# Patient Record
Sex: MALE | Race: WHITE | NOT HISPANIC OR LATINO | Employment: OTHER | URBAN - METROPOLITAN AREA
[De-identification: names, ages, dates, MRNs, and addresses within clinical notes are randomized per-mention and may not be internally consistent; named-entity substitution may affect disease eponyms.]

---

## 2017-02-02 ENCOUNTER — GENERIC CONVERSION - ENCOUNTER (OUTPATIENT)
Dept: OTHER | Facility: OTHER | Age: 72
End: 2017-02-02

## 2017-02-22 ENCOUNTER — GENERIC CONVERSION - ENCOUNTER (OUTPATIENT)
Dept: OTHER | Facility: OTHER | Age: 72
End: 2017-02-22

## 2017-03-16 ENCOUNTER — GENERIC CONVERSION - ENCOUNTER (OUTPATIENT)
Dept: OTHER | Facility: OTHER | Age: 72
End: 2017-03-16

## 2017-04-04 ENCOUNTER — ALLSCRIPTS OFFICE VISIT (OUTPATIENT)
Dept: OTHER | Facility: OTHER | Age: 72
End: 2017-04-04

## 2017-04-06 ENCOUNTER — TRANSCRIBE ORDERS (OUTPATIENT)
Dept: ADMINISTRATIVE | Facility: HOSPITAL | Age: 72
End: 2017-04-06

## 2017-04-06 DIAGNOSIS — I49.9 IRREGULAR HEART BEATS: Primary | ICD-10-CM

## 2017-04-09 ENCOUNTER — HOSPITAL ENCOUNTER (EMERGENCY)
Facility: HOSPITAL | Age: 72
Discharge: HOME | End: 2017-04-09
Attending: EMERGENCY MEDICINE
Payer: COMMERCIAL

## 2017-04-09 ENCOUNTER — APPOINTMENT (EMERGENCY)
Dept: RADIOLOGY | Facility: HOSPITAL | Age: 72
End: 2017-04-09
Payer: COMMERCIAL

## 2017-04-09 VITALS
TEMPERATURE: 98 F | DIASTOLIC BLOOD PRESSURE: 80 MMHG | SYSTOLIC BLOOD PRESSURE: 168 MMHG | RESPIRATION RATE: 16 BRPM | OXYGEN SATURATION: 97 % | HEART RATE: 87 BPM | WEIGHT: 147.2 LBS

## 2017-04-09 DIAGNOSIS — S42.309A: Primary | ICD-10-CM

## 2017-04-09 PROCEDURE — 73030 X-RAY EXAM OF SHOULDER: CPT

## 2017-04-09 PROCEDURE — 96372 THER/PROPH/DIAG INJ SC/IM: CPT

## 2017-04-09 PROCEDURE — 99283 EMERGENCY DEPT VISIT LOW MDM: CPT

## 2017-04-09 RX ORDER — MORPHINE SULFATE 10 MG/ML
6 INJECTION, SOLUTION INTRAMUSCULAR; INTRAVENOUS ONCE
Status: COMPLETED | OUTPATIENT
Start: 2017-04-09 | End: 2017-04-09

## 2017-04-09 RX ORDER — ONDANSETRON 4 MG/1
4 TABLET, ORALLY DISINTEGRATING ORAL ONCE
Status: COMPLETED | OUTPATIENT
Start: 2017-04-09 | End: 2017-04-09

## 2017-04-09 RX ORDER — OXYCODONE HYDROCHLORIDE 5 MG/1
5 TABLET ORAL EVERY 4 HOURS PRN
Qty: 12 TABLET | Refills: 0 | Status: SHIPPED | OUTPATIENT
Start: 2017-04-09 | End: 2017-04-21

## 2017-04-09 RX ADMIN — ONDANSETRON 4 MG: 4 TABLET, ORALLY DISINTEGRATING ORAL at 19:14

## 2017-04-09 RX ADMIN — MORPHINE SULFATE 6 MG: 10 INJECTION, SOLUTION INTRAMUSCULAR; INTRAVENOUS at 19:14

## 2017-04-10 ENCOUNTER — ALLSCRIPTS OFFICE VISIT (OUTPATIENT)
Dept: OTHER | Facility: OTHER | Age: 72
End: 2017-04-10

## 2017-04-10 DIAGNOSIS — S42.202A CLOSED FRACTURE OF UPPER END OF LEFT HUMERUS: ICD-10-CM

## 2017-04-22 ENCOUNTER — HOSPITAL ENCOUNTER (EMERGENCY)
Facility: HOSPITAL | Age: 72
Discharge: HOME | End: 2017-04-22
Attending: EMERGENCY MEDICINE | Admitting: EMERGENCY MEDICINE
Payer: COMMERCIAL

## 2017-04-22 VITALS
HEART RATE: 88 BPM | SYSTOLIC BLOOD PRESSURE: 141 MMHG | OXYGEN SATURATION: 96 % | DIASTOLIC BLOOD PRESSURE: 81 MMHG | RESPIRATION RATE: 18 BRPM | TEMPERATURE: 97.6 F

## 2017-04-22 DIAGNOSIS — S20.212A CONTUSION OF RIB ON LEFT SIDE, INITIAL ENCOUNTER: Primary | ICD-10-CM

## 2017-04-22 PROCEDURE — A9270 NON-COVERED ITEM OR SERVICE: HCPCS | Performed by: EMERGENCY MEDICINE

## 2017-04-22 PROCEDURE — 99284 EMERGENCY DEPT VISIT MOD MDM: CPT

## 2017-04-22 RX ORDER — IBUPROFEN 400 MG/1
400 TABLET ORAL ONCE
Status: COMPLETED | OUTPATIENT
Start: 2017-04-22 | End: 2017-04-22

## 2017-04-22 RX ADMIN — IBUPROFEN 400 MG: 400 TABLET ORAL at 00:53

## 2017-04-24 ENCOUNTER — APPOINTMENT (OUTPATIENT)
Dept: PHYSICAL THERAPY | Facility: CLINIC | Age: 72
End: 2017-04-24
Payer: COMMERCIAL

## 2017-04-24 DIAGNOSIS — S42.202A CLOSED FRACTURE OF UPPER END OF LEFT HUMERUS: ICD-10-CM

## 2017-04-24 PROCEDURE — 97110 THERAPEUTIC EXERCISES: CPT

## 2017-04-24 PROCEDURE — 97162 PT EVAL MOD COMPLEX 30 MIN: CPT

## 2017-04-26 ENCOUNTER — HOSPITAL ENCOUNTER (OUTPATIENT)
Dept: NON INVASIVE DIAGNOSTICS | Facility: CLINIC | Age: 72
Discharge: HOME | End: 2017-04-26
Payer: COMMERCIAL

## 2017-04-26 DIAGNOSIS — I49.9 IRREGULAR HEART BEATS: ICD-10-CM

## 2017-04-26 PROCEDURE — 93306 TTE W/DOPPLER COMPLETE: CPT

## 2017-04-27 ENCOUNTER — APPOINTMENT (OUTPATIENT)
Dept: PHYSICAL THERAPY | Facility: CLINIC | Age: 72
End: 2017-04-27
Payer: COMMERCIAL

## 2017-04-27 PROCEDURE — 97140 MANUAL THERAPY 1/> REGIONS: CPT

## 2017-04-27 PROCEDURE — 97110 THERAPEUTIC EXERCISES: CPT

## 2017-05-01 ENCOUNTER — APPOINTMENT (OUTPATIENT)
Dept: PHYSICAL THERAPY | Facility: CLINIC | Age: 72
End: 2017-05-01
Payer: COMMERCIAL

## 2017-05-01 PROCEDURE — 97140 MANUAL THERAPY 1/> REGIONS: CPT

## 2017-05-01 PROCEDURE — 97010 HOT OR COLD PACKS THERAPY: CPT

## 2017-05-01 PROCEDURE — 97112 NEUROMUSCULAR REEDUCATION: CPT

## 2017-05-03 ENCOUNTER — APPOINTMENT (OUTPATIENT)
Dept: PHYSICAL THERAPY | Facility: CLINIC | Age: 72
End: 2017-05-03
Payer: COMMERCIAL

## 2017-05-03 PROCEDURE — 97140 MANUAL THERAPY 1/> REGIONS: CPT

## 2017-05-03 PROCEDURE — 97112 NEUROMUSCULAR REEDUCATION: CPT

## 2017-05-03 PROCEDURE — 97010 HOT OR COLD PACKS THERAPY: CPT

## 2017-05-08 ENCOUNTER — APPOINTMENT (OUTPATIENT)
Dept: PHYSICAL THERAPY | Facility: CLINIC | Age: 72
End: 2017-05-08
Payer: COMMERCIAL

## 2017-05-08 ENCOUNTER — ALLSCRIPTS OFFICE VISIT (OUTPATIENT)
Dept: OTHER | Facility: OTHER | Age: 72
End: 2017-05-08

## 2017-05-08 ENCOUNTER — HOSPITAL ENCOUNTER (OUTPATIENT)
Dept: RADIOLOGY | Facility: CLINIC | Age: 72
Discharge: HOME | End: 2017-05-08
Payer: COMMERCIAL

## 2017-05-08 DIAGNOSIS — S42.202A CLOSED FRACTURE OF UPPER END OF LEFT HUMERUS: ICD-10-CM

## 2017-05-08 PROCEDURE — 97140 MANUAL THERAPY 1/> REGIONS: CPT

## 2017-05-08 PROCEDURE — 73030 X-RAY EXAM OF SHOULDER: CPT

## 2017-05-08 PROCEDURE — 97112 NEUROMUSCULAR REEDUCATION: CPT

## 2017-05-10 ENCOUNTER — APPOINTMENT (OUTPATIENT)
Dept: PHYSICAL THERAPY | Facility: CLINIC | Age: 72
End: 2017-05-10
Payer: COMMERCIAL

## 2017-05-10 PROCEDURE — 97140 MANUAL THERAPY 1/> REGIONS: CPT

## 2017-05-10 PROCEDURE — 97112 NEUROMUSCULAR REEDUCATION: CPT

## 2017-05-15 ENCOUNTER — APPOINTMENT (OUTPATIENT)
Dept: PHYSICAL THERAPY | Facility: CLINIC | Age: 72
End: 2017-05-15
Payer: COMMERCIAL

## 2017-05-15 PROCEDURE — 97140 MANUAL THERAPY 1/> REGIONS: CPT

## 2017-05-15 PROCEDURE — 97112 NEUROMUSCULAR REEDUCATION: CPT

## 2017-05-18 ENCOUNTER — APPOINTMENT (OUTPATIENT)
Dept: PHYSICAL THERAPY | Facility: CLINIC | Age: 72
End: 2017-05-18
Payer: COMMERCIAL

## 2017-05-18 ENCOUNTER — GENERIC CONVERSION - ENCOUNTER (OUTPATIENT)
Dept: OTHER | Facility: OTHER | Age: 72
End: 2017-05-18

## 2017-05-19 ENCOUNTER — GENERIC CONVERSION - ENCOUNTER (OUTPATIENT)
Dept: OTHER | Facility: OTHER | Age: 72
End: 2017-05-19

## 2017-05-19 ENCOUNTER — APPOINTMENT (OUTPATIENT)
Dept: PHYSICAL THERAPY | Facility: CLINIC | Age: 72
End: 2017-05-19
Payer: COMMERCIAL

## 2017-05-19 PROCEDURE — 97140 MANUAL THERAPY 1/> REGIONS: CPT

## 2017-05-19 PROCEDURE — 97110 THERAPEUTIC EXERCISES: CPT

## 2017-05-22 ENCOUNTER — APPOINTMENT (OUTPATIENT)
Dept: PHYSICAL THERAPY | Facility: CLINIC | Age: 72
End: 2017-05-22
Payer: COMMERCIAL

## 2017-05-22 PROCEDURE — 97112 NEUROMUSCULAR REEDUCATION: CPT

## 2017-05-22 PROCEDURE — 97140 MANUAL THERAPY 1/> REGIONS: CPT

## 2017-05-24 ENCOUNTER — APPOINTMENT (OUTPATIENT)
Dept: PHYSICAL THERAPY | Facility: CLINIC | Age: 72
End: 2017-05-24
Payer: COMMERCIAL

## 2017-05-24 PROCEDURE — 97112 NEUROMUSCULAR REEDUCATION: CPT

## 2017-05-24 PROCEDURE — 97140 MANUAL THERAPY 1/> REGIONS: CPT

## 2017-05-30 ENCOUNTER — APPOINTMENT (OUTPATIENT)
Dept: PHYSICAL THERAPY | Facility: CLINIC | Age: 72
End: 2017-05-30
Payer: COMMERCIAL

## 2017-05-30 PROCEDURE — 97110 THERAPEUTIC EXERCISES: CPT

## 2017-05-30 PROCEDURE — 97140 MANUAL THERAPY 1/> REGIONS: CPT

## 2017-05-30 PROCEDURE — 97112 NEUROMUSCULAR REEDUCATION: CPT

## 2017-06-01 ENCOUNTER — APPOINTMENT (OUTPATIENT)
Dept: PHYSICAL THERAPY | Facility: CLINIC | Age: 72
End: 2017-06-01
Payer: COMMERCIAL

## 2017-06-01 ENCOUNTER — GENERIC CONVERSION - ENCOUNTER (OUTPATIENT)
Dept: OTHER | Facility: OTHER | Age: 72
End: 2017-06-01

## 2017-06-01 PROCEDURE — 97140 MANUAL THERAPY 1/> REGIONS: CPT

## 2017-06-01 PROCEDURE — 97112 NEUROMUSCULAR REEDUCATION: CPT

## 2017-06-01 PROCEDURE — 97110 THERAPEUTIC EXERCISES: CPT

## 2017-06-05 ENCOUNTER — APPOINTMENT (OUTPATIENT)
Dept: PHYSICAL THERAPY | Facility: CLINIC | Age: 72
End: 2017-06-05
Payer: COMMERCIAL

## 2017-06-05 PROCEDURE — 97110 THERAPEUTIC EXERCISES: CPT

## 2017-06-05 PROCEDURE — 97140 MANUAL THERAPY 1/> REGIONS: CPT

## 2017-06-05 PROCEDURE — 97112 NEUROMUSCULAR REEDUCATION: CPT

## 2017-06-06 ENCOUNTER — GENERIC CONVERSION - ENCOUNTER (OUTPATIENT)
Dept: OTHER | Facility: OTHER | Age: 72
End: 2017-06-06

## 2017-06-07 ENCOUNTER — APPOINTMENT (OUTPATIENT)
Dept: PHYSICAL THERAPY | Facility: CLINIC | Age: 72
End: 2017-06-07
Payer: COMMERCIAL

## 2017-06-07 PROCEDURE — 97112 NEUROMUSCULAR REEDUCATION: CPT

## 2017-06-07 PROCEDURE — 97110 THERAPEUTIC EXERCISES: CPT

## 2017-06-07 PROCEDURE — 97140 MANUAL THERAPY 1/> REGIONS: CPT

## 2017-06-14 ENCOUNTER — APPOINTMENT (OUTPATIENT)
Dept: PHYSICAL THERAPY | Facility: CLINIC | Age: 72
End: 2017-06-14
Payer: COMMERCIAL

## 2017-06-15 ENCOUNTER — APPOINTMENT (OUTPATIENT)
Dept: PHYSICAL THERAPY | Facility: CLINIC | Age: 72
End: 2017-06-15
Payer: COMMERCIAL

## 2017-06-15 ENCOUNTER — GENERIC CONVERSION - ENCOUNTER (OUTPATIENT)
Dept: OTHER | Facility: OTHER | Age: 72
End: 2017-06-15

## 2017-06-15 PROCEDURE — 97140 MANUAL THERAPY 1/> REGIONS: CPT

## 2017-06-15 PROCEDURE — 97112 NEUROMUSCULAR REEDUCATION: CPT

## 2017-06-15 PROCEDURE — 97110 THERAPEUTIC EXERCISES: CPT

## 2017-06-16 ENCOUNTER — APPOINTMENT (OUTPATIENT)
Dept: PHYSICAL THERAPY | Facility: CLINIC | Age: 72
End: 2017-06-16
Payer: COMMERCIAL

## 2017-06-16 PROCEDURE — 97140 MANUAL THERAPY 1/> REGIONS: CPT

## 2017-06-16 PROCEDURE — 97112 NEUROMUSCULAR REEDUCATION: CPT

## 2017-06-16 PROCEDURE — 97110 THERAPEUTIC EXERCISES: CPT

## 2017-06-19 ENCOUNTER — ALLSCRIPTS OFFICE VISIT (OUTPATIENT)
Dept: OTHER | Facility: OTHER | Age: 72
End: 2017-06-19

## 2017-06-19 ENCOUNTER — APPOINTMENT (OUTPATIENT)
Dept: PHYSICAL THERAPY | Facility: CLINIC | Age: 72
End: 2017-06-19
Payer: COMMERCIAL

## 2017-06-19 DIAGNOSIS — S42.202A CLOSED FRACTURE OF UPPER END OF LEFT HUMERUS: ICD-10-CM

## 2017-06-19 PROCEDURE — 97110 THERAPEUTIC EXERCISES: CPT

## 2017-06-19 PROCEDURE — 97140 MANUAL THERAPY 1/> REGIONS: CPT

## 2017-06-19 PROCEDURE — 97112 NEUROMUSCULAR REEDUCATION: CPT

## 2017-06-21 ENCOUNTER — APPOINTMENT (OUTPATIENT)
Dept: PHYSICAL THERAPY | Facility: CLINIC | Age: 72
End: 2017-06-21
Payer: COMMERCIAL

## 2017-06-21 PROCEDURE — 97140 MANUAL THERAPY 1/> REGIONS: CPT

## 2017-06-21 PROCEDURE — 97112 NEUROMUSCULAR REEDUCATION: CPT

## 2017-06-21 PROCEDURE — 97110 THERAPEUTIC EXERCISES: CPT

## 2017-06-26 ENCOUNTER — APPOINTMENT (OUTPATIENT)
Dept: PHYSICAL THERAPY | Facility: CLINIC | Age: 72
End: 2017-06-26
Payer: COMMERCIAL

## 2017-06-26 PROCEDURE — 97112 NEUROMUSCULAR REEDUCATION: CPT

## 2017-06-26 PROCEDURE — 97140 MANUAL THERAPY 1/> REGIONS: CPT

## 2017-06-26 PROCEDURE — 97110 THERAPEUTIC EXERCISES: CPT

## 2017-06-28 ENCOUNTER — APPOINTMENT (OUTPATIENT)
Dept: PHYSICAL THERAPY | Facility: CLINIC | Age: 72
End: 2017-06-28
Payer: COMMERCIAL

## 2017-06-28 PROCEDURE — 97110 THERAPEUTIC EXERCISES: CPT

## 2017-06-28 PROCEDURE — 97140 MANUAL THERAPY 1/> REGIONS: CPT

## 2017-06-28 PROCEDURE — 97112 NEUROMUSCULAR REEDUCATION: CPT

## 2017-07-03 ENCOUNTER — APPOINTMENT (OUTPATIENT)
Dept: PHYSICAL THERAPY | Facility: CLINIC | Age: 72
End: 2017-07-03
Payer: COMMERCIAL

## 2017-07-03 PROCEDURE — 97110 THERAPEUTIC EXERCISES: CPT

## 2017-07-03 PROCEDURE — 97112 NEUROMUSCULAR REEDUCATION: CPT

## 2017-07-03 PROCEDURE — 97140 MANUAL THERAPY 1/> REGIONS: CPT

## 2017-07-05 ENCOUNTER — GENERIC CONVERSION - ENCOUNTER (OUTPATIENT)
Dept: OTHER | Facility: OTHER | Age: 72
End: 2017-07-05

## 2017-07-05 ENCOUNTER — APPOINTMENT (OUTPATIENT)
Dept: PHYSICAL THERAPY | Facility: CLINIC | Age: 72
End: 2017-07-05
Payer: COMMERCIAL

## 2017-07-05 PROCEDURE — 97112 NEUROMUSCULAR REEDUCATION: CPT

## 2017-07-05 PROCEDURE — 97110 THERAPEUTIC EXERCISES: CPT

## 2017-07-05 PROCEDURE — 97140 MANUAL THERAPY 1/> REGIONS: CPT

## 2017-07-06 ENCOUNTER — ALLSCRIPTS OFFICE VISIT (OUTPATIENT)
Dept: OTHER | Facility: OTHER | Age: 72
End: 2017-07-06

## 2017-07-06 ENCOUNTER — GENERIC CONVERSION - ENCOUNTER (OUTPATIENT)
Dept: OTHER | Facility: OTHER | Age: 72
End: 2017-07-06

## 2017-07-12 ENCOUNTER — APPOINTMENT (OUTPATIENT)
Dept: PHYSICAL THERAPY | Facility: CLINIC | Age: 72
End: 2017-07-12
Payer: COMMERCIAL

## 2017-07-12 PROCEDURE — 97112 NEUROMUSCULAR REEDUCATION: CPT

## 2017-07-12 PROCEDURE — 97140 MANUAL THERAPY 1/> REGIONS: CPT

## 2017-07-12 PROCEDURE — 97110 THERAPEUTIC EXERCISES: CPT

## 2017-07-14 ENCOUNTER — APPOINTMENT (OUTPATIENT)
Dept: PHYSICAL THERAPY | Facility: CLINIC | Age: 72
End: 2017-07-14
Payer: COMMERCIAL

## 2017-07-14 PROCEDURE — 97110 THERAPEUTIC EXERCISES: CPT

## 2017-07-14 PROCEDURE — 97112 NEUROMUSCULAR REEDUCATION: CPT

## 2017-07-14 PROCEDURE — 97140 MANUAL THERAPY 1/> REGIONS: CPT

## 2017-07-17 ENCOUNTER — APPOINTMENT (OUTPATIENT)
Dept: PHYSICAL THERAPY | Facility: CLINIC | Age: 72
End: 2017-07-17
Payer: COMMERCIAL

## 2017-07-17 PROCEDURE — 97112 NEUROMUSCULAR REEDUCATION: CPT

## 2017-07-17 PROCEDURE — 97110 THERAPEUTIC EXERCISES: CPT

## 2017-07-17 PROCEDURE — 97140 MANUAL THERAPY 1/> REGIONS: CPT

## 2017-07-19 ENCOUNTER — ALLSCRIPTS OFFICE VISIT (OUTPATIENT)
Dept: OTHER | Facility: OTHER | Age: 72
End: 2017-07-19

## 2017-07-19 ENCOUNTER — APPOINTMENT (OUTPATIENT)
Dept: PHYSICAL THERAPY | Facility: CLINIC | Age: 72
End: 2017-07-19
Payer: COMMERCIAL

## 2017-07-19 PROCEDURE — 97140 MANUAL THERAPY 1/> REGIONS: CPT

## 2017-07-19 PROCEDURE — 97112 NEUROMUSCULAR REEDUCATION: CPT

## 2017-07-19 PROCEDURE — 97110 THERAPEUTIC EXERCISES: CPT

## 2017-07-20 ENCOUNTER — GENERIC CONVERSION - ENCOUNTER (OUTPATIENT)
Dept: OTHER | Facility: OTHER | Age: 72
End: 2017-07-20

## 2017-07-24 ENCOUNTER — APPOINTMENT (OUTPATIENT)
Dept: PHYSICAL THERAPY | Facility: CLINIC | Age: 72
End: 2017-07-24
Payer: COMMERCIAL

## 2017-07-24 PROCEDURE — 97140 MANUAL THERAPY 1/> REGIONS: CPT

## 2017-07-24 PROCEDURE — 97110 THERAPEUTIC EXERCISES: CPT

## 2017-07-24 PROCEDURE — 97112 NEUROMUSCULAR REEDUCATION: CPT

## 2017-07-26 ENCOUNTER — APPOINTMENT (OUTPATIENT)
Dept: PHYSICAL THERAPY | Facility: CLINIC | Age: 72
End: 2017-07-26
Payer: COMMERCIAL

## 2017-07-26 PROCEDURE — 97140 MANUAL THERAPY 1/> REGIONS: CPT

## 2017-07-26 PROCEDURE — 97112 NEUROMUSCULAR REEDUCATION: CPT

## 2017-07-26 PROCEDURE — 97110 THERAPEUTIC EXERCISES: CPT

## 2017-07-31 ENCOUNTER — APPOINTMENT (OUTPATIENT)
Dept: PHYSICAL THERAPY | Facility: CLINIC | Age: 72
End: 2017-07-31
Payer: COMMERCIAL

## 2017-07-31 PROCEDURE — 97112 NEUROMUSCULAR REEDUCATION: CPT

## 2017-07-31 PROCEDURE — 97110 THERAPEUTIC EXERCISES: CPT

## 2017-07-31 PROCEDURE — 97140 MANUAL THERAPY 1/> REGIONS: CPT

## 2017-08-04 ENCOUNTER — APPOINTMENT (OUTPATIENT)
Dept: PHYSICAL THERAPY | Facility: CLINIC | Age: 72
End: 2017-08-04
Payer: COMMERCIAL

## 2017-08-04 PROCEDURE — 97110 THERAPEUTIC EXERCISES: CPT

## 2017-08-04 PROCEDURE — 97112 NEUROMUSCULAR REEDUCATION: CPT

## 2017-08-04 PROCEDURE — 97140 MANUAL THERAPY 1/> REGIONS: CPT

## 2017-08-07 ENCOUNTER — APPOINTMENT (OUTPATIENT)
Dept: PHYSICAL THERAPY | Facility: CLINIC | Age: 72
End: 2017-08-07
Payer: COMMERCIAL

## 2017-08-07 PROCEDURE — 97110 THERAPEUTIC EXERCISES: CPT

## 2017-08-07 PROCEDURE — 97140 MANUAL THERAPY 1/> REGIONS: CPT

## 2017-08-07 PROCEDURE — 97112 NEUROMUSCULAR REEDUCATION: CPT

## 2017-08-16 ENCOUNTER — APPOINTMENT (OUTPATIENT)
Dept: PHYSICAL THERAPY | Facility: CLINIC | Age: 72
End: 2017-08-16
Payer: COMMERCIAL

## 2017-08-17 ENCOUNTER — APPOINTMENT (OUTPATIENT)
Dept: PHYSICAL THERAPY | Facility: CLINIC | Age: 72
End: 2017-08-17
Payer: COMMERCIAL

## 2017-08-17 PROCEDURE — 97112 NEUROMUSCULAR REEDUCATION: CPT

## 2017-08-17 PROCEDURE — 97110 THERAPEUTIC EXERCISES: CPT

## 2017-08-17 PROCEDURE — 97140 MANUAL THERAPY 1/> REGIONS: CPT

## 2017-08-18 ENCOUNTER — APPOINTMENT (OUTPATIENT)
Dept: PHYSICAL THERAPY | Facility: CLINIC | Age: 72
End: 2017-08-18
Payer: COMMERCIAL

## 2017-08-18 ENCOUNTER — GENERIC CONVERSION - ENCOUNTER (OUTPATIENT)
Dept: OTHER | Facility: OTHER | Age: 72
End: 2017-08-18

## 2017-08-18 PROCEDURE — 97112 NEUROMUSCULAR REEDUCATION: CPT

## 2017-08-18 PROCEDURE — 97110 THERAPEUTIC EXERCISES: CPT

## 2017-08-18 PROCEDURE — 97140 MANUAL THERAPY 1/> REGIONS: CPT

## 2017-08-21 ENCOUNTER — GENERIC CONVERSION - ENCOUNTER (OUTPATIENT)
Dept: OBGYN CLINIC | Facility: CLINIC | Age: 72
End: 2017-08-21

## 2017-10-05 ENCOUNTER — HOSPITAL ENCOUNTER (EMERGENCY)
Facility: HOSPITAL | Age: 72
Discharge: HOME | End: 2017-10-05
Attending: EMERGENCY MEDICINE | Admitting: EMERGENCY MEDICINE
Payer: MEDICARE

## 2017-10-05 ENCOUNTER — GENERIC CONVERSION - ENCOUNTER (OUTPATIENT)
Dept: OTHER | Facility: OTHER | Age: 72
End: 2017-10-05

## 2017-10-05 ENCOUNTER — APPOINTMENT (EMERGENCY)
Dept: CT IMAGING | Facility: HOSPITAL | Age: 72
End: 2017-10-05
Payer: MEDICARE

## 2017-10-05 ENCOUNTER — TRANSCRIBE ORDERS (OUTPATIENT)
Dept: ADMINISTRATIVE | Facility: HOSPITAL | Age: 72
End: 2017-10-05

## 2017-10-05 ENCOUNTER — ALLSCRIPTS OFFICE VISIT (OUTPATIENT)
Dept: OTHER | Facility: OTHER | Age: 72
End: 2017-10-05

## 2017-10-05 VITALS
OXYGEN SATURATION: 98 % | DIASTOLIC BLOOD PRESSURE: 74 MMHG | RESPIRATION RATE: 18 BRPM | HEART RATE: 81 BPM | TEMPERATURE: 97.6 F | WEIGHT: 147.4 LBS | SYSTOLIC BLOOD PRESSURE: 128 MMHG

## 2017-10-05 DIAGNOSIS — I49.8 NODAL RHYTHM DISORDER: Primary | ICD-10-CM

## 2017-10-05 DIAGNOSIS — S22.39XA RIB FRACTURE: Primary | ICD-10-CM

## 2017-10-05 DIAGNOSIS — S32.000A LUMBAR COMPRESSION FRACTURE (HCC): ICD-10-CM

## 2017-10-05 PROCEDURE — 74176 CT ABD & PELVIS W/O CONTRAST: CPT

## 2017-10-05 PROCEDURE — 99283 EMERGENCY DEPT VISIT LOW MDM: CPT

## 2017-10-05 RX ORDER — TRAMADOL HYDROCHLORIDE 50 MG/1
50 TABLET ORAL EVERY 6 HOURS PRN
Qty: 20 TABLET | Refills: 0 | Status: SHIPPED | OUTPATIENT
Start: 2017-10-05 | End: 2017-10-15

## 2017-10-05 RX ORDER — IBUPROFEN 600 MG/1
600 TABLET ORAL ONCE
Status: COMPLETED | OUTPATIENT
Start: 2017-10-05 | End: 2017-10-05

## 2017-10-05 RX ORDER — ACETAMINOPHEN 160 MG
2000 TABLET,DISINTEGRATING ORAL DAILY
COMMUNITY
End: 2021-01-01 | Stop reason: HOSPADM

## 2017-10-05 RX ORDER — LEVOTHYROXINE SODIUM 0.15 MG/1
150 TABLET ORAL DAILY
COMMUNITY
End: 2019-04-05 | Stop reason: HOSPADM

## 2017-10-05 RX ORDER — ASPIRIN 81 MG/1
81 TABLET, CHEWABLE ORAL DAILY
COMMUNITY
End: 2021-01-01 | Stop reason: HOSPADM

## 2017-10-05 RX ORDER — AMLODIPINE BESYLATE 5 MG/1
5 TABLET ORAL DAILY
COMMUNITY
End: 2018-07-06

## 2017-10-05 RX ORDER — PANTOPRAZOLE SODIUM 40 MG/1
40 TABLET, DELAYED RELEASE ORAL
COMMUNITY
End: 2021-01-01 | Stop reason: HOSPADM

## 2017-10-05 RX ORDER — GABAPENTIN 100 MG/1
100 CAPSULE ORAL 3 TIMES DAILY
COMMUNITY
End: 2019-04-10

## 2017-10-05 RX ORDER — LISINOPRIL 20 MG/1
30 TABLET ORAL DAILY
COMMUNITY
End: 2019-04-10

## 2017-10-05 RX ORDER — TAMSULOSIN HYDROCHLORIDE 0.4 MG/1
0.4 CAPSULE ORAL
COMMUNITY
End: 2021-01-01 | Stop reason: HOSPADM

## 2017-10-05 RX ADMIN — IBUPROFEN 600 MG: 600 TABLET ORAL at 12:37

## 2017-10-05 NOTE — ED PROVIDER NOTES
History  Chief Complaint   Patient presents with    Back Pain     states he fell friday on his left side, denies hitting his head or loc  "thought the back pain would go away and it was just bruised but the pain is still there"       History provided by:  Patient  Back Pain   Location:  Lumbar spine  Quality:  Aching  Radiates to:  Does not radiate (left rib and lower back pain as well)  Pain severity:  Moderate  Onset quality:  Gradual  Timing:  Constant  Progression:  Worsening  Chronicity:  New  Context: recent injury    Context comment:  Patient fell several days ago and injured himself  Relieved by:  Being still  Worsened by:  Deep breathing and twisting  Ineffective treatments:  None tried  Associated symptoms: no abdominal pain, no abdominal swelling, no bladder incontinence, no bowel incontinence, no chest pain, no dysuria, no fever, no headaches, no leg pain, no numbness, no perianal numbness and no weakness        Prior to Admission Medications   Prescriptions Last Dose Informant Patient Reported? Taking?    Atorvastatin Calcium (LIPITOR PO)   Yes Yes   Sig: Take 10 mg by mouth   Cholecalciferol (VITAMIN D3) 2000 units capsule   Yes No   Sig: Take 2,000 Units by mouth daily   Insulin Degludec (TRESIBA FLEXTOUCH) 100 UNIT/ML SOPN   Yes Yes   Sig: Inject under the skin   amLODIPine (NORVASC) 5 mg tablet   Yes Yes   Sig: Take 5 mg by mouth daily   aspirin 81 mg chewable tablet   Yes Yes   Sig: Chew 81 mg daily   gabapentin (NEURONTIN) 100 mg capsule   Yes Yes   Sig: Take 100 mg by mouth   insulin aspart (NOVOLOG FLEXPEN) 100 Units/mL SOPN   Yes Yes   Sig: Inject under the skin   levothyroxine 150 mcg tablet   Yes Yes   Sig: Take by mouth   lisinopril (ZESTRIL) 20 mg tablet   Yes Yes   Sig: Take 20 mg by mouth   pantoprazole (PROTONIX) 40 mg tablet   Yes Yes   Sig: Take 40 mg by mouth   tamsulosin (FLOMAX) 0 4 mg   Yes Yes   Sig: Take by mouth      Facility-Administered Medications: None       Past Medical History:   Diagnosis Date    Cardiac disease     Diabetes mellitus ([facility] [address] )     Disease of thyroid gland     Pacemaker        Past Surgical History:   Procedure Laterality Date    CARDIAC PACEMAKER PLACEMENT Left 2015    CARDIAC PACEMAKER PLACEMENT      MUSCLE FLAP      right foot    TOE AMPUTATION      left 5th toe       History reviewed  No pertinent family history  I have reviewed and agree with the history as documented  Social History   Substance Use Topics    Smoking status: Former Smoker     Types: Pipe    Smokeless tobacco: Never Used    Alcohol use Yes      Comment: socially        Review of Systems   Constitutional: Negative for chills and fever  HENT: Negative for rhinorrhea, sore throat and trouble swallowing  Eyes: Negative for pain  Respiratory: Negative for cough, shortness of breath, wheezing and stridor  Cardiovascular: Negative for chest pain and leg swelling  Gastrointestinal: Negative for abdominal pain, bowel incontinence, diarrhea and nausea  Endocrine: Negative for polyuria  Genitourinary: Negative for bladder incontinence, dysuria, flank pain and urgency  Musculoskeletal: Positive for back pain  Negative for joint swelling, myalgias and neck stiffness  Skin: Negative for rash  Allergic/Immunologic: Negative for immunocompromised state  Neurological: Negative for dizziness, syncope, weakness, numbness and headaches  Psychiatric/Behavioral: Negative for confusion and suicidal ideas  All other systems reviewed and are negative  Physical Exam  ED Triage Vitals [10/05/17 1205]   Temperature Pulse Respirations Blood Pressure SpO2   97 6 °F (36 4 °C) 81 18 128/74 98 %      Temp Source Heart Rate Source Patient Position - Orthostatic VS BP Location FiO2 (%)   Oral Monitor Sitting Left arm --      Pain Score       --           Physical Exam   Constitutional: He is oriented to person, place, and time  He appears well-developed and well-nourished     HENT: Head: Normocephalic and atraumatic  Eyes: EOM are normal  Pupils are equal, round, and reactive to light  Neck: Normal range of motion  Neck supple  Cardiovascular: Normal rate and regular rhythm  Exam reveals no friction rub  No murmur heard  Pulmonary/Chest: Breath sounds normal  No respiratory distress  He has no wheezes  He has no rales  Abdominal: Soft  Bowel sounds are normal  He exhibits no distension  There is no tenderness  Musculoskeletal: Normal range of motion  He exhibits tenderness  He exhibits no edema  Back:    Neurological: He is alert and oriented to person, place, and time  Skin: Skin is warm  No rash noted  Psychiatric: He has a normal mood and affect  Nursing note and vitals reviewed  ED Medications  Medications   ibuprofen (MOTRIN) tablet 600 mg (600 mg Oral Given 10/5/17 1237)       Diagnostic Studies  Labs Reviewed - No data to display    CT abdomen pelvis wo contrast   Final Result      1  L1 superior endplate fracture with mild loss of height  No malalignment  2   Healing fracture of the posterior left 12th rib  3   No traumatic injury to the abdominal or pelvic viscera though evaluation is limited by lack of intravenous contrast       4   Moderate to severe fecal stasis  5   Prostatomegaly  6   Mild cholelithiasis  ##[id number]##[id number]                      Workstation performed: [id number]             Procedures  Procedures      Phone Contacts  ED Phone Contact    ED Course  ED Course                                MDM  Number of Diagnoses or Management Options  Lumbar compression fracture [facility]): new and requires workup  Rib fracture: new and requires workup  Diagnosis management comments: Noted superior endplate fracture on L1 which is causing the patient's pain has a healing left-sided 12 rib fracture  Likely the cause the patient's symptoms no evidence of hemorrhage   Recommend pain control outpatient management and follow-up given strict instructions when to return back to the emergency department  Pt re-examined and evaluated after testing and treatment  Spoke with the patient and feeling improved and sxs have resolved  Will discharge home with close f/u with pcp and instructed to return to the ED if sxs worsen or continue  Pt agrees with the plan for discharge and feels comfortable to go home with proper f/u  Advised to return for worsening or additional problems  Diagnostic tests were reviewed and questions answered  Diagnosis, care plan and treatment options were discussed  The patient understand instructions and will follow up as directed  Amount and/or Complexity of Data Reviewed  Tests in the radiology section of CPT®: ordered and reviewed      CritCare Time    Disposition  Final diagnoses:   Rib fracture   Lumbar compression fracture [facility])     ED Disposition     ED Disposition Condition Comment    Discharge  [place] discharge to home/self care      Condition at discharge: Stable        Follow-up Information    None       Discharge Medication List as of 10/5/2017  2:18 PM      START taking these medications    Details   traMADol (ULTRAM) 50 mg tablet Take 1 tablet by mouth every 6 (six) hours as needed for moderate pain for up to 10 days, Starting Thu 10/5/2017, Until Sun 10/15/2017, Print         CONTINUE these medications which have NOT CHANGED    Details   amLODIPine (NORVASC) 5 mg tablet Take 5 mg by mouth daily, Historical Med      aspirin 81 mg chewable tablet Chew 81 mg daily, Historical Med      Atorvastatin Calcium (LIPITOR PO) Take 10 mg by mouth, Historical Med      gabapentin (NEURONTIN) 100 mg capsule Take 100 mg by mouth, Historical Med      insulin aspart (NOVOLOG FLEXPEN) 100 Units/mL SOPN Inject under the skin, Starting Wed 7/19/2017, Historical Med      Insulin Degludec (TRESIBA FLEXTOUCH) 100 UNIT/ML SOPN Inject under the skin, Starting Wed 7/19/2017, Historical Med      levothyroxine 150 mcg tablet Take by mouth, Historical Med      lisinopril (ZESTRIL) 20 mg tablet Take 20 mg by mouth, Historical Med      pantoprazole (PROTONIX) 40 mg tablet Take 40 mg by mouth, Historical Med      tamsulosin (FLOMAX) 0 4 mg Take by mouth, Historical Med      Cholecalciferol (VITAMIN D3) 2000 units capsule Take 2,000 Units by mouth daily, Historical Med           No discharge procedures on file      ED Provider  Electronically Signed by       Sanjana Hadley DO  10/05/17 5358

## 2017-10-05 NOTE — DISCHARGE INSTRUCTIONS
Rib Fracture   AMBULATORY CARE:   A rib fracture  is a crack or break in a rib  A rib fracture can be caused by trauma such as a car accident or fall  Trauma can increase your risk for organ damage when your rib is fractured  Stress fractures in your ribs happen in your upper or middle ribs  Stress fractures can happen when you have a forceful long-term cough  Other things that increase your risk for rib fractures include being an older adult, osteoporosis, and tumors  Common signs and symptoms include:   · Chest wall pain that worsens when you breathe, move, or cough    · Bruising or swelling near your injury    · Shortness of breath or difficulty taking a deep breath  Call 911 for any of the following:   · You have trouble breathing  · You have new or increased pain  Seek care immediately if:   · Your pain does not get better, even after treatment  · You have a fever or a cough  Contact your healthcare provider if:   · You have questions or concerns about your condition or care  Treatment for a rib fracture  may include any of the following:  · Medicines:      ¨ NSAIDs  help decrease swelling and pain  NSAIDs are available without a doctor's order  Ask your healthcare provider which medicine is right for you  Ask how much to take and when to take it  Take as directed  NSAIDs can cause stomach bleeding and kidney problems if not taken correctly  ¨ Prescription pain medicine  may be given  Ask your healthcare provider how to take this medicine safely  Some prescription pain medicines contain acetaminophen  Do not take other medicines that contain acetaminophen without talking to your healthcare provider  Too much acetaminophen may cause liver damage  Prescription pain medicine may cause constipation  Ask your healthcare provider how to prevent or treat constipation  ¨ Intercostal nerve block  may be given to numb the injured area for about 6 hours   It is given as a shot between 2 of your ribs in the fractured area  You may need this if your pain continues or is getting worse even after you take oral pain medicines  · Deep breathing and coughing  will decrease your risk for a lung infection  Hug a pillow on the injured side while doing this exercise, to decrease pain  Take a deep breath and hold it for as long as possible  You should let the air out and then cough strongly  Deep breaths help open your airway  You may be given an incentive spirometer to help take deep breaths  Put the plastic piece in your mouth  Take a slow, deep breath  You should then let the air out and cough  Repeat these steps 10 times every hour  · Rest and limit activity as directed  to decrease swelling and pain, and allow your injury to heal  Avoid activities that may cause more pain or damage to your ribs such as, pulling, pushing, and lifting  As your pain decreases, begin movements slowly  Take short walks between rest periods  · Ice  helps decrease swelling and pain  Ice may also help prevent tissue damage  Use an ice pack or put crushed ice in a plastic bag  Cover it with a towel and place it on your injured area for 15 to 20 minutes every hour as directed  · Surgery  may be needed If many of your ribs are badly fractured  Surgery is often needed for a flail chest  Flail chest occurs if 3 or more of your ribs are broken in 2 or more places  This condition may make it hard for you to breathe  When you take a breath, your rib cage expands  The broken ribs with flail chest do not expand and may push inward on your organs  Broken ribs may need to be held together with plates and screws  An injury to an organ, nerve, or blood vessel may also be treated with surgery  Follow up with your healthcare provider as directed:  Write down your questions so you remember to ask them during your visits    © 2017 [employer] Information is for End User's use only and may not be sold, redistributed or otherwise used for commercial purposes  All illustrations and images included in CareNotes® are the copyrighted property of [employer]  or Kirill Ulrich  The above information is an  only  It is not intended as medical advice for individual conditions or treatments  Talk to your doctor, nurse or pharmacist before following any medical regimen to see if it is safe and effective for you  Pelvic Fracture   WHAT YOU NEED TO KNOW:   A pelvic fracture is a break in 1 or more of your 5 pelvic (hip) bones  This also includes a fracture of the acetabulum, the part of your pelvis that makes up your hip joint  Pelvic fractures can be caused by a car accident or a fall from a great height  Some pelvic fractures are caused by minor falls or injuries  DISCHARGE INSTRUCTIONS:   Medicines:   · Prescription pain medicine  may be given  Ask your healthcare provider how to take this medicine safely  · Take your medicine as directed  Contact your healthcare provider if you think your medicine is not helping or if you have side effects  Tell him or her if you are allergic to any medicine  Keep a list of the medicines, vitamins, and herbs you take  Include the amounts, and when and why you take them  Bring the list or the pill bottles to follow-up visits  Carry your medicine list with you in case of an emergency  Follow up with your healthcare provider as directed:  Write down your questions so you remember to ask them during your visits  Limit activity as directed:  Get plenty of rest while your fracture heals  When the pain decreases, begin normal, slow movements  Slowly start to do more each day  Rest when you feel it is needed  Do not lift heavy objects  Ice:  Apply ice on your hip for 15 to 20 minutes every hour or as directed  Use an ice pack, or put crushed ice in a plastic bag  Cover it with a towel  Ice helps prevent tissue damage and decreases swelling and pain  Walking devices:   You may need to use crutches or a walker until your pelvic injury heals  Ask for more information about how to use these walking devices if needed  Physical therapy:  A physical therapist may teach you exercises to strengthen your hip and legs once the pain is gone  Contact your healthcare provider if:   · Your skin is itchy, swollen, or has a rash  · Your pain or swelling increases  · You have new symptoms  · You have a fever  · You have questions or concerns about your condition or care  Seek care immediately or call 911 if:   · You feel lightheaded, short of breath, and have chest pain  · You cough up blood  · Your leg feels warm, tender, and painful  It may look swollen and red  © 2017 [employer]  Information is for End User's use only and may not be sold, redistributed or otherwise used for commercial purposes  All illustrations and images included in CareNotes® are the copyrighted property of [employer]  or Kirill Ulrich  The above information is an  only  It is not intended as medical advice for individual conditions or treatments  Talk to your doctor, nurse or pharmacist before following any medical regimen to see if it is safe and effective for you

## 2017-10-09 ENCOUNTER — ALLSCRIPTS OFFICE VISIT (OUTPATIENT)
Dept: OTHER | Facility: OTHER | Age: 72
End: 2017-10-09

## 2017-10-13 ENCOUNTER — HOSPITAL ENCOUNTER (OUTPATIENT)
Dept: SLEEP CENTER | Facility: CLINIC | Age: 72
Discharge: HOME | End: 2017-10-13
Payer: MEDICARE

## 2017-10-13 DIAGNOSIS — I49.8 NODAL RHYTHM DISORDER: ICD-10-CM

## 2017-10-13 DIAGNOSIS — G47.33 OSA (OBSTRUCTIVE SLEEP APNEA): ICD-10-CM

## 2017-10-13 PROCEDURE — 95810 POLYSOM 6/> YRS 4/> PARAM: CPT

## 2017-10-18 ENCOUNTER — HOSPITAL ENCOUNTER (OUTPATIENT)
Dept: SLEEP CENTER | Facility: CLINIC | Age: 72
Discharge: HOME | End: 2017-10-18
Payer: MEDICARE

## 2017-10-18 ENCOUNTER — TRANSCRIBE ORDERS (OUTPATIENT)
Dept: SLEEP CENTER | Facility: CLINIC | Age: 72
End: 2017-10-18

## 2017-10-18 DIAGNOSIS — G47.33 OSA (OBSTRUCTIVE SLEEP APNEA): ICD-10-CM

## 2017-10-18 DIAGNOSIS — G47.33 OSA (OBSTRUCTIVE SLEEP APNEA): Primary | ICD-10-CM

## 2017-10-18 PROCEDURE — 95811 POLYSOM 6/>YRS CPAP 4/> PARM: CPT

## 2017-10-19 ENCOUNTER — GENERIC CONVERSION - ENCOUNTER (OUTPATIENT)
Dept: OTHER | Facility: OTHER | Age: 72
End: 2017-10-19

## 2017-10-19 DIAGNOSIS — S32.009A CLOSED FRACTURE OF LUMBAR VERTEBRA (HCC): ICD-10-CM

## 2017-10-19 DIAGNOSIS — M62.830 MUSCLE SPASM OF BACK: ICD-10-CM

## 2017-10-27 ENCOUNTER — APPOINTMENT (OUTPATIENT)
Dept: PHYSICAL THERAPY | Facility: CLINIC | Age: 72
End: 2017-10-27
Payer: MEDICARE

## 2017-10-27 ENCOUNTER — GENERIC CONVERSION - ENCOUNTER (OUTPATIENT)
Dept: OTHER | Facility: OTHER | Age: 72
End: 2017-10-27

## 2017-10-27 DIAGNOSIS — M62.830 MUSCLE SPASM OF BACK: ICD-10-CM

## 2017-10-27 DIAGNOSIS — S32.009A CLOSED FRACTURE OF LUMBAR VERTEBRA (HCC): ICD-10-CM

## 2017-10-27 PROCEDURE — 97162 PT EVAL MOD COMPLEX 30 MIN: CPT

## 2017-10-27 PROCEDURE — G8990 OTHER PT/OT CURRENT STATUS: HCPCS

## 2017-10-27 PROCEDURE — 97112 NEUROMUSCULAR REEDUCATION: CPT

## 2017-10-27 PROCEDURE — G8991 OTHER PT/OT GOAL STATUS: HCPCS

## 2017-11-01 ENCOUNTER — APPOINTMENT (OUTPATIENT)
Dept: PHYSICAL THERAPY | Facility: CLINIC | Age: 72
End: 2017-11-01
Payer: MEDICARE

## 2017-11-02 ENCOUNTER — APPOINTMENT (OUTPATIENT)
Dept: PHYSICAL THERAPY | Facility: CLINIC | Age: 72
End: 2017-11-02
Payer: MEDICARE

## 2017-11-02 PROCEDURE — 97140 MANUAL THERAPY 1/> REGIONS: CPT

## 2017-11-02 PROCEDURE — 97112 NEUROMUSCULAR REEDUCATION: CPT

## 2017-11-02 PROCEDURE — 97110 THERAPEUTIC EXERCISES: CPT

## 2017-11-03 ENCOUNTER — APPOINTMENT (OUTPATIENT)
Dept: PHYSICAL THERAPY | Facility: CLINIC | Age: 72
End: 2017-11-03
Payer: MEDICARE

## 2017-11-03 PROCEDURE — 97140 MANUAL THERAPY 1/> REGIONS: CPT

## 2017-11-03 PROCEDURE — 97112 NEUROMUSCULAR REEDUCATION: CPT

## 2017-11-06 ENCOUNTER — HOSPITAL ENCOUNTER (OUTPATIENT)
Dept: SLEEP CENTER | Facility: CLINIC | Age: 72
Discharge: HOME | End: 2017-11-06
Payer: MEDICARE

## 2017-11-06 ENCOUNTER — TRANSCRIBE ORDERS (OUTPATIENT)
Dept: SLEEP CENTER | Facility: CLINIC | Age: 72
End: 2017-11-06

## 2017-11-06 DIAGNOSIS — G47.33 OSA (OBSTRUCTIVE SLEEP APNEA): ICD-10-CM

## 2017-11-06 DIAGNOSIS — G47.33 OSA (OBSTRUCTIVE SLEEP APNEA): Primary | ICD-10-CM

## 2017-11-08 ENCOUNTER — APPOINTMENT (OUTPATIENT)
Dept: PHYSICAL THERAPY | Facility: CLINIC | Age: 72
End: 2017-11-08
Payer: MEDICARE

## 2017-11-08 PROCEDURE — 97110 THERAPEUTIC EXERCISES: CPT

## 2017-11-08 PROCEDURE — 97140 MANUAL THERAPY 1/> REGIONS: CPT

## 2017-11-08 PROCEDURE — 97112 NEUROMUSCULAR REEDUCATION: CPT

## 2017-11-10 ENCOUNTER — APPOINTMENT (OUTPATIENT)
Dept: PHYSICAL THERAPY | Facility: CLINIC | Age: 72
End: 2017-11-10
Payer: MEDICARE

## 2017-11-10 PROCEDURE — 97110 THERAPEUTIC EXERCISES: CPT

## 2017-11-10 PROCEDURE — 97112 NEUROMUSCULAR REEDUCATION: CPT

## 2017-11-10 PROCEDURE — 97140 MANUAL THERAPY 1/> REGIONS: CPT

## 2017-11-15 ENCOUNTER — APPOINTMENT (OUTPATIENT)
Dept: PHYSICAL THERAPY | Facility: CLINIC | Age: 72
End: 2017-11-15
Payer: MEDICARE

## 2017-11-15 PROCEDURE — 97112 NEUROMUSCULAR REEDUCATION: CPT

## 2017-11-15 PROCEDURE — 97110 THERAPEUTIC EXERCISES: CPT

## 2017-11-17 ENCOUNTER — APPOINTMENT (OUTPATIENT)
Dept: PHYSICAL THERAPY | Facility: CLINIC | Age: 72
End: 2017-11-17
Payer: MEDICARE

## 2017-11-17 PROCEDURE — 97110 THERAPEUTIC EXERCISES: CPT

## 2017-11-17 PROCEDURE — 97112 NEUROMUSCULAR REEDUCATION: CPT

## 2017-11-22 ENCOUNTER — APPOINTMENT (OUTPATIENT)
Dept: PHYSICAL THERAPY | Facility: CLINIC | Age: 72
End: 2017-11-22
Payer: MEDICARE

## 2017-11-22 PROCEDURE — 97110 THERAPEUTIC EXERCISES: CPT

## 2017-11-22 PROCEDURE — 97112 NEUROMUSCULAR REEDUCATION: CPT

## 2017-11-24 ENCOUNTER — GENERIC CONVERSION - ENCOUNTER (OUTPATIENT)
Dept: OTHER | Facility: OTHER | Age: 72
End: 2017-11-24

## 2017-11-24 ENCOUNTER — APPOINTMENT (OUTPATIENT)
Dept: PHYSICAL THERAPY | Facility: CLINIC | Age: 72
End: 2017-11-24
Payer: MEDICARE

## 2017-11-24 PROCEDURE — G8990 OTHER PT/OT CURRENT STATUS: HCPCS

## 2017-11-24 PROCEDURE — 97110 THERAPEUTIC EXERCISES: CPT

## 2017-11-24 PROCEDURE — G8991 OTHER PT/OT GOAL STATUS: HCPCS

## 2017-11-24 PROCEDURE — 97112 NEUROMUSCULAR REEDUCATION: CPT

## 2017-11-29 ENCOUNTER — APPOINTMENT (OUTPATIENT)
Dept: PHYSICAL THERAPY | Facility: CLINIC | Age: 72
End: 2017-11-29
Payer: MEDICARE

## 2018-01-10 ENCOUNTER — ALLSCRIPTS OFFICE VISIT (OUTPATIENT)
Dept: OTHER | Facility: OTHER | Age: 73
End: 2018-01-10

## 2018-01-10 ENCOUNTER — GENERIC CONVERSION - ENCOUNTER (OUTPATIENT)
Dept: OTHER | Facility: OTHER | Age: 73
End: 2018-01-10

## 2018-01-10 NOTE — PROGRESS NOTES
Assessment  Assessed    1  Heart block (426 9) (I45 9)    Plan  Heart block    · ECHO COMPLETE WITH CONTRAST IF INDICATED, TTE / TRANSTHORACIC;  Status:Need Information - Financial Authorization,Retrospective By Protocol Authorization; Requested for:[id number];    Perform:[facility]; Order Comments:LV function; Due:33Aun8469; Last Updated By:Natalia Zhu; 4/14/2016 12:07:21 PM;Ordered;  For:Heart block; Ordered By:Jacinto English; Discussion/Summary  Cardiology Discussion Summary Free Text Note Form [facility]:   1  Heart block post permanent pacemaker    The pacemaker site looks well  Patient is 100% RV paced despite prolonging out the AV delay    At the current time the patient does not have any symptoms  Rate histograms were reviewed and is appropriate    Patient will get an echocardiogram in 5 months time  If his heart rate function continues to decline, then he may be a candidate for BiV upgrade   Otherwise to continue the same  Chief Complaint   Followup of pacemaker for heart block   Chief Complaint Free Text Note Form: Patient here for 6-9 month follow-up visit  He denies any cardiac complaints  He does c/o occasional heartburn which subsides on its own without taking antacids        History of Present Illness   The patient has slowly recovered from his lower limb wound that required muscle grafting  He has started to walk And has been feeling better       The patient is a pleasant 70-year-old gentleman with significant history of diabetes and heart block  I had initially seen him in May of 2015, when he was in sinus rhythm, high junctional escape  He was completely asymptomatic at the time  However he had an event in August of 2015, when he developed complete heart block and a pacemaker was placed      He has been doing well as far as cardiac complaints or concerns  He does not complain of any angina like chest pain or chest pressure  He does not complain of any orthopnea, paroxysmal nocturnal dyspnea,  He does not complain of palpitations, presyncope or syncope      He had been a lifelong   His activity is limited because of this foot problem and not his cardiac condition        Review of Systems   As described in detail in my history of present illness  The patient does or significant leg from his diabetes        Active Problems  Problems    1  Acid reflux (530 81) (K21 9)   2  Anemia (285 9) (D64 9)   3  Ankle ulcer (707 13) (L97 309)   4  Arthritis (716 90) (M19 90)   5  Chronic foot ulcer (707 15) (L97 509)   6  Diabetic peripheral neuropathy (250 60,357 2) (E11 42)   7  DM type 2, not at goal (250 00) (E11 9)   8  Encounter for prostate cancer screening (V76 44) (Z12 5)   9  Flu vaccine need (V04 81) (Z23)   10  Glaucoma screening (V80 1) (Z13 5)   11  Heart block (426 9) (I45 9)   12  Hyperlipidemia (272 4) (E78 5)   13  Hypertension (401 9) (I10)   14  Hypoglycemia (251 2) (E16 2)   15  Hypomagnesemia (275 2) (E83 42)   16  Hyponatremia (276 1) (E87 1)   17  Hypothyroidism (244 9) (E03 9)   18  Microalbuminuria (791 0) (R80 9)   19  Nausea (787 02) (R11 0)   20  Open Wound Of The Toe(S) (893 0)   21  Osteomyelitis, ankle and foot (730 27) (M86 9)   22  Pacemaker Placement   23  Screening for genitourinary condition (V81 6) (Z13 89)   24  Screening for neurological condition (V80 09) (Z13 89)   25  Second degree AV block, Mobitz type I (426 13) (I44 1)   26  Thyroid disorder (246 9) (E07 9)   27  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 (056 70) (W98 13)   28  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, 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)    (E10 39,E10 65)   29  Visit for pre-operative examination (V72 84) (Z01 818)   30  Vitamin B12 deficiency (266 2) (E53 8)    Past Medical History  Problems    1  History of Closed fracture of fibula, lateral malleolus, right (824 2) (S82 61XA)   2  History of External incisional dehiscence (998 32) (T81 31XA)   3   History of Muscular deconditioning (781 99) (R23 240)  Active Problems And Past Medical History Reviewed: The active problems and past medical history were reviewed and updated today  Surgical History  Problems    1  History of Cataract Surgery   2  History of Nerve Block Transforaminal Epidural Lumbar With Fluoroscopic Guidance  Surgical History Reviewed: The surgical history was reviewed and updated today  Family History  Mother    1  Family history of Diabetes Mellitus (V18 0)   2  Family history of Heart Disease (V17 49)   3  Family history of Thyroid Disorder (V18 19)  Brother    4  Family history of Cancer   5  Family history of Gout (V18 19)  Maternal Grandmother    6  Family history of Diabetes Mellitus (V18 0)  Family History Reviewed: The family history was reviewed and updated today  Social History  Problems    · Denied: History of Alcohol Use (History)   · Caffeine Use   · Denied: History of Drug Use   · Former smoker (R64 10) (E24 992)   · Occupation:   · Stopped Drinking Alcohol  Social History Reviewed: The social history was reviewed and updated today  The social history was reviewed and is unchanged  Current Meds   1  AmLODIPine Besylate 5 MG Oral Tablet; take 1 tablet by mouth daily; Therapy: 89HIN3570 to (Evaluate:09Apr2017)  Requested for: 14Apr2016 Recorded   2  Aspirin 81 MG Oral Tablet; Take 1 tablet daily Recorded   3  Atorvastatin Calcium 10 MG Oral Tablet; take 1 tablet by mouth daily; Therapy: 66EDB1977 to (Evaluate:11Mar2015)  Requested for: 28HPN1556; Last   Rx:11Nov2014 Ordered   4  [facility] Vitamin D3 400 UNIT CAPS; TAKE 1 CAPSULE Daily Recorded   5  Gabapentin 100 MG Oral Capsule; take 1 capsule by mouth three times daily; Therapy: 09TLR9944 to 51-30-20-57)  Requested for: 78HRY8563; Last   Rx:01Nov2015 Ordered   6  Glucerna LIQD; USE AS DIRECTED Recorded   7   HumaLOG KwikPen 100 UNIT/ML Subcutaneous Solution Pen-injector; FOLLOW   SLIDING SCALE AS DIRECTED; Therapy: 30Mcs5032 to (Evaluate:68Zzy2133)  Requested for: 53Oua0020; Last   Rx:10Sep2015 Ordered   8  Ketostix In Vitro Strip; USE AS DIRECTED; Therapy: 87NJH3091 to (Evaluate:31Mar2014); Last Rx:30Jan2014 Ordered   9  Lantus SoloStar 100 UNIT/ML Subcutaneous Solution Pen-injector; INJECT 7 UNITS   EVERY MORNING AND 6 UNITS EVERY EVENING AS DIRECTED; Therapy: 23RSL3012 to (Evaluate:01Feb2016)  Requested for: 90UZZ0499; Last   Rx:03Nov2015 Ordered   10  Levothyroxine Sodium 150 MCG Oral Tablet; TAKE 1 TABLET BY MOUTH MONDAY THRU    SATURDAY AND 2 TABLETS BY MOUTH ONSUNDAY; Therapy: 86GUG3456 to (Evaluate:25Jun2016)  Requested for: 18GBM2431; Last    Rx:81Mzw5358 Ordered   11  Lisinopril 20 MG Oral Tablet; TAKE 1 TABLET BY MOUTH DAILY FOR BLOOD    PRESSURE; Therapy: 62LZT6235 to (Evaluate:12Mar2017)  Requested for: 23BYR2470; Last    Rx:17Mar2016 Ordered   12  Loratadine 10 MG Oral Tablet; TAKE 1 TABLET DAILY AS NEEDED; Therapy: 93JMU6424 to [phone])  Requested for: 41Czp9878; Last    Rx:51Gjv0326 Ordered   13  Pantoprazole Sodium 40 MG Oral Tablet Delayed Release; take 1 tablet by mouth every    day; Therapy: 43POI8193 to (Evaluate:25Jun2016)  Requested for: 55Lni9146; Last    Rx:83Avt4083 Ordered   14  Vitamin B-Complex Oral Tablet; TAKE 1 TABLET DAILY; Therapy: 14Apr2016 to Recorded  Medication List Reviewed: The medication list was reviewed and updated today  Allergies  Medication    1  Acetaminophen CAPS   2  Iodine SOLN    Physical Exam    Constitutional   General appearance: No acute distress, well appearing and well nourished  Marisabel See does have Compression stockings  Eyes   Conjunctiva and Sclera examination: Conjunctiva pink, sclera anicteric  Pallor present, no cyanosis icterus or julio-orbital edema  Ears, Nose, Mouth, and Throat - External inspection of ears and nose: Normal without deformities or discharge   Nasal mucosa, septum, and turbinates: Normal, "no edema or discharge  Oropharynx: Clear, nares are clear, mucous membranes are moist    Neck   Neck and thyroid: Normal, supple, trachea midline, no thyromegaly  Pulmonary   Auscultation of lungs: Clear to auscultation, no rales, no rhonchi, no wheezing, good air movement  Cardiovascular S1-S2,Regular, S2 is split  Compression stockings around the lower extremity and could not check for pedal pulses  Chest -   Sternum: Normal     Abdomen   Abdomen: Non-tender and no distention  Musculoskeletal Patient is recovering from foot surgery and he uses and modified walker  Skin - Erythema over both hands, which has been present for a long time, denies history of psoriasis  Neurologic - Extraocular movement is full and symmetric, facial symmetry is retained, head neck tongue and palate movement is bilateral and symmetric  Speech: Normal     Psychiatric - Orientation to person, place, and time: Normal  Mood and affect: Normal       Results/Data  ECG Report: EKG reviewed by myself, a sensed and V  paced  The patient is 100% of the time Results   Cardiac Device Remote [id number] 07:02PM Wajonathan Agudelo     Test Name Result Flag Reference   MISCELLANEOUS COMMENT      CARELINK TRANSMISSION: [id number] VOLTAGE ADEQUATE  (9 YRS)  1 VHR EPISODE DETECTED 7 BEATS @ 300ms  PATIENT EF IS 60% (06/15)  ALL AVAILABLE LEAD PARAMETERS WITHIN NORMAL LIMITS  AP 1%  99% (CHB)  NORMAL DEVICE FUNCTION  ----[facility]   Cardiac Electrophysiology Report      [id number]}  pdf   DEVICE TYPE Pacemaker       (1) CBC/PLT/DIFF [id number] 09:22AM Zurdo Smith     Test Name Result Flag Reference   WHITE BLOOD CELL COUNT 5 4 Thousand/uL  3 8-10 8   RED BLOOD CELL COUNT 4 43 Million/uL  4 20-5 80   HEMOGLOBIN 12 8 g/dL L 13 2-17 1   HEMATOCRIT 39 4 %  38 5-50 0   MCV 88 9 fL  80 0-100 0   MCH 29 0 pg  27 0-33 0   MCHC " 32 6 g/dL  32 0-36 0   RDW 13 9 %  11 0-15 0   PLATELET COUNT 685 Thousand/uL  140-400   MPV 7 2 fL L 7 5-11 5   ABSOLUTE NEUTROPHILS 3850 cells/uL  4545-6806   ABSOLUTE LYMPHOCYTES 740 cells/uL L 850-3900   ABSOLUTE MONOCYTES 475 cells/uL  200-950   ABSOLUTE EOSINOPHILS 302 cells/uL     ABSOLUTE BASOPHILS 32 cells/uL  0-200   NEUTROPHILS 71 3 %     LYMPHOCYTES 13 7 %     MONOCYTES 8 8 %     EOSINOPHILS 5 6 %     BASOPHILS 0 6 %       Future Appointments    Date/Time Provider Specialty Site   10/10/2016 08:30 AM Cardiology, [facility]   05/11/2016 09:15 AM Ulysses Greaves, M D   [address] [employer]     Signatures   Electronically signed by : SADIE Damon ; Apr 14 2016  2:57PM EST                       (Author)

## 2018-01-12 VITALS
WEIGHT: 147 LBS | HEIGHT: 68 IN | SYSTOLIC BLOOD PRESSURE: 108 MMHG | HEART RATE: 101 BPM | DIASTOLIC BLOOD PRESSURE: 68 MMHG | BODY MASS INDEX: 22.28 KG/M2

## 2018-01-13 VITALS
HEART RATE: 92 BPM | HEIGHT: 68 IN | DIASTOLIC BLOOD PRESSURE: 79 MMHG | WEIGHT: 147 LBS | BODY MASS INDEX: 22.28 KG/M2 | SYSTOLIC BLOOD PRESSURE: 145 MMHG

## 2018-01-13 VITALS
WEIGHT: 149 LBS | SYSTOLIC BLOOD PRESSURE: 156 MMHG | HEART RATE: 87 BPM | BODY MASS INDEX: 22.58 KG/M2 | HEIGHT: 68 IN | DIASTOLIC BLOOD PRESSURE: 80 MMHG

## 2018-01-13 VITALS
DIASTOLIC BLOOD PRESSURE: 68 MMHG | OXYGEN SATURATION: 99 % | HEART RATE: 87 BPM | HEIGHT: 68 IN | RESPIRATION RATE: 14 BRPM | SYSTOLIC BLOOD PRESSURE: 122 MMHG | BODY MASS INDEX: 22.88 KG/M2 | WEIGHT: 151 LBS

## 2018-01-13 VITALS
SYSTOLIC BLOOD PRESSURE: 140 MMHG | HEART RATE: 87 BPM | HEIGHT: 68 IN | BODY MASS INDEX: 22.3 KG/M2 | DIASTOLIC BLOOD PRESSURE: 76 MMHG | WEIGHT: 147.13 LBS

## 2018-01-15 VITALS
BODY MASS INDEX: 22.58 KG/M2 | HEIGHT: 68 IN | SYSTOLIC BLOOD PRESSURE: 130 MMHG | WEIGHT: 149 LBS | DIASTOLIC BLOOD PRESSURE: 82 MMHG | HEART RATE: 86 BPM | RESPIRATION RATE: 14 BRPM

## 2018-01-22 VITALS
SYSTOLIC BLOOD PRESSURE: 150 MMHG | HEART RATE: 74 BPM | BODY MASS INDEX: 22.32 KG/M2 | HEIGHT: 68 IN | DIASTOLIC BLOOD PRESSURE: 78 MMHG | WEIGHT: 147.25 LBS

## 2018-01-22 VITALS
HEIGHT: 68 IN | SYSTOLIC BLOOD PRESSURE: 110 MMHG | HEART RATE: 86 BPM | BODY MASS INDEX: 22.13 KG/M2 | RESPIRATION RATE: 16 BRPM | WEIGHT: 146 LBS | DIASTOLIC BLOOD PRESSURE: 62 MMHG | OXYGEN SATURATION: 99 %

## 2018-01-25 ENCOUNTER — OFFICE VISIT (OUTPATIENT)
Dept: FAMILY MEDICINE CLINIC | Facility: CLINIC | Age: 73
End: 2018-01-25
Payer: COMMERCIAL

## 2018-01-25 VITALS
HEART RATE: 86 BPM | SYSTOLIC BLOOD PRESSURE: 100 MMHG | DIASTOLIC BLOOD PRESSURE: 52 MMHG | WEIGHT: 149 LBS | BODY MASS INDEX: 23.39 KG/M2 | OXYGEN SATURATION: 98 % | HEIGHT: 67 IN

## 2018-01-25 DIAGNOSIS — I49.9 CARDIAC ARRHYTHMIA, UNSPECIFIED CARDIAC ARRHYTHMIA TYPE: ICD-10-CM

## 2018-01-25 DIAGNOSIS — Z12.11 SCREENING FOR COLON CANCER: Primary | ICD-10-CM

## 2018-01-25 DIAGNOSIS — Z96.41 INSULIN PUMP STATUS: ICD-10-CM

## 2018-01-25 DIAGNOSIS — Z23 NEED FOR PNEUMOCOCCAL VACCINE: ICD-10-CM

## 2018-01-25 DIAGNOSIS — E78.5 HYPERLIPIDEMIA, UNSPECIFIED HYPERLIPIDEMIA TYPE: ICD-10-CM

## 2018-01-25 DIAGNOSIS — I10 ESSENTIAL HYPERTENSION: ICD-10-CM

## 2018-01-25 DIAGNOSIS — D64.9 ANEMIA, UNSPECIFIED TYPE: ICD-10-CM

## 2018-01-25 DIAGNOSIS — E11.3299 DIABETES MELLITUS WITH BACKGROUND RETINOPATHY (HCC): ICD-10-CM

## 2018-01-25 DIAGNOSIS — K21.9 GASTROESOPHAGEAL REFLUX DISEASE WITHOUT ESOPHAGITIS: ICD-10-CM

## 2018-01-25 DIAGNOSIS — E11.42 DIABETIC PERIPHERAL NEUROPATHY (HCC): ICD-10-CM

## 2018-01-25 DIAGNOSIS — Z95.0 HISTORY OF PERMANENT CARDIAC PACEMAKER PLACEMENT: ICD-10-CM

## 2018-01-25 DIAGNOSIS — E03.9 ACQUIRED HYPOTHYROIDISM: ICD-10-CM

## 2018-01-25 PROBLEM — M19.90 ARTHRITIS: Status: ACTIVE | Noted: 2018-01-25

## 2018-01-25 PROBLEM — E11.9 DM TYPE 2, NOT AT GOAL (HCC): Status: ACTIVE | Noted: 2018-01-25

## 2018-01-25 PROCEDURE — 90471 IMMUNIZATION ADMIN: CPT

## 2018-01-25 PROCEDURE — 90732 PPSV23 VACC 2 YRS+ SUBQ/IM: CPT

## 2018-01-25 PROCEDURE — 99215 OFFICE O/P EST HI 40 MIN: CPT | Performed by: FAMILY MEDICINE

## 2018-01-25 NOTE — PATIENT INSTRUCTIONS
10% - bad control"> 10% - bad control,Hemoglobin A1c (HbA1c) greater than 10% indicating poor diabetic control,Haemoglobin A1c greater than 10% indicating poor diabetic control">   Diabetes Mellitus Type 2 in Adults, Ambulatory Care   GENERAL INFORMATION:   Diabetes mellitus type 2  is a disease that affects how your body uses glucose (sugar)  Insulin helps move sugar out of the blood so it can be used for energy  Normally, when the blood sugar level increases, the pancreas makes more insulin  Type 2 diabetes develops because either the body cannot make enough insulin, or it cannot use the insulin correctly  After many years, your pancreas may stop making insulin  Common symptoms include the following:   · More hunger or thirst than usual     · Frequent urination     · Weight loss without trying     · Blurred vision  Seek immediate care for the following symptoms:   · Severe abdominal pain, or pain that spreads to your back  You may also be vomiting  · Trouble staying awake or focusing    · Shaking or sweating    · Blurred or double vision    · Breath has a fruity, sweet smell    · Breathing is deep and labored, or rapid and shallow    · Heartbeat is fast and weak  Treatment for diabetes mellitus type 2  includes keeping your blood sugar at a normal level  You must eat the right foods, and exercise regularly  You may also need medicine if you cannot control your blood sugar level with nutrition and exercise  Manage diabetes mellitus type 2:   · Check your blood sugar level  You will be taught how to check a small drop of blood in a glucose monitor  Ask your healthcare provider when and how often to check during the day  Ask your healthcare provider what your blood sugar levels should be when you check them  · Keep track of carbohydrates (sugar and starchy foods)  Your blood sugar level can get too high if you eat too many carbohydrates   Your dietitian will help you plan meals and snacks that have the right amount of carbohydrates  · Eat low-fat foods  Some examples are skinless chicken and low-fat milk  · Eat less sodium (salt)  Some examples of high-sodium foods to limit are soy sauce, potato chips, and soup  Do not add salt to food you cook  Limit your use of table salt  · Eat high-fiber foods  Foods that are a good source of fiber include vegetables, whole grain bread, and beans  · Limit alcohol  Alcohol affects your blood sugar level and can make it harder to manage your diabetes  Women should limit alcohol to 1 drink a day  Men should limit alcohol to 2 drinks a day  A drink of alcohol is 12 ounces of beer, 5 ounces of wine, or 1½ ounces of liquor  · Get regular exercise  Exercise can help keep your blood sugar level steady, decrease your risk of heart disease, and help you lose weight  Exercise for at least 30 minutes, 5 days a week  Include muscle strengthening activities 2 days each week  Work with your healthcare provider to create an exercise plan  · Check your feet each day  for injuries or open sores  Ask your healthcare provider for activities you can do if you have an open sore  · Quit smoking  If you smoke, it is never too late to quit  Smoking can worsen the problems that may occur with diabetes  Ask your healthcare provider for information about how to stop smoking if you are having trouble quitting  · Ask about your weight:  Ask healthcare providers if you need to lose weight, and how much to lose  Ask them to help you with a weight loss program  Even a 10 to 15 pound weight loss can help you manage your blood sugar level  · Carry medical alert identification  Wear medical alert jewelry or carry a card that says you have diabetes  Ask your healthcare provider where to get these items  · Ask about vaccines  Diabetes puts you at risk of serious illness if you get the flu, pneumonia, or hepatitis   Ask your healthcare provider if you should get a flu, pneumonia, or hepatitis B vaccine, and when to get the vaccine  Follow up with your healthcare provider as directed:  Write down your questions so you remember to ask them during your visits  CARE AGREEMENT:   You have the right to help plan your care  Learn about your health condition and how it may be treated  Discuss treatment options with your caregivers to decide what care you want to receive  You always have the right to refuse treatment  The above information is an  only  It is not intended as medical advice for individual conditions or treatments  Talk to your doctor, nurse or pharmacist before following any medical regimen to see if it is safe and effective for you  © 2014 [address] is for End User's use only and may not be sold, redistributed or otherwise used for commercial purposes  All illustrations and images included in CareNotes® are the copyrighted property of A D A M , Inc  or Kirill Ulrich

## 2018-01-25 NOTE — PROGRESS NOTES
Assessment/Plan:    No problem-specific Assessment & Plan notes found for this encounter  Diagnoses and all orders for this visit:    Screening for colon cancer  -     Ambulatory referral to Gastroenterology; Future  -     CBC and differential; Future  -     Comprehensive metabolic panel; Future  -     Hemoglobin A1c; Future  -     Lipid panel; Future  -     TSH, 3rd generation with T4 reflex; Future  -     CBC and differential  -     Comprehensive metabolic panel  -     Hemoglobin A1c  -     Lipid panel  -     TSH, 3rd generation with T4 reflex  -     Ambulatory referral to Gastroenterology; Future    Gastroesophageal reflux disease without esophagitis  -     CBC and differential; Future  -     Comprehensive metabolic panel; Future  -     Hemoglobin A1c; Future  -     Lipid panel; Future  -     TSH, 3rd generation with T4 reflex; Future  -     CBC and differential  -     Comprehensive metabolic panel  -     Hemoglobin A1c  -     Lipid panel  -     TSH, 3rd generation with T4 reflex    Acquired hypothyroidism  -     CBC and differential; Future  -     Comprehensive metabolic panel; Future  -     Hemoglobin A1c; Future  -     Lipid panel; Future  -     TSH, 3rd generation with T4 reflex; Future  -     CBC and differential  -     Comprehensive metabolic panel  -     Hemoglobin A1c  -     Lipid panel  -     TSH, 3rd generation with T4 reflex    Diabetes mellitus with background retinopathy (HCC)  -     CBC and differential; Future  -     Comprehensive metabolic panel; Future  -     Hemoglobin A1c; Future  -     Lipid panel; Future  -     TSH, 3rd generation with T4 reflex; Future  -     CBC and differential  -     Comprehensive metabolic panel  -     Hemoglobin A1c  -     Lipid panel  -     TSH, 3rd generation with T4 reflex    Essential hypertension  -     CBC and differential; Future  -     Comprehensive metabolic panel; Future  -     Hemoglobin A1c; Future  -     Lipid panel;  Future  -     TSH, 3rd generation with T4 reflex; Future  -     CBC and differential  -     Comprehensive metabolic panel  -     Hemoglobin A1c  -     Lipid panel  -     TSH, 3rd generation with T4 reflex    Anemia, unspecified type  -     CBC and differential; Future  -     Comprehensive metabolic panel; Future  -     Hemoglobin A1c; Future  -     Lipid panel; Future  -     TSH, 3rd generation with T4 reflex; Future  -     CBC and differential  -     Comprehensive metabolic panel  -     Hemoglobin A1c  -     Lipid panel  -     TSH, 3rd generation with T4 reflex    Hyperlipidemia, unspecified hyperlipidemia type  -     CBC and differential; Future  -     Comprehensive metabolic panel; Future  -     Hemoglobin A1c; Future  -     Lipid panel; Future  -     TSH, 3rd generation with T4 reflex; Future  -     CBC and differential  -     Comprehensive metabolic panel  -     Hemoglobin A1c  -     Lipid panel  -     TSH, 3rd generation with T4 reflex    History of permanent cardiac pacemaker placement  -     CBC and differential; Future  -     Comprehensive metabolic panel; Future  -     Hemoglobin A1c; Future  -     Lipid panel; Future  -     TSH, 3rd generation with T4 reflex; Future  -     CBC and differential  -     Comprehensive metabolic panel  -     Hemoglobin A1c  -     Lipid panel  -     TSH, 3rd generation with T4 reflex    Need for pneumococcal vaccine  -     Pneumococcal polysaccharide vaccine 23-valent greater than or equal to 1yo subcutaneous/IM    Other orders  -     Discontinue: aspirin 81 MG tablet; Take by mouth  -     Discontinue: Cholecalciferol (CVS VITAMIN D3) 400 units CAPS; Take 1 capsule by mouth daily  -     Calcium Citrate 200 MG TABS; Take by mouth daily  -     glucose blood test strip; As directed dx:250 03  -     glucose blood (ACCU-CHEK DEMETRIO PLUS) test strip; Check BGs 5 times a day  -     Discontinue: aspirin 81 MG tablet; Take 1 tablet by mouth daily          Subjective:      Patient ID: Vanda Rothman is a 67 y o  male   PATIENT IS HERE FOR 6 MONTH FOLLOW-Up  PAT[facility] AND [facility] ALL HIS CARE BASED THROUGH THEM  HE IS A [employer]OWN DIABETIC CONTINUOUSLY DOING CONTINUOUS GLUCOSE MONITORING  Patient states that he has labile blood sugar values,  Which fluctuate very frequently  His most recent A1c is 8 3 on 20 units of Traciba and NovoLog which he takes 3 times daily  Patient has diabetic retinopathy and macular degeneration  Patient follows up with his ophthalmologist every few weeks for an intra-ocular injection  Also follows up with podiatrist   Patient also has a history of borderline low hemoglobin of 11 9  He does complain of mild fatigue  Patient states that he wants to try an over-the-counter iron supplement and see if that helps with the symptoms  Patient has been for colonoscopy twice but unfortunately he did not have a good prep  He was advised to go back for colonoscopy but he wants to go through [address] [place] [address]  Patient will be provided with a referral for the same today  He also has a history of hypothyroidism for which he is currently on 100 micrograms of levothyroxine  Patient states that his thyroid function is monitored by his other PCP, he does not remember what his last value was but he states that his doses monitored by them  He also went for a prostate biopsy recently which was reported negative  Diabetes   He presents for his follow-up diabetic visit  He has type 2 diabetes mellitus  No MedicAlert identification noted  His disease course has been fluctuating (rECENT a1C 8 3)  Hypoglycemia symptoms include dizziness, headaches, sweats and tremors  Pertinent negatives for hypoglycemia include no nervousness/anxiousness  Associated symptoms include fatigue  There are no hypoglycemic complications  Diabetic complications include peripheral neuropathy and retinopathy  Current diabetic treatment includes intensive insulin program and insulin injections   He is compliant with treatment all of the time  His weight is stable  He is following a diabetic diet  Meal planning includes avoidance of concentrated sweets and ADA exchanges  He has not had a previous visit with a dietitian  He participates in exercise daily  His home blood glucose trend is fluctuating dramatically  An ACE inhibitor/angiotensin II receptor blocker is being taken  He sees a podiatrist Eye exam is current  Hypertension   This is a chronic problem  The current episode started more than 1 year ago  The problem is unchanged  Associated symptoms include headaches and sweats  Past treatments include ACE inhibitors and calcium channel blockers  The current treatment provides significant improvement  There are no compliance problems  Hypertensive end-organ damage includes retinopathy and a thyroid problem  Hyperlipidemia   This is a chronic problem  The current episode started more than 1 year ago  The problem is controlled  Recent lipid tests were reviewed and are normal  Exacerbating diseases include diabetes and hypothyroidism  Current antihyperlipidemic treatment includes statins  Thyroid Problem   Presents for follow-up (TSH UNAVAILABLE) visit  Symptoms include fatigue and tremors  Patient reports no anxiety, depressed mood or hair loss  The symptoms have been stable  His past medical history is significant for diabetes and hyperlipidemia  The following portions of the patient's history were reviewed and updated as appropriate: allergies, current medications, past family history, past medical history, past social history, past surgical history and problem list     Review of Systems   Constitutional: Positive for fatigue  Eyes:        MACULAR DEGENERATION  Respiratory: Negative  Gastrointestinal: Negative  Endocrine:        DIABETES, HYPOTHYROIDISM  Musculoskeletal: Negative  Neurological: Positive for dizziness, tremors and headaches  Psychiatric/Behavioral: Negative    The patient is not nervous/anxious  Objective:     Physical Exam   Constitutional: He is oriented to person, place, and time  He appears well-developed and well-nourished  Cardiovascular: Normal rate and regular rhythm  Pulmonary/Chest: Effort normal and breath sounds normal    Abdominal: Soft  Bowel sounds are normal    Musculoskeletal: Normal range of motion  Neurological: He is alert and oriented to person, place, and time  He has normal reflexes

## 2018-03-07 NOTE — PROGRESS NOTES
"  Discussion/Summary  Normal device function      Results/Data  Cardiac Device Remote 09Oct2017 04:28AM Didier Baires     Test Name Result Flag Reference   MISCELLANEOUS COMMENT      CARELINK TRANSMISSION: BATTERY VOLTAGE ADEQUATE (6 YR)  AP 3 6%  100% (>40%/AVB)  ALL AVAILABLE LEAD PARAMETERS WITHIN NORMAL LIMITS  NO SIGNIFICANT HIGH RATE EPISODES  NORMAL DEVICE FUNCTION  RG   Cardiac Electrophysiology Report      [id number] Hospital Pittsburgh_[facility] [id number]  pdf   DEVICE TYPE Pacemaker       Cardiac Electrophysiology Report [id number] 04:28AM Didier Baires     Test Name Result Flag Reference   Cardiac Electrophysiology Report      Dwayne Cheadle  pdf     Signatures   Electronically signed by : Caitlin Padilla, ; Oct  9 2017  2:05PM EST                       (Author)    Electronically signed by : Lai Estrada DO; Oct 10 2017  7:23PM EST                       (Author)    "

## 2018-03-07 NOTE — PROGRESS NOTES
"  Discussion/Summary  Normal device function       Device interrogated and following changes done :  - Rate response turned on  - AV delay reduced to more physiologic at 200-250 ms       Results/Data  Cardiac Device In Clinic 25Oct2016 02:59PM Miranda Lim     Test Name Result Flag Reference   MISCELLANEOUS COMMENT      CHANGES MADE PER DR PAUL DURING OFFICE VISIT:[id number] PACED AV DELAY TO 250MS FROM [id number] SENSED AV DELAY TO 200MS FROM [id number]   Cardiac Electrophysiology Report      [id number] [id number]  pdf   DEVICE TYPE Pacemaker       Cardiac Device In Clinic 25Oct2016 01:42PM Miranda Lim     Test Name Result Flag Reference   MISCELLANEOUS COMMENT (Report)     DEVICE INTERROGATED IN THE [facility] OFFICE  BATTERY VOLTAGE ADEQUATE (7 5 YRS)  AP<0 1%, >99% (>40% CHB)  NO NEW SIGNIFICANT HIGH RATE EPISODES  ALL LEAD PARAMETERS WITHIN NORMAL LIMITS  ALL OTHER TESTING WITHIN NORMAL LIMITS  PT C/O FATIGUE W/ ACTIVITY  PROGRAMMED RATE RESPONSE ON  NORMAL DEVICE FUNCTION  PT TO SEE DR PAUL TODAY  GV   Cardiac Electrophysiology Report      [id number] [id number] [id number]  pdf   DEVICE TYPE Pacemaker       Cardiac Electrophysiology Report [id number] 01:42PM Miranda Lim     Test Name Result Flag Reference   Cardiac Electrophysiology Report      [id number]  pdf     Signatures   Electronically signed by : Evelio Sotelo RN; Oct 25 2016 11:23AM EST                       (Author)    Electronically signed by : SADIE Terrazas ; Oct 27 2016  9:57PM EST                       (Author)    "

## 2018-03-07 NOTE — PROGRESS NOTES
"  Discussion/Summary  Normal device function      Results/Data  Results   Cardiac Device Remote [id number] 10:39PM [facility]     Test Name Result Flag Reference   MISCELLANEOUS COMMENT      CARELINK TRANSMISSION: BATTERY VOLTAGE ADEQUATE (8 YRS)  AP<0 1%, -100% (>40% CHB)  NO SIGNIFICANT HIGH RATE EPISODES  ALL AVAILABLE LEAD PARAMETERS WITHIN NORMAL LIMITS  NORMAL DEVICE FUNCTION  GV   Cardiac Electrophysiology Report      [id number]}  pdf   DEVICE TYPE Pacemaker       Cardiac Electrophysiology Report [id number] 10:39PM [facility]     Test Name Result Flag Reference   Cardiac Electrophysiology Report      [id number]  pdf     Signatures   Electronically signed by : Paulino Childress RN; Apr 15 2016  4:37PM EST                       (Author)    Electronically signed by : SADIE Lo ; Apr 17 2016  8:26AM EST                       (Author)    "

## 2018-03-07 NOTE — PROGRESS NOTES
"  Discussion/Summary  Normal device function      Results/Data  Cardiac Device In Clinic [id number] 06:20PM Amarilis Munda     Test Name Result Flag Reference   MISCELLANEOUS COMMENT (Report)     DEVICE INTERROGATED IN THE [facility] OFFICE  BATTERY VOLTAGE ADEQUATE  (6 5 YRS)  AP 3%  99%  ALL LEAD PARAMETERS WITHIN NORMAL LIMITS  2 VHR EPISODES DETECTED  7 BEATS @ 340ms  PATIENT EF IS 60% (2015)  NO PROGRAMMING CHANGES MADE TO DEVICE PARAMETERS  NORMAL DEVICE FUNCTION  ---GUNDERSON   Cardiac Electrophysiology Report      [id number] [id number] [id number]  pdf   DEVICE TYPE Pacemaker       Cardiac Electrophysiology Report [id number] 06:20PM Amarilis Munda     Test Name Result Flag Reference   Cardiac Electrophysiology Report      [id number]  pdf     Signatures   Electronically signed by : Clara Godfrey, ; Apr 12 2017  1:21PM EST                       (Author)    Electronically signed by : SADIE Ayers ; Apr 12 2017  3:32PM EST                       (Author)    "

## 2018-03-07 NOTE — PROGRESS NOTES
"  Discussion/Summary  Normal device function      Results/Data  Cardiac Device Remote [id number] 09:45AM [facility] [employer] [facility]     Test Name Result Flag Reference   MISCELLANEOUS COMMENT      CARELINK TRANSMISSION: BATTERY STATUS "OK"  AS 0%  100%  NO SIGNIFICANT HIGH RATE EPISODES  ALL AVAILABLE LEAD PARAMETERS WITHIN NORMAL LIMITS  NORMAL DEVICE FUNCTION  NC   Cardiac Electrophysiology Report      [id number]}  pdf   DEVICE TYPE Pacemaker       Cardiac Electrophysiology Report [id number] 09:45AM [facility] [employer] [facility]     Test Name Result Flag Reference   Cardiac Electrophysiology Report      [id number]  pdf     Signatures   Electronically signed by : Demetri Kidd, ; Jul 25 2016  1:47PM EST                       (Author)    Electronically signed by : SADIE Yip ; Jul 25 2016  4:47PM EST                       (Author)    "

## 2018-03-07 NOTE — PROGRESS NOTES
"  Discussion/Summary  Normal device function      Results/Data  Cardiac Device Remote [id number] 11:39AM Kalin Ferrer     Test Name Result Flag Reference   MISCELLANEOUS COMMENT      CARELINK TRANSMISSION: BATTERY VOLTAGE ADEQUATE (6 5 YRS)  AP-2%, >99% (>40% CHB)  SMALL R WAVES-LAST MEASURED-2 6MV  ALL OTHER AVAILABLE LEAD PARAMETERS WITHIN NORMAL LIMITS  NO SIGNIFICANT HIGH RATE EPISODES  NORMAL DEVICE FUNCTION  GV   Cardiac Electrophysiology Report      [id number]  pdf   DEVICE TYPE Pacemaker       Cardiac Electrophysiology Report [id number] 11:39AM Kalin Ferrer     Test Name Result Flag Reference   Cardiac Electrophysiology Report      [id number]  pdf     Signatures   Electronically signed by : Elaine Nguyen RN; Jul 7 2017  4:02PM EST                       (Author)    Electronically signed by : SADIE Vance ; Jul 7 2017  4:26PM EST                       (Author)    "

## 2018-04-10 ENCOUNTER — CLINICAL SUPPORT (OUTPATIENT)
Dept: CARDIOLOGY CLINIC | Facility: CLINIC | Age: 73
End: 2018-04-10
Payer: COMMERCIAL

## 2018-04-10 DIAGNOSIS — I44.2 COMPLETE ATRIOVENTRICULAR BLOCK (HCC): Primary | ICD-10-CM

## 2018-04-10 DIAGNOSIS — Z95.0 PRESENCE OF PERMANENT CARDIAC PACEMAKER: ICD-10-CM

## 2018-04-10 PROCEDURE — 93280 PM DEVICE PROGR EVAL DUAL: CPT | Performed by: INTERNAL MEDICINE

## 2018-04-10 NOTE — PROGRESS NOTES
[facility]  ([facility])BATTERY VOLTAGE ADEQUATE  (6 YRS)  AP 2%  99%  ALL LEAD PARAMETERS WITHIN NORMAL LIMITS  1 VHR EPISODES DETECTED  8 BEATS @ 207ms  PATIENT EF IS 55% (2017)   NO PROGRAMMING CHANGES MADE TO DEVICE PARAMETERS  NORMAL DEVICE FUNCTION  ---GUNDERSON

## 2018-04-16 ENCOUNTER — OFFICE VISIT (OUTPATIENT)
Dept: GASTROENTEROLOGY | Facility: AMBULARY SURGERY CENTER | Age: 73
End: 2018-04-16
Payer: COMMERCIAL

## 2018-04-16 VITALS
HEIGHT: 67 IN | SYSTOLIC BLOOD PRESSURE: 122 MMHG | DIASTOLIC BLOOD PRESSURE: 82 MMHG | TEMPERATURE: 96.2 F | WEIGHT: 147 LBS | HEART RATE: 82 BPM | BODY MASS INDEX: 23.07 KG/M2

## 2018-04-16 DIAGNOSIS — Z12.11 SCREENING FOR COLON CANCER: ICD-10-CM

## 2018-04-16 DIAGNOSIS — K21.9 CHRONIC GERD: Primary | ICD-10-CM

## 2018-04-16 PROCEDURE — 99204 OFFICE O/P NEW MOD 45 MIN: CPT | Performed by: INTERNAL MEDICINE

## 2018-04-16 NOTE — LETTER
April 16, 2018     Zaria Cleary MD  [address]  [facility] [address]    Patient: Sabas Mendez   YOB: 1945   Date of Visit: 4/16/2018       Dear Dr Soraya Garcia:    Thank you for referring Brennon Gleason to me for evaluation  Below are my notes for this consultation  If you have questions, please do not hesitate to call me  I look forward to following your patient along with you           Sincerely,        Alexander Zamora MD        CC: No Recipients

## 2018-04-16 NOTE — PROGRESS NOTES
Consultation -  [facility]  Kin Bonner 67 y o  male MRN: [id number]  Unit/Bed#:  Encounter: [id number]        Consults    ASSESSMENT/PLAN:   1  Colon cancer screening:  Average risk-last colonoscopy was 12 years ago, unsure of any polyps-no family history of colon cancer   -will schedule for colonoscopy for age-appropriate screening   - Patient was explained about  the risks and benefits of the procedure  Risks including but not limited to bleeding, infection, perforation were explained in detail  Also explained about less than 100% sensitivity with the exam and other alternatives  2   Longstanding symptoms of GERD:  Will controlled with Protonix 40 mg, denies dysphagia, hematemesis and melena  Given chronicity of the symptoms, will schedule EGD to assess for Puente's  He has never had an EGD before   -no recent lab CR, scheduled for labs in June  Hemoglobin was last checked in 2015 as per our records, hemoglobin was 12 4, liver function tests were normal     3  Patient was explained about the lifestyle and dietary modifications  Advised to avoid fatty foods, chocolates, caffeine, alcohol and any other triggering foods  Avoid eating for at least 3 hours before going to bed                 ______________________________________________________________________    Reason for Consult / Principal Problem: [unfilled]    HPI: Kin Bonner is a 67y o  year old male with history of diabetes, hypertension, hyperlipidemia, pacemaker, peripheral neuropathy, presents for evaluation of colon cancer screening  Patient states that he underwent colonoscopy 12 years ago, unsure if there were any polyps  He is due for his colonoscopy  Denies any change in bowel habits, hematochezia, unintentional weight loss or abdominal pain  He does have longstanding symptoms of acid reflux which are well controlled with Protonix 40 mg on daily basis  He has never had an EGD    He denies dysphagia, hematemesis or melena  He is an ex-smoker, quit 20 years ago  He drinks socially  No family history of colon cancer  Review of Systems: The remainder of the review of systems was negative except for the pertinent positives noted in HPI  Historical Information   Past Medical History:   Diagnosis Date    Arthritis     Cardiac disease     Closed fracture of fibula, lateral malleolus, right     Diabetes mellitus ([address] )     Disease of thyroid gland     External incisional dehiscence     GERD (gastroesophageal reflux disease)     Heart disease     Muscular deconditioning     Pacemaker     Thyroid disease      Past Surgical History:   Procedure Laterality Date    CARDIAC PACEMAKER PLACEMENT Left 2015    CARDIAC PACEMAKER PLACEMENT      CARDIAC SURGERY      CATARACT EXTRACTION      MUSCLE FLAP      right foot    NERVE BLOCK      Transforaminal Epidural Lumbar with Fluoroscopic Guidance     TOE AMPUTATION      left 5th toe     Social History   History   Alcohol Use    Yes     Comment: socially     History   Drug Use No     History   Smoking Status    Former Smoker    Types: Pipe   Smokeless Tobacco    Never Used     Family History   Problem Relation Age of Onset    Diabetes Mother     Heart disease Mother     Thyroid disease Mother      Disorder     Cancer Brother     Gout Brother     Diabetes Maternal Grandmother        Meds/Allergies       (Not in a hospital admission)  No current facility-administered medications for this visit  Allergies   Allergen Reactions    Acetaminophen     Iodine Rash       Objective     Blood pressure 122/82, pulse 82, temperature (!) 96 2 °F (35 7 °C), temperature source Tympanic, height 5' 7" (1 702 m), weight 66 7 kg (147 lb)  [unfilled]    PHYSICAL EXAM     GEN: well nourished, well developed, no acute distress  HEENT: anicteric, MMM, no thrush      CV: RRR, no m/r/g  CHEST: CTA b/l, no WRR  ABD: +BS, soft, NT/ND, no hepatosplenomegaly  EXT:  No edema bilaterally, has compression stocking on the left lower extremity and bandage on the right  SKIN: no rashes,  NEURO: aaox3    Lab Results:   No visits with results within 1 Day(s) from this visit     Latest known visit with results is:   Lab Conversion - Encounter on 01/07/2016   Component Date Value    PROSTATE SPECIFIC ANTIGE* 01/06/2016 4 0      Imaging Studies: I have personally reviewed pertinent films in PACS

## 2018-04-23 ENCOUNTER — ANESTHESIA EVENT (OUTPATIENT)
Dept: PERIOP | Facility: AMBULARY SURGERY CENTER | Age: 73
End: 2018-04-23
Payer: COMMERCIAL

## 2018-05-04 ENCOUNTER — HOSPITAL ENCOUNTER (OUTPATIENT)
Facility: AMBULARY SURGERY CENTER | Age: 73
Setting detail: OUTPATIENT SURGERY
Discharge: HOME | End: 2018-05-04
Attending: INTERNAL MEDICINE | Admitting: INTERNAL MEDICINE
Payer: COMMERCIAL

## 2018-05-04 ENCOUNTER — ANESTHESIA (OUTPATIENT)
Dept: PERIOP | Facility: AMBULARY SURGERY CENTER | Age: 73
End: 2018-05-04
Payer: COMMERCIAL

## 2018-05-04 VITALS
HEIGHT: 67 IN | SYSTOLIC BLOOD PRESSURE: 110 MMHG | DIASTOLIC BLOOD PRESSURE: 62 MMHG | OXYGEN SATURATION: 98 % | TEMPERATURE: 97.2 F | BODY MASS INDEX: 23.23 KG/M2 | RESPIRATION RATE: 16 BRPM | WEIGHT: 148 LBS | HEART RATE: 67 BPM

## 2018-05-04 DIAGNOSIS — Z12.11 SCREENING FOR COLON CANCER: ICD-10-CM

## 2018-05-04 LAB
GLUCOSE SERPL-MCNC: 338 MG/DL (ref 65–140)
GLUCOSE SERPL-MCNC: 367 MG/DL (ref 65–140)

## 2018-05-04 PROCEDURE — 43239 EGD BIOPSY SINGLE/MULTIPLE: CPT | Performed by: INTERNAL MEDICINE

## 2018-05-04 PROCEDURE — 88342 IMHCHEM/IMCYTCHM 1ST ANTB: CPT | Performed by: PATHOLOGY

## 2018-05-04 PROCEDURE — 88305 TISSUE EXAM BY PATHOLOGIST: CPT | Performed by: PATHOLOGY

## 2018-05-04 PROCEDURE — 82948 REAGENT STRIP/BLOOD GLUCOSE: CPT

## 2018-05-04 RX ORDER — PROPOFOL 10 MG/ML
INJECTION, EMULSION INTRAVENOUS AS NEEDED
Status: DISCONTINUED | OUTPATIENT
Start: 2018-05-04 | End: 2018-05-04 | Stop reason: SURG

## 2018-05-04 RX ORDER — SODIUM CHLORIDE 9 MG/ML
INJECTION, SOLUTION INTRAVENOUS CONTINUOUS PRN
Status: DISCONTINUED | OUTPATIENT
Start: 2018-05-04 | End: 2018-05-04 | Stop reason: SURG

## 2018-05-04 RX ADMIN — PROPOFOL 50 MG: 10 INJECTION, EMULSION INTRAVENOUS at 08:57

## 2018-05-04 RX ADMIN — PROPOFOL 50 MG: 10 INJECTION, EMULSION INTRAVENOUS at 08:59

## 2018-05-04 RX ADMIN — PROPOFOL 50 MG: 10 INJECTION, EMULSION INTRAVENOUS at 09:01

## 2018-05-04 RX ADMIN — SODIUM CHLORIDE: 0.9 INJECTION, SOLUTION INTRAVENOUS at 07:58

## 2018-05-04 RX ADMIN — PROPOFOL 100 MG: 10 INJECTION, EMULSION INTRAVENOUS at 08:55

## 2018-05-04 NOTE — ANESTHESIA PREPROCEDURE EVALUATION
Review of Systems/Medical History      No history of anesthetic complications     Cardiovascular  Hyperlipidemia, Hypertension ,    Pulmonary       GI/Hepatic    GERD ,             Endo/Other  Diabetes poorly controlled Insulin, History of thyroid disease , hypothyroidism,      GYN  Negative gynecology ROS          Hematology  Anemia ,     Musculoskeletal    Arthritis     Neurology  Negative neurology ROS      Psychology   Negative psychology ROS              Physical Exam    Airway    Mallampati score: II  TM Distance: >3 FB  Neck ROM: full     Dental       Cardiovascular      Pulmonary      Other Findings        Anesthesia Plan  ASA Score- 3     Anesthesia Type- IV sedation with anesthesia with ASA Monitors  Additional Monitors:   Airway Plan:     Comment: IV sedation,  standard ASA monitors  Risks and benefits discussed with patient; patient consented and agrees to proceed  I saw and evaluated the patient  If seen with CRNA, we have discussed the anesthetic plan and I am in agreement that the plan is appropriate for the patient  Pt has diabetes - indwelling monitor; he attempts tight control and is actively managing it  He describes his blood pressure swings as similar to the stock market  We will check sugars pre and post, and as long as he is within his normal range and feels well, we will not treat it and let him resume his home management  He is agreeable to this plan        Plan Factors-    Induction- intravenous  Postoperative Plan-     Informed Consent- Anesthetic plan and risks discussed with patient  I personally reviewed this patient with the CRNA  Discussed and agreed on the Anesthesia Plan with the CRNA  Alexey Sanchez

## 2018-05-04 NOTE — H&P
History and Physical -  [facility]  Gabby Moss 67 y o  male MRN: [id number]    HPI: Gabby Moss is a 67y o  year old male who presents for colon cancer screening, and evaluation of longstanding symptoms of acid reflux  His last colonoscopy was 12 years ago        Review of Systems    Historical Information   Past Medical History:   Diagnosis Date    Arthritis     Cardiac disease     Closed fracture of fibula, lateral malleolus, right     Diabetes mellitus ([address] )     Disease of thyroid gland     External incisional dehiscence     GERD (gastroesophageal reflux disease)     Heart disease     Muscular deconditioning     Pacemaker     Thyroid disease      Past Surgical History:   Procedure Laterality Date    CARDIAC PACEMAKER PLACEMENT Left 2015    CARDIAC PACEMAKER PLACEMENT      CARDIAC SURGERY      CATARACT EXTRACTION      MUSCLE FLAP      right foot    NERVE BLOCK      Transforaminal Epidural Lumbar with Fluoroscopic Guidance     TOE AMPUTATION      left 5th toe     Social History   History   Alcohol Use    Yes     Comment: socially     History   Drug Use No     History   Smoking Status    Former Smoker    Types: Pipe   Smokeless Tobacco    Never Used     Family History   Problem Relation Age of Onset    Diabetes Mother     Heart disease Mother     Thyroid disease Mother      Disorder     Cancer Brother     Gout Brother     Diabetes Maternal Grandmother        Meds/Allergies     Prescriptions Prior to Admission   Medication    amLODIPine (NORVASC) 5 mg tablet    aspirin 81 mg chewable tablet    Atorvastatin Calcium (LIPITOR PO)    Calcium Citrate 200 MG TABS    Cholecalciferol (VITAMIN D3) 2000 units capsule    gabapentin (NEURONTIN) 100 mg capsule    levothyroxine 150 mcg tablet    lisinopril (ZESTRIL) 20 mg tablet    pantoprazole (PROTONIX) 40 mg tablet    tamsulosin (FLOMAX) 0 4 mg    insulin aspart (NOVOLOG FLEXPEN) 100 Units/mL SOPN    Insulin Degludec (TRESIBA FLEXTOUCH) 100 UNIT/ML SOPN       Allergies   Allergen Reactions    Acetaminophen     Iodine Rash       Objective     Blood pressure (!) 198/91, pulse 84, temperature 98 °F (36 7 °C), temperature source Temporal, resp  rate 16, height 5' 7" (1 702 m), weight 67 1 kg (148 lb), SpO2 98 %  PHYSICAL EXAM    Gen: NAD  CV: RRR  CHEST: Clear  ABD: soft, NT/ND  EXT: no edema  Neuro: AAO      ASSESSMENT/PLAN:  This is a 67y o  year old male here for colon cancer screening and evaluation of GERD symptoms  His last colonoscopy was 12 years ago      PLAN:   Procedure:  EGD and colonoscopy

## 2018-05-04 NOTE — OP NOTE
ESOPHAGOGASTRODUODENOSCOPY    PROCEDURE: EGD    SEDATION: Monitored anesthesia care, check anesthesia records    ASA Class: 2    INDICATIONS:  Longstanding symptoms of GERD, evaluate for Puente's  CONSENT:  Informed consent was obtained for the procedure, including sedation after explaining the risks and benefits of the procedure  Risks including but not limited to bleeding, perforation, infection, and missed lesion  PREPARATION:   Telemetry, pulse oximetry, blood pressure were monitored throughout the procedure  Patient was identified by myself both verbally and by visual inspection of ID band  DESCRIPTION:   Patient was placed in the left lateral decubitus position and was sedated with the above medication  The gastroscope was introduced in to the oropharynx and the esophagus was intubated under direct visualization  Scope was passed down the esophagus up to 2nd part of the duodenum  A careful inspection was made as the gastroscope was withdrawn, including a retroflexed view of the stomach; findings and interventions are described below  FINDINGS:    #1  Esophagus-1 5 cm of diffuse columnar mucosa noted in the distal esophagus suggestive of short-segment Puente's, 4 quadrant biopsies were obtained  There was a diminutive hiatal hernia  Squamocolumnar junction was noted at 40 cm  #2  Stomach-pale appearing gastric mucosa with thin gastric folds-suggestive of atrophic gastritis, biopsies were obtained to assess for H pylori  Retroflexed view revealed small sliding hiatal hernia  #3  Duodenum-normal appearing duodenal mucosa within the bulb, duodenal sweep and D2  IMPRESSIONS:      1  Possible short-segment Puente's esophagus  2   Moderate gastritis  RECOMMENDATIONS:     1  Follow-up biopsy results in 2-3 weeks  2   Continue pantoprazole 40 mg   3   Avoid NSAIDs  4   Anti-reflux diet  COMPLICATIONS:  None; patient tolerated the procedure well            DISPOSITION: PACU CONDITION: Stable

## 2018-05-04 NOTE — ANESTHESIA POSTPROCEDURE EVALUATION
Post-Op Assessment Note      CV Status:  Stable    Mental Status:  Alert and awake    Hydration Status:  Euvolemic    PONV Controlled:  Controlled    Airway Patency:  Patent    Post Op Vitals Reviewed: Yes          Staff: CRNA           /63 (05/04/18 0905)    Temp      Pulse 78 (05/04/18 0905)   Resp 14 (05/04/18 0905)    SpO2 96 % (05/04/18 0905)

## 2018-05-14 ENCOUNTER — TELEPHONE (OUTPATIENT)
Dept: GASTROENTEROLOGY | Facility: AMBULARY SURGERY CENTER | Age: 73
End: 2018-05-14

## 2018-05-14 ENCOUNTER — TELEPHONE (OUTPATIENT)
Dept: GASTROENTEROLOGY | Facility: CLINIC | Age: 73
End: 2018-05-14

## 2018-05-14 NOTE — TELEPHONE ENCOUNTER
Dr Carmen Valles pt    Pt called in stating that since his egd/colon on 5/4 he has been using the bathroom very frequently and his stomach is gassy  Please call back to discuss   [phone]

## 2018-05-14 NOTE — TELEPHONE ENCOUNTER
Please schedule patient for colonoscopy with 2 day prep as Dr Kamran Gaffney documented in EGD pathology result note

## 2018-05-14 NOTE — TELEPHONE ENCOUNTER
Patient is s/p EGD 5/4 showing short segment barretts, mod gastritis, he is on protonix 40 qd  It sounds like his colonoscopy could not be completed as his bowel was not clear  The nurse documented that she had to apply  Diaper as he was incontinent of a large amount of stool  I sent a message to procedure scheduling to call pt to schedule colonoscopy with 2 day prep as directed in your result note  He is still reporting soft/liquid stool 1x/day,  denies bleeding but is incontient of stool on occasion when he passes gas  I suggested he take a probiotic, increase fiber and water intake  I'm not sure if he's actually constipated and is not emptying which is causing these symptoms  or if imodium would help  Let me know your suggestion

## 2018-05-15 NOTE — TELEPHONE ENCOUNTER
Hi, please try to schedule for colonoscopy soon as possible, he can take Imodium p r n  however would not advice this prior to the colonoscopy  He needs a 2 day prep

## 2018-05-21 ENCOUNTER — PREP FOR PROCEDURE (OUTPATIENT)
Dept: GASTROENTEROLOGY | Facility: AMBULARY SURGERY CENTER | Age: 73
End: 2018-05-21

## 2018-05-21 DIAGNOSIS — Z12.11 SCREENING FOR COLON CANCER: Primary | ICD-10-CM

## 2018-05-21 NOTE — TELEPHONE ENCOUNTER
I spoke to patient, his symptoms have improved with imodium prn  I transferred his call to Bernice to schedule procedure  He said his next availability is July

## 2018-05-23 ENCOUNTER — OFFICE VISIT (OUTPATIENT)
Dept: FAMILY MEDICINE CLINIC | Facility: CLINIC | Age: 73
End: 2018-05-23
Payer: COMMERCIAL

## 2018-05-23 VITALS
HEART RATE: 94 BPM | HEIGHT: 67 IN | SYSTOLIC BLOOD PRESSURE: 118 MMHG | DIASTOLIC BLOOD PRESSURE: 76 MMHG | OXYGEN SATURATION: 98 % | TEMPERATURE: 97.7 F | RESPIRATION RATE: 16 BRPM | BODY MASS INDEX: 22.79 KG/M2 | WEIGHT: 145.2 LBS

## 2018-05-23 DIAGNOSIS — J04.2 ACUTE LARYNGOTRACHEITIS: Primary | ICD-10-CM

## 2018-05-23 PROCEDURE — 99213 OFFICE O/P EST LOW 20 MIN: CPT | Performed by: PHYSICIAN ASSISTANT

## 2018-05-23 RX ORDER — AMOXICILLIN 500 MG/1
500 TABLET, FILM COATED ORAL 2 TIMES DAILY
Qty: 14 TABLET | Refills: 0 | Status: SHIPPED | OUTPATIENT
Start: 2018-05-23 | End: 2018-05-30

## 2018-05-23 NOTE — PROGRESS NOTES
Assessment/Plan:    No problem-specific Assessment & Plan notes found for this encounter  Diagnoses and all orders for this visit:    Acute laryngotracheitis  Symptoms, VS and PE all minimal but increased concern due to age and brittle diabetic status  - Started on Amoxicillin 500 mg BID for 7 day  - advised to continue OTC supportive care with saline gargle   - encouraged rest and fluid intake   - educated Pt that cough can take 10-14 days total to resolve  - directed to return if fever over 100 4, symptoms persist for 7 days, thick productive cough        Subjective:    Patient ID: William Monzon is a 67 y o  male  Pt is presenting today for Raspy throat and hacking cough starting today  The patient is concerned due to his age and his brittle diabetic status  He has noticed his blood sugar was elevated today  He denies any fever, chills, SOB or runny nose  URI    There has been no fever  Associated symptoms include coughing and a sore throat  Pertinent negatives include no abdominal pain, chest pain, congestion, diarrhea, headaches, nausea, neck pain, rhinorrhea, sinus pain, sneezing, swollen glands, vomiting or wheezing  He has tried nothing for the symptoms  The following portions of the patient's history were reviewed and updated as appropriate: allergies, current medications and problem list     Review of Systems   Constitutional: Negative for activity change, fatigue, fever and unexpected weight change  HENT: Positive for sore throat  Negative for congestion, rhinorrhea, sinus pain and sneezing  Respiratory: Positive for cough  Negative for shortness of breath and wheezing  Cardiovascular: Negative for chest pain, palpitations and leg swelling  Gastrointestinal: Negative for abdominal pain, constipation, diarrhea, nausea and vomiting  Musculoskeletal: Negative for back pain, neck pain and neck stiffness  Neurological: Negative for headaches           Objective:  /76 Pulse 94   Temp 97 7 °F (36 5 °C)   Resp 16   Ht 5' 7" (1 702 m)   Wt 65 9 kg (145 lb 3 2 oz)   SpO2 98%   BMI 22 74 kg/m²      Physical Exam   Constitutional: He appears well-developed and well-nourished  HENT:   Head: Normocephalic and atraumatic  Right Ear: Tympanic membrane, external ear and ear canal normal    Left Ear: Tympanic membrane, external ear and ear canal normal    Nose: Nose normal  No rhinorrhea  Mouth/Throat: Oropharynx is clear and moist  No oropharyngeal exudate or posterior oropharyngeal erythema  Cardiovascular: Normal rate, regular rhythm and normal heart sounds  Exam reveals no gallop and no friction rub  No murmur heard  Pulmonary/Chest: Effort normal and breath sounds normal  He has no wheezes  He has no rales  Lymphadenopathy:        Head (right side): No submental, no submandibular, no tonsillar, no preauricular and no posterior auricular adenopathy present  Head (left side): No submental, no submandibular, no tonsillar, no preauricular and no posterior auricular adenopathy present  He has no cervical adenopathy

## 2018-07-06 ENCOUNTER — OFFICE VISIT (OUTPATIENT)
Dept: FAMILY MEDICINE CLINIC | Facility: CLINIC | Age: 73
End: 2018-07-06
Payer: COMMERCIAL

## 2018-07-06 VITALS
OXYGEN SATURATION: 97 % | WEIGHT: 141 LBS | HEART RATE: 73 BPM | HEIGHT: 67 IN | DIASTOLIC BLOOD PRESSURE: 52 MMHG | BODY MASS INDEX: 22.13 KG/M2 | SYSTOLIC BLOOD PRESSURE: 98 MMHG

## 2018-07-06 DIAGNOSIS — Z13.6 SCREENING FOR AAA (AORTIC ABDOMINAL ANEURYSM): ICD-10-CM

## 2018-07-06 DIAGNOSIS — Z00.00 MEDICARE ANNUAL WELLNESS VISIT, SUBSEQUENT: Primary | ICD-10-CM

## 2018-07-06 DIAGNOSIS — Z13.820 OSTEOPOROSIS SCREENING: ICD-10-CM

## 2018-07-06 DIAGNOSIS — Z91.81 RISK FOR FALLS: ICD-10-CM

## 2018-07-06 PROCEDURE — 3725F SCREEN DEPRESSION PERFORMED: CPT | Performed by: FAMILY MEDICINE

## 2018-07-06 PROCEDURE — G0439 PPPS, SUBSEQ VISIT: HCPCS | Performed by: FAMILY MEDICINE

## 2018-07-06 NOTE — PROGRESS NOTES
Assessment and Plan:  Problem List Items Addressed This Visit        Other    Medicare annual wellness visit, subsequent - Primary    Relevant Orders    US abdominal aorta screening aaa      Other Visit Diagnoses     Risk for falls        Screening for AAA (aortic abdominal aneurysm)        Relevant Orders    US abdominal aorta screening aaa        Health Maintenance Due   Topic Date Due    Hepatitis C Screening  1945    ABDOMINAL AORTIC ANEURYSM (AAA) SCREEN  10/13/2010    URINE MICROALBUMIN  11/10/2015    HEMOGLOBIN A1C  12/23/2015    GLAUCOMA SCREENING 65 + YR  02/02/2018    DM Eye Exam  07/06/2018      since patient's blood pressure is low today he was advised to stop amlodipine  he will continue taking lisinopril 40 milligram daily  Patient advised to monitor his blood pressure at home  He will call us back if the blood pressure is persistently below 100/60 or persistently above 130/80  I will see him back in the office within 7 to 10 days with labs and for blood pressure check  I have ordered an abdominal aortic aneurysm screening and DEXA screening since patient has never had these  He is a high risk for both  Patient advised to call insurance  To check coverage before going for any of these screenings  HPI:  Alex Aponte is a 67 y o  male here for his Subsequent Wellness Visit  Patient has all his care through [place]  He follows up with an endocrine specialist for management of his diabetes  He is currently  Using a continues blood sugar monitor since his blood sugar fluctuates throughout the day  he also sees a urologist    patient was due for labs few weeks ago  These were to follow-up on his chronic medical issues  Patient was unable to go for labs  Patient's blood pressure is low today  Patient denies any symptoms of chest pain /shortness of breath /dizziness especially with changes in posture     Patient Active Problem List   Diagnosis    Acid reflux    Acquired hypothyroidism    Anemia    Arthritis    Cardiac arrhythmia    Diabetes mellitus with background retinopathy ([facility] )    Diabetic peripheral neuropathy ([facility] )    DM type 2, not at goal [facility])    Essential hypertension    Hyperlipidemia    Insulin pump status    History of permanent cardiac pacemaker placement    Screening for colon cancer    Medicare annual wellness visit, subsequent     Past Medical History:   Diagnosis Date    Arthritis     Cardiac disease     Closed fracture of fibula, lateral malleolus, right     Diabetes mellitus ([facility] )     Disease of thyroid gland     External incisional dehiscence     GERD (gastroesophageal reflux disease)     Heart disease     Muscular deconditioning     Pacemaker     Thyroid disease      Past Surgical History:   Procedure Laterality Date    CARDIAC PACEMAKER PLACEMENT Left 2015    CARDIAC PACEMAKER PLACEMENT      CARDIAC SURGERY      CATARACT EXTRACTION      MUSCLE FLAP      right foot    NERVE BLOCK      Transforaminal Epidural Lumbar with Fluoroscopic Guidance     AZ COLONOSCOPY FLX DX W/COLLJ SPEC WHEN PFRMD N/A 5/4/2018    Procedure: EGD AND COLONOSCOPY;  Surgeon: Evelyn De Jesus MD;  [facility];   Service: Gastroenterology    TOE AMPUTATION      left 5th toe     Family History   Problem Relation Age of Onset    Diabetes Mother     Heart disease Mother     Thyroid disease Mother         Disorder     Cancer Brother     Gout Brother     Diabetes Maternal Grandmother      History   Smoking Status    Former Smoker    Types: Pipe   Smokeless Tobacco    Never Used     History   Alcohol Use    Yes     Comment: socially      History   Drug Use No         Current Outpatient Prescriptions   Medication Sig Dispense Refill    amLODIPine (NORVASC) 5 mg tablet Take 5 mg by mouth daily      aspirin 81 mg chewable tablet Chew 81 mg daily      Atorvastatin Calcium (LIPITOR PO) Take 10 mg by mouth      Calcium Citrate 200 MG TABS Take by mouth daily      Cholecalciferol (VITAMIN D3) 2000 units capsule Take 2,000 Units by mouth daily      gabapentin (NEURONTIN) 100 mg capsule Take 100 mg by mouth      insulin aspart (NOVOLOG FLEXPEN) 100 Units/mL SOPN Inject 8 Units under the skin 3 (three) times a day       Insulin Degludec (TRESIBA FLEXTOUCH) 100 UNIT/ML SOPN Inject 18 Units under the skin daily at bedtime        levothyroxine 150 mcg tablet Take by mouth      lisinopril (ZESTRIL) 20 mg tablet Take 30 mg by mouth        pantoprazole (PROTONIX) 40 mg tablet Take 40 mg by mouth      tamsulosin (FLOMAX) 0 4 mg Take by mouth       No current facility-administered medications for this visit        Allergies   Allergen Reactions    Acetaminophen      Other reaction(s): Unknown Reaction  sensitive    Ibuprofen      sensitive    Iodine Rash     Immunization History   Administered Date(s) Administered    Influenza Quadrivalent, 6-35 Months IM 11/19/2016    Influenza Split High Dose Preservative Free IM 10/15/2014, 11/11/2015, 10/19/2017    Influenza TIV (IM) 10/20/2011, 09/15/2013    Pneumococcal Conjugate 13-Valent 11/15/2016    Pneumococcal Polysaccharide PPV23 01/25/2018    Tdap 10/24/2008       Patient Care Team:  Marsha Johnson MD as PCP - MD Marzena Hart MD Renella Dolores, MD Noelia Patricia, MD Shellia Fritter, DO [facility] D[facility]    Medicare Screening Tests and Risk Assessments:  AWV Clinical     ISAR:       Once in a Lifetime Medicare Screening:       Medicare Screening Tests and Risk Assessment:   AAA Risk Assessment    Age over 72 (males only):  Yes    Osteoporosis Risk Assessment    HIV Risk Assessment        Drug and Alcohol Use:   Tobacco use    Tobacco use duration    Tobacco Cessation Readiness    Alcohol use    Alcohol Treatment Readiness   Illicit Drug Use        Diet & Exercise:   Diet   How many servings a day of the following:   Exercise        Cognitive Impairment Screening:   Cognitive Impairment Screening        Functional Ability/Level of Safety:   Hearing    Hearing Impairment Assessment    Current Activities    Help needed with the folllowing:    ADL    Fall Risk   Injury History       Home Safety:   Home Safety Risk Factors       Advanced Directives:   Advanced Directives    Patient's End of Life Decisions        Urinary Incontinence:       Glaucoma:            Provider Screening     Preventative Screening/Counseling:   Cardiovascular Screening/Counseling:   (Labs Q5 years, EKG optional one-time)   General:  Risks and Benefits Discussed, Screening Current Counseling:  Healthy Diet, Healthy Weight          Diabetes Screening/Counseling:   (2 tests/year if Pre-Diabetes or 1 test/year if no Diabetes)   General:  Risks and Benefits Discussed, Screening Current Counseling:  Healthy Diet, Healthy Weight          Colorectal Cancer Screening/Counseling:   (FOBT Q1 yr; Flex Sig Q4 yrs or Q10 yrs after Screening Colonoscopy; Screening Colonoscpy Q2 yrs High Risk or Q10 yrs Low Risk; Barium Enema Q2 yrs High Risk or Q4 yrs Low Risk)   General:  Risks and Benefits Discussed  Due for: Studies:  Colonoscopy - Low Risk         Prostate Cancer Screening/Counseling:   (Annual)    General:  Risks and Benefits Discussed          Breast Cancer Screening/Counseling:   (Baseline Age 28 - 43; Annual Age 36+)         Cervical Cancer Screening/Counseling:   (Annual for High Risk or Childbearing Age with Abnormal Pap in Last 3 yrs;  Every 2 all others)         Osteoporosis Screening/Counseling:   (Every 2 Yrs if at risk or more if medically necessary)   General:  Risks and Benefits Discussed  Due for: Studies:  DXA Axial         AAA Screening/Counseling:   (Once per Lifetime with risk factors)     Age over 72 (males only):  Yes    General:  Risks and Benefits Discussed  [place] US:  Screening US         Glaucoma Screening/Counseling:   (Annual)   General:  Risks and Benefits Discussed, Screening Current          HIV Screening/Counseling:   (Voluntary; Once annually for high risk OR 3 times for Pregnancy at diagnosis of IUP; 3rd trimester; and at Labor   General:  Screening Not Indicated           Hepatitis C Screening:   Hepatitis C Counseling Provided: Yes               Immunizations:   Influenza (annual): Influenza UTD This Year   Pneumococcal (Once in a Lifetime):  Lifetime Vaccine Completed   Zostavax (Medicare D Coverage, Pt >72 yo):  Risks & Benefits Discussed   Tdap (Non-Medicare Wellness Visit required): Tdap Vaccine UTD       Other Preventative Couseling (Non-Medicare Wellness Visit Required):       Referrals (Non-Medicare Wellness Visit Required):       Medical Equipment/Suppliers:           No exam data present    Physical Exam :  General ROS: negative  ENT ROS: negative  Hematological and Lymphatic ROS: negative  Respiratory ROS: no cough, shortness of breath, or wheezing  Cardiovascular ROS: no chest pain or dyspnea on exertion  Gastrointestinal ROS: no abdominal pain, change in bowel habits, or black or bloody stools  Musculoskeletal ROS: negative  Neurological ROS: negative  Physical Exam   Constitutional: He is oriented to person, place, and time  He appears well-developed and well-nourished  HENT:   Right Ear: External ear normal    Left Ear: External ear normal    Eyes: EOM are normal  Pupils are equal, round, and reactive to light  Neck: Normal range of motion  Neck supple  Cardiovascular: Normal rate, regular rhythm and normal heart sounds  Pulmonary/Chest: Effort normal and breath sounds normal    Abdominal: Soft  He exhibits no mass  There is no tenderness  There is no guarding  Musculoskeletal: Normal range of motion  Neurological: He is alert and oriented to person, place, and time  Psychiatric: He has a normal mood and affect  His behavior is normal  Judgment and thought content normal    Nursing note and vitals reviewed

## 2018-07-06 NOTE — PATIENT INSTRUCTIONS
Obesity   AMBULATORY CARE:   Obesity  is when your body mass index (BMI) is greater than 30  Your healthcare provider will use your height and weight to measure your BMI  The risks of obesity include  many health problems, such as injuries or physical disability  You may need tests to check for the following:  · Diabetes     · High blood pressure or high cholesterol     · Heart disease     · Gallbladder or liver disease     · Cancer of the colon, breast, prostate, liver, or kidney     · Sleep apnea     · Arthritis or gout  Seek care immediately if:   · You have a severe headache, confusion, or difficulty speaking  · You have weakness on one side of your body  · You have chest pain, sweating, or shortness of breath  Contact your healthcare provider if:   · You have symptoms of gallbladder or liver disease, such as pain in your upper abdomen  · You have knee or hip pain and discomfort while walking  · You have symptoms of diabetes, such as intense hunger and thirst, and frequent urination  · You have symptoms of sleep apnea, such as snoring or daytime sleepiness  · You have questions or concerns about your condition or care  Treatment for obesity  focuses on helping you lose weight to improve your health  Even a small decrease in BMI can reduce the risk for many health problems  Your healthcare provider will help you set a weight-loss goal   · Lifestyle changes  are the first step in treating obesity  These include making healthy food choices and getting regular physical activity  Your healthcare provider may suggest a weight-loss program that involves coaching, education, and therapy  · Medicine  may help you lose weight when it is used with a healthy diet and physical activity  · Surgery  can help you lose weight if you are very obese and have other health problems  There are several types of weight-loss surgery  Ask your healthcare provider for more information    Be successful losing weight:   · Set small, realistic goals  An example of a small goal is to walk for 20 minutes 5 days a week  Anther goal is to lose 5% of your body weight  · Tell friends, family members, and coworkers about your goals  and ask for their support  Ask a friend to lose weight with you, or join a weight-loss support group  · Identify foods or triggers that may cause you to overeat , and find ways to avoid them  Remove tempting high-calorie foods from your home and workplace  Place a bowl of fresh fruit on your kitchen counter  If stress causes you to eat, then find other ways to cope with stress  · Keep a diary to track what you eat and drink  Also write down how many minutes of physical activity you do each day  Weigh yourself once a week and record it in your diary  Eating changes: You will need to eat 500 to 1,000 fewer calories each day than you currently eat to lose 1 to 2 pounds a week  The following changes will help you cut calories:  · Eat smaller portions  Use small plates, no larger than 9 inches in diameter  Fill your plate half full of fruits and vegetables  Measure your food using measuring cups until you know what a serving size looks like  · Eat 3 meals and 1 or 2 snacks each day  Plan your meals in advance  Susanne Stewart and eat at home most of the time  Eat slowly  · Eat fruits and vegetables at every meal   They are low in calories and high in fiber, which makes you feel full  Do not add butter, margarine, or cream sauce to vegetables  Use herbs to season steamed vegetables  · Eat less fat and fewer fried foods  Eat more baked or grilled chicken and fish  These protein sources are lower in calories and fat than red meat  Limit fast food  Dress your salads with olive oil and vinegar instead of bottled dressing  · Limit the amount of sugar you eat  Do not drink sugary beverages  Limit alcohol  Activity changes:  Physical activity is good for your body in many ways   It helps you burn calories and build strong muscles  It decreases stress and depression, and improves your mood  It can also help you sleep better  Talk to your healthcare provider before you begin an exercise program   · Exercise for at least 30 minutes 5 days a week  Start slowly  Set aside time each day for physical activity that you enjoy and that is convenient for you  It is best to do both weight training and an activity that increases your heart rate, such as walking, bicycling, or swimming  · Find ways to be more active  Do yard work and housecleaning  Walk up the stairs instead of using elevators  Spend your leisure time going to events that require walking, such as outdoor festivals or fairs  This extra physical activity can help you lose weight and keep it off  Follow up with your healthcare provider as directed: You may need to meet with a dietitian  Write down your questions so you remember to ask them during your visits  © 2017 [address] [employer] Information is for End User's use only and may not be sold, redistributed or otherwise used for commercial purposes  All illustrations and images included in CareNotes® are the copyrighted property of [employer]  or Kirill Ulrich  The above information is an  only  It is not intended as medical advice for individual conditions or treatments  Talk to your doctor, nurse or pharmacist before following any medical regimen to see if it is safe and effective for you  Urinary Incontinence   WHAT YOU NEED TO KNOW:   What is urinary incontinence? Urinary incontinence (UI) is when you lose control of your bladder  What causes UI? UI occurs because your bladder cannot store or empty urine properly  The following are the most common types of UI:  · Stress incontinence  is when you leak urine due to increased bladder pressure  This may happen when you cough, sneeze, or exercise       · Urge incontinence  is when you feel the need to urinate right away and leak urine accidentally  · Mixed incontinence  is when you have both stress and urge UI  What are the signs and symptoms of UI?   · You feel like your bladder does not empty completely when you urinate  · You urinate often and need to urinate immediately  · You leak urine when you sleep, or you wake up with the urge to urinate  · You leak urine when you cough, sneeze, exercise, or laugh  How is UI diagnosed? Your healthcare provider will ask how often you leak urine and whether you have stress or urge symptoms  Tell him which medicines you take, how often you urinate, and how much liquid you drink each day  You may need any of the following tests:  · Urine tests  may show infection or kidney function  · A pelvic exam  may be done to check for blockages  A pelvic exam will also show if your bladder, uterus, or other organs have moved out of place  · An x-ray, ultrasound, or CT  may show problems with parts of your urinary system  You may be given contrast liquid to help your organs show up better in the pictures  Tell the healthcare provider if you have ever had an allergic reaction to contrast liquid  Do not enter the MRI room with anything metal  Metal can cause serious injury  Tell the healthcare provider if you have any metal in or on your body  · A bladder scan  will show how much urine is left in your bladder after you urinate  You will be asked to urinate and then healthcare providers will use a small ultrasound machine to check the urine left in your bladder  · Cystometry  is used to check the function of your urinary system  Your healthcare provider checks the pressure in your bladder while filling it with fluid  Your bladder pressure may also be tested when your bladder is full and while you urinate  How is UI treated? · Medicines  can help strengthen your bladder control      · Electrical stimulation  is used to send a small amount of electrical energy to your pelvic floor muscles  This helps control your bladder function  Electrodes may be placed outside your body or in your rectum  For women, the electrodes may be placed in the vagina  · A bulking agent  may be injected into the wall of your urethra to make it thicker  This helps keep your urethra closed and decreases urine leakage  · Devices  such as a clamp, pessary, or tampon may help stop urine leaks  Ask your healthcare provider for more information about these and other devices  · Surgery  may be needed if other treatments do not work  Several types of surgery can help improve your bladder control  Ask your healthcare provider for more information about the surgery you may need  How can I manage my symptoms? · Do pelvic muscle exercises often  Your pelvic muscles help you stop urinating  Squeeze these muscles tight for 5 seconds, then relax for 5 seconds  Gradually work up to squeezing for 10 seconds  Do 3 sets of 15 repetitions a day, or as directed  This will help strengthen your pelvic muscles and improve bladder control  · A catheter  may be used to help empty your bladder  A catheter is a tiny, plastic tube that is put into your bladder to drain your urine  Your healthcare provider may tell you to use a catheter to prevent your bladder from getting too full and leaking urine  · Keep a UI record  Write down how often you leak urine and how much you leak  Make a note of what you were doing when you leaked urine  · Train your bladder  Go to the bathroom at set times, such as every 2 hours, even if you do not feel the urge to go  You can also try to hold your urine when you feel the urge to go  For example, hold your urine for 5 minutes when you feel the urge to go  As that becomes easier, hold your urine for 10 minutes  · Drink liquids as directed  Ask your healthcare provider how much liquid to drink each day and which liquids are best for you   You may need to limit the amount of liquid you drink to help control your urine leakage  Limit or do not have drinks that contain caffeine or alcohol  Do not drink any liquid right before you go to bed  · Prevent constipation  Eat a variety of high-fiber foods  Good examples are high-fiber cereals, beans, vegetables, and whole-grain breads  Prune juice may help make your bowel movement softer  Walking is the best way to trigger your intestines to have a bowel movement  · Exercise regularly and maintain a healthy weight  Ask your healthcare provider how much you should weigh and about the best exercise plan for you  Weight loss and exercise will decrease pressure on your bladder and help you control your leakage  Ask him to help you create a weight loss plan if you are overweight  When should I seek immediate care? · You have severe pain  · You are confused or cannot think clearly  When should I contact my healthcare provider? · You have a fever  · You see blood in your urine  · You have pain when you urinate  · You have new or worse pain, even after treatment  · Your mouth feels dry or you have vision changes  · Your urine is cloudy or smells bad  · You have questions or concerns about your condition or care  CARE AGREEMENT:   You have the right to help plan your care  Learn about your health condition and how it may be treated  Discuss treatment options with your caregivers to decide what care you want to receive  You always have the right to refuse treatment  The above information is an  only  It is not intended as medical advice for individual conditions or treatments  Talk to your doctor, nurse or pharmacist before following any medical regimen to see if it is safe and effective for you  © 2017 [address] [employer] Information is for End User's use only and may not be sold, redistributed or otherwise used for commercial purposes   All illustrations and images included in CareNotes® are the copyrighted property of [employer]  or Kirill Ulrich  Cigarette Smoking and Your Health   AMBULATORY CARE:   Risks to your health if you smoke:  Nicotine and other chemicals found in tobacco damage every cell in your body  Even if you are a light smoker, you have an increased risk for cancer, heart disease, and lung disease  If you are pregnant or have diabetes, smoking increases your risk for complications  Benefits to your health if you stop smoking:   · You decrease respiratory symptoms such as coughing, wheezing, and shortness of breath  · You reduce your risk for cancers of the lung, mouth, throat, kidney, bladder, pancreas, stomach, and cervix  If you already have cancer, you increase the benefits of chemotherapy  You also reduce your risk for cancer returning or a second cancer from developing  · You reduce your risk for heart disease, blood clots, heart attack, and stroke  · You reduce your risk for lung infections, and diseases such as pneumonia, asthma, chronic bronchitis, and emphysema  · Your circulation improves  More oxygen can be delivered to your body  If you have diabetes, you lower your risk for complications, such as kidney, artery, and eye diseases  You also lower your risk for nerve damage  Nerve damage can lead to amputations, poor vision, and blindness  · You improve your body's ability to heal and to fight infections  Benefits to the health of others if you stop smoking:  Tobacco is harmful to nonsmokers who breathe in your secondhand smoke  The following are ways the health of others around you may improve when you stop smoking:  · You lower the risks for lung cancer and heart disease in nonsmoking adults  · If you are pregnant, you lower the risk for miscarriage, early delivery, low birth weight, and stillbirth  You also lower your baby's risk for SIDS, obesity, developmental delay, and neurobehavioral problems, such as ADHD  · If you have children, you lower their risk for ear infections, colds, pneumonia, bronchitis, and asthma  For more information and support to stop smoking:   · Smokefree  gov  Phone: 5- 286 - 521-9179  Web Address: www smokefr[employer]  Follow up with your healthcare provider as directed:  Write down your questions so you remember to ask them during your visits  © 2017 [address] [employer] Information is for End User's use only and may not be sold, redistributed or otherwise used for commercial purposes  All illustrations and images included in CareNotes® are the copyrighted property of [employer]  or Kirill Ulrich  The above information is an  only  It is not intended as medical advice for individual conditions or treatments  Talk to your doctor, nurse or pharmacist before following any medical regimen to see if it is safe and effective for you  Fall Prevention   WHAT YOU NEED TO KNOW:   What is fall prevention? Fall prevention includes ways to make your home and other areas safer  It also includes ways you can move more carefully to prevent a fall  What increases my risk for falls? · Lack of vitamin D    · Not getting enough sleep each night    · Trouble walking or keeping your balance, or foot problems    · Health conditions that cause changes in your blood pressure, vision, or muscle strength and coordination    · Medicines that make you dizzy, weak, or sleepy    · Problems seeing clearly    · Shoes that have high heels or are not supportive    · Tripping hazards, such as items left on the floor, no handrails on the stairs, or broken steps  How can I help protect myself from falls? · Stand or sit up slowly  This may help you keep your balance and prevent falls  If you need to get up during the night, sit up first  Be sure you are fully awake before you stand  Turn on the light before you start walking  Go slowly in case you are still sleepy   Make sure you will not trip over any pets sleeping in the bedroom  · Use assistive devices as directed  Your healthcare provider may suggest that you use a cane or walker to help you keep your balance  You may need to have grab bars put in your bathroom near the toilet or in the shower  · Wear shoes that fit well and have soles that   Wear shoes both inside and outside  Use slippers with good   Do not wear shoes with high heels  · Wear a personal alarm  This is a device that allows you to call 911 if you fall and need help  Ask your healthcare provider for more information  · Stay active  Exercise can help strengthen your muscles and improve your balance  Your healthcare provider may recommend water aerobics or walking  He or she may also recommend physical therapy to improve your coordination  Never start an exercise program without talking to your healthcare provider first      · Manage medical conditions  Keep all appointments with your healthcare providers  Visit your eye doctor as directed  How can I make my home safer? · Add items to prevent falls in the bathroom  Put nonslip strips on your bath or shower floor to prevent you from slipping  Use a bath mat if you do not have carpet in the bathroom  This will prevent you from falling when you step out of the bath or shower  Use a shower seat so you do not need to stand while you shower  Sit on the toilet or a chair in your bathroom to dry yourself and put on clothing  This will prevent you from losing your balance from drying or dressing yourself while you are standing  · Keep paths clear  Remove books, shoes, and other objects from walkways and stairs  Place cords for telephones and lamps out of the way so that you do not need to walk over them  Tape them down if you cannot move them  Remove small rugs  If you cannot remove a rug, secure it with double-sided tape  This will prevent you from tripping  · Install bright lights in your home  Use night lights to help light paths to the bathroom or kitchen  Always turn on the light before you start walking  · Keep items you use often on shelves within reach  Do not use a step stool to help you reach an item  · Paint or place reflective tape on the edges of your stairs  This will help you see the stairs better  Call 911 or have someone else call if:   · You have fallen and are unconscious  · You have fallen and cannot move part of your body  Contact your healthcare provider if:   · You have fallen and have pain or a headache  · You have questions or concerns about your condition or care  CARE AGREEMENT:   You have the right to help plan your care  Learn about your health condition and how it may be treated  Discuss treatment options with your caregivers to decide what care you want to receive  You always have the right to refuse treatment  The above information is an  only  It is not intended as medical advice for individual conditions or treatments  Talk to your doctor, nurse or pharmacist before following any medical regimen to see if it is safe and effective for you  © 2017 [address] [facility] Information is for End User's use only and may not be sold, redistributed or otherwise used for commercial purposes  All illustrations and images included in CareNotes® are the copyrighted property of [employer]  or Kirill Ulrich  Advance Directives   WHAT YOU NEED TO KNOW:   What are advance directives? Advance directives are legal documents that state your wishes and plans for medical care  These plans are made ahead of time in case you lose your ability to make decisions for yourself  Advance directives can apply to any medical decision, such as the treatments you want, and if you want to donate organs  What are the types of advance directives? There are many types of advance directives, and each state has rules about how to use them   You may choose a combination of any of the following:  · Living will: This is a written record of the treatment you want  You can also choose which treatments you do not want, which to limit, and which to stop at a certain time  This includes surgery, medicine, IV fluid, and tube feedings  · Durable power of  for healthcare [facility]): This is a written record that states who you want to make healthcare choices for you when you are unable to make them for yourself  This person, called a proxy, is usually a family member or a friend  You may choose more than 1 proxy  · Do not resuscitate (DNR) order:  A DNR order is used in case your heart stops beating or you stop breathing  It is a request not to have certain forms of treatment, such as CPR  A DNR order may be included in other types of advance directives  · Medical directive: This covers the care that you want if you are in a coma, near death, or unable to make decisions for yourself  You can list the treatments you want for each condition  Treatment may include pain medicine, surgery, blood transfusions, dialysis, IV or tube feedings, and a ventilator (breathing machine)  · Values history: This document has questions about your views, beliefs, and how you feel and think about life  This information can help others choose the care that you would choose  Why are advance directives important? An advance directive helps you control your care  Although spoken wishes may be used, it is better to have your wishes written down  Spoken wishes can be misunderstood, or not followed  Treatments may be given even if you do not want them  An advance directive may make it easier for your family to make difficult choices about your care  How do I decide what to put in my advance directives? · Make informed decisions:  Make sure you fully understand treatments or care you may receive   Think about the benefits and problems your decisions could cause for you or your family  Talk to healthcare providers if you have concerns or questions before you write down your wishes  You may also want to talk with your Sabian[employer] or , or a   Check your state laws to make sure that what you put in your advance directive is legal      · Sign all forms:  Sign and date your advance directive when you have finished  You may also need 2 witnesses to sign the forms  Witnesses cannot be your doctor or his staff, your spouse, heirs or beneficiaries, people you owe money to, or your chosen proxy  Talk to your family, proxy, and healthcare providers about your advance directive  Give each person a copy, and keep one for yourself in a place you can get to easily  Do not keep it hidden or locked away  · Review and revise your plans: You can revise your advance directive at any time, as long as you are able to make decisions  Review your plan every year, and when there are changes in your life, or your health  When you make changes, let your family, proxy, and healthcare providers know  Give each a new copy  Where can I find more information? · [employer]  [place] , [address]  Phone: 2- [phone] - [phone]  Phone: 3- 603 - [phone]  Web Address: http://www  aafp org  · [address] [facility])  [address] [facility], [address]  Phone: [phone]  Phone: [phone]  Web Address: Amber farrar  [facility] AGREEMENT:   You have the right to help plan your care  To help with this plan, you must learn about your health condition and treatment options  You must also learn about advance directives and how they are used  Work with your healthcare providers to decide what care will be used to treat you  You always have the right to refuse treatment  The above information is an  only   It is not intended as medical advice for individual conditions or treatments  Talk to your doctor, nurse or pharmacist before following any medical regimen to see if it is safe and effective for you  © 2017 [address] [employer] Information is for End User's use only and may not be sold, redistributed or otherwise used for commercial purposes  All illustrations and images included in CareNotes® are the copyrighted property of [employer] M [employer]  or Kirill Ulrich

## 2018-07-09 ENCOUNTER — APPOINTMENT (OUTPATIENT)
Dept: LAB | Facility: CLINIC | Age: 73
End: 2018-07-09
Payer: COMMERCIAL

## 2018-07-09 DIAGNOSIS — E03.9 PRIMARY HYPOTHYROIDISM: ICD-10-CM

## 2018-07-09 DIAGNOSIS — I10 BENIGN HYPERTENSION: ICD-10-CM

## 2018-07-09 DIAGNOSIS — E11.3299 TYPE 2 DIABETES MELLITUS WITH BACKGROUND RETINOPATHY (HCC): ICD-10-CM

## 2018-07-09 DIAGNOSIS — D64.9 ANEMIA, UNSPECIFIED TYPE: ICD-10-CM

## 2018-07-09 DIAGNOSIS — E11.3299 TYPE 2 DIABETES MELLITUS WITH BACKGROUND RETINOPATHY (HCC): Primary | ICD-10-CM

## 2018-07-09 LAB
ALBUMIN SERPL BCP-MCNC: 3.3 G/DL (ref 3.5–5)
ALP SERPL-CCNC: 95 U/L (ref 46–116)
ALT SERPL W P-5'-P-CCNC: 26 U/L (ref 12–78)
ANION GAP SERPL CALCULATED.3IONS-SCNC: 6 MMOL/L (ref 4–13)
AST SERPL W P-5'-P-CCNC: 17 U/L (ref 5–45)
BASOPHILS # BLD AUTO: 0.04 THOUSANDS/ΜL (ref 0–0.1)
BASOPHILS NFR BLD AUTO: 1 % (ref 0–1)
BILIRUB SERPL-MCNC: 0.44 MG/DL (ref 0.2–1)
BUN SERPL-MCNC: 30 MG/DL (ref 5–25)
CALCIUM SERPL-MCNC: 9 MG/DL (ref 8.3–10.1)
CHLORIDE SERPL-SCNC: 100 MMOL/L (ref 100–108)
CHOLEST SERPL-MCNC: 134 MG/DL (ref 50–200)
CO2 SERPL-SCNC: 28 MMOL/L (ref 21–32)
CREAT SERPL-MCNC: 1.46 MG/DL (ref 0.6–1.3)
EOSINOPHIL # BLD AUTO: 0.44 THOUSAND/ΜL (ref 0–0.61)
EOSINOPHIL NFR BLD AUTO: 7 % (ref 0–6)
ERYTHROCYTE [DISTWIDTH] IN BLOOD BY AUTOMATED COUNT: 12.9 % (ref 11.6–15.1)
EST. AVERAGE GLUCOSE BLD GHB EST-MCNC: 212 MG/DL
GFR SERPL CREATININE-BSD FRML MDRD: 47 ML/MIN/1.73SQ M
GLUCOSE P FAST SERPL-MCNC: 329 MG/DL (ref 65–99)
HBA1C MFR BLD: 9 % (ref 4.2–6.3)
HCT VFR BLD AUTO: 36.2 % (ref 36.5–49.3)
HDLC SERPL-MCNC: 68 MG/DL (ref 40–60)
HGB BLD-MCNC: 12.2 G/DL (ref 12–17)
IMM GRANULOCYTES # BLD AUTO: 0.03 THOUSAND/UL (ref 0–0.2)
IMM GRANULOCYTES NFR BLD AUTO: 1 % (ref 0–2)
LDLC SERPL CALC-MCNC: 47 MG/DL (ref 0–100)
LYMPHOCYTES # BLD AUTO: 0.71 THOUSANDS/ΜL (ref 0.6–4.47)
LYMPHOCYTES NFR BLD AUTO: 12 % (ref 14–44)
MCH RBC QN AUTO: 30.7 PG (ref 26.8–34.3)
MCHC RBC AUTO-ENTMCNC: 33.7 G/DL (ref 31.4–37.4)
MCV RBC AUTO: 91 FL (ref 82–98)
MONOCYTES # BLD AUTO: 0.61 THOUSAND/ΜL (ref 0.17–1.22)
MONOCYTES NFR BLD AUTO: 10 % (ref 4–12)
NEUTROPHILS # BLD AUTO: 4.12 THOUSANDS/ΜL (ref 1.85–7.62)
NEUTS SEG NFR BLD AUTO: 69 % (ref 43–75)
NONHDLC SERPL-MCNC: 66 MG/DL
NRBC BLD AUTO-RTO: 0 /100 WBCS
PLATELET # BLD AUTO: 123 THOUSANDS/UL (ref 149–390)
PMV BLD AUTO: 10.2 FL (ref 8.9–12.7)
POTASSIUM SERPL-SCNC: 5.2 MMOL/L (ref 3.5–5.3)
PROT SERPL-MCNC: 7 G/DL (ref 6.4–8.2)
RBC # BLD AUTO: 3.97 MILLION/UL (ref 3.88–5.62)
SODIUM SERPL-SCNC: 134 MMOL/L (ref 136–145)
TRIGL SERPL-MCNC: 97 MG/DL
TSH SERPL DL<=0.05 MIU/L-ACNC: 2.9 UIU/ML (ref 0.36–3.74)
WBC # BLD AUTO: 5.95 THOUSAND/UL (ref 4.31–10.16)

## 2018-07-09 PROCEDURE — 84443 ASSAY THYROID STIM HORMONE: CPT

## 2018-07-09 PROCEDURE — 36415 COLL VENOUS BLD VENIPUNCTURE: CPT

## 2018-07-09 PROCEDURE — 80061 LIPID PANEL: CPT

## 2018-07-09 PROCEDURE — 85025 COMPLETE CBC W/AUTO DIFF WBC: CPT

## 2018-07-09 PROCEDURE — 80053 COMPREHEN METABOLIC PANEL: CPT

## 2018-07-09 PROCEDURE — 83036 HEMOGLOBIN GLYCOSYLATED A1C: CPT

## 2018-07-10 ENCOUNTER — REMOTE DEVICE CLINIC VISIT (OUTPATIENT)
Dept: CARDIOLOGY CLINIC | Facility: CLINIC | Age: 73
End: 2018-07-10
Payer: COMMERCIAL

## 2018-07-10 DIAGNOSIS — Z95.0 PRESENCE OF CARDIAC PACEMAKER: ICD-10-CM

## 2018-07-10 DIAGNOSIS — I44.30 AVB (ATRIOVENTRICULAR BLOCK): Primary | ICD-10-CM

## 2018-07-10 PROCEDURE — 93296 REM INTERROG EVL PM/IDS: CPT | Performed by: INTERNAL MEDICINE

## 2018-07-10 PROCEDURE — 93294 REM INTERROG EVL PM/LDLS PM: CPT | Performed by: INTERNAL MEDICINE

## 2018-07-10 NOTE — PROGRESS NOTES
CARELINK TRANSMISSION: BATTERY VOLTAGE ADEQUATE (5 YRS)  AP: 3 6%  : 100% (>40% CHB)  ALL AVAILABLE LEAD PARAMETERS WITHIN NORMAL LIMITS   1 VT EPISODE WITH EGRAM SHOWING NSVT 6 BEATS @ 154 BPM   NO OTHER SIGNIFICANT HIGH RATE EPISODES   PT TAKES ASA 81MG, NO BB   EF: 55% (ECHO 04/26/17)   TASK TO DR Tisa Galeazzi FUNCTIONING APPROPRIATELY   CH/EB

## 2018-07-23 ENCOUNTER — TELEPHONE (OUTPATIENT)
Dept: GASTROENTEROLOGY | Facility: MEDICAL CENTER | Age: 73
End: 2018-07-23

## 2018-07-23 ENCOUNTER — APPOINTMENT (OUTPATIENT)
Dept: LAB | Facility: CLINIC | Age: 73
End: 2018-07-23
Payer: COMMERCIAL

## 2018-07-23 ENCOUNTER — OFFICE VISIT (OUTPATIENT)
Dept: FAMILY MEDICINE CLINIC | Facility: CLINIC | Age: 73
End: 2018-07-23
Payer: COMMERCIAL

## 2018-07-23 ENCOUNTER — TELEPHONE (OUTPATIENT)
Dept: FAMILY MEDICINE CLINIC | Facility: CLINIC | Age: 73
End: 2018-07-23

## 2018-07-23 ENCOUNTER — TRANSCRIBE ORDERS (OUTPATIENT)
Dept: LAB | Facility: CLINIC | Age: 73
End: 2018-07-23

## 2018-07-23 ENCOUNTER — TELEPHONE (OUTPATIENT)
Dept: GASTROENTEROLOGY | Facility: CLINIC | Age: 73
End: 2018-07-23

## 2018-07-23 VITALS
DIASTOLIC BLOOD PRESSURE: 70 MMHG | HEART RATE: 83 BPM | BODY MASS INDEX: 22.47 KG/M2 | TEMPERATURE: 98.5 F | SYSTOLIC BLOOD PRESSURE: 92 MMHG | RESPIRATION RATE: 16 BRPM | HEIGHT: 67 IN | OXYGEN SATURATION: 96 % | WEIGHT: 143.2 LBS

## 2018-07-23 DIAGNOSIS — E78.5 HYPERLIPIDEMIA, UNSPECIFIED HYPERLIPIDEMIA TYPE: ICD-10-CM

## 2018-07-23 DIAGNOSIS — N18.30 STAGE 3 CHRONIC KIDNEY DISEASE (HCC): ICD-10-CM

## 2018-07-23 DIAGNOSIS — Z01.810 PREOP CARDIOVASCULAR EXAM: Primary | ICD-10-CM

## 2018-07-23 DIAGNOSIS — H43.12 VITREOUS HEMORRHAGE OF LEFT EYE (HCC): ICD-10-CM

## 2018-07-23 DIAGNOSIS — Z12.11 COLON CANCER SCREENING: Primary | ICD-10-CM

## 2018-07-23 DIAGNOSIS — I10 ESSENTIAL HYPERTENSION: ICD-10-CM

## 2018-07-23 DIAGNOSIS — E11.3299 DIABETES MELLITUS WITH BACKGROUND RETINOPATHY (HCC): ICD-10-CM

## 2018-07-23 DIAGNOSIS — E11.42 DIABETIC PERIPHERAL NEUROPATHY (HCC): ICD-10-CM

## 2018-07-23 DIAGNOSIS — D69.6 THROMBOCYTOPENIA (HCC): ICD-10-CM

## 2018-07-23 DIAGNOSIS — Z96.41 INSULIN PUMP STATUS: ICD-10-CM

## 2018-07-23 DIAGNOSIS — H43.12 VITREOUS HEMORRHAGE OF LEFT EYE (HCC): Primary | ICD-10-CM

## 2018-07-23 LAB
ANION GAP SERPL CALCULATED.3IONS-SCNC: 5 MMOL/L (ref 4–13)
BUN SERPL-MCNC: 23 MG/DL (ref 5–25)
CALCIUM SERPL-MCNC: 9.3 MG/DL (ref 8.3–10.1)
CHLORIDE SERPL-SCNC: 103 MMOL/L (ref 100–108)
CO2 SERPL-SCNC: 29 MMOL/L (ref 21–32)
CREAT SERPL-MCNC: 1.27 MG/DL (ref 0.6–1.3)
GFR SERPL CREATININE-BSD FRML MDRD: 56 ML/MIN/1.73SQ M
GLUCOSE P FAST SERPL-MCNC: 172 MG/DL (ref 65–99)
POTASSIUM SERPL-SCNC: 5.4 MMOL/L (ref 3.5–5.3)
SODIUM SERPL-SCNC: 137 MMOL/L (ref 136–145)

## 2018-07-23 PROCEDURE — 80048 BASIC METABOLIC PNL TOTAL CA: CPT

## 2018-07-23 PROCEDURE — 1160F RVW MEDS BY RX/DR IN RCRD: CPT | Performed by: FAMILY MEDICINE

## 2018-07-23 PROCEDURE — 4040F PNEUMOC VAC/ADMIN/RCVD: CPT | Performed by: FAMILY MEDICINE

## 2018-07-23 PROCEDURE — 3066F NEPHROPATHY DOC TX: CPT | Performed by: FAMILY MEDICINE

## 2018-07-23 PROCEDURE — 1036F TOBACCO NON-USER: CPT | Performed by: FAMILY MEDICINE

## 2018-07-23 PROCEDURE — 36415 COLL VENOUS BLD VENIPUNCTURE: CPT

## 2018-07-23 PROCEDURE — 99215 OFFICE O/P EST HI 40 MIN: CPT | Performed by: FAMILY MEDICINE

## 2018-07-23 RX ORDER — POLYETHYLENE GLYCOL 3350 17 G/17G
119 POWDER, FOR SOLUTION ORAL ONCE
Qty: 119 G | Refills: 0 | Status: SHIPPED | OUTPATIENT
Start: 2018-07-23 | End: 2019-04-08

## 2018-07-23 NOTE — PROGRESS NOTES
Subjective:      Patient ID: Maribell Funes is a 67 y o  male  Chief Complaint   Patient presents with    Pre-op Exam    Follow-up     LABS       HPI    Patient is a known type 2 diabetic,  Insulin dependent,  Poorly controlled on insulin with the most recent A1c of 9  Patient follows up with an endocrine specialist through [place]  Patient currently uses continuous blood sugar monitoring device  He also follows up with a podiatrist    he recently developed total loss of vision in his left eye, he was diagnosed to have vitreous hemorrhage, and is now scheduled for   Vitrectomy  Patient states that he does not have the exact date for the surgery but he was informed that  He will have the surgery within 2 to 3 weeks  His recent labs also revealed a low platelet count of 203, creatinine of 1 46 with a GFR of 47     his LDL is at goal, 47  Patient takes a statin  Patient's TSH is at goal on 150 micrograms of levothyroxine, he is currently taking an ACE  Inhibitor  Patient has cardiac pacemaker,   Any follows up with [facility]  Patient will need a cardiac clearance prior to surgery  The following portions of the patient's history were reviewed and updated as appropriate: allergies, current medications, past family history, past medical history, past social history, past surgical history and problem list     Procedure date: unknown date    Surgeon:  [facility], Dr Felipe Goldstein    Planned procedure:    Vitrectomy    Diagnosis for procedure:  Vitreous hemorrhage    Prior anesthesia: yes   Type: general     Problem with anesthesia: no    CAD History: yes    ALINA history:no    Exercise Capacity:  · Able to walk 4 blocks without symptoms?: Yes  · Able to walk 2 flights without symptoms?: Yes        Review of Systems   Constitutional: Negative  Respiratory: Negative  Cardiovascular: Negative  Gastrointestinal: Negative  Musculoskeletal: Negative  Negative for myalgias  Neurological: Negative  Psychiatric/Behavioral: Negative  Current Outpatient Prescriptions   Medication Sig Dispense Refill    aspirin 81 mg chewable tablet Chew 81 mg daily      Atorvastatin Calcium (LIPITOR PO) Take 10 mg by mouth daily        Calcium Citrate 200 MG TABS Take by mouth daily      Cholecalciferol (VITAMIN D3) 2000 units capsule Take 2,000 Units by mouth daily      gabapentin (NEURONTIN) 100 mg capsule Take 100 mg by mouth 3 (three) times a day        insulin aspart (NOVOLOG FLEXPEN) 100 Units/mL SOPN Inject 8 Units under the skin 3 (three) times a day       Insulin Degludec (TRESIBA FLEXTOUCH) 100 UNIT/ML SOPN Inject 18 Units under the skin daily at bedtime        levothyroxine 150 mcg tablet Take 150 mcg by mouth daily        lisinopril (ZESTRIL) 20 mg tablet Take 30 mg by mouth daily        pantoprazole (PROTONIX) 40 mg tablet Take 40 mg by mouth      tamsulosin (FLOMAX) 0 4 mg Take 0 4 mg by mouth daily with dinner         No current facility-administered medications for this visit  Acetaminophen; Ibuprofen; and Iodine    Past Medical History:   Diagnosis Date    Arthritis     Cardiac disease     Closed fracture of fibula, lateral malleolus, right     Diabetes mellitus [facility] [address] )     Disease of thyroid gland     External incisional dehiscence     GERD (gastroesophageal reflux disease)     Heart disease     Muscular deconditioning     Pacemaker     Thyroid disease        Past Surgical History:   Procedure Laterality Date    CARDIAC PACEMAKER PLACEMENT Left 2015    CARDIAC PACEMAKER PLACEMENT      CARDIAC SURGERY      CATARACT EXTRACTION      MUSCLE FLAP      right foot    NERVE BLOCK      Transforaminal Epidural Lumbar with Fluoroscopic Guidance     MT COLONOSCOPY FLX DX W/COLLJ SPEC WHEN PFRMD N/A 5/4/2018    Procedure: EGD AND COLONOSCOPY;  Surgeon: Wilfredo Bronson MD;  [facility];   Service: Gastroenterology    TOE AMPUTATION      left 5th toe       Family History   Problem Relation Age of Onset    Diabetes Mother     Heart disease Mother     Thyroid disease Mother         Disorder     Cancer Brother     Gout Brother     Diabetes Maternal Grandmother        Social History   Substance Use Topics    Smoking status: Former Smoker     Types: Pipe    Smokeless tobacco: Never Used    Alcohol use Yes      Comment: socially       Objective:    Vitals:    07/23/18 1047   BP: 92/70   Pulse: 83   Resp: 16   Temp: 98 5 °F (36 9 °C)   SpO2: 96%   Weight: 65 kg (143 lb 3 2 oz)   Height: 5' 7" (1 702 m)        Physical Exam   Constitutional: He is oriented to person, place, and time  He appears well-developed and well-nourished  Cardiovascular: Normal rate, regular rhythm and normal heart sounds  Pulses are no weak pulses  Pulmonary/Chest: Effort normal and breath sounds normal    Abdominal: Soft  Bowel sounds are normal    Feet:   Right Foot:   Skin Integrity: Negative for ulcer, skin breakdown, erythema, warmth, callus or dry skin  Left Foot:   Skin Integrity: Negative for ulcer, skin breakdown, erythema, warmth, callus or dry skin  Neurological: He is alert and oriented to person, place, and time  Psychiatric: His behavior is normal  Judgment normal        Patient's shoes and socks removed  Right Foot/Ankle   Right Foot Inspection  Skin Exam: skin normal and skin intact no dry skin, no warmth, no callus, no erythema, no maceration, no abnormal color, no pre-ulcer, no ulcer and no callus                          Toe Exam: ROM and strength within normal limits  Sensory   Vibration: intact  Proprioception: intact   Monofilament testing: intact  Vascular  Capillary refills: < 3 seconds      Left Foot/Ankle  Left Foot Inspection  Skin Exam: skin normal and skin intactno dry skin, no warmth, no erythema, no maceration, normal color, no pre-ulcer, no ulcer and no callus                         Toe Exam: ROM and strength within normal limits Sensory   Vibration: intact  Proprioception: intact  Monofilament: intact  Vascular  Capillary refills: < 3 seconds    Assign Risk Category:  No deformity present; No loss of protective sensation; No weak pulses       Risk: 0    Preop labs/testing available and reviewed: yes    Laboratory Results: I have personally reviewed the pertinent laboratory results/reports     EKG: Pt will need cardiac clearance  Chest x-ray: ordered today      Assessment/Plan:    Patient is not cleared for planned procedure, needs further tests, cbc  To follow up on low platelet number, Chest xray  Diabetes, not optimally controlled, A1c 9  Patient will have to discuss Diabetes control with his endocrine  Further testing/evaluation is required, cardiac clearance  Postop concerns: no       Diagnoses and all orders for this visit:    Preop cardiovascular exam  -     XR chest pa & lateral; Future    Hyperlipidemia, unspecified hyperlipidemia type    Essential hypertension    Diabetic peripheral neuropathy ([facility] [address] )  -     Comprehensive metabolic panel; Future  -     Hemoglobin A1C; Future  -     Microalbumin / creatinine urine ratio; Future    Diabetes mellitus with background retinopathy ([facility] [address] )  -     Comprehensive metabolic panel; Future  -     Hemoglobin A1C; Future  -     Microalbumin / creatinine urine ratio; Future    Insulin pump status    Stage 3 chronic kidney disease  -     Comprehensive metabolic panel; Future  -     Microalbumin / creatinine urine ratio; Future    Thrombocytopenia (HCC)  -     CBC and differential; Future  -     CBC and differential; Future         type 2 diabetes, A1c not at goal, with diabetic retinopathy, nephropathy and neuropathy  Left vitreous hemorrhage due to retinopathy,  Patient is scheduled for vitrectomy within 2 to 3 weeks  Patient will call us back with any has the date for the surgery    I ordered a CBC to follow-up on his low platelet number,  And a chest x-ray as a part of preop clearance  Patient will continue management of his diabetes per his endocrine specialist     Patient advised to recheck his kidney function after a few weeks and follow up thereafter  Patient's TSH is at goal on 150 micrograms of levothyroxine he will continue same  His blood pressure and cholesterol panels are also at goal on statin and ACE inhibitor  Patient will continue same for cardiovascular protection

## 2018-07-23 NOTE — TELEPHONE ENCOUNTER
Patient called back to let Dr Tabby Kirkland know that his eye surgery is scheduled for 08/09/2018  He doesn't know the doctor but he said it's the [facility] on [address]  I asked him if he needed to set up a pre op clearance and he said that's what she is working on now from today's (07/23/18) visit

## 2018-07-23 NOTE — TELEPHONE ENCOUNTER
Dr Tonia Randle pt    Pt still needs his colonoscopy prep  He is scheduled on 7/26 and he states he is suppose to have a 2-day prep  Please advise   [phone]

## 2018-07-26 ENCOUNTER — TELEPHONE (OUTPATIENT)
Dept: FAMILY MEDICINE CLINIC | Facility: CLINIC | Age: 73
End: 2018-07-26

## 2018-07-26 ENCOUNTER — OFFICE VISIT (OUTPATIENT)
Dept: CARDIOLOGY CLINIC | Facility: CLINIC | Age: 73
End: 2018-07-26
Payer: COMMERCIAL

## 2018-07-26 VITALS
WEIGHT: 143.8 LBS | DIASTOLIC BLOOD PRESSURE: 80 MMHG | HEIGHT: 67 IN | SYSTOLIC BLOOD PRESSURE: 134 MMHG | BODY MASS INDEX: 22.57 KG/M2 | OXYGEN SATURATION: 97 % | HEART RATE: 80 BPM

## 2018-07-26 DIAGNOSIS — I10 ESSENTIAL HYPERTENSION: ICD-10-CM

## 2018-07-26 DIAGNOSIS — Z01.818 PRE-OPERATIVE CLEARANCE: Primary | ICD-10-CM

## 2018-07-26 DIAGNOSIS — I49.9 CARDIAC ARRHYTHMIA, UNSPECIFIED CARDIAC ARRHYTHMIA TYPE: ICD-10-CM

## 2018-07-26 PROCEDURE — 93000 ELECTROCARDIOGRAM COMPLETE: CPT | Performed by: INTERNAL MEDICINE

## 2018-07-26 NOTE — TELEPHONE ENCOUNTER
Pt called to let you know he had his EKG done this morning, they told him everything looked good  He is going for his chest X Ray tomorrow

## 2018-07-27 ENCOUNTER — APPOINTMENT (OUTPATIENT)
Dept: RADIOLOGY | Age: 73
End: 2018-07-27
Payer: COMMERCIAL

## 2018-07-27 ENCOUNTER — HOSPITAL ENCOUNTER (OUTPATIENT)
Dept: RADIOLOGY | Age: 73
Discharge: HOME | End: 2018-07-27
Payer: COMMERCIAL

## 2018-07-27 DIAGNOSIS — Z00.00 MEDICARE ANNUAL WELLNESS VISIT, SUBSEQUENT: ICD-10-CM

## 2018-07-27 DIAGNOSIS — Z13.6 SCREENING FOR AAA (AORTIC ABDOMINAL ANEURYSM): ICD-10-CM

## 2018-07-27 DIAGNOSIS — Z01.810 PREOP CARDIOVASCULAR EXAM: ICD-10-CM

## 2018-07-27 DIAGNOSIS — Z13.820 OSTEOPOROSIS SCREENING: ICD-10-CM

## 2018-07-27 PROCEDURE — 77080 DXA BONE DENSITY AXIAL: CPT

## 2018-07-27 PROCEDURE — 76706 US ABDL AORTA SCREEN AAA: CPT

## 2018-07-27 PROCEDURE — 71046 X-RAY EXAM CHEST 2 VIEWS: CPT

## 2018-08-07 ENCOUNTER — TELEPHONE (OUTPATIENT)
Dept: CARDIOLOGY CLINIC | Facility: CLINIC | Age: 73
End: 2018-08-07

## 2018-08-07 NOTE — TELEPHONE ENCOUNTER
Jackeline Paniagua had an EKG done at [facility] on 7/26 with Dr Dorothey Gowers  The doctor's office is looking for the cardiac clearance  His surgery is 8/9/18, this Thursday

## 2018-10-03 ENCOUNTER — TELEPHONE (OUTPATIENT)
Dept: GASTROENTEROLOGY | Facility: AMBULARY SURGERY CENTER | Age: 73
End: 2018-10-03

## 2018-10-18 LAB
LEFT EYE DIABETIC RETINOPATHY: NORMAL
RIGHT EYE DIABETIC RETINOPATHY: NORMAL

## 2018-10-23 ENCOUNTER — IMMUNIZATION (OUTPATIENT)
Dept: FAMILY MEDICINE CLINIC | Facility: CLINIC | Age: 73
End: 2018-10-23
Payer: COMMERCIAL

## 2018-10-23 DIAGNOSIS — Z23 NEED FOR INFLUENZA VACCINATION: Primary | ICD-10-CM

## 2018-10-23 PROCEDURE — G0008 ADMIN INFLUENZA VIRUS VAC: HCPCS

## 2018-10-23 PROCEDURE — 90662 IIV NO PRSV INCREASED AG IM: CPT

## 2019-03-30 ENCOUNTER — HOSPITAL ENCOUNTER (INPATIENT)
Facility: HOSPITAL | Age: 74
LOS: 5 days | Discharge: HOME | DRG: 638 | End: 2019-04-05
Attending: EMERGENCY MEDICINE | Admitting: INTERNAL MEDICINE
Payer: COMMERCIAL

## 2019-03-30 ENCOUNTER — APPOINTMENT (EMERGENCY)
Dept: CT IMAGING | Facility: HOSPITAL | Age: 74
DRG: 638 | End: 2019-03-30
Payer: COMMERCIAL

## 2019-03-30 DIAGNOSIS — E87.5 HYPERKALEMIA: ICD-10-CM

## 2019-03-30 DIAGNOSIS — R73.9 HYPERGLYCEMIA: Primary | ICD-10-CM

## 2019-03-30 DIAGNOSIS — N50.89 SCROTAL EDEMA: ICD-10-CM

## 2019-03-30 DIAGNOSIS — E11.10 DKA (DIABETIC KETOACIDOSES): ICD-10-CM

## 2019-03-30 DIAGNOSIS — E03.9 ACQUIRED HYPOTHYROIDISM: Chronic | ICD-10-CM

## 2019-03-30 LAB
BASE EX.OXY STD BLDV CALC-SCNC: 51.3 % (ref 60–80)
BASE EXCESS BLDV CALC-SCNC: -19.7 MMOL/L
GLUCOSE SERPL-MCNC: >500 MG/DL (ref 65–140)
GLUCOSE SERPL-MCNC: >500 MG/DL (ref 65–140)
HCO3 BLDV-SCNC: 9.9 MMOL/L (ref 24–30)
O2 CT BLDV-SCNC: 8.9 ML/DL
PCO2 BLDV: 36.5 MM HG (ref 42–50)
PH BLDV: 7.05 [PH] (ref 7.3–7.4)
PO2 BLDV: 35.2 MM HG (ref 35–45)

## 2019-03-30 PROCEDURE — 85027 COMPLETE CBC AUTOMATED: CPT | Performed by: EMERGENCY MEDICINE

## 2019-03-30 PROCEDURE — 82948 REAGENT STRIP/BLOOD GLUCOSE: CPT

## 2019-03-30 PROCEDURE — 70450 CT HEAD/BRAIN W/O DYE: CPT

## 2019-03-30 PROCEDURE — 85007 BL SMEAR W/DIFF WBC COUNT: CPT | Performed by: EMERGENCY MEDICINE

## 2019-03-30 PROCEDURE — 96361 HYDRATE IV INFUSION ADD-ON: CPT

## 2019-03-30 PROCEDURE — 36415 COLL VENOUS BLD VENIPUNCTURE: CPT | Performed by: EMERGENCY MEDICINE

## 2019-03-30 PROCEDURE — 99285 EMERGENCY DEPT VISIT HI MDM: CPT

## 2019-03-30 PROCEDURE — 96372 THER/PROPH/DIAG INJ SC/IM: CPT

## 2019-03-30 PROCEDURE — 82805 BLOOD GASES W/O2 SATURATION: CPT | Performed by: EMERGENCY MEDICINE

## 2019-03-30 PROCEDURE — 82009 KETONE BODYS QUAL: CPT | Performed by: EMERGENCY MEDICINE

## 2019-03-30 PROCEDURE — 80053 COMPREHEN METABOLIC PANEL: CPT | Performed by: EMERGENCY MEDICINE

## 2019-03-30 RX ORDER — SODIUM CHLORIDE 9 MG/ML
250 INJECTION, SOLUTION INTRAVENOUS CONTINUOUS
Status: DISCONTINUED | OUTPATIENT
Start: 2019-03-31 | End: 2019-03-31

## 2019-03-30 RX ORDER — SODIUM CHLORIDE 9 MG/ML
1000 INJECTION, SOLUTION INTRAVENOUS ONCE
Status: COMPLETED | OUTPATIENT
Start: 2019-03-31 | End: 2019-03-31

## 2019-03-30 RX ORDER — 0.9 % SODIUM CHLORIDE 0.9 %
3 VIAL (ML) INJECTION AS NEEDED
Status: DISCONTINUED | OUTPATIENT
Start: 2019-03-30 | End: 2019-04-05 | Stop reason: HOSPADM

## 2019-03-30 RX ORDER — SODIUM CHLORIDE 9 MG/ML
500 INJECTION, SOLUTION INTRAVENOUS CONTINUOUS
Status: DISPENSED | OUTPATIENT
Start: 2019-03-31 | End: 2019-03-31

## 2019-03-30 RX ADMIN — INSULIN LISPRO 10 UNITS: 100 INJECTION, SOLUTION INTRAVENOUS; SUBCUTANEOUS at 23:49

## 2019-03-30 RX ADMIN — SODIUM CHLORIDE 1000 ML: 0.9 INJECTION, SOLUTION INTRAVENOUS at 23:00

## 2019-03-31 ENCOUNTER — APPOINTMENT (INPATIENT)
Dept: RADIOLOGY | Facility: HOSPITAL | Age: 74
DRG: 638 | End: 2019-03-31
Payer: COMMERCIAL

## 2019-03-31 PROBLEM — D72.829 LEUKOCYTOSIS: Status: RESOLVED | Noted: 2019-03-31 | Resolved: 2019-03-31

## 2019-03-31 PROBLEM — D72.829 LEUKOCYTOSIS: Status: ACTIVE | Noted: 2019-03-31

## 2019-03-31 PROBLEM — E87.2 HIGH ANION GAP METABOLIC ACIDOSIS: Status: ACTIVE | Noted: 2019-03-31

## 2019-03-31 PROBLEM — Z95.0 PACEMAKER: Chronic | Status: ACTIVE | Noted: 2019-03-31

## 2019-03-31 PROBLEM — Z95.0 PACEMAKER: Status: ACTIVE | Noted: 2019-03-31

## 2019-03-31 PROBLEM — E87.5 HYPERKALEMIA: Status: RESOLVED | Noted: 2019-03-31 | Resolved: 2019-03-31

## 2019-03-31 PROBLEM — E11.10 DIABETIC KETOACIDOSIS WITHOUT COMA ASSOCIATED WITH TYPE 2 DIABETES MELLITUS (HCC): Status: ACTIVE | Noted: 2019-03-31

## 2019-03-31 PROBLEM — E87.5 HYPERKALEMIA: Status: ACTIVE | Noted: 2019-03-31

## 2019-03-31 PROBLEM — N17.9 AKI (ACUTE KIDNEY INJURY) (HCC): Status: ACTIVE | Noted: 2019-03-31

## 2019-03-31 PROBLEM — E87.8 LOW BICARBONATE: Status: ACTIVE | Noted: 2019-03-31

## 2019-03-31 PROBLEM — E87.1 HYPONATREMIA: Status: ACTIVE | Noted: 2019-03-31

## 2019-03-31 PROBLEM — D69.6 THROMBOCYTOPENIA (HCC): Chronic | Status: ACTIVE | Noted: 2018-07-23

## 2019-03-31 PROBLEM — G93.40 ENCEPHALOPATHY: Status: ACTIVE | Noted: 2019-03-31

## 2019-03-31 PROBLEM — R74.01 TRANSAMINITIS: Status: ACTIVE | Noted: 2019-03-31

## 2019-03-31 LAB
ACETONE SERPL-MCNC: ABNORMAL MG/DL
ALBUMIN SERPL BCP-MCNC: 2.6 G/DL (ref 3.5–5)
ALBUMIN SERPL BCP-MCNC: 3.1 G/DL (ref 3.5–5)
ALP SERPL-CCNC: 129 U/L (ref 46–116)
ALP SERPL-CCNC: 96 U/L (ref 46–116)
ALT SERPL W P-5'-P-CCNC: 38 U/L (ref 12–78)
ALT SERPL W P-5'-P-CCNC: 46 U/L (ref 12–78)
AMMONIA PLAS-SCNC: 11 UMOL/L (ref 11–35)
ANION GAP SERPL CALCULATED.3IONS-SCNC: 11 MMOL/L (ref 4–13)
ANION GAP SERPL CALCULATED.3IONS-SCNC: 11 MMOL/L (ref 4–13)
ANION GAP SERPL CALCULATED.3IONS-SCNC: 15 MMOL/L (ref 4–13)
ANION GAP SERPL CALCULATED.3IONS-SCNC: 23 MMOL/L (ref 4–13)
ANION GAP SERPL CALCULATED.3IONS-SCNC: 24 MMOL/L (ref 4–13)
ANION GAP SERPL CALCULATED.3IONS-SCNC: 24 MMOL/L (ref 4–13)
AST SERPL W P-5'-P-CCNC: 42 U/L (ref 5–45)
AST SERPL W P-5'-P-CCNC: 68 U/L (ref 5–45)
ATRIAL RATE: 93 BPM
BACTERIA UR QL AUTO: ABNORMAL /HPF
BASE EX.OXY STD BLDV CALC-SCNC: 88.3 % (ref 60–80)
BASE EXCESS BLDV CALC-SCNC: -6.3 MMOL/L
BASOPHILS # BLD AUTO: 0.01 THOUSANDS/ΜL (ref 0–0.1)
BASOPHILS # BLD MANUAL: 0 THOUSAND/UL (ref 0–0.1)
BASOPHILS NFR BLD AUTO: 0 % (ref 0–1)
BASOPHILS NFR MAR MANUAL: 0 % (ref 0–1)
BILIRUB SERPL-MCNC: 0.4 MG/DL (ref 0.2–1)
BILIRUB SERPL-MCNC: 1.2 MG/DL (ref 0.2–1)
BILIRUB UR QL STRIP: ABNORMAL
BUN SERPL-MCNC: 57 MG/DL (ref 5–25)
BUN SERPL-MCNC: 60 MG/DL (ref 5–25)
BUN SERPL-MCNC: 61 MG/DL (ref 5–25)
BUN SERPL-MCNC: 63 MG/DL (ref 5–25)
BUN SERPL-MCNC: 63 MG/DL (ref 5–25)
BUN SERPL-MCNC: 64 MG/DL (ref 5–25)
CALCIUM SERPL-MCNC: 7.8 MG/DL (ref 8.3–10.1)
CALCIUM SERPL-MCNC: 7.8 MG/DL (ref 8.3–10.1)
CALCIUM SERPL-MCNC: 8 MG/DL (ref 8.3–10.1)
CALCIUM SERPL-MCNC: 8.1 MG/DL (ref 8.3–10.1)
CALCIUM SERPL-MCNC: 8.3 MG/DL (ref 8.3–10.1)
CALCIUM SERPL-MCNC: 8.9 MG/DL (ref 8.3–10.1)
CHLORIDE SERPL-SCNC: 101 MMOL/L (ref 100–108)
CHLORIDE SERPL-SCNC: 102 MMOL/L (ref 100–108)
CHLORIDE SERPL-SCNC: 83 MMOL/L (ref 100–108)
CHLORIDE SERPL-SCNC: 89 MMOL/L (ref 100–108)
CHLORIDE SERPL-SCNC: 93 MMOL/L (ref 100–108)
CHLORIDE SERPL-SCNC: 98 MMOL/L (ref 100–108)
CK MB SERPL-MCNC: 13.6 NG/ML (ref 0–5)
CK MB SERPL-MCNC: 3.6 % (ref 0–2.5)
CK SERPL-CCNC: 375 U/L (ref 39–308)
CLARITY UR: CLEAR
CO2 SERPL-SCNC: 11 MMOL/L (ref 21–32)
CO2 SERPL-SCNC: 12 MMOL/L (ref 21–32)
CO2 SERPL-SCNC: 13 MMOL/L (ref 21–32)
CO2 SERPL-SCNC: 17 MMOL/L (ref 21–32)
CO2 SERPL-SCNC: 21 MMOL/L (ref 21–32)
CO2 SERPL-SCNC: 22 MMOL/L (ref 21–32)
COLOR UR: YELLOW
CREAT SERPL-MCNC: 2.16 MG/DL (ref 0.6–1.3)
CREAT SERPL-MCNC: 2.39 MG/DL (ref 0.6–1.3)
CREAT SERPL-MCNC: 2.95 MG/DL (ref 0.6–1.3)
CREAT SERPL-MCNC: 2.95 MG/DL (ref 0.6–1.3)
CREAT SERPL-MCNC: 2.98 MG/DL (ref 0.6–1.3)
CREAT SERPL-MCNC: 3.1 MG/DL (ref 0.6–1.3)
EOSINOPHIL # BLD AUTO: 0 THOUSAND/ΜL (ref 0–0.61)
EOSINOPHIL # BLD MANUAL: 0 THOUSAND/UL (ref 0–0.4)
EOSINOPHIL NFR BLD AUTO: 0 % (ref 0–6)
EOSINOPHIL NFR BLD MANUAL: 0 % (ref 0–6)
ERYTHROCYTE [DISTWIDTH] IN BLOOD BY AUTOMATED COUNT: 12.1 % (ref 11.6–15.1)
ERYTHROCYTE [DISTWIDTH] IN BLOOD BY AUTOMATED COUNT: 12.3 % (ref 11.6–15.1)
EST. AVERAGE GLUCOSE BLD GHB EST-MCNC: 217 MG/DL
GFR SERPL CREATININE-BSD FRML MDRD: 19 ML/MIN/1.73SQ M
GFR SERPL CREATININE-BSD FRML MDRD: 20 ML/MIN/1.73SQ M
GFR SERPL CREATININE-BSD FRML MDRD: 26 ML/MIN/1.73SQ M
GFR SERPL CREATININE-BSD FRML MDRD: 29 ML/MIN/1.73SQ M
GLUCOSE SERPL-MCNC: 106 MG/DL (ref 65–140)
GLUCOSE SERPL-MCNC: 1078 MG/DL (ref 65–140)
GLUCOSE SERPL-MCNC: 108 MG/DL (ref 65–140)
GLUCOSE SERPL-MCNC: 133 MG/DL (ref 65–140)
GLUCOSE SERPL-MCNC: 158 MG/DL (ref 65–140)
GLUCOSE SERPL-MCNC: 166 MG/DL (ref 65–140)
GLUCOSE SERPL-MCNC: 233 MG/DL (ref 65–140)
GLUCOSE SERPL-MCNC: 294 MG/DL (ref 65–140)
GLUCOSE SERPL-MCNC: 331 MG/DL (ref 65–140)
GLUCOSE SERPL-MCNC: 396 MG/DL (ref 65–140)
GLUCOSE SERPL-MCNC: 420 MG/DL (ref 65–140)
GLUCOSE SERPL-MCNC: 488 MG/DL (ref 65–140)
GLUCOSE SERPL-MCNC: 540 MG/DL (ref 65–140)
GLUCOSE SERPL-MCNC: 604 MG/DL (ref 65–140)
GLUCOSE SERPL-MCNC: 725 MG/DL (ref 65–140)
GLUCOSE SERPL-MCNC: 83 MG/DL (ref 65–140)
GLUCOSE SERPL-MCNC: 851 MG/DL (ref 65–140)
GLUCOSE SERPL-MCNC: 894 MG/DL (ref 65–140)
GLUCOSE SERPL-MCNC: 949 MG/DL (ref 65–140)
GLUCOSE SERPL-MCNC: 95 MG/DL (ref 65–140)
GLUCOSE SERPL-MCNC: >500 MG/DL (ref 65–140)
GLUCOSE UR STRIP-MCNC: ABNORMAL MG/DL
HBA1C MFR BLD: 9.2 % (ref 4.2–6.3)
HCO3 BLDV-SCNC: 20.1 MMOL/L (ref 24–30)
HCT VFR BLD AUTO: 32.3 % (ref 36.5–49.3)
HCT VFR BLD AUTO: 38.1 % (ref 36.5–49.3)
HGB BLD-MCNC: 10.5 G/DL (ref 12–17)
HGB BLD-MCNC: 11.8 G/DL (ref 12–17)
HGB UR QL STRIP.AUTO: ABNORMAL
IMM GRANULOCYTES # BLD AUTO: 0.06 THOUSAND/UL (ref 0–0.2)
IMM GRANULOCYTES NFR BLD AUTO: 1 % (ref 0–2)
KETONES UR STRIP-MCNC: ABNORMAL MG/DL
L PNEUMO1 AG UR QL IA.RAPID: NEGATIVE
LACTATE SERPL-SCNC: 2 MMOL/L (ref 0.5–2)
LACTATE SERPL-SCNC: 3.2 MMOL/L (ref 0.5–2)
LACTATE SERPL-SCNC: 3.3 MMOL/L (ref 0.5–2)
LACTATE SERPL-SCNC: 3.7 MMOL/L (ref 0.5–2)
LACTATE SERPL-SCNC: 3.9 MMOL/L (ref 0.5–2)
LACTATE SERPL-SCNC: 5.4 MMOL/L (ref 0.5–2)
LEUKOCYTE ESTERASE UR QL STRIP: ABNORMAL
LIPASE SERPL-CCNC: 26 U/L (ref 73–393)
LYMPHOCYTES # BLD AUTO: 0 % (ref 14–44)
LYMPHOCYTES # BLD AUTO: 0 THOUSAND/UL (ref 0.6–4.47)
LYMPHOCYTES # BLD AUTO: 0.29 THOUSANDS/ΜL (ref 0.6–4.47)
LYMPHOCYTES NFR BLD AUTO: 3 % (ref 14–44)
MAGNESIUM SERPL-MCNC: 2 MG/DL (ref 1.6–2.6)
MAGNESIUM SERPL-MCNC: 2.2 MG/DL (ref 1.6–2.6)
MAGNESIUM SERPL-MCNC: 2.3 MG/DL (ref 1.6–2.6)
MAGNESIUM SERPL-MCNC: 2.5 MG/DL (ref 1.6–2.6)
MCH RBC QN AUTO: 30.3 PG (ref 26.8–34.3)
MCH RBC QN AUTO: 30.4 PG (ref 26.8–34.3)
MCHC RBC AUTO-ENTMCNC: 31 G/DL (ref 31.4–37.4)
MCHC RBC AUTO-ENTMCNC: 32.5 G/DL (ref 31.4–37.4)
MCV RBC AUTO: 93 FL (ref 82–98)
MCV RBC AUTO: 98 FL (ref 82–98)
MONOCYTES # BLD AUTO: 0.48 THOUSAND/ΜL (ref 0.17–1.22)
MONOCYTES # BLD AUTO: 0.95 THOUSAND/UL (ref 0–1.22)
MONOCYTES NFR BLD AUTO: 5 % (ref 4–12)
MONOCYTES NFR BLD: 9 % (ref 4–12)
NEUTROPHILS # BLD AUTO: 8.33 THOUSANDS/ΜL (ref 1.85–7.62)
NEUTROPHILS # BLD MANUAL: 9.56 THOUSAND/UL (ref 1.85–7.62)
NEUTS BAND NFR BLD MANUAL: 1 % (ref 0–8)
NEUTS SEG NFR BLD AUTO: 90 % (ref 43–75)
NEUTS SEG NFR BLD AUTO: 91 % (ref 43–75)
NITRITE UR QL STRIP: NEGATIVE
NON-SQ EPI CELLS URNS QL MICRO: ABNORMAL /HPF
NRBC BLD AUTO-RTO: 0 /100 WBCS
NRBC BLD AUTO-RTO: 0 /100 WBCS
O2 CT BLDV-SCNC: 13.2 ML/DL
P AXIS: 0 DEGREES
PCO2 BLDV: 43.5 MM HG (ref 42–50)
PH BLDV: 7.28 [PH] (ref 7.3–7.4)
PH UR STRIP.AUTO: 5 [PH]
PHOSPHATE SERPL-MCNC: 2.6 MG/DL (ref 2.3–4.1)
PHOSPHATE SERPL-MCNC: 3.5 MG/DL (ref 2.3–4.1)
PHOSPHATE SERPL-MCNC: 5.3 MG/DL (ref 2.3–4.1)
PHOSPHATE SERPL-MCNC: 6.1 MG/DL (ref 2.3–4.1)
PLATELET # BLD AUTO: 102 THOUSANDS/UL (ref 149–390)
PLATELET # BLD AUTO: 120 THOUSANDS/UL (ref 149–390)
PLATELET # BLD AUTO: 125 THOUSANDS/UL (ref 149–390)
PLATELET BLD QL SMEAR: ADEQUATE
PMV BLD AUTO: 8.7 FL (ref 8.9–12.7)
PMV BLD AUTO: 9.2 FL (ref 8.9–12.7)
PMV BLD AUTO: 9.4 FL (ref 8.9–12.7)
PO2 BLDV: 60.6 MM HG (ref 35–45)
POTASSIUM SERPL-SCNC: 3.7 MMOL/L (ref 3.5–5.3)
POTASSIUM SERPL-SCNC: 4 MMOL/L (ref 3.5–5.3)
POTASSIUM SERPL-SCNC: 4.4 MMOL/L (ref 3.5–5.3)
POTASSIUM SERPL-SCNC: 4.8 MMOL/L (ref 3.5–5.3)
POTASSIUM SERPL-SCNC: 5.7 MMOL/L (ref 3.5–5.3)
POTASSIUM SERPL-SCNC: 8.1 MMOL/L (ref 3.5–5.3)
PROCALCITONIN SERPL-MCNC: 0.56 NG/ML
PROT SERPL-MCNC: 5.2 G/DL (ref 6.4–8.2)
PROT SERPL-MCNC: 6.6 G/DL (ref 6.4–8.2)
PROT UR STRIP-MCNC: NEGATIVE MG/DL
QRS AXIS: 90 DEGREES
QRSD INTERVAL: 96 MS
QT INTERVAL: 206 MS
QTC INTERVAL: 361 MS
RBC # BLD AUTO: 3.47 MILLION/UL (ref 3.88–5.62)
RBC # BLD AUTO: 3.88 MILLION/UL (ref 3.88–5.62)
RBC #/AREA URNS AUTO: ABNORMAL /HPF
S PNEUM AG UR QL: NEGATIVE
SODIUM SERPL-SCNC: 118 MMOL/L (ref 136–145)
SODIUM SERPL-SCNC: 125 MMOL/L (ref 136–145)
SODIUM SERPL-SCNC: 129 MMOL/L (ref 136–145)
SODIUM SERPL-SCNC: 130 MMOL/L (ref 136–145)
SODIUM SERPL-SCNC: 133 MMOL/L (ref 136–145)
SODIUM SERPL-SCNC: 135 MMOL/L (ref 136–145)
SP GR UR STRIP.AUTO: >=1.03 (ref 1–1.03)
T WAVE AXIS: -86 DEGREES
T4 FREE SERPL-MCNC: 1.29 NG/DL (ref 0.76–1.46)
TOTAL CELLS COUNTED SPEC: 100
TSH SERPL DL<=0.05 MIU/L-ACNC: 0.04 UIU/ML (ref 0.36–3.74)
UROBILINOGEN UR QL STRIP.AUTO: 0.2 E.U./DL
VENTRICULAR RATE: 185 BPM
WBC # BLD AUTO: 10.5 THOUSAND/UL (ref 4.31–10.16)
WBC # BLD AUTO: 9.17 THOUSAND/UL (ref 4.31–10.16)
WBC #/AREA URNS AUTO: ABNORMAL /HPF

## 2019-03-31 PROCEDURE — 83036 HEMOGLOBIN GLYCOSYLATED A1C: CPT | Performed by: PHYSICIAN ASSISTANT

## 2019-03-31 PROCEDURE — 81001 URINALYSIS AUTO W/SCOPE: CPT | Performed by: PHYSICIAN ASSISTANT

## 2019-03-31 PROCEDURE — 96374 THER/PROPH/DIAG INJ IV PUSH: CPT

## 2019-03-31 PROCEDURE — ERR1 ERRONEOUS ENCOUNTER-DISREGARD: Performed by: PHYSICIAN ASSISTANT

## 2019-03-31 PROCEDURE — 82140 ASSAY OF AMMONIA: CPT | Performed by: NURSE PRACTITIONER

## 2019-03-31 PROCEDURE — 85025 COMPLETE CBC W/AUTO DIFF WBC: CPT | Performed by: PHYSICIAN ASSISTANT

## 2019-03-31 PROCEDURE — 84100 ASSAY OF PHOSPHORUS: CPT | Performed by: PHYSICIAN ASSISTANT

## 2019-03-31 PROCEDURE — 80053 COMPREHEN METABOLIC PANEL: CPT | Performed by: NURSE PRACTITIONER

## 2019-03-31 PROCEDURE — 83735 ASSAY OF MAGNESIUM: CPT | Performed by: INTERNAL MEDICINE

## 2019-03-31 PROCEDURE — 93010 ELECTROCARDIOGRAM REPORT: CPT | Performed by: INTERNAL MEDICINE

## 2019-03-31 PROCEDURE — C9113 INJ PANTOPRAZOLE SODIUM, VIA: HCPCS | Performed by: NURSE PRACTITIONER

## 2019-03-31 PROCEDURE — 99223 1ST HOSP IP/OBS HIGH 75: CPT | Performed by: INTERNAL MEDICINE

## 2019-03-31 PROCEDURE — 80048 BASIC METABOLIC PNL TOTAL CA: CPT | Performed by: INTERNAL MEDICINE

## 2019-03-31 PROCEDURE — 93005 ELECTROCARDIOGRAM TRACING: CPT

## 2019-03-31 PROCEDURE — 82947 ASSAY GLUCOSE BLOOD QUANT: CPT | Performed by: INTERNAL MEDICINE

## 2019-03-31 PROCEDURE — 71045 X-RAY EXAM CHEST 1 VIEW: CPT

## 2019-03-31 PROCEDURE — 82948 REAGENT STRIP/BLOOD GLUCOSE: CPT

## 2019-03-31 PROCEDURE — 83605 ASSAY OF LACTIC ACID: CPT | Performed by: NURSE PRACTITIONER

## 2019-03-31 PROCEDURE — 82947 ASSAY GLUCOSE BLOOD QUANT: CPT | Performed by: PHYSICIAN ASSISTANT

## 2019-03-31 PROCEDURE — 83690 ASSAY OF LIPASE: CPT | Performed by: PHYSICIAN ASSISTANT

## 2019-03-31 PROCEDURE — 83735 ASSAY OF MAGNESIUM: CPT | Performed by: PHYSICIAN ASSISTANT

## 2019-03-31 PROCEDURE — 84439 ASSAY OF FREE THYROXINE: CPT | Performed by: NURSE PRACTITIONER

## 2019-03-31 PROCEDURE — 84443 ASSAY THYROID STIM HORMONE: CPT | Performed by: NURSE PRACTITIONER

## 2019-03-31 PROCEDURE — 87086 URINE CULTURE/COLONY COUNT: CPT | Performed by: PHYSICIAN ASSISTANT

## 2019-03-31 PROCEDURE — 82550 ASSAY OF CK (CPK): CPT | Performed by: PHYSICIAN ASSISTANT

## 2019-03-31 PROCEDURE — 80048 BASIC METABOLIC PNL TOTAL CA: CPT | Performed by: PHYSICIAN ASSISTANT

## 2019-03-31 PROCEDURE — 87040 BLOOD CULTURE FOR BACTERIA: CPT | Performed by: PHYSICIAN ASSISTANT

## 2019-03-31 PROCEDURE — 87449 NOS EACH ORGANISM AG IA: CPT | Performed by: PHYSICIAN ASSISTANT

## 2019-03-31 PROCEDURE — 85049 AUTOMATED PLATELET COUNT: CPT | Performed by: PHYSICIAN ASSISTANT

## 2019-03-31 PROCEDURE — 82805 BLOOD GASES W/O2 SATURATION: CPT | Performed by: INTERNAL MEDICINE

## 2019-03-31 PROCEDURE — 83605 ASSAY OF LACTIC ACID: CPT | Performed by: INTERNAL MEDICINE

## 2019-03-31 PROCEDURE — 96361 HYDRATE IV INFUSION ADD-ON: CPT

## 2019-03-31 PROCEDURE — 84145 PROCALCITONIN (PCT): CPT | Performed by: PHYSICIAN ASSISTANT

## 2019-03-31 PROCEDURE — 82553 CREATINE MB FRACTION: CPT | Performed by: PHYSICIAN ASSISTANT

## 2019-03-31 PROCEDURE — 84100 ASSAY OF PHOSPHORUS: CPT | Performed by: INTERNAL MEDICINE

## 2019-03-31 RX ORDER — POTASSIUM CHLORIDE 14.9 MG/ML
20 INJECTION INTRAVENOUS ONCE
Status: DISCONTINUED | OUTPATIENT
Start: 2019-03-31 | End: 2019-03-31 | Stop reason: SDUPTHER

## 2019-03-31 RX ORDER — SODIUM CHLORIDE 450 MG/100ML
250 INJECTION, SOLUTION INTRAVENOUS CONTINUOUS
Status: DISCONTINUED | OUTPATIENT
Start: 2019-03-31 | End: 2019-03-31

## 2019-03-31 RX ORDER — SODIUM CHLORIDE, SODIUM GLUCONATE, SODIUM ACETATE, POTASSIUM CHLORIDE, MAGNESIUM CHLORIDE, SODIUM PHOSPHATE, DIBASIC, AND POTASSIUM PHOSPHATE .53; .5; .37; .037; .03; .012; .00082 G/100ML; G/100ML; G/100ML; G/100ML; G/100ML; G/100ML; G/100ML
1000 INJECTION, SOLUTION INTRAVENOUS ONCE
Status: COMPLETED | OUTPATIENT
Start: 2019-03-31 | End: 2019-03-31

## 2019-03-31 RX ORDER — PANTOPRAZOLE SODIUM 40 MG/1
40 INJECTION, POWDER, FOR SOLUTION INTRAVENOUS
Status: DISCONTINUED | OUTPATIENT
Start: 2019-03-31 | End: 2019-04-01

## 2019-03-31 RX ORDER — FUROSEMIDE 10 MG/ML
20 INJECTION INTRAMUSCULAR; INTRAVENOUS ONCE
Status: DISCONTINUED | OUTPATIENT
Start: 2019-03-31 | End: 2019-03-31

## 2019-03-31 RX ORDER — POTASSIUM CHLORIDE 14.9 MG/ML
20 INJECTION INTRAVENOUS ONCE
Status: COMPLETED | OUTPATIENT
Start: 2019-03-31 | End: 2019-03-31

## 2019-03-31 RX ORDER — ATORVASTATIN CALCIUM 10 MG/1
10 TABLET, FILM COATED ORAL DAILY
Status: DISCONTINUED | OUTPATIENT
Start: 2019-03-31 | End: 2019-04-05 | Stop reason: HOSPADM

## 2019-03-31 RX ORDER — SODIUM CHLORIDE 9 MG/ML
250 INJECTION, SOLUTION INTRAVENOUS CONTINUOUS
Status: DISCONTINUED | OUTPATIENT
Start: 2019-03-31 | End: 2019-03-31

## 2019-03-31 RX ORDER — LEVOTHYROXINE SODIUM 0.15 MG/1
150 TABLET ORAL
Status: DISCONTINUED | OUTPATIENT
Start: 2019-03-31 | End: 2019-03-31

## 2019-03-31 RX ORDER — LEVOTHYROXINE SODIUM 0.12 MG/1
125 TABLET ORAL
Status: DISCONTINUED | OUTPATIENT
Start: 2019-04-01 | End: 2019-04-05 | Stop reason: HOSPADM

## 2019-03-31 RX ORDER — SODIUM POLYSTYRENE SULFONATE 4.1 MEQ/G
15 POWDER, FOR SUSPENSION ORAL; RECTAL ONCE
Status: COMPLETED | OUTPATIENT
Start: 2019-03-31 | End: 2019-03-31

## 2019-03-31 RX ORDER — POTASSIUM CHLORIDE 20 MEQ/1
20 TABLET, EXTENDED RELEASE ORAL ONCE
Status: DISCONTINUED | OUTPATIENT
Start: 2019-03-31 | End: 2019-03-31

## 2019-03-31 RX ORDER — ONDANSETRON 2 MG/ML
4 INJECTION INTRAMUSCULAR; INTRAVENOUS EVERY 4 HOURS PRN
Status: DISCONTINUED | OUTPATIENT
Start: 2019-03-31 | End: 2019-04-05 | Stop reason: HOSPADM

## 2019-03-31 RX ORDER — SODIUM CHLORIDE 9 MG/ML
500 INJECTION, SOLUTION INTRAVENOUS CONTINUOUS
Status: DISCONTINUED | OUTPATIENT
Start: 2019-03-31 | End: 2019-03-31

## 2019-03-31 RX ORDER — POTASSIUM CHLORIDE 14.9 MG/ML
20 INJECTION INTRAVENOUS ONCE
Status: DISCONTINUED | OUTPATIENT
Start: 2019-03-31 | End: 2019-03-31

## 2019-03-31 RX ORDER — ASPIRIN 81 MG/1
81 TABLET, CHEWABLE ORAL DAILY
Status: DISCONTINUED | OUTPATIENT
Start: 2019-04-01 | End: 2019-04-05 | Stop reason: HOSPADM

## 2019-03-31 RX ORDER — DEXTROSE AND SODIUM CHLORIDE 5; .9 G/100ML; G/100ML
250 INJECTION, SOLUTION INTRAVENOUS CONTINUOUS
Status: DISCONTINUED | OUTPATIENT
Start: 2019-03-31 | End: 2019-04-01

## 2019-03-31 RX ORDER — SODIUM CHLORIDE 9 MG/ML
2000 INJECTION, SOLUTION INTRAVENOUS CONTINUOUS
Status: DISCONTINUED | OUTPATIENT
Start: 2019-03-31 | End: 2019-03-31

## 2019-03-31 RX ORDER — HEPARIN SODIUM 5000 [USP'U]/ML
5000 INJECTION, SOLUTION INTRAVENOUS; SUBCUTANEOUS EVERY 8 HOURS SCHEDULED
Status: DISCONTINUED | OUTPATIENT
Start: 2019-03-31 | End: 2019-04-05 | Stop reason: HOSPADM

## 2019-03-31 RX ORDER — ALBUTEROL SULFATE 2.5 MG/3ML
10 SOLUTION RESPIRATORY (INHALATION) ONCE
Status: COMPLETED | OUTPATIENT
Start: 2019-03-31 | End: 2019-03-31

## 2019-03-31 RX ORDER — TAMSULOSIN HYDROCHLORIDE 0.4 MG/1
0.4 CAPSULE ORAL
Status: DISCONTINUED | OUTPATIENT
Start: 2019-03-31 | End: 2019-04-05 | Stop reason: HOSPADM

## 2019-03-31 RX ORDER — MAGNESIUM SULFATE HEPTAHYDRATE 40 MG/ML
2 INJECTION, SOLUTION INTRAVENOUS ONCE
Status: COMPLETED | OUTPATIENT
Start: 2019-03-31 | End: 2019-03-31

## 2019-03-31 RX ORDER — POTASSIUM CHLORIDE 20 MEQ/1
20 TABLET, EXTENDED RELEASE ORAL ONCE
Status: COMPLETED | OUTPATIENT
Start: 2019-03-31 | End: 2019-03-31

## 2019-03-31 RX ADMIN — HEPARIN SODIUM 5000 UNITS: 5000 INJECTION INTRAVENOUS; SUBCUTANEOUS at 22:13

## 2019-03-31 RX ADMIN — Medication 6 UNITS/HR: at 02:16

## 2019-03-31 RX ADMIN — HEPARIN SODIUM 5000 UNITS: 5000 INJECTION INTRAVENOUS; SUBCUTANEOUS at 05:00

## 2019-03-31 RX ADMIN — CALCIUM GLUCONATE 1 G: 98 INJECTION, SOLUTION INTRAVENOUS at 00:56

## 2019-03-31 RX ADMIN — POTASSIUM CHLORIDE 20 MEQ: 14.9 INJECTION, SOLUTION INTRAVENOUS at 05:36

## 2019-03-31 RX ADMIN — SODIUM POLYSTYRENE SULFONATE 15 G: 1 POWDER ORAL; RECTAL at 01:33

## 2019-03-31 RX ADMIN — SODIUM CHLORIDE, SODIUM GLUCONATE, SODIUM ACETATE, POTASSIUM CHLORIDE, MAGNESIUM CHLORIDE, SODIUM PHOSPHATE, DIBASIC, AND POTASSIUM PHOSPHATE 1000 ML: .53; .5; .37; .037; .03; .012; .00082 INJECTION, SOLUTION INTRAVENOUS at 17:21

## 2019-03-31 RX ADMIN — SODIUM CHLORIDE, SODIUM GLUCONATE, SODIUM ACETATE, POTASSIUM CHLORIDE, MAGNESIUM CHLORIDE, SODIUM PHOSPHATE, DIBASIC, AND POTASSIUM PHOSPHATE 1000 ML: .53; .5; .37; .037; .03; .012; .00082 INJECTION, SOLUTION INTRAVENOUS at 15:20

## 2019-03-31 RX ADMIN — SODIUM CHLORIDE 1000 ML/HR: 0.9 INJECTION, SOLUTION INTRAVENOUS at 00:24

## 2019-03-31 RX ADMIN — SODIUM CHLORIDE 500 ML/HR: 0.9 INJECTION, SOLUTION INTRAVENOUS at 00:25

## 2019-03-31 RX ADMIN — DEXTROSE AND SODIUM CHLORIDE 250 ML/HR: 5; .9 INJECTION, SOLUTION INTRAVENOUS at 14:14

## 2019-03-31 RX ADMIN — PANTOPRAZOLE SODIUM 40 MG: 40 INJECTION, POWDER, FOR SOLUTION INTRAVENOUS at 08:18

## 2019-03-31 RX ADMIN — ATORVASTATIN CALCIUM 10 MG: 10 TABLET, FILM COATED ORAL at 08:18

## 2019-03-31 RX ADMIN — SODIUM CHLORIDE 0.3 UNITS/HR: 9 INJECTION, SOLUTION INTRAVENOUS at 17:22

## 2019-03-31 RX ADMIN — TAMSULOSIN HYDROCHLORIDE 0.4 MG: 0.4 CAPSULE ORAL at 19:03

## 2019-03-31 RX ADMIN — SODIUM CHLORIDE 250 ML/HR: 0.9 INJECTION, SOLUTION INTRAVENOUS at 02:17

## 2019-03-31 RX ADMIN — SODIUM CHLORIDE, SODIUM GLUCONATE, SODIUM ACETATE, POTASSIUM CHLORIDE, MAGNESIUM CHLORIDE, SODIUM PHOSPHATE, DIBASIC, AND POTASSIUM PHOSPHATE 1000 ML: .53; .5; .37; .037; .03; .012; .00082 INJECTION, SOLUTION INTRAVENOUS at 07:38

## 2019-03-31 RX ADMIN — MAGNESIUM SULFATE HEPTAHYDRATE 2 G: 40 INJECTION, SOLUTION INTRAVENOUS at 05:36

## 2019-03-31 RX ADMIN — ALBUTEROL SULFATE 10 MG: 2.5 SOLUTION RESPIRATORY (INHALATION) at 00:47

## 2019-03-31 RX ADMIN — DEXTROSE AND SODIUM CHLORIDE 250 ML/HR: 5; .9 INJECTION, SOLUTION INTRAVENOUS at 20:10

## 2019-03-31 RX ADMIN — Medication 12 UNITS/HR: at 07:10

## 2019-03-31 RX ADMIN — SODIUM BICARBONATE 50 MEQ: 84 INJECTION, SOLUTION INTRAVENOUS at 00:47

## 2019-03-31 RX ADMIN — LEVOTHYROXINE SODIUM 150 MCG: 150 TABLET ORAL at 09:06

## 2019-03-31 RX ADMIN — SODIUM CHLORIDE 250 ML/HR: 0.45 INJECTION, SOLUTION INTRAVENOUS at 07:42

## 2019-03-31 RX ADMIN — POTASSIUM CHLORIDE 20 MEQ: 14.9 INJECTION, SOLUTION INTRAVENOUS at 18:32

## 2019-03-31 RX ADMIN — HEPARIN SODIUM 5000 UNITS: 5000 INJECTION INTRAVENOUS; SUBCUTANEOUS at 13:52

## 2019-03-31 RX ADMIN — SODIUM CHLORIDE 6 UNITS/HR: 9 INJECTION, SOLUTION INTRAVENOUS at 00:41

## 2019-04-01 LAB
ANION GAP SERPL CALCULATED.3IONS-SCNC: 6 MMOL/L (ref 4–13)
ANION GAP SERPL CALCULATED.3IONS-SCNC: 9 MMOL/L (ref 4–13)
BACTERIA UR CULT: NORMAL
BASOPHILS # BLD AUTO: 0.01 THOUSANDS/ΜL (ref 0–0.1)
BASOPHILS NFR BLD AUTO: 0 % (ref 0–1)
BUN SERPL-MCNC: 33 MG/DL (ref 5–25)
BUN SERPL-MCNC: 44 MG/DL (ref 5–25)
CALCIUM SERPL-MCNC: 7.3 MG/DL (ref 8.3–10.1)
CALCIUM SERPL-MCNC: 7.7 MG/DL (ref 8.3–10.1)
CHLORIDE SERPL-SCNC: 105 MMOL/L (ref 100–108)
CHLORIDE SERPL-SCNC: 105 MMOL/L (ref 100–108)
CO2 SERPL-SCNC: 23 MMOL/L (ref 21–32)
CO2 SERPL-SCNC: 24 MMOL/L (ref 21–32)
CREAT SERPL-MCNC: 1.01 MG/DL (ref 0.6–1.3)
CREAT SERPL-MCNC: 1.39 MG/DL (ref 0.6–1.3)
EOSINOPHIL # BLD AUTO: 0.02 THOUSAND/ΜL (ref 0–0.61)
EOSINOPHIL NFR BLD AUTO: 0 % (ref 0–6)
ERYTHROCYTE [DISTWIDTH] IN BLOOD BY AUTOMATED COUNT: 13.1 % (ref 11.6–15.1)
GFR SERPL CREATININE-BSD FRML MDRD: 50 ML/MIN/1.73SQ M
GFR SERPL CREATININE-BSD FRML MDRD: 73 ML/MIN/1.73SQ M
GLUCOSE SERPL-MCNC: 102 MG/DL (ref 65–140)
GLUCOSE SERPL-MCNC: 102 MG/DL (ref 65–140)
GLUCOSE SERPL-MCNC: 121 MG/DL (ref 65–140)
GLUCOSE SERPL-MCNC: 125 MG/DL (ref 65–140)
GLUCOSE SERPL-MCNC: 130 MG/DL (ref 65–140)
GLUCOSE SERPL-MCNC: 134 MG/DL (ref 65–140)
GLUCOSE SERPL-MCNC: 137 MG/DL (ref 65–140)
GLUCOSE SERPL-MCNC: 140 MG/DL (ref 65–140)
GLUCOSE SERPL-MCNC: 141 MG/DL (ref 65–140)
GLUCOSE SERPL-MCNC: 161 MG/DL (ref 65–140)
GLUCOSE SERPL-MCNC: 194 MG/DL (ref 65–140)
GLUCOSE SERPL-MCNC: 237 MG/DL (ref 65–140)
GLUCOSE SERPL-MCNC: 253 MG/DL (ref 65–140)
GLUCOSE SERPL-MCNC: 262 MG/DL (ref 65–140)
GLUCOSE SERPL-MCNC: 280 MG/DL (ref 65–140)
GLUCOSE SERPL-MCNC: 288 MG/DL (ref 65–140)
GLUCOSE SERPL-MCNC: 297 MG/DL (ref 65–140)
GLUCOSE SERPL-MCNC: 32 MG/DL (ref 65–140)
GLUCOSE SERPL-MCNC: 58 MG/DL (ref 65–140)
GLUCOSE SERPL-MCNC: 59 MG/DL (ref 65–140)
GLUCOSE SERPL-MCNC: 62 MG/DL (ref 65–140)
GLUCOSE SERPL-MCNC: 77 MG/DL (ref 65–140)
GLUCOSE SERPL-MCNC: 90 MG/DL (ref 65–140)
HCT VFR BLD AUTO: 32.8 % (ref 36.5–49.3)
HGB BLD-MCNC: 11.5 G/DL (ref 12–17)
IMM GRANULOCYTES # BLD AUTO: 0.09 THOUSAND/UL (ref 0–0.2)
IMM GRANULOCYTES NFR BLD AUTO: 1 % (ref 0–2)
LYMPHOCYTES # BLD AUTO: 0.5 THOUSANDS/ΜL (ref 0.6–4.47)
LYMPHOCYTES NFR BLD AUTO: 4 % (ref 14–44)
MAGNESIUM SERPL-MCNC: 2.3 MG/DL (ref 1.6–2.6)
MCH RBC QN AUTO: 30.7 PG (ref 26.8–34.3)
MCHC RBC AUTO-ENTMCNC: 35.1 G/DL (ref 31.4–37.4)
MCV RBC AUTO: 88 FL (ref 82–98)
MONOCYTES # BLD AUTO: 1.26 THOUSAND/ΜL (ref 0.17–1.22)
MONOCYTES NFR BLD AUTO: 9 % (ref 4–12)
NEUTROPHILS # BLD AUTO: 12.53 THOUSANDS/ΜL (ref 1.85–7.62)
NEUTS SEG NFR BLD AUTO: 86 % (ref 43–75)
NRBC BLD AUTO-RTO: 0 /100 WBCS
PLATELET # BLD AUTO: 139 THOUSANDS/UL (ref 149–390)
PMV BLD AUTO: 9.7 FL (ref 8.9–12.7)
POTASSIUM SERPL-SCNC: 4 MMOL/L (ref 3.5–5.3)
POTASSIUM SERPL-SCNC: 4.1 MMOL/L (ref 3.5–5.3)
RBC # BLD AUTO: 3.75 MILLION/UL (ref 3.88–5.62)
SODIUM SERPL-SCNC: 135 MMOL/L (ref 136–145)
SODIUM SERPL-SCNC: 137 MMOL/L (ref 136–145)
WBC # BLD AUTO: 14.41 THOUSAND/UL (ref 4.31–10.16)

## 2019-04-01 PROCEDURE — 85025 COMPLETE CBC W/AUTO DIFF WBC: CPT | Performed by: NURSE PRACTITIONER

## 2019-04-01 PROCEDURE — 99231 SBSQ HOSP IP/OBS SF/LOW 25: CPT | Performed by: INTERNAL MEDICINE

## 2019-04-01 PROCEDURE — 83735 ASSAY OF MAGNESIUM: CPT | Performed by: NURSE PRACTITIONER

## 2019-04-01 PROCEDURE — 82948 REAGENT STRIP/BLOOD GLUCOSE: CPT

## 2019-04-01 PROCEDURE — C9113 INJ PANTOPRAZOLE SODIUM, VIA: HCPCS | Performed by: NURSE PRACTITIONER

## 2019-04-01 PROCEDURE — ERR1 ERRONEOUS ENCOUNTER-DISREGARD: Performed by: INTERNAL MEDICINE

## 2019-04-01 PROCEDURE — 80048 BASIC METABOLIC PNL TOTAL CA: CPT | Performed by: NURSE PRACTITIONER

## 2019-04-01 PROCEDURE — 99232 SBSQ HOSP IP/OBS MODERATE 35: CPT | Performed by: INTERNAL MEDICINE

## 2019-04-01 PROCEDURE — ERR1 ERRONEOUS ENCOUNTER-DISREGARD: Performed by: NURSE PRACTITIONER

## 2019-04-01 RX ORDER — MELATONIN
2000 DAILY
Status: DISCONTINUED | OUTPATIENT
Start: 2019-04-01 | End: 2019-04-05 | Stop reason: HOSPADM

## 2019-04-01 RX ORDER — DEXTROSE MONOHYDRATE 25 G/50ML
50 INJECTION, SOLUTION INTRAVENOUS ONCE
Status: COMPLETED | OUTPATIENT
Start: 2019-04-01 | End: 2019-04-01

## 2019-04-01 RX ORDER — GABAPENTIN 100 MG/1
100 CAPSULE ORAL 3 TIMES DAILY
Status: DISCONTINUED | OUTPATIENT
Start: 2019-04-01 | End: 2019-04-05 | Stop reason: HOSPADM

## 2019-04-01 RX ORDER — DEXTROSE MONOHYDRATE 25 G/50ML
INJECTION, SOLUTION INTRAVENOUS
Status: COMPLETED
Start: 2019-04-01 | End: 2019-04-01

## 2019-04-01 RX ORDER — PANTOPRAZOLE SODIUM 40 MG/1
40 TABLET, DELAYED RELEASE ORAL
Status: DISCONTINUED | OUTPATIENT
Start: 2019-04-01 | End: 2019-04-01

## 2019-04-01 RX ORDER — INSULIN GLARGINE 100 [IU]/ML
18 INJECTION, SOLUTION SUBCUTANEOUS
Status: DISCONTINUED | OUTPATIENT
Start: 2019-04-02 | End: 2019-04-01

## 2019-04-01 RX ORDER — INSULIN GLARGINE 100 [IU]/ML
25 INJECTION, SOLUTION SUBCUTANEOUS ONCE
Status: DISCONTINUED | OUTPATIENT
Start: 2019-04-01 | End: 2019-04-01

## 2019-04-01 RX ORDER — DEXTROSE MONOHYDRATE 50 MG/ML
100 INJECTION, SOLUTION INTRAVENOUS CONTINUOUS
Status: DISCONTINUED | OUTPATIENT
Start: 2019-04-01 | End: 2019-04-03

## 2019-04-01 RX ORDER — CALCIUM CARBONATE 500(1250)
1 TABLET ORAL
Status: DISCONTINUED | OUTPATIENT
Start: 2019-04-02 | End: 2019-04-05 | Stop reason: HOSPADM

## 2019-04-01 RX ORDER — PANTOPRAZOLE SODIUM 40 MG/1
40 TABLET, DELAYED RELEASE ORAL
Status: DISCONTINUED | OUTPATIENT
Start: 2019-04-02 | End: 2019-04-05 | Stop reason: HOSPADM

## 2019-04-01 RX ADMIN — LEVOTHYROXINE SODIUM 125 MCG: 125 TABLET ORAL at 05:21

## 2019-04-01 RX ADMIN — HEPARIN SODIUM 5000 UNITS: 5000 INJECTION INTRAVENOUS; SUBCUTANEOUS at 20:34

## 2019-04-01 RX ADMIN — DEXTROSE 60 ML/HR: 5 SOLUTION INTRAVENOUS at 21:04

## 2019-04-01 RX ADMIN — HEPARIN SODIUM 5000 UNITS: 5000 INJECTION INTRAVENOUS; SUBCUTANEOUS at 13:58

## 2019-04-01 RX ADMIN — ATORVASTATIN CALCIUM 10 MG: 10 TABLET, FILM COATED ORAL at 09:13

## 2019-04-01 RX ADMIN — DEXTROSE AND SODIUM CHLORIDE 250 ML/HR: 5; .9 INJECTION, SOLUTION INTRAVENOUS at 05:30

## 2019-04-01 RX ADMIN — TAMSULOSIN HYDROCHLORIDE 0.4 MG: 0.4 CAPSULE ORAL at 17:11

## 2019-04-01 RX ADMIN — GABAPENTIN 100 MG: 100 CAPSULE ORAL at 20:34

## 2019-04-01 RX ADMIN — DEXTROSE MONOHYDRATE 50 ML: 25 INJECTION, SOLUTION INTRAVENOUS at 17:07

## 2019-04-01 RX ADMIN — DEXTROSE AND SODIUM CHLORIDE 250 ML/HR: 5; .9 INJECTION, SOLUTION INTRAVENOUS at 07:33

## 2019-04-01 RX ADMIN — DEXTROSE MONOHYDRATE 50 ML: 500 INJECTION PARENTERAL at 14:04

## 2019-04-01 RX ADMIN — DEXTROSE MONOHYDRATE 50 ML: 25 INJECTION, SOLUTION INTRAVENOUS at 12:36

## 2019-04-01 RX ADMIN — DEXTROSE AND SODIUM CHLORIDE 250 ML/HR: 5; .9 INJECTION, SOLUTION INTRAVENOUS at 00:44

## 2019-04-01 RX ADMIN — GABAPENTIN 100 MG: 100 CAPSULE ORAL at 17:11

## 2019-04-01 RX ADMIN — ASPIRIN 81 MG 81 MG: 81 TABLET ORAL at 09:13

## 2019-04-01 RX ADMIN — Medication 1 UNITS/HR: at 18:21

## 2019-04-01 RX ADMIN — PANTOPRAZOLE SODIUM 40 MG: 40 INJECTION, POWDER, FOR SOLUTION INTRAVENOUS at 05:21

## 2019-04-01 RX ADMIN — HEPARIN SODIUM 5000 UNITS: 5000 INJECTION INTRAVENOUS; SUBCUTANEOUS at 05:21

## 2019-04-02 LAB
ANION GAP SERPL CALCULATED.3IONS-SCNC: 8 MMOL/L (ref 4–13)
ATRIAL RATE: 86 BPM
BUN SERPL-MCNC: 26 MG/DL (ref 5–25)
CALCIUM SERPL-MCNC: 7.8 MG/DL (ref 8.3–10.1)
CHLORIDE SERPL-SCNC: 105 MMOL/L (ref 100–108)
CO2 SERPL-SCNC: 22 MMOL/L (ref 21–32)
CREAT SERPL-MCNC: 0.87 MG/DL (ref 0.6–1.3)
ERYTHROCYTE [DISTWIDTH] IN BLOOD BY AUTOMATED COUNT: 13.1 % (ref 11.6–15.1)
GFR SERPL CREATININE-BSD FRML MDRD: 86 ML/MIN/1.73SQ M
GLUCOSE SERPL-MCNC: 101 MG/DL (ref 65–140)
GLUCOSE SERPL-MCNC: 104 MG/DL (ref 65–140)
GLUCOSE SERPL-MCNC: 107 MG/DL (ref 65–140)
GLUCOSE SERPL-MCNC: 124 MG/DL (ref 65–140)
GLUCOSE SERPL-MCNC: 125 MG/DL (ref 65–140)
GLUCOSE SERPL-MCNC: 125 MG/DL (ref 65–140)
GLUCOSE SERPL-MCNC: 126 MG/DL (ref 65–140)
GLUCOSE SERPL-MCNC: 129 MG/DL (ref 65–140)
GLUCOSE SERPL-MCNC: 132 MG/DL (ref 65–140)
GLUCOSE SERPL-MCNC: 135 MG/DL (ref 65–140)
GLUCOSE SERPL-MCNC: 140 MG/DL (ref 65–140)
GLUCOSE SERPL-MCNC: 146 MG/DL (ref 65–140)
GLUCOSE SERPL-MCNC: 152 MG/DL (ref 65–140)
GLUCOSE SERPL-MCNC: 164 MG/DL (ref 65–140)
GLUCOSE SERPL-MCNC: 165 MG/DL (ref 65–140)
GLUCOSE SERPL-MCNC: 196 MG/DL (ref 65–140)
GLUCOSE SERPL-MCNC: 206 MG/DL (ref 65–140)
GLUCOSE SERPL-MCNC: 209 MG/DL (ref 65–140)
GLUCOSE SERPL-MCNC: 49 MG/DL (ref 65–140)
GLUCOSE SERPL-MCNC: 85 MG/DL (ref 65–140)
GLUCOSE SERPL-MCNC: 99 MG/DL (ref 65–140)
HCT VFR BLD AUTO: 32.5 % (ref 36.5–49.3)
HGB BLD-MCNC: 11.3 G/DL (ref 12–17)
MAGNESIUM SERPL-MCNC: 2.3 MG/DL (ref 1.6–2.6)
MCH RBC QN AUTO: 30.3 PG (ref 26.8–34.3)
MCHC RBC AUTO-ENTMCNC: 34.8 G/DL (ref 31.4–37.4)
MCV RBC AUTO: 87 FL (ref 82–98)
P AXIS: 70 DEGREES
PLATELET # BLD AUTO: 102 THOUSANDS/UL (ref 149–390)
PMV BLD AUTO: 8.5 FL (ref 8.9–12.7)
POTASSIUM SERPL-SCNC: 4 MMOL/L (ref 3.5–5.3)
PR INTERVAL: 254 MS
QRS AXIS: -87 DEGREES
QRSD INTERVAL: 148 MS
QT INTERVAL: 414 MS
QTC INTERVAL: 495 MS
RBC # BLD AUTO: 3.73 MILLION/UL (ref 3.88–5.62)
SODIUM SERPL-SCNC: 135 MMOL/L (ref 136–145)
T WAVE AXIS: 91 DEGREES
VENTRICULAR RATE: 86 BPM
WBC # BLD AUTO: 7.42 THOUSAND/UL (ref 4.31–10.16)

## 2019-04-02 PROCEDURE — 82948 REAGENT STRIP/BLOOD GLUCOSE: CPT

## 2019-04-02 PROCEDURE — 83735 ASSAY OF MAGNESIUM: CPT | Performed by: NURSE PRACTITIONER

## 2019-04-02 PROCEDURE — G8978 MOBILITY CURRENT STATUS: HCPCS

## 2019-04-02 PROCEDURE — 99232 SBSQ HOSP IP/OBS MODERATE 35: CPT | Performed by: INTERNAL MEDICINE

## 2019-04-02 PROCEDURE — 97116 GAIT TRAINING THERAPY: CPT

## 2019-04-02 PROCEDURE — 97163 PT EVAL HIGH COMPLEX 45 MIN: CPT

## 2019-04-02 PROCEDURE — 80048 BASIC METABOLIC PNL TOTAL CA: CPT | Performed by: NURSE PRACTITIONER

## 2019-04-02 PROCEDURE — 85027 COMPLETE CBC AUTOMATED: CPT | Performed by: NURSE PRACTITIONER

## 2019-04-02 PROCEDURE — G8979 MOBILITY GOAL STATUS: HCPCS

## 2019-04-02 PROCEDURE — 93010 ELECTROCARDIOGRAM REPORT: CPT | Performed by: INTERNAL MEDICINE

## 2019-04-02 RX ORDER — LISINOPRIL 10 MG/1
10 TABLET ORAL DAILY
Status: DISCONTINUED | OUTPATIENT
Start: 2019-04-03 | End: 2019-04-05 | Stop reason: HOSPADM

## 2019-04-02 RX ADMIN — GABAPENTIN 100 MG: 100 CAPSULE ORAL at 08:13

## 2019-04-02 RX ADMIN — GABAPENTIN 100 MG: 100 CAPSULE ORAL at 22:39

## 2019-04-02 RX ADMIN — ATORVASTATIN CALCIUM 10 MG: 10 TABLET, FILM COATED ORAL at 08:13

## 2019-04-02 RX ADMIN — INSULIN ASPART 100 UNITS: 100 INJECTION, SOLUTION INTRAVENOUS; SUBCUTANEOUS at 13:18

## 2019-04-02 RX ADMIN — PANTOPRAZOLE SODIUM 40 MG: 40 TABLET, DELAYED RELEASE ORAL at 05:37

## 2019-04-02 RX ADMIN — LEVOTHYROXINE SODIUM 125 MCG: 125 TABLET ORAL at 05:37

## 2019-04-02 RX ADMIN — HEPARIN SODIUM 5000 UNITS: 5000 INJECTION INTRAVENOUS; SUBCUTANEOUS at 22:40

## 2019-04-02 RX ADMIN — Medication 1 TABLET: at 08:13

## 2019-04-02 RX ADMIN — GABAPENTIN 100 MG: 100 CAPSULE ORAL at 16:01

## 2019-04-02 RX ADMIN — ASPIRIN 81 MG 81 MG: 81 TABLET ORAL at 08:13

## 2019-04-02 RX ADMIN — TAMSULOSIN HYDROCHLORIDE 0.4 MG: 0.4 CAPSULE ORAL at 16:01

## 2019-04-02 RX ADMIN — HEPARIN SODIUM 5000 UNITS: 5000 INJECTION INTRAVENOUS; SUBCUTANEOUS at 05:36

## 2019-04-02 RX ADMIN — VITAMIN D, TAB 1000IU (100/BT) 2000 UNITS: 25 TAB at 08:13

## 2019-04-02 RX ADMIN — HEPARIN SODIUM 5000 UNITS: 5000 INJECTION INTRAVENOUS; SUBCUTANEOUS at 13:19

## 2019-04-03 ENCOUNTER — PATIENT OUTREACH (OUTPATIENT)
Dept: FAMILY MEDICINE CLINIC | Facility: CLINIC | Age: 74
End: 2019-04-03

## 2019-04-03 DIAGNOSIS — E10.10 DIABETIC KETOACIDOSIS WITHOUT COMA ASSOCIATED WITH TYPE 1 DIABETES MELLITUS (HCC): Primary | ICD-10-CM

## 2019-04-03 LAB
ANION GAP SERPL CALCULATED.3IONS-SCNC: 7 MMOL/L (ref 4–13)
BUN SERPL-MCNC: 18 MG/DL (ref 5–25)
CALCIUM SERPL-MCNC: 8.7 MG/DL (ref 8.3–10.1)
CHLORIDE SERPL-SCNC: 102 MMOL/L (ref 100–108)
CO2 SERPL-SCNC: 24 MMOL/L (ref 21–32)
CREAT SERPL-MCNC: 0.84 MG/DL (ref 0.6–1.3)
ERYTHROCYTE [DISTWIDTH] IN BLOOD BY AUTOMATED COUNT: 12.8 % (ref 11.6–15.1)
GFR SERPL CREATININE-BSD FRML MDRD: 87 ML/MIN/1.73SQ M
GLUCOSE SERPL-MCNC: 192 MG/DL (ref 65–140)
GLUCOSE SERPL-MCNC: 232 MG/DL (ref 65–140)
GLUCOSE SERPL-MCNC: 280 MG/DL (ref 65–140)
GLUCOSE SERPL-MCNC: 280 MG/DL (ref 65–140)
GLUCOSE SERPL-MCNC: 285 MG/DL (ref 65–140)
GLUCOSE SERPL-MCNC: 289 MG/DL (ref 65–140)
GLUCOSE SERPL-MCNC: 294 MG/DL (ref 65–140)
GLUCOSE SERPL-MCNC: 304 MG/DL (ref 65–140)
GLUCOSE SERPL-MCNC: 322 MG/DL (ref 65–140)
GLUCOSE SERPL-MCNC: 345 MG/DL (ref 65–140)
GLUCOSE SERPL-MCNC: 346 MG/DL (ref 65–140)
GLUCOSE SERPL-MCNC: 370 MG/DL (ref 65–140)
HCT VFR BLD AUTO: 35.2 % (ref 36.5–49.3)
HGB BLD-MCNC: 12.1 G/DL (ref 12–17)
MCH RBC QN AUTO: 30.2 PG (ref 26.8–34.3)
MCHC RBC AUTO-ENTMCNC: 34.4 G/DL (ref 31.4–37.4)
MCV RBC AUTO: 88 FL (ref 82–98)
PLATELET # BLD AUTO: 116 THOUSANDS/UL (ref 149–390)
PMV BLD AUTO: 9.4 FL (ref 8.9–12.7)
POTASSIUM SERPL-SCNC: 4.8 MMOL/L (ref 3.5–5.3)
RBC # BLD AUTO: 4.01 MILLION/UL (ref 3.88–5.62)
SODIUM SERPL-SCNC: 133 MMOL/L (ref 136–145)
WBC # BLD AUTO: 6.06 THOUSAND/UL (ref 4.31–10.16)

## 2019-04-03 PROCEDURE — 99232 SBSQ HOSP IP/OBS MODERATE 35: CPT | Performed by: INTERNAL MEDICINE

## 2019-04-03 PROCEDURE — 97116 GAIT TRAINING THERAPY: CPT

## 2019-04-03 PROCEDURE — 85027 COMPLETE CBC AUTOMATED: CPT | Performed by: INTERNAL MEDICINE

## 2019-04-03 PROCEDURE — 97530 THERAPEUTIC ACTIVITIES: CPT

## 2019-04-03 PROCEDURE — 82948 REAGENT STRIP/BLOOD GLUCOSE: CPT

## 2019-04-03 PROCEDURE — ERR1 ERRONEOUS ENCOUNTER-DISREGARD: Performed by: INTERNAL MEDICINE

## 2019-04-03 PROCEDURE — 80048 BASIC METABOLIC PNL TOTAL CA: CPT | Performed by: INTERNAL MEDICINE

## 2019-04-03 RX ORDER — HYDRALAZINE HYDROCHLORIDE 20 MG/ML
5 INJECTION INTRAMUSCULAR; INTRAVENOUS EVERY 6 HOURS PRN
Status: DISCONTINUED | OUTPATIENT
Start: 2019-04-03 | End: 2019-04-05 | Stop reason: HOSPADM

## 2019-04-03 RX ADMIN — GABAPENTIN 100 MG: 100 CAPSULE ORAL at 15:36

## 2019-04-03 RX ADMIN — VITAMIN D, TAB 1000IU (100/BT) 2000 UNITS: 25 TAB at 08:02

## 2019-04-03 RX ADMIN — HEPARIN SODIUM 5000 UNITS: 5000 INJECTION INTRAVENOUS; SUBCUTANEOUS at 14:31

## 2019-04-03 RX ADMIN — Medication 1 TABLET: at 07:56

## 2019-04-03 RX ADMIN — HEPARIN SODIUM 5000 UNITS: 5000 INJECTION INTRAVENOUS; SUBCUTANEOUS at 05:36

## 2019-04-03 RX ADMIN — TAMSULOSIN HYDROCHLORIDE 0.4 MG: 0.4 CAPSULE ORAL at 15:36

## 2019-04-03 RX ADMIN — HYDRALAZINE HYDROCHLORIDE 5 MG: 20 INJECTION INTRAMUSCULAR; INTRAVENOUS at 02:13

## 2019-04-03 RX ADMIN — HEPARIN SODIUM 5000 UNITS: 5000 INJECTION INTRAVENOUS; SUBCUTANEOUS at 22:43

## 2019-04-03 RX ADMIN — DEXTROSE 100 ML/HR: 5 SOLUTION INTRAVENOUS at 01:08

## 2019-04-03 RX ADMIN — LEVOTHYROXINE SODIUM 125 MCG: 125 TABLET ORAL at 05:36

## 2019-04-03 RX ADMIN — ATORVASTATIN CALCIUM 10 MG: 10 TABLET, FILM COATED ORAL at 08:02

## 2019-04-03 RX ADMIN — ASPIRIN 81 MG 81 MG: 81 TABLET ORAL at 08:02

## 2019-04-03 RX ADMIN — GABAPENTIN 100 MG: 100 CAPSULE ORAL at 08:02

## 2019-04-03 RX ADMIN — PANTOPRAZOLE SODIUM 40 MG: 40 TABLET, DELAYED RELEASE ORAL at 05:36

## 2019-04-03 RX ADMIN — LISINOPRIL 10 MG: 10 TABLET ORAL at 08:02

## 2019-04-03 RX ADMIN — GABAPENTIN 100 MG: 100 CAPSULE ORAL at 22:43

## 2019-04-04 LAB
ANION GAP SERPL CALCULATED.3IONS-SCNC: 4 MMOL/L (ref 4–13)
BUN SERPL-MCNC: 18 MG/DL (ref 5–25)
CALCIUM SERPL-MCNC: 8.6 MG/DL (ref 8.3–10.1)
CHLORIDE SERPL-SCNC: 102 MMOL/L (ref 100–108)
CO2 SERPL-SCNC: 28 MMOL/L (ref 21–32)
CREAT SERPL-MCNC: 0.94 MG/DL (ref 0.6–1.3)
GFR SERPL CREATININE-BSD FRML MDRD: 80 ML/MIN/1.73SQ M
GLUCOSE SERPL-MCNC: 217 MG/DL (ref 65–140)
GLUCOSE SERPL-MCNC: 218 MG/DL (ref 65–140)
GLUCOSE SERPL-MCNC: 224 MG/DL (ref 65–140)
GLUCOSE SERPL-MCNC: 234 MG/DL (ref 65–140)
GLUCOSE SERPL-MCNC: 235 MG/DL (ref 65–140)
GLUCOSE SERPL-MCNC: 247 MG/DL (ref 65–140)
GLUCOSE SERPL-MCNC: 262 MG/DL (ref 65–140)
GLUCOSE SERPL-MCNC: 264 MG/DL (ref 65–140)
GLUCOSE SERPL-MCNC: 264 MG/DL (ref 65–140)
GLUCOSE SERPL-MCNC: 272 MG/DL (ref 65–140)
GLUCOSE SERPL-MCNC: 304 MG/DL (ref 65–140)
POTASSIUM SERPL-SCNC: 4.4 MMOL/L (ref 3.5–5.3)
SODIUM SERPL-SCNC: 134 MMOL/L (ref 136–145)

## 2019-04-04 PROCEDURE — 80048 BASIC METABOLIC PNL TOTAL CA: CPT | Performed by: NURSE PRACTITIONER

## 2019-04-04 PROCEDURE — 99232 SBSQ HOSP IP/OBS MODERATE 35: CPT | Performed by: INTERNAL MEDICINE

## 2019-04-04 PROCEDURE — 82948 REAGENT STRIP/BLOOD GLUCOSE: CPT

## 2019-04-04 RX ADMIN — PANTOPRAZOLE SODIUM 40 MG: 40 TABLET, DELAYED RELEASE ORAL at 05:04

## 2019-04-04 RX ADMIN — VITAMIN D, TAB 1000IU (100/BT) 2000 UNITS: 25 TAB at 08:41

## 2019-04-04 RX ADMIN — HEPARIN SODIUM 5000 UNITS: 5000 INJECTION INTRAVENOUS; SUBCUTANEOUS at 13:41

## 2019-04-04 RX ADMIN — Medication 1 TABLET: at 08:41

## 2019-04-04 RX ADMIN — LISINOPRIL 10 MG: 10 TABLET ORAL at 08:41

## 2019-04-04 RX ADMIN — HEPARIN SODIUM 5000 UNITS: 5000 INJECTION INTRAVENOUS; SUBCUTANEOUS at 05:05

## 2019-04-04 RX ADMIN — ASPIRIN 81 MG 81 MG: 81 TABLET ORAL at 08:41

## 2019-04-04 RX ADMIN — LEVOTHYROXINE SODIUM 125 MCG: 125 TABLET ORAL at 05:04

## 2019-04-04 RX ADMIN — TAMSULOSIN HYDROCHLORIDE 0.4 MG: 0.4 CAPSULE ORAL at 16:17

## 2019-04-04 RX ADMIN — GABAPENTIN 100 MG: 100 CAPSULE ORAL at 22:09

## 2019-04-04 RX ADMIN — INSULIN ASPART 100 UNITS: 100 INJECTION, SOLUTION INTRAVENOUS; SUBCUTANEOUS at 12:37

## 2019-04-04 RX ADMIN — GABAPENTIN 100 MG: 100 CAPSULE ORAL at 16:17

## 2019-04-04 RX ADMIN — GABAPENTIN 100 MG: 100 CAPSULE ORAL at 08:42

## 2019-04-04 RX ADMIN — ATORVASTATIN CALCIUM 10 MG: 10 TABLET, FILM COATED ORAL at 08:41

## 2019-04-05 ENCOUNTER — TRANSITIONAL CARE MANAGEMENT (OUTPATIENT)
Dept: FAMILY MEDICINE CLINIC | Facility: CLINIC | Age: 74
End: 2019-04-05

## 2019-04-05 VITALS
DIASTOLIC BLOOD PRESSURE: 79 MMHG | BODY MASS INDEX: 23.07 KG/M2 | WEIGHT: 147 LBS | HEIGHT: 67 IN | HEART RATE: 78 BPM | SYSTOLIC BLOOD PRESSURE: 162 MMHG | OXYGEN SATURATION: 96 % | RESPIRATION RATE: 18 BRPM | TEMPERATURE: 98 F

## 2019-04-05 LAB
ANION GAP SERPL CALCULATED.3IONS-SCNC: 6 MMOL/L (ref 4–13)
BACTERIA BLD CULT: NORMAL
BACTERIA BLD CULT: NORMAL
BUN SERPL-MCNC: 18 MG/DL (ref 5–25)
CALCIUM SERPL-MCNC: 8.8 MG/DL (ref 8.3–10.1)
CHLORIDE SERPL-SCNC: 101 MMOL/L (ref 100–108)
CO2 SERPL-SCNC: 29 MMOL/L (ref 21–32)
CREAT SERPL-MCNC: 0.98 MG/DL (ref 0.6–1.3)
ERYTHROCYTE [DISTWIDTH] IN BLOOD BY AUTOMATED COUNT: 12.7 % (ref 11.6–15.1)
GFR SERPL CREATININE-BSD FRML MDRD: 76 ML/MIN/1.73SQ M
GLUCOSE SERPL-MCNC: 159 MG/DL (ref 65–140)
GLUCOSE SERPL-MCNC: 160 MG/DL (ref 65–140)
GLUCOSE SERPL-MCNC: 253 MG/DL (ref 65–140)
HCT VFR BLD AUTO: 31.3 % (ref 36.5–49.3)
HGB BLD-MCNC: 11 G/DL (ref 12–17)
MCH RBC QN AUTO: 30.6 PG (ref 26.8–34.3)
MCHC RBC AUTO-ENTMCNC: 35.1 G/DL (ref 31.4–37.4)
MCV RBC AUTO: 87 FL (ref 82–98)
PLATELET # BLD AUTO: 133 THOUSANDS/UL (ref 149–390)
PMV BLD AUTO: 9.1 FL (ref 8.9–12.7)
POTASSIUM SERPL-SCNC: 4.2 MMOL/L (ref 3.5–5.3)
RBC # BLD AUTO: 3.59 MILLION/UL (ref 3.88–5.62)
SODIUM SERPL-SCNC: 136 MMOL/L (ref 136–145)
WBC # BLD AUTO: 6.83 THOUSAND/UL (ref 4.31–10.16)

## 2019-04-05 PROCEDURE — 99239 HOSP IP/OBS DSCHRG MGMT >30: CPT | Performed by: INTERNAL MEDICINE

## 2019-04-05 PROCEDURE — 85027 COMPLETE CBC AUTOMATED: CPT | Performed by: INTERNAL MEDICINE

## 2019-04-05 PROCEDURE — 82948 REAGENT STRIP/BLOOD GLUCOSE: CPT

## 2019-04-05 PROCEDURE — 80048 BASIC METABOLIC PNL TOTAL CA: CPT | Performed by: INTERNAL MEDICINE

## 2019-04-05 RX ORDER — LEVOTHYROXINE SODIUM 0.12 MG/1
125 TABLET ORAL
Qty: 30 TABLET | Refills: 0 | Status: SHIPPED | OUTPATIENT
Start: 2019-04-06 | End: 2019-11-26

## 2019-04-05 RX ORDER — POTASSIUM CHLORIDE 750 MG/1
10 TABLET, EXTENDED RELEASE ORAL DAILY
Qty: 5 TABLET | Refills: 0 | Status: SHIPPED | OUTPATIENT
Start: 2019-04-05 | End: 2019-06-03 | Stop reason: ALTCHOICE

## 2019-04-05 RX ORDER — FUROSEMIDE 20 MG/1
20 TABLET ORAL DAILY
Qty: 5 TABLET | Refills: 0 | Status: SHIPPED | OUTPATIENT
Start: 2019-04-05 | End: 2019-06-03 | Stop reason: ALTCHOICE

## 2019-04-05 RX ADMIN — ASPIRIN 81 MG 81 MG: 81 TABLET ORAL at 08:24

## 2019-04-05 RX ADMIN — GABAPENTIN 100 MG: 100 CAPSULE ORAL at 08:23

## 2019-04-05 RX ADMIN — Medication 1 TABLET: at 08:23

## 2019-04-05 RX ADMIN — HEPARIN SODIUM 5000 UNITS: 5000 INJECTION INTRAVENOUS; SUBCUTANEOUS at 05:18

## 2019-04-05 RX ADMIN — LEVOTHYROXINE SODIUM 125 MCG: 125 TABLET ORAL at 05:17

## 2019-04-05 RX ADMIN — VITAMIN D, TAB 1000IU (100/BT) 2000 UNITS: 25 TAB at 08:24

## 2019-04-05 RX ADMIN — LISINOPRIL 10 MG: 10 TABLET ORAL at 08:24

## 2019-04-05 RX ADMIN — ATORVASTATIN CALCIUM 10 MG: 10 TABLET, FILM COATED ORAL at 08:24

## 2019-04-05 RX ADMIN — PANTOPRAZOLE SODIUM 40 MG: 40 TABLET, DELAYED RELEASE ORAL at 05:18

## 2019-04-05 RX ADMIN — HEPARIN SODIUM 5000 UNITS: 5000 INJECTION INTRAVENOUS; SUBCUTANEOUS at 13:36

## 2019-04-09 ENCOUNTER — IN-CLINIC DEVICE VISIT (OUTPATIENT)
Dept: CARDIOLOGY CLINIC | Facility: CLINIC | Age: 74
End: 2019-04-09
Payer: COMMERCIAL

## 2019-04-09 DIAGNOSIS — I44.2 COMPLETE ATRIOVENTRICULAR BLOCK (HCC): Primary | ICD-10-CM

## 2019-04-09 DIAGNOSIS — Z95.0 PRESENCE OF PERMANENT CARDIAC PACEMAKER: ICD-10-CM

## 2019-04-09 PROCEDURE — 93280 PM DEVICE PROGR EVAL DUAL: CPT | Performed by: INTERNAL MEDICINE

## 2019-04-10 ENCOUNTER — OFFICE VISIT (OUTPATIENT)
Dept: FAMILY MEDICINE CLINIC | Facility: CLINIC | Age: 74
End: 2019-04-10
Payer: COMMERCIAL

## 2019-04-10 VITALS
OXYGEN SATURATION: 98 % | HEART RATE: 86 BPM | SYSTOLIC BLOOD PRESSURE: 122 MMHG | DIASTOLIC BLOOD PRESSURE: 62 MMHG | HEIGHT: 67 IN | WEIGHT: 150 LBS | BODY MASS INDEX: 23.54 KG/M2

## 2019-04-10 DIAGNOSIS — E03.9 HYPOTHYROIDISM, UNSPECIFIED TYPE: ICD-10-CM

## 2019-04-10 DIAGNOSIS — E10.10 DIABETIC KETOACIDOSIS WITHOUT COMA ASSOCIATED WITH TYPE 1 DIABETES MELLITUS (HCC): ICD-10-CM

## 2019-04-10 DIAGNOSIS — E87.2 HIGH ANION GAP METABOLIC ACIDOSIS: ICD-10-CM

## 2019-04-10 DIAGNOSIS — IMO0001 TRANSITION OF CARE PERFORMED WITH SHARING OF CLINICAL SUMMARY: Primary | ICD-10-CM

## 2019-04-10 DIAGNOSIS — E78.5 HYPERLIPIDEMIA, UNSPECIFIED HYPERLIPIDEMIA TYPE: ICD-10-CM

## 2019-04-10 DIAGNOSIS — H43.12 VITREOUS HEMORRHAGE OF LEFT EYE (HCC): ICD-10-CM

## 2019-04-10 PROCEDURE — 1160F RVW MEDS BY RX/DR IN RCRD: CPT | Performed by: FAMILY MEDICINE

## 2019-04-10 PROCEDURE — 1111F DSCHRG MED/CURRENT MED MERGE: CPT | Performed by: FAMILY MEDICINE

## 2019-04-10 PROCEDURE — 99496 TRANSJ CARE MGMT HIGH F2F 7D: CPT | Performed by: FAMILY MEDICINE

## 2019-04-10 RX ORDER — EZETIMIBE 10 MG/1
5 TABLET ORAL DAILY
COMMUNITY
End: 2019-06-03 | Stop reason: ALTCHOICE

## 2019-04-10 RX ORDER — LISINOPRIL 20 MG/1
10 TABLET ORAL DAILY
COMMUNITY
End: 2020-01-15

## 2019-04-10 RX ORDER — DULOXETIN HYDROCHLORIDE 30 MG/1
30 CAPSULE, DELAYED RELEASE ORAL DAILY
COMMUNITY
End: 2021-01-01 | Stop reason: HOSPADM

## 2019-04-11 ENCOUNTER — TELEPHONE (OUTPATIENT)
Dept: CASE MANAGEMENT | Facility: HOSPITAL | Age: 74
End: 2019-04-11

## 2019-04-11 ENCOUNTER — PATIENT OUTREACH (OUTPATIENT)
Dept: FAMILY MEDICINE CLINIC | Facility: CLINIC | Age: 74
End: 2019-04-11

## 2019-04-17 ENCOUNTER — OFFICE VISIT (OUTPATIENT)
Dept: ENDOCRINOLOGY | Facility: CLINIC | Age: 74
End: 2019-04-17
Payer: COMMERCIAL

## 2019-04-17 VITALS
BODY MASS INDEX: 21.97 KG/M2 | SYSTOLIC BLOOD PRESSURE: 102 MMHG | WEIGHT: 140 LBS | HEIGHT: 67 IN | DIASTOLIC BLOOD PRESSURE: 60 MMHG

## 2019-04-17 DIAGNOSIS — E03.9 ACQUIRED HYPOTHYROIDISM: Chronic | ICD-10-CM

## 2019-04-17 DIAGNOSIS — E10.65 TYPE 1 DIABETES MELLITUS WITH HYPERGLYCEMIA (HCC): Primary | ICD-10-CM

## 2019-04-17 DIAGNOSIS — E78.2 MIXED HYPERLIPIDEMIA: Chronic | ICD-10-CM

## 2019-04-17 DIAGNOSIS — I10 ESSENTIAL HYPERTENSION: Chronic | ICD-10-CM

## 2019-04-17 PROCEDURE — 99214 OFFICE O/P EST MOD 30 MIN: CPT | Performed by: PHYSICIAN ASSISTANT

## 2019-04-22 ENCOUNTER — PATIENT OUTREACH (OUTPATIENT)
Dept: FAMILY MEDICINE CLINIC | Facility: CLINIC | Age: 74
End: 2019-04-22

## 2019-04-24 ENCOUNTER — APPOINTMENT (OUTPATIENT)
Dept: LAB | Facility: HOSPITAL | Age: 74
End: 2019-04-24
Payer: COMMERCIAL

## 2019-04-24 DIAGNOSIS — E11.42 DIABETIC PERIPHERAL NEUROPATHY (HCC): ICD-10-CM

## 2019-04-24 DIAGNOSIS — E78.5 HYPERLIPIDEMIA, UNSPECIFIED HYPERLIPIDEMIA TYPE: ICD-10-CM

## 2019-04-24 DIAGNOSIS — N18.30 STAGE 3 CHRONIC KIDNEY DISEASE (HCC): ICD-10-CM

## 2019-04-24 DIAGNOSIS — E11.3299 DIABETES MELLITUS WITH BACKGROUND RETINOPATHY (HCC): ICD-10-CM

## 2019-04-24 LAB
CHOLEST SERPL-MCNC: 162 MG/DL (ref 50–200)
CREAT UR-MCNC: 98.8 MG/DL
HDLC SERPL-MCNC: 93 MG/DL (ref 40–60)
LDLC SERPL CALC-MCNC: 62 MG/DL (ref 0–100)
MICROALBUMIN UR-MCNC: 379 MG/L (ref 0–20)
MICROALBUMIN/CREAT 24H UR: 384 MG/G CREATININE (ref 0–30)
NONHDLC SERPL-MCNC: 69 MG/DL
TRIGL SERPL-MCNC: 33 MG/DL
TSH SERPL DL<=0.05 MIU/L-ACNC: 1.49 UIU/ML (ref 0.36–3.74)

## 2019-04-24 PROCEDURE — 84443 ASSAY THYROID STIM HORMONE: CPT | Performed by: FAMILY MEDICINE

## 2019-04-24 PROCEDURE — 3062F POS MACROALBUMINURIA REV: CPT | Performed by: FAMILY MEDICINE

## 2019-04-24 PROCEDURE — 82570 ASSAY OF URINE CREATININE: CPT

## 2019-04-24 PROCEDURE — 36415 COLL VENOUS BLD VENIPUNCTURE: CPT

## 2019-04-24 PROCEDURE — 80061 LIPID PANEL: CPT

## 2019-04-24 PROCEDURE — 82043 UR ALBUMIN QUANTITATIVE: CPT

## 2019-04-25 ENCOUNTER — OFFICE VISIT (OUTPATIENT)
Dept: FAMILY MEDICINE CLINIC | Facility: CLINIC | Age: 74
End: 2019-04-25
Payer: COMMERCIAL

## 2019-04-25 VITALS
HEIGHT: 67 IN | HEART RATE: 84 BPM | DIASTOLIC BLOOD PRESSURE: 60 MMHG | OXYGEN SATURATION: 99 % | SYSTOLIC BLOOD PRESSURE: 108 MMHG | WEIGHT: 141 LBS | BODY MASS INDEX: 22.13 KG/M2

## 2019-04-25 DIAGNOSIS — R80.9 MICROALBUMINURIA: ICD-10-CM

## 2019-04-25 DIAGNOSIS — E03.9 HYPOTHYROIDISM, UNSPECIFIED TYPE: Primary | ICD-10-CM

## 2019-04-25 DIAGNOSIS — S81.801A WOUND OF RIGHT LOWER EXTREMITY, INITIAL ENCOUNTER: ICD-10-CM

## 2019-04-25 PROBLEM — E11.10 DKA (DIABETIC KETOACIDOSES): Status: RESOLVED | Noted: 2019-03-31 | Resolved: 2019-04-25

## 2019-04-25 PROBLEM — N17.9 AKI (ACUTE KIDNEY INJURY) (HCC): Status: RESOLVED | Noted: 2019-03-31 | Resolved: 2019-04-25

## 2019-04-25 PROBLEM — G93.40 ENCEPHALOPATHY: Status: RESOLVED | Noted: 2019-03-31 | Resolved: 2019-04-25

## 2019-04-25 PROCEDURE — 99213 OFFICE O/P EST LOW 20 MIN: CPT | Performed by: FAMILY MEDICINE

## 2019-04-30 PROBLEM — I83.019 VARICOSE VEINS OF RIGHT LOWER EXTREMITY WITH ULCER (HCC): Status: ACTIVE | Noted: 2019-04-25

## 2019-04-30 PROBLEM — L97.919 VARICOSE VEINS OF RIGHT LOWER EXTREMITY WITH ULCER (HCC): Status: ACTIVE | Noted: 2019-04-25

## 2019-05-01 ENCOUNTER — PROCEDURE VISIT (OUTPATIENT)
Dept: PODIATRY | Facility: CLINIC | Age: 74
End: 2019-05-01
Payer: COMMERCIAL

## 2019-05-01 VITALS
HEIGHT: 68 IN | BODY MASS INDEX: 20.92 KG/M2 | WEIGHT: 138 LBS | DIASTOLIC BLOOD PRESSURE: 86 MMHG | SYSTOLIC BLOOD PRESSURE: 168 MMHG

## 2019-05-01 DIAGNOSIS — I83.012 VARICOSE VEINS OF RIGHT LOWER EXTREMITY WITH ULCER OF CALF WITH FAT LAYER EXPOSED (HCC): Primary | ICD-10-CM

## 2019-05-01 DIAGNOSIS — L97.212 VARICOSE VEINS OF RIGHT LOWER EXTREMITY WITH ULCER OF CALF WITH FAT LAYER EXPOSED (HCC): Primary | ICD-10-CM

## 2019-05-01 PROCEDURE — 11045 DBRDMT SUBQ TISS EACH ADDL: CPT | Performed by: PODIATRIST

## 2019-05-01 PROCEDURE — 11042 DBRDMT SUBQ TIS 1ST 20SQCM/<: CPT | Performed by: PODIATRIST

## 2019-05-09 ENCOUNTER — OFFICE VISIT (OUTPATIENT)
Dept: PODIATRY | Facility: CLINIC | Age: 74
End: 2019-05-09
Payer: COMMERCIAL

## 2019-05-09 VITALS — HEIGHT: 68 IN | BODY MASS INDEX: 20.92 KG/M2 | WEIGHT: 138 LBS

## 2019-05-09 DIAGNOSIS — I87.2 PERIPHERAL VENOUS INSUFFICIENCY: ICD-10-CM

## 2019-05-09 DIAGNOSIS — I87.311 CHRONIC VENOUS HYPERTENSION (IDIOPATHIC) WITH ULCER OF RIGHT LOWER EXTREMITY (HCC): ICD-10-CM

## 2019-05-09 DIAGNOSIS — L97.919 CHRONIC VENOUS HYPERTENSION (IDIOPATHIC) WITH ULCER OF RIGHT LOWER EXTREMITY (HCC): ICD-10-CM

## 2019-05-09 DIAGNOSIS — I89.0 LYMPHEDEMA: ICD-10-CM

## 2019-05-09 DIAGNOSIS — E11.42 DIABETIC PERIPHERAL NEUROPATHY (HCC): Primary | Chronic | ICD-10-CM

## 2019-05-09 PROCEDURE — 11042 DBRDMT SUBQ TIS 1ST 20SQCM/<: CPT | Performed by: PODIATRIST

## 2019-05-09 PROCEDURE — 11045 DBRDMT SUBQ TISS EACH ADDL: CPT | Performed by: PODIATRIST

## 2019-05-09 PROCEDURE — 29580 STRAPPING UNNA BOOT: CPT | Performed by: PODIATRIST

## 2019-05-09 RX ORDER — BLOOD-GLUCOSE SENSOR
EACH MISCELLANEOUS
COMMUNITY
Start: 2014-11-26

## 2019-05-16 ENCOUNTER — OFFICE VISIT (OUTPATIENT)
Dept: PODIATRY | Facility: CLINIC | Age: 74
End: 2019-05-16
Payer: COMMERCIAL

## 2019-05-16 VITALS
BODY MASS INDEX: 20.92 KG/M2 | DIASTOLIC BLOOD PRESSURE: 80 MMHG | SYSTOLIC BLOOD PRESSURE: 150 MMHG | WEIGHT: 138 LBS | HEIGHT: 68 IN

## 2019-05-16 DIAGNOSIS — E11.622 DIABETES MELLITUS WITH SKIN ULCER (HCC): ICD-10-CM

## 2019-05-16 DIAGNOSIS — L98.499 DIABETES MELLITUS WITH SKIN ULCER (HCC): ICD-10-CM

## 2019-05-16 DIAGNOSIS — L97.212: ICD-10-CM

## 2019-05-16 DIAGNOSIS — E11.42 DIABETIC PERIPHERAL NEUROPATHY (HCC): Primary | Chronic | ICD-10-CM

## 2019-05-16 DIAGNOSIS — L97.212 VARICOSE VEINS OF RIGHT LOWER EXTREMITY WITH ULCER OF CALF WITH FAT LAYER EXPOSED (HCC): ICD-10-CM

## 2019-05-16 DIAGNOSIS — I83.012 VARICOSE VEINS OF RIGHT LOWER EXTREMITY WITH ULCER OF CALF WITH FAT LAYER EXPOSED (HCC): ICD-10-CM

## 2019-05-16 PROCEDURE — 11042 DBRDMT SUBQ TIS 1ST 20SQCM/<: CPT | Performed by: PODIATRIST

## 2019-05-16 PROCEDURE — 11045 DBRDMT SUBQ TISS EACH ADDL: CPT | Performed by: PODIATRIST

## 2019-05-17 ENCOUNTER — PATIENT OUTREACH (OUTPATIENT)
Dept: FAMILY MEDICINE CLINIC | Facility: CLINIC | Age: 74
End: 2019-05-17

## 2019-05-22 ENCOUNTER — PROCEDURE VISIT (OUTPATIENT)
Dept: PODIATRY | Facility: CLINIC | Age: 74
End: 2019-05-22
Payer: COMMERCIAL

## 2019-05-22 VITALS
WEIGHT: 138.6 LBS | DIASTOLIC BLOOD PRESSURE: 54 MMHG | HEIGHT: 68 IN | BODY MASS INDEX: 21.01 KG/M2 | SYSTOLIC BLOOD PRESSURE: 114 MMHG

## 2019-05-22 DIAGNOSIS — E11.42 DIABETIC PERIPHERAL NEUROPATHY (HCC): Chronic | ICD-10-CM

## 2019-05-22 DIAGNOSIS — E11.622 DIABETES MELLITUS WITH SKIN ULCER (HCC): ICD-10-CM

## 2019-05-22 DIAGNOSIS — L98.499 DIABETES MELLITUS WITH SKIN ULCER (HCC): ICD-10-CM

## 2019-05-22 DIAGNOSIS — L97.212: Primary | ICD-10-CM

## 2019-05-22 PROCEDURE — 15002 WOUND PREP TRK/ARM/LEG: CPT | Performed by: PODIATRIST

## 2019-05-24 ENCOUNTER — TELEPHONE (OUTPATIENT)
Dept: CARDIOLOGY CLINIC | Facility: CLINIC | Age: 74
End: 2019-05-24

## 2019-05-24 ENCOUNTER — PATIENT OUTREACH (OUTPATIENT)
Dept: FAMILY MEDICINE CLINIC | Facility: CLINIC | Age: 74
End: 2019-05-24

## 2019-05-29 ENCOUNTER — TELEPHONE (OUTPATIENT)
Dept: CARDIOLOGY CLINIC | Facility: CLINIC | Age: 74
End: 2019-05-29

## 2019-05-30 ENCOUNTER — PROCEDURE VISIT (OUTPATIENT)
Dept: PODIATRY | Facility: CLINIC | Age: 74
End: 2019-05-30
Payer: COMMERCIAL

## 2019-05-30 VITALS
WEIGHT: 138 LBS | DIASTOLIC BLOOD PRESSURE: 78 MMHG | HEIGHT: 68 IN | SYSTOLIC BLOOD PRESSURE: 141 MMHG | BODY MASS INDEX: 20.92 KG/M2 | HEART RATE: 81 BPM

## 2019-05-30 DIAGNOSIS — E11.622 DIABETES MELLITUS WITH SKIN ULCER (HCC): ICD-10-CM

## 2019-05-30 DIAGNOSIS — L98.499 DIABETES MELLITUS WITH SKIN ULCER (HCC): ICD-10-CM

## 2019-05-30 DIAGNOSIS — I83.012 VARICOSE VEINS OF RIGHT LOWER EXTREMITY WITH ULCER OF CALF WITH FAT LAYER EXPOSED (HCC): Primary | ICD-10-CM

## 2019-05-30 DIAGNOSIS — L97.212 VARICOSE VEINS OF RIGHT LOWER EXTREMITY WITH ULCER OF CALF WITH FAT LAYER EXPOSED (HCC): Primary | ICD-10-CM

## 2019-05-30 DIAGNOSIS — L97.212: ICD-10-CM

## 2019-05-30 PROCEDURE — 99213 OFFICE O/P EST LOW 20 MIN: CPT | Performed by: PODIATRIST

## 2019-05-31 ENCOUNTER — PATIENT OUTREACH (OUTPATIENT)
Dept: FAMILY MEDICINE CLINIC | Facility: CLINIC | Age: 74
End: 2019-05-31

## 2019-06-03 ENCOUNTER — OFFICE VISIT (OUTPATIENT)
Dept: CARDIOLOGY CLINIC | Facility: CLINIC | Age: 74
End: 2019-06-03
Payer: COMMERCIAL

## 2019-06-03 VITALS
HEART RATE: 83 BPM | BODY MASS INDEX: 20.69 KG/M2 | DIASTOLIC BLOOD PRESSURE: 88 MMHG | SYSTOLIC BLOOD PRESSURE: 140 MMHG | HEIGHT: 68 IN | WEIGHT: 136.5 LBS

## 2019-06-03 DIAGNOSIS — I10 ESSENTIAL HYPERTENSION: Primary | Chronic | ICD-10-CM

## 2019-06-03 DIAGNOSIS — E78.2 MIXED HYPERLIPIDEMIA: Chronic | ICD-10-CM

## 2019-06-03 DIAGNOSIS — Z95.0 HISTORY OF PERMANENT CARDIAC PACEMAKER PLACEMENT: ICD-10-CM

## 2019-06-03 DIAGNOSIS — N18.30 STAGE 3 CHRONIC KIDNEY DISEASE (HCC): ICD-10-CM

## 2019-06-03 DIAGNOSIS — I44.2 COMPLETE HEART BLOCK (HCC): ICD-10-CM

## 2019-06-03 DIAGNOSIS — E11.3299 DIABETES MELLITUS WITH BACKGROUND RETINOPATHY (HCC): ICD-10-CM

## 2019-06-03 DIAGNOSIS — Z01.810 PREOP CARDIOVASCULAR EXAM: ICD-10-CM

## 2019-06-03 PROBLEM — I49.9 CARDIAC ARRHYTHMIA: Status: RESOLVED | Noted: 2017-04-04 | Resolved: 2019-06-03

## 2019-06-03 PROCEDURE — 93000 ELECTROCARDIOGRAM COMPLETE: CPT | Performed by: INTERNAL MEDICINE

## 2019-06-03 PROCEDURE — 99214 OFFICE O/P EST MOD 30 MIN: CPT | Performed by: INTERNAL MEDICINE

## 2019-06-05 ENCOUNTER — APPOINTMENT (OUTPATIENT)
Dept: LAB | Facility: CLINIC | Age: 74
End: 2019-06-05
Payer: COMMERCIAL

## 2019-06-05 ENCOUNTER — OFFICE VISIT (OUTPATIENT)
Dept: FAMILY MEDICINE CLINIC | Facility: CLINIC | Age: 74
End: 2019-06-05
Payer: COMMERCIAL

## 2019-06-05 VITALS
DIASTOLIC BLOOD PRESSURE: 60 MMHG | WEIGHT: 135 LBS | SYSTOLIC BLOOD PRESSURE: 102 MMHG | HEIGHT: 68 IN | OXYGEN SATURATION: 98 % | HEART RATE: 88 BPM | TEMPERATURE: 97.2 F | BODY MASS INDEX: 20.46 KG/M2

## 2019-06-05 DIAGNOSIS — D64.9 ANEMIA, UNSPECIFIED TYPE: ICD-10-CM

## 2019-06-05 DIAGNOSIS — E03.9 HYPOTHYROIDISM, UNSPECIFIED TYPE: Chronic | ICD-10-CM

## 2019-06-05 DIAGNOSIS — S81.801A WOUND OF RIGHT LOWER EXTREMITY, INITIAL ENCOUNTER: ICD-10-CM

## 2019-06-05 DIAGNOSIS — E11.42 DIABETIC PERIPHERAL NEUROPATHY (HCC): Chronic | ICD-10-CM

## 2019-06-05 DIAGNOSIS — R73.9 HYPERGLYCEMIA: ICD-10-CM

## 2019-06-05 DIAGNOSIS — Z95.0 PACEMAKER: Chronic | ICD-10-CM

## 2019-06-05 DIAGNOSIS — R80.9 MICROALBUMINURIA: ICD-10-CM

## 2019-06-05 DIAGNOSIS — Z01.818 PREOPERATIVE CLEARANCE: Primary | ICD-10-CM

## 2019-06-05 DIAGNOSIS — D69.6 THROMBOCYTOPENIA (HCC): Chronic | ICD-10-CM

## 2019-06-05 LAB
ALBUMIN SERPL BCP-MCNC: 3.1 G/DL (ref 3.5–5)
ALP SERPL-CCNC: 113 U/L (ref 46–116)
ALT SERPL W P-5'-P-CCNC: 27 U/L (ref 12–78)
ANION GAP SERPL CALCULATED.3IONS-SCNC: 1 MMOL/L (ref 4–13)
AST SERPL W P-5'-P-CCNC: 17 U/L (ref 5–45)
BASOPHILS # BLD AUTO: 0.03 THOUSANDS/ΜL (ref 0–0.1)
BASOPHILS NFR BLD AUTO: 1 % (ref 0–1)
BILIRUB SERPL-MCNC: 0.31 MG/DL (ref 0.2–1)
BUN SERPL-MCNC: 30 MG/DL (ref 5–25)
CALCIUM SERPL-MCNC: 8.7 MG/DL (ref 8.3–10.1)
CHLORIDE SERPL-SCNC: 101 MMOL/L (ref 100–108)
CO2 SERPL-SCNC: 30 MMOL/L (ref 21–32)
CREAT SERPL-MCNC: 1.32 MG/DL (ref 0.6–1.3)
EOSINOPHIL # BLD AUTO: 0.25 THOUSAND/ΜL (ref 0–0.61)
EOSINOPHIL NFR BLD AUTO: 5 % (ref 0–6)
ERYTHROCYTE [DISTWIDTH] IN BLOOD BY AUTOMATED COUNT: 13.2 % (ref 11.6–15.1)
GFR SERPL CREATININE-BSD FRML MDRD: 53 ML/MIN/1.73SQ M
GLUCOSE SERPL-MCNC: 248 MG/DL (ref 65–140)
HCT VFR BLD AUTO: 35.7 % (ref 36.5–49.3)
HGB BLD-MCNC: 11.7 G/DL (ref 12–17)
IMM GRANULOCYTES # BLD AUTO: 0.01 THOUSAND/UL (ref 0–0.2)
IMM GRANULOCYTES NFR BLD AUTO: 0 % (ref 0–2)
LYMPHOCYTES # BLD AUTO: 0.9 THOUSANDS/ΜL (ref 0.6–4.47)
LYMPHOCYTES NFR BLD AUTO: 16 % (ref 14–44)
MCH RBC QN AUTO: 29.6 PG (ref 26.8–34.3)
MCHC RBC AUTO-ENTMCNC: 32.8 G/DL (ref 31.4–37.4)
MCV RBC AUTO: 90 FL (ref 82–98)
MONOCYTES # BLD AUTO: 0.56 THOUSAND/ΜL (ref 0.17–1.22)
MONOCYTES NFR BLD AUTO: 10 % (ref 4–12)
NEUTROPHILS # BLD AUTO: 3.8 THOUSANDS/ΜL (ref 1.85–7.62)
NEUTS SEG NFR BLD AUTO: 68 % (ref 43–75)
NRBC BLD AUTO-RTO: 0 /100 WBCS
PLATELET # BLD AUTO: 135 THOUSANDS/UL (ref 149–390)
PMV BLD AUTO: 9.3 FL (ref 8.9–12.7)
POTASSIUM SERPL-SCNC: 5.1 MMOL/L (ref 3.5–5.3)
PROT SERPL-MCNC: 6.8 G/DL (ref 6.4–8.2)
RBC # BLD AUTO: 3.95 MILLION/UL (ref 3.88–5.62)
SODIUM SERPL-SCNC: 132 MMOL/L (ref 136–145)
WBC # BLD AUTO: 5.55 THOUSAND/UL (ref 4.31–10.16)

## 2019-06-05 PROCEDURE — 85025 COMPLETE CBC W/AUTO DIFF WBC: CPT | Performed by: FAMILY MEDICINE

## 2019-06-05 PROCEDURE — 80053 COMPREHEN METABOLIC PANEL: CPT | Performed by: FAMILY MEDICINE

## 2019-06-05 PROCEDURE — 99214 OFFICE O/P EST MOD 30 MIN: CPT | Performed by: FAMILY MEDICINE

## 2019-06-05 PROCEDURE — 36415 COLL VENOUS BLD VENIPUNCTURE: CPT | Performed by: FAMILY MEDICINE

## 2019-06-06 ENCOUNTER — OFFICE VISIT (OUTPATIENT)
Dept: PODIATRY | Facility: CLINIC | Age: 74
End: 2019-06-06
Payer: COMMERCIAL

## 2019-06-06 VITALS
WEIGHT: 135 LBS | SYSTOLIC BLOOD PRESSURE: 162 MMHG | HEIGHT: 68 IN | BODY MASS INDEX: 20.46 KG/M2 | DIASTOLIC BLOOD PRESSURE: 80 MMHG

## 2019-06-06 DIAGNOSIS — E11.42 DIABETIC PERIPHERAL NEUROPATHY (HCC): Primary | Chronic | ICD-10-CM

## 2019-06-06 DIAGNOSIS — L97.212: ICD-10-CM

## 2019-06-06 DIAGNOSIS — E11.622 DIABETES MELLITUS WITH SKIN ULCER (HCC): ICD-10-CM

## 2019-06-06 DIAGNOSIS — L98.499 DIABETES MELLITUS WITH SKIN ULCER (HCC): ICD-10-CM

## 2019-06-06 PROCEDURE — 11042 DBRDMT SUBQ TIS 1ST 20SQCM/<: CPT | Performed by: PODIATRIST

## 2019-06-07 ENCOUNTER — ANESTHESIA EVENT (OUTPATIENT)
Dept: PERIOP | Facility: HOSPITAL | Age: 74
End: 2019-06-07
Payer: COMMERCIAL

## 2019-06-10 ENCOUNTER — ANESTHESIA (OUTPATIENT)
Dept: PERIOP | Facility: HOSPITAL | Age: 74
End: 2019-06-10
Payer: COMMERCIAL

## 2019-06-10 ENCOUNTER — HOSPITAL ENCOUNTER (OUTPATIENT)
Facility: HOSPITAL | Age: 74
Setting detail: OUTPATIENT SURGERY
Discharge: HOME | End: 2019-06-10
Attending: PODIATRIST | Admitting: PODIATRIST
Payer: COMMERCIAL

## 2019-06-10 VITALS
TEMPERATURE: 97.5 F | RESPIRATION RATE: 16 BRPM | HEART RATE: 78 BPM | DIASTOLIC BLOOD PRESSURE: 81 MMHG | WEIGHT: 133.5 LBS | SYSTOLIC BLOOD PRESSURE: 143 MMHG | OXYGEN SATURATION: 98 % | BODY MASS INDEX: 20.23 KG/M2 | HEIGHT: 68 IN

## 2019-06-10 DIAGNOSIS — Z98.890 POST-OPERATIVE STATE: Primary | ICD-10-CM

## 2019-06-10 LAB
GLUCOSE SERPL-MCNC: 108 MG/DL (ref 65–140)
GLUCOSE SERPL-MCNC: 129 MG/DL (ref 65–140)

## 2019-06-10 PROCEDURE — 99024 POSTOP FOLLOW-UP VISIT: CPT | Performed by: PODIATRIST

## 2019-06-10 PROCEDURE — 15100 SPLT AGRFT T/A/L 1ST 100SQCM: CPT | Performed by: PODIATRIST

## 2019-06-10 PROCEDURE — 82948 REAGENT STRIP/BLOOD GLUCOSE: CPT

## 2019-06-10 RX ORDER — PROMETHAZINE HYDROCHLORIDE 25 MG/ML
6.25 INJECTION, SOLUTION INTRAMUSCULAR; INTRAVENOUS ONCE AS NEEDED
Status: DISCONTINUED | OUTPATIENT
Start: 2019-06-10 | End: 2019-06-10 | Stop reason: HOSPADM

## 2019-06-10 RX ORDER — FENTANYL CITRATE/PF 50 MCG/ML
25 SYRINGE (ML) INJECTION
Status: DISCONTINUED | OUTPATIENT
Start: 2019-06-10 | End: 2019-06-10 | Stop reason: HOSPADM

## 2019-06-10 RX ORDER — OXYCODONE HYDROCHLORIDE AND ACETAMINOPHEN 5; 325 MG/1; MG/1
1 TABLET ORAL EVERY 4 HOURS PRN
Status: DISCONTINUED | OUTPATIENT
Start: 2019-06-10 | End: 2019-06-10 | Stop reason: HOSPADM

## 2019-06-10 RX ORDER — HYDRALAZINE HYDROCHLORIDE 20 MG/ML
5 INJECTION INTRAMUSCULAR; INTRAVENOUS AS NEEDED
Status: DISCONTINUED | OUTPATIENT
Start: 2019-06-10 | End: 2019-06-10 | Stop reason: HOSPADM

## 2019-06-10 RX ORDER — MEPERIDINE HYDROCHLORIDE 25 MG/ML
12.5 INJECTION INTRAMUSCULAR; INTRAVENOUS; SUBCUTANEOUS
Status: DISCONTINUED | OUTPATIENT
Start: 2019-06-10 | End: 2019-06-10 | Stop reason: HOSPADM

## 2019-06-10 RX ORDER — NICOTINE POLACRILEX 4 MG
LOZENGE BUCCAL ONCE
COMMUNITY
End: 2021-01-01 | Stop reason: HOSPADM

## 2019-06-10 RX ORDER — LIDOCAINE HYDROCHLORIDE 10 MG/ML
INJECTION, SOLUTION INFILTRATION; PERINEURAL AS NEEDED
Status: DISCONTINUED | OUTPATIENT
Start: 2019-06-10 | End: 2019-06-10 | Stop reason: HOSPADM

## 2019-06-10 RX ORDER — BUPIVACAINE HYDROCHLORIDE 2.5 MG/ML
INJECTION, SOLUTION EPIDURAL; INFILTRATION; INTRACAUDAL AS NEEDED
Status: DISCONTINUED | OUTPATIENT
Start: 2019-06-10 | End: 2019-06-10 | Stop reason: HOSPADM

## 2019-06-10 RX ORDER — CEFAZOLIN SODIUM 1 G/50ML
1000 SOLUTION INTRAVENOUS ONCE
Status: COMPLETED | OUTPATIENT
Start: 2019-06-10 | End: 2019-06-10

## 2019-06-10 RX ORDER — METOCLOPRAMIDE HYDROCHLORIDE 5 MG/ML
10 INJECTION INTRAMUSCULAR; INTRAVENOUS ONCE AS NEEDED
Status: DISCONTINUED | OUTPATIENT
Start: 2019-06-10 | End: 2019-06-10 | Stop reason: HOSPADM

## 2019-06-10 RX ORDER — MIDAZOLAM HYDROCHLORIDE 1 MG/ML
INJECTION INTRAMUSCULAR; INTRAVENOUS AS NEEDED
Status: DISCONTINUED | OUTPATIENT
Start: 2019-06-10 | End: 2019-06-10 | Stop reason: SURG

## 2019-06-10 RX ORDER — LABETALOL 20 MG/4 ML (5 MG/ML) INTRAVENOUS SYRINGE
5 AS NEEDED
Status: DISCONTINUED | OUTPATIENT
Start: 2019-06-10 | End: 2019-06-10 | Stop reason: HOSPADM

## 2019-06-10 RX ORDER — OXYCODONE HYDROCHLORIDE AND ACETAMINOPHEN 5; 325 MG/1; MG/1
1 TABLET ORAL EVERY 6 HOURS PRN
Qty: 10 TABLET | Refills: 0 | Status: SHIPPED | OUTPATIENT
Start: 2019-06-10 | End: 2019-06-15

## 2019-06-10 RX ORDER — SODIUM CHLORIDE 9 MG/ML
100 INJECTION, SOLUTION INTRAVENOUS CONTINUOUS
Status: DISCONTINUED | OUTPATIENT
Start: 2019-06-10 | End: 2019-06-10 | Stop reason: HOSPADM

## 2019-06-10 RX ORDER — PROPOFOL 10 MG/ML
INJECTION, EMULSION INTRAVENOUS CONTINUOUS PRN
Status: DISCONTINUED | OUTPATIENT
Start: 2019-06-10 | End: 2019-06-10 | Stop reason: SURG

## 2019-06-10 RX ORDER — MINERAL OIL
OIL (ML) MISCELLANEOUS AS NEEDED
Status: DISCONTINUED | OUTPATIENT
Start: 2019-06-10 | End: 2019-06-10 | Stop reason: HOSPADM

## 2019-06-10 RX ORDER — PROPOFOL 10 MG/ML
INJECTION, EMULSION INTRAVENOUS AS NEEDED
Status: DISCONTINUED | OUTPATIENT
Start: 2019-06-10 | End: 2019-06-10 | Stop reason: SURG

## 2019-06-10 RX ORDER — ONDANSETRON 2 MG/ML
4 INJECTION INTRAMUSCULAR; INTRAVENOUS ONCE AS NEEDED
Status: DISCONTINUED | OUTPATIENT
Start: 2019-06-10 | End: 2019-06-10 | Stop reason: HOSPADM

## 2019-06-10 RX ORDER — FENTANYL CITRATE 50 UG/ML
INJECTION, SOLUTION INTRAMUSCULAR; INTRAVENOUS AS NEEDED
Status: DISCONTINUED | OUTPATIENT
Start: 2019-06-10 | End: 2019-06-10 | Stop reason: SURG

## 2019-06-10 RX ADMIN — PROPOFOL 20 MG: 10 INJECTION, EMULSION INTRAVENOUS at 13:11

## 2019-06-10 RX ADMIN — CEFAZOLIN SODIUM 1000 MG: 1 SOLUTION INTRAVENOUS at 13:12

## 2019-06-10 RX ADMIN — FENTANYL CITRATE 100 MCG: 50 INJECTION, SOLUTION INTRAMUSCULAR; INTRAVENOUS at 13:10

## 2019-06-10 RX ADMIN — PROPOFOL 50 MCG/KG/MIN: 10 INJECTION, EMULSION INTRAVENOUS at 13:11

## 2019-06-10 RX ADMIN — MIDAZOLAM 2 MG: 1 INJECTION INTRAMUSCULAR; INTRAVENOUS at 13:10

## 2019-06-10 RX ADMIN — SODIUM CHLORIDE 100 ML/HR: 0.9 INJECTION, SOLUTION INTRAVENOUS at 11:52

## 2019-06-11 ENCOUNTER — PATIENT OUTREACH (OUTPATIENT)
Dept: FAMILY MEDICINE CLINIC | Facility: CLINIC | Age: 74
End: 2019-06-11

## 2019-06-13 ENCOUNTER — OFFICE VISIT (OUTPATIENT)
Dept: PODIATRY | Facility: CLINIC | Age: 74
End: 2019-06-13

## 2019-06-13 VITALS
DIASTOLIC BLOOD PRESSURE: 50 MMHG | BODY MASS INDEX: 20.16 KG/M2 | HEIGHT: 68 IN | WEIGHT: 133 LBS | SYSTOLIC BLOOD PRESSURE: 102 MMHG

## 2019-06-13 DIAGNOSIS — Z98.890 POST-OPERATIVE STATE: Primary | ICD-10-CM

## 2019-06-13 PROCEDURE — 99024 POSTOP FOLLOW-UP VISIT: CPT | Performed by: PODIATRIST

## 2019-06-17 ENCOUNTER — PATIENT OUTREACH (OUTPATIENT)
Dept: FAMILY MEDICINE CLINIC | Facility: CLINIC | Age: 74
End: 2019-06-17

## 2019-06-18 ENCOUNTER — OFFICE VISIT (OUTPATIENT)
Dept: PODIATRY | Facility: CLINIC | Age: 74
End: 2019-06-18

## 2019-06-18 VITALS
DIASTOLIC BLOOD PRESSURE: 90 MMHG | BODY MASS INDEX: 20.16 KG/M2 | HEIGHT: 68 IN | WEIGHT: 133 LBS | SYSTOLIC BLOOD PRESSURE: 162 MMHG

## 2019-06-18 DIAGNOSIS — Z94.5 STATUS POST SPLIT THICKNESS SKIN GRAFT: Primary | ICD-10-CM

## 2019-06-18 DIAGNOSIS — Z98.890 POST-OPERATIVE STATE: ICD-10-CM

## 2019-06-18 PROCEDURE — 99024 POSTOP FOLLOW-UP VISIT: CPT | Performed by: PODIATRIST

## 2019-06-25 ENCOUNTER — OFFICE VISIT (OUTPATIENT)
Dept: PODIATRY | Facility: CLINIC | Age: 74
End: 2019-06-25

## 2019-06-25 VITALS
BODY MASS INDEX: 20.25 KG/M2 | HEIGHT: 68 IN | SYSTOLIC BLOOD PRESSURE: 74 MMHG | DIASTOLIC BLOOD PRESSURE: 53 MMHG | WEIGHT: 133.6 LBS | HEART RATE: 82 BPM

## 2019-06-25 DIAGNOSIS — Z98.890 POST-OPERATIVE STATE: ICD-10-CM

## 2019-06-25 DIAGNOSIS — L97.212: Primary | ICD-10-CM

## 2019-06-25 DIAGNOSIS — E11.42 DIABETIC PERIPHERAL NEUROPATHY (HCC): Chronic | ICD-10-CM

## 2019-06-25 DIAGNOSIS — Z94.5 STATUS POST SPLIT THICKNESS SKIN GRAFT: ICD-10-CM

## 2019-06-25 PROCEDURE — 99024 POSTOP FOLLOW-UP VISIT: CPT | Performed by: PODIATRIST

## 2019-07-02 ENCOUNTER — PATIENT OUTREACH (OUTPATIENT)
Dept: FAMILY MEDICINE CLINIC | Facility: CLINIC | Age: 74
End: 2019-07-02

## 2019-07-02 ENCOUNTER — PROCEDURE VISIT (OUTPATIENT)
Dept: PODIATRY | Facility: CLINIC | Age: 74
End: 2019-07-02

## 2019-07-02 VITALS
WEIGHT: 133 LBS | BODY MASS INDEX: 20.16 KG/M2 | HEIGHT: 68 IN | SYSTOLIC BLOOD PRESSURE: 114 MMHG | HEART RATE: 80 BPM | DIASTOLIC BLOOD PRESSURE: 64 MMHG

## 2019-07-02 DIAGNOSIS — Z98.890 POST-OPERATIVE STATE: Primary | ICD-10-CM

## 2019-07-02 PROCEDURE — 99024 POSTOP FOLLOW-UP VISIT: CPT | Performed by: PODIATRIST

## 2019-07-02 NOTE — PROGRESS NOTES
Assessment:  1  Post-SPTSG site    Plan:  Silver alginate applied to wound bed, unna boot applied using paste wrap and coban  Applied from base of toes to infrapatellar notch  To be left dry and intact  Problem List Items Addressed This Visit     None             There are no diagnoses linked to this encounter  Subjective:      Patient ID: Brad Lion is a 68 y o  male  Pt arrives for wound care follow up  Unna boot intact but wet  Pt states he got the unna boot wet today in shower  Pt denies problems and states there was no odor  Wound Check         The following portions of the patient's history were reviewed and updated as appropriate: allergies, current medications, past family history, past medical history, past social history, past surgical history and problem list     Review of Systems   Constitutional: Negative  Objective:      /64   Pulse 80   Ht 5' 8" (1 727 m) Comment: per pt  Wt 60 3 kg (133 lb) Comment: per pt, no scale avail  BMI 20 22 kg/m²          Physical Exam   Constitutional: He appears well-developed and well-nourished  No distress  Skin: He is not diaphoretic  Nursing note and vitals reviewed          Wound # 1   Location: right posterior lower leg  Length 5cm: Width 2 5cm: Depth 0 2cm:   Deepest Tissue Noted in Base: sq  Probe to Bone?: no  Peripheral Skin Description: intact  Granulation: 90% Fibrotic Tissue: 10% Necrotic Tissue: 0%  Drainage Amount: large  Signs of Infection: no  Total debrided 0 square centimeters

## 2019-07-09 ENCOUNTER — PROCEDURE VISIT (OUTPATIENT)
Dept: PODIATRY | Facility: CLINIC | Age: 74
End: 2019-07-09
Payer: COMMERCIAL

## 2019-07-09 VITALS
HEIGHT: 68 IN | BODY MASS INDEX: 20.16 KG/M2 | WEIGHT: 133 LBS | HEART RATE: 87 BPM | SYSTOLIC BLOOD PRESSURE: 132 MMHG | DIASTOLIC BLOOD PRESSURE: 79 MMHG

## 2019-07-09 DIAGNOSIS — Z94.5 STATUS POST SPLIT THICKNESS SKIN GRAFT: Primary | ICD-10-CM

## 2019-07-09 DIAGNOSIS — T86.821 SKIN GRAFT FAILURE: ICD-10-CM

## 2019-07-09 PROBLEM — L97.919 VARICOSE VEINS OF RIGHT LOWER EXTREMITY WITH ULCER (HCC): Status: RESOLVED | Noted: 2019-04-25 | Resolved: 2019-07-09

## 2019-07-09 PROBLEM — L97.212: Status: RESOLVED | Noted: 2019-05-16 | Resolved: 2019-07-09

## 2019-07-09 PROBLEM — I83.019 VARICOSE VEINS OF RIGHT LOWER EXTREMITY WITH ULCER (HCC): Status: RESOLVED | Noted: 2019-04-25 | Resolved: 2019-07-09

## 2019-07-09 PROBLEM — E11.622 DIABETES MELLITUS WITH SKIN ULCER (HCC): Status: RESOLVED | Noted: 2019-05-16 | Resolved: 2019-07-09

## 2019-07-09 PROBLEM — L98.499 DIABETES MELLITUS WITH SKIN ULCER (HCC): Status: RESOLVED | Noted: 2019-05-16 | Resolved: 2019-07-09

## 2019-07-09 PROCEDURE — 11042 DBRDMT SUBQ TIS 1ST 20SQCM/<: CPT | Performed by: PODIATRIST

## 2019-07-09 NOTE — PATIENT INSTRUCTIONS
Debridement   WHAT YOU NEED TO KNOW:   Debridement is the removal of infected, damaged, or dead tissue so a wound can heal properly  You may need more than one debridement  DISCHARGE INSTRUCTIONS:   Medicines:   · Medicines  can help decrease pain or prevent or treat an infection  · Take your medicine as directed  Contact your healthcare provider if you think your medicine is not helping or if you have side effects  Tell him of her if you are allergic to any medicine  Keep a list of the medicines, vitamins, and herbs you take  Include the amounts, and when and why you take them  Bring the list or the pill bottles to follow-up visits  Carry your medicine list with you in case of an emergency  Follow up with your healthcare provider as directed: You may need to return to have your wound checked  Write down your questions so you remember to ask them during your visits  Care for your wound as directed:   · Keep your wound clean and dry  You may need to cover your wound when you bathe  · Limit movements,  such as stretching, to prevent bleeding, tearing, and swelling in your wound  · Protect your wound  Avoid sunlight for at least 6 months  Apply mild, unscented lotion or cream to the skin around your wound to keep it moist     · Do not smoke  If you smoke, it is never too late to quit  Smoking decreases blood flow to the wound and delays healing  Ask for information if you need help quitting  · Drink liquids as directed  Ask how much liquid to drink each day and which liquids are best for you  Liquids help keep your skin moist so your wound can heal      · Eat a variety of healthy foods  Foods rich in protein, such as meat, eggs, and dairy products, help repair tissue  Carbohydrate-rich foods, such as bread and cereals, help increase cell growth and decrease the risk for wound infection  Do not have caffeine  Ask if you should take vitamins   Vitamins A and C may help tissue formation and increase scar tissue strength  Contact your healthcare provider if:   · You have a fever  · Your pain gets worse or does not go away, even after treatment  · Your skin is red, swollen, or draining pus  · You have questions or concerns about your condition or care  Return to the emergency department if:   · Blood soaks through your bandage  · You have severe pain  © 2017 [employer] Information is for End User's use only and may not be sold, redistributed or otherwise used for commercial purposes  All illustrations and images included in CareNotes® are the copyrighted property of [employer]  or Kirill Ulrich  The above information is an  only  It is not intended as medical advice for individual conditions or treatments  Talk to your doctor, nurse or pharmacist before following any medical regimen to see if it is safe and effective for you  Unna Boot   WHAT YOU NEED TO KNOW:   An Unna boot is a compression dressing made by wrapping layers of gauze around your leg and foot  It is often used to protect an ulcer or open wound  The compression of the dressing helps improve blood flow in your lower leg  Compression also helps decrease swelling and pain  You may need to wear the boot for a few weeks or until your wound heals  DISCHARGE INSTRUCTIONS:   Follow up with your healthcare provider as directed: Your Unna boot will be changed by a healthcare provider at least once every 7 days  Your wound will be cleaned and measured to make sure it is healing with each boot change  Write down your questions so you remember to ask them during your visits  Keep your boot dry:  Ask how to cover it when you take a shower or bath  Self-care:   · Manage your health conditions  Your wound may not heal or new wounds may form if your health conditions are not controlled  Take your medicines as directed   Follow your healthcare provider's instructions if you have high blood pressure  Check your blood sugar levels as directed if you have diabetes  · Walk for exercise  This will help the boot compress and improve blood flow  Ask your healthcare provider how long you should walk each day  · Eat a variety of healthy foods to promote wound healing  Healthy foods include fruits, vegetables, whole-grain breads, low-fat dairy products, beans, lean meats, and fish  Ask if you need to be on a special diet  Contact your healthcare provider if:   · Your boot feels very tight or loose after you walk  · Drainage from your wound soaks through the boot  · You have questions or concerns about your condition or care  Remove the boot and return to the emergency department if:   · Your leg itches and feels warm  · Your toes tingle, feel numb, or change color  · Your boot causes pain in your foot or leg when you walk  · You have swelling above or below your boot  © 2017 [employer] Information is for End User's use only and may not be sold, redistributed or otherwise used for commercial purposes  All illustrations and images included in CareNotes® are the copyrighted property of [employer]  or Kirill Ulrich  The above information is an  only  It is not intended as medical advice for individual conditions or treatments  Talk to your doctor, nurse or pharmacist before following any medical regimen to see if it is safe and effective for you

## 2019-07-09 NOTE — PROGRESS NOTES
Assessment:  1  Partial skin graft failure    Plan:  A formal timeout including patient identification, laterality and existing allergies using [facility] protocol was conducted  Procedure Performed: Debridement of subcutaneous tissue- <20 sq cm (50773)  Anesthetic used as needed to reduce discomfort  Under aseptic technique, the wound was thoroughly explored for undermining, deep sinus tracts and bone involvement  Sterile instrumentation including scalpel used to excise the clearly delineated devitalized subcutaneous tissue exposing viable tissue  Bleeding controlled with gentle pressure  Patient tolerated debridement without complications  The wound was dressed  Wound dressing technique and frequency described to patient  I am to be called if any signs of infection develop such as erythema, increased wound size or significant change in appearance of wound, odor, pain, increased or change in color of drainage, swelling, fever, chills, nightsweats  I reviewed these signs with the patient  I recommended limiting leg dependency and to use any prescribed compression devices in an effort to allow proper fluid control and healing of the wounded area  I was very clear that sleeping with legs dependent is to be avoided at all costs  I explained that good wound care and compliance are necessary to allow healing and to prevent limb loss  Zinc oxide applied to periwound  Silver alginate applied to the wound bed  Unna boot applied using paste wrap and coban  Applied from base of toes to infrapatellar notch  To remain dry and intact  Problem List Items Addressed This Visit     None             There are no diagnoses linked to this encounter  Subjective:      Patient ID: Maribell Funes is a 68 y o  male  Pt arrives for wound care follow up  Presents with dressing intact  Pt denies problems with the unna boot         The following portions of the patient's history were reviewed and updated as appropriate: allergies, current medications, past family history, past medical history, past social history, past surgical history and problem list     Review of Systems   Constitutional: Negative  Objective:      /79   Pulse 87   Ht 5' 8" (1 727 m) Comment: per pt  Wt 60 3 kg (133 lb) Comment: per pt, no scale avail  BMI 20 22 kg/m²          Physical Exam   Constitutional: He appears well-developed and well-nourished  No distress  Skin: He is not diaphoretic  Nursing note and vitals reviewed          Wound # 1   Location: right posterior lower leg  Length 4cm: Width 2cm: Depth 0 2cm:   Deepest Tissue Noted in Base: SQ  Probe to Bone?: no  Peripheral Skin Description: intact  Granulation: 90% Fibrotic Tissue: 10% Necrotic Tissue: 0%  Drainage Amount: large  Signs of Infection: no  Total debrided 8square centimeters

## 2019-07-12 ENCOUNTER — PATIENT OUTREACH (OUTPATIENT)
Dept: FAMILY MEDICINE CLINIC | Facility: CLINIC | Age: 74
End: 2019-07-12

## 2019-07-13 ENCOUNTER — HOSPITAL ENCOUNTER (INPATIENT)
Facility: HOSPITAL | Age: 74
LOS: 3 days | Discharge: HOME | DRG: 638 | End: 2019-07-16
Attending: EMERGENCY MEDICINE | Admitting: HOSPITALIST
Payer: COMMERCIAL

## 2019-07-13 ENCOUNTER — APPOINTMENT (EMERGENCY)
Dept: RADIOLOGY | Facility: HOSPITAL | Age: 74
DRG: 638 | End: 2019-07-13
Payer: COMMERCIAL

## 2019-07-13 DIAGNOSIS — E11.10 DKA (DIABETIC KETOACIDOSES): Primary | ICD-10-CM

## 2019-07-13 LAB
ALBUMIN SERPL BCP-MCNC: 3.4 G/DL (ref 3.5–5)
ALP SERPL-CCNC: 125 U/L (ref 46–116)
ALT SERPL W P-5'-P-CCNC: 55 U/L (ref 12–78)
ANION GAP SERPL CALCULATED.3IONS-SCNC: 14 MMOL/L (ref 4–13)
ANION GAP SERPL CALCULATED.3IONS-SCNC: 14 MMOL/L (ref 4–13)
AST SERPL W P-5'-P-CCNC: 59 U/L (ref 5–45)
BASE EXCESS BLDA CALC-SCNC: -6 MMOL/L (ref -2–3)
BASOPHILS # BLD AUTO: 0.03 THOUSANDS/ΜL (ref 0–0.1)
BASOPHILS NFR BLD AUTO: 0 % (ref 0–1)
BETA-HYDROXYBUTYRATE: 4.4 MMOL/L
BILIRUB SERPL-MCNC: 0.7 MG/DL (ref 0.2–1)
BILIRUB UR QL STRIP: NEGATIVE
BUN SERPL-MCNC: 40 MG/DL (ref 5–25)
BUN SERPL-MCNC: 42 MG/DL (ref 5–25)
CA-I BLD-SCNC: 1.22 MMOL/L (ref 1.12–1.32)
CALCIUM SERPL-MCNC: 8.5 MG/DL (ref 8.3–10.1)
CALCIUM SERPL-MCNC: 9.5 MG/DL (ref 8.3–10.1)
CHLORIDE SERPL-SCNC: 90 MMOL/L (ref 100–108)
CHLORIDE SERPL-SCNC: 99 MMOL/L (ref 100–108)
CLARITY UR: CLEAR
CO2 SERPL-SCNC: 20 MMOL/L (ref 21–32)
CO2 SERPL-SCNC: 20 MMOL/L (ref 21–32)
COLOR UR: YELLOW
CREAT SERPL-MCNC: 1.97 MG/DL (ref 0.6–1.3)
CREAT SERPL-MCNC: 2.32 MG/DL (ref 0.6–1.3)
EOSINOPHIL # BLD AUTO: 0.07 THOUSAND/ΜL (ref 0–0.61)
EOSINOPHIL NFR BLD AUTO: 1 % (ref 0–6)
ERYTHROCYTE [DISTWIDTH] IN BLOOD BY AUTOMATED COUNT: 13.2 % (ref 11.6–15.1)
GFR SERPL CREATININE-BSD FRML MDRD: 27 ML/MIN/1.73SQ M
GFR SERPL CREATININE-BSD FRML MDRD: 33 ML/MIN/1.73SQ M
GLUCOSE SERPL-MCNC: 316 MG/DL (ref 65–140)
GLUCOSE SERPL-MCNC: 378 MG/DL (ref 65–140)
GLUCOSE SERPL-MCNC: 456 MG/DL (ref 65–140)
GLUCOSE SERPL-MCNC: 580 MG/DL (ref 65–140)
GLUCOSE SERPL-MCNC: 732 MG/DL (ref 65–140)
GLUCOSE SERPL-MCNC: >500 MG/DL (ref 65–140)
GLUCOSE SERPL-MCNC: >600 MG/DL (ref 65–140)
GLUCOSE UR STRIP-MCNC: ABNORMAL MG/DL
HCO3 BLDA-SCNC: 20.2 MMOL/L (ref 24–30)
HCT VFR BLD AUTO: 39.4 % (ref 36.5–49.3)
HCT VFR BLD CALC: 38 % (ref 36.5–49.3)
HGB BLD-MCNC: 12.6 G/DL (ref 12–17)
HGB BLDA-MCNC: 12.9 G/DL (ref 12–17)
HGB UR QL STRIP.AUTO: NEGATIVE
IMM GRANULOCYTES # BLD AUTO: 0.03 THOUSAND/UL (ref 0–0.2)
IMM GRANULOCYTES NFR BLD AUTO: 0 % (ref 0–2)
KETONES UR STRIP-MCNC: ABNORMAL MG/DL
LACTATE SERPL-SCNC: 2.9 MMOL/L (ref 0.5–2)
LEUKOCYTE ESTERASE UR QL STRIP: NEGATIVE
LIPASE SERPL-CCNC: 36 U/L (ref 73–393)
LYMPHOCYTES # BLD AUTO: 0.29 THOUSANDS/ΜL (ref 0.6–4.47)
LYMPHOCYTES NFR BLD AUTO: 4 % (ref 14–44)
MAGNESIUM SERPL-MCNC: 1.9 MG/DL (ref 1.6–2.6)
MAGNESIUM SERPL-MCNC: 2 MG/DL (ref 1.6–2.6)
MCH RBC QN AUTO: 30.1 PG (ref 26.8–34.3)
MCHC RBC AUTO-ENTMCNC: 32 G/DL (ref 31.4–37.4)
MCV RBC AUTO: 94 FL (ref 82–98)
MONOCYTES # BLD AUTO: 0.57 THOUSAND/ΜL (ref 0.17–1.22)
MONOCYTES NFR BLD AUTO: 7 % (ref 4–12)
NEUTROPHILS # BLD AUTO: 6.85 THOUSANDS/ΜL (ref 1.85–7.62)
NEUTS SEG NFR BLD AUTO: 88 % (ref 43–75)
NITRITE UR QL STRIP: NEGATIVE
NRBC BLD AUTO-RTO: 0 /100 WBCS
PCO2 BLD: 22 MMOL/L (ref 21–32)
PCO2 BLD: 43.5 MM HG (ref 42–50)
PH BLD: 7.28 [PH] (ref 7.3–7.4)
PH UR STRIP.AUTO: 5 [PH] (ref 4.5–8)
PHOSPHATE SERPL-MCNC: 3.2 MG/DL (ref 2.3–4.1)
PHOSPHATE SERPL-MCNC: 4.4 MG/DL (ref 2.3–4.1)
PLATELET # BLD AUTO: 110 THOUSANDS/UL (ref 149–390)
PMV BLD AUTO: 9 FL (ref 8.9–12.7)
PO2 BLD: 26 MM HG (ref 35–45)
POTASSIUM BLD-SCNC: 6.2 MMOL/L (ref 3.5–5.3)
POTASSIUM SERPL-SCNC: 4.5 MMOL/L (ref 3.5–5.3)
POTASSIUM SERPL-SCNC: 6.3 MMOL/L (ref 3.5–5.3)
PROT SERPL-MCNC: 7.2 G/DL (ref 6.4–8.2)
PROT UR STRIP-MCNC: NEGATIVE MG/DL
RBC # BLD AUTO: 4.19 MILLION/UL (ref 3.88–5.62)
SAO2 % BLD FROM PO2: 39 % (ref 95–98)
SODIUM BLD-SCNC: 126 MMOL/L (ref 136–145)
SODIUM SERPL-SCNC: 124 MMOL/L (ref 136–145)
SODIUM SERPL-SCNC: 133 MMOL/L (ref 136–145)
SP GR UR STRIP.AUTO: 1.01 (ref 1–1.03)
SPECIMEN SOURCE: ABNORMAL
TROPONIN I SERPL-MCNC: 0.03 NG/ML
UROBILINOGEN UR QL STRIP.AUTO: 0.2 E.U./DL
WBC # BLD AUTO: 7.84 THOUSAND/UL (ref 4.31–10.16)

## 2019-07-13 PROCEDURE — 83690 ASSAY OF LIPASE: CPT | Performed by: EMERGENCY MEDICINE

## 2019-07-13 PROCEDURE — 84484 ASSAY OF TROPONIN QUANT: CPT | Performed by: EMERGENCY MEDICINE

## 2019-07-13 PROCEDURE — 99222 1ST HOSP IP/OBS MODERATE 55: CPT | Performed by: HOSPITALIST

## 2019-07-13 PROCEDURE — 82948 REAGENT STRIP/BLOOD GLUCOSE: CPT

## 2019-07-13 PROCEDURE — 84100 ASSAY OF PHOSPHORUS: CPT | Performed by: EMERGENCY MEDICINE

## 2019-07-13 PROCEDURE — 82010 KETONE BODYS QUAN: CPT | Performed by: EMERGENCY MEDICINE

## 2019-07-13 PROCEDURE — 99291 CRITICAL CARE FIRST HOUR: CPT | Performed by: EMERGENCY MEDICINE

## 2019-07-13 PROCEDURE — 85014 HEMATOCRIT: CPT

## 2019-07-13 PROCEDURE — 82947 ASSAY GLUCOSE BLOOD QUANT: CPT | Performed by: EMERGENCY MEDICINE

## 2019-07-13 PROCEDURE — 82803 BLOOD GASES ANY COMBINATION: CPT

## 2019-07-13 PROCEDURE — 83735 ASSAY OF MAGNESIUM: CPT | Performed by: EMERGENCY MEDICINE

## 2019-07-13 PROCEDURE — 82947 ASSAY GLUCOSE BLOOD QUANT: CPT

## 2019-07-13 PROCEDURE — 93005 ELECTROCARDIOGRAM TRACING: CPT

## 2019-07-13 PROCEDURE — 85025 COMPLETE CBC W/AUTO DIFF WBC: CPT | Performed by: EMERGENCY MEDICINE

## 2019-07-13 PROCEDURE — 80053 COMPREHEN METABOLIC PANEL: CPT | Performed by: EMERGENCY MEDICINE

## 2019-07-13 PROCEDURE — 99285 EMERGENCY DEPT VISIT HI MDM: CPT

## 2019-07-13 PROCEDURE — 80048 BASIC METABOLIC PNL TOTAL CA: CPT | Performed by: EMERGENCY MEDICINE

## 2019-07-13 PROCEDURE — 82330 ASSAY OF CALCIUM: CPT

## 2019-07-13 PROCEDURE — 84295 ASSAY OF SERUM SODIUM: CPT

## 2019-07-13 PROCEDURE — 96365 THER/PROPH/DIAG IV INF INIT: CPT

## 2019-07-13 PROCEDURE — 36415 COLL VENOUS BLD VENIPUNCTURE: CPT | Performed by: EMERGENCY MEDICINE

## 2019-07-13 PROCEDURE — 84132 ASSAY OF SERUM POTASSIUM: CPT

## 2019-07-13 PROCEDURE — 81003 URINALYSIS AUTO W/O SCOPE: CPT

## 2019-07-13 PROCEDURE — 96361 HYDRATE IV INFUSION ADD-ON: CPT

## 2019-07-13 PROCEDURE — 71046 X-RAY EXAM CHEST 2 VIEWS: CPT

## 2019-07-13 PROCEDURE — 83605 ASSAY OF LACTIC ACID: CPT | Performed by: EMERGENCY MEDICINE

## 2019-07-13 RX ORDER — SODIUM CHLORIDE 9 MG/ML
2000 INJECTION, SOLUTION INTRAVENOUS CONTINUOUS
Status: DISPENSED | OUTPATIENT
Start: 2019-07-13 | End: 2019-07-13

## 2019-07-13 RX ORDER — HEPARIN SODIUM 5000 [USP'U]/ML
5000 INJECTION, SOLUTION INTRAVENOUS; SUBCUTANEOUS EVERY 8 HOURS SCHEDULED
Status: DISCONTINUED | OUTPATIENT
Start: 2019-07-13 | End: 2019-07-16 | Stop reason: HOSPADM

## 2019-07-13 RX ORDER — 0.9 % SODIUM CHLORIDE 0.9 %
3 VIAL (ML) INJECTION AS NEEDED
Status: DISCONTINUED | OUTPATIENT
Start: 2019-07-13 | End: 2019-07-16 | Stop reason: HOSPADM

## 2019-07-13 RX ORDER — POTASSIUM CHLORIDE 20 MEQ/1
20 TABLET, EXTENDED RELEASE ORAL ONCE
Status: COMPLETED | OUTPATIENT
Start: 2019-07-13 | End: 2019-07-13

## 2019-07-13 RX ORDER — ATORVASTATIN CALCIUM 10 MG/1
10 TABLET, FILM COATED ORAL
Status: DISCONTINUED | OUTPATIENT
Start: 2019-07-14 | End: 2019-07-16 | Stop reason: HOSPADM

## 2019-07-13 RX ORDER — SODIUM CHLORIDE 9 MG/ML
250 INJECTION, SOLUTION INTRAVENOUS CONTINUOUS
Status: DISPENSED | OUTPATIENT
Start: 2019-07-13 | End: 2019-07-14

## 2019-07-13 RX ORDER — DULOXETIN HYDROCHLORIDE 30 MG/1
30 CAPSULE, DELAYED RELEASE ORAL DAILY
Status: DISCONTINUED | OUTPATIENT
Start: 2019-07-14 | End: 2019-07-16 | Stop reason: HOSPADM

## 2019-07-13 RX ORDER — LISINOPRIL 20 MG/1
20 TABLET ORAL 2 TIMES DAILY
Status: DISCONTINUED | OUTPATIENT
Start: 2019-07-13 | End: 2019-07-16 | Stop reason: HOSPADM

## 2019-07-13 RX ORDER — PANTOPRAZOLE SODIUM 40 MG/1
40 TABLET, DELAYED RELEASE ORAL
Status: DISCONTINUED | OUTPATIENT
Start: 2019-07-14 | End: 2019-07-16 | Stop reason: HOSPADM

## 2019-07-13 RX ORDER — SODIUM CHLORIDE 9 MG/ML
500 INJECTION, SOLUTION INTRAVENOUS CONTINUOUS
Status: DISPENSED | OUTPATIENT
Start: 2019-07-13 | End: 2019-07-13

## 2019-07-13 RX ORDER — POTASSIUM CHLORIDE 14.9 MG/ML
20 INJECTION INTRAVENOUS ONCE
Status: COMPLETED | OUTPATIENT
Start: 2019-07-13 | End: 2019-07-13

## 2019-07-13 RX ORDER — DEXTROSE AND SODIUM CHLORIDE 5; .9 G/100ML; G/100ML
250 INJECTION, SOLUTION INTRAVENOUS CONTINUOUS
Status: DISCONTINUED | OUTPATIENT
Start: 2019-07-13 | End: 2019-07-13

## 2019-07-13 RX ORDER — SODIUM CHLORIDE 9 MG/ML
1000 INJECTION, SOLUTION INTRAVENOUS CONTINUOUS
Status: DISPENSED | OUTPATIENT
Start: 2019-07-13 | End: 2019-07-13

## 2019-07-13 RX ORDER — DEXTROSE AND SODIUM CHLORIDE 5; .45 G/100ML; G/100ML
250 INJECTION, SOLUTION INTRAVENOUS CONTINUOUS
Status: DISCONTINUED | OUTPATIENT
Start: 2019-07-13 | End: 2019-07-14

## 2019-07-13 RX ORDER — CHOLECALCIFEROL (VITAMIN D3) 125 MCG
2000 CAPSULE ORAL DAILY
Status: DISCONTINUED | OUTPATIENT
Start: 2019-07-14 | End: 2019-07-16 | Stop reason: HOSPADM

## 2019-07-13 RX ORDER — LANOLIN ALCOHOL/MO/W.PET/CERES
2000 CREAM (GRAM) TOPICAL DAILY
COMMUNITY
End: 2021-01-01 | Stop reason: HOSPADM

## 2019-07-13 RX ORDER — LEVOTHYROXINE SODIUM 0.15 MG/1
150 TABLET ORAL
Status: DISCONTINUED | OUTPATIENT
Start: 2019-07-14 | End: 2019-07-15

## 2019-07-13 RX ORDER — ASPIRIN 81 MG/1
81 TABLET, CHEWABLE ORAL DAILY
Status: DISCONTINUED | OUTPATIENT
Start: 2019-07-14 | End: 2019-07-16 | Stop reason: HOSPADM

## 2019-07-13 RX ORDER — TAMSULOSIN HYDROCHLORIDE 0.4 MG/1
0.4 CAPSULE ORAL
Status: DISCONTINUED | OUTPATIENT
Start: 2019-07-14 | End: 2019-07-16 | Stop reason: HOSPADM

## 2019-07-13 RX ADMIN — SODIUM CHLORIDE 500 ML/HR: 0.9 INJECTION, SOLUTION INTRAVENOUS at 19:30

## 2019-07-13 RX ADMIN — POTASSIUM CHLORIDE 20 MEQ: 1500 TABLET, EXTENDED RELEASE ORAL at 22:18

## 2019-07-13 RX ADMIN — LISINOPRIL 20 MG: 20 TABLET ORAL at 22:18

## 2019-07-13 RX ADMIN — HEPARIN SODIUM 5000 UNITS: 5000 INJECTION INTRAVENOUS; SUBCUTANEOUS at 22:19

## 2019-07-13 RX ADMIN — SODIUM CHLORIDE 500 ML/HR: 0.9 INJECTION, SOLUTION INTRAVENOUS at 18:50

## 2019-07-13 RX ADMIN — SODIUM CHLORIDE 250 ML/HR: 0.9 INJECTION, SOLUTION INTRAVENOUS at 22:19

## 2019-07-13 RX ADMIN — MAGNESIUM OXIDE TAB 400 MG (241.3 MG ELEMENTAL MG) 800 MG: 400 (241.3 MG) TAB at 22:18

## 2019-07-13 RX ADMIN — SODIUM CHLORIDE 6.1 UNITS/HR: 9 INJECTION, SOLUTION INTRAVENOUS at 17:30

## 2019-07-13 RX ADMIN — SODIUM CHLORIDE 500 ML: 0.9 INJECTION, SOLUTION INTRAVENOUS at 16:36

## 2019-07-13 RX ADMIN — SODIUM CHLORIDE 2000 ML/HR: 0.9 INJECTION, SOLUTION INTRAVENOUS at 17:10

## 2019-07-13 RX ADMIN — Medication 6.1 UNITS/HR: at 19:00

## 2019-07-13 NOTE — ED PROVIDER NOTES
History  Chief Complaint   Patient presents with    Hyperglycemia - Symptomatic     Patient reports having blood glucose of 538mg/dL and is unable to control it with his insulin pump  C/O feeling weak, having vision changes, and having dry mouth  History provided by:  Patient   used: No    Hyperglycemia - Symptomatic   Associated symptoms: nausea    Associated symptoms: no abdominal pain, no chest pain, no diaphoresis, no dizziness, no dysuria, no fever, no shortness of breath, no vomiting and no weakness      Patient is a 60-year-old male presenting to emergency department due to elevated blood sugar at home over 500  Unable to control with his insulin pump  Feels weak  Feels dry  No fevers  No chills  No vomiting  Has nausea  Intermittent abdominal discomfort  No pain  No diarrhea  No pain with urination  Patient has been diabetic for 30 years  Is not sure if type 1 or 2 but is on insulin pump  MDM DKA evaluation      Prior to Admission Medications   Prescriptions Last Dose Informant Patient Reported? Taking?    Atorvastatin Calcium (LIPITOR PO)  Self Yes Yes   Sig: Take 10 mg by mouth daily 1/2 tab daily   CALCIUM CITRATE PO  Self Yes Yes   Sig: Take 2,000 mg by mouth daily    Cholecalciferol (VITAMIN D3) 2000 units capsule  Self Yes Yes   Sig: Take 2,000 Units by mouth daily   Continuous Blood Gluc Sensor (DEXCOM G6 SENSOR) MISC  Self Yes Yes   Sig: As directed dx:250 03   DULoxetine (CYMBALTA) 30 mg delayed release capsule  Self Yes Yes   Sig: Take 30 mg by mouth daily    aspirin 81 mg chewable tablet  Self Yes Yes   Sig: Chew 81 mg daily   cyanocobalamin (VITAMIN B-12) 1,000 mcg tablet   Yes Yes   Sig: Take 2,000 mcg by mouth daily   glucose 40 %   Yes Yes   Sig: Take by mouth once   insulin aspart (NovoLOG) 100 units/mL injection  Self Yes Yes   Sig: Inject under the skin Via insulin pump   levothyroxine 125 mcg tablet  Self No Yes   Sig: Take 1 tablet (125 mcg total) by mouth daily in the early morning   Patient taking differently: Take 150 mcg by mouth daily in the early morning    lisinopril (ZESTRIL) 20 mg tablet  Self Yes Yes   Sig: Take 20 mg by mouth 2 (two) times a day    pantoprazole (PROTONIX) 40 mg tablet  Self Yes Yes   Sig: Take 40 mg by mouth   tamsulosin (FLOMAX) 0 4 mg  Self Yes Yes   Sig: Take 0 4 mg by mouth daily with dinner        Facility-Administered Medications: None       Past Medical History:   Diagnosis Date    Arthritis     Cardiac disease     Closed fracture of fibula, lateral malleolus, right     Complete heart block (HCC)     Diabetes mellitus ([facility] [address] )     Disease of thyroid gland     External incisional dehiscence     GERD (gastroesophageal reflux disease)     Heart disease     Muscular deconditioning     Pacemaker     Sleep apnea     cannot tolerate cpap    Thyroid disease        Past Surgical History:   Procedure Laterality Date    CARDIAC PACEMAKER PLACEMENT Left 2015    CARDIAC PACEMAKER PLACEMENT      CARDIAC SURGERY      CATARACT EXTRACTION      MUSCLE FLAP      right foot    NERVE BLOCK      Transforaminal Epidural Lumbar with Fluoroscopic Guidance     CT COLONOSCOPY FLX DX W/COLLJ SPEC WHEN PFRMD N/A 5/4/2018    Procedure: EGD AND COLONOSCOPY;  Surgeon: Wilfredo Bronson MD;  Location: [facility]; Service: Gastroenterology    CT SPLIT [address] <100 SQCM Right 6/10/2019    Procedure: SKIN GRAFT SPLIT THICKNESS ([facility])  EXTREMITY right leg;  Surgeon: Jil Gallardo DPM;  Location: [facility] OR;  Service: Podiatry    TOE AMPUTATION      left 5th toe       Family History   Problem Relation Age of Onset    Diabetes Mother     Heart disease Mother     Thyroid disease Mother         Disorder     Cancer Brother     Gout Brother     Diabetes Maternal Grandmother      I have reviewed and agree with the history as documented      Social History     Tobacco Use    Smoking status: Former Smoker     Types: Pipe  Smokeless tobacco: Never Used   Substance Use Topics    Alcohol use: Yes     Alcohol/week: 0 0 standard drinks     Frequency: Never     Drinks per session: Patient refused     Binge frequency: Never     Comment: socially    Drug use: No        Review of Systems   Constitutional: Negative for chills, diaphoresis and fever  HENT: Negative for congestion and sore throat  Respiratory: Negative for cough, shortness of breath, wheezing and stridor  Cardiovascular: Negative for chest pain, palpitations and leg swelling  Gastrointestinal: Positive for nausea  Negative for abdominal pain, blood in stool, diarrhea and vomiting  Genitourinary: Negative for dysuria, frequency and urgency  Musculoskeletal: Negative for neck pain and neck stiffness  Skin: Negative for pallor and rash  Neurological: Negative for dizziness, syncope, weakness, light-headedness and headaches  All other systems reviewed and are negative  Physical Exam  Physical Exam   Constitutional: He is oriented to person, place, and time  He appears well-developed and well-nourished  No distress  HENT:   Head: Normocephalic and atraumatic  Dry mucous membranes   Eyes: EOM are normal    Neck: Neck supple  Cardiovascular: Regular rhythm  Tachycardic   Pulmonary/Chest: Effort normal and breath sounds normal  No respiratory distress  Abdominal: Soft  He exhibits no distension  There is no tenderness  There is no guarding  Musculoskeletal: Normal range of motion  He exhibits no edema or deformity  Neurological: He is alert and oriented to person, place, and time  Skin: Skin is warm  Capillary refill takes less than 2 seconds  He is not diaphoretic         Vital Signs  ED Triage Vitals   Temperature Pulse Respirations Blood Pressure SpO2   07/13/19 1621 07/13/19 1617 07/13/19 1617 07/13/19 1617 07/13/19 1617   98 °F (36 7 °C) (!) 106 20 92/53 96 %      Temp Source Heart Rate Source Patient Position - Orthostatic VS BP Location FiO2 (%)   07/13/19 1621 07/13/19 1617 07/13/19 1617 07/13/19 1617 --   Oral Monitor Lying Right arm       Pain Score       07/13/19 1749       No Pain           Vitals:    07/13/19 1900 07/13/19 1916 07/13/19 2205 07/13/19 2309   BP: 139/77 136/62 116/57 117/59   Pulse: 92 91 85 82   Patient Position - Orthostatic VS:  Lying Lying Lying         Visual Acuity  Visual Acuity      Most Recent Value   L Pupil Size (mm)  3   R Pupil Size (mm)  3          ED Medications  Medications   sodium chloride (PF) 0 9 % injection 3 mL (has no administration in time range)   sodium chloride 0 9 % infusion ( Intravenous Canceled Entry 7/13/19 1853)     Followed by   sodium chloride 0 9 % infusion (500 mL/hr Intravenous New Bag 7/13/19 1850)     Followed by   sodium chloride 0 9 % infusion (250 mL/hr Intravenous New Bag 7/13/19 2219)   aspirin chewable tablet 81 mg (has no administration in time range)   atorvastatin (LIPITOR) tablet 10 mg (has no administration in time range)   cyanocobalamin (VITAMIN B-12) tablet 2,000 mcg (has no administration in time range)   DULoxetine (CYMBALTA) delayed release capsule 30 mg (has no administration in time range)   levothyroxine tablet 150 mcg (has no administration in time range)   lisinopril (ZESTRIL) tablet 20 mg (20 mg Oral Given 7/13/19 2218)   pantoprazole (PROTONIX) EC tablet 40 mg (has no administration in time range)   tamsulosin (FLOMAX) capsule 0 4 mg (has no administration in time range)   dextrose 5 % and sodium chloride 0 45 % infusion (250 mL/hr Intravenous New Bag 7/14/19 0023)   insulin regular (HumuLIN R,NovoLIN R) 1 Units/mL in sodium chloride 0 9 % 100 mL infusion (3 1 Units/hr Intravenous Rate/Dose Change 7/14/19 0030)   sodium chloride 0 9 % infusion (1,000 mL/hr Intravenous Not Given 7/13/19 1854)     Followed by   sodium chloride 0 9 % infusion (500 mL/hr Intravenous New Bag 7/13/19 1930)     Followed by   sodium chloride 0 9 % infusion (0 mL/hr Intravenous Hold 7/14/19 0028)   heparin (porcine) subcutaneous injection 5,000 Units (5,000 Units Subcutaneous Given 7/13/19 2219)   sodium chloride 0 9 % bolus 500 mL (0 mL Intravenous Stopped 7/13/19 1658)   potassium chloride (K-DUR,KLOR-CON) CR tablet 20 mEq (20 mEq Oral Given 7/13/19 2218)     Or   potassium chloride 20 mEq IVPB (premix) ( Intravenous See Alternative 7/13/19 2218)   magnesium oxide (MAG-OX) tablet 800 mg (800 mg Oral Given 7/13/19 2218)       Diagnostic Studies  Results Reviewed     Procedure Component Value Units Date/Time    Basic metabolic panel [[id number]] Collected:  07/14/19 0025    Lab Status:  No result Specimen:  Blood from Arm, Left     Magnesium [[id number]] Collected:  07/14/19 0025    Lab Status:  No result Specimen:  Blood from Arm, Left     Phosphorus [[id number]] Collected:  07/14/19 0025    Lab Status:  No result Specimen:  Blood from Arm, Left     Basic metabolic panel [[id number]]     Lab Status:  No result Specimen:  Blood     Magnesium [[id number]]     Lab Status:  No result Specimen:  Blood     Phosphorus [[id number]]     Lab Status:  No result Specimen:  Blood     Basic metabolic panel [[id number]]  (Abnormal) Collected:  07/13/19 1958    Lab Status:  Final result Specimen:  Blood from Arm, Left Updated:  07/13/19 2030     Sodium 133 mmol/L      Potassium 4 5 mmol/L      Chloride 99 mmol/L      CO2 20 mmol/L      ANION GAP 14 mmol/L      BUN 40 mg/dL      Creatinine 1 97 mg/dL      Glucose 456 mg/dL      Calcium 8 5 mg/dL      eGFR 33 ml/min/1 73sq m     Narrative:       [place] guidelines for Chronic Kidney Disease (CKD):     Stage 1 with normal or high GFR (GFR > 90 mL/min/1 73 square meters)    Stage 2 Mild CKD (GFR = 60-89 mL/min/1 73 square meters)    Stage 3A Moderate CKD (GFR = 45-59 mL/min/1 73 square meters)    Stage 3B Moderate CKD (GFR = 30-44 mL/min/1 73 square meters)    Stage 4 Severe CKD (GFR = 15-29 mL/min/1 73 square meters)    Stage 5 End Stage CKD (GFR <15 mL/min/1 73 square meters)  Note: GFR calculation is accurate only with a steady state creatinine    Phosphorus [[id number]]  (Normal) Collected:  07/13/19 1958    Lab Status:  Final result Specimen:  Blood from Arm, Left Updated:  07/13/19 2030     Phosphorus 3 2 mg/dL     Magnesium [[id number]]  (Normal) Collected:  07/13/19 1958    Lab Status:  Final result Specimen:  Blood from Arm, Left Updated:  07/13/19 2018     Magnesium 1 9 mg/dL     Glucose, random [[id number]]  (Abnormal) Collected:  07/13/19 1845    Lab Status:  Final result Specimen:  Blood from Arm, Left Updated:  07/13/19 1916     Glucose 580 mg/dL     Fingerstick Glucose (POCT) [[id number]]  (Abnormal) Collected:  07/13/19 1839    Lab Status:  Final result Updated:  07/13/19 1855     POC Glucose >500 mg/dl     ED Urine Macroscopic [[id number]]  (Abnormal) Collected:  07/13/19 1807    Lab Status:  Final result Specimen:  Urine Updated:  07/13/19 1759     Color, UA Yellow     Clarity, UA Clear     pH, UA 5 0     Leukocytes, UA Negative     Nitrite, UA Negative     Protein, UA Negative mg/dl      Glucose,  (1/2%) mg/dl      Ketones, UA 15 (1+) mg/dl      Urobilinogen, UA 0 2 E U /dl      Bilirubin, UA Negative     Blood, UA Negative     Specific Gravity, UA 1 010    Narrative:       CLINITEK RESULT    Lipase [[id number]]  (Abnormal) Collected:  07/13/19 1629    Lab Status:  Final result Specimen:  Blood from Arm, Left Updated:  07/13/19 1727     Lipase 36 u/L     Lactic acid, plasma [[id number]]  (Abnormal) Collected:  07/13/19 1629    Lab Status:  Final result Specimen:  Blood from Arm, Left Updated:  07/13/19 1724     LACTIC ACID 2 9 mmol/L     Narrative:       Result may be elevated if tourniquet was used during collection      Comprehensive metabolic panel [[id number]]  (Abnormal) Collected:  07/13/19 1629    Lab Status:  Final result Specimen:  Blood from Arm, Left Updated:  07/13/19 1724     Sodium 124 mmol/L      Potassium 6 3 mmol/L      Chloride 90 mmol/L      CO2 20 mmol/L      ANION GAP 14 mmol/L      BUN 42 mg/dL      Creatinine 2 32 mg/dL      Glucose 732 mg/dL      Calcium 9 5 mg/dL      AST 59 U/L      ALT 55 U/L      Alkaline Phosphatase 125 U/L      Total Protein 7 2 g/dL      Albumin 3 4 g/dL      Total Bilirubin 0 70 mg/dL      eGFR 27 ml/min/1 73sq m     Narrative:       [place] guidelines for Chronic Kidney Disease (CKD):     Stage 1 with normal or high GFR (GFR > 90 mL/min/1 73 square meters)    Stage 2 Mild CKD (GFR = 60-89 mL/min/1 73 square meters)    Stage 3A Moderate CKD (GFR = 45-59 mL/min/1 73 square meters)    Stage 3B Moderate CKD (GFR = 30-44 mL/min/1 73 square meters)    Stage 4 Severe CKD (GFR = 15-29 mL/min/1 73 square meters)    Stage 5 End Stage CKD (GFR <15 mL/min/1 73 square meters)  Note: GFR calculation is accurate only with a steady state creatinine    Phosphorus [[id number]]  (Abnormal) Collected:  07/13/19 1629    Lab Status:  Final result Specimen:  Blood from Arm, Left Updated:  07/13/19 1712     Phosphorus 4 4 mg/dL     Magnesium [[id number]]  (Normal) Collected:  07/13/19 1629    Lab Status:  Final result Specimen:  Blood from Arm, Left Updated:  07/13/19 1712     Magnesium 2 0 mg/dL     Troponin I [[id number]]  (Normal) Collected:  07/13/19 1629    Lab Status:  Final result Specimen:  Blood from Arm, Left Updated:  07/13/19 1705     Troponin I 0 03 ng/mL     Beta Hydroxybutyrate [[id number]]  (Abnormal) Collected:  07/13/19 1629    Lab Status:  Final result Specimen:  Blood from Arm, Left Updated:  07/13/19 1656     BETA-HYDROXYBUTYRATE 4 4 mmol/L     CBC and differential [[id number]]  (Abnormal) Collected:  07/13/19 1629    Lab Status:  Final result Specimen:  Blood from Arm, Left Updated:  07/13/19 1639     WBC 7 84 Thousand/uL      RBC 4 19 Million/uL      Hemoglobin 12 6 g/dL      Hematocrit 39 4 %      MCV 94 fL      MCH 30 1 pg      MCHC 32 0 g/dL      RDW 13 2 % MPV 9 0 fL      Platelets 499 Thousands/uL      nRBC 0 /100 WBCs      Neutrophils Relative 88 %      Immat GRANS % 0 %      Lymphocytes Relative 4 %      Monocytes Relative 7 %      Eosinophils Relative 1 %      Basophils Relative 0 %      Neutrophils Absolute 6 85 Thousands/µL      Immature Grans Absolute 0 03 Thousand/uL      Lymphocytes Absolute 0 29 Thousands/µL      Monocytes Absolute 0 57 Thousand/µL      Eosinophils Absolute 0 07 Thousand/µL      Basophils Absolute 0 03 Thousands/µL     POCT Blood Gas (CG8+) [[id number]]  (Abnormal) Collected:  07/13/19 1632    Lab Status:  Final result Specimen:  Venous Updated:  07/13/19 1636     ph, Yusuf ISTAT 7 275     pCO2, Yusuf i-STAT 43 5 mm HG      pO2, Yusuf i-STAT 26 0 mm HG      BE, i-STAT -6 mmol/L      HCO3, Yusuf i-STAT 20 2 mmol/L      CO2, i-STAT 22 mmol/L      O2 Sat, i-STAT 39 %      SODIUM, I-STAT 126 mmol/l      Potassium, i-STAT 6 2 mmol/L      Calcium, Ionized i-STAT 1 22 mmol/L      Hct, i-STAT 38 %      Hgb, i-STAT 12 9 g/dl      Glucose, i-STAT >600 mg/dl      Specimen Type VENOUS                 XR chest 2 views    (Results Pending)              Procedures  CriticalCare Time  Performed by: Anahi Oliveira MD  Authorized by: Anahi Oliveira MD     Critical care provider statement:     Critical care time (minutes):  40    Critical care was necessary to treat or prevent imminent or life-threatening deterioration of the following conditions:  Dehydration, endocrine crisis and metabolic crisis    Critical care was time spent personally by me on the following activities:  Ordering and performing treatments and interventions, obtaining history from patient or surrogate, development of treatment plan with patient or surrogate, ordering and review of laboratory studies, re-evaluation of patient's condition and evaluation of patient's response to treatment           ED Course  ED Course as of Jul 14 0103   Sat Jul 13, 2019   1643 ECG shows rate of 104, paced, left axis deviation, nonspecific ST and T-waves, ,  independently interpreted by me                                  MDM    Disposition  Final diagnoses:   DKA (diabetic ketoacidoses) ([address] )     Time reflects when diagnosis was documented in both MDM as applicable and the Disposition within this note     Time User Action Codes Description Comment    7/13/2019  6:15 PM Celena Watkins [E13 [id number]] DKA (diabetic ketoacidoses) [facility])       ED Disposition     ED Disposition Condition Date/Time Comment    Admit Stable Sat Jul 13, 2019  6:15 PM Case was discussed with medicine and the patient's admission status was agreed to be Admission Status: inpatient status to the service of Dr Suzette Moncada          Follow-up Information    None         Current Discharge Medication List      CONTINUE these medications which have NOT CHANGED    Details   aspirin 81 mg chewable tablet Chew 81 mg daily      Atorvastatin Calcium (LIPITOR PO) Take 10 mg by mouth daily 1/2 tab daily      CALCIUM CITRATE PO Take 2,000 mg by mouth daily       Cholecalciferol (VITAMIN D3) 2000 units capsule Take 2,000 Units by mouth daily      Continuous Blood Gluc Sensor (DEXCOM G6 SENSOR) MISC As directed dx:250 03      cyanocobalamin (VITAMIN B-12) 1,000 mcg tablet Take 2,000 mcg by mouth daily      DULoxetine (CYMBALTA) 30 mg delayed release capsule Take 30 mg by mouth daily       glucose 40 % Take by mouth once      insulin aspart (NovoLOG) 100 units/mL injection Inject under the skin Via insulin pump      levothyroxine 125 mcg tablet Take 1 tablet (125 mcg total) by mouth daily in the early morning  Qty: 30 tablet, Refills: 0    Associated Diagnoses: Acquired hypothyroidism      lisinopril (ZESTRIL) 20 mg tablet Take 20 mg by mouth 2 (two) times a day       pantoprazole (PROTONIX) 40 mg tablet Take 40 mg by mouth      tamsulosin (FLOMAX) 0 4 mg Take 0 4 mg by mouth daily with dinner             No discharge procedures on file      ED Provider  Electronically Signed by           Trinity Corbett MD  07/14/19 2373       Trinity Corbett MD  07/14/19 1274

## 2019-07-13 NOTE — H&P
H&P- Sabas Mendez 1945, 68 y o  male MRN: [id number]    Unit/Bed#: ED 09 Encounter: [id number]    Primary Care Provider: Zaria Cleary MD   Date and time admitted to hospital: 7/13/2019  4:14 PM      * DKA (diabetic ketoacidoses) (HCC)  Assessment & Plan  Elevated blood sugars today; nonfunctioning insulin pump as per patient  Glucose of 732, bicarb of 20; AG of 14  Beta Hydroxybutyrate elevated at 4 4  IVF with normal saline and initiated on an insulin gtt  Hourly accuchecks  Chem panel q4h  K monitoring and supplementation  Change IVF to D5 1/2NS once blood sugars <250  Endocrinology     Hyperlipidemia  Assessment & Plan  Continue with statin    Essential hypertension  Assessment & Plan  Continue with home meds    Hypothyroidism  Assessment & Plan  Synthroid 150    KAZ  Assessment & Plan  Attributed to DKA  IVF and monitor      VTE Prophylaxis: Heparin  / sequential compression device   Code Status: full  POLST: POLST form is not discussed and not completed at this time  Anticipated Length of Stay:  Patient will be admitted on an Inpatient basis with an anticipated length of stay of  > 2 midnights  Justification for Hospital Stay: DKA    Total Time for Visit, including Counseling / Coordination of Care: 30 minutes  Greater than 50% of this total time spent on direct patient counseling and coordination of care  Chief Complaint:   DKA    History of Present Illness:    Sabas Mendez is a 68 y o  male with a PMHx of DM1, hypothyroidism, HTN presents to the ED for elevated blood sugars at home today  States the blood sugar readings were over 500 today thinks his insulin pump may not be functioning correctly  Patient states he was dry heaving this afternoon but denies fever/chills, abdominal pain, urinary complaints at this time       Review of Systems:  Review of Systems - 12 point ROS negative, except as in HPI    Past Medical and Surgical History:     Past Medical History:   Diagnosis Date  Arthritis     Cardiac disease     Closed fracture of fibula, lateral malleolus, right     Complete heart block (HCC)     Diabetes mellitus ([facility] [address] )     Disease of thyroid gland     External incisional dehiscence     GERD (gastroesophageal reflux disease)     Heart disease     Muscular deconditioning     Pacemaker     Sleep apnea     cannot tolerate cpap    Thyroid disease        Past Surgical History:   Procedure Laterality Date    CARDIAC PACEMAKER PLACEMENT Left 2015    CARDIAC PACEMAKER PLACEMENT      CARDIAC SURGERY      CATARACT EXTRACTION      MUSCLE FLAP      right foot    NERVE BLOCK      Transforaminal Epidural Lumbar with Fluoroscopic Guidance     NM COLONOSCOPY FLX DX W/COLLJ SPEC WHEN PFRMD N/A 5/4/2018    Procedure: EGD AND COLONOSCOPY;  Surgeon: Matthew Landaverde MD;  Location: [facility]; Service: Gastroenterology    NM SPLIT [address] <100 SQCM Right 6/10/2019    Procedure: SKIN GRAFT SPLIT THICKNESS (STSG)  EXTREMITY right leg;  Surgeon: Drew Obando DPM;  Location: [facility];  Service: Podiatry    TOE AMPUTATION      left 5th toe     Meds/Allergies:    Prior to Admission medications    Medication Sig Start Date End Date Taking?  Authorizing Provider   aspirin 81 mg chewable tablet Chew 81 mg daily   Yes Historical Provider, MD   Atorvastatin Calcium (LIPITOR PO) Take 10 mg by mouth daily 1/2 tab daily   Yes Historical Provider, MD   CALCIUM CITRATE PO Take 2,000 mg by mouth daily  11/3/17  Yes Historical Provider, MD   Cholecalciferol (VITAMIN D3) 2000 units capsule Take 2,000 Units by mouth daily   Yes Historical Provider, MD   Continuous Blood Gluc Sensor (DEXCOM G6 SENSOR) MISC As directed dx:250 03 11/26/14  Yes Historical Provider, MD   cyanocobalamin (VITAMIN B-12) 1,000 mcg tablet Take 2,000 mcg by mouth daily   Yes Historical Provider, MD   DULoxetine (CYMBALTA) 30 mg delayed release capsule Take 30 mg by mouth daily    Yes Historical Provider, MD glucose 40 % Take by mouth once   Yes Historical Provider, MD   insulin aspart (NovoLOG) 100 units/mL injection Inject under the skin Via insulin pump   Yes Historical Provider, MD   levothyroxine 125 mcg tablet Take 1 tablet (125 mcg total) by mouth daily in the early morning  Patient taking differently: Take 150 mcg by mouth daily in the early morning  4/6/19  Yes Amy Carter MD   lisinopril (ZESTRIL) 20 mg tablet Take 20 mg by mouth 2 (two) times a day    Yes Historical Provider, MD   pantoprazole (PROTONIX) 40 mg tablet Take 40 mg by mouth   Yes Historical Provider, MD   tamsulosin (FLOMAX) 0 4 mg Take 0 4 mg by mouth daily with dinner     Yes Historical Provider, MD   Cyanocobalamin (VITAMIN B 12 PO) Daily 7/8/14 7/13/19  Historical Provider, MD     I have reviewed home medications using allscripts  Allergies: Allergies   Allergen Reactions    Ibuprofen      sensitive    Iodine Rash       Social History:     Marital Status: /Civil Union   Substance Use History:   Social History     Substance and Sexual Activity   Alcohol Use Yes    Comment: socially     Social History     Tobacco Use   Smoking Status Former Smoker    Types: Pipe   Smokeless Tobacco Never Used     Social History     Substance and Sexual Activity   Drug Use No       Family History:  non-contributory    Physical Exam:     Vitals:   Blood Pressure: 112/58 (07/13/19 1800)  Pulse: 94 (07/13/19 1800)  Temperature: 98 °F (36 7 °C) (07/13/19 1621)  Temp Source: Oral (07/13/19 1621)  Respirations: 16 (07/13/19 1800)  Weight - Scale: 61 kg (134 lb 7 7 oz) (07/13/19 1618)  SpO2: 98 % (07/13/19 1800)    Physical Exam  Gen -Patient comfortable   HEENT: Dry MM  Neck- Supple  No thyromegaly or lymphadenopathy  Lungs-Clear bilaterally without any wheeze or rales   Heart S1-S2, regular rate and rhythm, no murmurs  Abdomen-soft nontender, no organomegaly   Bowel sounds present  Extremities-no cyanosi,  clubbing or edema  Skin- no rash  Neuro-nonfocal       Additional Data:     Lab Results: I have personally reviewed pertinent reports  Results from last 7 days   Lab Units 07/13/19  1632 07/13/19  1629   WBC Thousand/uL  --  7 84   HEMOGLOBIN g/dL  --  12 6   I STAT HEMOGLOBIN g/dl 12 9  --    HEMATOCRIT %  --  39 4   HEMATOCRIT, ISTAT % 38  --    PLATELETS Thousands/uL  --  110*   NEUTROS PCT %  --  88*   LYMPHS PCT %  --  4*   MONOS PCT %  --  7   EOS PCT %  --  1     Results from last 7 days   Lab Units 07/13/19  1632 07/13/19  1629   SODIUM mmol/L  --  124*   POTASSIUM mmol/L  --  6 3*   CHLORIDE mmol/L  --  90*   CO2 mmol/L  --  20*   CO2, I-STAT mmol/L 22  --    BUN mg/dL  --  42*   CREATININE mg/dL  --  2 32*   ANION GAP mmol/L  --  14*   CALCIUM mg/dL  --  9 5   ALBUMIN g/dL  --  3 4*   TOTAL BILIRUBIN mg/dL  --  0 70   ALK PHOS U/L  --  125*   ALT U/L  --  55   AST U/L  --  59*   GLUCOSE RANDOM mg/dL  --  732*                 Results from last 7 days   Lab Units 07/13/19  1629   LACTIC ACID mmol/L 2 9*       Imaging: I have personally reviewed pertinent reports  XR chest 2 views    (Results Pending)     [employer] / [employer] Records Reviewed: Yes     ** Please Note: This note has been constructed using a voice recognition system   **

## 2019-07-13 NOTE — ASSESSMENT & PLAN NOTE
Lab Results   Component Value Date    HGBA1C 9 2 (H) 03/31/2019       No results for input(s): POCGLU in the last 72 hours      Blood Sugar Average: Last 72 hrs:  insulin gtt

## 2019-07-14 LAB
ANION GAP SERPL CALCULATED.3IONS-SCNC: 3 MMOL/L (ref 4–13)
ANION GAP SERPL CALCULATED.3IONS-SCNC: 8 MMOL/L (ref 4–13)
BUN SERPL-MCNC: 33 MG/DL (ref 5–25)
BUN SERPL-MCNC: 36 MG/DL (ref 5–25)
CALCIUM SERPL-MCNC: 8 MG/DL (ref 8.3–10.1)
CALCIUM SERPL-MCNC: 8.4 MG/DL (ref 8.3–10.1)
CHLORIDE SERPL-SCNC: 104 MMOL/L (ref 100–108)
CHLORIDE SERPL-SCNC: 104 MMOL/L (ref 100–108)
CHOLEST SERPL-MCNC: 111 MG/DL (ref 50–200)
CO2 SERPL-SCNC: 24 MMOL/L (ref 21–32)
CO2 SERPL-SCNC: 25 MMOL/L (ref 21–32)
CREAT SERPL-MCNC: 1.29 MG/DL (ref 0.6–1.3)
CREAT SERPL-MCNC: 1.59 MG/DL (ref 0.6–1.3)
ERYTHROCYTE [DISTWIDTH] IN BLOOD BY AUTOMATED COUNT: 13.4 % (ref 11.6–15.1)
EST. AVERAGE GLUCOSE BLD GHB EST-MCNC: 209 MG/DL
GFR SERPL CREATININE-BSD FRML MDRD: 42 ML/MIN/1.73SQ M
GFR SERPL CREATININE-BSD FRML MDRD: 55 ML/MIN/1.73SQ M
GLUCOSE SERPL-MCNC: 106 MG/DL (ref 65–140)
GLUCOSE SERPL-MCNC: 131 MG/DL (ref 65–140)
GLUCOSE SERPL-MCNC: 135 MG/DL (ref 65–140)
GLUCOSE SERPL-MCNC: 139 MG/DL (ref 65–140)
GLUCOSE SERPL-MCNC: 155 MG/DL (ref 65–140)
GLUCOSE SERPL-MCNC: 186 MG/DL (ref 65–140)
GLUCOSE SERPL-MCNC: 290 MG/DL (ref 65–140)
GLUCOSE SERPL-MCNC: 295 MG/DL (ref 65–140)
GLUCOSE SERPL-MCNC: 297 MG/DL (ref 65–140)
GLUCOSE SERPL-MCNC: 40 MG/DL (ref 65–140)
GLUCOSE SERPL-MCNC: 48 MG/DL (ref 65–140)
GLUCOSE SERPL-MCNC: 96 MG/DL (ref 65–140)
HBA1C MFR BLD: 8.9 % (ref 4.2–6.3)
HCT VFR BLD AUTO: 30.7 % (ref 36.5–49.3)
HDLC SERPL-MCNC: 72 MG/DL (ref 40–60)
HGB BLD-MCNC: 10.3 G/DL (ref 12–17)
LDLC SERPL CALC-MCNC: 35 MG/DL (ref 0–100)
MAGNESIUM SERPL-MCNC: 1.9 MG/DL (ref 1.6–2.6)
MAGNESIUM SERPL-MCNC: 2 MG/DL (ref 1.6–2.6)
MCH RBC QN AUTO: 30.6 PG (ref 26.8–34.3)
MCHC RBC AUTO-ENTMCNC: 33.6 G/DL (ref 31.4–37.4)
MCV RBC AUTO: 91 FL (ref 82–98)
NONHDLC SERPL-MCNC: 39 MG/DL
PHOSPHATE SERPL-MCNC: 2.5 MG/DL (ref 2.3–4.1)
PLATELET # BLD AUTO: 111 THOUSANDS/UL (ref 149–390)
PMV BLD AUTO: 8.9 FL (ref 8.9–12.7)
POTASSIUM SERPL-SCNC: 4.3 MMOL/L (ref 3.5–5.3)
POTASSIUM SERPL-SCNC: 4.9 MMOL/L (ref 3.5–5.3)
RBC # BLD AUTO: 3.37 MILLION/UL (ref 3.88–5.62)
SODIUM SERPL-SCNC: 132 MMOL/L (ref 136–145)
SODIUM SERPL-SCNC: 136 MMOL/L (ref 136–145)
T4 FREE SERPL-MCNC: 1.4 NG/DL (ref 0.76–1.46)
TRIGL SERPL-MCNC: 19 MG/DL
TSH SERPL DL<=0.05 MIU/L-ACNC: 0.28 UIU/ML (ref 0.36–3.74)
WBC # BLD AUTO: 7.14 THOUSAND/UL (ref 4.31–10.16)

## 2019-07-14 PROCEDURE — 82948 REAGENT STRIP/BLOOD GLUCOSE: CPT

## 2019-07-14 PROCEDURE — 83036 HEMOGLOBIN GLYCOSYLATED A1C: CPT | Performed by: PHYSICIAN ASSISTANT

## 2019-07-14 PROCEDURE — 99232 SBSQ HOSP IP/OBS MODERATE 35: CPT | Performed by: HOSPITALIST

## 2019-07-14 PROCEDURE — 83735 ASSAY OF MAGNESIUM: CPT | Performed by: PHYSICIAN ASSISTANT

## 2019-07-14 PROCEDURE — 80048 BASIC METABOLIC PNL TOTAL CA: CPT | Performed by: PHYSICIAN ASSISTANT

## 2019-07-14 PROCEDURE — 80061 LIPID PANEL: CPT | Performed by: PHYSICIAN ASSISTANT

## 2019-07-14 PROCEDURE — 84439 ASSAY OF FREE THYROXINE: CPT | Performed by: PHYSICIAN ASSISTANT

## 2019-07-14 PROCEDURE — 84443 ASSAY THYROID STIM HORMONE: CPT | Performed by: PHYSICIAN ASSISTANT

## 2019-07-14 PROCEDURE — 84100 ASSAY OF PHOSPHORUS: CPT | Performed by: PHYSICIAN ASSISTANT

## 2019-07-14 PROCEDURE — 85027 COMPLETE CBC AUTOMATED: CPT | Performed by: PHYSICIAN ASSISTANT

## 2019-07-14 RX ORDER — INSULIN GLARGINE 100 [IU]/ML
15 INJECTION, SOLUTION SUBCUTANEOUS ONCE
Status: COMPLETED | OUTPATIENT
Start: 2019-07-14 | End: 2019-07-14

## 2019-07-14 RX ORDER — DEXTROSE MONOHYDRATE 25 G/50ML
INJECTION, SOLUTION INTRAVENOUS
Status: COMPLETED
Start: 2019-07-14 | End: 2019-07-14

## 2019-07-14 RX ORDER — INSULIN GLARGINE 100 [IU]/ML
15 INJECTION, SOLUTION SUBCUTANEOUS
Status: DISCONTINUED | OUTPATIENT
Start: 2019-07-14 | End: 2019-07-15

## 2019-07-14 RX ADMIN — TAMSULOSIN HYDROCHLORIDE 0.4 MG: 0.4 CAPSULE ORAL at 17:37

## 2019-07-14 RX ADMIN — INSULIN LISPRO 5 UNITS: 100 INJECTION, SOLUTION INTRAVENOUS; SUBCUTANEOUS at 11:42

## 2019-07-14 RX ADMIN — LISINOPRIL 20 MG: 20 TABLET ORAL at 10:14

## 2019-07-14 RX ADMIN — INSULIN LISPRO 3 UNITS: 100 INJECTION, SOLUTION INTRAVENOUS; SUBCUTANEOUS at 11:41

## 2019-07-14 RX ADMIN — ATORVASTATIN CALCIUM 10 MG: 10 TABLET, FILM COATED ORAL at 17:36

## 2019-07-14 RX ADMIN — DULOXETINE HYDROCHLORIDE 30 MG: 30 CAPSULE, DELAYED RELEASE ORAL at 10:14

## 2019-07-14 RX ADMIN — INSULIN LISPRO 5 UNITS: 100 INJECTION, SOLUTION INTRAVENOUS; SUBCUTANEOUS at 17:37

## 2019-07-14 RX ADMIN — DEXTROSE MONOHYDRATE 50 ML: 25 INJECTION, SOLUTION INTRAVENOUS at 03:49

## 2019-07-14 RX ADMIN — INSULIN LISPRO 3 UNITS: 100 INJECTION, SOLUTION INTRAVENOUS; SUBCUTANEOUS at 17:37

## 2019-07-14 RX ADMIN — DEXTROSE 50 % IN WATER (D50W) INTRAVENOUS SYRINGE 50 ML: at 03:49

## 2019-07-14 RX ADMIN — CYANOCOBALAMIN TAB 500 MCG 2000 MCG: 500 TAB at 10:14

## 2019-07-14 RX ADMIN — ASPIRIN 81 MG 81 MG: 81 TABLET ORAL at 10:14

## 2019-07-14 RX ADMIN — DEXTROSE AND SODIUM CHLORIDE 250 ML/HR: 5; .45 INJECTION, SOLUTION INTRAVENOUS at 00:23

## 2019-07-14 RX ADMIN — INSULIN LISPRO 3 UNITS: 100 INJECTION, SOLUTION INTRAVENOUS; SUBCUTANEOUS at 22:15

## 2019-07-14 RX ADMIN — INSULIN GLARGINE 15 UNITS: 100 INJECTION, SOLUTION SUBCUTANEOUS at 22:14

## 2019-07-14 RX ADMIN — HEPARIN SODIUM 5000 UNITS: 5000 INJECTION INTRAVENOUS; SUBCUTANEOUS at 22:20

## 2019-07-14 RX ADMIN — HEPARIN SODIUM 5000 UNITS: 5000 INJECTION INTRAVENOUS; SUBCUTANEOUS at 15:12

## 2019-07-14 RX ADMIN — LEVOTHYROXINE SODIUM 150 MCG: 150 TABLET ORAL at 06:19

## 2019-07-14 RX ADMIN — HEPARIN SODIUM 5000 UNITS: 5000 INJECTION INTRAVENOUS; SUBCUTANEOUS at 06:19

## 2019-07-14 RX ADMIN — LISINOPRIL 20 MG: 20 TABLET ORAL at 17:37

## 2019-07-14 RX ADMIN — PANTOPRAZOLE SODIUM 40 MG: 40 TABLET, DELAYED RELEASE ORAL at 06:19

## 2019-07-14 RX ADMIN — INSULIN GLARGINE 15 UNITS: 100 INJECTION, SOLUTION SUBCUTANEOUS at 03:01

## 2019-07-14 NOTE — UTILIZATION REVIEW
Initial Clinical Review    Admission: Date/Time/Statement: INPATIENT  7/13/19 @ 1816     Orders Placed This Encounter   Procedures    Inpatient Admission     Standing Status:   Standing     Number of Occurrences:   1     Order Specific Question:   Admitting Physician     Answer:   Mike Handler [[id number]]     Order Specific Question:   Level of Care     Answer:   Level 2 Stepdown / HOT [14]     Order Specific Question:   Estimated length of stay     Answer:   More than 2 Midnights     Order Specific Question:   Certification     Answer:   I certify that inpatient services are medically necessary for this patient for a duration of greater than two midnights  See H&P and MD Progress Notes for additional information about the patient's course of treatment  ED Arrival Information     Expected Arrival Acuity Means of Arrival Escorted By Service Admission Type    - 7/13/2019 16:06 [facility] Emergency    Arrival Complaint    -        Chief Complaint   Patient presents with    Hyperglycemia - Symptomatic     Patient reports having blood glucose of 538mg/dL and is unable to control it with his insulin pump  C/O feeling weak, having vision changes, and having dry mouth  Assessment/Plan: 69 yo male presents to ED from home due to elevated blood glucose > 500 (538)  Glucose 732  on initial ED labs  Pt is a diabetic x 30 years and uses an insulin pump  Pt c/o feeling weak and dry and having vision changes  + tachycardia  Venous pH 7 275, Beta-hydroxybutyrate 4 4 Pt admitted as INPATIENT  For DKA  IVF  NS and insulin gtt started  Endocrinology consulted  BMP's q 4h and hourly glucose monitoring  Also noted to have KAZ attributed to DKA, will continue to monitor and continue to treat with IVF     ED Triage Vitals   Temperature Pulse Respirations Blood Pressure SpO2   07/13/19 1621 07/13/19 1617 07/13/19 1617 07/13/19 1617 07/13/19 1617   98 °F (36 7 °C) (!) 106 20 92/53 96 %      Temp Source Heart Rate Source Patient Position - Orthostatic VS BP Location FiO2 (%)   07/13/19 1621 07/13/19 1617 07/13/19 1617 07/13/19 1617 --   Oral Monitor Lying Right arm       Pain Score       07/13/19 1749       No Pain        Wt Readings from Last 1 Encounters:   07/13/19 62 7 kg (138 lb 3 2 oz)     Additional Vital Signs:  Date/Time  Temp  Pulse  Resp  BP  MAP (mmHg)  SpO2  O2 Device  Patient Position - Orthostatic VS   07/14/19 0700  97 6 °F (36 4 °C)  78  18  137/60    98 %  None (Room air)  Lying   07/14/19 0300  97 6 °F (36 4 °C)  77  18  97/56  73  98 %  None (Room air)         Pertinent Labs/Diagnostic Test Results:    CXR  No acute cardiopulmonary disease      Beta-hydroxybutyrate 4 4    Results from last 7 days   Lab Units 07/14/19  0520 07/13/19  1632 07/13/19  1629   WBC Thousand/uL 7 14  --  7 84   HEMOGLOBIN g/dL 10 3*  --  12 6   I STAT HEMOGLOBIN g/dl  --  12 9  --    HEMATOCRIT % 30 7*  --  39 4   HEMATOCRIT, ISTAT %  --  38  --    PLATELETS Thousands/uL 111*  --  110*   NEUTROS ABS Thousands/µL  --   --  6 85         Results from last 7 days   Lab Units 07/14/19  0516 07/14/19  0025 07/13/19  1958 07/13/19  1632 07/13/19  1629   SODIUM mmol/L 132* 136 133*  --  124*   POTASSIUM mmol/L 4 9 4 3 4 5  --  6 3*   CHLORIDE mmol/L 104 104 99*  --  90*   CO2 mmol/L 25 24 20*  --  20*   CO2, I-STAT mmol/L  --   --   --  22  --    ANION GAP mmol/L 3* 8 14*  --  14*   BUN mg/dL 33* 36* 40*  --  42*   CREATININE mg/dL 1 29 1 59* 1 97*  --  2 32*   EGFR ml/min/1 73sq m 55 42 33  --  27   CALCIUM mg/dL 8 0* 8 4 8 5  --  9 5   CALCIUM, IONIZED, ISTAT mmol/L  --   --   --  1 22  --    MAGNESIUM mg/dL 2 0 1 9 1 9  --  2 0   PHOSPHORUS mg/dL  --  2 5 3 2  --  4 4*     Results from last 7 days   Lab Units 07/13/19  1629   AST U/L 59*   ALT U/L 55   ALK PHOS U/L 125*   TOTAL PROTEIN g/dL 7 2   ALBUMIN g/dL 3 4*   TOTAL BILIRUBIN mg/dL 0 70     Results from last 7 days   Lab Units 07/14/19  0735 07/14/19  0518 07/14/19  0358 07/14/19  0349 07/14/19  0339 07/14/19  0209 07/14/19  0019 07/13/19  2204 07/13/19  2104 07/13/19  1839   POC GLUCOSE mg/dl 135 155* 186* 40* 48* 106 139 316* 378* >500*     Results from last 7 days   Lab Units 07/14/19  0516 07/14/19  0025 07/13/19  1958 07/13/19  1845 07/13/19  1629   GLUCOSE RANDOM mg/dL 131 96 456* 580* 732*       Results from last 7 days   Lab Units 07/13/19  1632   PH, ANDRA I-STAT  7 275*   PCO2, ANDRA ISTAT mm HG 43 5   PO2, ANDRA ISTAT mm HG 26 0*   HCO3, ANDRA ISTAT mmol/L 20 2*   I STAT BASE EXC mmol/L -6*   I STAT O2 SAT % 39*         Results from last 7 days   Lab Units 07/13/19  1629   TROPONIN I ng/mL 0 03     Results from last 7 days   Lab Units 07/13/19  1629   LACTIC ACID mmol/L 2 9*           Results from last 7 days   Lab Units 07/13/19  1629   LIPASE u/L 36*         Results from last 7 days   Lab Units 07/13/19  1807   CLARITY UA  Clear   COLOR UA  Yellow   SPEC GRAV UA  1 010   PH UA  5 0   GLUCOSE UA mg/dl 500 (1/2%)*   KETONES UA mg/dl 15 (1+)*   BLOOD UA  Negative   PROTEIN UA mg/dl Negative   NITRITE UA  Negative   BILIRUBIN UA  Negative   UROBILINOGEN UA E U /dl 0 2   LEUKOCYTES UA  Negative       ED Treatment:   Medication Administration from 07/13/2019 1606 to 07/13/2019 1910       Date/Time Order Dose Route Action     07/13/2019 1658 sodium chloride 0 9 % bolus 500 mL 0 mL Intravenous Stopped     07/13/2019 1636 sodium chloride 0 9 % bolus 500 mL 500 mL Intravenous New Bag     07/13/2019 1710 sodium chloride 0 9 % infusion 2,000 mL/hr Intravenous New Bag     07/13/2019 1850 sodium chloride 0 9 % infusion 500 mL/hr Intravenous New Bag     07/13/2019 1730 insulin regular (HumuLIN R,NovoLIN R) 1 Units/mL in sodium chloride 0 9 % 100 mL infusion 6 1 Units/hr Intravenous New Bag     07/13/2019 1900 insulin regular (HumuLIN R,NovoLIN R) 1 Units/mL in sodium chloride 0 9 % 100 mL infusion 6 1 Units/hr Intravenous New Bag        Past Medical History:   Diagnosis Date  Arthritis     Cardiac disease     Closed fracture of fibula, lateral malleolus, right     Complete heart block (HCC)     Diabetes mellitus (HCC)     Disease of thyroid gland     External incisional dehiscence     GERD (gastroesophageal reflux disease)     Heart disease     Muscular deconditioning     Pacemaker     Sleep apnea     cannot tolerate cpap    Thyroid disease      Present on Admission:   Hypothyroidism   Essential hypertension   Hyperlipidemia      Admitting Diagnosis: DKA (diabetic ketoacidoses) (HCC) [E13 10]  High blood sugar [R73 9]  Age/Sex: 68 y o  male  Admission Orders:    Current Facility-Administered Medications:  aspirin 81 mg Oral Daily    atorvastatin 10 mg Oral Daily With Dinner    cyanocobalamin 2,000 mcg Oral Daily    DULoxetine 30 mg Oral Daily    heparin (porcine) 5,000 Units Subcutaneous Q8H [address]    insulin glargine 15 Units Subcutaneous HS    insulin lispro 1-5 Units Subcutaneous HS    insulin lispro 1-5 Units Subcutaneous TID AC    insulin lispro 5 Units Subcutaneous TID With Meals    insulin regular (HumuLIN R,NovoLIN R) infusion 0 1-30 Units/hr Intravenous Continuous Last Rate: 3 1 Units/hr (07/14/19 0427)   levothyroxine 150 mcg Oral Early Morning    lisinopril 20 mg Oral BID    pantoprazole 40 mg Oral Early Morning    sodium chloride (PF) 3 mL Intravenous PRN    tamsulosin 0 4 mg Oral Daily With Dinner      BMP 7/15 0600   IP CONSULT TO ENDOCRINOLOGY  [facility]       Network Utilization Review Department  Phone: [phone]; Fax [phone]  [email or web address]  ATTENTION: Please call with any questions or concerns to [phone]  and carefully listen to the prompts so that you are directed to the right person  Send all requests for admission clinical reviews, approved or denied determinations and any other requests to fax [phone]   All voicemails are confidential

## 2019-07-14 NOTE — PROGRESS NOTES
Progress Note - Wilbert Olivares 1945, 68 y o  male MRN: [id number]    Unit/Bed#: -Gigi Encounter: [id number]    Primary Care Provider: Ankur Hernández MD   Date and time admitted to hospital: 2019  4:14 PM          DKA (diabetic ketoacidoses) ([facility] [address] )  Assessment & Plan  Blood sugars improved  Noted to have an episode of hypoglycemia overnight  Insulin gtt turned off, transitioned to basal insulin  Continue to monitor accuchecks  K supplemented  Diabetic diet  Endocrinology; evaluation of insulin pump  HbA1c pending     Hyperlipidemia  Assessment & Plan  Continue with statin     Essential hypertension  Assessment & Plan  Continue with home meds     Hypothyroidism  Assessment & Plan  TSH low; will defer to Endo to adjust Synthroid     KAZ  Assessment & Plan  Attributed to DKA  Improved after ivf      VTE Pharmacologic Prophylaxis:   Pharmacologic: Heparin  Mechanical VTE Prophylaxis in Place: Yes  Patient Centered Rounds: I have performed bedside rounds with nursing staff today  Time Spent for Care: 30 minutes  More than 50% of total time spent on counseling and coordination of care as described above  Current Length of Stay: 1 day(s)  Current Patient Status: Inpatient   Certification Statement: The patient will continue to require additional inpatient hospital stay due to blood sugar correction  Discharge Plan: home  Code Status: Prior      Subjective:   No complaints offered  Mild lethargy this am    Objective:     Vitals:   Temp (24hrs), Av 7 °F (36 5 °C), Min:97 6 °F (36 4 °C), Max:98 °F (36 7 °C)    Temp:  [97 6 °F (36 4 °C)-98 °F (36 7 °C)] 97 6 °F (36 4 °C)  HR:  [] 78  Resp:  [16-20] 18  BP: ()/(53-77) 137/60  SpO2:  [96 %-99 %] 98 %  Body mass index is 21 01 kg/m²  Input and Output Summary (last 24 hours):        Intake/Output Summary (Last 24 hours) at 2019 1029  Last data filed at 2019 0700  Gross per 24 hour   Intake 2539 65 ml   Output 1450 ml   Net 1089 65 ml     Physical Exam  Gen -Patient in no acute distress  Neck- Supple  No thyromegaly or lymphadenopathy  Lungs-Clear bilaterally without any wheeze or rales   Heart S1-S2, regular rate and rhythm, no murmurs  Abdomen-soft nontender, no organomegaly  Bowel sounds present  Extremities-no cyanosis, clubbing or edema  Skin- no rash  Neuro-nonfocal     Additional Data:   Labs:    Results from last 7 days   Lab Units 07/14/19  0520  07/13/19  1629   WBC Thousand/uL 7 14  --  7 84   HEMOGLOBIN g/dL 10 3*  --  12 6   I STAT HEMOGLOBIN   --    < >  --    HEMATOCRIT % 30 7*  --  39 4   HEMATOCRIT, ISTAT   --    < >  --    PLATELETS Thousands/uL 111*  --  110*   NEUTROS PCT %  --   --  88*   LYMPHS PCT %  --   --  4*   MONOS PCT %  --   --  7   EOS PCT %  --   --  1    < > = values in this interval not displayed  Results from last 7 days   Lab Units 07/14/19  0516  07/13/19  1629   SODIUM mmol/L 132*   < > 124*   POTASSIUM mmol/L 4 9   < > 6 3*   CHLORIDE mmol/L 104   < > 90*   CO2 mmol/L 25   < > 20*   CO2, I-STAT   --    < >  --    BUN mg/dL 33*   < > 42*   CREATININE mg/dL 1 29   < > 2 32*   ANION GAP mmol/L 3*   < > 14*   CALCIUM mg/dL 8 0*   < > 9 5   ALBUMIN g/dL  --   --  3 4*   TOTAL BILIRUBIN mg/dL  --   --  0 70   ALK PHOS U/L  --   --  125*   ALT U/L  --   --  55   AST U/L  --   --  59*   GLUCOSE RANDOM mg/dL 131   < > 732*    < > = values in this interval not displayed  Results from last 7 days   Lab Units 07/14/19  0735 07/14/19  0518 07/14/19  0358 07/14/19  0349 07/14/19  0339 07/14/19  0209 07/14/19  0019 07/13/19  2204 07/13/19  2104 07/13/19  1839   POC GLUCOSE mg/dl 135 155* 186* 40* 48* 106 139 316* 378* >500*         Results from last 7 days   Lab Units 07/13/19  1629   LACTIC ACID mmol/L 2 9*     * I Have Reviewed All Lab Data Listed Above  * Additional Pertinent Lab Tests Reviewed:  All Labs Within Last 24 Hours Reviewed      Last 24 Hours Medication List:     Current Facility-Administered Medications:  aspirin 81 mg Oral Daily Abram Burkitt, MD    atorvastatin 10 mg Oral Daily With Laurent Pratt MD    cyanocobalamin 2,000 mcg Oral Daily Abram Burkitt, MD    DULoxetine 30 mg Oral Daily Abram Burkitt, MD    heparin (porcine) 5,000 Units Subcutaneous [facility] Carrillo Andrade PA-C    insulin glargine 15 Units Subcutaneous HS SHALOM Flaherty-EWA    insulin lispro 1-5 Units Subcutaneous HS SHALOM Flaherty-EWA    insulin lispro 1-5 Units Subcutaneous TID AC Carrillo Andrade PA-C    insulin lispro 5 Units Subcutaneous TID With Meals Carrillo Andrade PA-C    insulin regular (HumuLIN R,NovoLIN R) infusion 0 1-30 Units/hr Intravenous Continuous Carrillo Andrade PA-C Last Rate: 3 1 Units/hr (07/14/19 0427)   levothyroxine 150 mcg Oral Early Morning Abram Burkitt, MD    lisinopril 20 mg Oral BID Abram Burkitt, MD    pantoprazole 40 mg Oral Early Morning Abram Burkitt, MD    sodium chloride (PF) 3 mL Intravenous PRN Carrillo Andrade PA-C    tamsulosin 0 4 mg Oral Daily With Laurent Pratt MD         Today, Patient Was Seen By: Abram Burkitt, MD    ** Please Note: Dictation voice to text software may have been used in the creation of this document   **

## 2019-07-14 NOTE — PROGRESS NOTES
Responded to call bell, patient reports "I think my sugar's dropping" based on portable glucometer reading  Verified BS with in-house glucometer, reading at 48  Gave patient 240 ml cranberry juice to drink, and re-checked @ 15 minutes  BS down to 40 on in-house glucometer  Paged SLIM and gave 1/2 amp D5 per protocol based on patient's BS and diet order  Per SLIM was instructed to give entire amp and reassess BS  Will continue to monitor

## 2019-07-14 NOTE — PLAN OF CARE
Problem: Potential for Falls  Goal: Patient will remain free of falls  Description  INTERVENTIONS:  - Assess patient frequently for physical needs  -  Identify cognitive and physical deficits and behaviors that affect risk of falls    -  [place] fall precautions as indicated by assessment   - Educate patient/family on patient safety including physical limitations  - Instruct patient to call for assistance with activity based on assessment  - Modify environment to reduce risk of injury  - Consider OT/PT consult to assist with strengthening/mobility  Outcome: Progressing     Problem: Prexisting or High Potential for Compromised Skin Integrity  Goal: Skin integrity is maintained or improved  Description  INTERVENTIONS:  - Identify patients at risk for skin breakdown  - Assess and monitor skin integrity  - Assess and monitor nutrition and hydration status  - Monitor labs (i e  albumin)  - Assess for incontinence   - Turn and reposition patient  - Assist with mobility/ambulation  - Relieve pressure over bony prominences  - Avoid friction and shearing  - Provide appropriate hygiene as needed including keeping skin clean and dry  - Evaluate need for skin moisturizer/barrier cream  - Collaborate with interdisciplinary team (i e  Nutrition, Rehabilitation, etc )   - Patient/family teaching  Outcome: Progressing

## 2019-07-15 LAB
ANION GAP SERPL CALCULATED.3IONS-SCNC: 4 MMOL/L (ref 4–13)
BUN SERPL-MCNC: 20 MG/DL (ref 5–25)
CALCIUM SERPL-MCNC: 8.5 MG/DL (ref 8.3–10.1)
CHLORIDE SERPL-SCNC: 106 MMOL/L (ref 100–108)
CO2 SERPL-SCNC: 28 MMOL/L (ref 21–32)
CREAT SERPL-MCNC: 1.05 MG/DL (ref 0.6–1.3)
GFR SERPL CREATININE-BSD FRML MDRD: 70 ML/MIN/1.73SQ M
GLUCOSE SERPL-MCNC: 106 MG/DL (ref 65–140)
GLUCOSE SERPL-MCNC: 120 MG/DL (ref 65–140)
GLUCOSE SERPL-MCNC: 128 MG/DL (ref 65–140)
GLUCOSE SERPL-MCNC: 144 MG/DL (ref 65–140)
GLUCOSE SERPL-MCNC: 175 MG/DL (ref 65–140)
GLUCOSE SERPL-MCNC: 276 MG/DL (ref 65–140)
GLUCOSE SERPL-MCNC: 55 MG/DL (ref 65–140)
GLUCOSE SERPL-MCNC: 80 MG/DL (ref 65–140)
POTASSIUM SERPL-SCNC: 4.3 MMOL/L (ref 3.5–5.3)
SODIUM SERPL-SCNC: 138 MMOL/L (ref 136–145)

## 2019-07-15 PROCEDURE — 99232 SBSQ HOSP IP/OBS MODERATE 35: CPT | Performed by: HOSPITALIST

## 2019-07-15 PROCEDURE — 80048 BASIC METABOLIC PNL TOTAL CA: CPT | Performed by: HOSPITALIST

## 2019-07-15 PROCEDURE — 99223 1ST HOSP IP/OBS HIGH 75: CPT | Performed by: INTERNAL MEDICINE

## 2019-07-15 PROCEDURE — 82948 REAGENT STRIP/BLOOD GLUCOSE: CPT

## 2019-07-15 RX ORDER — LEVOTHYROXINE SODIUM 112 UG/1
112 TABLET ORAL
Status: DISCONTINUED | OUTPATIENT
Start: 2019-07-16 | End: 2019-07-16 | Stop reason: HOSPADM

## 2019-07-15 RX ORDER — DEXTROSE MONOHYDRATE 25 G/50ML
25 INJECTION, SOLUTION INTRAVENOUS ONCE
Status: COMPLETED | OUTPATIENT
Start: 2019-07-15 | End: 2019-07-14

## 2019-07-15 RX ADMIN — LISINOPRIL 20 MG: 20 TABLET ORAL at 08:17

## 2019-07-15 RX ADMIN — HEPARIN SODIUM 5000 UNITS: 5000 INJECTION INTRAVENOUS; SUBCUTANEOUS at 21:29

## 2019-07-15 RX ADMIN — PANTOPRAZOLE SODIUM 40 MG: 40 TABLET, DELAYED RELEASE ORAL at 05:44

## 2019-07-15 RX ADMIN — ATORVASTATIN CALCIUM 10 MG: 10 TABLET, FILM COATED ORAL at 17:09

## 2019-07-15 RX ADMIN — INSULIN LISPRO 5 UNITS: 100 INJECTION, SOLUTION INTRAVENOUS; SUBCUTANEOUS at 11:34

## 2019-07-15 RX ADMIN — ASPIRIN 81 MG 81 MG: 81 TABLET ORAL at 08:17

## 2019-07-15 RX ADMIN — LISINOPRIL 20 MG: 20 TABLET ORAL at 17:09

## 2019-07-15 RX ADMIN — HEPARIN SODIUM 5000 UNITS: 5000 INJECTION INTRAVENOUS; SUBCUTANEOUS at 05:44

## 2019-07-15 RX ADMIN — CYANOCOBALAMIN TAB 500 MCG 2000 MCG: 500 TAB at 08:17

## 2019-07-15 RX ADMIN — TAMSULOSIN HYDROCHLORIDE 0.4 MG: 0.4 CAPSULE ORAL at 17:08

## 2019-07-15 RX ADMIN — LEVOTHYROXINE SODIUM 150 MCG: 150 TABLET ORAL at 05:44

## 2019-07-15 RX ADMIN — HEPARIN SODIUM 5000 UNITS: 5000 INJECTION INTRAVENOUS; SUBCUTANEOUS at 13:59

## 2019-07-15 RX ADMIN — DULOXETINE HYDROCHLORIDE 30 MG: 30 CAPSULE, DELAYED RELEASE ORAL at 08:17

## 2019-07-15 NOTE — CONSULTS
Consultation - Tushar Crowley 68 y o  male MRN: [id number]    Unit/Bed#: -01 Encounter: [id number]      Assessment/Plan     Assessment: This is a 68y o -year-old male with type 1 diabetes, with hyperglycemia and hypoglycemia, admitted with diabetes ketoacidosis, hypothyroidism, acute kidney failure    Plan:  1  Type 1 diabetes with hyperglycemia- etiology for DKA, patient things there was kinking of cannula, blood sugar was not coming down even after taking boluses     DKA has been resolved, recent anion gap is normal   He was given Lantus 15 units at bedtime yesterday  Patient prefers to go back on insulin pump, uses T slim insulin pump and NovoLog via pump, is comfortable using all the pump settings  Will restart the pump at 8:00 p m  With following settings  Basal rates- 12 AM - 0 4  1 AM- 0 4   7 AM- 0  65   I:C - 1 units for 25   ISF- 80   Target 140   Patient does not have insulin pump with him, his wife is going to bring insulin pump,  If he cannot restart the insulin pump he will need Lantus 10 units at bedtime, in case if he cannot restart the pump  Continue to monitor blood sugars with meals and at bedtime  Will discontinue Lantus tonight  orders for insulin pump in epic  Follow up with endocrinology on outpatient basis  Follow hypoglycemia protocol in case of blood sugar less than 70  Discussed with patient to call me, T slim support, to see if he needs any trouble shooting    2  Hypothyroidism  His dose was adjusted to 125 mcg daily in March  TSH is still low  Lab Results   Component Value Date    [id number] 0 275 (L) 07/14/2019    Reduce levothyroxine to 112 mcg daily  He will need repeat TSH and free T4 in 6 weeks after adjustment    3  Acute kidney failure-after hydration creatinine is improved      Thank you for consulting Endocrinology       CC: Diabetes Consult    History of Present Illness     HPI: Tushar Crowley is a 68y o  year old male with type 1 DM managed on insulin pump at home, uncontrolled, admitted for diabetic ketoacidosis  He had recent admission for diabetic ketoacidosis in March 2019  On admission patient had elevated blood sugars with positive anion gap, he was started on insulin infusion as per DKA protocol  He was given Lantus 15 units at bedtime, and insulin infusion was stopped  His blood sugars are in 200 range  On presentation he had a blood sugar of 456 creatinine 1 97 anion gap of 14  Most recent anion gap is 4 with glucose 128 and creatinine normalized to 1 05  On admission beta hydroxybutyrate was positive suggestive of diabetic ketoacidosis    His insulin pump settings are  Basal rates- 12 AM - 0 4  1 AM- 0 4   7 AM- 0  65   I:C - 1 units for 25   ISF- 80   Target 140       He denies any complications of diabetes    He also has history of hypothyroidism, his levothyroxine dose was recently changed to 125 mcg daily  He admits compliance to medication  He takes it on empty stomach 1 hour before breakfast              Inpatient consult to Endocrinology  Consult performed by: Hanny Randle MD  Consult ordered by: Tomás Vargas PA-C          Review of Systems   Constitutional: Positive for activity change  Negative for diaphoresis, fatigue, fever and unexpected weight change  HENT: Negative  Eyes: Negative for visual disturbance  Respiratory: Negative for cough, chest tightness and shortness of breath  Cardiovascular: Negative for chest pain, palpitations and leg swelling  Gastrointestinal: Negative for abdominal pain, blood in stool, constipation, diarrhea, nausea and vomiting  Endocrine: Negative for cold intolerance, heat intolerance, polydipsia, polyphagia and polyuria  Genitourinary: Negative for dysuria, enuresis, frequency and urgency  Musculoskeletal: Negative for arthralgias and myalgias  Skin: Negative for pallor, rash and wound  Allergic/Immunologic: Negative      Neurological: Negative for dizziness, tremors, weakness and numbness  Hematological: Negative  Psychiatric/Behavioral: Negative  Historical Information   Past Medical History:   Diagnosis Date    Arthritis     Cardiac disease     Closed fracture of fibula, lateral malleolus, right     Complete heart block (HCC)     Diabetes mellitus ([address] )     Disease of thyroid gland     External incisional dehiscence     GERD (gastroesophageal reflux disease)     Heart disease     Muscular deconditioning     Pacemaker     Sleep apnea     cannot tolerate cpap    Thyroid disease      Past Surgical History:   Procedure Laterality Date    CARDIAC PACEMAKER PLACEMENT Left 2015    CARDIAC PACEMAKER PLACEMENT      CARDIAC SURGERY      CATARACT EXTRACTION      MUSCLE FLAP      right foot    NERVE BLOCK      Transforaminal Epidural Lumbar with Fluoroscopic Guidance     VA COLONOSCOPY FLX DX W/COLLJ SPEC WHEN PFRMD N/A 5/4/2018    Procedure: EGD AND COLONOSCOPY;  Surgeon: Fernando Melo MD;  Location: [facility];   Service: Gastroenterology    VA SPLIT [address] <100 SQCM Right 6/10/2019    Procedure: SKIN GRAFT SPLIT THICKNESS (STSG)  EXTREMITY right leg;  Surgeon: Shola Agarwal DPM;  Location:  MAIN OR;  Service: Podiatry    TOE AMPUTATION      left 5th toe     Social History   Social History     Substance and Sexual Activity   Alcohol Use Yes    Alcohol/week: 0 0 standard drinks    Frequency: Never    Drinks per session: Patient refused    Binge frequency: Never    Comment: socially     Social History     Substance and Sexual Activity   Drug Use No     Social History     Tobacco Use   Smoking Status Former Smoker    Types: Pipe   Smokeless Tobacco Never Used     Family History:   Family History   Problem Relation Age of Onset    Diabetes Mother     Heart disease Mother     Thyroid disease Mother         Disorder     Cancer Brother     Gout Brother     Diabetes Maternal Grandmother        Meds/Allergies   Current Facility-Administered Medications   Medication Dose Route Frequency Provider Last Rate Last Dose    aspirin chewable tablet 81 mg  81 mg Oral Daily Agustin Chavez MD   81 mg at 07/15/19 0817    atorvastatin (LIPITOR) tablet 10 mg  10 mg Oral Daily With Preston Pena MD   10 mg at 07/14/19 1736    cyanocobalamin (VITAMIN B-12) tablet 2,000 mcg  2,000 mcg Oral Daily Agustin Chavez MD   2,000 mcg at 07/15/19 0817    DULoxetine (CYMBALTA) delayed release capsule 30 mg  30 mg Oral Daily Agustin Chavez MD   30 mg at 07/15/19 0817    heparin (porcine) subcutaneous injection 5,000 Units  5,000 Units Subcutaneous [facility] Lorraine Langston PA-C   5,000 Units at 07/15/19 1359    insulin glargine (LANTUS) subcutaneous injection 15 Units 0 15 mL  15 Units Subcutaneous HS Lorraine Langston PA-C   15 Units at 07/14/19 2214    insulin lispro (HumaLOG) 100 units/mL subcutaneous injection 1-5 Units  1-5 Units Subcutaneous HS Lorraine Langston PA-C   3 Units at 07/14/19 2215    insulin lispro (HumaLOG) 100 units/mL subcutaneous injection 1-5 Units  1-5 Units Subcutaneous TID AC Lorraine Langston PA-C   3 Units at 07/14/19 1737    insulin lispro (HumaLOG) 100 units/mL subcutaneous injection 5 Units  5 Units Subcutaneous BID before breakfast/lunch Agustin Chavez MD   5 Units at 07/15/19 1134    levothyroxine tablet 150 mcg  150 mcg Oral Early Morning Agustin Chavez MD   150 mcg at 07/15/19 0544    lisinopril (ZESTRIL) tablet 20 mg  20 mg Oral BID Agustin Chavez MD   20 mg at 07/15/19 0817    pantoprazole (PROTONIX) EC tablet 40 mg  40 mg Oral Early Morning Agustin Chavez MD   40 mg at 07/15/19 0544    sodium chloride (PF) 0 9 % injection 3 mL  3 mL Intravenous PRN Lorraine Langston PA-C        tamsulosin [facility]) capsule 0 4 mg  0 4 mg Oral Daily With Preston Pena MD   0 4 mg at 07/14/19 1737     Allergies   Allergen Reactions    Ibuprofen      sensitive    Iodine Rash Objective   Vitals: Blood pressure 122/65, pulse 75, temperature 97 5 °F (36 4 °C), temperature source Oral, resp  rate 18, height 5' 8" (1 727 m), weight 62 7 kg (138 lb 3 2 oz), SpO2 99 %  Intake/Output Summary (Last 24 hours) at 7/15/2019 1521  Last data filed at 7/15/2019 1001  Gross per 24 hour   Intake    Output 2100 ml   Net -2100 ml     Invasive Devices     Peripheral Intravenous Line            Peripheral IV 07/13/19 Left Forearm 1 day    Peripheral IV 07/13/19 Right Forearm 1 day                Physical Exam   Constitutional: He is oriented to person, place, and time  He appears well-developed and well-nourished  No distress  HENT:   Head: Normocephalic and atraumatic  Eyes: Pupils are equal, round, and reactive to light  EOM are normal    Neck: Normal range of motion  Neck supple  No thyromegaly present  Cardiovascular: Normal rate, regular rhythm and normal heart sounds  Pulmonary/Chest: Effort normal and breath sounds normal  No respiratory distress  Musculoskeletal: Normal range of motion  He exhibits no edema or deformity  Neurological: He is alert and oriented to person, place, and time  Skin: Skin is warm and dry  No erythema  Psychiatric: He has a normal mood and affect  Vitals reviewed  The history was obtained from the review of the chart, patient      Lab Results:   Results from last 7 days   Lab Units 07/14/19  0520   HEMOGLOBIN A1C % 8 9*     Lab Results   Component Value Date    WBC 7 14 07/14/2019    HGB 10 3 (L) 07/14/2019    HCT 30 7 (L) 07/14/2019    MCV 91 07/14/2019     (L) 07/14/2019     Lab Results   Component Value Date/Time    BUN 20 07/15/2019 04:42 AM    BUN 24 06/24/2015 06:00 AM     (L) 06/24/2015 06:00 AM    K 4 3 07/15/2019 04:42 AM    K 4 9 06/24/2015 06:00 AM     07/15/2019 04:42 AM    CL 96 (L) 06/24/2015 06:00 AM    CO2 28 07/15/2019 04:42 AM    CO2 22 07/13/2019 04:32 PM    CREATININE 1 05 07/15/2019 04:42 AM CREATININE 0 96 06/24/2015 06:00 AM    AST 59 (H) 07/13/2019 04:29 PM    AST 14 06/21/2015 04:21 AM    ALT 55 07/13/2019 04:29 PM    ALT 24 06/21/2015 04:21 AM    ALB 3 4 (L) 07/13/2019 04:29 PM    ALB 2 8 (L) 06/21/2015 04:21 AM     Recent Labs     07/14/19  0516   HDL 72*   TRIG 19     No results found for: MICROALBUR, [id number]  POC Glucose (mg/dl)   Date Value   07/15/2019 106   07/15/2019 80   07/15/2019 144 (H)   07/15/2019 55 (L)   07/14/2019 290 (H)   07/14/2019 297 (H)   07/14/2019 295 (H)   07/14/2019 135   07/14/2019 155 (H)   07/14/2019 186 (H)       Imaging Studies: I have personally reviewed pertinent reports  Portions of the record may have been created with voice recognition software

## 2019-07-15 NOTE — PROGRESS NOTES
Progress Note - Kassy Dior 1945, 68 y o  male MRN: [id number]    Unit/Bed#: -01 Encounter: [id number]    Primary Care Provider: Flory Lake MD   Date and time admitted to hospital: 2019  4:14 PM       DKA (diabetic ketoacidoses) ([facility] [address] )  Assessment & Plan  Blood sugars improved  Noted to have an episode of hypoglycemia overnight  Will d/c evening mealtime insulin; continue with basal insulin  Pending Endocrinology consultation   Continue to monitor accuchecks  Diabetic diet  HbA1c 8 9     Hyperlipidemia  Assessment & Plan  Continue with statin     Essential hypertension  Assessment & Plan  Continue with home meds     Hypothyroidism  Assessment & Plan  TSH low; will defer to Endo to adjust Synthroid       VTE Pharmacologic Prophylaxis:   Pharmacologic: Heparin  Mechanical VTE Prophylaxis in Place: Yes  Patient Centered Rounds: I have performed bedside rounds with nursing staff today  Time Spent for Care: 30 minutes  More than 50% of total time spent on counseling and coordination of care as described above  Current Length of Stay: 2 day(s)  Current Patient Status: Inpatient   Certification Statement: The patient will continue to require additional inpatient hospital stay due to blood sugar control, malfunctioning insulin pump  Discharge Plan: home  Code Status: Prior      Subjective:   No active complaints  Noted to have an episode of hypoglycemia last night    Objective:     Vitals:   Temp (24hrs), Av 7 °F (36 5 °C), Min:97 5 °F (36 4 °C), Max:97 9 °F (36 6 °C)    Temp:  [97 5 °F (36 4 °C)-97 9 °F (36 6 °C)] 97 5 °F (36 4 °C)  HR:  [75-81] 75  Resp:  [18] 18  BP: (122-168)/(65-78) 122/65  SpO2:  [97 %-99 %] 99 %  Body mass index is 21 01 kg/m²  Input and Output Summary (last 24 hours):        Intake/Output Summary (Last 24 hours) at 7/15/2019 1130  Last data filed at 7/15/2019 1001  Gross per 24 hour   Intake    Output 2100 ml   Net -2100 ml     Physical Exam  Gen -Patient comfortable   Neck- Supple  No thyromegaly or lymphadenopathy  Lungs-Clear bilaterally without any wheeze or rales   Heart S1-S2, regular rate and rhythm, no murmurs  Abdomen-soft nontender, no organomegaly  Bowel sounds present  Extremities-no cyanosis, clubbing or edema  Skin- no rash  Neuro-nonfocal     Additional Data:   Labs:    Results from last 7 days   Lab Units 07/14/19  0520  07/13/19  1629   WBC Thousand/uL 7 14  --  7 84   HEMOGLOBIN g/dL 10 3*  --  12 6   I STAT HEMOGLOBIN   --    < >  --    HEMATOCRIT % 30 7*  --  39 4   HEMATOCRIT, ISTAT   --    < >  --    PLATELETS Thousands/uL 111*  --  110*   NEUTROS PCT %  --   --  88*   LYMPHS PCT %  --   --  4*   MONOS PCT %  --   --  7   EOS PCT %  --   --  1    < > = values in this interval not displayed  Results from last 7 days   Lab Units 07/15/19  0442  07/13/19  1629   SODIUM mmol/L 138   < > 124*   POTASSIUM mmol/L 4 3   < > 6 3*   CHLORIDE mmol/L 106   < > 90*   CO2 mmol/L 28   < > 20*   CO2, I-STAT   --    < >  --    BUN mg/dL 20   < > 42*   CREATININE mg/dL 1 05   < > 2 32*   ANION GAP mmol/L 4   < > 14*   CALCIUM mg/dL 8 5   < > 9 5   ALBUMIN g/dL  --   --  3 4*   TOTAL BILIRUBIN mg/dL  --   --  0 70   ALK PHOS U/L  --   --  125*   ALT U/L  --   --  55   AST U/L  --   --  59*   GLUCOSE RANDOM mg/dL 128   < > 732*    < > = values in this interval not displayed  Results from last 7 days   Lab Units 07/15/19  0717 07/15/19  0437 07/15/19  0357 07/14/19  2124 07/14/19  1602 07/14/19  1112 07/14/19  0735 07/14/19  0518 07/14/19  0358 07/14/19  0349 07/14/19  0339 07/14/19  0209   POC GLUCOSE mg/dl 80 144* 55* 290* 297* 295* 135 155* 186* 40* 48* 106     Results from last 7 days   Lab Units 07/14/19  0520   HEMOGLOBIN A1C % 8 9*     Results from last 7 days   Lab Units 07/13/19  1629   LACTIC ACID mmol/L 2 9*     * I Have Reviewed All Lab Data Listed Above  * Additional Pertinent Lab Tests Reviewed:  All Labs Within Last 24 Hours Reviewed Last 24 Hours Medication List:     Current Facility-Administered Medications:  aspirin 81 mg Oral Daily Chris Madden MD   atorvastatin 10 mg Oral Daily With Justin Mccullough MD   cyanocobalamin 2,000 mcg Oral Daily Chris Madden MD   DULoxetine 30 mg Oral Daily Chris Madden MD   heparin (porcine) 5,000 Units Subcutaneous [facility] Evelyn Corrales PA-C   insulin glargine 15 Units Subcutaneous HS Evelyn Corrales PA-C   insulin lispro 1-5 Units Subcutaneous HS Evelyn Corrales PA-C   insulin lispro 1-5 Units Subcutaneous TID JESSIE Smith PA-C   insulin lispro 5 Units Subcutaneous BID before breakfast/lunch Chris Madden MD   levothyroxine 150 mcg Oral Early Morning Chris Madden MD   lisinopril 20 mg Oral BID Chris Madden MD   pantoprazole 40 mg Oral Early Morning Chris Madden MD   sodium chloride (PF) 3 mL Intravenous PRN Evelyn Corrales PA-C   tamsulosin 0 4 mg Oral Daily With Justin Mccullough MD        Today, Patient Was Seen By: Chris Madden MD    ** Please Note: Dictation voice to text software may have been used in the creation of this document   **

## 2019-07-15 NOTE — PLAN OF CARE
Problem: Potential for Falls  Goal: Patient will remain free of falls  Description  INTERVENTIONS:  - Assess patient frequently for physical needs  -  Identify cognitive and physical deficits and behaviors that affect risk of falls    -  [place] fall precautions as indicated by assessment   - Educate patient/family on patient safety including physical limitations  - Instruct patient to call for assistance with activity based on assessment  - Modify environment to reduce risk of injury  - Consider OT/PT consult to assist with strengthening/mobility  Outcome: Progressing     Problem: Prexisting or High Potential for Compromised Skin Integrity  Goal: Skin integrity is maintained or improved  Description  INTERVENTIONS:  - Identify patients at risk for skin breakdown  - Assess and monitor skin integrity  - Assess and monitor nutrition and hydration status  - Monitor labs (i e  albumin)  - Assess for incontinence   - Turn and reposition patient  - Assist with mobility/ambulation  - Relieve pressure over bony prominences  - Avoid friction and shearing  - Provide appropriate hygiene as needed including keeping skin clean and dry  - Evaluate need for skin moisturizer/barrier cream  - Collaborate with interdisciplinary team (i e  Nutrition, Rehabilitation, etc )   - Patient/family teaching  Outcome: Progressing

## 2019-07-16 VITALS
HEART RATE: 81 BPM | SYSTOLIC BLOOD PRESSURE: 165 MMHG | OXYGEN SATURATION: 98 % | RESPIRATION RATE: 18 BRPM | BODY MASS INDEX: 20.95 KG/M2 | DIASTOLIC BLOOD PRESSURE: 82 MMHG | HEIGHT: 68 IN | TEMPERATURE: 97.7 F | WEIGHT: 138.2 LBS

## 2019-07-16 PROBLEM — E11.10 DKA (DIABETIC KETOACIDOSES): Status: RESOLVED | Noted: 2019-03-31 | Resolved: 2019-07-16

## 2019-07-16 LAB
ATRIAL RATE: 104 BPM
GLUCOSE SERPL-MCNC: 185 MG/DL (ref 65–140)
GLUCOSE SERPL-MCNC: 244 MG/DL (ref 65–140)
GLUCOSE SERPL-MCNC: 85 MG/DL (ref 65–140)
P AXIS: 87 DEGREES
QRS AXIS: -87 DEGREES
QRSD INTERVAL: 166 MS
QT INTERVAL: 406 MS
QTC INTERVAL: 533 MS
T WAVE AXIS: 79 DEGREES
VENTRICULAR RATE: 104 BPM

## 2019-07-16 PROCEDURE — 82948 REAGENT STRIP/BLOOD GLUCOSE: CPT

## 2019-07-16 PROCEDURE — 99239 HOSP IP/OBS DSCHRG MGMT >30: CPT | Performed by: INTERNAL MEDICINE

## 2019-07-16 PROCEDURE — 93010 ELECTROCARDIOGRAM REPORT: CPT | Performed by: INTERNAL MEDICINE

## 2019-07-16 RX ADMIN — ASPIRIN 81 MG 81 MG: 81 TABLET ORAL at 08:49

## 2019-07-16 RX ADMIN — DULOXETINE HYDROCHLORIDE 30 MG: 30 CAPSULE, DELAYED RELEASE ORAL at 08:49

## 2019-07-16 RX ADMIN — HEPARIN SODIUM 5000 UNITS: 5000 INJECTION INTRAVENOUS; SUBCUTANEOUS at 06:01

## 2019-07-16 RX ADMIN — LISINOPRIL 20 MG: 20 TABLET ORAL at 08:49

## 2019-07-16 RX ADMIN — PANTOPRAZOLE SODIUM 40 MG: 40 TABLET, DELAYED RELEASE ORAL at 06:01

## 2019-07-16 RX ADMIN — LEVOTHYROXINE SODIUM 112 MCG: 112 TABLET ORAL at 06:01

## 2019-07-16 RX ADMIN — HEPARIN SODIUM 5000 UNITS: 5000 INJECTION INTRAVENOUS; SUBCUTANEOUS at 13:10

## 2019-07-16 RX ADMIN — CYANOCOBALAMIN TAB 500 MCG 2000 MCG: 500 TAB at 08:49

## 2019-07-16 NOTE — DISCHARGE SUMMARY
Discharge Summary - [facility] Internal Medicine    Patient Information: Yahir Richardson 68 y o  male MRN: [id number]  Unit/Bed#: -01 Encounter: [id number]    Discharging Physician / Practitioner: Bassam Rodas MD  PCP: Ignacio Crowley MD  Admission Date: 7/13/2019  Discharge Date: 07/16/19    Reason for Admission: DKA    Discharge Diagnoses:     Principal Problem (Resolved):    DKA (diabetic ketoacidoses) ([address] )  Active Problems:    Hypothyroidism    Essential hypertension    Hyperlipidemia      Consultations During Hospital Stay:  · Dr Bonnie Hamilton Endocrinology    Procedures Performed:     · none      Incidental Findings:   · none    Test Results Pending at Discharge (will require follow up):   · none     Outpatient Tests Requested:  · none    Complications: none    Hospital Course:     Yahir Richardson is a 68 y o  male patient who originally presented to the hospital on 7/13/2019 due to DKA with known Type 21 diabetes  This likely happened due to canula kink in the insulin pump  He was initially admitted and given IV insulin then seen by Endocrinology and restarted pump with improvement and was discharged in a stable condition  Condition at Discharge: fair     Discharge Day Visit / Exam:     Vitals: Blood Pressure: 165/82 (07/16/19 0900)  Pulse: 81 (07/16/19 0730)  Temperature: 97 7 °F (36 5 °C) (07/16/19 0730)  Temp Source: Oral (07/16/19 0730)  Respirations: 18 (07/16/19 0730)  Height: 5' 8" (172 7 cm) (07/13/19 1916)  Weight - Scale: 62 7 kg (138 lb 3 2 oz) (07/13/19 1916)  SpO2: 98 % (07/16/19 0730)  Exam:   Physical Exam   Constitutional: He is oriented to person, place, and time  HENT:   Head: Normocephalic  Mouth/Throat: Oropharynx is clear and moist    Eyes: Pupils are equal, round, and reactive to light  Cardiovascular: Normal rate and normal heart sounds  Pulmonary/Chest: Effort normal and breath sounds normal    Neurological: He is alert and oriented to person, place, and time  Discharge instructions/Information to patient and family:   See after visit summary for information provided to patient and family  Provisions for Follow-Up Care:  See after visit summary for information related to follow-up care and any pertinent home health orders  Disposition: Home    Discharge Statement:  I spent 35 minutes discharging the patient  This time was spent on the day of discharge  I had direct contact with the patient on the day of discharge  Greater than 50% of the total time was spent examining patient, answering all patient questions, arranging and discussing plan of care with patient as well as directly providing post-discharge instructions  Additional time then spent on discharge activities  Discharge Medications:  See after visit summary for reconciled discharge medications provided to patient and family  ** Please Note: Dragon 360 Dictation voice to text software may have been used in the creation of this document   **

## 2019-07-16 NOTE — PLAN OF CARE
Problem: Potential for Falls  Goal: Patient will remain free of falls  Description  INTERVENTIONS:  - Assess patient frequently for physical needs  -  Identify cognitive and physical deficits and behaviors that affect risk of falls    -  [place] fall precautions as indicated by assessment   - Educate patient/family on patient safety including physical limitations  - Instruct patient to call for assistance with activity based on assessment  - Modify environment to reduce risk of injury  - Consider OT/PT consult to assist with strengthening/mobility  Outcome: Progressing     Problem: Prexisting or High Potential for Compromised Skin Integrity  Goal: Skin integrity is maintained or improved  Description  INTERVENTIONS:  - Identify patients at risk for skin breakdown  - Assess and monitor skin integrity  - Assess and monitor nutrition and hydration status  - Monitor labs (i e  albumin)  - Assess for incontinence   - Turn and reposition patient  - Assist with mobility/ambulation  - Relieve pressure over bony prominences  - Avoid friction and shearing  - Provide appropriate hygiene as needed including keeping skin clean and dry  - Evaluate need for skin moisturizer/barrier cream  - Collaborate with interdisciplinary team (i e  Nutrition, Rehabilitation, etc )   - Patient/family teaching  Outcome: Progressing

## 2019-07-17 ENCOUNTER — OFFICE VISIT (OUTPATIENT)
Dept: PODIATRY | Facility: CLINIC | Age: 74
End: 2019-07-17
Payer: COMMERCIAL

## 2019-07-17 ENCOUNTER — TRANSITIONAL CARE MANAGEMENT (OUTPATIENT)
Dept: FAMILY MEDICINE CLINIC | Facility: CLINIC | Age: 74
End: 2019-07-17

## 2019-07-17 VITALS
HEIGHT: 68 IN | BODY MASS INDEX: 20.4 KG/M2 | HEART RATE: 87 BPM | WEIGHT: 134.6 LBS | SYSTOLIC BLOOD PRESSURE: 134 MMHG | DIASTOLIC BLOOD PRESSURE: 64 MMHG

## 2019-07-17 DIAGNOSIS — T86.821 SKIN GRAFT FAILURE: Primary | ICD-10-CM

## 2019-07-17 PROCEDURE — 11042 DBRDMT SUBQ TIS 1ST 20SQCM/<: CPT | Performed by: PODIATRIST

## 2019-07-17 NOTE — PROGRESS NOTES
Assessment:  1  Right calf wound at site of prior skin graft  2  Partial failure of skin graft Right calf    Plan:  1  PROCEDURE NOTE     A formal timeout including patient identification, laterality and existing allergies using [facility] protocol was conducted  Procedure Performed: Debridement of subcutaneous tissue- >20 sq cm (96414)  Under aseptic technique, the wound was thoroughly explored and bluntly probed for undermining, deep sinus tracts and bone involvement  Sterile instrumentation including scalpel, forceps and curette used to excise the clearly delineated devitalized subcutaneous tissue (fibrotic tissue and necrotic non-viable portions of fat) exposing viable tissue  After debridement, bleeding in the wound bed base was noted indicated viable subcutaneous tissue had been exposed  Bleeding controlled with gentle pressure  Patient tolerated debridement without complications  The wound was dressed  Wound dressing (Unna boot over maxorb Ag applied) to be left intact x 1 week  I am to be called if any signs of infection develop such as erythema, increased wound size or significant change in appearance of wound, odor, pain, increased or change in color of drainage, swelling, fever, chills, nightsweats  I reviewed these signs with the patient  I recommended limiting WB to bathroom priveleges and meal table and to use any prescribed offloading devices in an effort to allow proper rest and healing of the wounded area  I explained that good wound care and compliance are necessary to allow healing and to prevent toe loss or limb loss  Problem List Items Addressed This Visit        Musculoskeletal and Integument    Skin graft failure - Primary             Diagnoses and all orders for this visit:    Skin graft failure          Subjective:      Patient ID: Tony Freeman is a 68 y o  male  [facility] is here for a dressing change and wound evaluation of his Right leg  He presents with a DSD on the leg  He was in the hospital early this week for ketoacidosis  His Unna boot was removed when he was in the hospital       The following portions of the patient's history were reviewed and updated as appropriate: allergies, current medications, past family history, past medical history, past social history, past surgical history and problem list     Review of Systems   Constitutional: Negative  Objective:      /64   Pulse 87   Ht 5' 8" (1 727 m) Comment: verbal  Wt 61 1 kg (134 lb 9 6 oz)   BMI 20 47 kg/m²          Physical Exam   Constitutional: He appears well-developed and well-nourished  No distress  Skin: He is not diaphoretic  Nursing note and vitals reviewed        Wound # 1   Location: Right posterior calf  Length 3 0cm: Width 0 7cm: Depth 0 2cm:   Deepest Tissue Noted in Base: SubQ  Probe to Bone?: No  Peripheral Skin Description: Intact healed skin graft  Granulation: 80% Fibrotic Tissue: 20% Necrotic Tissue: 0%  Drainage Amount: Scant  Signs of Infection: None  Total debrided 2 1 square centimeters

## 2019-07-22 ENCOUNTER — REMOTE DEVICE CLINIC VISIT (OUTPATIENT)
Dept: CARDIOLOGY CLINIC | Facility: CLINIC | Age: 74
End: 2019-07-22
Payer: COMMERCIAL

## 2019-07-22 DIAGNOSIS — Z95.0 PRESENCE OF PERMANENT CARDIAC PACEMAKER: Primary | ICD-10-CM

## 2019-07-22 PROCEDURE — 93294 REM INTERROG EVL PM/LDLS PM: CPT | Performed by: INTERNAL MEDICINE

## 2019-07-22 PROCEDURE — 93296 REM INTERROG EVL PM/IDS: CPT | Performed by: INTERNAL MEDICINE

## 2019-07-22 NOTE — PROGRESS NOTES
Results for orders placed or performed in visit on 07/22/19   Cardiac EP device report    Narrative    MDT DUAL PM  CARELINK TRANSMISSION: BATTERY VOLTAGE ADEQUATE ( 4 5 YRS)  AP - 1 4%  - 99 9% (>40%/ AVB - MVP OFF)  ALL AVAILABLE LEAD PARAMETERS WITHIN NORMAL LIMITS  NO SIGNIFICANT HIGH RATE EPISODES  NORMAL DEVICE FUNCTION     EB

## 2019-07-23 ENCOUNTER — OFFICE VISIT (OUTPATIENT)
Dept: FAMILY MEDICINE CLINIC | Facility: CLINIC | Age: 74
End: 2019-07-23
Payer: COMMERCIAL

## 2019-07-23 VITALS
HEART RATE: 80 BPM | OXYGEN SATURATION: 96 % | RESPIRATION RATE: 16 BRPM | BODY MASS INDEX: 19.7 KG/M2 | WEIGHT: 130 LBS | DIASTOLIC BLOOD PRESSURE: 70 MMHG | HEIGHT: 68 IN | SYSTOLIC BLOOD PRESSURE: 128 MMHG

## 2019-07-23 DIAGNOSIS — IMO0001 TRANSITION OF CARE PERFORMED WITH SHARING OF CLINICAL SUMMARY: Primary | ICD-10-CM

## 2019-07-23 DIAGNOSIS — I10 ESSENTIAL HYPERTENSION: Chronic | ICD-10-CM

## 2019-07-23 DIAGNOSIS — D64.9 ANEMIA, UNSPECIFIED TYPE: ICD-10-CM

## 2019-07-23 DIAGNOSIS — E03.9 HYPOTHYROIDISM, UNSPECIFIED TYPE: Chronic | ICD-10-CM

## 2019-07-23 DIAGNOSIS — R80.9 MICROALBUMINURIA: ICD-10-CM

## 2019-07-23 DIAGNOSIS — E78.2 MIXED HYPERLIPIDEMIA: Chronic | ICD-10-CM

## 2019-07-23 DIAGNOSIS — R73.9 HYPERGLYCEMIA: ICD-10-CM

## 2019-07-23 DIAGNOSIS — E11.9 DM TYPE 2, NOT AT GOAL (HCC): ICD-10-CM

## 2019-07-23 PROCEDURE — 99496 TRANSJ CARE MGMT HIGH F2F 7D: CPT | Performed by: FAMILY MEDICINE

## 2019-07-23 PROCEDURE — 1111F DSCHRG MED/CURRENT MED MERGE: CPT | Performed by: FAMILY MEDICINE

## 2019-07-23 NOTE — PROGRESS NOTES
Assessment:  1  Right venous ulcer s/p SPTSG  2  Partial failure of SPTSG Right lower leg  3  Ingrown nail medial Right hallux nail    Plan:    1  Wound Debridement Right calf: A formal timeout including patient identification, laterality and existing allergies [facility] was conducted  Procedure Performed: Debridement of subcutaneous tissue- <20 sq cm (26027)  Anesthetic used as needed to reduce discomfort  Under aseptic technique, the wound was thoroughly explored for undermining, deep sinus tracts and bone involvement  Sterile instrumentation including scalpel used to excise the clearly delineated devitalized subcutaneous tissue exposing viable tissue  Bleeding controlled with gentle pressure  Patient tolerated debridement without complications  The wound was dressed  Wound dressing technique and frequency described to patient  I am to be called if any signs of infection develop such as erythema, increased wound size or significant change in appearance of wound, odor, pain, increased or change in color of drainage, swelling, fever, chills, nightsweats  I reviewed these signs with the patient  I recommended limiting leg dependency and to use any prescribed compression devices in an effort to allow proper fluid control and healing of the wounded area  I was very clear that sleeping with legs dependent is to be avoided at all costs  I explained that good wound care and compliance are necessary to allow healing and to prevent limb loss  Silver alginate applied to wound bed, covered with abd  Unna boot applied using paste wrap and coban  Applied from base of toes to infrapatellar notch  To remain dry and intact       2  Ingrown nail removal Right hallux: Nail removal  Date/Time: 7/25/2019 1:31 PM  Performed by: Drew Obando DPM  Authorized by: Drew Obando DPM     Patient location:  Clinic  Other Assisting Provider: No    Consent:     Consent obtained:  Verbal    Consent given by: Patient    Risks discussed:  Bleeding, incomplete removal, infection, pain and permanent nail deformity    Alternatives discussed:  Delayed treatment  Universal protocol:     Procedure explained and questions answered to patient or proxy's satisfaction: yes      Site/side marked: yes      Immediately prior to procedure a time out was called: yes      Patient identity confirmed:  Verbally with patient  Location:     Foot:  R big toe  Pre-procedure details:     Skin preparation:  Alcohol  Anesthesia (see MAR for exact dosages): Anesthesia method:  Local infiltration    Local anesthetic:  Lidocaine 1% w/o epi  Nail Removal:     Nail removed:  Vertical    Nail bed sutured: no      Removed nail replaced and anchored: no    Trephination:     Subungual hematoma drained: no    Ingrown nail:     Wedge excision of skin: no      Nail matrix removed or ablated:  None  Post-procedure details:     Dressing:  Antibiotic ointment and gauze roll    Patient tolerance of procedure: Tolerated well, no immediate complications            Problem List Items Addressed This Visit     None             There are no diagnoses linked to this encounter  Subjective:      Patient ID: Tony Freeman is a 68 y o  male  Pt arrives for wound care follow up  Presents with unna boot dry and intact  He has a secondary issue with pain, drainage Right medial hallux nail  The following portions of the patient's history were reviewed and updated as appropriate: allergies, current medications, past family history, past medical history, past social history, past surgical history and problem list     Review of Systems   Constitutional: Negative  Objective:      /66   Pulse 86   Ht 5' 8" (1 727 m) Comment: per pt  Wt 59 kg (130 lb) Comment: perpt  BMI 19 77 kg/m²          Physical Exam   Constitutional: He appears well-developed and well-nourished  No distress  Skin: He is not diaphoretic     Nursing note and vitals reviewed  Wound # 1   Location: right lower leg posterior  Length 5cm: Width 1 5cm: Depth 0 2cm:   Deepest Tissue Noted in Base: sq  Probe to Bone?: no  Peripheral Skin Description: intact  Granulation: 90% Fibrotic Tissue: 10% Necrotic Tissue: 0%  Drainage Amount: moderate  Signs of Infection: no  Total debrided 7 5 square centimeters        I note a paronychia of the medial Right hallux nail margin with erythema, tenderness, edema and an obvious ingrown nail with some mild serous drainage       Ingrown nail removed

## 2019-07-23 NOTE — ASSESSMENT & PLAN NOTE
Patient's TSH is 0 27 on 01/20 5 micrograms levothyroxine  Has an appointment with his endocrine Dr Oliverio Floyd next week  Advised to check TSH 2 days prior to his appointment for medication readjustment

## 2019-07-23 NOTE — ASSESSMENT & PLAN NOTE
Lab Results   Component Value Date    HGBA1C 8 9 (H) 07/14/2019       No results for input(s): POCGLU in the last 72 hours  Blood Sugar Average: Last 72 hrs:   patient on insulin pump  Will follow up with his endocrine next week

## 2019-07-23 NOTE — PROGRESS NOTES
FAMILY MEDICINE TRANSITION OF CARE OFFICE VISIT  [facility]    NAME: Beatriz Parker  AGE: 68 y o  SEX: male  : 1945       DATE: 2019    Assessment and Plan     Hypothyroidism  Patient's TSH is 0 27 on  5 micrograms levothyroxine  Has an appointment with his endocrine Dr Dayron Stroud next week  Advised to check TSH 2 days prior to his appointment for medication readjustment  DM type 2, not at goal [facility])  Lab Results   Component Value Date    HGBA1C 8 9 (H) 2019       No results for input(s): POCGLU in the last 72 hours  Blood Sugar Average: Last 72 hrs:   patient on insulin pump  Will follow up with his endocrine next week  Essential hypertension  Blood pressure is on goal on lisinopril 20 milligram   Continue same  Hyperlipidemia  Stable on atorvastatin 10 milligrams  Continue same  Anemia  Check hemoglobin now and at 3 months interval    Transition of care performed with sharing of clinical summary  Patient is back on insulin pump  Does not have any questions or concerns today  Has an upcoming appointment with his Endocrine to follow-up on diabetes and hypothyroidism  Will continue taking all the medications  Suggested to repeat TSH 2 days prior to his appointment for medication dose  readjustment  Microalbuminuria  Suggested to repeat at 3 months interval   Continue lisinopril          - Counseling Documentation: patient was counseled regarding: diagnostic results, instructions for management, risk factor reductions, prognosis, patient and family education, impressions, risks and benefits of treatment options and importance of compliance with treatment    Transitional Care Management Review     Tank Guzman is a 68 y o  male here for TCM follow-up    During the TCM phone call patient stated:    TCM Call (since 2019)     Date and time call was made  2019  9:46 AM    Hospital care reviewed  Records reviewed    Patient was hospitialized at  [address] [place] [address]    Date of Admission  07/13/19    Date of discharge  07/16/19    Diagnosis  DKA (DIABETIC KETOACIDOSES    Disposition  Home    Were the patients medications reviewed and updated  Yes    Current Symptoms  None      TCM Call (since 6/22/2019)     Post hospital issues  None    Should patient be enrolled in anticoag monitoring? No    Scheduled for follow up? Yes    Patients specialists  Endocrinologist    Did you obtain your prescribed medications  Yes    Do you need help managing your prescriptions or medications  No    Is transportation to your appointment needed  No    I have advised the patient to call PCP with any new or worsening symptoms  AKIKO GIVENS MA    Living Arrangements  Spouse or Significiant other    Support System  Family    Do you have social support  Yes, as much as I need    Are you recieving any outpatient services  No    Are you recieving home care services  No    Are you using any community resources  No    Current waiver services  No    Have you fallen in the last 12 months  No    Interperter language line needed  No          History of Present Illness     HPI  Patient is here to follow-up from most recent hospital admission with type 1 diabetes with hyperglycemia admitted diabetic ketoacidosis and acute kidney failure  Patient was evaluated by Endocrine  The etiology for DKA was due to a kink of cannula  Patient is back on insulin pump  Does not have any specific concerns today  He has an appointment with his Endocrine at [place] next week  Acute kidney injury resolved  Patient's TSH was 0 27, currently on 01/20 5 micrograms of levothyroxine  Patient's thyroid is monitored by his endocrine, Dr Modesto Marie      The following portions of the patient's history were reviewed and updated as appropriate: allergies, current medications, past family history, past medical history, past social history, past surgical history and problem list     Review of Systems       Review of Systems   Constitutional: Negative  Respiratory: Negative  Cardiovascular: Negative  Gastrointestinal: Negative  Musculoskeletal: Negative  Negative for myalgias  Neurological: Negative  Psychiatric/Behavioral: Negative  Active Problem List     Patient Active Problem List   Diagnosis    Acid reflux    Hypothyroidism    Anemia    Arthritis    Diabetes mellitus with background retinopathy ([facility] [address] )    Diabetic peripheral neuropathy ([facility] )    DM type 2, not at goal [facility])    Essential hypertension    Hyperlipidemia    Insulin pump status    Medtronic dual chamber PM    Screening for colon cancer    Preop cardiovascular exam    Risk for falls    Stage 3 chronic kidney disease (HCC)    Thrombocytopenia (HCC)    High anion gap metabolic acidosis    Transaminitis    Hyponatremia    Low bicarbonate    Pacemaker    Hyperglycemia    Vitreous hemorrhage of left eye (HCC)    Transition of care performed with sharing of clinical summary    Microalbuminuria    Complete heart block (HCC)    Preoperative clearance    Wound of right leg    Post-operative state    Status post split thickness skin graft    Skin graft failure       Objective     /70   Pulse 80   Resp 16   Ht 5' 8" (1 727 m)   Wt 59 kg (130 lb)   SpO2 96%   BMI 19 77 kg/m²     Physical Exam   Constitutional: He is oriented to person, place, and time  He appears well-developed and well-nourished  Cardiovascular: Normal rate, regular rhythm and normal heart sounds  Pulses are no weak pulses  Pulmonary/Chest: Effort normal and breath sounds normal    Abdominal: Soft  Bowel sounds are normal    Feet:   Right Foot:   Skin Integrity: Negative for ulcer, skin breakdown, erythema, warmth, callus or dry skin  Left Foot:   Skin Integrity: Negative for ulcer, skin breakdown, erythema, warmth, callus or dry skin     Neurological: He is alert and oriented to person, place, and time  Psychiatric: His behavior is normal  Judgment normal      Patient's shoes and socks removed  Right Foot/Ankle   Right Foot Inspection  Skin Exam: skin normal and skin intact no dry skin, no warmth, no callus, no erythema, no maceration, no abnormal color, no pre-ulcer, no ulcer and no callus                          Toe Exam: ROM and strength within normal limits  Sensory   Vibration: intact  Proprioception: intact   Monofilament testing: intact  Vascular  Capillary refills: < 3 seconds      Left Foot/Ankle  Left Foot Inspection  Skin Exam: skin normal and skin intactno dry skin, no warmth, no erythema, no maceration, normal color, no pre-ulcer, no ulcer and no callus                         Toe Exam: ROM and strength within normal limits                   Sensory   Vibration: intact  Proprioception: intact  Monofilament: intact  Vascular  Capillary refills: < 3 seconds    Assign Risk Category:  No deformity present; No loss of protective sensation; No weak pulses       Risk: 0    Sensations on RT foot are slightly diminished as compared to right foot  Rt leg Skin graft is dressed  Pt has appointment with podiatrist tomorrow  Laboratory Results: I have personally reviewed the pertinent laboratory results/reports     Radiology/Other Diagnostic Testing Results: I have personally reviewed pertinent reports  Xr Chest 2 Views    Result Date: 7/14/2019  CHEST INDICATION:   sob  COMPARISON:  3/31/2019 EXAM PERFORMED/VIEWS:  XR CHEST PA & LATERAL FINDINGS: Cardiomediastinal silhouette appears unremarkable  The lungs are clear  No pneumothorax or pleural effusion  Osseous structures appear within normal limits for patient age  No acute cardiopulmonary disease   Workstation performed: [id number]        Current Medications     Current Outpatient Medications:     aspirin 81 mg chewable tablet, Chew 81 mg daily, Disp: , Rfl:     Atorvastatin Calcium (LIPITOR PO), Take 10 mg by mouth daily 1/2 tab daily, Disp: , Rfl:     CALCIUM CITRATE PO, Take 2,000 mg by mouth daily , Disp: , Rfl:     Cholecalciferol (VITAMIN D3) 2000 units capsule, Take 2,000 Units by mouth daily, Disp: , Rfl:     Continuous Blood Gluc Sensor (DEXCOM G6 SENSOR) MISC, As directed dx:250 03, Disp: , Rfl:     cyanocobalamin (VITAMIN B-12) 1,000 mcg tablet, Take 2,000 mcg by mouth daily, Disp: , Rfl:     DULoxetine (CYMBALTA) 30 mg delayed release capsule, Take 30 mg by mouth daily , Disp: , Rfl:     glucose 40 %, Take by mouth once, Disp: , Rfl:     insulin aspart (NovoLOG) 100 units/mL injection, Inject under the skin Via insulin pump, Disp: , Rfl:     levothyroxine 125 mcg tablet, Take 1 tablet (125 mcg total) by mouth daily in the early morning (Patient taking differently: Take 150 mcg by mouth daily in the early morning ), Disp: 30 tablet, Rfl: 0    lisinopril (ZESTRIL) 20 mg tablet, Take 20 mg by mouth 2 (two) times a day , Disp: , Rfl:     pantoprazole (PROTONIX) 40 mg tablet, Take 40 mg by mouth, Disp: , Rfl:     tamsulosin (FLOMAX) 0 4 mg, Take 0 4 mg by mouth daily with dinner  , Disp: , Rfl:     Tera Quesada MD  [place] [id number]

## 2019-07-23 NOTE — ASSESSMENT & PLAN NOTE
Patient is back on insulin pump  Does not have any questions or concerns today  Has an upcoming appointment with his Endocrine to follow-up on diabetes and hypothyroidism  Will continue taking all the medications  Suggested to repeat TSH 2 days prior to his appointment for medication dose  readjustment

## 2019-07-24 ENCOUNTER — PROCEDURE VISIT (OUTPATIENT)
Dept: PODIATRY | Facility: CLINIC | Age: 74
End: 2019-07-24
Payer: COMMERCIAL

## 2019-07-24 VITALS
DIASTOLIC BLOOD PRESSURE: 66 MMHG | HEIGHT: 68 IN | HEART RATE: 86 BPM | BODY MASS INDEX: 19.7 KG/M2 | SYSTOLIC BLOOD PRESSURE: 136 MMHG | WEIGHT: 130 LBS

## 2019-07-24 DIAGNOSIS — T86.821 SKIN GRAFT FAILURE: ICD-10-CM

## 2019-07-24 DIAGNOSIS — S81.801D WOUND OF RIGHT LOWER EXTREMITY, SUBSEQUENT ENCOUNTER: ICD-10-CM

## 2019-07-24 DIAGNOSIS — L60.0 INGROWING NAIL: Primary | ICD-10-CM

## 2019-07-24 PROCEDURE — 11042 DBRDMT SUBQ TIS 1ST 20SQCM/<: CPT | Performed by: PODIATRIST

## 2019-07-24 PROCEDURE — 11730 AVULSION NAIL PLATE SIMPLE 1: CPT | Performed by: PODIATRIST

## 2019-07-30 PROBLEM — Z89.422 ACQUIRED ABSENCE OF OTHER LEFT TOE(S) (HCC): Status: ACTIVE | Noted: 2019-07-30

## 2019-07-30 PROBLEM — E11.22 TYPE 2 DIABETES MELLITUS WITH STAGE 3 CHRONIC KIDNEY DISEASE (HCC): Status: ACTIVE | Noted: 2019-07-30

## 2019-07-30 PROBLEM — N18.30 TYPE 2 DIABETES MELLITUS WITH STAGE 3 CHRONIC KIDNEY DISEASE (HCC): Status: ACTIVE | Noted: 2019-07-30

## 2019-07-30 NOTE — PROGRESS NOTES
Assessment:  1  Residual Right posterior lower leg wound  2  Partial skin graft failure  3  Diabetic peripheral neuropathy    Plan:  A formal timeout including patient identification, laterality and existing allergies [facility]  Procedure Performed: Debridement of subcutaneous tissue- <20 sq cm (05983)  Anesthetic used as needed to reduce discomfort  Under aseptic technique, the wound was thoroughly explored for undermining, deep sinus tracts and bone involvement  Sterile instrumentation including scalpel used to excise the clearly delineated devitalized subcutaneous tissue exposing viable tissue  Bleeding controlled with gentle pressure  Patient tolerated debridement without complications  The wound was dressed  Wound dressing technique and frequency described to patient  I am to be called if any signs of infection develop such as erythema, increased wound size or significant change in appearance of wound, odor, pain, increased or change in color of drainage, swelling, fever, chills, nightsweats  I reviewed these signs with the patient  I recommended limiting leg dependency and to use any prescribed compression devices in an effort to allow proper fluid control and healing of the wounded area  I was very clear that sleeping with legs dependent is to be avoided at all costs  I explained that good wound care and compliance are necessary to allow healing and to prevent limb loss  Silver alginate applied to wound bed, abd and kerlex, unna boot applied using paste wrap and coban  Applied from base of toes to infrapatellar notch  Unna boot care and precautions reviewed with pt  Pt verbalizes understanding  Problem List Items Addressed This Visit     None             There are no diagnoses linked to this encounter  Subjective:      Patient ID: Davonte Woods is a 68 y o  male  Pt arrives for wound care follow up  Presents with unna boot dry and intact  Pt denies problems  The following portions of the patient's history were reviewed and updated as appropriate: allergies, current medications, past family history, past medical history, past social history, past surgical history and problem list     Review of Systems   All other systems reviewed and are negative  Objective: There were no vitals taken for this visit  Physical Exam   Constitutional: He appears well-developed and well-nourished  No distress  Skin: He is not diaphoretic  Nursing note and vitals reviewed        Wound # 1   Location: right posterior lower leg  Length 2cm: Width 0 8cm: Depth 0 2cm:   Deepest Tissue Noted in Base: sq  Probe to Bone?: no  Peripheral Skin Description: intact  Granulation: 80% Fibrotic Tissue: 20% Necrotic Tissue: 0%  Drainage Amount: moderate  Signs of Infection: no  Total debrided 3 6 square centimeters

## 2019-07-31 ENCOUNTER — PATIENT OUTREACH (OUTPATIENT)
Dept: FAMILY MEDICINE CLINIC | Facility: CLINIC | Age: 74
End: 2019-07-31

## 2019-07-31 NOTE — PROGRESS NOTES
Outpatient Care Management Note:    Re: spoke with patient and wife  He continues to manage diabetes  She reports they received a call from [place] and recommended patient carry Humalog Insulin and syringe with him as they have concerns that he may be turning Insulin pump off instead of dosing self  States his blood sugars have not gone above 400  He continues to follow with Dr Laureen Esparza for lower leg wound  Unna boot intact  Encouraged to drink increased water during high temperature days  Denies need for additional resources/in home services at this time  Agrees to next outreach call in one month

## 2019-08-01 ENCOUNTER — PROCEDURE VISIT (OUTPATIENT)
Dept: PODIATRY | Facility: CLINIC | Age: 74
End: 2019-08-01
Payer: COMMERCIAL

## 2019-08-01 VITALS
HEART RATE: 90 BPM | SYSTOLIC BLOOD PRESSURE: 134 MMHG | DIASTOLIC BLOOD PRESSURE: 74 MMHG | HEIGHT: 68 IN | WEIGHT: 135.6 LBS | BODY MASS INDEX: 20.55 KG/M2

## 2019-08-01 DIAGNOSIS — S81.801D WOUND OF RIGHT LOWER EXTREMITY, SUBSEQUENT ENCOUNTER: ICD-10-CM

## 2019-08-01 DIAGNOSIS — E11.42 DIABETIC PERIPHERAL NEUROPATHY (HCC): Chronic | ICD-10-CM

## 2019-08-01 DIAGNOSIS — T86.821 SKIN GRAFT FAILURE: Primary | ICD-10-CM

## 2019-08-01 PROCEDURE — 11042 DBRDMT SUBQ TIS 1ST 20SQCM/<: CPT | Performed by: PODIATRIST

## 2019-08-02 NOTE — PATIENT INSTRUCTIONS
Debridement   WHAT YOU NEED TO KNOW:   Debridement is the removal of infected, damaged, or dead tissue so a wound can heal properly  You may need more than one debridement  DISCHARGE INSTRUCTIONS:   Medicines:   · Medicines  can help decrease pain or prevent or treat an infection  · Take your medicine as directed  Contact your healthcare provider if you think your medicine is not helping or if you have side effects  Tell him of her if you are allergic to any medicine  Keep a list of the medicines, vitamins, and herbs you take  Include the amounts, and when and why you take them  Bring the list or the pill bottles to follow-up visits  Carry your medicine list with you in case of an emergency  Follow up with your healthcare provider as directed: You may need to return to have your wound checked  Write down your questions so you remember to ask them during your visits  Care for your wound as directed:   · Keep your wound clean and dry  You may need to cover your wound when you bathe  · Limit movements,  such as stretching, to prevent bleeding, tearing, and swelling in your wound  · Protect your wound  Avoid sunlight for at least 6 months  Apply mild, unscented lotion or cream to the skin around your wound to keep it moist     · Do not smoke  If you smoke, it is never too late to quit  Smoking decreases blood flow to the wound and delays healing  Ask for information if you need help quitting  · Drink liquids as directed  Ask how much liquid to drink each day and which liquids are best for you  Liquids help keep your skin moist so your wound can heal      · Eat a variety of healthy foods  Foods rich in protein, such as meat, eggs, and dairy products, help repair tissue  Carbohydrate-rich foods, such as bread and cereals, help increase cell growth and decrease the risk for wound infection  Do not have caffeine  Ask if you should take vitamins   Vitamins A and C may help tissue formation and increase scar tissue strength  Contact your healthcare provider if:   · You have a fever  · Your pain gets worse or does not go away, even after treatment  · Your skin is red, swollen, or draining pus  · You have questions or concerns about your condition or care  Return to the emergency department if:   · Blood soaks through your bandage  · You have severe pain  © 2017 [employer]  Information is for End User's use only and may not be sold, redistributed or otherwise used for commercial purposes  All illustrations and images included in CareNotes® are the copyrighted property of [employer]  or Kirill Ulrich  The above information is an  only  It is not intended as medical advice for individual conditions or treatments  Talk to your doctor, nurse or pharmacist before following any medical regimen to see if it is safe and effective for you

## 2019-08-06 NOTE — PROGRESS NOTES
Assessment:  1  Partial skin graft failure Right leg  2  Diabetic calf ulcer  3  Diabetic peripheral neuropathy    Plan:  Procedure performed: Debridement of dermis/epidermis- < 20 sq cm (61435)     A formal timeout including patient identification, laterality and existing allergies [facility] conducted  Debridement is indicated due to devitalized tissue present in open wound bed  Anesthetic used as needed prior to procedure to prevent discomfort  Under aseptic technique,the wound was thoroughly explored for undermining, deep sinus tracts and bone involvement  Sterile instrumentation including scalpel used to excise the clearly delineated devitalized epidermis/dermis tissue to promote wound healing  I thoroughly explored the wound for deep infection or exposure of deep tissues  Minimal, if any, blood loss noted  Patient tolerated debridement without complications  The wound was dressed  Wound dressing technique and frequency described to patient  I am to be called if any signs of infection develop such as erythema, increased wound size or significant change in appearance of wound, odor, pain, increased or change in color of drainage, swelling, fever, chills, nightsweats  I reviewed these signs with the patient  I recommended limiting weight bearing to bathroom priveleges and meal table and to use any prescribed offloading devices in an effort to allow proper rest and healing of the wounded area  I explained that good wound care and compliance are necessary to allow healing and to prevent toe loss or limb loss  Wound is much smaller  Adaptic applied, abd and kerlex  Ace wrap x2 applied from base of toes to infrapatellar notch  To be changed in one week, dressing technique discussed and demonstrated  Pt verbalizes understanding       Problem List Items Addressed This Visit        Endocrine    Diabetic peripheral neuropathy (HCC) (Chronic)       Musculoskeletal and Integument    Skin graft failure - Primary       Other    Wound of right leg    Status post split thickness skin graft             Diagnoses and all orders for this visit:    Skin graft failure    Wound of right lower extremity, subsequent encounter    Status post split thickness skin graft    Diabetic peripheral neuropathy (HCC)          Subjective:      Patient ID: Jerry Faria is a 68 y o  male  Pt arrives for wound care follow up  Presents with unna boot intact, denies problems  Pt is going to [place] for vacation for two weeks  The following portions of the patient's history were reviewed and updated as appropriate: allergies, current medications, past family history, past medical history, past social history, past surgical history and problem list     Review of Systems   All other systems reviewed and are negative  Objective:      /80   Pulse 86   Ht 5' 8" (1 727 m)   Wt 60 9 kg (134 lb 3 2 oz)   BMI 20 41 kg/m²          Physical Exam   Constitutional: He appears well-developed and well-nourished  No distress  Skin: He is not diaphoretic  Nursing note and vitals reviewed          Wound # 1   Location: Right posterior lower leg  Length 0 5cm: Width 0 2cm: Depth 0 1cm:   Deepest Tissue Noted in Base: dermis  Probe to Bone?: no  Peripheral Skin Description: intact  Granulation: 90% Fibrotic Tissue: 10% Necrotic Tissue: 0%  Drainage Amount: minimal  Signs of Infection: no  Total debrided 0 10 square centimeters

## 2019-08-07 ENCOUNTER — PROCEDURE VISIT (OUTPATIENT)
Dept: PODIATRY | Facility: CLINIC | Age: 74
End: 2019-08-07
Payer: COMMERCIAL

## 2019-08-07 VITALS
DIASTOLIC BLOOD PRESSURE: 80 MMHG | HEART RATE: 86 BPM | HEIGHT: 68 IN | WEIGHT: 134.2 LBS | SYSTOLIC BLOOD PRESSURE: 140 MMHG | BODY MASS INDEX: 20.34 KG/M2

## 2019-08-07 DIAGNOSIS — E11.42 DIABETIC PERIPHERAL NEUROPATHY (HCC): Chronic | ICD-10-CM

## 2019-08-07 DIAGNOSIS — Z94.5 STATUS POST SPLIT THICKNESS SKIN GRAFT: ICD-10-CM

## 2019-08-07 DIAGNOSIS — T86.821 SKIN GRAFT FAILURE: Primary | ICD-10-CM

## 2019-08-07 DIAGNOSIS — S81.801D WOUND OF RIGHT LOWER EXTREMITY, SUBSEQUENT ENCOUNTER: ICD-10-CM

## 2019-08-07 PROCEDURE — 97597 DBRDMT OPN WND 1ST 20 CM/<: CPT | Performed by: PODIATRIST

## 2019-08-07 NOTE — PATIENT INSTRUCTIONS
Debridement   WHAT YOU NEED TO KNOW:   Debridement is the removal of infected, damaged, or dead tissue so a wound can heal properly  You may need more than one debridement  DISCHARGE INSTRUCTIONS:   Medicines:   · Medicines  can help decrease pain or prevent or treat an infection  · Take your medicine as directed  Contact your healthcare provider if you think your medicine is not helping or if you have side effects  Tell him of her if you are allergic to any medicine  Keep a list of the medicines, vitamins, and herbs you take  Include the amounts, and when and why you take them  Bring the list or the pill bottles to follow-up visits  Carry your medicine list with you in case of an emergency  Follow up with your healthcare provider as directed: You may need to return to have your wound checked  Write down your questions so you remember to ask them during your visits  Care for your wound as directed:   · Keep your wound clean and dry  You may need to cover your wound when you bathe  · Limit movements,  such as stretching, to prevent bleeding, tearing, and swelling in your wound  · Protect your wound  Avoid sunlight for at least 6 months  Apply mild, unscented lotion or cream to the skin around your wound to keep it moist     · Do not smoke  If you smoke, it is never too late to quit  Smoking decreases blood flow to the wound and delays healing  Ask for information if you need help quitting  · Drink liquids as directed  Ask how much liquid to drink each day and which liquids are best for you  Liquids help keep your skin moist so your wound can heal      · Eat a variety of healthy foods  Foods rich in protein, such as meat, eggs, and dairy products, help repair tissue  Carbohydrate-rich foods, such as bread and cereals, help increase cell growth and decrease the risk for wound infection  Do not have caffeine  Ask if you should take vitamins   Vitamins A and C may help tissue formation and increase scar tissue strength  Contact your healthcare provider if:   · You have a fever  · Your pain gets worse or does not go away, even after treatment  · Your skin is red, swollen, or draining pus  · You have questions or concerns about your condition or care  Return to the emergency department if:   · Blood soaks through your bandage  · You have severe pain  © 2017 [employer]  Information is for End User's use only and may not be sold, redistributed or otherwise used for commercial purposes  All illustrations and images included in CareNotes® are the copyrighted property of [employer]  or Kirill Ulrich  The above information is an  only  It is not intended as medical advice for individual conditions or treatments  Talk to your doctor, nurse or pharmacist before following any medical regimen to see if it is safe and effective for you

## 2019-08-29 ENCOUNTER — PROCEDURE VISIT (OUTPATIENT)
Dept: PODIATRY | Facility: CLINIC | Age: 74
End: 2019-08-29
Payer: COMMERCIAL

## 2019-08-29 VITALS — WEIGHT: 135.6 LBS | HEIGHT: 68 IN | BODY MASS INDEX: 20.55 KG/M2

## 2019-08-29 DIAGNOSIS — S81.801D WOUND OF RIGHT LOWER EXTREMITY, SUBSEQUENT ENCOUNTER: Primary | ICD-10-CM

## 2019-08-29 PROCEDURE — 11042 DBRDMT SUBQ TIS 1ST 20SQCM/<: CPT | Performed by: PODIATRIST

## 2019-08-29 NOTE — PATIENT INSTRUCTIONS
Debridement   WHAT YOU NEED TO KNOW:   Debridement is the removal of infected, damaged, or dead tissue so a wound can heal properly  You may need more than one debridement  DISCHARGE INSTRUCTIONS:   Medicines:   · Medicines  can help decrease pain or prevent or treat an infection  · Take your medicine as directed  Contact your healthcare provider if you think your medicine is not helping or if you have side effects  Tell him of her if you are allergic to any medicine  Keep a list of the medicines, vitamins, and herbs you take  Include the amounts, and when and why you take them  Bring the list or the pill bottles to follow-up visits  Carry your medicine list with you in case of an emergency  Follow up with your healthcare provider as directed: You may need to return to have your wound checked  Write down your questions so you remember to ask them during your visits  Care for your wound as directed:   · Keep your wound clean and dry  You may need to cover your wound when you bathe  · Limit movements,  such as stretching, to prevent bleeding, tearing, and swelling in your wound  · Protect your wound  Avoid sunlight for at least 6 months  Apply mild, unscented lotion or cream to the skin around your wound to keep it moist     · Do not smoke  If you smoke, it is never too late to quit  Smoking decreases blood flow to the wound and delays healing  Ask for information if you need help quitting  · Drink liquids as directed  Ask how much liquid to drink each day and which liquids are best for you  Liquids help keep your skin moist so your wound can heal      · Eat a variety of healthy foods  Foods rich in protein, such as meat, eggs, and dairy products, help repair tissue  Carbohydrate-rich foods, such as bread and cereals, help increase cell growth and decrease the risk for wound infection  Do not have caffeine  Ask if you should take vitamins   Vitamins A and C may help tissue formation and increase scar tissue strength  Contact your healthcare provider if:   · You have a fever  · Your pain gets worse or does not go away, even after treatment  · Your skin is red, swollen, or draining pus  · You have questions or concerns about your condition or care  Return to the emergency department if:   · Blood soaks through your bandage  · You have severe pain  © 2017 [employer] Information is for End User's use only and may not be sold, redistributed or otherwise used for commercial purposes  All illustrations and images included in CareNotes® are the copyrighted property of [employer]  or Kirill Ulrich  The above information is an  only  It is not intended as medical advice for individual conditions or treatments  Talk to your doctor, nurse or pharmacist before following any medical regimen to see if it is safe and effective for you  Venous Insufficiency   AMBULATORY CARE:   Venous insufficiency  is a condition that prevents blood from flowing out of your legs and back to your heart  Veins contain valves that help blood flow in one direction  Venous insufficiency means the valves do not close correctly or fully  Blood flows back and pools in your leg  This can cause problems such as varicose veins  Venous insufficiency may also be called chronic venous insufficiency or venous stasis    Common signs and symptoms:   · Visible veins on your legs that may be small and red or large, thick, and blue    · Swelling in your ankles or calves    · Changes in skin color, such as dark or purple skin    · An ulcer (open sore) on your leg    · Leg pain that is worse when you are menstruating (women) or when you stand, and better when you elevate your legs    · Burning or itching    · Cramps that happen at night    · Thick, hard skin on your legs and ankles    · Feeling of heaviness in your legs  Seek care immediately if:   · You have a wound that does not heal or is infected  · You have an injury that has broken your skin and caused your varicose veins to bleed  · Your leg is swollen and hard  · You have pain in your leg that does not go away or gets worse  · Your legs or feet are turning blue or black  · Your leg feels warm, tender, and painful  It may look swollen and red  Contact your healthcare provider if:   · You have a fever  · You have varicose veins and they are painful  · You have new or worsening leg pain, swelling, or redness  · You have new or worsening ulcers or other sores on your leg  · You have questions or concerns about your condition or care  Treatment  may include any of the following:  · Medicine  may be given to improve blood flow  The medicines may thin your blood or reduce swelling to help blood flow  You may also need medicine to treat a bacterial infection  · Ablation  is a procedure used to close varicose veins  A catheter is guided until it is near the vein  A device will then be guided to the area  The device may produce energy through radiofrequency or a laser  The energy creates heat that will close the blood vessel  · Sclerotherapy  is a procedure used to fade visible veins  Your healthcare provider will inject a liquid into a spider vein or varicose vein  The liquid causes irritation in the vein  The vein swells and sticks together  Your body will then absorb the vein  · Surgery  may be needed if other treatments do not work  Surgery may be used to repair a leg vein valve or to clip or tie off a vein so blood cannot flow through it  You may need to have a veins removed during surgery called stripping  Surgery may be used to bypass (go around) the damaged vein  Blood will flow through a vein transplanted from another part of your body  Manage your symptoms:   · Wear pressure stockings as directed  Pressure stockings help keep blood from pooling in your leg veins   Your healthcare provider can prescribe stockings that are right for you  Do not buy over-the-counter pressure stockings unless your healthcare provider says it is okay  They may not fit correctly or may have elastic that cuts off your circulation  Ask your healthcare provider when to start wearing pressure stockings and how long to wear them each day  · Do not sit or stand for long periods of time  If you have to sit for a long time, flex and extend your legs, feet, and ankles  Do this about 10 times every 30 minutes to help keep blood flowing  If you have to stand for a long time, take breaks and sit with your legs elevated  · Elevate your legs  Elevate your legs above the level of your heart to reduce swelling  Your healthcare provider may recommend that you keep your legs elevated for 30 minutes at a time  You may need to do this 3 to 4 times per day, or more if your healthcare provider recommends  · Do not smoke  Nicotine and other chemicals in cigarettes and cigars can cause blood vessel damage  Ask your healthcare provider for information if you currently smoke and need help to quit  E-cigarettes or smokeless tobacco still contain nicotine  Talk to your healthcare provider before you use these products  · Reach or maintain a healthy weight  Extra weight can make venous insufficiency worse  Ask your healthcare provider how much you should weigh  He can help you create a weight loss plan if you need to lose weight  · Exercise as directed  Walking can help increase blood flow in your calves  Ask your healthcare provider how much exercise you need each day and which exercises are best for you  · Care for your skin  Keep your skin clean  Do not use any soaps or lotions that may dry your skin  For example, do not use products that contain fragrance or alcohol  If you have a skin ulcer, your healthcare provider may recommend a wet-to-dry bandage  To do this, apply a wet bandage to your wound and allow it to dry   This will help remove drainage from your wound each time you change the bandage  Your healthcare provider will tell you how often to change your bandage and which kind of bandage to use  Check your wound for signs of infection, such as swelling or pus  · Go to physical therapy (PT) as directed  A physical therapist can help you increase movement and range of motion in your legs  Follow up with your healthcare provider as directed:  Write down your questions so you remember to ask them during your visits  © 2017 [address] [facility] Information is for End User's use only and may not be sold, redistributed or otherwise used for commercial purposes  All illustrations and images included in CareNotes® are the copyrighted property of [employer]  or Kirill Ulrich  The above information is an  only  It is not intended as medical advice for individual conditions or treatments  Talk to your doctor, nurse or pharmacist before following any medical regimen to see if it is safe and effective for you

## 2019-08-29 NOTE — PROGRESS NOTES
Assessment:  1  Right posterior calf wound  2  Diabetic peripheral neuropathy    Plan:  1  I had a long discussion with he and his wife about the need for better compliance; particularly with my instructions to elevate the leg and prevent prolonged periods of dependency  2  Debridement: A formal timeout including patient identification, laterality and existing allergies using [facility] protocol was conducted  Procedure Performed: Debridement of subcutaneous tissue- <20 sq cm (83643)  Anesthetic used as needed to reduce discomfort  Under aseptic technique, the wound was thoroughly explored for undermining, deep sinus tracts and bone involvement  Sterile instrumentation including scalpel used to excise the clearly delineated devitalized subcutaneous tissue exposing viable tissue  Bleeding controlled with gentle pressure  Patient tolerated debridement without complications  The wound was dressed  Wound dressing technique and frequency described to patient  I am to be called if any signs of infection develop such as erythema, increased wound size or significant change in appearance of wound, odor, pain, increased or change in color of drainage, swelling, fever, chills, nightsweats  I reviewed these signs with the patient  I recommended limiting leg dependency and to use any prescribed compression devices in an effort to allow proper fluid control and healing of the wounded area  I was very clear that sleeping with legs dependent is to be avoided at all costs  I explained that good wound care and compliance are necessary to allow healing and to prevent limb loss  Silver alginate applied to wound bed  Unna boot applied using paste wrap and coban  Applied from base of toes to infrapatellar notch  Unna boot care and precautions discussed and explained to pt/family  1  Leave wrap dry and intact  2   If wrap is too tight (feels like a tourniquet, toes turning purple), elevate the leg above heart level, if no relief, remove wrap (no scissors) and call office  3  If wrap becomes too loose, remove wrap (no scissors) and call the office  Pt/family verbalize understanding    Return in one week  Problem List Items Addressed This Visit     None             There are no diagnoses linked to this encounter  Subjective:      Patient ID: Jerry Faria is a 68 y o  male  Pt arrives for wound care follow up  Presents with dressing intact  Pt was away on vacation and changing the dressing as ordered  Pt reports wound is larger and admits the leg was dependent for long periods of time (car rides, etc )  The following portions of the patient's history were reviewed and updated as appropriate: allergies, current medications, past family history, past medical history, past social history, past surgical history and problem list     Review of Systems   Constitutional: Negative  Objective:      Ht 5' 8" (1 727 m) Comment: verbal  Wt 61 5 kg (135 lb 9 6 oz)   BMI 20 62 kg/m²          Physical Exam   Constitutional: He appears well-developed and well-nourished  No distress  Skin: He is not diaphoretic  Nursing note and vitals reviewed          Wound # 1   Location: Right posterior lower leg  Length 4 cm: Width 2cm: Depth 0 2 cm:   Deepest Tissue Noted in Base: SQ  Probe to Bone?: no  Peripheral Skin Description: intact  Granulation: 90% Fibrotic Tissue: 10% Necrotic Tissue: 0%  Drainage Amount: minimal  Signs of Infection: no  Total debrided  8square centimeters

## 2019-09-04 ENCOUNTER — PROCEDURE VISIT (OUTPATIENT)
Dept: PODIATRY | Facility: CLINIC | Age: 74
End: 2019-09-04
Payer: COMMERCIAL

## 2019-09-04 VITALS
SYSTOLIC BLOOD PRESSURE: 110 MMHG | HEART RATE: 80 BPM | HEIGHT: 68 IN | DIASTOLIC BLOOD PRESSURE: 58 MMHG | BODY MASS INDEX: 20.46 KG/M2 | WEIGHT: 135 LBS

## 2019-09-04 DIAGNOSIS — E11.42 DIABETIC PERIPHERAL NEUROPATHY (HCC): Chronic | ICD-10-CM

## 2019-09-04 DIAGNOSIS — S81.801D WOUND OF RIGHT LOWER EXTREMITY, SUBSEQUENT ENCOUNTER: Primary | ICD-10-CM

## 2019-09-04 PROCEDURE — 11042 DBRDMT SUBQ TIS 1ST 20SQCM/<: CPT | Performed by: PODIATRIST

## 2019-09-04 NOTE — PROGRESS NOTES
Assessment:  1  Calf ulcer    Plan:  A formal timeout including patient identification, laterality and existing allergies using [facility] was conducted  Procedure Performed: Debridement of subcutaneous tissue- <20 sq cm (61266)  Anesthetic used as needed to reduce discomfort  Under aseptic technique, the wound was thoroughly explored for undermining, deep sinus tracts and bone involvement  Sterile instrumentation including scalpel used to excise the clearly delineated devitalized subcutaneous tissue exposing viable tissue  Bleeding controlled with gentle pressure  Patient tolerated debridement without complications  The wound was dressed  Wound dressing technique and frequency described to patient  I am to be called if any signs of infection develop such as erythema, increased wound size or significant change in appearance of wound, odor, pain, increased or change in color of drainage, swelling, fever, chills, nightsweats  I reviewed these signs with the patient  I recommended limiting leg dependency and to use any prescribed compression devices in an effort to allow proper fluid control and healing of the wounded area  I was very clear that sleeping with legs dependent is to be avoided at all costs  I explained that good wound care and compliance are necessary to allow healing and to prevent limb loss  Silver alginate applied to wound bed, abd  Unna boot applied using paste wrap and coban  Applied from base of toes to infrapatellar notch  Unna boot care and precautions discussed and explained to pt/family  1  Leave wrap dry and intact  2  If wrap is too tight (feels like a tourniquet, toes turning purple), elevate the leg above heart level, if no relief, remove wrap (no scissors) and call office  3  If wrap becomes too loose, remove wrap (no scissors) and call the office    Pt/family verbalize understanding    Problem List Items Addressed This Visit     None             There are no diagnoses linked to this encounter  Subjective:      Patient ID: Charles Austin is a 68 y o  male  Pt arrives for wound care follow up  Presents with unna boot intact, denies problems  The following portions of the patient's history were reviewed and updated as appropriate: allergies, current medications, past family history, past medical history, past social history, past surgical history and problem list     Review of Systems   Constitutional: Negative  Objective:      Ht 5' 8" (1 727 m) Comment: verbal  Wt 61 2 kg (135 lb)   BMI 20 53 kg/m²          Physical Exam   Constitutional: He appears well-developed and well-nourished  No distress  Skin: He is not diaphoretic  Nursing note and vitals reviewed          Wound # 1   Location: right posterior lower leg  Length 4cm: Width 1 5cm: Depth 0 2cm:   Deepest Tissue Noted in Base: sq  Probe to Bone?: no  Peripheral Skin Description: intact  Granulation: 80% Fibrotic Tissue: 20% Necrotic Tissue: 0%  Drainage Amount: moderate  Signs of Infection: no  Total debrided 6 square centimeters

## 2019-09-06 ENCOUNTER — PATIENT OUTREACH (OUTPATIENT)
Dept: FAMILY MEDICINE CLINIC | Facility: CLINIC | Age: 74
End: 2019-09-06

## 2019-09-11 NOTE — PROGRESS NOTES
This patient was seen on 9/12/19  Assessment:    Problem List Items Addressed This Visit        Endocrine    Diabetic peripheral neuropathy (HCC) (Chronic)       Other    Wound of right leg - Primary          Plan:  DEBRIDEMENT NOTE    A formal timeout including patient identification, laterality and existing allergies [facility] was conducted  Procedure Performed: Debridement of subcutaneous tissue- >20 sq cm (42226)  Under aseptic technique, the wound was thoroughly explored and bluntly probed for undermining, deep sinus tracts and bone involvement  Sterile instrumentation including scalpel, forceps and curette used to excise the clearly delineated devitalized subcutaneous tissue (fibrotic tissue and necrotic non-viable portions of fat) exposing viable tissue  After debridement, bleeding in the wound bed base was noted indicated viable subcutaneous tissue had been exposed  Bleeding controlled with gentle pressure  Patient tolerated debridement without complications  The wound was dressed  Wound dressing technique and frequency described to patient  I am to be called if any signs of infection develop such as erythema, increased wound size or significant change in appearance of wound, odor, pain, increased or change in color of drainage, swelling, fever, chills, nightsweats  I reviewed these signs with the patient  I recommended limiting WB to bathroom priveleges and meal table and to use any prescribed offloading devices in an effort to allow proper rest and healing of the wounded area  I explained that good wound care and compliance are necessary to allow healing and to prevent toe loss or limb loss  Silver alginate applied to wound bed  Unna boot applied using paste wrap and coban  Applied from base of toes to infrapatellar notch  Unna boot care and precautions discussed and explained to pt/family  1  Leave wrap dry and intact  2   If wrap is too tight (feels like a tourniquet, toes turning purple), elevate the leg above heart level, if no relief, remove wrap (no scissors) and call office  3  If wrap becomes too loose, remove wrap (no scissors) and call the office  Pt/family verbalize understanding     Diagnoses and all orders for this visit:    Wound of right lower extremity, subsequent encounter    Diabetic peripheral neuropathy (HCC)          Subjective:      Patient ID: William Monzon is a 68 y o  male  Pt arrives for wound care follow up  Arrives with unna boot intact  Pt denies problems  The following portions of the patient's history were reviewed and updated as appropriate: allergies, current medications, past family history, past medical history, past social history, past surgical history and problem list     Review of Systems   Constitutional: Negative  Objective:      BP (!) 98/48   Pulse 66   Ht 5' 8" (1 727 m) Comment: verbal  Wt 61 1 kg (134 lb 12 8 oz)   BMI 20 50 kg/m²          Physical Exam   Constitutional: He appears well-developed and well-nourished  No distress  Skin: He is not diaphoretic  Nursing note and vitals reviewed          Wound # 1   Location: right posterior leg  Length 3cm: Width 1cm: Depth 0 2cm:   Deepest Tissue Noted in Base: SQ  Probe to Bone?: no  Peripheral Skin Description: intact  Granulation: 90% Fibrotic Tissue: 10% Necrotic Tissue: 0%  Drainage Amount: moderate  Signs of Infection: no  Total debrided 3 square centimeters

## 2019-09-12 ENCOUNTER — PROCEDURE VISIT (OUTPATIENT)
Dept: PODIATRY | Facility: CLINIC | Age: 74
End: 2019-09-12
Payer: COMMERCIAL

## 2019-09-12 VITALS
WEIGHT: 134.8 LBS | DIASTOLIC BLOOD PRESSURE: 48 MMHG | HEIGHT: 68 IN | SYSTOLIC BLOOD PRESSURE: 98 MMHG | HEART RATE: 66 BPM | BODY MASS INDEX: 20.43 KG/M2

## 2019-09-12 DIAGNOSIS — E11.42 DIABETIC PERIPHERAL NEUROPATHY (HCC): Chronic | ICD-10-CM

## 2019-09-12 DIAGNOSIS — S81.801D WOUND OF RIGHT LOWER EXTREMITY, SUBSEQUENT ENCOUNTER: Primary | ICD-10-CM

## 2019-09-12 PROCEDURE — 11042 DBRDMT SUBQ TIS 1ST 20SQCM/<: CPT | Performed by: PODIATRIST

## 2019-09-12 NOTE — PATIENT INSTRUCTIONS
Debridement   WHAT YOU NEED TO KNOW:   Debridement is the removal of infected, damaged, or dead tissue so a wound can heal properly  You may need more than one debridement  DISCHARGE INSTRUCTIONS:   Medicines:   · Medicines  can help decrease pain or prevent or treat an infection  · Take your medicine as directed  Contact your healthcare provider if you think your medicine is not helping or if you have side effects  Tell him of her if you are allergic to any medicine  Keep a list of the medicines, vitamins, and herbs you take  Include the amounts, and when and why you take them  Bring the list or the pill bottles to follow-up visits  Carry your medicine list with you in case of an emergency  Follow up with your healthcare provider as directed: You may need to return to have your wound checked  Write down your questions so you remember to ask them during your visits  Care for your wound as directed:   · Keep your wound clean and dry  You may need to cover your wound when you bathe  · Limit movements,  such as stretching, to prevent bleeding, tearing, and swelling in your wound  · Protect your wound  Avoid sunlight for at least 6 months  Apply mild, unscented lotion or cream to the skin around your wound to keep it moist     · Do not smoke  If you smoke, it is never too late to quit  Smoking decreases blood flow to the wound and delays healing  Ask for information if you need help quitting  · Drink liquids as directed  Ask how much liquid to drink each day and which liquids are best for you  Liquids help keep your skin moist so your wound can heal      · Eat a variety of healthy foods  Foods rich in protein, such as meat, eggs, and dairy products, help repair tissue  Carbohydrate-rich foods, such as bread and cereals, help increase cell growth and decrease the risk for wound infection  Do not have caffeine  Ask if you should take vitamins   Vitamins A and C may help tissue formation and increase scar tissue strength  Contact your healthcare provider if:   · You have a fever  · Your pain gets worse or does not go away, even after treatment  · Your skin is red, swollen, or draining pus  · You have questions or concerns about your condition or care  Return to the emergency department if:   · Blood soaks through your bandage  · You have severe pain  © 2017 [employer]  Information is for End User's use only and may not be sold, redistributed or otherwise used for commercial purposes  All illustrations and images included in CareNotes® are the copyrighted property of [employer]  or Kirill Ulrich  The above information is an  only  It is not intended as medical advice for individual conditions or treatments  Talk to your doctor, nurse or pharmacist before following any medical regimen to see if it is safe and effective for you

## 2019-09-17 NOTE — PROGRESS NOTES
This patient was seen on 9/19/19  Assessment:  The calf ulcer is healing well  The new foot ulcer appears to have some associated cellulitis streaking     Problem List Items Addressed This Visit        Endocrine    Diabetic peripheral neuropathy ([address] ) (Chronic)       Musculoskeletal and Integument    Right foot ulcer, with fat layer exposed ([address] [address] [address] )       Other    Wound of right leg - Primary    Relevant Medications    cephalexin (KEFLEX) 500 mg capsule    Other Relevant Orders    Offloading Wedge Shoe          Plan:    1  Start Keflex 500mg QID  2  Debridement of both calf and foot ulcers:     A formal timeout including patient identification, laterality and existing allergies using [facility] protocol was conducted  Procedure Performed: Debridement of subcutaneous tissue- <20 sq cm (49283)  Anesthetic used as needed to reduce discomfort  Under aseptic technique, the wound was thoroughly explored for undermining, deep sinus tracts and bone involvement  Sterile instrumentation including scalpel used to excise the clearly delineated devitalized subcutaneous tissue exposing viable tissue  Bleeding controlled with gentle pressure  Patient tolerated debridement without complications  The wound was dressed  Wound dressing technique and frequency described to patient  I am to be called if any signs of infection develop such as erythema, increased wound size or significant change in appearance of wound, odor, pain, increased or change in color of drainage, swelling, fever, chills, nightsweats  I reviewed these signs with the patient  I recommended limiting leg dependency and to use any prescribed compression devices in an effort to allow proper fluid control and healing of the wounded area  I was very clear that sleeping with legs dependent is to be avoided at all costs  I explained that good wound care and compliance are necessary to allow healing and to prevent limb loss  Silver alginate applied to wound bed  Unna boot applied using paste wrap and coban  Applied from base of toes to infrapatellar notch  Unna boot care and precautions discussed and explained to pt/family  1  Leave wrap dry and intact  2  If wrap is too tight (feels like a tourniquet, toes turning purple), elevate the leg above heart level, if no relief, remove wrap (no scissors) and call office  3  If wrap becomes too loose, remove wrap (no scissors) and call the office  Pt/family verbalize understanding    3  All ambulation in darco wedge shoe to remove weight from forefoot  Diagnoses and all orders for this visit:    Wound of right lower extremity, subsequent encounter  -     cephalexin (KEFLEX) 500 mg capsule; Take 1 capsule (500 mg total) by mouth every 6 (six) hours for 7 days  -     Offloading Wedge Shoe    Diabetic peripheral neuropathy (HCC)    Right foot ulcer, with fat layer exposed ([address] )          Subjective:      Patient ID: Cait Umanzor is a 68 y o  male  Pt arrives for wound care follow up  Presents with unna boot dry and intact  Pt denies problems         The following portions of the patient's history were reviewed and updated as appropriate: allergies, current medications, past family history, past medical history, past social history, past surgical history and problem list     Review of Systems      Objective:      BP 96/62   Pulse 86   Ht 5' 8" (1 727 m) Comment: verbal  Wt 60 9 kg (134 lb 3 2 oz)   BMI 20 41 kg/m²          Physical Exam      Wound # 1   Location: Right plantar 5th metatarsal head  Length 2 cm: Width 2 cm: Depth 0 2cm:   Deepest Tissue Noted in Base: SubQ  Probe to Bone?: no  Peripheral Skin Description: Callous  Granulation: 50% Fibrotic Tissue: 50% Necrotic Tissue: 0%  Drainage Amount: Mild  Signs of Infection: Cellulitis  Total debrided 4  square centimeters    Wound # 2  Location: Right posterior calf  Length 3 cm: Width 3 cm: Depth 0 2cm:   Deepest Tissue Noted in Base: SubQ  Probe to Bone?: no  Peripheral Skin Description: Clear  Granulation: 90% Fibrotic Tissue: 10% Necrotic Tissue: 0%  Drainage Amount: Mild  Signs of Infection None  Total debrided 9  square centimeters

## 2019-09-19 ENCOUNTER — PROCEDURE VISIT (OUTPATIENT)
Dept: PODIATRY | Facility: CLINIC | Age: 74
End: 2019-09-19
Payer: COMMERCIAL

## 2019-09-19 VITALS
DIASTOLIC BLOOD PRESSURE: 62 MMHG | BODY MASS INDEX: 20.34 KG/M2 | WEIGHT: 134.2 LBS | SYSTOLIC BLOOD PRESSURE: 96 MMHG | HEIGHT: 68 IN | HEART RATE: 86 BPM

## 2019-09-19 DIAGNOSIS — E11.42 DIABETIC PERIPHERAL NEUROPATHY (HCC): Chronic | ICD-10-CM

## 2019-09-19 DIAGNOSIS — S81.801D WOUND OF RIGHT LOWER EXTREMITY, SUBSEQUENT ENCOUNTER: ICD-10-CM

## 2019-09-19 DIAGNOSIS — L97.512 RIGHT FOOT ULCER, WITH FAT LAYER EXPOSED (HCC): Primary | ICD-10-CM

## 2019-09-19 PROBLEM — Z98.890 POST-OPERATIVE STATE: Status: RESOLVED | Noted: 2019-06-13 | Resolved: 2019-09-19

## 2019-09-19 PROCEDURE — 99213 OFFICE O/P EST LOW 20 MIN: CPT | Performed by: PODIATRIST

## 2019-09-19 PROCEDURE — 11042 DBRDMT SUBQ TIS 1ST 20SQCM/<: CPT | Performed by: PODIATRIST

## 2019-09-19 RX ORDER — CEPHALEXIN 500 MG/1
500 CAPSULE ORAL EVERY 6 HOURS SCHEDULED
Qty: 28 CAPSULE | Refills: 0 | Status: SHIPPED | OUTPATIENT
Start: 2019-09-19 | End: 2019-09-26

## 2019-09-24 NOTE — PROGRESS NOTES
This patient was seen on 9/26/19 for RN visit, unna boot dressing change  Assessment:  Posterior wound- alginate adhered to wound base  Periwound is dry  Foot wound- improved, erythema resolved  Problem List Items Addressed This Visit        Endocrine    Diabetic peripheral neuropathy (HCC) (Chronic)       Other    Wound of right leg - Primary          Plan:    I am to be called if any signs of infection develop such as erythema, increased wound size or significant change in appearance of wound, odor, pain, increased or change in color of drainage, swelling, fever, chills, nightsweats  I reviewed these signs with the patient  I recommended limiting leg dependency and to use any prescribed compression devices in an effort to allow proper fluid control and healing of the wounded area  I was very clear that sleeping with legs dependent is to be avoided at all costs  I explained that good wound care and compliance are necessary to allow healing and to prevent limb loss  Adaptic applied to posterior wound  Unna boot applied using paste wrap and coban  Applied from base of toes to infrapatellar notch  Unna boot care and precautions discussed and explained to pt/family  1  Leave wrap dry and intact  2  If wrap is too tight (feels like a tourniquet, toes turning purple), elevate the leg above heart level, if no relief, remove wrap (no scissors) and call office  3  If wrap becomes too loose, remove wrap (no scissors) and call the office  Pt/family verbalize understanding    Foot wound- acticoat flex 7 applied, mepilix border applied  Dressing to be left dry and intact  Diagnoses and all orders for this visit:    Wound of right lower extremity, subsequent encounter    Diabetic peripheral neuropathy (HCC)          Subjective:      Patient ID: Bob Rawls is a 68 y o  male  Pt arrives for RN visit, unna boot dressing change     Presents with dressing intact to foot and unna boot intact   Pt denies problems and states he is wearing the wedge shoe as ordered  Pt is taking the oral abx as ordered  The following portions of the patient's history were reviewed and updated as appropriate: allergies, current medications, past family history, past medical history, past social history, past surgical history and problem list     Review of Systems   Constitutional: Negative  Objective:      /86   Pulse 76   Ht 5' 8" (1 727 m) Comment: verbal  Wt 60 8 kg (134 lb)   BMI 20 37 kg/m²          Physical Exam   Nursing note and vitals reviewed          Wound # 1   Location: right posterior lower leg  Length 1 2cm: Width 0 8cm: Depth 0 2cm:   Deepest Tissue Noted in Base: SQ  Probe to Bone?: no  Peripheral Skin Description: dry  Granulation: 90% Fibrotic Tissue: 10% Necrotic Tissue: 0%  Drainage Amount: minimal  Signs of Infection: no  Total debrided 0 square centimeters    Wound # 2  Location: right lateral foot  Length 1 5cm: Width 1cm: Depth 0 2cm:   Deepest Tissue Noted in Base: SQ  Probe to Bone?: no  Peripheral Skin Description: intact  Granulation: 80% Fibrotic Tissue: 20% Necrotic Tissue: 0%  Drainage Amount: minimal  Signs of Infection: no, resolved  Total debrided 0 square centimeters

## 2019-09-26 ENCOUNTER — PROCEDURE VISIT (OUTPATIENT)
Dept: PODIATRY | Facility: CLINIC | Age: 74
End: 2019-09-26
Payer: COMMERCIAL

## 2019-09-26 VITALS
HEART RATE: 76 BPM | HEIGHT: 68 IN | BODY MASS INDEX: 20.31 KG/M2 | SYSTOLIC BLOOD PRESSURE: 162 MMHG | DIASTOLIC BLOOD PRESSURE: 86 MMHG | WEIGHT: 134 LBS

## 2019-09-26 DIAGNOSIS — E11.42 DIABETIC PERIPHERAL NEUROPATHY (HCC): Chronic | ICD-10-CM

## 2019-09-26 DIAGNOSIS — S81.801D WOUND OF RIGHT LOWER EXTREMITY, SUBSEQUENT ENCOUNTER: Primary | ICD-10-CM

## 2019-09-26 PROCEDURE — 99211 OFF/OP EST MAY X REQ PHY/QHP: CPT | Performed by: PODIATRIST

## 2019-09-27 ENCOUNTER — PATIENT OUTREACH (OUTPATIENT)
Dept: FAMILY MEDICINE CLINIC | Facility: CLINIC | Age: 74
End: 2019-09-27

## 2019-09-27 NOTE — PROGRESS NOTES
Outpatient Care Management Note:    Re: Brief conversation with pt and his wife  He continues to follow up diligently with podiatry for calf and new foot wound  Pt wearing a darco wedge shoe but expressed he is nervous as his "balance is not good"  Pt reports he has a walker and cane in his home, we discussed use of walker if it provides him additional stability  Encouraged to limit his walking and weight bearing to promote wound healing  Self manages his diabetes and provides glucometer readings to endocrinologist    Denies need for additional in home or community resources  Encouraged to maintain diet according to diabetic meal planning  Agrees to next outreach in one month

## 2019-10-01 NOTE — PROGRESS NOTES
This patient was seen on 10/3/19  Assessment:    Problem List Items Addressed This Visit     None          Plan:  A formal timeout including patient identification, laterality and existing allergies [facility] was [facility]  Procedure Performed: Debridement of subcutaneous tissue- <20 sq cm (71741)  Anesthetic used as needed to reduce discomfort  Under aseptic technique, the wound was thoroughly explored for undermining, deep sinus tracts and bone involvement  Sterile instrumentation including scalpel used to excise the clearly delineated devitalized subcutaneous tissue exposing viable tissue  Bleeding controlled with gentle pressure  Patient tolerated debridement without complications  The wound was dressed  Wound dressing technique and frequency described to patient  I am to be called if any signs of infection develop such as erythema, increased wound size or significant change in appearance of wound, odor, pain, increased or change in color of drainage, swelling, fever, chills, nightsweats  I reviewed these signs with the patient  I recommended limiting leg dependency and to use any prescribed compression devices in an effort to allow proper fluid control and healing of the wounded area  I was very clear that sleeping with legs dependent is to be avoided at all costs  I explained that good wound care and compliance are necessary to allow healing and to prevent limb loss  Adaptic applied to posterior leg wound  Unna boot applied using paste wrap and coban  Applied from base of toes to infrapatellar notch  Unna boot care and precautions discussed and explained to pt/family  1  Leave wrap dry and intact  2  If wrap is too tight (feels like a tourniquet, toes turning purple), elevate the leg above heart level, if no relief, remove wrap (no scissors) and call office  3  If wrap becomes too loose, remove wrap (no scissors) and call the office    Pt/family verbalize understanding    Lateral foot wound- adaptic applied with mepilex border  Dressing to be left dry and intact  There are no diagnoses linked to this encounter  Subjective:      Patient ID: Janna Sotelo is a 68 y o  male  Pt arrives for wound care follow up  Presents with unna boot intact and denies problems         The following portions of the patient's history were reviewed and updated as appropriate: allergies, current medications, past family history, past medical history, past social history, past surgical history and problem list     Review of Systems      Objective:      /82   Ht 5' 8" (1 727 m) Comment: verbal  Wt 62 1 kg (136 lb 12 8 oz)   BMI 20 80 kg/m²          Physical Exam      Wound # 1   Location: right posterior lower leg  Length 0 8cm: Width 0 5cm: Depth 0 2cm:   Deepest Tissue Noted in Base: SQ  Probe to Bone?: no  Peripheral Skin Description: intact  Granulation: 90% Fibrotic Tissue: 10% Necrotic Tissue: 0%  Drainage Amount: minimal  Signs of Infection: no  Total debrided 0 40 square centimeters            Wound # 2   Location: right lateral foot  Length 0 5cm: Width 1cm: Depth 0 2cm:   Deepest Tissue Noted in Base: SQ  Probe to Bone?: no  Peripheral Skin Description: intact  Granulation: 80% Fibrotic Tissue: 20% Necrotic Tissue: 0%  Drainage Amount: minimal  Signs of Infection: no  Total debrided 0 5 square centimeters

## 2019-10-03 ENCOUNTER — PROCEDURE VISIT (OUTPATIENT)
Dept: PODIATRY | Facility: CLINIC | Age: 74
End: 2019-10-03
Payer: COMMERCIAL

## 2019-10-03 VITALS
WEIGHT: 136.8 LBS | DIASTOLIC BLOOD PRESSURE: 82 MMHG | BODY MASS INDEX: 20.73 KG/M2 | HEIGHT: 68 IN | SYSTOLIC BLOOD PRESSURE: 122 MMHG

## 2019-10-03 DIAGNOSIS — L97.512 RIGHT FOOT ULCER, WITH FAT LAYER EXPOSED (HCC): ICD-10-CM

## 2019-10-03 DIAGNOSIS — E11.42 DIABETIC PERIPHERAL NEUROPATHY (HCC): Primary | Chronic | ICD-10-CM

## 2019-10-03 PROCEDURE — 11042 DBRDMT SUBQ TIS 1ST 20SQCM/<: CPT | Performed by: PODIATRIST

## 2019-10-08 NOTE — PROGRESS NOTES
This patient was seen on 10/10/19  Assessment:    Problem List Items Addressed This Visit     None          Plan:  A formal timeout including patient identification, laterality and existing allergies [facility]  Procedure Performed: Debridement of subcutaneous tissue- <20 sq cm (72472)  Anesthetic used as needed to reduce discomfort  Under aseptic technique, the wound was thoroughly explored for undermining, deep sinus tracts and bone involvement  Sterile instrumentation including scalpel used to excise the clearly delineated devitalized subcutaneous tissue exposing viable tissue  Bleeding controlled with gentle pressure  Patient tolerated debridement without complications  The wound was dressed  Wound dressing technique and frequency described to patient  I am to be called if any signs of infection develop such as erythema, increased wound size or significant change in appearance of wound, odor, pain, increased or change in color of drainage, swelling, fever, chills, nightsweats  I reviewed these signs with the patient  I recommended limiting leg dependency and to use any prescribed compression devices in an effort to allow proper fluid control and healing of the wounded area  I was very clear that sleeping with legs dependent is to be avoided at all costs  I explained that good wound care and compliance are necessary to allow healing and to prevent limb loss  Puracol and adaptic applied to wound bed  Unna boot applied using paste wrap and coban  Applied from base of toes to infrapatellar notch  Unna boot care and precautions discussed and explained to pt/family  1  Leave wrap dry and intact  2  If wrap is too tight (feels like a tourniquet, toes turning purple), elevate the leg above heart level, if no relief, remove wrap (no scissors) and call office  3  If wrap becomes too loose, remove wrap (no scissors) and call the office    Pt/family verbalize understanding    Foot wound- puracol and foam dressing applied to be left dry and intact  There are no diagnoses linked to this encounter  Subjective:      Patient ID: Anjana Lopez is a 68 y o  male  Pt arrives for wound care follow up  Presents with unna boot intact and denies problems  Dressing intact to lateral foot wound  Wearing wedge shoe as ordered  The following portions of the patient's history were reviewed and updated as appropriate: allergies, current medications, past family history, past medical history, past social history, past surgical history and problem list     Review of Systems   Constitutional: Negative  Objective:      /76   Pulse 80   Ht 5' 8" (1 727 m) Comment: verbal  Wt 62 8 kg (138 lb 6 4 oz)   BMI 21 04 kg/m²          Physical Exam   Constitutional: He appears well-developed and well-nourished  No distress  Skin: He is not diaphoretic  Nursing note and vitals reviewed          Wound # 1   Location: posterior right lower leg  Length 1 2cm: Width 0 5cm: Depth 0 2cm:   Deepest Tissue Noted in Base: SQ  Probe to Bone?: no  Peripheral Skin Description: intact  Granulation: 90% Fibrotic Tissue: 10% Necrotic Tissue: 0%  Drainage Amount: minimal  Signs of Infection: no  Total debrided 0 6 square centimeters            Wound # 2   Location: right lateral foot  Length 0 6cm: Width 1 4cm: Depth 0 2cm:   Deepest Tissue Noted in Base: SQ  Probe to Bone?: no  Peripheral Skin Description: intact  Granulation: 90% Fibrotic Tissue: 10% Necrotic Tissue: 0%  Drainage Amount: minimal  Signs of Infection: no  Total debrided 0 84 square centimeters

## 2019-10-10 ENCOUNTER — PROCEDURE VISIT (OUTPATIENT)
Dept: PODIATRY | Facility: CLINIC | Age: 74
End: 2019-10-10
Payer: COMMERCIAL

## 2019-10-10 VITALS
BODY MASS INDEX: 20.98 KG/M2 | HEIGHT: 68 IN | HEART RATE: 80 BPM | SYSTOLIC BLOOD PRESSURE: 140 MMHG | WEIGHT: 138.4 LBS | DIASTOLIC BLOOD PRESSURE: 76 MMHG

## 2019-10-10 DIAGNOSIS — L97.512 RIGHT FOOT ULCER, WITH FAT LAYER EXPOSED (HCC): ICD-10-CM

## 2019-10-10 DIAGNOSIS — E11.42 DIABETIC PERIPHERAL NEUROPATHY (HCC): Primary | Chronic | ICD-10-CM

## 2019-10-10 PROCEDURE — 11042 DBRDMT SUBQ TIS 1ST 20SQCM/<: CPT | Performed by: PODIATRIST

## 2019-10-17 ENCOUNTER — PROCEDURE VISIT (OUTPATIENT)
Dept: PODIATRY | Facility: CLINIC | Age: 74
End: 2019-10-17
Payer: COMMERCIAL

## 2019-10-17 VITALS
HEIGHT: 68 IN | DIASTOLIC BLOOD PRESSURE: 64 MMHG | HEART RATE: 88 BPM | SYSTOLIC BLOOD PRESSURE: 100 MMHG | WEIGHT: 138 LBS | BODY MASS INDEX: 20.92 KG/M2

## 2019-10-17 DIAGNOSIS — L97.512 RIGHT FOOT ULCER, WITH FAT LAYER EXPOSED (HCC): Primary | ICD-10-CM

## 2019-10-17 DIAGNOSIS — E11.42 DIABETIC PERIPHERAL NEUROPATHY (HCC): Chronic | ICD-10-CM

## 2019-10-17 PROCEDURE — 11042 DBRDMT SUBQ TIS 1ST 20SQCM/<: CPT | Performed by: PODIATRIST

## 2019-10-17 NOTE — PROGRESS NOTES
This patient was seen on 10/17/19  Assessment:    Problem List Items Addressed This Visit     None          Plan:  A formal timeout including patient identification, laterality and existing allergies using [facility] was conducted  Procedure Performed: Debridement of subcutaneous tissue- <20 sq cm (33795)  Anesthetic used as needed to reduce discomfort  Under aseptic technique, the wound was thoroughly explored for undermining, deep sinus tracts and bone involvement  Sterile instrumentation including scalpel used to excise the clearly delineated devitalized subcutaneous tissue exposing viable tissue  Bleeding controlled with gentle pressure  Patient tolerated debridement without complications  The wound was dressed  Wound dressing technique and frequency described to patient  I am to be called if any signs of infection develop such as erythema, increased wound size or significant change in appearance of wound, odor, pain, increased or change in color of drainage, swelling, fever, chills, nightsweats  I reviewed these signs with the patient  I recommended limiting leg dependency and to use any prescribed compression devices in an effort to allow proper fluid control and healing of the wounded area  I was very clear that sleeping with legs dependent is to be avoided at all costs  I explained that good wound care and compliance are necessary to allow healing and to prevent limb loss  Collagen and adaptic applied to wound bed  Unna boot applied using paste wrap and coban  Applied from base of toes to infrapatellar notch  Unna boot care and precautions discussed and explained to pt/family  1  Leave wrap dry and intact  2  If wrap is too tight (feels like a tourniquet, toes turning purple), elevate the leg above heart level, if no relief, remove wrap (no scissors) and call office  3  If wrap becomes too loose, remove wrap (no scissors) and call the office    Pt/family verbalize understanding    Foot wound- collagen and foam dressing applied  Leave dressing dry and intact  There are no diagnoses linked to this encounter  Subjective:      Patient ID: Ivis Stacy is a 76 y o  male  Pt arrives for wound care follow up  Presents with unna boot intact and dressing intact to foot wound  Pt wearing wedge shoe and using cane  Pt denies problems  The following portions of the patient's history were reviewed and updated as appropriate: allergies, current medications, past family history, past medical history, past social history, past surgical history and problem list     Review of Systems   Constitutional: Negative  Objective:      /64   Pulse 88   Ht 5' 8" (1 727 m) Comment: verbal  Wt 62 6 kg (138 lb)   BMI 20 98 kg/m²          Physical Exam   Constitutional: He appears well-developed and well-nourished  No distress  Skin: He is not diaphoretic  Nursing note and vitals reviewed          Wound # 1   Location: posterior right lower leg  Length 0 5 cm: Width 0 5 cm: Depth 0 2cm:   Deepest Tissue Noted in Base: SQ  Probe to Bone?: no  Peripheral Skin Description: intact  Granulation: 90% Fibrotic Tissue: 10% Necrotic Tissue: 0%  Drainage Amount: minimal  Signs of Infection: no  Total debrided 0 25square centimeters                      Wound # 2   Location: right lateral foot  Length 0 5cm: Width 1cm: Depth 0 2cm:   Deepest Tissue Noted in Base: SQ  Probe to Bone?: no  Peripheral Skin Description: intact  Granulation: 90% Fibrotic Tissue: 10% Necrotic Tissue: 0%  Drainage Amount: minimal  Signs of Infection: no  Total debrided  0 5square centimeters

## 2019-10-17 NOTE — PATIENT INSTRUCTIONS
Debridement   WHAT YOU NEED TO KNOW:   Debridement is the removal of infected, damaged, or dead tissue so a wound can heal properly  You may need more than one debridement  DISCHARGE INSTRUCTIONS:   Medicines:   · Medicines  can help decrease pain or prevent or treat an infection  · Take your medicine as directed  Contact your healthcare provider if you think your medicine is not helping or if you have side effects  Tell him of her if you are allergic to any medicine  Keep a list of the medicines, vitamins, and herbs you take  Include the amounts, and when and why you take them  Bring the list or the pill bottles to follow-up visits  Carry your medicine list with you in case of an emergency  Follow up with your healthcare provider as directed: You may need to return to have your wound checked  Write down your questions so you remember to ask them during your visits  Care for your wound as directed:   · Keep your wound clean and dry  You may need to cover your wound when you bathe  · Limit movements,  such as stretching, to prevent bleeding, tearing, and swelling in your wound  · Protect your wound  Avoid sunlight for at least 6 months  Apply mild, unscented lotion or cream to the skin around your wound to keep it moist     · Do not smoke  If you smoke, it is never too late to quit  Smoking decreases blood flow to the wound and delays healing  Ask for information if you need help quitting  · Drink liquids as directed  Ask how much liquid to drink each day and which liquids are best for you  Liquids help keep your skin moist so your wound can heal      · Eat a variety of healthy foods  Foods rich in protein, such as meat, eggs, and dairy products, help repair tissue  Carbohydrate-rich foods, such as bread and cereals, help increase cell growth and decrease the risk for wound infection  Do not have caffeine  Ask if you should take vitamins   Vitamins A and C may help tissue formation and increase scar tissue strength  Contact your healthcare provider if:   · You have a fever  · Your pain gets worse or does not go away, even after treatment  · Your skin is red, swollen, or draining pus  · You have questions or concerns about your condition or care  Return to the emergency department if:   · Blood soaks through your bandage  · You have severe pain  © 2017 [employer]  Information is for End User's use only and may not be sold, redistributed or otherwise used for commercial purposes  All illustrations and images included in CareNotes® are the copyrighted property of [employer]  or Kirill Ulrich  The above information is an  only  It is not intended as medical advice for individual conditions or treatments  Talk to your doctor, nurse or pharmacist before following any medical regimen to see if it is safe and effective for you

## 2019-10-22 ENCOUNTER — REMOTE DEVICE CLINIC VISIT (OUTPATIENT)
Dept: CARDIOLOGY CLINIC | Facility: CLINIC | Age: 74
End: 2019-10-22
Payer: COMMERCIAL

## 2019-10-22 DIAGNOSIS — Z95.0 PRESENCE OF PERMANENT CARDIAC PACEMAKER: Primary | ICD-10-CM

## 2019-10-22 PROCEDURE — 93294 REM INTERROG EVL PM/LDLS PM: CPT | Performed by: INTERNAL MEDICINE

## 2019-10-22 PROCEDURE — 93296 REM INTERROG EVL PM/IDS: CPT | Performed by: INTERNAL MEDICINE

## 2019-10-22 NOTE — PROGRESS NOTES
Results for orders placed or performed in visit on 10/22/19   Cardiac EP device report    Narrative    MDT DUAL PM  CARELINK TRANSMISSION: BATTERY VOLTAGE ADEQUATE  (3 5 YRS) AP 2%  99%  ALL AVAILABLE LEAD PARAMETERS WITHIN NORMAL LIMITS  NO SIGNIFICANT HIGH RATE EPISODES  NORMAL DEVICE FUNCTION  ---GUNDERSON

## 2019-10-24 ENCOUNTER — PROCEDURE VISIT (OUTPATIENT)
Dept: PODIATRY | Facility: CLINIC | Age: 74
End: 2019-10-24
Payer: COMMERCIAL

## 2019-10-24 VITALS
HEIGHT: 68 IN | BODY MASS INDEX: 21.22 KG/M2 | SYSTOLIC BLOOD PRESSURE: 120 MMHG | DIASTOLIC BLOOD PRESSURE: 50 MMHG | HEART RATE: 85 BPM | WEIGHT: 140 LBS

## 2019-10-24 DIAGNOSIS — L97.512 RIGHT FOOT ULCER, WITH FAT LAYER EXPOSED (HCC): Primary | ICD-10-CM

## 2019-10-24 DIAGNOSIS — S81.801D WOUND OF RIGHT LOWER EXTREMITY, SUBSEQUENT ENCOUNTER: ICD-10-CM

## 2019-10-24 DIAGNOSIS — E11.42 DIABETIC PERIPHERAL NEUROPATHY (HCC): Chronic | ICD-10-CM

## 2019-10-24 PROCEDURE — 11042 DBRDMT SUBQ TIS 1ST 20SQCM/<: CPT | Performed by: PODIATRIST

## 2019-10-24 NOTE — PROGRESS NOTES
This patient was seen on 10/24/19  Assessment:    Problem List Items Addressed This Visit        Endocrine    Diabetic peripheral neuropathy (HCC) (Chronic)       Musculoskeletal and Integument    Right foot ulcer, with fat layer exposed ([address] ) - Primary       Other    Wound of right leg          Plan:  A formal timeout including patient identification, laterality and existing allergies using [facility] protocol was conducted  Procedure Performed: Debridement of subcutaneous tissue- <20 sq cm (38198)  Anesthetic used as needed to reduce discomfort  Under aseptic technique, the wound was thoroughly explored for undermining, deep sinus tracts and bone involvement  Sterile instrumentation including scalpel used to excise the clearly delineated devitalized subcutaneous tissue exposing viable tissue  Bleeding controlled with gentle pressure  Patient tolerated debridement without complications  The wound was dressed  Wound dressing technique and frequency described to patient  I am to be called if any signs of infection develop such as erythema, increased wound size or significant change in appearance of wound, odor, pain, increased or change in color of drainage, swelling, fever, chills, nightsweats  I reviewed these signs with the patient  I recommended limiting leg dependency and to use any prescribed compression devices in an effort to allow proper fluid control and healing of the wounded area  I was very clear that sleeping with legs dependent is to be avoided at all costs  I explained that good wound care and compliance are necessary to allow healing and to prevent limb loss  Collagen and adaptic applied to leg wound  Unna boot applied using paste wrap and coban  Applied from base of toes to infrapatellar notch  Unna boot care and precautions discussed and explained to pt/family  1  Leave wrap dry and intact  2   If wrap is too tight (feels like a tourniquet, toes turning purple), elevate the leg above heart level, if no relief, remove wrap (no scissors) and call office  3  If wrap becomes too loose, remove wrap (no scissors) and call the office  Pt/family verbalize understanding    Foot wound- collagen and adaptic applied to wound, covered with foam and secured with tape  To remain dry and intact  Diagnoses and all orders for this visit:    Right foot ulcer, with fat layer exposed ([address] )    Diabetic peripheral neuropathy (HCC)    Wound of right lower extremity, subsequent encounter          Subjective:      Patient ID: Madhuri Alford is a 76 y o  male  Pt arrives for wound care follow up  Presents with unna boot intact  Not wearing wedge shoe due to low BP and a fall earlier this week at the [place]  The following portions of the patient's history were reviewed and updated as appropriate: allergies, current medications, past family history, past medical history, past social history, past surgical history and problem list     Review of Systems   Constitutional: Negative  Objective:      /50   Pulse 85   Ht 5' 8" (1 727 m) Comment: verbal  Wt 63 5 kg (140 lb)   BMI 21 29 kg/m²          Physical Exam   Constitutional: He appears well-developed and well-nourished  No distress  Skin: He is not diaphoretic  Nursing note and vitals reviewed          Wound # 1   Location: right posterior lower leg  Length 1cm: Width 0 5cm: Depth 0 2cm:   Deepest Tissue Noted in Base: SQ  Probe to Bone?: no  Peripheral Skin Description: intact  Granulation: 90% Fibrotic Tissue: 10% Necrotic Tissue: 0%  Drainage Amount: minimal  Signs of Infection: no  Total debrided 0 5 square centimeters              Wound # 2   Location: right foot wound  Length 0 4cm: Width 0 5cm: Depth 0 2cm:   Deepest Tissue Noted in Base: SQ  Probe to Bone?: no  Peripheral Skin Description: intact  Granulation: 90% Fibrotic Tissue: 10% Necrotic Tissue: 0%  Drainage Amount: minimal  Signs of Infection: no  Total debrided 0 square centimeters

## 2019-10-29 ENCOUNTER — PATIENT OUTREACH (OUTPATIENT)
Dept: FAMILY MEDICINE CLINIC | Facility: CLINIC | Age: 74
End: 2019-10-29

## 2019-10-29 NOTE — PROGRESS NOTES
Outpatient Care Management Note:    Re: Pt reports he is following with podiatrist for leg and foot wounds  Blood sugar range between 175-400 as per patient's wife  Denies need for additional resources or questions at this time  Pt supported by wife and family  Agrees to next outreach in three months

## 2019-10-30 ENCOUNTER — APPOINTMENT (OUTPATIENT)
Dept: LAB | Facility: CLINIC | Age: 74
End: 2019-10-30
Payer: COMMERCIAL

## 2019-10-30 DIAGNOSIS — E11.9 DM TYPE 2, NOT AT GOAL (HCC): ICD-10-CM

## 2019-10-30 DIAGNOSIS — R80.9 MICROALBUMINURIA: ICD-10-CM

## 2019-10-30 DIAGNOSIS — E03.9 HYPOTHYROIDISM, UNSPECIFIED TYPE: Chronic | ICD-10-CM

## 2019-10-30 DIAGNOSIS — R73.9 HYPERGLYCEMIA: ICD-10-CM

## 2019-10-30 DIAGNOSIS — D64.9 ANEMIA, UNSPECIFIED TYPE: ICD-10-CM

## 2019-10-30 LAB
BASOPHILS # BLD AUTO: 0.04 THOUSANDS/ΜL (ref 0–0.1)
BASOPHILS NFR BLD AUTO: 1 % (ref 0–1)
EOSINOPHIL # BLD AUTO: 0.37 THOUSAND/ΜL (ref 0–0.61)
EOSINOPHIL NFR BLD AUTO: 8 % (ref 0–6)
ERYTHROCYTE [DISTWIDTH] IN BLOOD BY AUTOMATED COUNT: 13 % (ref 11.6–15.1)
EST. AVERAGE GLUCOSE BLD GHB EST-MCNC: 206 MG/DL
HBA1C MFR BLD: 8.8 % (ref 4.2–6.3)
HCT VFR BLD AUTO: 36.9 % (ref 36.5–49.3)
HGB BLD-MCNC: 12 G/DL (ref 12–17)
IMM GRANULOCYTES # BLD AUTO: 0.01 THOUSAND/UL (ref 0–0.2)
IMM GRANULOCYTES NFR BLD AUTO: 0 % (ref 0–2)
LYMPHOCYTES # BLD AUTO: 1.12 THOUSANDS/ΜL (ref 0.6–4.47)
LYMPHOCYTES NFR BLD AUTO: 25 % (ref 14–44)
MCH RBC QN AUTO: 29.7 PG (ref 26.8–34.3)
MCHC RBC AUTO-ENTMCNC: 32.5 G/DL (ref 31.4–37.4)
MCV RBC AUTO: 91 FL (ref 82–98)
MONOCYTES # BLD AUTO: 0.43 THOUSAND/ΜL (ref 0.17–1.22)
MONOCYTES NFR BLD AUTO: 9 % (ref 4–12)
NEUTROPHILS # BLD AUTO: 2.61 THOUSANDS/ΜL (ref 1.85–7.62)
NEUTS SEG NFR BLD AUTO: 57 % (ref 43–75)
NRBC BLD AUTO-RTO: 0 /100 WBCS
PLATELET # BLD AUTO: 145 THOUSANDS/UL (ref 149–390)
PMV BLD AUTO: 9.3 FL (ref 8.9–12.7)
RBC # BLD AUTO: 4.04 MILLION/UL (ref 3.88–5.62)
WBC # BLD AUTO: 4.58 THOUSAND/UL (ref 4.31–10.16)

## 2019-10-30 PROCEDURE — 85025 COMPLETE CBC W/AUTO DIFF WBC: CPT

## 2019-10-30 PROCEDURE — 83036 HEMOGLOBIN GLYCOSYLATED A1C: CPT

## 2019-10-30 NOTE — PROGRESS NOTES
This patient was seen on 10/31/19  Assessment:    Problem List Items Addressed This Visit        Endocrine    Diabetic peripheral neuropathy (HCC) (Chronic)       Musculoskeletal and Integument    Right foot ulcer, with fat layer exposed ([address] ) - Primary       Other    Wound of right leg          Plan:  I recommended to patient to take great care to avoid direct pressure on calf wound area when elevating  A formal timeout including patient identification, laterality and existing allergies using [facility] protocol was conducted  Procedure Performed: Debridement of subcutaneous tissue- <20 sq cm (53197)  Anesthetic used as needed to reduce discomfort  Under aseptic technique, the wound was thoroughly explored for undermining, deep sinus tracts and bone involvement  Sterile instrumentation including scalpel used to excise the clearly delineated devitalized subcutaneous tissue exposing viable tissue  Bleeding controlled with gentle pressure  Patient tolerated debridement without complications  The wound was dressed  Wound dressing technique and frequency described to patient  I am to be called if any signs of infection develop such as erythema, increased wound size or significant change in appearance of wound, odor, pain, increased or change in color of drainage, swelling, fever, chills, nightsweats  I reviewed these signs with the patient  I recommended limiting leg dependency and to use any prescribed compression devices in an effort to allow proper fluid control and healing of the wounded area  I was very clear that sleeping with legs dependent is to be avoided at all costs  I explained that good wound care and compliance are necessary to allow healing and to prevent limb loss  Collagen applied to leg wound, adaptic  Unna boot applied using paste wrap and coban  Applied from base of toes to infrapatellar notch  Unna boot care and precautions discussed and explained to pt/family     1  Leave wrap dry and intact  2  If wrap is too tight (feels like a tourniquet, toes turning purple), elevate the leg above heart level, if no relief, remove wrap (no scissors) and call office  3  If wrap becomes too loose, remove wrap (no scissors) and call the office  Pt/family verbalize understanding      Foot wound- dermagran gauze applied, gauze and tape  To remain dry and intact  Return in one week  Diagnoses and all orders for this visit:    Right foot ulcer, with fat layer exposed ([address] )    Diabetic peripheral neuropathy (HCC)    Wound of right lower extremity, subsequent encounter          Subjective:      Patient ID: Sidney Anne is a 76 y o  male  Pt arrives for wound care follow up  Presents with unna boot dry and intact, denies problems  The following portions of the patient's history were reviewed and updated as appropriate: allergies, current medications, past family history, past medical history, past social history, past surgical history and problem list     Review of Systems   Constitutional: Negative  Objective:      /58   Pulse 93   Ht 5' 8" (1 727 m) Comment: verbal  Wt 63 5 kg (140 lb)   BMI 21 29 kg/m²          Physical Exam   Constitutional: He appears well-developed and well-nourished  No distress  Skin: He is not diaphoretic  Nursing note and vitals reviewed          Wound # 1   Location: right posterior lower leg  Length 3cm: Width 0 5cm: Depth 0 2cm:   Deepest Tissue Noted in Base: SQ  Probe to Bone?: no  Peripheral Skin Description: intact  Granulation: 90% Fibrotic Tissue: 10% Necrotic Tissue: 0%  Drainage Amount: minimal  Signs of Infection: no  Total debrided 1 5 square centimeters       Wound # 2   Location: right foot wound  Length 0 2cm: Width 0 3cm: Depth 0 2cm:   Deepest Tissue Noted in Base: SQ  Probe to Bone?: no  Peripheral Skin Description: intact  Granulation: 90% Fibrotic Tissue: 10% Necrotic Tissue: 0%  Drainage Amount: minimal  Signs of Infection: no  Total debrided  0 06square centimeters

## 2019-10-31 ENCOUNTER — PROCEDURE VISIT (OUTPATIENT)
Dept: PODIATRY | Facility: CLINIC | Age: 74
End: 2019-10-31
Payer: COMMERCIAL

## 2019-10-31 VITALS
WEIGHT: 140 LBS | DIASTOLIC BLOOD PRESSURE: 58 MMHG | HEART RATE: 93 BPM | SYSTOLIC BLOOD PRESSURE: 107 MMHG | HEIGHT: 68 IN | BODY MASS INDEX: 21.22 KG/M2

## 2019-10-31 DIAGNOSIS — E11.42 DIABETIC PERIPHERAL NEUROPATHY (HCC): Chronic | ICD-10-CM

## 2019-10-31 DIAGNOSIS — S81.801D WOUND OF RIGHT LOWER EXTREMITY, SUBSEQUENT ENCOUNTER: ICD-10-CM

## 2019-10-31 DIAGNOSIS — L97.512 RIGHT FOOT ULCER, WITH FAT LAYER EXPOSED (HCC): Primary | ICD-10-CM

## 2019-10-31 PROCEDURE — 11042 DBRDMT SUBQ TIS 1ST 20SQCM/<: CPT | Performed by: PODIATRIST

## 2019-11-01 ENCOUNTER — OFFICE VISIT (OUTPATIENT)
Dept: FAMILY MEDICINE CLINIC | Facility: CLINIC | Age: 74
End: 2019-11-01
Payer: COMMERCIAL

## 2019-11-01 VITALS
BODY MASS INDEX: 20.61 KG/M2 | HEIGHT: 68 IN | WEIGHT: 136 LBS | HEART RATE: 90 BPM | SYSTOLIC BLOOD PRESSURE: 138 MMHG | DIASTOLIC BLOOD PRESSURE: 78 MMHG | OXYGEN SATURATION: 99 %

## 2019-11-01 DIAGNOSIS — N18.30 TYPE 2 DIABETES MELLITUS WITH STAGE 3 CHRONIC KIDNEY DISEASE, WITH LONG-TERM CURRENT USE OF INSULIN (HCC): ICD-10-CM

## 2019-11-01 DIAGNOSIS — Z79.4 TYPE 2 DIABETES MELLITUS WITH LEFT EYE AFFECTED BY PROLIFERATIVE RETINOPATHY WITHOUT MACULAR EDEMA, WITH LONG-TERM CURRENT USE OF INSULIN (HCC): ICD-10-CM

## 2019-11-01 DIAGNOSIS — E11.22 TYPE 2 DIABETES MELLITUS WITH STAGE 3 CHRONIC KIDNEY DISEASE, WITH LONG-TERM CURRENT USE OF INSULIN (HCC): ICD-10-CM

## 2019-11-01 DIAGNOSIS — E78.2 MIXED HYPERLIPIDEMIA: Chronic | ICD-10-CM

## 2019-11-01 DIAGNOSIS — Z00.00 MEDICARE ANNUAL WELLNESS VISIT, SUBSEQUENT: Primary | ICD-10-CM

## 2019-11-01 DIAGNOSIS — Z89.422 ACQUIRED ABSENCE OF OTHER LEFT TOE(S) (HCC): ICD-10-CM

## 2019-11-01 DIAGNOSIS — Z79.4 TYPE 2 DIABETES MELLITUS WITH STAGE 3 CHRONIC KIDNEY DISEASE, WITH LONG-TERM CURRENT USE OF INSULIN (HCC): ICD-10-CM

## 2019-11-01 DIAGNOSIS — D69.6 THROMBOCYTOPENIA (HCC): Chronic | ICD-10-CM

## 2019-11-01 DIAGNOSIS — R80.9 MICROALBUMINURIA: ICD-10-CM

## 2019-11-01 DIAGNOSIS — E11.3592 TYPE 2 DIABETES MELLITUS WITH LEFT EYE AFFECTED BY PROLIFERATIVE RETINOPATHY WITHOUT MACULAR EDEMA, WITH LONG-TERM CURRENT USE OF INSULIN (HCC): ICD-10-CM

## 2019-11-01 PROCEDURE — 99213 OFFICE O/P EST LOW 20 MIN: CPT | Performed by: FAMILY MEDICINE

## 2019-11-01 PROCEDURE — 1125F AMNT PAIN NOTED PAIN PRSNT: CPT | Performed by: FAMILY MEDICINE

## 2019-11-01 PROCEDURE — 1170F FXNL STATUS ASSESSED: CPT | Performed by: FAMILY MEDICINE

## 2019-11-01 PROCEDURE — G0439 PPPS, SUBSEQ VISIT: HCPCS | Performed by: FAMILY MEDICINE

## 2019-11-01 PROCEDURE — 3008F BODY MASS INDEX DOCD: CPT | Performed by: FAMILY MEDICINE

## 2019-11-01 NOTE — ASSESSMENT & PLAN NOTE
Lab Results   Component Value Date    HGBA1C 8 8 (H) 10/30/2019     PATIENT ADVISED TO CONSULT NEPHROLOGY, FOR DETERIORATING KIDNEY FUNCTION

## 2019-11-01 NOTE — ASSESSMENT & PLAN NOTE
Lab Results   Component Value Date    HGBA1C 8 8 (H) 10/30/2019     Patient is on insulin pump, A1c improved to 8 8    Follows up with endocrine specialist through [place]

## 2019-11-01 NOTE — PROGRESS NOTES
Subjective:      Patient ID: Kalyan Husbands is a 76 y o  male  HPI  Patient is here for Medicare wellness  Also to follow up on chronic medical issues  Has type 2 diabetes, on insulin pump  Recent A1c is 8 8  Patient follows up with specialist through [facility]  He gets all his medications through [facility]  Is been monitor with labs every 3 months  Has a history of thrombocytopenia, platelet number is stable at 145  Patient is asymptomatic  Has hyperlipidemia, sick LDL is at goal on atorvastatin 10 milligram   Has chronic kidney disease secondary to diabetes  Microalbumin urea has improved on lisinopril  Past Medical History:   Diagnosis Date    Arthritis     Cardiac disease     Closed fracture of fibula, lateral malleolus, right     Complete heart block (HCC)     Diabetes mellitus ([address] )     Disease of thyroid gland     External incisional dehiscence     GERD (gastroesophageal reflux disease)     Heart disease     Muscular deconditioning     Pacemaker     Sleep apnea     cannot tolerate cpap    Thyroid disease        Family History   Problem Relation Age of Onset    Diabetes Mother     Heart disease Mother     Thyroid disease Mother         Disorder     Cancer Brother     Gout Brother     Diabetes Maternal Grandmother        Past Surgical History:   Procedure Laterality Date    CARDIAC PACEMAKER PLACEMENT Left 2015    CARDIAC PACEMAKER PLACEMENT      CARDIAC SURGERY      CATARACT EXTRACTION      MUSCLE FLAP      right foot    NERVE BLOCK      Transforaminal Epidural Lumbar with Fluoroscopic Guidance     NH COLONOSCOPY FLX DX W/COLLJ SPEC WHEN PFRMD N/A 5/4/2018    Procedure: EGD AND COLONOSCOPY;  Surgeon: John rOtiz MD;  [facility];   Service: Gastroenterology    NH SPLIT GRFT TRUNK,ARM,LEG <100 SQCM Right 6/10/2019    Procedure: SKIN GRAFT SPLIT THICKNESS (STSG)  EXTREMITY right leg;  Surgeon: Mary Menjivar DPM;  [facility];  Service: Podiatry    TOE AMPUTATION      left 5th toe        reports that he has quit smoking  His smoking use included pipe  He has never used smokeless tobacco  He reports that he drinks alcohol  He reports that he does not use drugs  Current Outpatient Medications:     aspirin 81 mg chewable tablet, Chew 81 mg daily, Disp: , Rfl:     Atorvastatin Calcium (LIPITOR PO), Take 10 mg by mouth daily 1/2 tab daily, Disp: , Rfl:     CALCIUM CITRATE PO, Take 2,000 mg by mouth daily , Disp: , Rfl:     Cholecalciferol (VITAMIN D3) 2000 units capsule, Take 2,000 Units by mouth daily, Disp: , Rfl:     Continuous Blood Gluc Sensor (DEXCOM G6 SENSOR) MISC, As directed dx:250 03, Disp: , Rfl:     cyanocobalamin (VITAMIN B-12) 1,000 mcg tablet, Take 2,000 mcg by mouth daily, Disp: , Rfl:     DULoxetine (CYMBALTA) 30 mg delayed release capsule, Take 30 mg by mouth daily , Disp: , Rfl:     glucose 40 %, Take by mouth once, Disp: , Rfl:     insulin aspart (NovoLOG) 100 units/mL injection, Inject under the skin Via insulin pump, Disp: , Rfl:     levothyroxine 125 mcg tablet, Take 1 tablet (125 mcg total) by mouth daily in the early morning (Patient taking differently: Take 150 mcg by mouth daily in the early morning ), Disp: 30 tablet, Rfl: 0    lisinopril (ZESTRIL) 20 mg tablet, Take 10 mg by mouth 2 (two) times a day , Disp: , Rfl:     pantoprazole (PROTONIX) 40 mg tablet, Take 40 mg by mouth, Disp: , Rfl:     tamsulosin (FLOMAX) 0 4 mg, Take 0 4 mg by mouth daily with dinner  , Disp: , Rfl:     The following portions of the patient's history were reviewed and updated as appropriate: allergies, current medications, past family history, past medical history, past social history, past surgical history and problem list     Review of Systems   Constitutional: Negative  Respiratory: Negative  Cardiovascular: Negative  Gastrointestinal: Negative  Musculoskeletal: Negative  Negative for myalgias  Neurological: Negative  Psychiatric/Behavioral: Negative  Objective:    /78 (BP Location: Right arm, Patient Position: Sitting, Cuff Size: Large)   Pulse 90   Ht 5' 8" (1 727 m)   Wt 61 7 kg (136 lb)   SpO2 99%   BMI 20 68 kg/m²      Physical Exam   Constitutional: He is oriented to person, place, and time  He appears well-developed and well-nourished  Cardiovascular: Normal rate, regular rhythm and normal heart sounds  Pulmonary/Chest: Effort normal and breath sounds normal    Abdominal: Soft  Bowel sounds are normal    Neurological: He is alert and oriented to person, place, and time  Psychiatric: His behavior is normal  Judgment normal          Recent Results (from the past 1008 hour(s))   Comprehensive metabolic panel    Collection Time: 10/30/19  7:52 AM   Result Value Ref Range    Sodium 136 136 - 145 mmol/L    Potassium 5 2 3 5 - 5 3 mmol/L    Chloride 102 100 - 108 mmol/L    CO2 29 21 - 32 mmol/L    ANION GAP 5 4 - 13 mmol/L    BUN 32 (H) 5 - 25 mg/dL    Creatinine 1 44 (H) 0 60 - 1 30 mg/dL    Glucose, Fasting 250 (H) 65 - 99 mg/dL    Calcium 9 2 8 3 - 10 1 mg/dL    AST 18 5 - 45 U/L    ALT 26 12 - 78 U/L    Alkaline Phosphatase 81 46 - 116 U/L    Total Protein 6 7 6 4 - 8 2 g/dL    Albumin 3 3 (L) 3 5 - 5 0 g/dL    Total Bilirubin 0 33 0 20 - 1 00 mg/dL    eGFR 47 ml/min/1 73sq m   TSH, 3rd generation with Free T4 reflex    Collection Time: 10/30/19  7:52 AM   Result Value Ref Range    TSH 3RD GENERATON 2 500 0 358 - 3 740 uIU/mL   Microalbumin / creatinine urine ratio    Collection Time: 10/30/19  7:52 AM   Result Value Ref Range    Creatinine, Ur 136 0 mg/dL    Microalbum  ,U,Random 120 0 (H) 0 0 - 20 0 mg/L    Microalb Creat Ratio 88 (H) 0 - 30 mg/g creatinine   Hemoglobin A1C    Collection Time: 10/30/19  7:52 AM   Result Value Ref Range    Hemoglobin A1C 8 8 (H) 4 2 - 6 3 %     mg/dl   CBC and differential    Collection Time: 10/30/19  7:52 AM   Result Value Ref Range    WBC 4 58 4 31 - 10 16 Thousand/uL    RBC 4 04 3 88 - 5 62 Million/uL    Hemoglobin 12 0 12 0 - 17 0 g/dL    Hematocrit 36 9 36 5 - 49 3 %    MCV 91 82 - 98 fL    MCH 29 7 26 8 - 34 3 pg    MCHC 32 5 31 4 - 37 4 g/dL    RDW 13 0 11 6 - 15 1 %    MPV 9 3 8 9 - 12 7 fL    Platelets 375 (L) 123 - 390 Thousands/uL    nRBC 0 /100 WBCs    Neutrophils Relative 57 43 - 75 %    Immat GRANS % 0 0 - 2 %    Lymphocytes Relative 25 14 - 44 %    Monocytes Relative 9 4 - 12 %    Eosinophils Relative 8 (H) 0 - 6 %    Basophils Relative 1 0 - 1 %    Neutrophils Absolute 2 61 1 85 - 7 62 Thousands/µL    Immature Grans Absolute 0 01 0 00 - 0 20 Thousand/uL    Lymphocytes Absolute 1 12 0 60 - 4 47 Thousands/µL    Monocytes Absolute 0 43 0 17 - 1 22 Thousand/µL    Eosinophils Absolute 0 37 0 00 - 0 61 Thousand/µL    Basophils Absolute 0 04 0 00 - 0 10 Thousands/µL       Assessment/Plan:    Type 2 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, left eye ([facility])    Lab Results   Component Value Date    HGBA1C 8 8 (H) 10/30/2019     Patient is on insulin pump, A1c improved to 8 8  [facility]     Type 2 diabetes mellitus with stage 3 chronic kidney disease ([facility] )    Lab Results   Component Value Date    HGBA1C 8 8 (H) 10/30/2019     PATIENT ADVISED TO CONSULT NEPHROLOGY, FOR DETERIORATING KIDNEY FUNCTION  Hyperlipidemia  LDL is at goal on atorvastatin 10 milligram   Continue same  Thrombocytopenia ([facility])  Platelet counts are stable  Being monitored by labs every 3 months at [facility]    Microalbuminuria  Improved  Patient is on lisinopril           Problem List Items Addressed This Visit        Endocrine    Type 2 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, left eye ([facility] )       Lab Results   Component Value Date    HGBA1C 8 8 (H) 10/30/2019     Patient is on insulin pump, A1c improved to 8 8    Follows up with endocrine specialist through [facility]          Type 2 diabetes mellitus with stage 3 chronic kidney disease ([facility] )       Lab Results   Component Value Date    HGBA1C 8 8 (H) 10/30/2019     PATIENT ADVISED TO CONSULT NEPHROLOGY, FOR DETERIORATING KIDNEY FUNCTION  Relevant Orders    Ambulatory referral to Nephrology       Other    Hyperlipidemia (Chronic)     LDL is at goal on atorvastatin 10 milligram   Continue same  Relevant Orders    Comprehensive metabolic panel    Lipid panel    Thrombocytopenia (HCC) (Chronic)     Platelet counts are stable  Being monitored by labs every 3 months at [facility] annual wellness visit, subsequent - Primary    Microalbuminuria     Improved    Patient is on lisinopril         Acquired absence of other left toe(s) ([facility] )

## 2019-11-01 NOTE — PROGRESS NOTES
Assessment and Plan:     Problem List Items Addressed This Visit     None           Preventive health issues were discussed with patient, and age appropriate screening tests were ordered as noted in patient's After Visit Summary  Personalized health advice and appropriate referrals for health education or preventive services given if needed, as noted in patient's After Visit Summary       History of Present Illness:     Patient presents for Medicare Annual Wellness visit    Patient Care Team:  Karena Felix MD as PCP - General  Breanneviry Mcginnis MD as PCP - Endocrinology (Endocrinology)  MD Ivis Sarmiento MD Aldo Hicks, MD Delana Chu, MD Spencer Revere, DO Dennison Allshouse, PA-C Marien Hedges, DPM Fabiola Brown, PA-C as Physician Assistant (Endocrinology)  Ashleigh Macdonald MD as Endoscopist  Checo Soliz RN as Lead OP Care Mgr (Care Coordination)     Problem List:     Patient Active Problem List   Diagnosis    Acid reflux    Hypothyroidism    Anemia    Arthritis    Type 2 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, left eye ([facility] )    Diabetic peripheral neuropathy ([facility] )    DM type 2, not at goal [facility])    Essential hypertension    Hyperlipidemia    Insulin pump status    Medtronic dual chamber PM    Screening for colon cancer    Preop cardiovascular exam    Risk for falls    Stage 3 chronic kidney disease ([facility] )    Thrombocytopenia (HCC)    High anion gap metabolic acidosis    Transaminitis    Hyponatremia    Low bicarbonate    Pacemaker    Type 2 diabetes mellitus with hyperglycemia ([facility] )    Vitreous hemorrhage of left eye ([facility] )    Transition of care performed with sharing of clinical summary    Microalbuminuria    Complete heart block ([address] )    Preoperative clearance    Wound of right leg    Status post split thickness skin graft    Skin graft failure    Acquired absence of other left toe(s) ([address] )    Type 2 diabetes mellitus with stage 3 chronic kidney disease ([facility] )    Right foot ulcer, with fat layer exposed ([facility] )      Past Medical and Surgical History:     Past Medical History:   Diagnosis Date    Arthritis     Cardiac disease     Closed fracture of fibula, lateral malleolus, right     Complete heart block (HCC)     Diabetes mellitus ([facility] )     Disease of thyroid gland     External incisional dehiscence     GERD (gastroesophageal reflux disease)     Heart disease     Muscular deconditioning     Pacemaker     Sleep apnea     cannot tolerate cpap    Thyroid disease      Past Surgical History:   Procedure Laterality Date    CARDIAC PACEMAKER PLACEMENT Left 2015    CARDIAC PACEMAKER PLACEMENT      CARDIAC SURGERY      CATARACT EXTRACTION      MUSCLE FLAP      right foot    NERVE BLOCK      Transforaminal Epidural Lumbar with Fluoroscopic Guidance     PA COLONOSCOPY FLX DX W/COLLJ SPEC WHEN PFRMD N/A 5/4/2018    Procedure: EGD AND COLONOSCOPY;  Surgeon: Elizabeth Al MD;  [facility];   Service: Gastroenterology    [address] <100 SQCM Right 6/10/2019    Procedure: SKIN GRAFT SPLIT THICKNESS (STSG)  EXTREMITY right leg;  Surgeon: Celena Fajardo DPM;  [facility];  Service: Podiatry    TOE AMPUTATION      left 5th toe      Family History:     Family History   Problem Relation Age of Onset    Diabetes Mother     Heart disease Mother     Thyroid disease Mother         Disorder     Cancer Brother     Gout Brother     Diabetes Maternal Grandmother       Social History:     Social History     Socioeconomic History    Marital status: /Civil Union     Spouse name: None    Number of children: 1    Years of education: Some college     Highest education level: None   Occupational History    Occupation: Disabled    Occupation: Retired      Comment: [employer]   Social Needs    Financial resource strain: None    Food insecurity:     Worry: None Inability: None    Transportation needs:     Medical: None     Non-medical: None   Tobacco Use    Smoking status: Former Smoker     Types: Pipe    Smokeless tobacco: Never Used   Substance and Sexual Activity    Alcohol use:  Yes     Alcohol/week: 0 0 standard drinks     Frequency: Never     Drinks per session: Patient refused     Binge frequency: Never     Comment: socially    Drug use: No    Sexual activity: None   Lifestyle    Physical activity:     Days per week: None     Minutes per session: None    Stress: None   Relationships    Social connections:     Talks on phone: None     Gets together: None     Attends Alevism service: None     Active member of club or organization: None     Attends meetings of clubs or organizations: None     Relationship status: None    Intimate partner violence:     Fear of current or ex partner: None     Emotionally abused: None     Physically abused: None     Forced sexual activity: None   Other Topics Concern    None   Social History Narrative    Caffeine Use - 15-20 oz/day     Daily caffeinated coffee consumption     Dental care, regularly - Denied     Guns in the home: Stored in locked cabinet - Denied    Pets/Animals: Dog     Scientologist practices    Tea    Water intake, adequate (per day)       Medications and Allergies:     Current Outpatient Medications   Medication Sig Dispense Refill    aspirin 81 mg chewable tablet Chew 81 mg daily      Atorvastatin Calcium (LIPITOR PO) Take 10 mg by mouth daily 1/2 tab daily      CALCIUM CITRATE PO Take 2,000 mg by mouth daily       Cholecalciferol (VITAMIN D3) 2000 units capsule Take 2,000 Units by mouth daily      Continuous Blood Gluc Sensor (DEXCOM G6 SENSOR) MISC As directed dx:250 03      cyanocobalamin (VITAMIN B-12) 1,000 mcg tablet Take 2,000 mcg by mouth daily      DULoxetine (CYMBALTA) 30 mg delayed release capsule Take 30 mg by mouth daily       glucose 40 % Take by mouth once      insulin aspart (NovoLOG) 100 units/mL injection Inject under the skin Via insulin pump      levothyroxine 125 mcg tablet Take 1 tablet (125 mcg total) by mouth daily in the early morning (Patient taking differently: Take 150 mcg by mouth daily in the early morning ) 30 tablet 0    lisinopril (ZESTRIL) 20 mg tablet Take 10 mg by mouth 2 (two) times a day       pantoprazole (PROTONIX) 40 mg tablet Take 40 mg by mouth      tamsulosin (FLOMAX) 0 4 mg Take 0 4 mg by mouth daily with dinner         No current facility-administered medications for this visit  Allergies   Allergen Reactions    Ibuprofen      sensitive    Iodine Rash      Immunizations:     Immunization History   Administered Date(s) Administered    INFLUENZA 10/13/2019    Influenza Quadrivalent, 6-35 Months IM 11/19/2016    Influenza Split High Dose Preservative Free IM 10/15/2014, 11/11/2015, 10/19/2017    Influenza TIV (IM) 10/20/2011, 09/15/2013    Influenza, high dose seasonal 0 5 mL 10/23/2018    Pneumococcal Conjugate 13-Valent 11/15/2016, 10/23/2019    Pneumococcal Polysaccharide PPV23 01/25/2018    Tdap 10/24/2008      Health Maintenance:         Topic Date Due    Hepatitis C Screening  1945     There are no preventive care reminders to display for this patient  Medicare Health Risk Assessment:     /78 (BP Location: Right arm, Patient Position: Sitting, Cuff Size: Large)   Pulse 90   Ht 5' 8" (1 727 m)   Wt 61 7 kg (136 lb)   SpO2 99%   BMI 20 68 kg/m²      Gregorio Benjamin is here for his Subsequent Wellness visit  Health Risk Assessment:   Patient rates overall health as fair  Patient feels that their physical health rating is same  Eyesight was rated as same  Hearing was rated as same  Patient feels that their emotional and mental health rating is same  Pain experienced in the last 7 days has been some  Patient's pain rating has been 5/10  Patient states that he has experienced no weight loss or gain in last 6 months  Depression Screening:   PHQ-2 Score: 0      Fall Risk Screening: In the past year, patient has experienced: history of falling in past year    Number of falls: 1  Injured during fall?: No    Feels unsteady when standing or walking?: No    Worried about falling?: No      Home Safety:  Patient does not have trouble with stairs inside or outside of their home  Patient has working smoke alarms and has working carbon monoxide detector  Home safety hazards include: none  Nutrition:   Current diet is Diabetic  Medications:   Patient is currently taking over-the-counter supplements  OTC medications include: see medication list  Patient is able to manage medications  Activities of Daily Living (ADLs)/Instrumental Activities of Daily Living (IADLs):   Walk and transfer into and out of bed and chair?: Yes  Dress and groom yourself?: Yes    Bathe or shower yourself?: Yes    Feed yourself? Yes  Do your laundry/housekeeping?: Yes  Manage your money, pay your bills and track your expenses?: Yes  Make your own meals?: Yes    Do your own shopping?: Yes    Previous Hospitalizations:   Any hospitalizations or ED visits within the last 12 months?: No      Advance Care Planning:     Durable POA for healthcare:  Yes    Advanced directive: Yes      Cognitive Screening:   Provider or family/friend/caregiver concerned regarding cognition?: No    PREVENTIVE SCREENINGS      Cardiovascular Screening:    General: Screening Not Indicated and History Lipid Disorder      Diabetes Screening:     General: Screening Not Indicated and History Diabetes      Colorectal Cancer Screening:     General: Screening Current      Prostate Cancer Screening:    General: Risks and Benefits Discussed and Patient Declines      Osteoporosis Screening:    General: Screening Current      Abdominal Aortic Aneurysm (AAA) Screening:    Risk factors include: age between 73-67 yo and tobacco use        Lung Cancer Screening:     General: Risks and Benefits Discussed and Screening Not Indicated      Hepatitis C Screening:    General: Risks and Benefits Discussed and Patient Declines    Other Counseling Topics:   Car/seat belt/driving safety, skin self-exam, sunscreen and calcium and vitamin D intake and regular weightbearing exercise         Trang Cornelius MD

## 2019-11-01 NOTE — PATIENT INSTRUCTIONS
Medicare Preventive Visit Patient Instructions  Thank you for completing your Welcome to Medicare Visit or Medicare Annual Wellness Visit today  Your next wellness visit will be due in one year (11/1/2020)  The screening/preventive services that you may require over the next 5-10 years are detailed below  Some tests may not apply to you based off risk factors and/or age  Screening tests ordered at today's visit but not completed yet may show as past due  Also, please note that scanned in results may not display below  Preventive Screenings:  Service Recommendations Previous Testing/Comments   Colorectal Cancer Screening  · Colonoscopy    · Fecal Occult Blood Test (FOBT)/Fecal Immunochemical Test (FIT)  · Fecal DNA/Cologuard Test  · Flexible Sigmoidoscopy Age: 54-65 years old   Colonoscopy: every 10 years (May be performed more frequently if at higher risk)  OR  FOBT/FIT: every 1 year  OR  Cologuard: every 3 years  OR  Sigmoidoscopy: every 5 years  Screening may be recommended earlier than age 48 if at higher risk for colorectal cancer  Also, an individualized decision between you and your healthcare provider will decide whether screening between the ages of 74-80 would be appropriate   Colonoscopy: Not on file  FOBT/FIT: Not on file  Cologuard: Not on file  Sigmoidoscopy: Not on file    Screening Current     Prostate Cancer Screening Individualized decision between patient and health care provider in men between ages of 53-78   Medicare will cover every 12 months beginning on the day after your 50th birthday PSA: No results in last 5 years          Hepatitis C Screening Once for adults born between [address] and 1965  More frequently in patients at high risk for Hepatitis C Hep C Antibody: Not on file       Diabetes Screening 1-2 times per year if you're at risk for diabetes or have pre-diabetes Fasting glucose: 250 mg/dL   A1C: 8 8 %    Screening Not Indicated  History Diabetes   Cholesterol Screening Once every 5 years if you don't have a lipid disorder  May order more often based on risk factors  Lipid panel: 07/14/2019    Screening Not Indicated  History Lipid Disorder      Other Preventive Screenings Covered by Medicare:  1  Abdominal Aortic Aneurysm (AAA) Screening: covered once if your at risk  You're considered to be at risk if you have a family history of AAA or a male between the age of 73-68 who smoking at least 100 cigarettes in your lifetime  2  Lung Cancer Screening: covers low dose CT scan once per year if you meet all of the following conditions: (1) Age 50-69; (2) No signs or symptoms of lung cancer; (3) Current smoker or have quit smoking within the last 15 years; (4) You have a tobacco smoking history of at least 30 pack years (packs per day x number of years you smoked); (5) You get a written order from a healthcare provider  3  Glaucoma Screening: covered annually if you're considered high risk: (1) You have diabetes OR (2) Family history of glaucoma OR (3)  aged 48 and older OR (3)  American aged 72 and older  3  Osteoporosis Screening: covered every 2 years if you meet one of the following conditions: (1) Have a vertebral abnormality; (2) On glucocorticoid therapy for more than 3 months; (3) Have primary hyperparathyroidism; (4) On osteoporosis medications and need to assess response to drug therapy  5  HIV Screening: covered annually if you're between the age of 12-76  Also covered annually if you are younger than 13 and older than 72 with risk factors for HIV infection  For pregnant patients, it is covered up to 3 times per pregnancy      Immunizations:  Immunization Recommendations   Influenza Vaccine Annual influenza vaccination during flu season is recommended for all persons aged >= 6 months who do not have contraindications   Pneumococcal Vaccine (Prevnar and Pneumovax)  * Prevnar = PCV13  * Pneumovax = PPSV23 Adults 25-60 years old: 1-3 doses may be recommended based on certain risk factors  Adults 72 years old: Prevnar (PCV13) vaccine recommended followed by Pneumovax (PPSV23) vaccine  If already received PPSV23 since turning 65, then PCV13 recommended at least one year after PPSV23 dose  Hepatitis B Vaccine 3 dose series if at intermediate or high risk (ex: diabetes, end stage renal disease, liver disease)   Tetanus (Td) Vaccine - COST NOT COVERED BY MEDICARE PART B Following completion of primary series, a booster dose should be given every 10 years to maintain immunity against tetanus  Td may also be given as tetanus wound prophylaxis  Tdap Vaccine - COST NOT COVERED BY MEDICARE PART B Recommended at least once for all adults  For pregnant patients, recommended with each pregnancy  Shingles Vaccine (Shingrix) - COST NOT COVERED BY MEDICARE PART B  2 shot series recommended in those aged 48 and above     Health Maintenance Due:      Topic Date Due    Hepatitis C Screening  1945     Immunizations Due:  There are no preventive care reminders to display for this patient  Advance Directives   What are advance directives? Advance directives are legal documents that state your wishes and plans for medical care  These plans are made ahead of time in case you lose your ability to make decisions for yourself  Advance directives can apply to any medical decision, such as the treatments you want, and if you want to donate organs  What are the types of advance directives? There are many types of advance directives, and each state has rules about how to use them  You may choose a combination of any of the following:  · Living will: This is a written record of the treatment you want  You can also choose which treatments you do not want, which to limit, and which to stop at a certain time  This includes surgery, medicine, IV fluid, and tube feedings  · Durable power of  for healthcare [facility]):   This is a written record that states who you want to make healthcare choices for you when you are unable to make them for yourself  This person, called a proxy, is usually a family member or a friend  You may choose more than 1 proxy  · Do not resuscitate (DNR) order:  A DNR order is used in case your heart stops beating or you stop breathing  It is a request not to have certain forms of treatment, such as CPR  A DNR order may be included in other types of advance directives  · Medical directive: This covers the care that you want if you are in a coma, near death, or unable to make decisions for yourself  You can list the treatments you want for each condition  Treatment may include pain medicine, surgery, blood transfusions, dialysis, IV or tube feedings, and a ventilator (breathing machine)  · Values history: This document has questions about your views, beliefs, and how you feel and think about life  This information can help others choose the care that you would choose  Why are advance directives important? An advance directive helps you control your care  Although spoken wishes may be used, it is better to have your wishes written down  Spoken wishes can be misunderstood, or not followed  Treatments may be given even if you do not want them  An advance directive may make it easier for your family to make difficult choices about your care  Fall Prevention    Fall prevention  includes ways to make your home and other areas safer  It also includes ways you can move more carefully to prevent a fall  Health conditions that cause changes in your blood pressure, vision, or muscle strength and coordination may increase your risk for falls  Medicines may also increase your risk for falls if they make you dizzy, weak, or sleepy  Fall prevention tips:   · Stand or sit up slowly  · Use assistive devices as directed  · Wear shoes that fit well and have soles that   · Wear a personal alarm  · Stay active  · Manage your medical conditions      Home Safety Tips:  · Add items to prevent falls in the bathroom  · Keep paths clear  · Install bright lights in your home  · Keep items you use often on shelves within reach  · Paint or place reflective tape on the edges of your stairs  © Copyright "[employer] 2018 Information is for End User's use only and may not be sold, redistributed or otherwise used for commercial purposes   All illustrations and images included in CareNotes® are the copyrighted property of A D A M , Inc  or [address] [place] [address]

## 2019-11-07 ENCOUNTER — PROCEDURE VISIT (OUTPATIENT)
Dept: PODIATRY | Facility: CLINIC | Age: 74
End: 2019-11-07
Payer: COMMERCIAL

## 2019-11-07 VITALS
BODY MASS INDEX: 20.67 KG/M2 | HEIGHT: 68 IN | HEART RATE: 80 BPM | DIASTOLIC BLOOD PRESSURE: 76 MMHG | WEIGHT: 136.4 LBS | SYSTOLIC BLOOD PRESSURE: 134 MMHG

## 2019-11-07 DIAGNOSIS — E11.42 DIABETIC PERIPHERAL NEUROPATHY (HCC): Primary | Chronic | ICD-10-CM

## 2019-11-07 DIAGNOSIS — S81.801D WOUND OF RIGHT LOWER EXTREMITY, SUBSEQUENT ENCOUNTER: ICD-10-CM

## 2019-11-07 DIAGNOSIS — L97.512 RIGHT FOOT ULCER, WITH FAT LAYER EXPOSED (HCC): ICD-10-CM

## 2019-11-07 PROCEDURE — 11042 DBRDMT SUBQ TIS 1ST 20SQCM/<: CPT | Performed by: PODIATRIST

## 2019-11-13 NOTE — PROGRESS NOTES
This patient was seen on 11/14/19  Assessment:    Problem List Items Addressed This Visit        Endocrine    Diabetic peripheral neuropathy (HCC) (Chronic)       Musculoskeletal and Integument    Right foot ulcer, with fat layer exposed ([facility] [address] ) - Primary          Plan:  A formal timeout including patient identification, laterality and existing allergies using [facility] protocol was conducted  Procedure Performed: Debridement of subcutaneous tissue- <20 sq cm (03931)  Anesthetic used as needed to reduce discomfort  Under aseptic technique, the wound was thoroughly explored for undermining, deep sinus tracts and bone involvement  Sterile instrumentation including scalpel used to excise the clearly delineated devitalized subcutaneous tissue exposing viable tissue  Bleeding controlled with gentle pressure  Patient tolerated debridement without complications  The wound was dressed  Wound dressing technique and frequency described to patient  I am to be called if any signs of infection develop such as erythema, increased wound size or significant change in appearance of wound, odor, pain, increased or change in color of drainage, swelling, fever, chills, nightsweats  I reviewed these signs with the patient  I recommended limiting leg dependency and to use any prescribed compression devices in an effort to allow proper fluid control and healing of the wounded area  I was very clear that sleeping with legs dependent is to be avoided at all costs  I explained that good wound care and compliance are necessary to allow healing and to prevent limb loss  Collagen and adaptic applied to leg wound  Unna boot applied using paste wrap and coban  Applied from base of toes to infrapatellar notch  Unna boot care and precautions discussed and explained to pt/family  1  Leave wrap dry and intact  2   If wrap is too tight (feels like a tourniquet, toes turning purple), elevate the leg above heart level, if no relief, remove wrap (no scissors) and call office  3  If wrap becomes too loose, remove wrap (no scissors) and call the office  Pt/family verbalize understanding    Foot wound- dermagran gauze applied, gauze and tape  To be left clean dry and intact  Diagnoses and all orders for this visit:    Right foot ulcer, with fat layer exposed ([address] )    Diabetic peripheral neuropathy (HCC)          Subjective:      Patient ID: Janna Sotelo is a 76 y o  male  Pt arrives for wound care follow up  Presents with unna boot intact and denies problems  The following portions of the patient's history were reviewed and updated as appropriate: allergies, current medications, past family history, past medical history, past social history, past surgical history and problem list     Review of Systems   Constitutional: Negative  Objective:      /60   Pulse 85   Ht 5' 8" (1 727 m) Comment: verbal  Wt 62 1 kg (136 lb 12 8 oz)   BMI 20 80 kg/m²          Physical Exam   Constitutional: He appears well-developed and well-nourished  No distress  Skin: He is not diaphoretic  Nursing note and vitals reviewed          Wound # 1   Location: right posterior lower leg  Length- 0 5  Width- 0 5 Depth 0 2cm:   Deepest Tissue Noted in Base: SQ  Probe to Bone?: no  Peripheral Skin Description: intact  Granulation: 90% Fibrotic Tissue: 10% Necrotic Tissue: 0%  Drainage Amount: minimal  Signs of Infection: no  Total debrided 0 square centimeters            Wound # 2   Location: right foot wound   length- 0 2 width- 0 2Depth 0 2cm:   Deepest Tissue Noted in Base: SQ  Probe to Bone?: no  Peripheral Skin Description: intact  Granulation: 90% Fibrotic Tissue: 10% Necrotic Tissue: 0%  Drainage Amount: minimal  Signs of Infection: no  Total debrided  0 04square centimeters

## 2019-11-14 ENCOUNTER — PROCEDURE VISIT (OUTPATIENT)
Dept: PODIATRY | Facility: CLINIC | Age: 74
End: 2019-11-14
Payer: COMMERCIAL

## 2019-11-14 VITALS
BODY MASS INDEX: 20.73 KG/M2 | HEIGHT: 68 IN | WEIGHT: 136.8 LBS | DIASTOLIC BLOOD PRESSURE: 60 MMHG | HEART RATE: 85 BPM | SYSTOLIC BLOOD PRESSURE: 114 MMHG

## 2019-11-14 DIAGNOSIS — E11.42 DIABETIC PERIPHERAL NEUROPATHY (HCC): Chronic | ICD-10-CM

## 2019-11-14 DIAGNOSIS — L97.512 RIGHT FOOT ULCER, WITH FAT LAYER EXPOSED (HCC): Primary | ICD-10-CM

## 2019-11-14 PROCEDURE — 11042 DBRDMT SUBQ TIS 1ST 20SQCM/<: CPT | Performed by: PODIATRIST

## 2019-11-19 NOTE — PROGRESS NOTES
This patient was seen on 11/21/19  Assessment:    Problem List Items Addressed This Visit     None          Plan:  DEBRIDEMENT NOTE    A formal timeout including patient identification, laterality and existing allergies using [facility] protocol was conducted  Procedure Performed: Debridement of subcutaneous tissue- >20 sq cm (06116)  Under aseptic technique, the wound was thoroughly explored and bluntly probed for undermining, deep sinus tracts and bone involvement  Sterile instrumentation including scalpel, forceps and curette used to excise the clearly delineated devitalized subcutaneous tissue (fibrotic tissue and necrotic non-viable portions of fat) exposing viable tissue  After debridement, bleeding in the wound bed base was noted indicated viable subcutaneous tissue had been exposed  Bleeding controlled with gentle pressure  Patient tolerated debridement without complications  The wound was dressed  Wound dressing technique and frequency described to patient  I am to be called if any signs of infection develop such as erythema, increased wound size or significant change in appearance of wound, odor, pain, increased or change in color of drainage, swelling, fever, chills, nightsweats  I reviewed these signs with the patient  I recommended limiting WB to bathroom priveleges and meal table and to use any prescribed offloading devices in an effort to allow proper rest and healing of the wounded area  I explained that good wound care and compliance are necessary to allow healing and to prevent toe loss or limb loss  Posterior leg wound appears healed  Adaptic applied for protection  Unna boot applied using paste wrap and coban  Applied from base of toes to infrapatellar notch  Unna boot care and precautions discussed and explained to pt/family  1  Leave wrap dry and intact  2   If wrap is too tight (feels like a tourniquet, toes turning purple), elevate the leg above heart level, if no relief, remove wrap (no scissors) and call office  3  If wrap becomes too loose, remove wrap (no scissors) and call the office  Pt/family verbalize understanding    Foot wound- dermagran gauze , gauze and tape  Horseshoe pad applied to assist in offloading  To be left clean, dry and intact  Continue to wear wedge shoe with every step  Patient instructed to bring his compression stockings to the next visit for possible transition  There are no diagnoses linked to this encounter  Subjective:      Patient ID: Riley Reeves is a 76 y o  male  Pt arrives for wound care follow up  Presents with unna boot intact and pt denies problems  The following portions of the patient's history were reviewed and updated as appropriate: allergies, current medications, past family history, past medical history, past social history, past surgical history and problem list     Review of Systems   Constitutional: Negative  Objective:      /67   Pulse 83   Ht 5' 8" (1 727 m) Comment: verbal  Wt 62 5 kg (137 lb 12 8 oz)   BMI 20 95 kg/m²          Physical Exam   Constitutional: He appears well-developed and well-nourished  No distress  Skin: He is not diaphoretic  Nursing note and vitals reviewed        Wound # 1   Location: right posterior lower leg HEALED  Length- 0cm  Width- 0cm Depth 0cm:   Deepest Tissue Noted in Base:   Probe to Bone?: no  Peripheral Skin Description: intact  Granulation: % Fibrotic Tissue: % Necrotic Tissue: 0%  Drainage Amount: minimal  Signs of Infection: no  Total debrided 0 square centimeters            Wound # 2   Location: right foot wound   length- 0 1 width- 0 1 Depth 0 2cm:   Deepest Tissue Noted in Base: SQ  Probe to Bone?: no  Peripheral Skin Description: intact  Granulation: 90% Fibrotic Tissue: 10% Necrotic Tissue: 0%  Drainage Amount: minimal  Signs of Infection: no  Total debrided  0 01square centimeters

## 2019-11-21 ENCOUNTER — PROCEDURE VISIT (OUTPATIENT)
Dept: PODIATRY | Facility: CLINIC | Age: 74
End: 2019-11-21
Payer: COMMERCIAL

## 2019-11-21 VITALS
BODY MASS INDEX: 20.88 KG/M2 | WEIGHT: 137.8 LBS | HEIGHT: 68 IN | HEART RATE: 83 BPM | DIASTOLIC BLOOD PRESSURE: 67 MMHG | SYSTOLIC BLOOD PRESSURE: 145 MMHG

## 2019-11-21 DIAGNOSIS — L97.512 RIGHT FOOT ULCER, WITH FAT LAYER EXPOSED (HCC): ICD-10-CM

## 2019-11-21 DIAGNOSIS — E11.42 DIABETIC PERIPHERAL NEUROPATHY (HCC): Primary | Chronic | ICD-10-CM

## 2019-11-21 PROBLEM — S81.801A WOUND OF RIGHT LEG: Status: RESOLVED | Noted: 2019-06-05 | Resolved: 2019-11-21

## 2019-11-21 PROCEDURE — 11042 DBRDMT SUBQ TIS 1ST 20SQCM/<: CPT | Performed by: PODIATRIST

## 2019-11-21 NOTE — PATIENT INSTRUCTIONS
Debridement   WHAT YOU NEED TO KNOW:   Debridement is the removal of infected, damaged, or dead tissue so a wound can heal properly  You may need more than one debridement  DISCHARGE INSTRUCTIONS:   Medicines:   · Medicines  can help decrease pain or prevent or treat an infection  · Take your medicine as directed  Contact your healthcare provider if you think your medicine is not helping or if you have side effects  Tell him of her if you are allergic to any medicine  Keep a list of the medicines, vitamins, and herbs you take  Include the amounts, and when and why you take them  Bring the list or the pill bottles to follow-up visits  Carry your medicine list with you in case of an emergency  Follow up with your healthcare provider as directed: You may need to return to have your wound checked  Write down your questions so you remember to ask them during your visits  Care for your wound as directed:   · Keep your wound clean and dry  You may need to cover your wound when you bathe  · Limit movements,  such as stretching, to prevent bleeding, tearing, and swelling in your wound  · Protect your wound  Avoid sunlight for at least 6 months  Apply mild, unscented lotion or cream to the skin around your wound to keep it moist     · Do not smoke  If you smoke, it is never too late to quit  Smoking decreases blood flow to the wound and delays healing  Ask for information if you need help quitting  · Drink liquids as directed  Ask how much liquid to drink each day and which liquids are best for you  Liquids help keep your skin moist so your wound can heal      · Eat a variety of healthy foods  Foods rich in protein, such as meat, eggs, and dairy products, help repair tissue  Carbohydrate-rich foods, such as bread and cereals, help increase cell growth and decrease the risk for wound infection  Do not have caffeine  Ask if you should take vitamins   Vitamins A and C may help tissue formation and increase scar tissue strength  Contact your healthcare provider if:   · You have a fever  · Your pain gets worse or does not go away, even after treatment  · Your skin is red, swollen, or draining pus  · You have questions or concerns about your condition or care  Return to the emergency department if:   · Blood soaks through your bandage  · You have severe pain  © 2017 [address]  Information is for End User's use only and may not be sold, redistributed or otherwise used for commercial purposes  All illustrations and images included in CareNotes® are the copyrighted property of [employer]  or Kirill Ulrich  The above information is an  only  It is not intended as medical advice for individual conditions or treatments  Talk to your doctor, nurse or pharmacist before following any medical regimen to see if it is safe and effective for you

## 2019-11-26 ENCOUNTER — CONSULT (OUTPATIENT)
Dept: NEPHROLOGY | Facility: CLINIC | Age: 74
End: 2019-11-26
Payer: COMMERCIAL

## 2019-11-26 VITALS
HEIGHT: 68 IN | SYSTOLIC BLOOD PRESSURE: 100 MMHG | WEIGHT: 136.8 LBS | BODY MASS INDEX: 20.73 KG/M2 | DIASTOLIC BLOOD PRESSURE: 60 MMHG

## 2019-11-26 DIAGNOSIS — N18.30 CKD (CHRONIC KIDNEY DISEASE), STAGE III (HCC): Primary | ICD-10-CM

## 2019-11-26 DIAGNOSIS — E11.22 TYPE 2 DIABETES MELLITUS WITH STAGE 3 CHRONIC KIDNEY DISEASE, WITH LONG-TERM CURRENT USE OF INSULIN (HCC): ICD-10-CM

## 2019-11-26 DIAGNOSIS — Z79.4 TYPE 2 DIABETES MELLITUS WITH STAGE 3 CHRONIC KIDNEY DISEASE, WITH LONG-TERM CURRENT USE OF INSULIN (HCC): ICD-10-CM

## 2019-11-26 DIAGNOSIS — E03.9 ACQUIRED HYPOTHYROIDISM: Chronic | ICD-10-CM

## 2019-11-26 DIAGNOSIS — N18.30 TYPE 2 DIABETES MELLITUS WITH STAGE 3 CHRONIC KIDNEY DISEASE, WITH LONG-TERM CURRENT USE OF INSULIN (HCC): ICD-10-CM

## 2019-11-26 LAB
SL AMB  POCT GLUCOSE, UA: ABNORMAL
SL AMB LEUKOCYTE ESTERASE,UA: ABNORMAL
SL AMB POCT BILIRUBIN,UA: ABNORMAL
SL AMB POCT BLOOD,UA: ABNORMAL
SL AMB POCT CLARITY,UA: CLEAR
SL AMB POCT COLOR,UA: YELLOW
SL AMB POCT KETONES,UA: ABNORMAL
SL AMB POCT NITRITE,UA: ABNORMAL
SL AMB POCT PH,UA: 5
SL AMB POCT SPECIFIC GRAVITY,UA: 1.01
SL AMB POCT URINE PROTEIN: ABNORMAL
SL AMB POCT UROBILINOGEN: 0.2

## 2019-11-26 PROCEDURE — 99205 OFFICE O/P NEW HI 60 MIN: CPT | Performed by: INTERNAL MEDICINE

## 2019-11-26 PROCEDURE — 3066F NEPHROPATHY DOC TX: CPT | Performed by: INTERNAL MEDICINE

## 2019-11-26 PROCEDURE — 81002 URINALYSIS NONAUTO W/O SCOPE: CPT | Performed by: INTERNAL MEDICINE

## 2019-11-26 RX ORDER — LEVOTHYROXINE SODIUM 0.12 MG/1
125 TABLET ORAL
Qty: 30 TABLET | Refills: 0
Start: 2019-11-26 | End: 2021-01-01 | Stop reason: HOSPADM

## 2019-11-26 NOTE — PATIENT INSTRUCTIONS
1) Avoid NSAIDS - (Example - motrin, advil, ibuprofen, aleve, exederin, etc)  2) Always follow a low salt diet  3) please lower lisinopril to 10 mg tabs once daily  4) labwork in 1-2 weeks  5) ultrasound before next appointment  6) talk to your PCP about other sleep apnea treatment options

## 2019-11-26 NOTE — LETTER
November 27, 2019     Darrell Archer MD  [address]  [facility] [address]    Patient: Torres Mario   YOB: 1945   Date of Visit: 11/26/2019       Dear Dr Celine Adair:    Thank you for referring Shwetha Acevedo to me for evaluation  Below are my notes for this consultation  If you have questions, please do not hesitate to call me  I look forward to following your patient along with you  Sincerely,        Dina Camargo MD        CC: No Recipients  Dina Camargo MD  11/27/2019  7:43 AM  Sign at close encounter  [address] 76 y o  male MRN: [id number]  11/27/2019    Reason for Visit: CKD III    ASSESSMENT and PLAN:    I had the pleasure of seeing Mr Mel Miles today in the renal clinic for the continued management of CKD III  68-year-old male with a past medical history of diabetes (30+ years, type II), diabetic retinopathy, neuropathy, gastroparesis, CKD, hyperlipidemia, hypertension (diagnosed for 5 years), thrombocytopenia, episode of DKA in July, prior echocardiogram with EF 55%, osteoporosis, admission in March with acute kidney injury and was admitted for lethargy and vomiting, was found to have DKA, quit smoking 30+ years ago (smoked for 20 years), R LE ulcerations who presents for initial evaluation for CKD      1) CKD III    -from 0752-6308, creatinine ranged from 0 6 to 1 mg/dL  -creatinine was 1 46 in July of 2018  -in March of 2019 with a peak creatinine of 3 1 mg/dL that had improved to 1 mg by discharge,  -then has had multiple episodes of acute kidney injury in June and also in July during admission of DKA  -creatinine on October 30th is 1 4 mg/dL  -prior CT scan in October 2017 with unremarkable kidneys, no hydronephrosis  -urinalysis in the office today is 300 protein, otherwise bland  -urinalysis in July and prior was negative to 1+ protein  -microalbumin to creatinine ratio most recent October was 80 but has been as high as 384  -hemoglobin A1c is 8 8% in October has mainly been uncontrolled since 2012  - to note, pt is on pantoprazole for 2-3 years   - to note, is on ACEI and was on higher dose which was changed end of oct    Pt had rising Cr in October from baseline  Has had mult episodes of KAZ, long standing uncontrolled DM (though proteinuria stable), HTN, PVD which are all contributing likely to kidney disease  Plan:  - I reviewed risk for progression of kidney disease  - check renal u/s  - BMP, CBC, Mag, phos, UPCR, UA  - lower lisinopril 10 mg once daily given marginal BP    2) HTN    - on lisinopril  - lowered 1 month ago (oct 2019, was on 20/20) to 10/10  - lower further to 10 mg once daily    3) Osteoporosis    - per Primary and Endo in [address] [facility]    4) Hyperkalemia    - low K diet  - lower lisinopril to 10 mg daily    5) ALINA    - did not tolerate mask  - advised to talk to PCP    6) RLE wound with PVD and MD    - diffuse b/l atherosclerotic disease    7) BPH    - sees Urology  - on tamsulosin    It was a pleasure evaluating your patient in the office today  Thank you for allowing our team to participate in the care of Mr Madhuri Alford  Please do not hesitate to contact our team if further issues/questions shall arise in the interim  No problem-specific Assessment & Plan notes found for this encounter  HPI:      75 yo male with presentation for CKD  Feeling fatigued for years and progressing       history of DM - 30+ years, type II  how long -   retinopathy - yes  neuropathy - neuropathy  history of HTN - yes  how long - 5 years  NSAID use - not using  ETOH - rare  Smoke - not now, prior  drugs - none  rash - since 2014 dry skin  travel outside of the country - none  supplements - B12, Ca,   family history of kidney disease - none; but DM is present  nephrolithiasis - none  urinary difficulty - frequency; BPH  - sig PVD      PATIENT INSTRUCTIONS:    Patient Instructions   1) Avoid NSAIDS - (Example - motrin, advil, ibuprofen, aleve, exederin, etc)  2) Always follow a low salt diet  3) please lower lisinopril to 10 mg tabs once daily  4) labwork in 1-2 weeks  5) ultrasound before next appointment  6) talk to your PCP about other sleep apnea treatment options        OBJECTIVE:  Current Weight: Weight - Scale: 62 1 kg (136 lb 12 8 oz)  Vitals:    11/26/19 1537 11/26/19 1648   BP:  100/60   Weight: 62 1 kg (136 lb 12 8 oz)    Height: 5' 8" (1 727 m)     Body mass index is 20 8 kg/m²  REVIEW OF SYSTEMS:    Review of Systems   Constitutional: Negative  Negative for appetite change and fatigue  HENT: Negative  Eyes: Negative  Respiratory: Negative  Negative for shortness of breath  Cardiovascular: Negative  Negative for leg swelling  Gastrointestinal: Negative  Endocrine: Negative  Genitourinary: Negative  Negative for difficulty urinating  Musculoskeletal: Negative  Allergic/Immunologic: Negative  Neurological: Negative  Hematological: Negative  Psychiatric/Behavioral: Negative  All other systems reviewed and are negative  PHYSICAL EXAM:      Physical Exam   Constitutional: He is oriented to person, place, and time  He appears well-developed and well-nourished  No distress  HENT:   Head: Normocephalic and atraumatic  Mouth/Throat: No oropharyngeal exudate  Eyes: Conjunctivae are normal  Right eye exhibits no discharge  Left eye exhibits no discharge  No scleral icterus  Neck: Normal range of motion  Neck supple  No JVD present  Cardiovascular: Normal rate, regular rhythm and normal heart sounds  Exam reveals no gallop and no friction rub  No murmur heard  Pulmonary/Chest: Effort normal and breath sounds normal  No respiratory distress  He has no wheezes  He has no rales  He exhibits no tenderness  Abdominal: Soft  Bowel sounds are normal  He exhibits no distension  There is no tenderness  There is no rebound  Musculoskeletal: Normal range of motion   He exhibits no edema, tenderness or deformity  RLE wrapped  Neurological: He is alert and oriented to person, place, and time  He has normal reflexes  No cranial nerve deficit  Coordination normal    Skin: Skin is warm and dry  No rash noted  He is not diaphoretic  No erythema  No pallor  Psychiatric: He has a normal mood and affect  His behavior is normal  Judgment and thought content normal    Nursing note and vitals reviewed        Medications:    Current Outpatient Medications:     aspirin 81 mg chewable tablet, Chew 81 mg daily, Disp: , Rfl:     Atorvastatin Calcium (LIPITOR PO), Take 10 mg by mouth daily 1/2 tab daily, Disp: , Rfl:     CALCIUM CITRATE PO, Take 2,000 mg by mouth daily , Disp: , Rfl:     Cholecalciferol (VITAMIN D3) 2000 units capsule, Take 2,000 Units by mouth daily, Disp: , Rfl:     Continuous Blood Gluc Sensor (DEXCOM G6 SENSOR) MISC, As directed dx:250 03, Disp: , Rfl:     cyanocobalamin (VITAMIN B-12) 1,000 mcg tablet, Take 2,000 mcg by mouth daily, Disp: , Rfl:     DULoxetine (CYMBALTA) 30 mg delayed release capsule, Take 30 mg by mouth daily , Disp: , Rfl:     glucose 40 %, Take by mouth once, Disp: , Rfl:     insulin aspart (NovoLOG) 100 units/mL injection, Inject under the skin Via insulin pump, Disp: , Rfl:     levothyroxine 125 mcg tablet, Take 1 tablet (125 mcg total) by mouth daily in the early morning, Disp: 30 tablet, Rfl: 0    lisinopril (ZESTRIL) 20 mg tablet, Take 10 mg by mouth daily, Disp: , Rfl:     pantoprazole (PROTONIX) 40 mg tablet, Take 40 mg by mouth, Disp: , Rfl:     tamsulosin (FLOMAX) 0 4 mg, Take 0 4 mg by mouth daily with dinner  , Disp: , Rfl:     Laboratory Results:        Invalid input(s): ALBUMIN    Results for orders placed or performed in visit on 11/26/19   POCT urine dip   Result Value Ref Range    LEUKOCYTE ESTERASE,UA neg     NITRITE,UA neg     SL AMB POCT UROBILINOGEN 0 2     POCT URINE PROTEIN 300+      PH,UA 5 0     BLOOD,UA neg     SPECIFIC GRAVITY,UA 1 015     KETONES,UA neg     BILIRUBIN,UA neg     GLUCOSE, UA neg      COLOR,UA yellow     CLARITY,UA clear

## 2019-11-26 NOTE — PROGRESS NOTES
NEPHROLOGY OFFICE VISIT   Torres Mario 76 y o  male MRN: [id number]  11/27/2019    Reason for Visit: CKD III    ASSESSMENT and PLAN:    I had the pleasure of seeing Mr Mel Miles today in the renal clinic for the continued management of CKD III  59-year-old male with a past medical history of diabetes (30+ years, type II), diabetic retinopathy, neuropathy, gastroparesis, CKD, hyperlipidemia, hypertension (diagnosed for 5 years), thrombocytopenia, episode of DKA in July, prior echocardiogram with EF 55%, osteoporosis, admission in March with acute kidney injury and was admitted for lethargy and vomiting, was found to have DKA, quit smoking 30+ years ago (smoked for 20 years), R LE ulcerations who presents for initial evaluation for CKD  1) CKD III    -from 6910-3321, creatinine ranged from 0 6 to 1 mg/dL  -creatinine was 1 46 in July of 2018  -in March of 2019 with a peak creatinine of 3 1 mg/dL that had improved to 1 mg by discharge,  -then has had multiple episodes of acute kidney injury in June and also in July during admission of DKA  -creatinine on October 30th is 1 4 mg/dL  -prior CT scan in October 2017 with unremarkable kidneys, no hydronephrosis  -urinalysis in the office today is 300 protein, otherwise bland  -urinalysis in July and prior was negative to 1+ protein  -microalbumin to creatinine ratio most recent October was 80 but has been as high as 384  -hemoglobin A1c is 8 8% in October has mainly been uncontrolled since 2012  - to note, pt is on pantoprazole for 2-3 years   - to note, is on ACEI and was on higher dose which was changed end of oct    Pt had rising Cr in October from baseline  Has had mult episodes of KAZ, long standing uncontrolled DM (though proteinuria stable), HTN, PVD which are all contributing likely to kidney disease       Plan:  - I reviewed risk for progression of kidney disease  - check renal u/s  - BMP, CBC, Mag, phos, UPCR, UA  - lower lisinopril 10 mg once daily given marginal BP    2) HTN    - on lisinopril  - lowered 1 month ago (oct 2019, was on 20/20) to 10/10  - lower further to 10 mg once daily    3) Osteoporosis    - per Primary and Endo in [place]    4) Hyperkalemia    - low K diet  - lower lisinopril to 10 mg daily    5) ALINA    - did not tolerate mask  - advised to talk to PCP    6) RLE wound with PVD and MD    - diffuse b/l atherosclerotic disease    7) BPH    - sees Urology  - on tamsulosin    It was a pleasure evaluating your patient in the office today  Thank you for allowing our team to participate in the care of Mr Jillian Freitas  Please do not hesitate to contact our team if further issues/questions shall arise in the interim  No problem-specific Assessment & Plan notes found for this encounter  HPI:      77 yo male with presentation for CKD  Feeling fatigued for years and progressing  history of DM - 30+ years, type II  how long -   retinopathy - yes  neuropathy - neuropathy  history of HTN - yes  how long - 5 years  NSAID use - not using  ETOH - rare  Smoke - not now, prior  drugs - none  rash - since 2014 dry skin  travel outside of the country - none  supplements - B12, Ca,   family history of kidney disease - none; but DM is present  nephrolithiasis - none  urinary difficulty - frequency; BPH  - sig PVD      PATIENT INSTRUCTIONS:    Patient Instructions   1) Avoid NSAIDS - (Example - motrin, advil, ibuprofen, aleve, exederin, etc)  2) Always follow a low salt diet  3) please lower lisinopril to 10 mg tabs once daily  4) labwork in 1-2 weeks  5) ultrasound before next appointment  6) talk to your PCP about other sleep apnea treatment options        OBJECTIVE:  Current Weight: Weight - Scale: 62 1 kg (136 lb 12 8 oz)  Vitals:    11/26/19 1537 11/26/19 1648   BP:  100/60   Weight: 62 1 kg (136 lb 12 8 oz)    Height: 5' 8" (1 727 m)     Body mass index is 20 8 kg/m²  REVIEW OF SYSTEMS:    Review of Systems   Constitutional: Negative    Negative for appetite change and fatigue  HENT: Negative  Eyes: Negative  Respiratory: Negative  Negative for shortness of breath  Cardiovascular: Negative  Negative for leg swelling  Gastrointestinal: Negative  Endocrine: Negative  Genitourinary: Negative  Negative for difficulty urinating  Musculoskeletal: Negative  Allergic/Immunologic: Negative  Neurological: Negative  Hematological: Negative  Psychiatric/Behavioral: Negative  All other systems reviewed and are negative  PHYSICAL EXAM:      Physical Exam   Constitutional: He is oriented to person, place, and time  He appears well-developed and well-nourished  No distress  HENT:   Head: Normocephalic and atraumatic  Mouth/Throat: No oropharyngeal exudate  Eyes: Conjunctivae are normal  Right eye exhibits no discharge  Left eye exhibits no discharge  No scleral icterus  Neck: Normal range of motion  Neck supple  No JVD present  Cardiovascular: Normal rate, regular rhythm and normal heart sounds  Exam reveals no gallop and no friction rub  No murmur heard  Pulmonary/Chest: Effort normal and breath sounds normal  No respiratory distress  He has no wheezes  He has no rales  He exhibits no tenderness  Abdominal: Soft  Bowel sounds are normal  He exhibits no distension  There is no tenderness  There is no rebound  Musculoskeletal: Normal range of motion  He exhibits no edema, tenderness or deformity  RLE wrapped  Neurological: He is alert and oriented to person, place, and time  He has normal reflexes  No cranial nerve deficit  Coordination normal    Skin: Skin is warm and dry  No rash noted  He is not diaphoretic  No erythema  No pallor  Psychiatric: He has a normal mood and affect  His behavior is normal  Judgment and thought content normal    Nursing note and vitals reviewed        Medications:    Current Outpatient Medications:     aspirin 81 mg chewable tablet, Chew 81 mg daily, Disp: , Rfl:     Atorvastatin Calcium (LIPITOR PO), Take 10 mg by mouth daily 1/2 tab daily, Disp: , Rfl:     CALCIUM CITRATE PO, Take 2,000 mg by mouth daily , Disp: , Rfl:     Cholecalciferol (VITAMIN D3) 2000 units capsule, Take 2,000 Units by mouth daily, Disp: , Rfl:     Continuous Blood Gluc Sensor (DEXCOM G6 SENSOR) MISC, As directed dx:250 03, Disp: , Rfl:     cyanocobalamin (VITAMIN B-12) 1,000 mcg tablet, Take 2,000 mcg by mouth daily, Disp: , Rfl:     DULoxetine (CYMBALTA) 30 mg delayed release capsule, Take 30 mg by mouth daily , Disp: , Rfl:     glucose 40 %, Take by mouth once, Disp: , Rfl:     insulin aspart (NovoLOG) 100 units/mL injection, Inject under the skin Via insulin pump, Disp: , Rfl:     levothyroxine 125 mcg tablet, Take 1 tablet (125 mcg total) by mouth daily in the early morning, Disp: 30 tablet, Rfl: 0    lisinopril (ZESTRIL) 20 mg tablet, Take 10 mg by mouth daily, Disp: , Rfl:     pantoprazole (PROTONIX) 40 mg tablet, Take 40 mg by mouth, Disp: , Rfl:     tamsulosin (FLOMAX) 0 4 mg, Take 0 4 mg by mouth daily with dinner  , Disp: , Rfl:     Laboratory Results:        Invalid input(s): ALBUMIN    Results for orders placed or performed in visit on 11/26/19   POCT urine dip   Result Value Ref Range    LEUKOCYTE ESTERASE,UA neg     NITRITE,UA neg     SL AMB POCT UROBILINOGEN 0 2     POCT URINE PROTEIN 300+      PH,UA 5 0     BLOOD,UA neg     SPECIFIC GRAVITY,UA 1 015     KETONES,UA neg     BILIRUBIN,UA neg     GLUCOSE, UA neg      COLOR,UA yellow     CLARITY,UA clear

## 2019-11-27 ENCOUNTER — PROCEDURE VISIT (OUTPATIENT)
Dept: PODIATRY | Facility: CLINIC | Age: 74
End: 2019-11-27
Payer: COMMERCIAL

## 2019-11-27 VITALS
WEIGHT: 136.8 LBS | BODY MASS INDEX: 20.73 KG/M2 | HEIGHT: 68 IN | SYSTOLIC BLOOD PRESSURE: 138 MMHG | DIASTOLIC BLOOD PRESSURE: 84 MMHG | HEART RATE: 108 BPM

## 2019-11-27 DIAGNOSIS — L97.512 RIGHT FOOT ULCER, WITH FAT LAYER EXPOSED (HCC): Primary | ICD-10-CM

## 2019-11-27 DIAGNOSIS — E11.42 DIABETIC PERIPHERAL NEUROPATHY (HCC): Chronic | ICD-10-CM

## 2019-11-27 PROCEDURE — 11042 DBRDMT SUBQ TIS 1ST 20SQCM/<: CPT | Performed by: PODIATRIST

## 2019-11-27 RX ORDER — LEVOTHYROXINE SODIUM 100 UG/1
CAPSULE ORAL
COMMUNITY
End: 2019-11-27 | Stop reason: SDUPTHER

## 2019-12-04 ENCOUNTER — TELEPHONE (OUTPATIENT)
Dept: NEPHROLOGY | Facility: CLINIC | Age: 74
End: 2019-12-04

## 2019-12-04 ENCOUNTER — LAB (OUTPATIENT)
Dept: LAB | Facility: HOSPITAL | Age: 74
End: 2019-12-04
Attending: INTERNAL MEDICINE
Payer: COMMERCIAL

## 2019-12-04 ENCOUNTER — PROCEDURE VISIT (OUTPATIENT)
Dept: PODIATRY | Facility: CLINIC | Age: 74
End: 2019-12-04
Payer: COMMERCIAL

## 2019-12-04 VITALS
HEIGHT: 68 IN | DIASTOLIC BLOOD PRESSURE: 56 MMHG | WEIGHT: 137.3 LBS | HEART RATE: 84 BPM | BODY MASS INDEX: 20.81 KG/M2 | SYSTOLIC BLOOD PRESSURE: 114 MMHG

## 2019-12-04 DIAGNOSIS — N18.30 TYPE 2 DIABETES MELLITUS WITH STAGE 3 CHRONIC KIDNEY DISEASE, WITH LONG-TERM CURRENT USE OF INSULIN (HCC): ICD-10-CM

## 2019-12-04 DIAGNOSIS — N18.30 CKD (CHRONIC KIDNEY DISEASE), STAGE III (HCC): ICD-10-CM

## 2019-12-04 DIAGNOSIS — E11.42 DIABETIC PERIPHERAL NEUROPATHY (HCC): Chronic | ICD-10-CM

## 2019-12-04 DIAGNOSIS — L97.512 RIGHT FOOT ULCER, WITH FAT LAYER EXPOSED (HCC): Primary | ICD-10-CM

## 2019-12-04 DIAGNOSIS — E11.22 TYPE 2 DIABETES MELLITUS WITH STAGE 3 CHRONIC KIDNEY DISEASE, WITH LONG-TERM CURRENT USE OF INSULIN (HCC): ICD-10-CM

## 2019-12-04 DIAGNOSIS — Z79.4 TYPE 2 DIABETES MELLITUS WITH STAGE 3 CHRONIC KIDNEY DISEASE, WITH LONG-TERM CURRENT USE OF INSULIN (HCC): ICD-10-CM

## 2019-12-04 DIAGNOSIS — N18.30 CKD (CHRONIC KIDNEY DISEASE), STAGE III (HCC): Primary | ICD-10-CM

## 2019-12-04 LAB
ALBUMIN SERPL BCP-MCNC: 3.2 G/DL (ref 3.5–5)
ALP SERPL-CCNC: 80 U/L (ref 46–116)
ALT SERPL W P-5'-P-CCNC: 21 U/L (ref 12–78)
ANION GAP SERPL CALCULATED.3IONS-SCNC: 6 MMOL/L (ref 4–13)
AST SERPL W P-5'-P-CCNC: 17 U/L (ref 5–45)
BACTERIA UR QL AUTO: ABNORMAL /HPF
BASOPHILS # BLD AUTO: 0.03 THOUSANDS/ΜL (ref 0–0.1)
BASOPHILS NFR BLD AUTO: 1 % (ref 0–1)
BILIRUB SERPL-MCNC: 0.32 MG/DL (ref 0.2–1)
BILIRUB UR QL STRIP: NEGATIVE
BUN SERPL-MCNC: 31 MG/DL (ref 5–25)
CALCIUM SERPL-MCNC: 9.3 MG/DL (ref 8.3–10.1)
CHLORIDE SERPL-SCNC: 102 MMOL/L (ref 100–108)
CLARITY UR: CLEAR
CO2 SERPL-SCNC: 31 MMOL/L (ref 21–32)
COLOR UR: YELLOW
CREAT SERPL-MCNC: 1.72 MG/DL (ref 0.6–1.3)
CREAT UR-MCNC: 132.5 MG/DL
EOSINOPHIL # BLD AUTO: 0.33 THOUSAND/ΜL (ref 0–0.61)
EOSINOPHIL NFR BLD AUTO: 7 % (ref 0–6)
ERYTHROCYTE [DISTWIDTH] IN BLOOD BY AUTOMATED COUNT: 13.1 % (ref 11.6–15.1)
GFR SERPL CREATININE-BSD FRML MDRD: 38 ML/MIN/1.73SQ M
GLUCOSE P FAST SERPL-MCNC: 129 MG/DL (ref 65–99)
GLUCOSE UR STRIP-MCNC: ABNORMAL MG/DL
HCT VFR BLD AUTO: 38.1 % (ref 36.5–49.3)
HGB BLD-MCNC: 12.2 G/DL (ref 12–17)
HGB UR QL STRIP.AUTO: NEGATIVE
HYALINE CASTS #/AREA URNS LPF: ABNORMAL /LPF
IMM GRANULOCYTES # BLD AUTO: 0.01 THOUSAND/UL (ref 0–0.2)
IMM GRANULOCYTES NFR BLD AUTO: 0 % (ref 0–2)
KETONES UR STRIP-MCNC: NEGATIVE MG/DL
LEUKOCYTE ESTERASE UR QL STRIP: ABNORMAL
LYMPHOCYTES # BLD AUTO: 1.09 THOUSANDS/ΜL (ref 0.6–4.47)
LYMPHOCYTES NFR BLD AUTO: 23 % (ref 14–44)
MAGNESIUM SERPL-MCNC: 2.1 MG/DL (ref 1.6–2.6)
MCH RBC QN AUTO: 30 PG (ref 26.8–34.3)
MCHC RBC AUTO-ENTMCNC: 32 G/DL (ref 31.4–37.4)
MCV RBC AUTO: 94 FL (ref 82–98)
MONOCYTES # BLD AUTO: 0.6 THOUSAND/ΜL (ref 0.17–1.22)
MONOCYTES NFR BLD AUTO: 13 % (ref 4–12)
NEUTROPHILS # BLD AUTO: 2.73 THOUSANDS/ΜL (ref 1.85–7.62)
NEUTS SEG NFR BLD AUTO: 56 % (ref 43–75)
NITRITE UR QL STRIP: NEGATIVE
NON-SQ EPI CELLS URNS QL MICRO: ABNORMAL /HPF
NRBC BLD AUTO-RTO: 0 /100 WBCS
PH UR STRIP.AUTO: 5.5 [PH]
PHOSPHATE SERPL-MCNC: 3.1 MG/DL (ref 2.3–4.1)
PLATELET # BLD AUTO: 144 THOUSANDS/UL (ref 149–390)
PMV BLD AUTO: 8.8 FL (ref 8.9–12.7)
POTASSIUM SERPL-SCNC: 4.6 MMOL/L (ref 3.5–5.3)
PROT SERPL-MCNC: 6.6 G/DL (ref 6.4–8.2)
PROT UR STRIP-MCNC: NEGATIVE MG/DL
PROT UR-MCNC: 17 MG/DL
PROT/CREAT UR: 0.13 MG/G{CREAT} (ref 0–0.1)
RBC # BLD AUTO: 4.07 MILLION/UL (ref 3.88–5.62)
RBC #/AREA URNS AUTO: ABNORMAL /HPF
SODIUM SERPL-SCNC: 139 MMOL/L (ref 136–145)
SP GR UR STRIP.AUTO: 1.02 (ref 1–1.03)
TSH SERPL DL<=0.05 MIU/L-ACNC: 2.47 UIU/ML (ref 0.36–3.74)
UROBILINOGEN UR QL STRIP.AUTO: 0.2 E.U./DL
WBC # BLD AUTO: 4.79 THOUSAND/UL (ref 4.31–10.16)
WBC #/AREA URNS AUTO: ABNORMAL /HPF

## 2019-12-04 PROCEDURE — 83735 ASSAY OF MAGNESIUM: CPT

## 2019-12-04 PROCEDURE — 84156 ASSAY OF PROTEIN URINE: CPT

## 2019-12-04 PROCEDURE — 85025 COMPLETE CBC W/AUTO DIFF WBC: CPT | Performed by: FAMILY MEDICINE

## 2019-12-04 PROCEDURE — 81001 URINALYSIS AUTO W/SCOPE: CPT

## 2019-12-04 PROCEDURE — 84100 ASSAY OF PHOSPHORUS: CPT

## 2019-12-04 PROCEDURE — 11042 DBRDMT SUBQ TIS 1ST 20SQCM/<: CPT | Performed by: PODIATRIST

## 2019-12-04 PROCEDURE — 36415 COLL VENOUS BLD VENIPUNCTURE: CPT | Performed by: FAMILY MEDICINE

## 2019-12-04 PROCEDURE — 84443 ASSAY THYROID STIM HORMONE: CPT | Performed by: FAMILY MEDICINE

## 2019-12-04 PROCEDURE — 82570 ASSAY OF URINE CREATININE: CPT

## 2019-12-04 PROCEDURE — 80053 COMPREHEN METABOLIC PANEL: CPT | Performed by: FAMILY MEDICINE

## 2019-12-04 NOTE — RESULT ENCOUNTER NOTE
Hello    Patient normally is followed up by Ms Jag Varghese       I attempted to call the patient to review the rising creatinine   -no answer on the phone  -can you please reach the patient and let the patient know that the creatinine is higher than before and it is 1 7  -can you please have the patient hold lisinopril completely for now  -check blood pressures once a day and the patient should call us if the blood pressures are above 150 with the top number  -repeat BMP in 1 week  -awaiting renal ultrasound     Thank you    np

## 2019-12-04 NOTE — TELEPHONE ENCOUNTER
----- Message from Tejas Marie MD sent at 12/4/2019  9:25 AM EST -----  Hello    Patient normally is followed up by Ms Lawson Royal       I attempted to call the patient to review the rising creatinine   -no answer on the phone  -can you please reach the patient and let the patient know that the creatinine is higher than before and it is 1 7  -can you please have the patient hold lisinopril completely for now  -check blood pressures once a day and the patient should call us if the blood pressures are above 150 with the top number  -repeat BMP in 1 week  -awaiting renal ultrasound     Thank you    np

## 2019-12-04 NOTE — PROGRESS NOTES
This patient was seen on 12/4/19  Assessment:    Problem List Items Addressed This Visit        Endocrine    Diabetic peripheral neuropathy (HCC) (Chronic)       Musculoskeletal and Integument    Right foot ulcer, with fat layer exposed ([address] ) - Primary          Plan:      1  I feel that the leg wound has recurred due to the shear as he applies his compression stocking that removes the epidermis  We will attempt to prescribe alternate compression device ([employer]Aids)  2  DEBRIDEMENT NOTE (Foot Ulcer)    A formal timeout including patient identification, laterality and existing allergies using [facility] protocol was conducted  Procedure Performed: Debridement of subcutaneous tissue- >20 sq cm (65989)  Under aseptic technique, the wound was thoroughly explored and bluntly probed for undermining, deep sinus tracts and bone involvement  Sterile instrumentation including scalpel, forceps and curette used to excise the clearly delineated devitalized subcutaneous tissue (fibrotic tissue and necrotic non-viable portions of fat) exposing viable tissue  After debridement, bleeding in the wound bed base was noted indicated viable subcutaneous tissue had been exposed  Bleeding controlled with gentle pressure  Patient tolerated debridement without complications  The wound was dressed  Wound dressing technique and frequency described to patient  I am to be called if any signs of infection develop such as erythema, increased wound size or significant change in appearance of wound, odor, pain, increased or change in color of drainage, swelling, fever, chills, nightsweats  I reviewed these signs with the patient  I recommended limiting WB to bathroom priveleges and meal table and to use any prescribed offloading devices in an effort to allow proper rest and healing of the wounded area  I explained that good wound care and compliance are necessary to allow healing and to prevent toe loss or limb loss       Right posterior leg- reopened  Collagen and adaptic applied  Unna boot applied using paste wrap and coban  Applied from base of toes to infrapatellar notch  Unna boot care and precautions discussed and explained to pt/family  1  Leave wrap dry and intact  2  If wrap is too tight (feels like a tourniquet, toes turning purple), elevate the leg above heart level, if no relief, remove wrap (no scissors) and call office  3  If wrap becomes too loose, remove wrap (no scissors) and call the office  Pt/family verbalize understanding    Right lateral foot-dermagran gauze applied, gauze and tape  To remain dry and intact  Diagnoses and all orders for this visit:    Right foot ulcer, with fat layer exposed ([address] )    Diabetic peripheral neuropathy (HCC)          Subjective:      Patient ID: Ivis Stacy is a 76 y o  male  Pt arrives for wound care follow up  Pt wearing compression stockings bilaterally with dressing intact  Pt wearing wedge shoe  Pt denies problems, and was unaware his leg ulcer has re-opened since last week  The following portions of the patient's history were reviewed and updated as appropriate: allergies, current medications, past family history, past medical history, past social history, past surgical history and problem list     Review of Systems   Constitutional: Negative  Objective:      /56   Pulse 84   Ht 5' 8" (1 727 m) Comment: verbal  Wt 62 3 kg (137 lb 4 8 oz)   BMI 20 88 kg/m²          Physical Exam   Constitutional: He appears well-developed and well-nourished  No distress  Skin: He is not diaphoretic  Nursing note and vitals reviewed          Wound # 1 REOPENED  Location: right post lower leg  Length 5cm: Width 2cm: Depth 0 2cm:   Deepest Tissue Noted in Base: SQ  Probe to Bone?: no  Peripheral Skin Description: dry  Granulation: 90% Fibrotic Tissue: 10% Necrotic Tissue: 0%  Drainage Amount: minimal  Signs of Infection: no  Total debrided 0 square centimeters      Wound # 2   Location: right lateral foot  Length 0 2cm: Width 0 2cm: Depth 0 2cm:   Deepest Tissue Noted in Base: SQ  Probe to Bone?: no  Peripheral Skin Description: callus  Granulation: 90% Fibrotic Tissue: 10% Necrotic Tissue: 0%  Drainage Amount: minimal  Signs of Infection: no  Total debrided 0 04 square centimeters

## 2019-12-04 NOTE — TELEPHONE ENCOUNTER
Attempted to call the patient to review the rising creatinine  Will hold lisinopril completely  No answer over the phone  Task request sent  Lab work in 1 week again with BMP  Blood pressure check once daily

## 2019-12-09 ENCOUNTER — TELEPHONE (OUTPATIENT)
Dept: PODIATRY | Facility: CLINIC | Age: 74
End: 2019-12-09

## 2019-12-09 NOTE — TELEPHONE ENCOUNTER
Called in today stating that he had drainage but he wrap up like oralia told him to    He just wanted us to know and he will be here wed the 12/11/2019 for his appointment

## 2019-12-11 ENCOUNTER — PROCEDURE VISIT (OUTPATIENT)
Dept: PODIATRY | Facility: CLINIC | Age: 74
End: 2019-12-11
Payer: COMMERCIAL

## 2019-12-11 VITALS
HEIGHT: 68 IN | WEIGHT: 137.8 LBS | DIASTOLIC BLOOD PRESSURE: 84 MMHG | SYSTOLIC BLOOD PRESSURE: 140 MMHG | BODY MASS INDEX: 20.88 KG/M2 | HEART RATE: 86 BPM

## 2019-12-11 DIAGNOSIS — L97.512 RIGHT FOOT ULCER, WITH FAT LAYER EXPOSED (HCC): ICD-10-CM

## 2019-12-11 DIAGNOSIS — E11.42 DIABETIC PERIPHERAL NEUROPATHY (HCC): Primary | Chronic | ICD-10-CM

## 2019-12-11 PROCEDURE — 11042 DBRDMT SUBQ TIS 1ST 20SQCM/<: CPT | Performed by: PODIATRIST

## 2019-12-11 NOTE — PROGRESS NOTES
This patient was seen on 12/11/19  Assessment:  12/11/19  Problem List Items Addressed This Visit        Endocrine    Diabetic peripheral neuropathy ([facility] ) - Primary (Chronic)       Musculoskeletal and Integument    Right foot ulcer, with fat layer exposed ([facility] [address] )          Plan:  DEBRIDEMENT NOTE    A formal timeout including patient identification, laterality and existing allergies using [facility] protocol was conducted  Procedure Performed: Debridement of subcutaneous tissue- >20 sq cm (15716)  Under aseptic technique, the wound was thoroughly explored and bluntly probed for undermining, deep sinus tracts and bone involvement  Sterile instrumentation including scalpel, forceps and curette used to excise the clearly delineated devitalized subcutaneous tissue (fibrotic tissue and necrotic non-viable portions of fat) exposing viable tissue  After debridement, bleeding in the wound bed base was noted indicated viable subcutaneous tissue had been exposed  Bleeding controlled with gentle pressure  Patient tolerated debridement without complications  The wound was dressed  Wound dressing technique and frequency described to patient  I am to be called if any signs of infection develop such as erythema, increased wound size or significant change in appearance of wound, odor, pain, increased or change in color of drainage, swelling, fever, chills, nightsweats  I reviewed these signs with the patient  I recommended limiting WB to bathroom priveleges and meal table and to use any prescribed offloading devices in an effort to allow proper rest and healing of the wounded area  I explained that good wound care and compliance are necessary to allow healing and to prevent toe loss or limb loss  Food wound- wound gel applied, gauze and tape  To be left dry and intact  Lower leg- Alginate applied to wound bed  ABD and kerlex  Unna boot applied using paste wrap and coban  Applied from base of toes to infrapatellar notch  Unna boot care and precautions discussed and explained to pt/family  1  Leave wrap dry and intact  2  If wrap is too tight (feels like a tourniquet, toes turning purple), elevate the leg above heart level, if no relief, remove wrap (no scissors) and call office  3  If wrap becomes too loose, remove wrap (no scissors) and call the office  Pt/family verbalize understanding     Diagnoses and all orders for this visit:    Diabetic peripheral neuropathy ([facility] [address] )    Right foot ulcer, with fat layer exposed ([facility] [address] )          Subjective:      Patient ID: Valentine Mane is a 76 y o  male  Pt arrives for wound care follow up  Presents with unna boot intact but with added dressing over it  Pt states he noted breakthrough drainage on Monday and added a dressing over the unna boot  Pt wearing wedge shoe and states he wears it with every step  The following portions of the patient's history were reviewed and updated as appropriate: allergies, current medications, past family history, past medical history, past social history, past surgical history and problem list     Review of Systems   Constitutional: Negative  Objective:      /84   Pulse 86   Ht 5' 8" (1 727 m) Comment: verbal  Wt 62 5 kg (137 lb 12 8 oz)   BMI 20 95 kg/m²          Physical Exam   Constitutional: He appears well-developed and well-nourished  No distress  Skin: He is not diaphoretic  Nursing note and vitals reviewed        Wound # 1 REOPENED  Location: right post lower leg  Length 5cm: Width 2cm: Depth 0 2cm:   Deepest Tissue Noted in Base: SQ  Probe to Bone?: no  Peripheral Skin Description: dry  Granulation: 90% Fibrotic Tissue: 10% Necrotic Tissue: 0%  Drainage Amount: moderate  Signs of Infection: no  Total debrided 0 square centimeters        Wound # 2   Location: right lateral foot  Length 0 2cm: Width 0 2cm: Depth 0 2cm:   Deepest Tissue Noted in Base: SQ  Probe to Bone?: no  Peripheral Skin Description: callus  Granulation: 90% Fibrotic Tissue: 10% Necrotic Tissue: 0%  Drainage Amount: minimal  Signs of Infection: no  Total debrided 0 04 square centimeters

## 2019-12-13 ENCOUNTER — LAB (OUTPATIENT)
Dept: LAB | Facility: CLINIC | Age: 74
End: 2019-12-13
Payer: COMMERCIAL

## 2019-12-13 DIAGNOSIS — N18.30 CKD (CHRONIC KIDNEY DISEASE), STAGE III (HCC): ICD-10-CM

## 2019-12-13 LAB
ANION GAP SERPL CALCULATED.3IONS-SCNC: 3 MMOL/L (ref 4–13)
BUN SERPL-MCNC: 40 MG/DL (ref 5–25)
CALCIUM SERPL-MCNC: 9.2 MG/DL (ref 8.3–10.1)
CHLORIDE SERPL-SCNC: 103 MMOL/L (ref 100–108)
CO2 SERPL-SCNC: 29 MMOL/L (ref 21–32)
CREAT SERPL-MCNC: 1.65 MG/DL (ref 0.6–1.3)
GFR SERPL CREATININE-BSD FRML MDRD: 40 ML/MIN/1.73SQ M
GLUCOSE P FAST SERPL-MCNC: 159 MG/DL (ref 65–99)
POTASSIUM SERPL-SCNC: 5 MMOL/L (ref 3.5–5.3)
SODIUM SERPL-SCNC: 135 MMOL/L (ref 136–145)

## 2019-12-13 PROCEDURE — 80048 BASIC METABOLIC PNL TOTAL CA: CPT

## 2019-12-13 PROCEDURE — 36415 COLL VENOUS BLD VENIPUNCTURE: CPT

## 2019-12-13 NOTE — RESULT ENCOUNTER NOTE
Hello    Patient normally is followed up by Ms Beulah Romero  Can you please let the patient and his wife know that the creatinine is slightly improved  Continue with current regimen    Low-potassium diet   -can the patient have a repeat BMP in 4 weeks  -awaiting renal ultrasound results-can the patient let us know when he goes to have this completed    Thank you    np

## 2019-12-16 ENCOUNTER — TELEPHONE (OUTPATIENT)
Dept: NEPHROLOGY | Facility: CLINIC | Age: 74
End: 2019-12-16

## 2019-12-16 NOTE — TELEPHONE ENCOUNTER
Wife called  Message given to her  Patient having Brien Glass today at [facility]  Lab slip mailed for repeat BMP in four weeks  Left message on patient's voicemail to return my call         ----- Message from Nora Schmitt MD sent at 12/13/2019  3:32 PM EST -----  Hello    Patient normally is followed up by Ms Aurelia Mayer  Can you please let the patient and his wife know that the creatinine is slightly improved  Continue with current regimen    Low-potassium diet   -can the patient have a repeat BMP in 4 weeks  -awaiting renal ultrasound results-can the patient let us know when he goes to have this completed    Thank you    np

## 2019-12-17 NOTE — PROGRESS NOTES
This patient was seen on 12/18/19  Assessment:    Problem List Items Addressed This Visit        Endocrine    Diabetic peripheral neuropathy ([facility] [address] ) - Primary (Chronic)       Musculoskeletal and Integument    Right foot ulcer, with fat layer exposed ([facility] [address] )       Other    Non-pressure chronic ulcer of right calf with [facility] layer exposed [facility]          Plan:  DEBRIDEMENT NOTE    A formal timeout including patient identification, laterality and existing allergies using [facility] protocol was conducted  Procedure Performed: Debridement of subcutaneous tissue- >20 sq cm (69653)  Under aseptic technique, the wound was thoroughly explored and bluntly probed for undermining, deep sinus tracts and bone involvement  Sterile instrumentation including scalpel, forceps and curette used to excise the clearly delineated devitalized subcutaneous tissue (fibrotic tissue and necrotic non-viable portions of fat) exposing viable tissue  After debridement, bleeding in the wound bed base was noted indicated viable subcutaneous tissue had been exposed  Bleeding controlled with gentle pressure  Patient tolerated debridement without complications  The wound was dressed  Wound dressing technique and frequency described to patient  I am to be called if any signs of infection develop such as erythema, increased wound size or significant change in appearance of wound, odor, pain, increased or change in color of drainage, swelling, fever, chills, nightsweats  I reviewed these signs with the patient  I recommended limiting WB to bathroom priveleges and meal table and to use any prescribed offloading devices in an effort to allow proper rest and healing of the wounded area  I explained that good wound care and compliance are necessary to allow healing and to prevent toe loss or limb loss  ed   Food wound- dermagran gauze, gauze and tape  To be left dry and intact  Lower leg- Alginate applied to wound bed  ABD and kerlex   Unna boot applied using paste wrap and coban  Applied from base of toes to infrapatellar notch  Unna boot care and precautions discussed and explained to pt/family  1  Leave wrap dry and intact  2  If wrap is too tight (feels like a tourniquet, toes turning purple), elevate the leg above heart level, if no relief, remove wrap (no scissors) and call office  3  If wrap becomes too loose, remove wrap (no scissors) and call the office  Pt/family verbalize understanding    Circ-Aid wraps ordered for patient to use once healed  Diagnoses and all orders for this visit:    Diabetic peripheral neuropathy ([address] )    Right foot ulcer, with fat layer exposed ([address] )    Non-pressure chronic ulcer of right calf with fat layer exposed ([facility] [address] )    Other orders  -     ezetimibe (ZETIA) 10 mg tablet; TAKE 5MG (ONE-HALF TABLET) BY MOUTH DAILY FOR CHOLESTEROL          Subjective:      Patient ID: Alise Flannery is a 76 y o  male  Pt arrives for wound care follow up  Pt arrives with unna boot intact  Pt using wedge shoe and denies problems  The following portions of the patient's history were reviewed and updated as appropriate: allergies, current medications, past family history, past medical history, past social history, past surgical history and problem list     Review of Systems   Constitutional: Negative  Objective:      /84   Pulse (!) 106   Ht 5' 8" (1 727 m) Comment: verbal  Wt 62 2 kg (137 lb 3 2 oz)   BMI 20 86 kg/m²          Physical Exam   Constitutional: He appears well-developed and well-nourished  No distress  Skin: He is not diaphoretic  Nursing note and vitals reviewed          Wound # 1   Location: right post lower leg  Length 4cm: Width 1 5cm: Depth 0 2cm:   Deepest Tissue Noted in Base: SQ  Probe to Bone?: no  Peripheral Skin Description: dry  Granulation: 90% Fibrotic Tissue: 10% Necrotic Tissue: 0%  Drainage Amount: moderate  Signs of Infection: no  Total debrided 0 square centimeters                  Wound # 2   Location: right lateral foot  Length 0 2cm: Width 0 2cm: Depth 0 2cm:   Deepest Tissue Noted in Base: SQ  Probe to Bone?: no  Peripheral Skin Description: callus  Granulation: 90% Fibrotic Tissue: 10% Necrotic Tissue: 0%  Drainage Amount: minimal  Signs of Infection: no  Total debrided 0 04 square centimeters

## 2019-12-18 ENCOUNTER — PROCEDURE VISIT (OUTPATIENT)
Dept: PODIATRY | Facility: CLINIC | Age: 74
End: 2019-12-18
Payer: COMMERCIAL

## 2019-12-18 ENCOUNTER — HOSPITAL ENCOUNTER (OUTPATIENT)
Dept: ULTRASOUND IMAGING | Facility: HOSPITAL | Age: 74
Discharge: HOME | End: 2019-12-18
Attending: INTERNAL MEDICINE
Payer: COMMERCIAL

## 2019-12-18 VITALS
BODY MASS INDEX: 20.79 KG/M2 | HEIGHT: 68 IN | HEART RATE: 106 BPM | WEIGHT: 137.2 LBS | SYSTOLIC BLOOD PRESSURE: 140 MMHG | DIASTOLIC BLOOD PRESSURE: 84 MMHG

## 2019-12-18 DIAGNOSIS — N18.32 CHRONIC KIDNEY DISEASE (CKD) STAGE G3B/A3, MODERATELY DECREASED GLOMERULAR FILTRATION RATE (GFR) BETWEEN 30-44 ML/MIN/1.73 SQUARE METER AND ALBUMINURIA CREATININE RATIO GREATER THAN 300 MG/G (HCC): Primary | ICD-10-CM

## 2019-12-18 DIAGNOSIS — L97.212 NON-PRESSURE CHRONIC ULCER OF RIGHT CALF WITH FAT LAYER EXPOSED (HCC): ICD-10-CM

## 2019-12-18 DIAGNOSIS — E11.42 DIABETIC PERIPHERAL NEUROPATHY (HCC): Primary | Chronic | ICD-10-CM

## 2019-12-18 DIAGNOSIS — N18.30 CKD (CHRONIC KIDNEY DISEASE), STAGE III (HCC): ICD-10-CM

## 2019-12-18 DIAGNOSIS — L97.512 RIGHT FOOT ULCER, WITH FAT LAYER EXPOSED (HCC): ICD-10-CM

## 2019-12-18 PROBLEM — T86.821 SKIN GRAFT FAILURE: Status: RESOLVED | Noted: 2019-07-09 | Resolved: 2019-12-18

## 2019-12-18 PROCEDURE — 11042 DBRDMT SUBQ TIS 1ST 20SQCM/<: CPT | Performed by: PODIATRIST

## 2019-12-18 PROCEDURE — 76770 US EXAM ABDO BACK WALL COMP: CPT

## 2019-12-18 RX ORDER — EZETIMIBE 10 MG/1
TABLET ORAL
COMMUNITY
End: 2021-01-01 | Stop reason: HOSPADM

## 2019-12-18 NOTE — PATIENT INSTRUCTIONS
Debridement   WHAT YOU NEED TO KNOW:   Debridement is the removal of infected, damaged, or dead tissue so a wound can heal properly  You may need more than one debridement  DISCHARGE INSTRUCTIONS:   Medicines:   · Medicines  can help decrease pain or prevent or treat an infection  · Take your medicine as directed  Contact your healthcare provider if you think your medicine is not helping or if you have side effects  Tell him of her if you are allergic to any medicine  Keep a list of the medicines, vitamins, and herbs you take  Include the amounts, and when and why you take them  Bring the list or the pill bottles to follow-up visits  Carry your medicine list with you in case of an emergency  Follow up with your healthcare provider as directed: You may need to return to have your wound checked  Write down your questions so you remember to ask them during your visits  Care for your wound as directed:   · Keep your wound clean and dry  You may need to cover your wound when you bathe  · Limit movements,  such as stretching, to prevent bleeding, tearing, and swelling in your wound  · Protect your wound  Avoid sunlight for at least 6 months  Apply mild, unscented lotion or cream to the skin around your wound to keep it moist     · Do not smoke  If you smoke, it is never too late to quit  Smoking decreases blood flow to the wound and delays healing  Ask for information if you need help quitting  · Drink liquids as directed  Ask how much liquid to drink each day and which liquids are best for you  Liquids help keep your skin moist so your wound can heal      · Eat a variety of healthy foods  Foods rich in protein, such as meat, eggs, and dairy products, help repair tissue  Carbohydrate-rich foods, such as bread and cereals, help increase cell growth and decrease the risk for wound infection  Do not have caffeine  Ask if you should take vitamins   Vitamins A and C may help tissue formation and increase scar tissue strength  Contact your healthcare provider if:   · You have a fever  · Your pain gets worse or does not go away, even after treatment  · Your skin is red, swollen, or draining pus  · You have questions or concerns about your condition or care  Return to the emergency department if:   · Blood soaks through your bandage  · You have severe pain  © 2017 [facility] Information is for End User's use only and may not be sold, redistributed or otherwise used for commercial purposes  All illustrations and images included in CareNotes® are the copyrighted property of A D [employer] M [employer]  or Kirill Ulrich  The above information is an  only  It is not intended as medical advice for individual conditions or treatments  Talk to your doctor, nurse or pharmacist before following any medical regimen to see if it is safe and effective for you  Unna Boot   WHAT YOU NEED TO KNOW:   An Unna boot is a compression dressing made by wrapping layers of gauze around your leg and foot  It is often used to protect an ulcer or open wound  The compression of the dressing helps improve blood flow in your lower leg  Compression also helps decrease swelling and pain  You may need to wear the boot for a few weeks or until your wound heals  DISCHARGE INSTRUCTIONS:   Follow up with your healthcare provider as directed: Your Unna boot will be changed by a healthcare provider at least once every 7 days  Your wound will be cleaned and measured to make sure it is healing with each boot change  Write down your questions so you remember to ask them during your visits  Keep your boot dry:  Ask how to cover it when you take a shower or bath  Self-care:   · Manage your health conditions  Your wound may not heal or new wounds may form if your health conditions are not controlled  Take your medicines as directed   Follow your healthcare provider's instructions if you have high blood pressure  Check your blood sugar levels as directed if you have diabetes  · Walk for exercise  This will help the boot compress and improve blood flow  Ask your healthcare provider how long you should walk each day  · Eat a variety of healthy foods to promote wound healing  Healthy foods include fruits, vegetables, whole-grain breads, low-fat dairy products, beans, lean meats, and fish  Ask if you need to be on a special diet  Contact your healthcare provider if:   · Your boot feels very tight or loose after you walk  · Drainage from your wound soaks through the boot  · You have questions or concerns about your condition or care  Remove the boot and return to the emergency department if:   · Your leg itches and feels warm  · Your toes tingle, feel numb, or change color  · Your boot causes pain in your foot or leg when you walk  · You have swelling above or below your boot  © 2017 [address] Information is for End User's use only and may not be sold, redistributed or otherwise used for commercial purposes  All illustrations and images included in CareNotes® are the copyrighted property of [employer] M [employer]  or Kirill Ulrich  The above information is an  only  It is not intended as medical advice for individual conditions or treatments  Talk to your doctor, nurse or pharmacist before following any medical regimen to see if it is safe and effective for you

## 2019-12-19 ENCOUNTER — PATIENT OUTREACH (OUTPATIENT)
Dept: FAMILY MEDICINE CLINIC | Facility: CLINIC | Age: 74
End: 2019-12-19

## 2019-12-19 NOTE — PROGRESS NOTES
Outpatient Care Management Note:    Re: spoke with patient and wife  He is recovering from a "cold" as per patient  Blood sugar range 230-316  Continues to have weekly visits with podiatrist for lower leg wounds  Follow up with Dr Rosa Johnson, renal ultrasound completed  Pt denies any recent falls or injury  Will convert patient to quarterly surveillance

## 2019-12-19 NOTE — PROGRESS NOTES
This patient was seen on 12/24/19    Assessment:    Problem List Items Addressed This Visit        Endocrine    Diabetic peripheral neuropathy ([facility] [address] ) - Primary (Chronic)       Other    Non-pressure chronic ulcer of right calf with [facility] layer exposed [facility]          Plan:  A formal timeout including patient identification, laterality and existing allergies using [facility] protocol was conducted  Procedure Performed: Debridement of subcutaneous tissue- <20 sq cm (40584)  Anesthetic used as needed to reduce discomfort  Under aseptic technique, the wound was thoroughly explored for undermining, deep sinus tracts and bone involvement  Sterile instrumentation including scalpel used to excise the clearly delineated devitalized subcutaneous tissue exposing viable tissue  Bleeding controlled with gentle pressure  Patient tolerated debridement without complications  The wound was dressed  Wound dressing technique and frequency described to patient  I am to be called if any signs of infection develop such as erythema, increased wound size or significant change in appearance of wound, odor, pain, increased or change in color of drainage, swelling, fever, chills, nightsweats  I reviewed these signs with the patient  I recommended limiting leg dependency and to use any prescribed compression devices in an effort to allow proper fluid control and healing of the wounded area  I was very clear that sleeping with legs dependent is to be avoided at all costs  I explained that good wound care and compliance are necessary to allow healing and to prevent limb loss  Food wound- healed, no dressing needed  Lower leg- Alginate applied to wound bed  ABD and kerlex  Unna boot applied using paste wrap and coban  Applied from base of toes to infrapatellar notch   Unna boot care and precautions discussed and explained to pt/family  1  Leave wrap dry and intact  2   If wrap is too tight (feels like a tourniquet, toes turning purple), elevate the leg above heart level, if no relief, remove wrap (no scissors) and call office  3  If wrap becomes too loose, remove wrap (no scissors) and call the office  Pt/family verbalize understanding       Diagnoses and all orders for this visit:    Diabetic peripheral neuropathy ([address] )    Non-pressure chronic ulcer of right calf with fat layer exposed ([address] )    Other orders  -     Discontinue: Thyroid (LEVOTHYROXINE-LIOTHYRONINE PO); TAKE ONE CAPSULE BY MOUTH EVERY MORNING ON AN EMPTY STOMACH WITH A GLASS OF PLAIN WATER ONLY  WAIT ONE HOUR BEFORE EATING  TAKE ONE ADDITIONAL CAPSULE ON SUNDAYS  (TAKE 2 CAPSULES ON SUNDAYS)          Subjective:      Patient ID: Colletta School is a 76 y o  male  Pt arrives for wound care follow up  Presents with dressing intact and denies problems  Wearing wedge shoe  The following portions of the patient's history were reviewed and updated as appropriate: allergies, current medications, past family history, past medical history, past social history, past surgical history and problem list     Review of Systems   Constitutional: Negative  Objective:      /56   Pulse 90   Ht 5' 8" (1 727 m)   Wt 62 2 kg (137 lb 3 2 oz)   BMI 20 86 kg/m²          Physical Exam   Constitutional: He appears well-developed and well-nourished  No distress  Skin: He is not diaphoretic  Nursing note and vitals reviewed          Wound # 1   Location: right post lower leg  Length 3 5cm: Width 1cm: Depth 0 2cm:   Deepest Tissue Noted in Base: SQ  Probe to Bone?: no  Peripheral Skin Description: dry  Granulation: 90% Fibrotic Tissue: 10% Necrotic Tissue: 0%  Drainage Amount: moderate  Signs of Infection: no  Total debrided 3 5 square centimeters            Wound # 2   Location: right lateral footHEALED  Length 0cm: Width 0cm: Depth 0cm:   Deepest Tissue Noted in Base:   Probe to Bone?: no  Peripheral Skin Description: callus  Granulation: 0% Fibrotic Tissue: 0% Necrotic Tissue: 0%  Drainage Amount: minimal  Signs of Infection: no  Total debrided 0 square centimeters

## 2019-12-24 ENCOUNTER — PROCEDURE VISIT (OUTPATIENT)
Dept: PODIATRY | Facility: CLINIC | Age: 74
End: 2019-12-24
Payer: COMMERCIAL

## 2019-12-24 VITALS
HEIGHT: 68 IN | HEART RATE: 90 BPM | DIASTOLIC BLOOD PRESSURE: 56 MMHG | SYSTOLIC BLOOD PRESSURE: 120 MMHG | WEIGHT: 137.2 LBS | BODY MASS INDEX: 20.79 KG/M2

## 2019-12-24 DIAGNOSIS — E11.42 DIABETIC PERIPHERAL NEUROPATHY (HCC): Primary | Chronic | ICD-10-CM

## 2019-12-24 DIAGNOSIS — L97.212 NON-PRESSURE CHRONIC ULCER OF RIGHT CALF WITH FAT LAYER EXPOSED (HCC): ICD-10-CM

## 2019-12-24 PROCEDURE — 11042 DBRDMT SUBQ TIS 1ST 20SQCM/<: CPT | Performed by: PODIATRIST

## 2019-12-31 NOTE — RESULT ENCOUNTER NOTE
Hello    Patient normally is followed up by Ms Mahan Listen  Can you please let the patient know that the renal ultrasound does not show any blockages  Patient should continue with the plan from the last phone discussion  He should be repeating blood work  2nd week of January      Thank you    np

## 2020-01-01 ENCOUNTER — TELEPHONE (OUTPATIENT)
Dept: NEPHROLOGY | Facility: CLINIC | Age: 75
End: 2020-01-01

## 2020-01-01 ENCOUNTER — LAB (OUTPATIENT)
Dept: LAB | Facility: CLINIC | Age: 75
End: 2020-01-01
Payer: COMMERCIAL

## 2020-01-01 ENCOUNTER — PROCEDURE VISIT (OUTPATIENT)
Dept: PODIATRY | Facility: CLINIC | Age: 75
End: 2020-01-01
Payer: COMMERCIAL

## 2020-01-01 ENCOUNTER — IN-CLINIC DEVICE VISIT (OUTPATIENT)
Dept: CARDIOLOGY CLINIC | Facility: CLINIC | Age: 75
End: 2020-01-01
Payer: COMMERCIAL

## 2020-01-01 ENCOUNTER — TELEMEDICINE (OUTPATIENT)
Dept: NEPHROLOGY | Facility: CLINIC | Age: 75
End: 2020-01-01
Payer: COMMERCIAL

## 2020-01-01 ENCOUNTER — VBI (OUTPATIENT)
Dept: ADMINISTRATIVE | Facility: OTHER | Age: 75
End: 2020-01-01

## 2020-01-01 ENCOUNTER — REMOTE DEVICE CLINIC VISIT (OUTPATIENT)
Dept: CARDIOLOGY CLINIC | Facility: CLINIC | Age: 75
End: 2020-01-01
Payer: COMMERCIAL

## 2020-01-01 ENCOUNTER — TELEPHONE (OUTPATIENT)
Dept: OTHER | Facility: OTHER | Age: 75
End: 2020-01-01

## 2020-01-01 ENCOUNTER — TELEMEDICINE (OUTPATIENT)
Dept: FAMILY MEDICINE CLINIC | Facility: CLINIC | Age: 75
End: 2020-01-01
Payer: COMMERCIAL

## 2020-01-01 ENCOUNTER — OFFICE VISIT (OUTPATIENT)
Dept: FAMILY MEDICINE CLINIC | Facility: CLINIC | Age: 75
End: 2020-01-01
Payer: COMMERCIAL

## 2020-01-01 ENCOUNTER — HOSPITAL ENCOUNTER (OUTPATIENT)
Dept: RADIOLOGY | Facility: HOSPITAL | Age: 75
Discharge: HOME | End: 2020-09-17
Attending: PODIATRIST
Payer: COMMERCIAL

## 2020-01-01 ENCOUNTER — APPOINTMENT (OUTPATIENT)
Dept: LAB | Facility: CLINIC | Age: 75
End: 2020-01-01
Payer: COMMERCIAL

## 2020-01-01 ENCOUNTER — OFFICE VISIT (OUTPATIENT)
Dept: PODIATRY | Facility: CLINIC | Age: 75
End: 2020-01-01
Payer: COMMERCIAL

## 2020-01-01 ENCOUNTER — TELEPHONE (OUTPATIENT)
Dept: ENDOCRINOLOGY | Facility: CLINIC | Age: 75
End: 2020-01-01

## 2020-01-01 ENCOUNTER — OFFICE VISIT (OUTPATIENT)
Dept: CARDIOLOGY CLINIC | Facility: CLINIC | Age: 75
End: 2020-01-01
Payer: COMMERCIAL

## 2020-01-01 VITALS
HEART RATE: 93 BPM | SYSTOLIC BLOOD PRESSURE: 128 MMHG | HEIGHT: 65 IN | BODY MASS INDEX: 24.87 KG/M2 | DIASTOLIC BLOOD PRESSURE: 80 MMHG | WEIGHT: 149.3 LBS | TEMPERATURE: 97.8 F

## 2020-01-01 VITALS
WEIGHT: 147.4 LBS | WEIGHT: 144 LBS | TEMPERATURE: 95 F | SYSTOLIC BLOOD PRESSURE: 102 MMHG | BODY MASS INDEX: 24.56 KG/M2 | HEIGHT: 65 IN | SYSTOLIC BLOOD PRESSURE: 123 MMHG | DIASTOLIC BLOOD PRESSURE: 73 MMHG | HEART RATE: 96 BPM | DIASTOLIC BLOOD PRESSURE: 68 MMHG | TEMPERATURE: 96.5 F | BODY MASS INDEX: 23.99 KG/M2 | HEIGHT: 65 IN | HEART RATE: 102 BPM

## 2020-01-01 VITALS
TEMPERATURE: 96 F | SYSTOLIC BLOOD PRESSURE: 152 MMHG | DIASTOLIC BLOOD PRESSURE: 76 MMHG | HEART RATE: 90 BPM | WEIGHT: 144 LBS | BODY MASS INDEX: 23.99 KG/M2 | HEIGHT: 65 IN

## 2020-01-01 VITALS — HEIGHT: 65 IN | BODY MASS INDEX: 24.66 KG/M2 | WEIGHT: 148 LBS

## 2020-01-01 VITALS
WEIGHT: 144 LBS | DIASTOLIC BLOOD PRESSURE: 93 MMHG | BODY MASS INDEX: 23.99 KG/M2 | SYSTOLIC BLOOD PRESSURE: 159 MMHG | HEART RATE: 92 BPM | TEMPERATURE: 96.3 F | HEIGHT: 65 IN

## 2020-01-01 VITALS
WEIGHT: 147 LBS | DIASTOLIC BLOOD PRESSURE: 59 MMHG | SYSTOLIC BLOOD PRESSURE: 100 MMHG | HEART RATE: 94 BPM | BODY MASS INDEX: 24.46 KG/M2

## 2020-01-01 VITALS
TEMPERATURE: 97 F | HEIGHT: 65 IN | BODY MASS INDEX: 24.11 KG/M2 | SYSTOLIC BLOOD PRESSURE: 134 MMHG | HEART RATE: 86 BPM | DIASTOLIC BLOOD PRESSURE: 74 MMHG | WEIGHT: 144.7 LBS

## 2020-01-01 VITALS
DIASTOLIC BLOOD PRESSURE: 79 MMHG | BODY MASS INDEX: 24.53 KG/M2 | TEMPERATURE: 95.8 F | SYSTOLIC BLOOD PRESSURE: 142 MMHG | HEART RATE: 80 BPM | WEIGHT: 147.2 LBS | HEIGHT: 65 IN

## 2020-01-01 VITALS
TEMPERATURE: 96 F | HEART RATE: 88 BPM | HEIGHT: 65 IN | BODY MASS INDEX: 24.5 KG/M2 | DIASTOLIC BLOOD PRESSURE: 76 MMHG | SYSTOLIC BLOOD PRESSURE: 147 MMHG

## 2020-01-01 VITALS
TEMPERATURE: 96.5 F | SYSTOLIC BLOOD PRESSURE: 113 MMHG | BODY MASS INDEX: 24.29 KG/M2 | DIASTOLIC BLOOD PRESSURE: 71 MMHG | HEIGHT: 65 IN | WEIGHT: 145.8 LBS | HEART RATE: 84 BPM

## 2020-01-01 VITALS
WEIGHT: 144 LBS | DIASTOLIC BLOOD PRESSURE: 71 MMHG | SYSTOLIC BLOOD PRESSURE: 126 MMHG | DIASTOLIC BLOOD PRESSURE: 84 MMHG | HEART RATE: 92 BPM | WEIGHT: 147 LBS | HEIGHT: 65 IN | BODY MASS INDEX: 23.99 KG/M2 | BODY MASS INDEX: 24.46 KG/M2 | TEMPERATURE: 96 F | SYSTOLIC BLOOD PRESSURE: 142 MMHG | HEART RATE: 84 BPM

## 2020-01-01 VITALS
TEMPERATURE: 98.6 F | HEIGHT: 65 IN | HEART RATE: 90 BPM | WEIGHT: 146.6 LBS | DIASTOLIC BLOOD PRESSURE: 68 MMHG | SYSTOLIC BLOOD PRESSURE: 112 MMHG | BODY MASS INDEX: 24.43 KG/M2

## 2020-01-01 VITALS
HEIGHT: 65 IN | DIASTOLIC BLOOD PRESSURE: 64 MMHG | TEMPERATURE: 96 F | BODY MASS INDEX: 23.99 KG/M2 | SYSTOLIC BLOOD PRESSURE: 109 MMHG | HEART RATE: 98 BPM | WEIGHT: 144 LBS

## 2020-01-01 VITALS
HEART RATE: 90 BPM | DIASTOLIC BLOOD PRESSURE: 80 MMHG | WEIGHT: 147 LBS | HEIGHT: 65 IN | SYSTOLIC BLOOD PRESSURE: 135 MMHG | BODY MASS INDEX: 24.49 KG/M2 | TEMPERATURE: 96.4 F

## 2020-01-01 VITALS
HEART RATE: 86 BPM | TEMPERATURE: 98.1 F | RESPIRATION RATE: 16 BRPM | OXYGEN SATURATION: 98 % | DIASTOLIC BLOOD PRESSURE: 74 MMHG | WEIGHT: 150 LBS | SYSTOLIC BLOOD PRESSURE: 122 MMHG | HEIGHT: 65 IN | BODY MASS INDEX: 24.99 KG/M2

## 2020-01-01 VITALS
HEIGHT: 65 IN | HEART RATE: 87 BPM | BODY MASS INDEX: 24.99 KG/M2 | SYSTOLIC BLOOD PRESSURE: 130 MMHG | DIASTOLIC BLOOD PRESSURE: 65 MMHG | WEIGHT: 150 LBS

## 2020-01-01 VITALS
BODY MASS INDEX: 23.99 KG/M2 | TEMPERATURE: 96 F | HEIGHT: 65 IN | SYSTOLIC BLOOD PRESSURE: 119 MMHG | DIASTOLIC BLOOD PRESSURE: 67 MMHG | WEIGHT: 144 LBS | HEART RATE: 87 BPM

## 2020-01-01 VITALS
WEIGHT: 148 LBS | BODY MASS INDEX: 24.63 KG/M2 | SYSTOLIC BLOOD PRESSURE: 122 MMHG | DIASTOLIC BLOOD PRESSURE: 70 MMHG | HEART RATE: 93 BPM | TEMPERATURE: 96.7 F

## 2020-01-01 VITALS — BODY MASS INDEX: 24.29 KG/M2 | HEIGHT: 65 IN | WEIGHT: 145.8 LBS

## 2020-01-01 VITALS
WEIGHT: 148.8 LBS | HEART RATE: 85 BPM | BODY MASS INDEX: 24.79 KG/M2 | HEIGHT: 65 IN | WEIGHT: 146.6 LBS | BODY MASS INDEX: 24.43 KG/M2 | TEMPERATURE: 95 F | SYSTOLIC BLOOD PRESSURE: 114 MMHG | DIASTOLIC BLOOD PRESSURE: 67 MMHG | HEIGHT: 65 IN

## 2020-01-01 DIAGNOSIS — E11.42 DIABETIC PERIPHERAL NEUROPATHY (HCC): Chronic | ICD-10-CM

## 2020-01-01 DIAGNOSIS — L97.511 RIGHT FOOT ULCER, LIMITED TO BREAKDOWN OF SKIN (HCC): ICD-10-CM

## 2020-01-01 DIAGNOSIS — E87.5 HYPERKALEMIA: ICD-10-CM

## 2020-01-01 DIAGNOSIS — Z86.31 PERSONAL HISTORY OF DIABETIC FOOT ULCER: ICD-10-CM

## 2020-01-01 DIAGNOSIS — L97.511 RIGHT FOOT ULCER, LIMITED TO BREAKDOWN OF SKIN (HCC): Primary | ICD-10-CM

## 2020-01-01 DIAGNOSIS — I10 ESSENTIAL HYPERTENSION: ICD-10-CM

## 2020-01-01 DIAGNOSIS — N18.30 CKD (CHRONIC KIDNEY DISEASE), STAGE III (HCC): Primary | ICD-10-CM

## 2020-01-01 DIAGNOSIS — N18.30 TYPE 2 DIABETES MELLITUS WITH STAGE 3 CHRONIC KIDNEY DISEASE, WITH LONG-TERM CURRENT USE OF INSULIN (HCC): ICD-10-CM

## 2020-01-01 DIAGNOSIS — R31.29 MICROSCOPIC HEMATURIA: ICD-10-CM

## 2020-01-01 DIAGNOSIS — Z95.0 PRESENCE OF PERMANENT CARDIAC PACEMAKER: Primary | ICD-10-CM

## 2020-01-01 DIAGNOSIS — I10 ESSENTIAL HYPERTENSION: Chronic | ICD-10-CM

## 2020-01-01 DIAGNOSIS — Z79.4 TYPE 2 DIABETES MELLITUS WITH LEFT EYE AFFECTED BY PROLIFERATIVE RETINOPATHY WITHOUT MACULAR EDEMA, WITH LONG-TERM CURRENT USE OF INSULIN (HCC): Primary | ICD-10-CM

## 2020-01-01 DIAGNOSIS — Z89.422 ACQUIRED ABSENCE OF OTHER LEFT TOE(S) (HCC): ICD-10-CM

## 2020-01-01 DIAGNOSIS — E87.5 HYPERKALEMIA: Primary | ICD-10-CM

## 2020-01-01 DIAGNOSIS — I44.2 COMPLETE HEART BLOCK (HCC): Primary | ICD-10-CM

## 2020-01-01 DIAGNOSIS — L97.512 RIGHT FOOT ULCER, WITH FAT LAYER EXPOSED (HCC): ICD-10-CM

## 2020-01-01 DIAGNOSIS — N18.30 CKD (CHRONIC KIDNEY DISEASE), STAGE III (HCC): ICD-10-CM

## 2020-01-01 DIAGNOSIS — R80.9 MICROALBUMINURIA: ICD-10-CM

## 2020-01-01 DIAGNOSIS — N18.31 STAGE 3A CHRONIC KIDNEY DISEASE (HCC): ICD-10-CM

## 2020-01-01 DIAGNOSIS — E11.42 DIABETIC PERIPHERAL NEUROPATHY (HCC): Primary | Chronic | ICD-10-CM

## 2020-01-01 DIAGNOSIS — E11.3592 TYPE 2 DIABETES MELLITUS WITH LEFT EYE AFFECTED BY PROLIFERATIVE RETINOPATHY WITHOUT MACULAR EDEMA, WITH LONG-TERM CURRENT USE OF INSULIN (HCC): ICD-10-CM

## 2020-01-01 DIAGNOSIS — Z79.4 TYPE 2 DIABETES MELLITUS WITH LEFT EYE AFFECTED BY PROLIFERATIVE RETINOPATHY WITHOUT MACULAR EDEMA, WITH LONG-TERM CURRENT USE OF INSULIN (HCC): ICD-10-CM

## 2020-01-01 DIAGNOSIS — L97.212 NON-PRESSURE CHRONIC ULCER OF RIGHT CALF WITH FAT LAYER EXPOSED (HCC): Primary | ICD-10-CM

## 2020-01-01 DIAGNOSIS — E78.2 MIXED HYPERLIPIDEMIA: ICD-10-CM

## 2020-01-01 DIAGNOSIS — N18.31 STAGE 3A CHRONIC KIDNEY DISEASE (HCC): Primary | ICD-10-CM

## 2020-01-01 DIAGNOSIS — Z95.0 HISTORY OF PERMANENT CARDIAC PACEMAKER PLACEMENT: ICD-10-CM

## 2020-01-01 DIAGNOSIS — I10 BENIGN ESSENTIAL HTN: ICD-10-CM

## 2020-01-01 DIAGNOSIS — Z95.0 CARDIAC PACEMAKER IN SITU: Primary | ICD-10-CM

## 2020-01-01 DIAGNOSIS — I15.9 SECONDARY HYPERTENSION: ICD-10-CM

## 2020-01-01 DIAGNOSIS — Z79.4 TYPE 2 DIABETES MELLITUS WITH STAGE 3 CHRONIC KIDNEY DISEASE, WITH LONG-TERM CURRENT USE OF INSULIN (HCC): ICD-10-CM

## 2020-01-01 DIAGNOSIS — D69.6 THROMBOCYTOPENIA (HCC): ICD-10-CM

## 2020-01-01 DIAGNOSIS — E03.9 HYPOTHYROIDISM, UNSPECIFIED TYPE: Chronic | ICD-10-CM

## 2020-01-01 DIAGNOSIS — Z86.31 PERSONAL HISTORY OF DIABETIC FOOT ULCER: Primary | ICD-10-CM

## 2020-01-01 DIAGNOSIS — I10 BENIGN ESSENTIAL HTN: Primary | ICD-10-CM

## 2020-01-01 DIAGNOSIS — S81.801A OPEN WOUND OF RIGHT LOWER LEG, INITIAL ENCOUNTER: Primary | ICD-10-CM

## 2020-01-01 DIAGNOSIS — E11.3592 TYPE 2 DIABETES MELLITUS WITH LEFT EYE AFFECTED BY PROLIFERATIVE RETINOPATHY WITHOUT MACULAR EDEMA, WITH LONG-TERM CURRENT USE OF INSULIN (HCC): Primary | ICD-10-CM

## 2020-01-01 DIAGNOSIS — S81.801A OPEN WOUND OF RIGHT LOWER LEG, INITIAL ENCOUNTER: ICD-10-CM

## 2020-01-01 DIAGNOSIS — E03.9 HYPOTHYROIDISM, UNSPECIFIED TYPE: ICD-10-CM

## 2020-01-01 DIAGNOSIS — E11.9 ENCOUNTER FOR DIABETIC FOOT EXAM (HCC): Primary | ICD-10-CM

## 2020-01-01 DIAGNOSIS — E11.22 TYPE 2 DIABETES MELLITUS WITH STAGE 3 CHRONIC KIDNEY DISEASE, WITH LONG-TERM CURRENT USE OF INSULIN (HCC): ICD-10-CM

## 2020-01-01 DIAGNOSIS — D69.6 THROMBOCYTOPENIA (HCC): Chronic | ICD-10-CM

## 2020-01-01 DIAGNOSIS — E03.9 HYPOTHYROIDISM, UNSPECIFIED TYPE: Primary | ICD-10-CM

## 2020-01-01 DIAGNOSIS — N18.30 STAGE 3 CHRONIC KIDNEY DISEASE (HCC): ICD-10-CM

## 2020-01-01 LAB
ALBUMIN SERPL BCP-MCNC: 3.1 G/DL (ref 3.5–5)
ALP SERPL-CCNC: 91 U/L (ref 46–116)
ALT SERPL W P-5'-P-CCNC: 33 U/L (ref 12–78)
ANION GAP SERPL CALCULATED.3IONS-SCNC: 1 MMOL/L (ref 4–13)
ANION GAP SERPL CALCULATED.3IONS-SCNC: 11 MMOL/L (ref 4–13)
ANION GAP SERPL CALCULATED.3IONS-SCNC: 3 MMOL/L (ref 4–13)
ANION GAP SERPL CALCULATED.3IONS-SCNC: 5 MMOL/L (ref 4–13)
ANION GAP SERPL CALCULATED.3IONS-SCNC: 5 MMOL/L (ref 4–13)
AST SERPL W P-5'-P-CCNC: 31 U/L (ref 5–45)
BACTERIA UR QL AUTO: ABNORMAL /HPF
BASOPHILS # BLD AUTO: 0.04 THOUSANDS/ΜL (ref 0–0.1)
BASOPHILS NFR BLD AUTO: 1 % (ref 0–1)
BILIRUB SERPL-MCNC: 0.3 MG/DL (ref 0.2–1)
BILIRUB UR QL STRIP: NEGATIVE
BUN SERPL-MCNC: 24 MG/DL (ref 5–25)
BUN SERPL-MCNC: 27 MG/DL (ref 5–25)
BUN SERPL-MCNC: 29 MG/DL (ref 5–25)
BUN SERPL-MCNC: 31 MG/DL (ref 5–25)
BUN SERPL-MCNC: 34 MG/DL (ref 5–25)
CALCIUM ALBUM COR SERPL-MCNC: 9.8 MG/DL (ref 8.3–10.1)
CALCIUM SERPL-MCNC: 9 MG/DL (ref 8.3–10.1)
CALCIUM SERPL-MCNC: 9.1 MG/DL (ref 8.3–10.1)
CALCIUM SERPL-MCNC: 9.3 MG/DL (ref 8.3–10.1)
CALCIUM SERPL-MCNC: 9.5 MG/DL (ref 8.3–10.1)
CALCIUM SERPL-MCNC: 9.6 MG/DL (ref 8.3–10.1)
CHLORIDE SERPL-SCNC: 102 MMOL/L (ref 100–108)
CHLORIDE SERPL-SCNC: 103 MMOL/L (ref 100–108)
CHLORIDE SERPL-SCNC: 104 MMOL/L (ref 100–108)
CHOLEST SERPL-MCNC: 164 MG/DL (ref 50–200)
CLARITY UR: CLEAR
CO2 SERPL-SCNC: 24 MMOL/L (ref 21–32)
CO2 SERPL-SCNC: 30 MMOL/L (ref 21–32)
CO2 SERPL-SCNC: 31 MMOL/L (ref 21–32)
COLOR UR: YELLOW
CREAT SERPL-MCNC: 1.25 MG/DL (ref 0.6–1.3)
CREAT SERPL-MCNC: 1.29 MG/DL (ref 0.6–1.3)
CREAT SERPL-MCNC: 1.3 MG/DL (ref 0.6–1.3)
CREAT SERPL-MCNC: 1.37 MG/DL (ref 0.6–1.3)
CREAT SERPL-MCNC: 1.42 MG/DL (ref 0.6–1.3)
CREAT UR-MCNC: 148 MG/DL
CREAT UR-MCNC: 78 MG/DL
CREAT UR-MCNC: 80.2 MG/DL
CREAT UR-MCNC: 97.7 MG/DL
EOSINOPHIL # BLD AUTO: 0.37 THOUSAND/ΜL (ref 0–0.61)
EOSINOPHIL NFR BLD AUTO: 8 % (ref 0–6)
ERYTHROCYTE [DISTWIDTH] IN BLOOD BY AUTOMATED COUNT: 13.2 % (ref 11.6–15.1)
ERYTHROCYTE [DISTWIDTH] IN BLOOD BY AUTOMATED COUNT: 13.2 % (ref 11.6–15.1)
EST. AVERAGE GLUCOSE BLD GHB EST-MCNC: 206 MG/DL
GFR SERPL CREATININE-BSD FRML MDRD: 48 ML/MIN/1.73SQ M
GFR SERPL CREATININE-BSD FRML MDRD: 50 ML/MIN/1.73SQ M
GFR SERPL CREATININE-BSD FRML MDRD: 53 ML/MIN/1.73SQ M
GFR SERPL CREATININE-BSD FRML MDRD: 54 ML/MIN/1.73SQ M
GFR SERPL CREATININE-BSD FRML MDRD: 56 ML/MIN/1.73SQ M
GLUCOSE P FAST SERPL-MCNC: 169 MG/DL (ref 65–99)
GLUCOSE P FAST SERPL-MCNC: 228 MG/DL (ref 65–99)
GLUCOSE P FAST SERPL-MCNC: 82 MG/DL (ref 65–99)
GLUCOSE SERPL-MCNC: 117 MG/DL (ref 65–140)
GLUCOSE SERPL-MCNC: 124 MG/DL (ref 65–140)
GLUCOSE UR STRIP-MCNC: ABNORMAL MG/DL
GLUCOSE UR STRIP-MCNC: ABNORMAL MG/DL
GLUCOSE UR STRIP-MCNC: NEGATIVE MG/DL
HBA1C MFR BLD: 8.8 %
HCT VFR BLD AUTO: 38 % (ref 36.5–49.3)
HCT VFR BLD AUTO: 39.7 % (ref 36.5–49.3)
HDLC SERPL-MCNC: 85 MG/DL
HGB BLD-MCNC: 12.5 G/DL (ref 12–17)
HGB BLD-MCNC: 12.5 G/DL (ref 12–17)
HGB UR QL STRIP.AUTO: NEGATIVE
HYALINE CASTS #/AREA URNS LPF: ABNORMAL /LPF
HYALINE CASTS #/AREA URNS LPF: ABNORMAL /LPF
IMM GRANULOCYTES # BLD AUTO: 0.02 THOUSAND/UL (ref 0–0.2)
IMM GRANULOCYTES NFR BLD AUTO: 0 % (ref 0–2)
KETONES UR STRIP-MCNC: NEGATIVE MG/DL
LDLC SERPL CALC-MCNC: 66 MG/DL (ref 0–100)
LEUKOCYTE ESTERASE UR QL STRIP: 25
LEUKOCYTE ESTERASE UR QL STRIP: ABNORMAL
LEUKOCYTE ESTERASE UR QL STRIP: NEGATIVE
LYMPHOCYTES # BLD AUTO: 0.85 THOUSANDS/ΜL (ref 0.6–4.47)
LYMPHOCYTES NFR BLD AUTO: 17 % (ref 14–44)
MAGNESIUM SERPL-MCNC: 1.9 MG/DL (ref 1.6–2.6)
MAGNESIUM SERPL-MCNC: 2.1 MG/DL (ref 1.6–2.6)
MAGNESIUM SERPL-MCNC: 2.3 MG/DL (ref 1.6–2.6)
MAGNESIUM SERPL-MCNC: 2.3 MG/DL (ref 1.6–2.6)
MCH RBC QN AUTO: 28.3 PG (ref 26.8–34.3)
MCH RBC QN AUTO: 30 PG (ref 26.8–34.3)
MCHC RBC AUTO-ENTMCNC: 31.5 G/DL (ref 31.4–37.4)
MCHC RBC AUTO-ENTMCNC: 32.9 G/DL (ref 31.4–37.4)
MCV RBC AUTO: 90 FL (ref 82–98)
MCV RBC AUTO: 91 FL (ref 82–98)
MICROALBUMIN UR-MCNC: 211 MG/L (ref 0–20)
MICROALBUMIN/CREAT 24H UR: 263 MG/G CREATININE (ref 0–30)
MONOCYTES # BLD AUTO: 0.51 THOUSAND/ΜL (ref 0.17–1.22)
MONOCYTES NFR BLD AUTO: 10 % (ref 4–12)
MUCOUS THREADS UR QL AUTO: ABNORMAL
NEUTROPHILS # BLD AUTO: 3.15 THOUSANDS/ΜL (ref 1.85–7.62)
NEUTS SEG NFR BLD AUTO: 64 % (ref 43–75)
NITRITE UR QL STRIP: NEGATIVE
NON-SQ EPI CELLS URNS QL MICRO: ABNORMAL /HPF
NONHDLC SERPL-MCNC: 79 MG/DL
NRBC BLD AUTO-RTO: 0 /100 WBCS
PH UR STRIP.AUTO: 5.5 [PH]
PH UR STRIP.AUTO: 6 [PH]
PH UR STRIP.AUTO: 6 [PH]
PHOSPHATE SERPL-MCNC: 3.4 MG/DL (ref 2.3–4.1)
PHOSPHATE SERPL-MCNC: 3.6 MG/DL (ref 2.3–4.1)
PLATELET # BLD AUTO: 106 THOUSANDS/UL (ref 149–390)
PLATELET # BLD AUTO: 130 THOUSANDS/UL (ref 149–390)
PMV BLD AUTO: 8.5 FL (ref 8.9–12.7)
PMV BLD AUTO: 9.9 FL (ref 8.9–12.7)
POTASSIUM SERPL-SCNC: 4.5 MMOL/L (ref 3.5–5.3)
POTASSIUM SERPL-SCNC: 4.5 MMOL/L (ref 3.5–5.3)
POTASSIUM SERPL-SCNC: 5.1 MMOL/L (ref 3.5–5.3)
POTASSIUM SERPL-SCNC: 5.3 MMOL/L (ref 3.5–5.3)
POTASSIUM SERPL-SCNC: 5.9 MMOL/L (ref 3.5–5.3)
PROT SERPL-MCNC: 6.9 G/DL (ref 6.4–8.2)
PROT UR STRIP-MCNC: ABNORMAL MG/DL
PROT UR-MCNC: 15 MG/DL
PROT UR-MCNC: 33 MG/DL
PROT UR-MCNC: 40 MG/DL
PROT/CREAT UR: 0.15 MG/G{CREAT} (ref 0–0.1)
PROT/CREAT UR: 0.22 MG/G{CREAT} (ref 0–0.1)
PROT/CREAT UR: 0.51 MG/G{CREAT} (ref 0–0.1)
PTH-INTACT SERPL-MCNC: 40.4 PG/ML (ref 18.4–80.1)
PTH-INTACT SERPL-MCNC: 56.9 PG/ML (ref 18.4–80.1)
RBC # BLD AUTO: 4.17 MILLION/UL (ref 3.88–5.62)
RBC # BLD AUTO: 4.41 MILLION/UL (ref 3.88–5.62)
RBC #/AREA URNS AUTO: ABNORMAL /HPF
SODIUM SERPL-SCNC: 136 MMOL/L (ref 136–145)
SODIUM SERPL-SCNC: 136 MMOL/L (ref 136–145)
SODIUM SERPL-SCNC: 138 MMOL/L (ref 136–145)
SODIUM SERPL-SCNC: 139 MMOL/L (ref 136–145)
SODIUM SERPL-SCNC: 140 MMOL/L (ref 136–145)
SP GR UR STRIP.AUTO: 1.01 (ref 1–1.04)
SP GR UR STRIP.AUTO: 1.02 (ref 1–1.03)
SP GR UR STRIP.AUTO: 1.03 (ref 1–1.03)
TRIGL SERPL-MCNC: 64 MG/DL
TSH SERPL DL<=0.05 MIU/L-ACNC: 0.9 UIU/ML (ref 0.36–3.74)
UROBILINOGEN UA: NEGATIVE MG/DL
UROBILINOGEN UR QL STRIP.AUTO: 0.2 E.U./DL
UROBILINOGEN UR QL STRIP.AUTO: 0.2 E.U./DL
WBC # BLD AUTO: 4.94 THOUSAND/UL (ref 4.31–10.16)
WBC # BLD AUTO: 5.46 THOUSAND/UL (ref 4.31–10.16)
WBC #/AREA URNS AUTO: ABNORMAL /HPF

## 2020-01-01 PROCEDURE — 82570 ASSAY OF URINE CREATININE: CPT

## 2020-01-01 PROCEDURE — 1160F RVW MEDS BY RX/DR IN RCRD: CPT | Performed by: PODIATRIST

## 2020-01-01 PROCEDURE — 1036F TOBACCO NON-USER: CPT | Performed by: PODIATRIST

## 2020-01-01 PROCEDURE — 11042 DBRDMT SUBQ TIS 1ST 20SQCM/<: CPT | Performed by: PODIATRIST

## 2020-01-01 PROCEDURE — 4010F ACE/ARB THERAPY RXD/TAKEN: CPT | Performed by: INTERNAL MEDICINE

## 2020-01-01 PROCEDURE — 3074F SYST BP LT 130 MM HG: CPT | Performed by: INTERNAL MEDICINE

## 2020-01-01 PROCEDURE — 4040F PNEUMOC VAC/ADMIN/RCVD: CPT | Performed by: PODIATRIST

## 2020-01-01 PROCEDURE — 3046F HEMOGLOBIN A1C LEVEL >9.0%: CPT | Performed by: PODIATRIST

## 2020-01-01 PROCEDURE — 93280 PM DEVICE PROGR EVAL DUAL: CPT | Performed by: INTERNAL MEDICINE

## 2020-01-01 PROCEDURE — 36415 COLL VENOUS BLD VENIPUNCTURE: CPT

## 2020-01-01 PROCEDURE — 99213 OFFICE O/P EST LOW 20 MIN: CPT | Performed by: PODIATRIST

## 2020-01-01 PROCEDURE — 4010F ACE/ARB THERAPY RXD/TAKEN: CPT | Performed by: PODIATRIST

## 2020-01-01 PROCEDURE — 3078F DIAST BP <80 MM HG: CPT | Performed by: INTERNAL MEDICINE

## 2020-01-01 PROCEDURE — 81001 URINALYSIS AUTO W/SCOPE: CPT

## 2020-01-01 PROCEDURE — 3046F HEMOGLOBIN A1C LEVEL >9.0%: CPT | Performed by: INTERNAL MEDICINE

## 2020-01-01 PROCEDURE — 3079F DIAST BP 80-89 MM HG: CPT | Performed by: INTERNAL MEDICINE

## 2020-01-01 PROCEDURE — 3077F SYST BP >= 140 MM HG: CPT | Performed by: PODIATRIST

## 2020-01-01 PROCEDURE — 3078F DIAST BP <80 MM HG: CPT | Performed by: PODIATRIST

## 2020-01-01 PROCEDURE — 1160F RVW MEDS BY RX/DR IN RCRD: CPT | Performed by: INTERNAL MEDICINE

## 2020-01-01 PROCEDURE — 84156 ASSAY OF PROTEIN URINE: CPT

## 2020-01-01 PROCEDURE — 3066F NEPHROPATHY DOC TX: CPT | Performed by: INTERNAL MEDICINE

## 2020-01-01 PROCEDURE — 82570 ASSAY OF URINE CREATININE: CPT | Performed by: FAMILY MEDICINE

## 2020-01-01 PROCEDURE — 3052F HG A1C>EQUAL 8.0%<EQUAL 9.0%: CPT | Performed by: PODIATRIST

## 2020-01-01 PROCEDURE — 83735 ASSAY OF MAGNESIUM: CPT

## 2020-01-01 PROCEDURE — 3066F NEPHROPATHY DOC TX: CPT | Performed by: PODIATRIST

## 2020-01-01 PROCEDURE — 3725F SCREEN DEPRESSION PERFORMED: CPT | Performed by: FAMILY MEDICINE

## 2020-01-01 PROCEDURE — 3008F BODY MASS INDEX DOCD: CPT | Performed by: PODIATRIST

## 2020-01-01 PROCEDURE — 93296 REM INTERROG EVL PM/IDS: CPT | Performed by: INTERNAL MEDICINE

## 2020-01-01 PROCEDURE — 99214 OFFICE O/P EST MOD 30 MIN: CPT | Performed by: INTERNAL MEDICINE

## 2020-01-01 PROCEDURE — 80048 BASIC METABOLIC PNL TOTAL CA: CPT

## 2020-01-01 PROCEDURE — 3061F NEG MICROALBUMINURIA REV: CPT | Performed by: PODIATRIST

## 2020-01-01 PROCEDURE — 97597 DBRDMT OPN WND 1ST 20 CM/<: CPT | Performed by: PODIATRIST

## 2020-01-01 PROCEDURE — 85027 COMPLETE CBC AUTOMATED: CPT

## 2020-01-01 PROCEDURE — 84443 ASSAY THYROID STIM HORMONE: CPT

## 2020-01-01 PROCEDURE — 1125F AMNT PAIN NOTED PAIN PRSNT: CPT | Performed by: FAMILY MEDICINE

## 2020-01-01 PROCEDURE — 83970 ASSAY OF PARATHORMONE: CPT

## 2020-01-01 PROCEDURE — G0439 PPPS, SUBSEQ VISIT: HCPCS | Performed by: FAMILY MEDICINE

## 2020-01-01 PROCEDURE — 83036 HEMOGLOBIN GLYCOSYLATED A1C: CPT

## 2020-01-01 PROCEDURE — 1036F TOBACCO NON-USER: CPT | Performed by: INTERNAL MEDICINE

## 2020-01-01 PROCEDURE — 80061 LIPID PANEL: CPT

## 2020-01-01 PROCEDURE — 3074F SYST BP LT 130 MM HG: CPT | Performed by: PODIATRIST

## 2020-01-01 PROCEDURE — 99214 OFFICE O/P EST MOD 30 MIN: CPT | Performed by: FAMILY MEDICINE

## 2020-01-01 PROCEDURE — 84100 ASSAY OF PHOSPHORUS: CPT

## 2020-01-01 PROCEDURE — 3008F BODY MASS INDEX DOCD: CPT | Performed by: INTERNAL MEDICINE

## 2020-01-01 PROCEDURE — 4040F PNEUMOC VAC/ADMIN/RCVD: CPT | Performed by: INTERNAL MEDICINE

## 2020-01-01 PROCEDURE — 73630 X-RAY EXAM OF FOOT: CPT

## 2020-01-01 PROCEDURE — 3079F DIAST BP 80-89 MM HG: CPT | Performed by: PODIATRIST

## 2020-01-01 PROCEDURE — 80053 COMPREHEN METABOLIC PANEL: CPT

## 2020-01-01 PROCEDURE — 1170F FXNL STATUS ASSESSED: CPT | Performed by: FAMILY MEDICINE

## 2020-01-01 PROCEDURE — 93294 REM INTERROG EVL PM/LDLS PM: CPT | Performed by: INTERNAL MEDICINE

## 2020-01-01 PROCEDURE — 3060F POS MICROALBUMINURIA REV: CPT | Performed by: PODIATRIST

## 2020-01-01 PROCEDURE — 85025 COMPLETE CBC W/AUTO DIFF WBC: CPT

## 2020-01-01 PROCEDURE — 82043 UR ALBUMIN QUANTITATIVE: CPT | Performed by: FAMILY MEDICINE

## 2020-01-01 RX ORDER — AMLODIPINE BESYLATE 5 MG/1
5 TABLET ORAL DAILY
Qty: 90 TABLET | Refills: 3 | Status: SHIPPED | OUTPATIENT
Start: 2020-01-01 | End: 2021-01-01

## 2020-01-01 RX ORDER — LISINOPRIL 5 MG/1
5 TABLET ORAL DAILY
Qty: 90 TABLET | Refills: 3 | Status: CANCELLED | OUTPATIENT
Start: 2020-01-01

## 2020-01-01 RX ORDER — LISINOPRIL 5 MG/1
5 TABLET ORAL DAILY
Qty: 30 TABLET | Refills: 3 | Status: SHIPPED | OUTPATIENT
Start: 2020-01-01 | End: 2021-01-01

## 2020-01-01 RX ORDER — AMLODIPINE BESYLATE 5 MG/1
5 TABLET ORAL DAILY
COMMUNITY
End: 2021-01-01 | Stop reason: SDUPTHER

## 2020-01-01 RX ORDER — LISINOPRIL 10 MG/1
TABLET ORAL
COMMUNITY
End: 2020-01-01

## 2020-01-01 RX ORDER — AMLODIPINE BESYLATE 5 MG/1
5 TABLET ORAL DAILY
Qty: 30 TABLET | Refills: 5 | Status: SHIPPED | OUTPATIENT
Start: 2020-01-01 | End: 2020-01-01

## 2020-01-02 ENCOUNTER — TELEPHONE (OUTPATIENT)
Dept: NEPHROLOGY | Facility: CLINIC | Age: 75
End: 2020-01-02

## 2020-01-02 NOTE — PROGRESS NOTES
This patient was seen on 1/7/20  Assessment:    Problem List Items Addressed This Visit        Endocrine    Diabetic peripheral neuropathy (HCC) (Chronic)       Other    Non-pressure chronic ulcer of right calf with fat layer exposed ([address] ) - Primary          Plan:  A formal timeout including patient identification, laterality and existing allergies using [facility] protocol was conducted  Procedure Performed: Debridement of subcutaneous tissue- <20 sq cm (47161)  Anesthetic used as needed to reduce discomfort  Under aseptic technique, the wound was thoroughly explored for undermining, deep sinus tracts and bone involvement  Sterile instrumentation including scalpel used to excise the clearly delineated devitalized subcutaneous tissue exposing viable tissue  Bleeding controlled with gentle pressure  Patient tolerated debridement without complications  The wound was dressed  Wound dressing technique and frequency described to patient  I am to be called if any signs of infection develop such as erythema, increased wound size or significant change in appearance of wound, odor, pain, increased or change in color of drainage, swelling, fever, chills, nightsweats  I reviewed these signs with the patient  I recommended limiting leg dependency and to use any prescribed compression devices in an effort to allow proper fluid control and healing of the wounded area  I was very clear that sleeping with legs dependent is to be avoided at all costs  I explained that good wound care and compliance are necessary to allow healing and to prevent limb loss  Food wound- healed, no dressing needed  Lower leg- Alginate applied to wound bed  ABD and kerlex  Unna boot applied using paste wrap and coban  Applied from base of toes to infrapatellar notch   Unna boot care and precautions discussed and explained to pt/family  1  Leave wrap dry and intact  2   If wrap is too tight (feels like a tourniquet, toes turning purple), elevate the leg above heart level, if no relief, remove wrap (no scissors) and call office  3  If wrap becomes too loose, remove wrap (no scissors) and call the office  Pt/family verbalize understanding     Foot wound remains healed, may discontinue wearing the wedge shoe  Pt is instructed to bring his compression stockings to next visit  Diagnoses and all orders for this visit:    Non-pressure chronic ulcer of right calf with fat layer exposed ([address] )    Diabetic peripheral neuropathy (HCC)          Subjective:      Patient ID: Kalyan Husbands is a 76 y o  male  Pt arrives for wound care follow up  Presents with dressing intact and denies problems  Pt wearing wedge shoe  The following portions of the patient's history were reviewed and updated as appropriate: allergies, current medications, past family history, past medical history, past social history, past surgical history and problem list     Review of Systems   Constitutional: Negative  Objective:      BP (!) 174/91   Pulse 84   Ht 5' 8" (1 727 m)   Wt 62 3 kg (137 lb 6 4 oz) Comment: verbal, pt unsteady, could not walk to scale  BMI 20 89 kg/m²          Physical Exam   Constitutional: He appears well-developed and well-nourished  No distress  Skin: He is not diaphoretic  Nursing note and vitals reviewed          Wound # 1   Location: right post lower leg  Length cm:1 Width1 cm: Depth 0 2cm:   Deepest Tissue Noted in Base: SQ  Probe to Bone?: no  Peripheral Skin Description: dry  Granulation: 90% Fibrotic Tissue: 10% Necrotic Tissue: 0%  Drainage Amount: moderate  Signs of Infection: no  Total debrided  1 square centimeters              Wound # 2   Location: right lateral footHEALED  Length 0cm: Width 0cm: Depth 0cm:   Deepest Tissue Noted in Base:   Probe to Bone?: no  Peripheral Skin Description: callus  Granulation: 0% Fibrotic Tissue: 0% Necrotic Tissue: 0%  Drainage Amount: minimal  Signs of Infection: no  Total debrided 0 square centimeters

## 2020-01-02 NOTE — TELEPHONE ENCOUNTER
Pt wife advised  ----- Message from Erica Gould MD sent at 12/31/2019  4:01 PM EST -----  Hello    Patient normally is followed up by Ms Jordan Hodgkin  Can you please let the patient know that the renal ultrasound does not show any blockages  Patient should continue with the plan from the last phone discussion  He should be repeating blood work  2nd week of January      Thank you    np

## 2020-01-07 ENCOUNTER — PROCEDURE VISIT (OUTPATIENT)
Dept: PODIATRY | Facility: CLINIC | Age: 75
End: 2020-01-07
Payer: COMMERCIAL

## 2020-01-07 VITALS
DIASTOLIC BLOOD PRESSURE: 91 MMHG | HEIGHT: 68 IN | SYSTOLIC BLOOD PRESSURE: 174 MMHG | HEART RATE: 84 BPM | BODY MASS INDEX: 20.82 KG/M2 | WEIGHT: 137.4 LBS

## 2020-01-07 DIAGNOSIS — L97.212 NON-PRESSURE CHRONIC ULCER OF RIGHT CALF WITH FAT LAYER EXPOSED (HCC): Primary | ICD-10-CM

## 2020-01-07 DIAGNOSIS — E11.42 DIABETIC PERIPHERAL NEUROPATHY (HCC): Chronic | ICD-10-CM

## 2020-01-07 PROCEDURE — 11042 DBRDMT SUBQ TIS 1ST 20SQCM/<: CPT | Performed by: PODIATRIST

## 2020-01-08 NOTE — PATIENT INSTRUCTIONS
Debridement   WHAT YOU NEED TO KNOW:   Debridement is the removal of infected, damaged, or dead tissue so a wound can heal properly  You may need more than one debridement  DISCHARGE INSTRUCTIONS:   Medicines:   · Medicines  can help decrease pain or prevent or treat an infection  · Take your medicine as directed  Contact your healthcare provider if you think your medicine is not helping or if you have side effects  Tell him of her if you are allergic to any medicine  Keep a list of the medicines, vitamins, and herbs you take  Include the amounts, and when and why you take them  Bring the list or the pill bottles to follow-up visits  Carry your medicine list with you in case of an emergency  Follow up with your healthcare provider as directed: You may need to return to have your wound checked  Write down your questions so you remember to ask them during your visits  Care for your wound as directed:   · Keep your wound clean and dry  You may need to cover your wound when you bathe  · Limit movements,  such as stretching, to prevent bleeding, tearing, and swelling in your wound  · Protect your wound  Avoid sunlight for at least 6 months  Apply mild, unscented lotion or cream to the skin around your wound to keep it moist     · Do not smoke  If you smoke, it is never too late to quit  Smoking decreases blood flow to the wound and delays healing  Ask for information if you need help quitting  · Drink liquids as directed  Ask how much liquid to drink each day and which liquids are best for you  Liquids help keep your skin moist so your wound can heal      · Eat a variety of healthy foods  Foods rich in protein, such as meat, eggs, and dairy products, help repair tissue  Carbohydrate-rich foods, such as bread and cereals, help increase cell growth and decrease the risk for wound infection  Do not have caffeine  Ask if you should take vitamins   Vitamins A and C may help tissue formation and increase scar tissue strength  Contact your healthcare provider if:   · You have a fever  · Your pain gets worse or does not go away, even after treatment  · Your skin is red, swollen, or draining pus  · You have questions or concerns about your condition or care  Return to the emergency department if:   · Blood soaks through your bandage  · You have severe pain  © 2017 [employer]  Information is for End User's use only and may not be sold, redistributed or otherwise used for commercial purposes  All illustrations and images included in CareNotes® are the copyrighted property of [employer]  or Kirill Ulrich  The above information is an  only  It is not intended as medical advice for individual conditions or treatments  Talk to your doctor, nurse or pharmacist before following any medical regimen to see if it is safe and effective for you

## 2020-01-09 NOTE — PROGRESS NOTES
This patient was seen on 1/14/2020  Assessment:    Problem List Items Addressed This Visit        Endocrine    Diabetic peripheral neuropathy ([facility] [address] ) - Primary (Chronic)       Other    Non-pressure chronic ulcer of right calf with [facility] layer exposed ([facility] )          Plan:  A formal timeout including patient identification, laterality and existing allergies using [facility] protocol was conducted  Procedure Performed: Debridement of subcutaneous tissue- <20 sq cm (46963)  Anesthetic used as needed to reduce discomfort  Under aseptic technique, the wound was thoroughly explored for undermining, deep sinus tracts and bone involvement  Sterile instrumentation including scalpel used to excise the clearly delineated devitalized subcutaneous tissue exposing viable tissue  Bleeding controlled with gentle pressure  Patient tolerated debridement without complications  The wound was dressed  Wound dressing technique and frequency described to patient  I am to be called if any signs of infection develop such as erythema, increased wound size or significant change in appearance of wound, odor, pain, increased or change in color of drainage, swelling, fever, chills, nightsweats  I reviewed these signs with the patient  I recommended limiting leg dependency and to use any prescribed compression devices in an effort to allow proper fluid control and healing of the wounded area  I was very clear that sleeping with legs dependent is to be avoided at all costs  I explained that good wound care and compliance are necessary to allow healing and to prevent limb loss  Alginate applied to wound  Unna boot applied using paste wrap and coban  Applied from base of toes to infrapatellar notch  Unna boot care and precautions discussed and explained to pt/family  1  Leave wrap dry and intact  2   If wrap is too tight (feels like a tourniquet, toes turning purple), elevate the leg above heart level, if no relief, remove wrap (no scissors) and call office  3  If wrap becomes too loose, remove wrap (no scissors) and call the office  Pt/family verbalize understanding       Diagnoses and all orders for this visit:    Diabetic peripheral neuropathy ([facility] [address] )    Non-pressure chronic ulcer of right calf with fat layer exposed ([facility] [address] )          Subjective:      Patient ID: Neva Villalobos is a 76 y o  male  Pt arrives for wound care follow up  Presents with unna boot intact, pt denies problems  Foot wound remains healed  The following portions of the patient's history were reviewed and updated as appropriate: allergies, current medications, past family history, past medical history, past social history, past surgical history and problem list     Review of Systems   Constitutional: Negative  Objective:      BP (!) 150/101   Pulse 79   Ht 5' 8" (1 727 m)   Wt 63 5 kg (140 lb)   BMI 21 29 kg/m²          Physical Exam   Constitutional: He appears well-developed and well-nourished  No distress  Skin: He is not diaphoretic  Nursing note and vitals reviewed          Wound # 1   Location: right post lower leg  Length cm:1 Width1 cm: Depth 0 2cm:   Deepest Tissue Noted in Base: SQ  Probe to Bone?: no  Peripheral Skin Description: dry  Granulation: 90% Fibrotic Tissue: 10% Necrotic Tissue: 0%  Drainage Amount: moderate  Signs of Infection: no  Total debrided  1 square centimeters                  Wound # 2   Location: right lateral footHEALED  Length 0cm: Width 0cm: Depth 0cm:   Deepest Tissue Noted in Base:   Probe to Bone?: no  Peripheral Skin Description: callus  Granulation: 0% Fibrotic Tissue: 0% Necrotic Tissue: 0%  Drainage Amount: minimal  Signs of Infection: no  Total debrided 0 square centimeters

## 2020-01-10 PROBLEM — L97.512 RIGHT FOOT ULCER, WITH FAT LAYER EXPOSED (HCC): Status: RESOLVED | Noted: 2019-09-19 | Resolved: 2020-01-10

## 2020-01-10 NOTE — PATIENT INSTRUCTIONS
Debridement   WHAT YOU NEED TO KNOW:   Debridement is the removal of infected, damaged, or dead tissue so a wound can heal properly  You may need more than one debridement  DISCHARGE INSTRUCTIONS:   Medicines:   · Medicines  can help decrease pain or prevent or treat an infection  · Take your medicine as directed  Contact your healthcare provider if you think your medicine is not helping or if you have side effects  Tell him of her if you are allergic to any medicine  Keep a list of the medicines, vitamins, and herbs you take  Include the amounts, and when and why you take them  Bring the list or the pill bottles to follow-up visits  Carry your medicine list with you in case of an emergency  Follow up with your healthcare provider as directed: You may need to return to have your wound checked  Write down your questions so you remember to ask them during your visits  Care for your wound as directed:   · Keep your wound clean and dry  You may need to cover your wound when you bathe  · Limit movements,  such as stretching, to prevent bleeding, tearing, and swelling in your wound  · Protect your wound  Avoid sunlight for at least 6 months  Apply mild, unscented lotion or cream to the skin around your wound to keep it moist     · Do not smoke  If you smoke, it is never too late to quit  Smoking decreases blood flow to the wound and delays healing  Ask for information if you need help quitting  · Drink liquids as directed  Ask how much liquid to drink each day and which liquids are best for you  Liquids help keep your skin moist so your wound can heal      · Eat a variety of healthy foods  Foods rich in protein, such as meat, eggs, and dairy products, help repair tissue  Carbohydrate-rich foods, such as bread and cereals, help increase cell growth and decrease the risk for wound infection  Do not have caffeine  Ask if you should take vitamins   Vitamins A and C may help tissue formation and increase scar tissue strength  Contact your healthcare provider if:   · You have a fever  · Your pain gets worse or does not go away, even after treatment  · Your skin is red, swollen, or draining pus  · You have questions or concerns about your condition or care  Return to the emergency department if:   · Blood soaks through your bandage  · You have severe pain  © 2017 [employer]  Information is for End User's use only and may not be sold, redistributed or otherwise used for commercial purposes  All illustrations and images included in CareNotes® are the copyrighted property of [employer] [facility] [employer] [facility] [employer]  or Kirill Ulrich  The above information is an  only  It is not intended as medical advice for individual conditions or treatments  Talk to your doctor, nurse or pharmacist before following any medical regimen to see if it is safe and effective for you

## 2020-01-14 ENCOUNTER — PROCEDURE VISIT (OUTPATIENT)
Dept: PODIATRY | Facility: CLINIC | Age: 75
End: 2020-01-14
Payer: COMMERCIAL

## 2020-01-14 ENCOUNTER — TELEPHONE (OUTPATIENT)
Dept: NEPHROLOGY | Facility: CLINIC | Age: 75
End: 2020-01-14

## 2020-01-14 VITALS
SYSTOLIC BLOOD PRESSURE: 150 MMHG | HEIGHT: 68 IN | DIASTOLIC BLOOD PRESSURE: 101 MMHG | WEIGHT: 140 LBS | BODY MASS INDEX: 21.22 KG/M2 | HEART RATE: 79 BPM

## 2020-01-14 DIAGNOSIS — L97.212 NON-PRESSURE CHRONIC ULCER OF RIGHT CALF WITH FAT LAYER EXPOSED (HCC): ICD-10-CM

## 2020-01-14 DIAGNOSIS — E11.42 DIABETIC PERIPHERAL NEUROPATHY (HCC): Primary | Chronic | ICD-10-CM

## 2020-01-14 PROCEDURE — 11042 DBRDMT SUBQ TIS 1ST 20SQCM/<: CPT | Performed by: PODIATRIST

## 2020-01-14 NOTE — PATIENT INSTRUCTIONS
Debridement   WHAT YOU NEED TO KNOW:   Debridement is the removal of infected, damaged, or dead tissue so a wound can heal properly  You may need more than one debridement  DISCHARGE INSTRUCTIONS:   Medicines:   · Medicines  can help decrease pain or prevent or treat an infection  · Take your medicine as directed  Contact your healthcare provider if you think your medicine is not helping or if you have side effects  Tell him of her if you are allergic to any medicine  Keep a list of the medicines, vitamins, and herbs you take  Include the amounts, and when and why you take them  Bring the list or the pill bottles to follow-up visits  Carry your medicine list with you in case of an emergency  Follow up with your healthcare provider as directed: You may need to return to have your wound checked  Write down your questions so you remember to ask them during your visits  Care for your wound as directed:   · Keep your wound clean and dry  You may need to cover your wound when you bathe  · Limit movements,  such as stretching, to prevent bleeding, tearing, and swelling in your wound  · Protect your wound  Avoid sunlight for at least 6 months  Apply mild, unscented lotion or cream to the skin around your wound to keep it moist     · Do not smoke  If you smoke, it is never too late to quit  Smoking decreases blood flow to the wound and delays healing  Ask for information if you need help quitting  · Drink liquids as directed  Ask how much liquid to drink each day and which liquids are best for you  Liquids help keep your skin moist so your wound can heal      · Eat a variety of healthy foods  Foods rich in protein, such as meat, eggs, and dairy products, help repair tissue  Carbohydrate-rich foods, such as bread and cereals, help increase cell growth and decrease the risk for wound infection  Do not have caffeine  Ask if you should take vitamins   Vitamins A and C may help tissue formation and increase scar tissue strength  Contact your healthcare provider if:   · You have a fever  · Your pain gets worse or does not go away, even after treatment  · Your skin is red, swollen, or draining pus  · You have questions or concerns about your condition or care  Return to the emergency department if:   · Blood soaks through your bandage  · You have severe pain  © 2017 [employer]  Information is for End User's use only and may not be sold, redistributed or otherwise used for commercial purposes  All illustrations and images included in CareNotes® are the copyrighted property of [employer]  or Kirill Ulrich  The above information is an  only  It is not intended as medical advice for individual conditions or treatments  Talk to your doctor, nurse or pharmacist before following any medical regimen to see if it is safe and effective for you

## 2020-01-14 NOTE — TELEPHONE ENCOUNTER
Patient called the office stated he was seen by Podiatry today with a BP of 150/101  After arriving home patient waited 15 min and BP was 169/88; he has been off lisinopril 10 mg daily  Over all patient is feeling great no dizziness or lightheadedness  [address] Comprehensive

## 2020-01-15 DIAGNOSIS — I15.9 SECONDARY HYPERTENSION: Primary | ICD-10-CM

## 2020-01-15 PROCEDURE — 3066F NEPHROPATHY DOC TX: CPT | Performed by: PODIATRIST

## 2020-01-15 RX ORDER — AMLODIPINE BESYLATE 5 MG/1
5 TABLET ORAL DAILY
Qty: 30 TABLET | Refills: 3 | Status: SHIPPED | OUTPATIENT
Start: 2020-01-15 | End: 2020-01-01 | Stop reason: SDUPTHER

## 2020-01-15 NOTE — TELEPHONE ENCOUNTER
Hello    I called pt  No answer  Left vm for pt to call us back with a time for me to call him   Please let me know when he says I can call him    Thanks    np

## 2020-01-15 NOTE — PROGRESS NOTES
Reviewed with the patient regarding blood pressures  Since the lisinopril has been discontinued, the blood pressures have been elevated  Patient had issues of marginal blood pressures prior we had to lower the lisinopril  Subsequently lisinopril is held completely due to kidney injury  Therefore will start amlodipine  Advised the patient to call if the blood pressures decreased below 453 systolic  Orders have been placed

## 2020-01-17 ENCOUNTER — LAB (OUTPATIENT)
Dept: LAB | Facility: CLINIC | Age: 75
End: 2020-01-17
Payer: COMMERCIAL

## 2020-01-17 ENCOUNTER — DOCUMENTATION (OUTPATIENT)
Dept: NEPHROLOGY | Facility: CLINIC | Age: 75
End: 2020-01-17

## 2020-01-17 DIAGNOSIS — N18.32 CHRONIC KIDNEY DISEASE (CKD) STAGE G3B/A3, MODERATELY DECREASED GLOMERULAR FILTRATION RATE (GFR) BETWEEN 30-44 ML/MIN/1.73 SQUARE METER AND ALBUMINURIA CREATININE RATIO GREATER THAN 300 MG/G (HCC): ICD-10-CM

## 2020-01-17 LAB
ANION GAP SERPL CALCULATED.3IONS-SCNC: 3 MMOL/L (ref 4–13)
BUN SERPL-MCNC: 23 MG/DL (ref 5–25)
CALCIUM SERPL-MCNC: 9.6 MG/DL (ref 8.3–10.1)
CHLORIDE SERPL-SCNC: 105 MMOL/L (ref 100–108)
CO2 SERPL-SCNC: 31 MMOL/L (ref 21–32)
CREAT SERPL-MCNC: 1.22 MG/DL (ref 0.6–1.3)
GFR SERPL CREATININE-BSD FRML MDRD: 58 ML/MIN/1.73SQ M
GLUCOSE P FAST SERPL-MCNC: 181 MG/DL (ref 65–99)
POTASSIUM SERPL-SCNC: 4.8 MMOL/L (ref 3.5–5.3)
SODIUM SERPL-SCNC: 139 MMOL/L (ref 136–145)

## 2020-01-17 PROCEDURE — 80048 BASIC METABOLIC PNL TOTAL CA: CPT

## 2020-01-17 PROCEDURE — 36415 COLL VENOUS BLD VENIPUNCTURE: CPT

## 2020-01-17 NOTE — PROGRESS NOTES
Can you please let the patient know that his creatinine has returned closer to baseline   We will review further in detail at the appointment in 2 weeks  Pt's wife aware of the above

## 2020-01-17 NOTE — RESULT ENCOUNTER NOTE
Hello    Patient normally is followed up by Ms [place]  Can you please let the patient know that his creatinine has returned closer to baseline  We will review further in detail at the appointment in 2 weeks      Thank you    np

## 2020-01-20 ENCOUNTER — REMOTE DEVICE CLINIC VISIT (OUTPATIENT)
Dept: CARDIOLOGY CLINIC | Facility: CLINIC | Age: 75
End: 2020-01-20
Payer: COMMERCIAL

## 2020-01-20 DIAGNOSIS — Z95.0 PACEMAKER: Primary | ICD-10-CM

## 2020-01-20 PROCEDURE — 93294 REM INTERROG EVL PM/LDLS PM: CPT | Performed by: INTERNAL MEDICINE

## 2020-01-20 PROCEDURE — 93296 REM INTERROG EVL PM/IDS: CPT | Performed by: INTERNAL MEDICINE

## 2020-01-20 NOTE — PROGRESS NOTES
Results for orders placed or performed in visit on 01/20/20   Cardiac EP device report    Narrative    MDT-DUAL CHAMBER PPM (DDDR MODE)  CARELINK TRANSMISSION: BATTERY ADEQUATE (4 YRS)  AP 1%;  100% (CHB/DDDR 50)  A-LEAD THRESHOLD ELEVATED (HX OF THE SAME); A-LEAD IMPEDANCES & SENSING ARE STABLE & ADEQUATE  RV-LEAD PARAMETERS WITHIN NORMAL LIMITS  NO EPISODES  NORMAL DEVICE FUNCTION   PL

## 2020-01-21 ENCOUNTER — PROCEDURE VISIT (OUTPATIENT)
Dept: PODIATRY | Facility: CLINIC | Age: 75
End: 2020-01-21
Payer: COMMERCIAL

## 2020-01-21 VITALS
DIASTOLIC BLOOD PRESSURE: 52 MMHG | HEIGHT: 68 IN | WEIGHT: 141.8 LBS | BODY MASS INDEX: 21.49 KG/M2 | HEART RATE: 82 BPM | SYSTOLIC BLOOD PRESSURE: 113 MMHG

## 2020-01-21 DIAGNOSIS — E11.42 DIABETIC PERIPHERAL NEUROPATHY (HCC): Primary | Chronic | ICD-10-CM

## 2020-01-21 DIAGNOSIS — L97.212 NON-PRESSURE CHRONIC ULCER OF RIGHT CALF WITH FAT LAYER EXPOSED (HCC): ICD-10-CM

## 2020-01-21 PROCEDURE — 11042 DBRDMT SUBQ TIS 1ST 20SQCM/<: CPT | Performed by: PODIATRIST

## 2020-01-21 NOTE — PATIENT INSTRUCTIONS
Debridement   WHAT YOU NEED TO KNOW:   Debridement is the removal of infected, damaged, or dead tissue so a wound can heal properly  You may need more than one debridement  DISCHARGE INSTRUCTIONS:   Medicines:   · Medicines  can help decrease pain or prevent or treat an infection  · Take your medicine as directed  Contact your healthcare provider if you think your medicine is not helping or if you have side effects  Tell him of her if you are allergic to any medicine  Keep a list of the medicines, vitamins, and herbs you take  Include the amounts, and when and why you take them  Bring the list or the pill bottles to follow-up visits  Carry your medicine list with you in case of an emergency  Follow up with your healthcare provider as directed: You may need to return to have your wound checked  Write down your questions so you remember to ask them during your visits  Care for your wound as directed:   · Keep your wound clean and dry  You may need to cover your wound when you bathe  · Limit movements,  such as stretching, to prevent bleeding, tearing, and swelling in your wound  · Protect your wound  Avoid sunlight for at least 6 months  Apply mild, unscented lotion or cream to the skin around your wound to keep it moist     · Do not smoke  If you smoke, it is never too late to quit  Smoking decreases blood flow to the wound and delays healing  Ask for information if you need help quitting  · Drink liquids as directed  Ask how much liquid to drink each day and which liquids are best for you  Liquids help keep your skin moist so your wound can heal      · Eat a variety of healthy foods  Foods rich in protein, such as meat, eggs, and dairy products, help repair tissue  Carbohydrate-rich foods, such as bread and cereals, help increase cell growth and decrease the risk for wound infection  Do not have caffeine  Ask if you should take vitamins   Vitamins A and C may help tissue formation and increase scar tissue strength  Contact your healthcare provider if:   · You have a fever  · Your pain gets worse or does not go away, even after treatment  · Your skin is red, swollen, or draining pus  · You have questions or concerns about your condition or care  Return to the emergency department if:   · Blood soaks through your bandage  · You have severe pain  © 2017 [employer]  Information is for End User's use only and may not be sold, redistributed or otherwise used for commercial purposes  All illustrations and images included in CareNotes® are the copyrighted property of [employer]  or Kirill Ulrich  The above information is an  only  It is not intended as medical advice for individual conditions or treatments  Talk to your doctor, nurse or pharmacist before following any medical regimen to see if it is safe and effective for you

## 2020-01-23 NOTE — PROGRESS NOTES
This patient was seen on 1/28/2020  Assessment:    Problem List Items Addressed This Visit        Endocrine    Diabetic peripheral neuropathy ([facility] [address] ) - Primary (Chronic)       Other    Non-pressure chronic ulcer of right calf with [facility] layer exposed [facility]          Plan:  A formal timeout including patient identification, laterality and existing allergies using [facility] protocol was conducted  Procedure Performed: Debridement of subcutaneous tissue- <20 sq cm (68302)  Anesthetic used as needed to reduce discomfort  Under aseptic technique, the wound was thoroughly explored for undermining, deep sinus tracts and bone involvement  Sterile instrumentation including scalpel used to excise the clearly delineated devitalized subcutaneous tissue exposing viable tissue  Bleeding controlled with gentle pressure  Patient tolerated debridement without complications  The wound was dressed  Wound dressing technique and frequency described to patient  I am to be called if any signs of infection develop such as erythema, increased wound size or significant change in appearance of wound, odor, pain, increased or change in color of drainage, swelling, fever, chills, nightsweats  I reviewed these signs with the patient  I recommended limiting leg dependency and to use any prescribed compression devices in an effort to allow proper fluid control and healing of the wounded area  I was very clear that sleeping with legs dependent is to be avoided at all costs  I explained that good wound care and compliance are necessary to allow healing and to prevent limb loss  Collagen applied to wound  Unna boot applied using paste wrap and coban  Applied from base of toes to infrapatellar notch   Unna boot care and precautions discussed and explained to pt/family  1  Leave wrap dry and intact  2   If wrap is too tight (feels like a tourniquet, toes turning purple), elevate the leg above heart level, if no relief, remove wrap (no scissors) and call office  3  If wrap becomes too loose, remove wrap (no scissors) and call the office  Pt/family verbalize understanding        Diagnoses and all orders for this visit:    Diabetic peripheral neuropathy ([address] )    Non-pressure chronic ulcer of right calf with fat layer exposed ([address] )          Subjective:      Patient ID: Anjana Lopez is a 76 y o  male  Pt arrives for wound care follow up  Presents with dressing intact and denies problems  The following portions of the patient's history were reviewed and updated as appropriate: allergies, current medications, past family history, past medical history, past social history, past surgical history and problem list     Review of Systems   Constitutional: Negative  Objective:      /62   Pulse 78   Ht 5' 8" (1 727 m) Comment: verbal  Wt 67 6 kg (149 lb)   BMI 22 66 kg/m²          Physical Exam   Constitutional: He appears well-developed and well-nourished  No distress  Skin: He is not diaphoretic  Nursing note and vitals reviewed          Wound # 1   Location: right post lower leg  Length cm:0 5 Width 0 5 cm: Depth 0 2cm:   Deepest Tissue Noted in Base: SQ  Probe to Bone?: no  Peripheral Skin Description: dry  Granulation: 90% Fibrotic Tissue: 10% Necrotic Tissue: 0%  Drainage Amount: moderate  Signs of Infection: no  Total debrided  0 25 square centimeters

## 2020-01-28 ENCOUNTER — PROCEDURE VISIT (OUTPATIENT)
Dept: PODIATRY | Facility: CLINIC | Age: 75
End: 2020-01-28
Payer: COMMERCIAL

## 2020-01-28 VITALS
SYSTOLIC BLOOD PRESSURE: 136 MMHG | WEIGHT: 149 LBS | HEART RATE: 78 BPM | BODY MASS INDEX: 22.58 KG/M2 | HEIGHT: 68 IN | DIASTOLIC BLOOD PRESSURE: 62 MMHG

## 2020-01-28 DIAGNOSIS — L97.212 NON-PRESSURE CHRONIC ULCER OF RIGHT CALF WITH FAT LAYER EXPOSED (HCC): ICD-10-CM

## 2020-01-28 DIAGNOSIS — E11.42 DIABETIC PERIPHERAL NEUROPATHY (HCC): Primary | Chronic | ICD-10-CM

## 2020-01-28 PROCEDURE — 11042 DBRDMT SUBQ TIS 1ST 20SQCM/<: CPT | Performed by: PODIATRIST

## 2020-02-04 NOTE — PATIENT INSTRUCTIONS
Venous Insufficiency   AMBULATORY CARE:   Venous insufficiency  is a condition that prevents blood from flowing out of your legs and back to your heart  Veins contain valves that help blood flow in one direction  Venous insufficiency means the valves do not close correctly or fully  Blood flows back and pools in your leg  This can cause problems such as varicose veins  Venous insufficiency may also be called chronic venous insufficiency or venous stasis  Common signs and symptoms:   · Visible veins on your legs that may be small and red or large, thick, and blue    · Swelling in your ankles or calves    · Changes in skin color, such as dark or purple skin    · An ulcer (open sore) on your leg    · Leg pain that is worse when you are menstruating (women) or when you stand, and better when you elevate your legs    · Burning or itching    · Cramps that happen at night    · Thick, hard skin on your legs and ankles    · Feeling of heaviness in your legs  Seek care immediately if:   · You have a wound that does not heal or is infected  · You have an injury that has broken your skin and caused your varicose veins to bleed  · Your leg is swollen and hard  · You have pain in your leg that does not go away or gets worse  · Your legs or feet are turning blue or black  · Your leg feels warm, tender, and painful  It may look swollen and red  Contact your healthcare provider if:   · You have a fever  · You have varicose veins and they are painful  · You have new or worsening leg pain, swelling, or redness  · You have new or worsening ulcers or other sores on your leg  · You have questions or concerns about your condition or care  Treatment  may include any of the following:  · Medicine  may be given to improve blood flow  The medicines may thin your blood or reduce swelling to help blood flow  You may also need medicine to treat a bacterial infection      · Ablation  is a procedure used to close varicose veins  A catheter is guided until it is near the vein  A device will then be guided to the area  The device may produce energy through radiofrequency or a laser  The energy creates heat that will close the blood vessel  · Sclerotherapy  is a procedure used to fade visible veins  Your healthcare provider will inject a liquid into a spider vein or varicose vein  The liquid causes irritation in the vein  The vein swells and sticks together  Your body will then absorb the vein  · Surgery  may be needed if other treatments do not work  Surgery may be used to repair a leg vein valve or to clip or tie off a vein so blood cannot flow through it  You may need to have a veins removed during surgery called stripping  Surgery may be used to bypass (go around) the damaged vein  Blood will flow through a vein transplanted from another part of your body  Manage your symptoms:   · Wear pressure stockings as directed  Pressure stockings help keep blood from pooling in your leg veins  Your healthcare provider can prescribe stockings that are right for you  Do not buy over-the-counter pressure stockings unless your healthcare provider says it is okay  They may not fit correctly or may have elastic that cuts off your circulation  Ask your healthcare provider when to start wearing pressure stockings and how long to wear them each day  · Do not sit or stand for long periods of time  If you have to sit for a long time, flex and extend your legs, feet, and ankles  Do this about 10 times every 30 minutes to help keep blood flowing  If you have to stand for a long time, take breaks and sit with your legs elevated  · Elevate your legs  Elevate your legs above the level of your heart to reduce swelling  Your healthcare provider may recommend that you keep your legs elevated for 30 minutes at a time  You may need to do this 3 to 4 times per day, or more if your healthcare provider recommends  · Do not smoke  Nicotine and other chemicals in cigarettes and cigars can cause blood vessel damage  Ask your healthcare provider for information if you currently smoke and need help to quit  E-cigarettes or smokeless tobacco still contain nicotine  Talk to your healthcare provider before you use these products  · Reach or maintain a healthy weight  Extra weight can make venous insufficiency worse  Ask your healthcare provider how much you should weigh  He can help you create a weight loss plan if you need to lose weight  · Exercise as directed  Walking can help increase blood flow in your calves  Ask your healthcare provider how much exercise you need each day and which exercises are best for you  · Care for your skin  Keep your skin clean  Do not use any soaps or lotions that may dry your skin  For example, do not use products that contain fragrance or alcohol  If you have a skin ulcer, your healthcare provider may recommend a wet-to-dry bandage  To do this, apply a wet bandage to your wound and allow it to dry  This will help remove drainage from your wound each time you change the bandage  Your healthcare provider will tell you how often to change your bandage and which kind of bandage to use  Check your wound for signs of infection, such as swelling or pus  · Go to physical therapy (PT) as directed  A physical therapist can help you increase movement and range of motion in your legs  Follow up with your healthcare provider as directed:  Write down your questions so you remember to ask them during your visits  © 2017 [employer] Information is for End User's use only and may not be sold, redistributed or otherwise used for commercial purposes  All illustrations and images included in CareNotes® are the copyrighted property of [employer]  or Kirill Ulrich  The above information is an  only   It is not intended as medical advice for individual conditions or treatments  Talk to your doctor, nurse or pharmacist before following any medical regimen to see if it is safe and effective for you

## 2020-02-04 NOTE — PROGRESS NOTES
This patient was seen on 2/5/2020  Assessment:    Problem List Items Addressed This Visit        Endocrine    Diabetic peripheral neuropathy ([facility] [address] ) - Primary (Chronic)       Other    Non-pressure chronic ulcer of right calf with fat layer exposed ([facility] [address] )          Plan:  A formal timeout including patient identification, laterality and existing allergies using [facility] protocol was conducted  Procedure Performed: Debridement of subcutaneous tissue- <20 sq cm (48391)  Anesthetic used as needed to reduce discomfort  Under aseptic technique, the wound was thoroughly explored for undermining, deep sinus tracts and bone involvement  Sterile instrumentation including scalpel used to excise the clearly delineated devitalized subcutaneous tissue exposing viable tissue  Bleeding controlled with gentle pressure  Patient tolerated debridement without complications  The wound was dressed  Wound dressing technique and frequency described to patient  I am to be called if any signs of infection develop such as erythema, increased wound size or significant change in appearance of wound, odor, pain, increased or change in color of drainage, swelling, fever, chills, nightsweats  I reviewed these signs with the patient  I recommended limiting leg dependency and to use any prescribed compression devices in an effort to allow proper fluid control and healing of the wounded area  I was very clear that sleeping with legs dependent is to be avoided at all costs  I explained that good wound care and compliance are necessary to allow healing and to prevent limb loss  Collagen and dermagran gauze applied to open area  Unna boot applied using paste wrap and coban  Applied from base of toes to infrapatellar notch  Unna boot care and precautions discussed and explained to pt/family  1  Leave wrap dry and intact  2   If wrap is too tight (feels like a tourniquet, toes turning purple), elevate the leg above heart level, if no relief, remove wrap (no scissors) and call office  3  If wrap becomes too loose, remove wrap (no scissors) and call the office  Pt/family verbalize understanding       Diagnoses and all orders for this visit:    Diabetic peripheral neuropathy ([facility] [address] )    Non-pressure chronic ulcer of right calf with fat layer exposed ([facility] [address] )          Subjective:      Patient ID: Torres Mario is a 76 y o  male  Pt arrives for wound care follow up  Presents with unna boot intact and denies problems  The following portions of the patient's history were reviewed and updated as appropriate: allergies, current medications, past family history, past medical history, past social history, past surgical history and problem list     Review of Systems   Constitutional: Negative  Objective:      /80   Pulse 79   Ht 5' 8" (1 727 m) Comment: verbal  Wt 67 7 kg (149 lb 3 2 oz)   BMI 22 69 kg/m²          Physical Exam   Constitutional: He appears well-developed and well-nourished  No distress  Skin: He is not diaphoretic  Nursing note and vitals reviewed          Wound # 1   Location: right post lower leg  Length cm:0 5 Width 0 2 cm: Depth 0 2cm:   Deepest Tissue Noted in Base: SQ  Probe to Bone?: no  Peripheral Skin Description: dry  Granulation: 90% Fibrotic Tissue: 10% Necrotic Tissue: 0%  Drainage Amount: moderate  Signs of Infection: no  Total debrided  0 10 square centimeters

## 2020-02-05 ENCOUNTER — PROCEDURE VISIT (OUTPATIENT)
Dept: PODIATRY | Facility: CLINIC | Age: 75
End: 2020-02-05
Payer: COMMERCIAL

## 2020-02-05 ENCOUNTER — OFFICE VISIT (OUTPATIENT)
Dept: NEPHROLOGY | Facility: CLINIC | Age: 75
End: 2020-02-05
Payer: COMMERCIAL

## 2020-02-05 VITALS
WEIGHT: 149.2 LBS | HEIGHT: 68 IN | SYSTOLIC BLOOD PRESSURE: 136 MMHG | BODY MASS INDEX: 22.61 KG/M2 | HEART RATE: 79 BPM | DIASTOLIC BLOOD PRESSURE: 80 MMHG

## 2020-02-05 VITALS
HEIGHT: 68 IN | DIASTOLIC BLOOD PRESSURE: 72 MMHG | BODY MASS INDEX: 23.58 KG/M2 | SYSTOLIC BLOOD PRESSURE: 142 MMHG | WEIGHT: 155.6 LBS

## 2020-02-05 DIAGNOSIS — N18.30 CKD (CHRONIC KIDNEY DISEASE), STAGE III (HCC): Primary | ICD-10-CM

## 2020-02-05 DIAGNOSIS — I10 BENIGN ESSENTIAL HTN: ICD-10-CM

## 2020-02-05 DIAGNOSIS — L97.212 NON-PRESSURE CHRONIC ULCER OF RIGHT CALF WITH FAT LAYER EXPOSED (HCC): ICD-10-CM

## 2020-02-05 DIAGNOSIS — E11.42 DIABETIC PERIPHERAL NEUROPATHY (HCC): Primary | Chronic | ICD-10-CM

## 2020-02-05 PROCEDURE — 3008F BODY MASS INDEX DOCD: CPT | Performed by: INTERNAL MEDICINE

## 2020-02-05 PROCEDURE — 3078F DIAST BP <80 MM HG: CPT | Performed by: INTERNAL MEDICINE

## 2020-02-05 PROCEDURE — 1160F RVW MEDS BY RX/DR IN RCRD: CPT | Performed by: INTERNAL MEDICINE

## 2020-02-05 PROCEDURE — 1036F TOBACCO NON-USER: CPT | Performed by: INTERNAL MEDICINE

## 2020-02-05 PROCEDURE — 11042 DBRDMT SUBQ TIS 1ST 20SQCM/<: CPT | Performed by: PODIATRIST

## 2020-02-05 PROCEDURE — 99214 OFFICE O/P EST MOD 30 MIN: CPT | Performed by: INTERNAL MEDICINE

## 2020-02-05 PROCEDURE — 3066F NEPHROPATHY DOC TX: CPT | Performed by: INTERNAL MEDICINE

## 2020-02-05 PROCEDURE — 3077F SYST BP >= 140 MM HG: CPT | Performed by: INTERNAL MEDICINE

## 2020-02-05 PROCEDURE — 4040F PNEUMOC VAC/ADMIN/RCVD: CPT | Performed by: INTERNAL MEDICINE

## 2020-02-05 NOTE — PROGRESS NOTES
NEPHROLOGY OFFICE VISIT   José Miguel King 76 y o  male MRN: [id number]  2/5/2020    Reason for Visit: CKD III    ASSESSMENT and PLAN:    I had the pleasure of seeing Mr Maryann Ortiz today in the renal clinic for the continued management of CKD III  66-year-old male with a past medical history of diabetes (30+ years, type II), diabetic retinopathy, neuropathy, gastroparesis, CKD, hyperlipidemia, hypertension (diagnosed for 5 years), thrombocytopenia, episode of DKA in July, prior echocardiogram with EF 55%, osteoporosis, admission in March with acute kidney injury and was admitted for lethargy and vomiting, was found to have DKA, quit smoking 30+ years ago (smoked for 20 years), R LE ulcerations who presents for f/u evaluation for CKD     1) CKD III - baseline Cr may be 1 2-1 5 mg/dL?      -from 5446-6712, creatinine ranged from 0 6 to 1 mg/dL  -creatinine was 1 46 in July of 2018  - renal ultrasound 12/2019 - R kidney 10 8 and L 10 7  Normal echogenicity   -in March of 2019 with a peak creatinine of 3 1 mg/dL that had improved to 1 mg by discharge,  -then has had multiple episodes of acute kidney injury in June and also in July during admission of DKA  -creatinine on October 30th is 1 4 mg/dL  -prior CT scan in October 2017 with unremarkable kidneys, no hydronephrosis  -urinalysis in the office was 300 protein, otherwise bland  -urinalysis in July 2019 and prior was negative to 1+ protein  -microalbumin to creatinine ratio most recent October was 80 but has been as high as 384  -hemoglobin A1c is 8 8% in October has mainly been uncontrolled since 2012  - to note, pt is on pantoprazole for 2-3 years   - 12/2019 - ACEi held completely due to rising Cr and marginal BP  - 1/15/2020 - started on amlodipine due to rising BP      Pt had rising Cr in October 2019 from baseline   Has had mult episodes of KAZ, long standing uncontrolled DM (though proteinuria stable), HTN, PVD which are all contributing likely to kidney disease  Rise in Cr may have been secondary to ACEi/Hypotension/autoregulatory failure  Cr improved to baseline 1 2 mg/dL with holding of ACEi     Plan:  - I reviewed risk for progression of kidney disease  - check renal doplar study  - eventually likely will benefit from ACEi but check u/s as above  - BMP, CBC, Mag, phos, UPCR, UA  - kidney smart     2) HTN     - on amlodipine  - off ACEi due to KAZ end of 2019     3) Osteoporosis     - per Primary and Endo in [place]     4) Hyperkalemia     - low K diet  - improved     5) ALINA     - did not tolerate mask  - advised to talk to PCP     6) RLE wound with PVD and MD     - diffuse b/l atherosclerotic disease  - follows with Podiatry closely     7) BPH     - sees Urology  - on tamsulosin    8) DM    - has DM pump  - gastroparesis     It was a pleasure evaluating your patient in the office today  Thank you for allowing our team to participate in the care of Mr Janna Sotelo  Please do not hesitate to contact our team if further issues/questions shall arise in the interim  No problem-specific Assessment & Plan notes found for this encounter  HPI:    Patient denies complaints  Wound is healing lower extremity  No fevers, chills, nausea, vomiting  PATIENT INSTRUCTIONS:    There are no Patient Instructions on file for this visit  OBJECTIVE:  Current Weight: Weight - Scale: 70 6 kg (155 lb 9 6 oz)  Vitals:    02/05/20 1350   BP: 142/72   BP Location: Right arm   Patient Position: Sitting   Cuff Size: Large   Weight: 70 6 kg (155 lb 9 6 oz)   Height: 5' 8" (1 727 m)    Body mass index is 23 66 kg/m²  REVIEW OF SYSTEMS:    Review of Systems   Constitutional: Negative  Negative for appetite change and fatigue  HENT: Negative  Eyes: Negative  Respiratory: Negative  Negative for shortness of breath  Cardiovascular: Negative  Negative for leg swelling  Gastrointestinal: Negative  Endocrine: Negative  Genitourinary: Negative  Negative for difficulty urinating  Musculoskeletal: Negative  Allergic/Immunologic: Negative  Neurological: Negative  Hematological: Negative  Psychiatric/Behavioral: Negative  All other systems reviewed and are negative  PHYSICAL EXAM:      Physical Exam   Constitutional: He is oriented to person, place, and time  He appears well-developed and well-nourished  No distress  HENT:   Head: Normocephalic and atraumatic  Mouth/Throat: No oropharyngeal exudate  Eyes: Conjunctivae are normal  Right eye exhibits no discharge  Left eye exhibits no discharge  No scleral icterus  Neck: Normal range of motion  Neck supple  No JVD present  Cardiovascular: Normal rate, regular rhythm and normal heart sounds  Exam reveals no gallop and no friction rub  No murmur heard  Pulmonary/Chest: Effort normal and breath sounds normal  No respiratory distress  He has no wheezes  He has no rales  He exhibits no tenderness  Abdominal: Soft  Bowel sounds are normal  He exhibits no distension  There is no tenderness  There is no rebound  Musculoskeletal: Normal range of motion  He exhibits no edema, tenderness or deformity  Neurological: He is alert and oriented to person, place, and time  He has normal reflexes  No cranial nerve deficit  Coordination normal    Skin: Skin is warm and dry  No rash noted  He is not diaphoretic  No erythema  No pallor  Psychiatric: He has a normal mood and affect  His behavior is normal  Judgment and thought content normal    Nursing note and vitals reviewed        Medications:    Current Outpatient Medications:     amLODIPine (NORVASC) 5 mg tablet, Take 1 tablet (5 mg total) by mouth daily, Disp: 30 tablet, Rfl: 3    aspirin 81 mg chewable tablet, Chew 81 mg daily, Disp: , Rfl:     Atorvastatin Calcium (LIPITOR PO), Take 10 mg by mouth daily 1/2 tab daily, Disp: , Rfl:     CALCIUM CITRATE PO, Take 2,000 mg by mouth daily , Disp: , Rfl:     Cholecalciferol (VITAMIN D3) 2000 units capsule, Take 2,000 Units by mouth daily, Disp: , Rfl:     Continuous Blood Gluc Sensor (DEXCOM G6 SENSOR) MISC, As directed dx:250 03, Disp: , Rfl:     cyanocobalamin (VITAMIN B-12) 1,000 mcg tablet, Take 2,000 mcg by mouth daily, Disp: , Rfl:     DULoxetine (CYMBALTA) 30 mg delayed release capsule, Take 30 mg by mouth daily , Disp: , Rfl:     ezetimibe (ZETIA) 10 mg tablet, TAKE 5MG (ONE-HALF TABLET) BY MOUTH DAILY FOR CHOLESTEROL, Disp: , Rfl:     glucose 40 %, Take by mouth once, Disp: , Rfl:     insulin aspart (NovoLOG) 100 units/mL injection, Inject under the skin Via insulin pump, Disp: , Rfl:     levothyroxine 125 mcg tablet, Take 1 tablet (125 mcg total) by mouth daily in the early morning, Disp: 30 tablet, Rfl: 0    pantoprazole (PROTONIX) 40 mg tablet, Take 40 mg by mouth, Disp: , Rfl:     tamsulosin (FLOMAX) 0 4 mg, Take 0 4 mg by mouth daily with dinner  , Disp: , Rfl:     Laboratory Results:        Invalid input(s): ALBUMIN    Results for orders placed or performed in visit on 87/03/17   Basic metabolic panel   Result Value Ref Range    Sodium 139 136 - 145 mmol/L    Potassium 4 8 3 5 - 5 3 mmol/L    Chloride 105 100 - 108 mmol/L    CO2 31 21 - 32 mmol/L    ANION GAP 3 (L) 4 - 13 mmol/L    BUN 23 5 - 25 mg/dL    Creatinine 1 22 0 60 - 1 30 mg/dL    Glucose, Fasting 181 (H) 65 - 99 mg/dL    Calcium 9 6 8 3 - 10 1 mg/dL    eGFR 58 ml/min/1 73sq m

## 2020-02-05 NOTE — LETTER
February 5, 2020     Darrell Archer MD  [address]    Patient: Torres Mario   YOB: 1945   Date of Visit: 2/5/2020       Dear Dr Celine Adair:    Thank you for referring Shwetha Acevedo to me for evaluation  Below are my notes for this consultation  If you have questions, please do not hesitate to call me  I look forward to following your patient along with you  Sincerely,        Dina Camargo MD        CC: No Recipients  Dina Camargo MD  2/5/2020  2:41 PM  Sign at close encounter  [address] 76 y o  male MRN: [id number]  2/5/2020    Reason for Visit: CKD III    ASSESSMENT and PLAN:    I had the pleasure of seeing Mr Mel Miles today in the renal clinic for the continued management of CKD III  70-year-old male with a past medical history of diabetes (30+ years, type II), diabetic retinopathy, neuropathy, gastroparesis, CKD, hyperlipidemia, hypertension (diagnosed for 5 years), thrombocytopenia, episode of DKA in July, prior echocardiogram with EF 55%, osteoporosis, admission in March with acute kidney injury and was admitted for lethargy and vomiting, was found to have DKA, quit smoking 30+ years ago (smoked for 20 years), R LE ulcerations who presents for f/u evaluation for CKD     1) CKD III  - baseline Cr may be 1 2-1 5 mg/dL?      -from 4224-2142, creatinine ranged from 0 6 to 1 mg/dL  -creatinine was 1 46 in July of 2018  - renal ultrasound 12/2019 - R kidney 10 8 and L 10 7   Normal echogenicity   -in March of 2019 with a peak creatinine of 3 1 mg/dL that had improved to 1 mg by discharge,  -then has had multiple episodes of acute kidney injury in June and also in July during admission of DKA  -creatinine on October 30th is 1 4 mg/dL  -prior CT scan in October 2017 with unremarkable kidneys, no hydronephrosis  -urinalysis in the office was 300 protein, otherwise bland  -urinalysis in July 2019 and prior was negative to 1+ protein  -microalbumin to creatinine ratio most recent October was 80 but has been as high as 384  -hemoglobin A1c is 8 8% in October has mainly been uncontrolled since 2012  - to note, pt is on pantoprazole for 2-3 years   - 12/2019 - ACEi held completely due to rising Cr and marginal BP  - 1/15/2020 - started on amlodipine due to rising BP      Pt had rising Cr in October 2019 from baseline  Has had mult episodes of KAZ, long standing uncontrolled DM (though proteinuria stable), HTN, PVD which are all contributing likely to kidney disease  Rise in Cr may have been secondary to ACEi/Hypotension/autoregulatory failure  Cr improved to baseline 1 2 mg/dL with holding of ACEi     Plan:  - I reviewed risk for progression of kidney disease  - check renal doplar study  - eventually likely will benefit from ACEi but check u/s as above  - BMP, CBC, Mag, phos, UPCR, UA  - kidney smart     2) HTN     - on amlodipine  - off ACEi due to KAZ end of 2019     3) Osteoporosis     - per Primary and Endo in [address] [facility]     4) Hyperkalemia     - low K diet  - improved     5) ALINA     - did not tolerate mask  - advised to talk to PCP     6) RLE wound with PVD and MD     - diffuse b/l atherosclerotic disease  - follows with Podiatry closely     7) BPH     - sees Urology  - on tamsulosin    8) DM    - has DM pump  - gastroparesis     It was a pleasure evaluating your patient in the office today  Thank you for allowing our team to participate in the care of Mr Geri Bone  Please do not hesitate to contact our team if further issues/questions shall arise in the interim  No problem-specific Assessment & Plan notes found for this encounter  HPI:    Patient denies complaints  Wound is healing lower extremity  No fevers, chills, nausea, vomiting  PATIENT INSTRUCTIONS:    There are no Patient Instructions on file for this visit        OBJECTIVE:  Current Weight: Weight - Scale: 70 6 kg (155 lb 9 6 oz)  Vitals: 02/05/20 1350   BP: 142/72   BP Location: Right arm   Patient Position: Sitting   Cuff Size: Large   Weight: 70 6 kg (155 lb 9 6 oz)   Height: 5' 8" (1 727 m)    Body mass index is 23 66 kg/m²  REVIEW OF SYSTEMS:    Review of Systems   Constitutional: Negative  Negative for appetite change and fatigue  HENT: Negative  Eyes: Negative  Respiratory: Negative  Negative for shortness of breath  Cardiovascular: Negative  Negative for leg swelling  Gastrointestinal: Negative  Endocrine: Negative  Genitourinary: Negative  Negative for difficulty urinating  Musculoskeletal: Negative  Allergic/Immunologic: Negative  Neurological: Negative  Hematological: Negative  Psychiatric/Behavioral: Negative  All other systems reviewed and are negative  PHYSICAL EXAM:      Physical Exam   Constitutional: He is oriented to person, place, and time  He appears well-developed and well-nourished  No distress  HENT:   Head: Normocephalic and atraumatic  Mouth/Throat: No oropharyngeal exudate  Eyes: Conjunctivae are normal  Right eye exhibits no discharge  Left eye exhibits no discharge  No scleral icterus  Neck: Normal range of motion  Neck supple  No JVD present  Cardiovascular: Normal rate, regular rhythm and normal heart sounds  Exam reveals no gallop and no friction rub  No murmur heard  Pulmonary/Chest: Effort normal and breath sounds normal  No respiratory distress  He has no wheezes  He has no rales  He exhibits no tenderness  Abdominal: Soft  Bowel sounds are normal  He exhibits no distension  There is no tenderness  There is no rebound  Musculoskeletal: Normal range of motion  He exhibits no edema, tenderness or deformity  Neurological: He is alert and oriented to person, place, and time  He has normal reflexes  No cranial nerve deficit  Coordination normal    Skin: Skin is warm and dry  No rash noted  He is not diaphoretic  No erythema  No pallor     Psychiatric: He has a normal mood and affect  His behavior is normal  Judgment and thought content normal    Nursing note and vitals reviewed        Medications:    Current Outpatient Medications:     amLODIPine (NORVASC) 5 mg tablet, Take 1 tablet (5 mg total) by mouth daily, Disp: 30 tablet, Rfl: 3    aspirin 81 mg chewable tablet, Chew 81 mg daily, Disp: , Rfl:     Atorvastatin Calcium (LIPITOR PO), Take 10 mg by mouth daily 1/2 tab daily, Disp: , Rfl:     CALCIUM CITRATE PO, Take 2,000 mg by mouth daily , Disp: , Rfl:     Cholecalciferol (VITAMIN D3) 2000 units capsule, Take 2,000 Units by mouth daily, Disp: , Rfl:     Continuous Blood Gluc Sensor (DEXCOM G6 SENSOR) MISC, As directed dx:250 03, Disp: , Rfl:     cyanocobalamin (VITAMIN B-12) 1,000 mcg tablet, Take 2,000 mcg by mouth daily, Disp: , Rfl:     DULoxetine (CYMBALTA) 30 mg delayed release capsule, Take 30 mg by mouth daily , Disp: , Rfl:     ezetimibe (ZETIA) 10 mg tablet, TAKE 5MG (ONE-HALF TABLET) BY MOUTH DAILY FOR CHOLESTEROL, Disp: , Rfl:     glucose 40 %, Take by mouth once, Disp: , Rfl:     insulin aspart (NovoLOG) 100 units/mL injection, Inject under the skin Via insulin pump, Disp: , Rfl:     levothyroxine 125 mcg tablet, Take 1 tablet (125 mcg total) by mouth daily in the early morning, Disp: 30 tablet, Rfl: 0    pantoprazole (PROTONIX) 40 mg tablet, Take 40 mg by mouth, Disp: , Rfl:     tamsulosin (FLOMAX) 0 4 mg, Take 0 4 mg by mouth daily with dinner  , Disp: , Rfl:     Laboratory Results:        Invalid input(s): ALBUMIN    Results for orders placed or performed in visit on 24/09/07   Basic metabolic panel   Result Value Ref Range    Sodium 139 136 - 145 mmol/L    Potassium 4 8 3 5 - 5 3 mmol/L    Chloride 105 100 - 108 mmol/L    CO2 31 21 - 32 mmol/L    ANION GAP 3 (L) 4 - 13 mmol/L    BUN 23 5 - 25 mg/dL    Creatinine 1 22 0 60 - 1 30 mg/dL    Glucose, Fasting 181 (H) 65 - 99 mg/dL    Calcium 9 6 8 3 - 10 1 mg/dL    eGFR 58 ml/min/1 73sq m

## 2020-02-05 NOTE — PATIENT INSTRUCTIONS
1) labwork in 2-3 months  2) appointment in 3 months  3) no changes to your medications  4) renal u/s prior to next appt

## 2020-02-06 NOTE — PROGRESS NOTES
This patient was seen on 2/11/2020  Assessment:    Problem List Items Addressed This Visit        Endocrine    Diabetic peripheral neuropathy ([facility] ) - Primary (Chronic)       Other    Non-pressure chronic ulcer of right calf with fat layer exposed ([facility] )          Plan: Adaptic applied to open area  Unna boot applied using paste wrap and coban  Applied from base of toes to infrapatellar notch  Unna boot care and precautions discussed and explained to pt/family  1  Leave wrap dry and intact  2  If wrap is too tight (feels like a tourniquet, toes turning purple), elevate the leg above heart level, if no relief, remove wrap (no scissors) and call office  3  If wrap becomes too loose, remove wrap (no scissors) and call the office  Pt/family verbalize understanding       Diagnoses and all orders for this visit:    Diabetic peripheral neuropathy ([facility] [address] )    Non-pressure chronic ulcer of right calf with fat layer exposed ([facility] [address] )          Subjective:      Patient ID: Terrence Lee is a 76 y o  male  Pt arrives for wound care follow up  Presents with unna boot intact and denies problems  The following portions of the patient's history were reviewed and updated as appropriate: allergies, current medications, past family history, past medical history, past social history, past surgical history and problem list     Review of Systems   Constitutional: Negative  Objective:      /69   Pulse 81   Ht 5' 8" (1 727 m) Comment: verbal  Wt 67 2 kg (148 lb 3 2 oz)   BMI 22 53 kg/m²          Physical Exam   Constitutional: He appears well-developed and well-nourished  No distress  Skin: He is not diaphoretic  Nursing note and vitals reviewed        Wound # 1   Location: right post lower leg  Length cm:0 5 Width 052 cm: Depth 0 2cm:   Deepest Tissue Noted in Base: SQ  Probe to Bone?: no  Peripheral Skin Description: dry  Granulation: 90% Fibrotic Tissue: 10% Necrotic Tissue: 0%  Drainage Amount: moderate  Signs of Infection: no  Total debrided  0 square centimeters

## 2020-02-11 ENCOUNTER — PROCEDURE VISIT (OUTPATIENT)
Dept: PODIATRY | Facility: CLINIC | Age: 75
End: 2020-02-11
Payer: COMMERCIAL

## 2020-02-11 VITALS
HEIGHT: 68 IN | DIASTOLIC BLOOD PRESSURE: 69 MMHG | HEART RATE: 81 BPM | SYSTOLIC BLOOD PRESSURE: 135 MMHG | BODY MASS INDEX: 22.46 KG/M2 | WEIGHT: 148.2 LBS

## 2020-02-11 DIAGNOSIS — E11.42 DIABETIC PERIPHERAL NEUROPATHY (HCC): Chronic | ICD-10-CM

## 2020-02-11 DIAGNOSIS — L97.212 NON-PRESSURE CHRONIC ULCER OF RIGHT CALF WITH FAT LAYER EXPOSED (HCC): Primary | ICD-10-CM

## 2020-02-11 PROCEDURE — 3075F SYST BP GE 130 - 139MM HG: CPT | Performed by: PODIATRIST

## 2020-02-11 PROCEDURE — 3078F DIAST BP <80 MM HG: CPT | Performed by: PODIATRIST

## 2020-02-11 PROCEDURE — 1160F RVW MEDS BY RX/DR IN RCRD: CPT | Performed by: PODIATRIST

## 2020-02-11 PROCEDURE — 29580 STRAPPING UNNA BOOT: CPT | Performed by: PODIATRIST

## 2020-02-11 PROCEDURE — 1036F TOBACCO NON-USER: CPT | Performed by: PODIATRIST

## 2020-02-11 PROCEDURE — 4040F PNEUMOC VAC/ADMIN/RCVD: CPT | Performed by: PODIATRIST

## 2020-02-11 PROCEDURE — 99213 OFFICE O/P EST LOW 20 MIN: CPT | Performed by: PODIATRIST

## 2020-02-11 PROCEDURE — 3066F NEPHROPATHY DOC TX: CPT | Performed by: PODIATRIST

## 2020-02-13 NOTE — PROGRESS NOTES
This patient was seen on 2/18/2020  Assessment:    Problem List Items Addressed This Visit        Endocrine    Diabetic peripheral neuropathy (HCC) (Chronic)       Other    Non-pressure chronic ulcer of right calf with fat layer exposed ([address] ) - Primary          Plan:  A formal timeout including patient identification, laterality and existing [facility]  Procedure Performed: Debridement of subcutaneous tissue- <20 sq cm (01141)  Anesthetic used as needed to reduce discomfort  Under aseptic technique, the wound was thoroughly explored for undermining, deep sinus tracts and bone involvement  Sterile instrumentation including scalpel used to excise the clearly delineated devitalized subcutaneous tissue exposing viable tissue  Bleeding controlled with gentle pressure  Patient tolerated debridement without complications  The wound was dressed  Wound dressing technique and frequency described to patient  I am to be called if any signs of infection develop such as erythema, increased wound size or significant change in appearance of wound, odor, pain, increased or change in color of drainage, swelling, fever, chills, nightsweats  I reviewed these signs with the patient  I recommended limiting leg dependency and to use any prescribed compression devices in an effort to allow proper fluid control and healing of the wounded area  I was very clear that sleeping with legs dependent is to be avoided at all costs  I explained that good wound care and compliance are necessary to allow healing and to prevent limb loss  Silver alginate applied to wound  Unna boot applied using paste wrap and coban  Applied from base of toes to infrapatellar notch  Unna boot care and precautions discussed and explained to pt/family  1  Leave wrap dry and intact  2   If wrap is too tight (feels like a tourniquet, toes turning purple), elevate the leg above heart level, if no relief, remove wrap (no scissors) and call office  3  If wrap becomes too loose, remove wrap (no scissors) and call the office  Pt/family verbalize understanding       Diagnoses and all orders for this visit:    Non-pressure chronic ulcer of right calf with fat layer exposed ([address] )    Diabetic peripheral neuropathy (HCC)          Subjective:      Patient ID: Valentine Mane is a 76 y o  male  Pt arrives for wound care follow up  Presents with unna boot intact and denies problems  The following portions of the patient's history were reviewed and updated as appropriate: allergies, current medications, past family history, past medical history, past social history, past surgical history and problem list     Review of Systems   Constitutional: Negative  Objective:      /67   Pulse 85   Ht 5' 8" (1 727 m) Comment: verbal  Wt 66 2 kg (146 lb)   BMI 22 20 kg/m²          Physical Exam   Constitutional: He appears well-developed and well-nourished  No distress  Skin: He is not diaphoretic  Nursing note and vitals reviewed          Wound # 1   Location: right post lower leg  Length cm:0 3 Width 0 5 cm: Depth 0 2cm:   Deepest Tissue Noted in Base: SQ  Probe to Bone?: no  Peripheral Skin Description: dry  Granulation: 90% Fibrotic Tissue: 10% Necrotic Tissue: 0%  Drainage Amount: moderate  Signs of Infection: no  Total debrided  0 15 square centimeters

## 2020-02-14 NOTE — PATIENT INSTRUCTIONS
Unna Boot   WHAT YOU NEED TO KNOW:   An Unna boot is a compression dressing made by wrapping layers of gauze around your leg and foot  It is often used to protect an ulcer or open wound  The compression of the dressing helps improve blood flow in your lower leg  Compression also helps decrease swelling and pain  You may need to wear the boot for a few weeks or until your wound heals  DISCHARGE INSTRUCTIONS:   Follow up with your healthcare provider as directed: Your Unna boot will be changed by a healthcare provider at least once every 7 days  Your wound will be cleaned and measured to make sure it is healing with each boot change  Write down your questions so you remember to ask them during your visits  Keep your boot dry:  Ask how to cover it when you take a shower or bath  Self-care:   · Manage your health conditions  Your wound may not heal or new wounds may form if your health conditions are not controlled  Take your medicines as directed  Follow your healthcare provider's instructions if you have high blood pressure  Check your blood sugar levels as directed if you have diabetes  · Walk for exercise  This will help the boot compress and improve blood flow  Ask your healthcare provider how long you should walk each day  · Eat a variety of healthy foods to promote wound healing  Healthy foods include fruits, vegetables, whole-grain breads, low-fat dairy products, beans, lean meats, and fish  Ask if you need to be on a special diet  Contact your healthcare provider if:   · Your boot feels very tight or loose after you walk  · Drainage from your wound soaks through the boot  · You have questions or concerns about your condition or care  Remove the boot and return to the emergency department if:   · Your leg itches and feels warm  · Your toes tingle, feel numb, or change color  · Your boot causes pain in your foot or leg when you walk       · You have swelling above or below your kelsie   © 2017 [facility] [facility] Information is for End User's use only and may not be sold, redistributed or otherwise used for commercial purposes  All illustrations and images included in CareNotes® are the copyrighted property of A D A M , Inc  or Kirill Ulrich  The above information is an  only  It is not intended as medical advice for individual conditions or treatments  Talk to your doctor, nurse or pharmacist before following any medical regimen to see if it is safe and effective for you

## 2020-02-18 ENCOUNTER — PROCEDURE VISIT (OUTPATIENT)
Dept: PODIATRY | Facility: CLINIC | Age: 75
End: 2020-02-18
Payer: COMMERCIAL

## 2020-02-18 VITALS
WEIGHT: 146 LBS | HEIGHT: 68 IN | DIASTOLIC BLOOD PRESSURE: 67 MMHG | SYSTOLIC BLOOD PRESSURE: 122 MMHG | BODY MASS INDEX: 22.13 KG/M2 | HEART RATE: 85 BPM

## 2020-02-18 DIAGNOSIS — E11.42 DIABETIC PERIPHERAL NEUROPATHY (HCC): Chronic | ICD-10-CM

## 2020-02-18 DIAGNOSIS — L97.212 NON-PRESSURE CHRONIC ULCER OF RIGHT CALF WITH FAT LAYER EXPOSED (HCC): Primary | ICD-10-CM

## 2020-02-18 PROCEDURE — 11042 DBRDMT SUBQ TIS 1ST 20SQCM/<: CPT | Performed by: PODIATRIST

## 2020-02-20 ENCOUNTER — HOSPITAL ENCOUNTER (OUTPATIENT)
Dept: NON INVASIVE DIAGNOSTICS | Facility: CLINIC | Age: 75
Discharge: HOME | End: 2020-02-20
Payer: COMMERCIAL

## 2020-02-20 DIAGNOSIS — N18.30 CKD (CHRONIC KIDNEY DISEASE), STAGE III (HCC): ICD-10-CM

## 2020-02-20 DIAGNOSIS — I10 BENIGN ESSENTIAL HTN: ICD-10-CM

## 2020-02-20 PROCEDURE — 93975 VASCULAR STUDY: CPT

## 2020-02-20 PROCEDURE — 93975 VASCULAR STUDY: CPT | Performed by: SURGERY

## 2020-02-20 NOTE — PROGRESS NOTES
This patient was seen on 2/25/2020  Assessment:    Problem List Items Addressed This Visit        Endocrine    Diabetic peripheral neuropathy ([facility] [address] ) - Primary (Chronic)       Other    Non-pressure chronic ulcer of right calf with fat layer exposed ([facility] )          Plan:  A formal timeout including patient identification, laterality and existing allergies using [facility] protocol was conducted  Procedure Performed: Debridement of subcutaneous tissue- <20 sq cm (23093)  Anesthetic used as needed to reduce discomfort  Under aseptic technique, the wound was thoroughly explored for undermining, deep sinus tracts and bone involvement  Sterile instrumentation including scalpel used to excise the clearly delineated devitalized subcutaneous tissue exposing viable tissue  Bleeding controlled with gentle pressure  Patient tolerated debridement without complications  The wound was dressed  Wound dressing technique and frequency described to patient  I am to be called if any signs of infection develop such as erythema, increased wound size or significant change in appearance of wound, odor, pain, increased or change in color of drainage, swelling, fever, chills, nightsweats  I reviewed these signs with the patient  I recommended limiting leg dependency and to use any prescribed compression devices in an effort to allow proper fluid control and healing of the wounded area  I was very clear that sleeping with legs dependent is to be avoided at all costs  I explained that good wound care and compliance are necessary to allow healing and to prevent limb loss  Silver alginate applied to wound  Unna boot applied using paste wrap and coban  Applied from base of toes to infrapatellar notch  Unna boot care and precautions discussed and explained to pt/family  1  Leave wrap dry and intact  2   If wrap is too tight (feels like a tourniquet, toes turning purple), elevate the leg above heart level, if no relief, remove wrap (no scissors) and call office  3  If wrap becomes too loose, remove wrap (no scissors) and call the office  Pt/family verbalize understanding     Diagnoses and all orders for this visit:    Diabetic peripheral neuropathy ([facility] [address] )    Non-pressure chronic ulcer of right calf with fat layer exposed ([facility] [address] )          Subjective:      Patient ID: Damaris Boyce is a 76 y o  male  Pt arrives for wound care follow up  Presents with unna boot intact and denies problems  The following portions of the patient's history were reviewed and updated as appropriate: allergies, current medications, past family history, past medical history, past social history, past surgical history and problem list     Review of Systems   Constitutional: Negative  Objective:      /69   Pulse 83   Ht 5' 8" (1 727 m)   Wt 66 kg (145 lb 8 oz)   BMI 22 12 kg/m²          Physical Exam   Constitutional: He appears well-developed and well-nourished  No distress  Skin: He is not diaphoretic  Nursing note and vitals reviewed          Wound # 1   Location: right post lower leg  Length cm:0 3 Width 0 5 cm: Depth 0 2cm:   Deepest Tissue Noted in Base: SQ  Probe to Bone?: no  Peripheral Skin Description: dry  Granulation: 90% Fibrotic Tissue: 10% Necrotic Tissue: 0%  Drainage Amount: moderate  Signs of Infection: no  Total debrided  0 15 square centimeters

## 2020-02-25 ENCOUNTER — PROCEDURE VISIT (OUTPATIENT)
Dept: PODIATRY | Facility: CLINIC | Age: 75
End: 2020-02-25
Payer: COMMERCIAL

## 2020-02-25 ENCOUNTER — TELEPHONE (OUTPATIENT)
Dept: NEPHROLOGY | Facility: CLINIC | Age: 75
End: 2020-02-25

## 2020-02-25 VITALS
HEART RATE: 83 BPM | DIASTOLIC BLOOD PRESSURE: 69 MMHG | WEIGHT: 145.5 LBS | HEIGHT: 68 IN | SYSTOLIC BLOOD PRESSURE: 114 MMHG | BODY MASS INDEX: 22.05 KG/M2

## 2020-02-25 DIAGNOSIS — L97.212 NON-PRESSURE CHRONIC ULCER OF RIGHT CALF WITH FAT LAYER EXPOSED (HCC): ICD-10-CM

## 2020-02-25 DIAGNOSIS — E11.42 DIABETIC PERIPHERAL NEUROPATHY (HCC): Primary | Chronic | ICD-10-CM

## 2020-02-25 PROCEDURE — 11042 DBRDMT SUBQ TIS 1ST 20SQCM/<: CPT | Performed by: PODIATRIST

## 2020-02-25 NOTE — TELEPHONE ENCOUNTER
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

## 2020-02-25 NOTE — RESULT ENCOUNTER NOTE
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

## 2020-02-25 NOTE — PATIENT INSTRUCTIONS
Debridement   WHAT YOU NEED TO KNOW:   Debridement is the removal of infected, damaged, or dead tissue so a wound can heal properly  You may need more than one debridement  DISCHARGE INSTRUCTIONS:   Medicines:   · Medicines  can help decrease pain or prevent or treat an infection  · Take your medicine as directed  Contact your healthcare provider if you think your medicine is not helping or if you have side effects  Tell him of her if you are allergic to any medicine  Keep a list of the medicines, vitamins, and herbs you take  Include the amounts, and when and why you take them  Bring the list or the pill bottles to follow-up visits  Carry your medicine list with you in case of an emergency  Follow up with your healthcare provider as directed: You may need to return to have your wound checked  Write down your questions so you remember to ask them during your visits  Care for your wound as directed:   · Keep your wound clean and dry  You may need to cover your wound when you bathe  · Limit movements,  such as stretching, to prevent bleeding, tearing, and swelling in your wound  · Protect your wound  Avoid sunlight for at least 6 months  Apply mild, unscented lotion or cream to the skin around your wound to keep it moist     · Do not smoke  If you smoke, it is never too late to quit  Smoking decreases blood flow to the wound and delays healing  Ask for information if you need help quitting  · Drink liquids as directed  Ask how much liquid to drink each day and which liquids are best for you  Liquids help keep your skin moist so your wound can heal      · Eat a variety of healthy foods  Foods rich in protein, such as meat, eggs, and dairy products, help repair tissue  Carbohydrate-rich foods, such as bread and cereals, help increase cell growth and decrease the risk for wound infection  Do not have caffeine  Ask if you should take vitamins   Vitamins A and C may help tissue formation and increase scar tissue strength  Contact your healthcare provider if:   · You have a fever  · Your pain gets worse or does not go away, even after treatment  · Your skin is red, swollen, or draining pus  · You have questions or concerns about your condition or care  Return to the emergency department if:   · Blood soaks through your bandage  · You have severe pain  © 2017 [employer] Information is for End User's use only and may not be sold, redistributed or otherwise used for commercial purposes  All illustrations and images included in CareNotes® are the copyrighted property of [employer]  or Kirill Ulrich  The above information is an  only  It is not intended as medical advice for individual conditions or treatments  Talk to your doctor, nurse or pharmacist before following any medical regimen to see if it is safe and effective for you  Chronic Wound Care   WHAT YOU NEED TO KNOW:   A wound is an injury that causes a break in the skin  There may also be damage to nearby tissues  Chronic wounds are wounds that do not heal completely in 6 weeks  Examples of wounds that can become chronic are deep ulcers (open sores), large burns, and infected cuts  DISCHARGE INSTRUCTIONS:   Medicines:   · NSAIDs  help decrease swelling, pain, and fever  This medicine is available with or without a doctor's order  NSAIDs can cause stomach bleeding or kidney problems in certain people  If you take blood thinner medicine, always ask your healthcare provider if NSAIDs are safe for you  Always read the medicine label and follow directions  · Acetaminophen  decreases pain and fever  It is available without a doctor's order  Ask how much to take and how often to take it  Follow directions  Acetaminophen can cause liver damage if not taken correctly  · Antibiotics  may be given to prevent or treat an infection caused by bacteria  · Take your medicine as directed    Contact your healthcare provider if you think your medicine is not helping or if you have side effects  Tell him if you are allergic to any medicine  Keep a list of the medicines, vitamins, and herbs you take  Include the amounts, and when and why you take them  Bring the list or the pill bottles to follow-up visits  Carry your medicine list with you in case of an emergency  Follow up with your healthcare provider as directed: You need to return to have your wound checked  If you have packing in your wound, you need to return to have the packing replaced and the bandage changed  Write down your questions so you remember to ask them during your visits  Wound care:   · If your wound was closed with thin strips of medical tape, keep them clean and dry  The strips of medical tape will fall off on their own  Do not pull them off  · Keep the bandage clean and dry  Do not remove the bandage over your wound unless your healthcare provider says it is okay  · Wash your hands before and after you take care of your wound to prevent infection  · Clean the wound as directed  If you cannot reach the wound, have someone help you  · If you have pain when you change your bandages, take pain medicine before you start  · If you have packing, make sure all the gauze used to pack the wound is taken out and replaced as directed  Keep track of how many gauze dressings are placed inside the wound  Negative pressure wound therapy:  Negative pressure wound therapy (NPWT) is also called wound vacuum, or wound vac therapy  A vacuum device uses suction to remove fluid and waste from your wound and pull the edges closer together  It may also increase blood flow and new tissue growth in the wound  Your healthcare providers will decide if you need NPWT at home and how long you need it  Tell your healthcare providers if you do not feel able to use NPWT at home    · If you will use NPWT at home, get trained on how to use the equipment correctly  Ask your healthcare providers to watch you use the vacuum device to make sure you are using it correctly  Learn when and how to change the drainage container  Keep the directions in a place where you can find them easily  · Ask about the risks of NPWT, including bleeding and infection  Tell your healthcare providers about the medicines you use  Certain medicines, such as aspirin and blood thinners, increase your risk for bleeding  If you see blood in the tubing or container, or on your bandages, stop the device immediately  Apply direct pressure  Call 911   Eat healthy foods and drink liquids as directed:  Healthy foods give your body the nutrients it needs to heal your wound  Liquids prevent dehydration that can decrease the blood supply to your wound  Healthy foods include fruits, vegetables, grains (breads and cereals), dairy, and protein foods  Protein foods include meat, fish, nuts, and soy products  Protein, calories, vitamin C, and zinc help wounds heal  Ask for more information about the foods you should eat to improve healing  Do not smoke: If you smoke, it is never too late to quit  Smoking delays wound healing  Smoking also increases your risk for infection after surgery  Ask your healthcare provider for information if you need help quitting  Prevent pressure damage:  If you have a chronic wound, you may be at risk for pressure damage to your wound and other places on your body  Pressure sores can develop when blood flow to an area is blocked  For example, you sit or lie in the same position without moving and put pressure on your legs  · Prevent pressure sores by changing your position every 15 minutes while you are sitting  Prop your legs on pillows to lift your heels while you are lying down  · Check your skin daily for signs of pressure sores  Common signs are swelling, open sores, blisters, a rash, or changes in color or temperature   Tell your healthcare provider if you have any of these signs  Contact your healthcare provider if:   · You have a fever  · You have increased or new pain, swelling, redness, or bleeding in or around your wound  · You have pus or a foul odor coming from your wound  · Your skin itches or has a rash  · You have questions or concerns about your condition or care  Return to the emergency department if:   · You have muscle or joint pain, body aches, or sweating, with a fever  · You have a headache with diarrhea, nausea or vomiting, or a sore throat  · You are confused, or feel dizzy or faint when you stand up  · You have trouble breathing or sudden chest pain  · You see blood in the NPWT tubing or container, or on your bandages  © 2016 [address] [employer] is for End User's use only and may not be sold, redistributed or otherwise used for commercial purposes  All illustrations and images included in CareNotes® are the copyrighted property of [employer]  or Kirill Ulrich  The above information is an  only  It is not intended as medical advice for individual conditions or treatments  Talk to your doctor, nurse or pharmacist before following any medical regimen to see if it is safe and effective for you

## 2020-03-03 NOTE — PROGRESS NOTES
This patient was seen on 3/4/2020  Assessment:    Problem List Items Addressed This Visit        Endocrine    Diabetic peripheral neuropathy (HCC) (Chronic)       Other    Non-pressure chronic ulcer of right calf with fat layer exposed ([facility] [address] ) - Primary          Plan:  A formal timeout including patient identification, laterality and existing allergies [facility]  Procedure Performed: Debridement of subcutaneous tissue- <20 sq cm (02523)  Anesthetic used as needed to reduce discomfort  Under aseptic technique, the wound was thoroughly explored for undermining, deep sinus tracts and bone involvement  Sterile instrumentation including scalpel used to excise the clearly delineated devitalized subcutaneous tissue exposing viable tissue  Bleeding controlled with gentle pressure  Patient tolerated debridement without complications  The wound was dressed  Wound dressing technique and frequency described to patient  I am to be called if any signs of infection develop such as erythema, increased wound size or significant change in appearance of wound, odor, pain, increased or change in color of drainage, swelling, fever, chills, nightsweats  I reviewed these signs with the patient  I recommended limiting leg dependency and to use any prescribed compression devices in an effort to allow proper fluid control and healing of the wounded area  I was very clear that sleeping with legs dependent is to be avoided at all costs  I explained that good wound care and compliance are necessary to allow healing and to prevent limb loss  Silver alginate applied to wound  Unna boot applied using paste wrap and coban  Applied from base of toes to infrapatellar notch   Unna boot care and precautions discussed and explained to pt/family  1  Leave wrap dry and intact  2   If wrap is too tight (feels like a tourniquet, toes turning purple), elevate the leg above heart level, if no relief, remove wrap (no scissors) and call office  3  If wrap becomes too loose, remove wrap (no scissors) and call the office  Pt/family verbalize understanding        Diagnoses and all orders for this visit:    Non-pressure chronic ulcer of right calf with fat layer exposed ([address] )    Diabetic peripheral neuropathy (HCC)          Subjective:      Patient ID: Deja Villa is a 76 y o  male  Pt arrives for wound care follow up  Presents with unna boot intact and denies problems  The following portions of the patient's history were reviewed and updated as appropriate: allergies, current medications, past family history, past medical history, past social history, past surgical history and problem list     Review of Systems   Constitutional: Negative  Objective:      /76   Pulse 82   Ht 5' 5" (1 651 m)   Wt 65 9 kg (145 lb 3 2 oz)   BMI 24 16 kg/m²          Physical Exam   Constitutional: He appears well-developed and well-nourished  No distress  Skin: He is not diaphoretic  Nursing note and vitals reviewed          Wound # 1   Location: right post lower leg  Length cm:0 3 Width 0 3 cm: Depth 0 2cm:   Deepest Tissue Noted in Base: SQ  Probe to Bone?: no  Peripheral Skin Description: dry  Granulation: 90% Fibrotic Tissue: 10% Necrotic Tissue: 0%  Drainage Amount: moderate  Signs of Infection: no  Total debrided  0 09 square centimeters

## 2020-03-04 ENCOUNTER — PROCEDURE VISIT (OUTPATIENT)
Dept: PODIATRY | Facility: CLINIC | Age: 75
End: 2020-03-04
Payer: COMMERCIAL

## 2020-03-04 VITALS
DIASTOLIC BLOOD PRESSURE: 76 MMHG | HEART RATE: 82 BPM | HEIGHT: 65 IN | BODY MASS INDEX: 24.19 KG/M2 | WEIGHT: 145.2 LBS | SYSTOLIC BLOOD PRESSURE: 134 MMHG

## 2020-03-04 DIAGNOSIS — E11.42 DIABETIC PERIPHERAL NEUROPATHY (HCC): Chronic | ICD-10-CM

## 2020-03-04 DIAGNOSIS — L97.212 NON-PRESSURE CHRONIC ULCER OF RIGHT CALF WITH FAT LAYER EXPOSED (HCC): Primary | ICD-10-CM

## 2020-03-04 PROCEDURE — 11042 DBRDMT SUBQ TIS 1ST 20SQCM/<: CPT | Performed by: PODIATRIST

## 2020-03-05 NOTE — PROGRESS NOTES
This patient was seen on 3/10/2020  Assessment:    Problem List Items Addressed This Visit        Endocrine    Diabetic peripheral neuropathy ([facility] [address] ) - Primary (Chronic)       Other    Non[facility]pressure chronic [facility] right [facility] with [facility]          Plan:  A formal timeout including patient identification, laterality and existing allergies using [facility] protocol was conducted  Procedure Performed: Debridement of subcutaneous tissue- <20 sq cm (71187)  Anesthetic used as needed to reduce discomfort  Under aseptic technique, the wound was thoroughly explored for undermining, deep sinus tracts and bone involvement  Sterile instrumentation including scalpel used to excise the clearly delineated devitalized subcutaneous tissue exposing viable tissue  Bleeding controlled with gentle pressure  Patient tolerated debridement without complications  The wound was dressed  Wound dressing technique and frequency described to patient  I am to be called if any signs of infection develop such as erythema, increased wound size or significant change in appearance of wound, odor, pain, increased or change in color of drainage, swelling, fever, chills, nightsweats  I reviewed these signs with the patient  I recommended limiting leg dependency and to use any prescribed compression devices in an effort to allow proper fluid control and healing of the wounded area  I was very clear that sleeping with legs dependent is to be avoided at all costs  I explained that good wound care and compliance are necessary to allow healing and to prevent limb loss  Adaptic applied to wound bed  Unna boot applied using paste wrap and coban  Applied from base of toes to infrapatellar notch  Unna boot care and precautions discussed and explained to pt/family  1  Leave wrap dry and intact  2   If wrap is too tight (feels like a tourniquet, toes turning purple), elevate the leg above heart level, if no relief, remove wrap (no scissors) and call office  3  If wrap becomes too loose, remove wrap (no scissors) and call the office  Pt/family verbalize understanding       Diagnoses and all orders for this visit:    Diabetic peripheral neuropathy ([facility] [address] )    Non-pressure chronic ulcer of right calf with fat layer exposed ([facility] [address] )          Subjective:      Patient ID: José Miguel King is a 76 y o  male  Pt arrives for wound care follow up  Presents with unna boot intact and denies problems  The following portions of the patient's history were reviewed and updated as appropriate: allergies, current medications, past family history, past medical history, past social history, past surgical history and problem list     Review of Systems   Constitutional: Negative  Objective:      /76   Pulse 97   Ht 5' 5 25" (1 657 m)   Wt 65 2 kg (143 lb 12 8 oz)   BMI 23 75 kg/m²          Physical Exam   Constitutional: He appears well-developed and well-nourished  No distress  Skin: He is not diaphoretic  Nursing note and vitals reviewed          Wound # 1   Location: right post lower leg  Length cm:0 3 Width 0 3 cm: Depth 0 2cm:   Deepest Tissue Noted in Base: SQ  Probe to Bone?: no  Peripheral Skin Description: dry  Granulation: 90% Fibrotic Tissue: 10% Necrotic Tissue: 0%  Drainage Amount: moderate  Signs of Infection: no  Total debrided  0 09 square centimeters

## 2020-03-10 ENCOUNTER — PROCEDURE VISIT (OUTPATIENT)
Dept: PODIATRY | Facility: CLINIC | Age: 75
End: 2020-03-10
Payer: COMMERCIAL

## 2020-03-10 VITALS
WEIGHT: 143.8 LBS | DIASTOLIC BLOOD PRESSURE: 76 MMHG | HEIGHT: 65 IN | BODY MASS INDEX: 23.96 KG/M2 | SYSTOLIC BLOOD PRESSURE: 115 MMHG | HEART RATE: 97 BPM

## 2020-03-10 DIAGNOSIS — E11.42 DIABETIC PERIPHERAL NEUROPATHY (HCC): Primary | Chronic | ICD-10-CM

## 2020-03-10 DIAGNOSIS — L97.212 NON-PRESSURE CHRONIC ULCER OF RIGHT CALF WITH FAT LAYER EXPOSED (HCC): ICD-10-CM

## 2020-03-10 PROCEDURE — 11042 DBRDMT SUBQ TIS 1ST 20SQCM/<: CPT | Performed by: PODIATRIST

## 2020-03-10 NOTE — PATIENT INSTRUCTIONS
Debridement   WHAT YOU NEED TO KNOW:   Debridement is the removal of infected, damaged, or dead tissue so a wound can heal properly  You may need more than one debridement  DISCHARGE INSTRUCTIONS:   Medicines:   · Medicines  can help decrease pain or prevent or treat an infection  · Take your medicine as directed  Contact your healthcare provider if you think your medicine is not helping or if you have side effects  Tell him of her if you are allergic to any medicine  Keep a list of the medicines, vitamins, and herbs you take  Include the amounts, and when and why you take them  Bring the list or the pill bottles to follow-up visits  Carry your medicine list with you in case of an emergency  Follow up with your healthcare provider as directed: You may need to return to have your wound checked  Write down your questions so you remember to ask them during your visits  Care for your wound as directed:   · Keep your wound clean and dry  You may need to cover your wound when you bathe  · Limit movements,  such as stretching, to prevent bleeding, tearing, and swelling in your wound  · Protect your wound  Avoid sunlight for at least 6 months  Apply mild, unscented lotion or cream to the skin around your wound to keep it moist     · Do not smoke  If you smoke, it is never too late to quit  Smoking decreases blood flow to the wound and delays healing  Ask for information if you need help quitting  · Drink liquids as directed  Ask how much liquid to drink each day and which liquids are best for you  Liquids help keep your skin moist so your wound can heal      · Eat a variety of healthy foods  Foods rich in protein, such as meat, eggs, and dairy products, help repair tissue  Carbohydrate-rich foods, such as bread and cereals, help increase cell growth and decrease the risk for wound infection  Do not have caffeine  Ask if you should take vitamins   Vitamins A and C may help tissue formation and increase scar tissue strength  Contact your healthcare provider if:   · You have a fever  · Your pain gets worse or does not go away, even after treatment  · Your skin is red, swollen, or draining pus  · You have questions or concerns about your condition or care  Return to the emergency department if:   · Blood soaks through your bandage  · You have severe pain  © 2017 [address] [employer] Information is for End User's use only and may not be sold, redistributed or otherwise used for commercial purposes  All illustrations and images included in CareNotes® are the copyrighted property of [employer]  or Kirill Ulrich  The above information is an  only  It is not intended as medical advice for individual conditions or treatments  Talk to your doctor, nurse or pharmacist before following any medical regimen to see if it is safe and effective for you  Unna Boot   WHAT YOU NEED TO KNOW:   An Unna boot is a compression dressing made by wrapping layers of gauze around your leg and foot  It is often used to protect an ulcer or open wound  The compression of the dressing helps improve blood flow in your lower leg  Compression also helps decrease swelling and pain  You may need to wear the boot for a few weeks or until your wound heals  DISCHARGE INSTRUCTIONS:   Follow up with your healthcare provider as directed: Your Unna boot will be changed by a healthcare provider at least once every 7 days  Your wound will be cleaned and measured to make sure it is healing with each boot change  Write down your questions so you remember to ask them during your visits  Keep your boot dry:  Ask how to cover it when you take a shower or bath  Self-care:   · Manage your health conditions  Your wound may not heal or new wounds may form if your health conditions are not controlled  Take your medicines as directed   Follow your healthcare provider's instructions if you have high blood pressure  Check your blood sugar levels as directed if you have diabetes  · Walk for exercise  This will help the boot compress and improve blood flow  Ask your healthcare provider how long you should walk each day  · Eat a variety of healthy foods to promote wound healing  Healthy foods include fruits, vegetables, whole-grain breads, low-fat dairy products, beans, lean meats, and fish  Ask if you need to be on a special diet  Contact your healthcare provider if:   · Your boot feels very tight or loose after you walk  · Drainage from your wound soaks through the boot  · You have questions or concerns about your condition or care  Remove the boot and return to the emergency department if:   · Your leg itches and feels warm  · Your toes tingle, feel numb, or change color  · Your boot causes pain in your foot or leg when you walk  · You have swelling above or below your boot  © 2017 [address] [employer] Information is for End User's use only and may not be sold, redistributed or otherwise used for commercial purposes  All illustrations and images included in CareNotes® are the copyrighted property of [employer]  or Kirill Ulrich  The above information is an  only  It is not intended as medical advice for individual conditions or treatments  Talk to your doctor, nurse or pharmacist before following any medical regimen to see if it is safe and effective for you

## 2020-03-12 NOTE — PROGRESS NOTES
This patient was seen on 3/17/2020  Assessment:    Problem List Items Addressed This Visit        Endocrine    Diabetic peripheral neuropathy (HCC) (Chronic)       Other    Non-pressure chronic ulcer of right calf with fat layer exposed ([facility] [address] ) - Primary          Plan:    No debridement needed  I am to be called if any signs of infection develop such as erythema, increased wound size or significant change in appearance of wound, odor, pain, increased or change in color of drainage, swelling, fever, chills, nightsweats  I reviewed these signs with the patient  I recommended limiting leg dependency and to use any prescribed compression devices in an effort to allow proper fluid control and healing of the wounded area  I was very clear that sleeping with legs dependent is to be avoided at all costs  I explained that good wound care and compliance are necessary to allow healing and to prevent limb loss  Adaptic and collagen applied to wound bed  Unna boot applied using paste wrap and coban  Applied from base of toes to infrapatellar notch  Unna boot care and precautions discussed and explained to pt/family  1  Leave wrap dry and intact  2  If wrap is too tight (feels like a tourniquet, toes turning purple), elevate the leg above heart level, if no relief, remove wrap (no scissors) and call office  3  If wrap becomes too loose, remove wrap (no scissors) and call the office  Pt/family verbalize understanding        Diagnoses and all orders for this visit:    Non-pressure chronic ulcer of right calf with fat layer exposed ([facility] [address] )    Diabetic peripheral neuropathy (HCC)          Subjective:      Patient ID: Charlotte Modi is a 76 y o  male  Pt arrives for wound care follow up  Presents with unna boot intact and denies problems         The following portions of the patient's history were reviewed and updated as appropriate: allergies, current medications, past family history, past medical history, past social history, past surgical history and problem list     Review of Systems   Constitutional: Negative  Objective:      /68   Pulse 81   Ht 5' 5 25" (1 657 m)   Wt 64 9 kg (143 lb)   BMI 23 61 kg/m²          Physical Exam   Constitutional: He appears well-developed and well-nourished  No distress  Skin: He is not diaphoretic  Nursing note and vitals reviewed          Wound # 1   Location: right post lower leg  Length cm:0 3 Width 0 3 cm: Depth 0 2cm:   Deepest Tissue Noted in Base: SQ  Probe to Bone?: no  Peripheral Skin Description: dry  Granulation: 90% Fibrotic Tissue: 10% Necrotic Tissue: 0%  Drainage Amount: moderate  Signs of Infection: no  Total debrided  0 square centimeters

## 2020-03-17 ENCOUNTER — PROCEDURE VISIT (OUTPATIENT)
Dept: PODIATRY | Facility: CLINIC | Age: 75
End: 2020-03-17
Payer: COMMERCIAL

## 2020-03-17 VITALS
WEIGHT: 143 LBS | HEIGHT: 65 IN | BODY MASS INDEX: 23.82 KG/M2 | DIASTOLIC BLOOD PRESSURE: 68 MMHG | HEART RATE: 81 BPM | SYSTOLIC BLOOD PRESSURE: 108 MMHG

## 2020-03-17 DIAGNOSIS — L97.212 NON-PRESSURE CHRONIC ULCER OF RIGHT CALF WITH FAT LAYER EXPOSED (HCC): Primary | ICD-10-CM

## 2020-03-17 DIAGNOSIS — E11.42 DIABETIC PERIPHERAL NEUROPATHY (HCC): Chronic | ICD-10-CM

## 2020-03-17 PROCEDURE — 1160F RVW MEDS BY RX/DR IN RCRD: CPT | Performed by: PODIATRIST

## 2020-03-17 PROCEDURE — 3074F SYST BP LT 130 MM HG: CPT | Performed by: PODIATRIST

## 2020-03-17 PROCEDURE — 3008F BODY MASS INDEX DOCD: CPT | Performed by: PODIATRIST

## 2020-03-17 PROCEDURE — 3078F DIAST BP <80 MM HG: CPT | Performed by: PODIATRIST

## 2020-03-17 PROCEDURE — 3066F NEPHROPATHY DOC TX: CPT | Performed by: PODIATRIST

## 2020-03-17 PROCEDURE — 1036F TOBACCO NON-USER: CPT | Performed by: PODIATRIST

## 2020-03-17 PROCEDURE — 4040F PNEUMOC VAC/ADMIN/RCVD: CPT | Performed by: PODIATRIST

## 2020-03-17 PROCEDURE — 99213 OFFICE O/P EST LOW 20 MIN: CPT | Performed by: PODIATRIST

## 2020-03-19 NOTE — PROGRESS NOTES
This patient was seen on 3/24/2020  Assessment:    Problem List Items Addressed This Visit        Endocrine    Diabetic peripheral neuropathy ([facility] [address] ) - Primary (Chronic)       Other    Non-pressure chronic ulcer of right calf with [facility] layer exposed ([facility] )          Plan:  A formal timeout including patient identification, laterality and existing allergies using [facility] protocol was conducted  Procedure Performed: Debridement of subcutaneous tissue- <20 sq cm (59847)  Anesthetic used as needed to reduce discomfort  Under aseptic technique, the wound was thoroughly explored for undermining, deep sinus tracts and bone involvement  Sterile instrumentation including scalpel used to excise the clearly delineated devitalized subcutaneous tissue exposing viable tissue  Bleeding controlled with gentle pressure  Patient tolerated debridement without complications  The wound was dressed  Wound dressing technique and frequency described to patient  I am to be called if any signs of infection develop such as erythema, increased wound size or significant change in appearance of wound, odor, pain, increased or change in color of drainage, swelling, fever, chills, nightsweats  I reviewed these signs with the patient  I recommended limiting leg dependency and to use any prescribed compression devices in an effort to allow proper fluid control and healing of the wounded area  I was very clear that sleeping with legs dependent is to be avoided at all costs  I explained that good wound care and compliance are necessary to allow healing and to prevent limb loss  Collagen and adaptic applied to wound  Unna boot applied  Unna boot applied using paste wrap and coban  Applied from base of toes to infrapatellar notch  Unna boot care and precautions discussed and explained to pt/family  1  Leave wrap dry and intact  2   If wrap is too tight (feels like a tourniquet, toes turning purple), elevate the leg above heart level, if no relief, remove wrap (no scissors) and call office  3  If wrap becomes too loose, remove wrap (no scissors) and call the office  Pt/family verbalize understanding     Diagnoses and all orders for this visit:    Diabetic peripheral neuropathy ([facility] [address] )    Non-pressure chronic ulcer of right calf with fat layer exposed ([facility] [address] )          Subjective:      Patient ID: Deja Villa is a 76 y o  male  Pt arrives for wound care follow up  Presents with unna boot intact and denies problems  The following portions of the patient's history were reviewed and updated as appropriate: allergies, current medications, past family history, past medical history, past social history, past surgical history and problem list     Review of Systems   Constitutional: Negative  Objective:      /68   Pulse 86   Ht 5' 5 25" (1 657 m)   Wt 64 9 kg (143 lb)   BMI 23 61 kg/m²          Physical Exam   Constitutional: He appears well-developed and well-nourished  No distress  Skin: He is not diaphoretic  Nursing note and vitals reviewed          Wound # 1   Location: right post lower leg  Length cm:0 3 Width 0 3 cm: Depth 0 2cm:   Deepest Tissue Noted in Base: SQ  Probe to Bone?: no  Peripheral Skin Description: dry  Granulation: 90% Fibrotic Tissue: 10% Necrotic Tissue: 0%  Drainage Amount: moderate  Signs of Infection: no  Total debrided  0 9 square centimeters

## 2020-03-23 LAB — HBA1C MFR BLD HPLC: 9.2 %

## 2020-03-23 PROCEDURE — 3046F HEMOGLOBIN A1C LEVEL >9.0%: CPT | Performed by: PODIATRIST

## 2020-03-24 ENCOUNTER — PROCEDURE VISIT (OUTPATIENT)
Dept: PODIATRY | Facility: CLINIC | Age: 75
End: 2020-03-24
Payer: COMMERCIAL

## 2020-03-24 VITALS
DIASTOLIC BLOOD PRESSURE: 68 MMHG | BODY MASS INDEX: 23.82 KG/M2 | HEART RATE: 86 BPM | WEIGHT: 143 LBS | SYSTOLIC BLOOD PRESSURE: 118 MMHG | HEIGHT: 65 IN

## 2020-03-24 DIAGNOSIS — E11.42 DIABETIC PERIPHERAL NEUROPATHY (HCC): Primary | Chronic | ICD-10-CM

## 2020-03-24 DIAGNOSIS — L97.212 NON-PRESSURE CHRONIC ULCER OF RIGHT CALF WITH FAT LAYER EXPOSED (HCC): ICD-10-CM

## 2020-03-24 PROCEDURE — 11042 DBRDMT SUBQ TIS 1ST 20SQCM/<: CPT | Performed by: PODIATRIST

## 2020-03-31 ENCOUNTER — PROCEDURE VISIT (OUTPATIENT)
Dept: PODIATRY | Facility: CLINIC | Age: 75
End: 2020-03-31
Payer: COMMERCIAL

## 2020-03-31 ENCOUNTER — PATIENT OUTREACH (OUTPATIENT)
Dept: FAMILY MEDICINE CLINIC | Facility: CLINIC | Age: 75
End: 2020-03-31

## 2020-03-31 VITALS — HEIGHT: 65 IN | BODY MASS INDEX: 24.16 KG/M2 | WEIGHT: 145 LBS

## 2020-03-31 DIAGNOSIS — L97.212 NON-PRESSURE CHRONIC ULCER OF RIGHT CALF WITH FAT LAYER EXPOSED (HCC): ICD-10-CM

## 2020-03-31 DIAGNOSIS — E11.42 DIABETIC PERIPHERAL NEUROPATHY (HCC): Primary | Chronic | ICD-10-CM

## 2020-03-31 PROCEDURE — 11042 DBRDMT SUBQ TIS 1ST 20SQCM/<: CPT | Performed by: PODIATRIST

## 2020-03-31 NOTE — PROGRESS NOTES
This patient was seen on 3/31/20  Assessment:    Problem List Items Addressed This Visit        Nervous and Auditory    Diabetic peripheral neuropathy ([facility] [address] ) - Primary (Chronic)       Other    Non-pressure chronic ulcer of right calf with fat layer exposed ([facility] [address] )          Plan:  A formal timeout including patient identification, laterality and existing allergies using [facility] protocol was conducted  Procedure Performed: Debridement of subcutaneous tissue- <20 sq cm (65904)  Anesthetic used as needed to reduce discomfort  Under aseptic technique, the wound was thoroughly explored for undermining, deep sinus tracts and bone involvement  Sterile instrumentation including scalpel used to excise the clearly delineated devitalized subcutaneous tissue exposing viable tissue  Bleeding controlled with gentle pressure  Patient tolerated debridement without complications  The wound was dressed  Wound dressing technique and frequency described to patient  I am to be called if any signs of infection develop such as erythema, increased wound size or significant change in appearance of wound, odor, pain, increased or change in color of drainage, swelling, fever, chills, nightsweats  I reviewed these signs with the patient  I recommended limiting leg dependency and to use any prescribed compression devices in an effort to allow proper fluid control and healing of the wounded area  I was very clear that sleeping with legs dependent is to be avoided at all costs  I explained that good wound care and compliance are necessary to allow healing and to prevent limb loss  Adaptic and silver alginate applied to wound  Unna boot applied  Unna boot applied using paste wrap and coban  Applied from base of toes to infrapatellar notch  Unna boot care and precautions discussed and explained to pt/family  1  Leave wrap dry and intact  2   If wrap is too tight (feels like a tourniquet, toes turning purple), elevate the leg above heart level, if no relief, remove wrap (no scissors) and call office  3  If wrap becomes too loose, remove wrap (no scissors) and call the office  Pt/family verbalize understanding     Diagnoses and all orders for this visit:    Diabetic peripheral neuropathy ([facility] [address] )    Non-pressure chronic ulcer of right calf with fat layer exposed ([facility] [address] )          Subjective:      Patient ID: Valentine Mane is a 76 y o  male  Pt arrives for wound care follow up  Presents with unna boot intact and denies problems         The following portions of the patient's history were reviewed and updated as appropriate: allergies, current medications, past family history, past medical history, past social history, past surgical history and problem list     Review of Systems      Objective:      Ht 5' 5 25" (1 657 m)   Wt 65 8 kg (145 lb)   BMI 23 94 kg/m²          Physical Exam      Wound # 1   Location: right post lower leg  Length cm:0 3 Width 0 3 cm: Depth 0 2cm:   Deepest Tissue Noted in Base: SQ  Probe to Bone?: no  Peripheral Skin Description: dry  Granulation: 90% Fibrotic Tissue: 10% Necrotic Tissue: 0%  Drainage Amount: moderate  Signs of Infection: no  Total debrided  0 9 square centimeters

## 2020-03-31 NOTE — PATIENT INSTRUCTIONS
Debridement   WHAT YOU NEED TO KNOW:   Debridement is the removal of infected, damaged, or dead tissue so a wound can heal properly  You may need more than one debridement  DISCHARGE INSTRUCTIONS:   Medicines:   · Medicines  can help decrease pain or prevent or treat an infection  · Take your medicine as directed  Contact your healthcare provider if you think your medicine is not helping or if you have side effects  Tell him of her if you are allergic to any medicine  Keep a list of the medicines, vitamins, and herbs you take  Include the amounts, and when and why you take them  Bring the list or the pill bottles to follow-up visits  Carry your medicine list with you in case of an emergency  Follow up with your healthcare provider as directed: You may need to return to have your wound checked  Write down your questions so you remember to ask them during your visits  Care for your wound as directed:   · Keep your wound clean and dry  You may need to cover your wound when you bathe  · Limit movements,  such as stretching, to prevent bleeding, tearing, and swelling in your wound  · Protect your wound  Avoid sunlight for at least 6 months  Apply mild, unscented lotion or cream to the skin around your wound to keep it moist     · Do not smoke  If you smoke, it is never too late to quit  Smoking decreases blood flow to the wound and delays healing  Ask for information if you need help quitting  · Drink liquids as directed  Ask how much liquid to drink each day and which liquids are best for you  Liquids help keep your skin moist so your wound can heal      · Eat a variety of healthy foods  Foods rich in protein, such as meat, eggs, and dairy products, help repair tissue  Carbohydrate-rich foods, such as bread and cereals, help increase cell growth and decrease the risk for wound infection  Do not have caffeine  Ask if you should take vitamins   Vitamins A and C may help tissue formation and increase scar tissue strength  Contact your healthcare provider if:   · You have a fever  · Your pain gets worse or does not go away, even after treatment  · Your skin is red, swollen, or draining pus  · You have questions or concerns about your condition or care  Return to the emergency department if:   · Blood soaks through your bandage  · You have severe pain  © 2017 [employer]  Information is for End User's use only and may not be sold, redistributed or otherwise used for commercial purposes  All illustrations and images included in CareNotes® are the copyrighted property of [employer]  or Kirill Ulrich  The above information is an  only  It is not intended as medical advice for individual conditions or treatments  Talk to your doctor, nurse or pharmacist before following any medical regimen to see if it is safe and effective for you

## 2020-04-07 ENCOUNTER — PROCEDURE VISIT (OUTPATIENT)
Dept: PODIATRY | Facility: CLINIC | Age: 75
End: 2020-04-07
Payer: COMMERCIAL

## 2020-04-07 VITALS — BODY MASS INDEX: 24.16 KG/M2 | HEIGHT: 65 IN | WEIGHT: 145 LBS

## 2020-04-07 DIAGNOSIS — L97.212 NON-PRESSURE CHRONIC ULCER OF RIGHT CALF WITH FAT LAYER EXPOSED (HCC): ICD-10-CM

## 2020-04-07 DIAGNOSIS — E11.42 DIABETIC PERIPHERAL NEUROPATHY (HCC): Primary | Chronic | ICD-10-CM

## 2020-04-07 PROCEDURE — 11042 DBRDMT SUBQ TIS 1ST 20SQCM/<: CPT | Performed by: PODIATRIST

## 2020-04-13 ENCOUNTER — REMOTE DEVICE CLINIC VISIT (OUTPATIENT)
Dept: CARDIOLOGY CLINIC | Facility: CLINIC | Age: 75
End: 2020-04-13
Payer: COMMERCIAL

## 2020-04-13 DIAGNOSIS — Z95.0 PACEMAKER: Primary | ICD-10-CM

## 2020-04-13 PROCEDURE — 93296 REM INTERROG EVL PM/IDS: CPT | Performed by: INTERNAL MEDICINE

## 2020-04-13 PROCEDURE — 93294 REM INTERROG EVL PM/LDLS PM: CPT | Performed by: INTERNAL MEDICINE

## 2020-04-14 ENCOUNTER — PROCEDURE VISIT (OUTPATIENT)
Dept: PODIATRY | Facility: CLINIC | Age: 75
End: 2020-04-14
Payer: COMMERCIAL

## 2020-04-14 VITALS — HEIGHT: 65 IN | WEIGHT: 144 LBS | BODY MASS INDEX: 23.99 KG/M2

## 2020-04-14 DIAGNOSIS — L97.212 NON-PRESSURE CHRONIC ULCER OF RIGHT CALF WITH FAT LAYER EXPOSED (HCC): ICD-10-CM

## 2020-04-14 DIAGNOSIS — E11.42 DIABETIC PERIPHERAL NEUROPATHY (HCC): Primary | Chronic | ICD-10-CM

## 2020-04-14 PROCEDURE — 11042 DBRDMT SUBQ TIS 1ST 20SQCM/<: CPT | Performed by: PODIATRIST

## 2020-06-16 PROBLEM — E11.9 ENCOUNTER FOR DIABETIC FOOT EXAM (HCC): Status: ACTIVE | Noted: 2018-07-06

## 2020-06-16 PROBLEM — Z86.31 PERSONAL HISTORY OF DIABETIC FOOT ULCER: Status: ACTIVE | Noted: 2020-01-01

## 2020-06-16 PROBLEM — L97.212 NON-PRESSURE CHRONIC ULCER OF RIGHT CALF WITH FAT LAYER EXPOSED (HCC): Status: RESOLVED | Noted: 2019-12-18 | Resolved: 2020-01-01

## 2020-07-07 PROBLEM — S81.801A OPEN WOUND OF RIGHT LOWER LEG: Status: ACTIVE | Noted: 2020-01-01

## 2020-07-08 NOTE — PATIENT INSTRUCTIONS
Chronic Wound Care   WHAT YOU NEED TO KNOW:   A wound is an injury that causes a break in the skin  There may also be damage to nearby tissues  Chronic wounds are wounds that do not heal completely in 6 weeks  Examples of wounds that can become chronic are deep ulcers (open sores), large burns, and infected cuts  DISCHARGE INSTRUCTIONS:   Medicines:   · NSAIDs  help decrease swelling, pain, and fever  This medicine is available with or without a doctor's order  NSAIDs can cause stomach bleeding or kidney problems in certain people  If you take blood thinner medicine, always ask your healthcare provider if NSAIDs are safe for you  Always read the medicine label and follow directions  · Acetaminophen  decreases pain and fever  It is available without a doctor's order  Ask how much to take and how often to take it  Follow directions  Acetaminophen can cause liver damage if not taken correctly  · Antibiotics  may be given to prevent or treat an infection caused by bacteria  · Take your medicine as directed  Contact your healthcare provider if you think your medicine is not helping or if you have side effects  Tell him if you are allergic to any medicine  Keep a list of the medicines, vitamins, and herbs you take  Include the amounts, and when and why you take them  Bring the list or the pill bottles to follow-up visits  Carry your medicine list with you in case of an emergency  Follow up with your healthcare provider as directed: You need to return to have your wound checked  If you have packing in your wound, you need to return to have the packing replaced and the bandage changed  Write down your questions so you remember to ask them during your visits  Wound care:   · If your wound was closed with thin strips of medical tape, keep them clean and dry  The strips of medical tape will fall off on their own  Do not pull them off  · Keep the bandage clean and dry   Do not remove the bandage over your wound unless your healthcare provider says it is okay  · Wash your hands before and after you take care of your wound to prevent infection  · Clean the wound as directed  If you cannot reach the wound, have someone help you  · If you have pain when you change your bandages, take pain medicine before you start  · If you have packing, make sure all the gauze used to pack the wound is taken out and replaced as directed  Keep track of how many gauze dressings are placed inside the wound  Negative pressure wound therapy:  Negative pressure wound therapy (NPWT) is also called wound vacuum, or wound vac therapy  A vacuum device uses suction to remove fluid and waste from your wound and pull the edges closer together  It may also increase blood flow and new tissue growth in the wound  Your healthcare providers will decide if you need NPWT at home and how long you need it  Tell your healthcare providers if you do not feel able to use NPWT at home  · If you will use NPWT at home, get trained on how to use the equipment correctly  Ask your healthcare providers to watch you use the vacuum device to make sure you are using it correctly  Learn when and how to change the drainage container  Keep the directions in a place where you can find them easily  · Ask about the risks of NPWT, including bleeding and infection  Tell your healthcare providers about the medicines you use  Certain medicines, such as aspirin and blood thinners, increase your risk for bleeding  If you see blood in the tubing or container, or on your bandages, stop the device immediately  Apply direct pressure  Call 911   Eat healthy foods and drink liquids as directed:  Healthy foods give your body the nutrients it needs to heal your wound  Liquids prevent dehydration that can decrease the blood supply to your wound  Healthy foods include fruits, vegetables, grains (breads and cereals), dairy, and protein foods   Protein foods include meat, fish, nuts, and soy products  Protein, calories, vitamin C, and zinc help wounds heal  Ask for more information about the foods you should eat to improve healing  Do not smoke: If you smoke, it is never too late to quit  Smoking delays wound healing  Smoking also increases your risk for infection after surgery  Ask your healthcare provider for information if you need help quitting  Prevent pressure damage:  If you have a chronic wound, you may be at risk for pressure damage to your wound and other places on your body  Pressure sores can develop when blood flow to an area is blocked  For example, you sit or lie in the same position without moving and put pressure on your legs  · Prevent pressure sores by changing your position every 15 minutes while you are sitting  Prop your legs on pillows to lift your heels while you are lying down  · Check your skin daily for signs of pressure sores  Common signs are swelling, open sores, blisters, a rash, or changes in color or temperature  Tell your healthcare provider if you have any of these signs  Contact your healthcare provider if:   · You have a fever  · You have increased or new pain, swelling, redness, or bleeding in or around your wound  · You have pus or a foul odor coming from your wound  · Your skin itches or has a rash  · You have questions or concerns about your condition or care  Return to the emergency department if:   · You have muscle or joint pain, body aches, or sweating, with a fever  · You have a headache with diarrhea, nausea or vomiting, or a sore throat  · You are confused, or feel dizzy or faint when you stand up  · You have trouble breathing or sudden chest pain  · You see blood in the NPWT tubing or container, or on your bandages  © 2016 [address] [employer] is for End User's use only and may not be sold, redistributed or otherwise used for commercial purposes   All illustrations and images included in [facility] are the copyrighted property of [employer]  or Kirill Ulrich  The above information is an  only  It is not intended as medical advice for individual conditions or treatments  Talk to your doctor, nurse or pharmacist before following any medical regimen to see if it is safe and effective for you

## 2020-07-08 NOTE — PROGRESS NOTES
This patient was seen on 7/7/20  Assessment:    Problem List Items Addressed This Visit        Other    Open wound of right lower leg - Primary          Plan:  Dermagran and gauze recommended to the small traumatic wound on the shin to be changed once daily  Consistent use of the compression hose recommended during waking hours  Follow-up check of the small shin wound x 30 days  Diagnoses and all orders for this visit:    Open wound of right lower leg, initial encounter          Subjective:      Patient ID: Amirah Garcia is a 76 y o  male  [facility] for a follow-up check of his posterior leg venous ulcer  He notes no recurrent drainage from the site  He's using his compression hose daily  He does admit he bumped his anterior Right leg and has a small abrasion  The following portions of the patient's history were reviewed and updated as appropriate: allergies, current medications, past family history, past medical history, past social history, past surgical history and problem list     Review of Systems   Constitutional: Positive for fatigue  Negative for activity change, appetite change, chills, diaphoresis, fever and unexpected weight change  Respiratory: Negative  Cardiovascular: Negative  Gastrointestinal: Negative  Musculoskeletal: Negative  Skin: Positive for wound  Neurological: Positive for numbness  Psychiatric/Behavioral: Negative  Objective:      /71   Pulse 84   Temp (!) 96 5 °F (35 8 °C)   Ht 5' 5" (1 651 m)   Wt 66 1 kg (145 lb 12 8 oz)   BMI 24 26 kg/m²          Physical Exam   Constitutional: He is oriented to person, place, and time  He appears well-developed and well-nourished  No distress  HENT:   Head: Normocephalic  Eyes: Pupils are equal, round, and reactive to light  Neck: Normal range of motion  Cardiovascular:   Pulses:       Dorsalis pedis pulses are 1+ on the right side, and 1+ on the left side  Posterior tibial pulses are 1+ on the right side, and 1+ on the left side  Pulmonary/Chest: Effort normal and breath sounds normal    Abdominal: Soft  Bowel sounds are normal    Feet:   Right Foot:   Skin Integrity: Negative for ulcer, skin breakdown, erythema, warmth, callus or dry skin  Left Foot: amputated  Neurological: He is alert and oriented to person, place, and time  Skin: He is not diaphoretic  Nursing note and vitals reviewed

## 2020-07-14 NOTE — PROGRESS NOTES
MDT-DUAL CHAMBER PPM  [employer] TRANSMISSION:  BATTERY VOLTAGE ADEQUATE (3 5 YR)    AP 1 7%  100% (>40%/CHB)   ALL LEAD PARAMETERS WITHIN NORMAL LIMITS HX OF ELEVATED ATRIAL PACING THRESHOLD, SET TO 3V/ 6MS   NO HIGH RATE EPISODES   NORMAL DEVICE FUNCTION   RG

## 2020-07-22 NOTE — PROGRESS NOTES
NEPHROLOGY OFFICE VISIT   Britta Sidhu 76 y o  male MRN: [id number]  7/22/2020    Reason for Visit: CKD III    ASSESSMENT and PLAN:    I had the pleasure of seeing Mr Carly Eid today in the renal clinic for the continued management of CKD III  24-year-old male with a past medical history of diabetes (30+ years, type II), diabetic retinopathy, neuropathy, gastroparesis, CKD, hyperlipidemia, hypertension (diagnosed for 5 years), thrombocytopenia, episode of DKA in July, prior echocardiogram with EF 55%, osteoporosis, admission in March with acute kidney injury and was admitted for lethargy and vomiting, was found to have DKA, quit smoking 30+ years ago (smoked for 20 years), R LE ulcerations who presents for f/u evaluation for CKD thru telemedicine visit      Since last being seen, patient had debridement of RLE ulcer by Podiatry team on 2/2020       Further debridements in march 2020 and April 2020 of ulcer     1) CKD III - baseline Cr may be 1 2-1 5 mg/dL?      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  -in March of 2019 with a peak creatinine of 3 1 mg/dL that had improved to 1 mg by discharge,  -then has had multiple episodes of acute kidney injury in June and also in July during admission of DKA  -creatinine on October 30th is 1 4 mg/dL  -prior CT scan in October 2017 with unremarkable kidneys, no hydronephrosis  -urinalysis in the office was 300 protein, otherwise bland  -urinalysis in [address] [place] [address] prior was negative to 1+ protein  -microalbumin to creatinine ratio most recent October was 80 but has been as high as 384  -hemoglobin A1c is 8 8% in October has mainly been uncontrolled since 2012  - to note, pt is on pantoprazole for 2-3 years   - 12/2019 - ACEi held completely due to rising Cr and marginal BP  - 1/15/2020 - started on amlodipine due to rising BP   - 5/18/2020 - K 5 3 but slightly hemolyzed  Cr baseline 1 4 mg/dL  Microscopic hematuria 4-10 RBC  UPCR slightly higher 0 22    -July 21st-creatinine stable 1 25  Patient seen in appointment July 22nd  Denies complaints  Stopping amlodipine  Starting lisinopril      Pt had rising Cr in October 2019 from baseline  Has had mult episodes of KAZ, long standing uncontrolled DM (though proteinuria stable), HTN, PVD which are all contributing likely to kidney disease        Rise in Cr may have been secondary to ACEi/Hypotension/autoregulatory failure  Cr improved to baseline 1 2 mg/dL with holding of ACEi and therefore renal doplar study was checked which was unrevealing for renal art stenosis       Plan:  - patient saw the urologist   Awaiting urine cytology  Patient follows the urology closely  Had microscopic hematuria prior also   -stop amlodipine  -start lisinopril once a day  -start lisinopril 5 milligrams  -BMP in 1 week  -full set of lab work before next appointment in 3-4 months     2) HTN     -stop amlodipine on July 22nd  -start lisinopril once a day July 23rd     3) Osteoporosis     - per Primary and Endo in [place]     4) Hyperkalemia     - low K diet  - improved  See above regarding ACE-inhibitor     5) ALINA     - did not tolerate mask  - advised to talk to PCP     6) RLE wound with PVD and MD     - diffuse b/l atherosclerotic disease  - follows with Podiatry closely     7) BPH     - sees Urology  - on tamsulosin     8) DM     - has DM pump  - per endocrinology team  - gastroparesis     9) MBD     - PTH - 56 9 on 5/2020  -check lab work next appointment     10) microscopic hematuria     - pt follows at [place]   - will need cysto if continues -patient saw the urologist   Had ultrasound which was normal per the patient  Was advised to have urine cytology    I asked the patient to have the urologist send records when he goes for his next appointment  11) BPH    - follows with Urology  - is to have urine cytology per pt      It was a pleasure evaluating your patient  Thank you for allowing our team to participate in the care of Mr Naren Parker  Please do not hesitate to contact our team if further issues/questions shall arise in the interim    It was a pleasure evaluating your patient in the office today  Thank you for allowing our team to participate in the care of Mr Bonita Ruffin  Please do not hesitate to contact our team if further issues/questions shall arise in the interim  No problem-specific Assessment & Plan notes found for this encounter  HPI:    Wounds on lower extremity closed  No recent fevers, chills, nausea, vomiting  PATIENT INSTRUCTIONS:    Patient Instructions   1) Avoid NSAIDS - (Example - motrin, advil, ibuprofen, aleve, exederin, etc)  2) Always follow a low salt diet  3) STOP amlodipine  4) START lisinopril 5 mg daily  5) BMP labwork in 1 week   6) please check BP at home 2-3 times per week  If rises above 145 (top number) please call  7) full labs in 3-4 months and appt in 4 month        OBJECTIVE:  Current Weight:    There were no vitals filed for this visit  There is no height or weight on file to calculate BMI  REVIEW OF SYSTEMS:    Review of Systems   Constitutional: Negative  Negative for appetite change and fatigue  HENT: Negative  Eyes: Negative  Respiratory: Negative  Negative for shortness of breath  Cardiovascular: Negative  Negative for leg swelling  Gastrointestinal: Negative  Endocrine: Negative  Genitourinary: Negative  Negative for difficulty urinating  Musculoskeletal: Negative  Allergic/Immunologic: Negative  Neurological: Negative  Hematological: Negative  Psychiatric/Behavioral: Negative  All other systems reviewed and are negative        PHYSICAL EXAM:      Physical Exam   Constitutional: He appears well-developed and well-nourished  No distress  HENT:   Head: Normocephalic  Eyes: No scleral icterus  Neck: Normal range of motion  Pulmonary/Chest: Effort normal  No respiratory distress  Psychiatric: He has a normal mood and affect   His behavior is normal  Judgment and thought content normal        Medications:    Current Outpatient Medications:     aspirin 81 mg chewable tablet, Chew 81 mg daily, Disp: , Rfl:     Atorvastatin Calcium (LIPITOR PO), Take 10 mg by mouth daily 1/2 tab daily, Disp: , Rfl:     CALCIUM CITRATE PO, Take 2,000 mg by mouth daily , Disp: , Rfl:     Cholecalciferol (VITAMIN D3) 2000 units capsule, Take 2,000 Units by mouth daily, Disp: , Rfl:     Continuous Blood Gluc Sensor (DEXCOM G6 SENSOR) MISC, As directed dx:250 03, Disp: , Rfl:     cyanocobalamin (VITAMIN B-12) 1,000 mcg tablet, Take 2,000 mcg by mouth daily, Disp: , Rfl:     DULoxetine (CYMBALTA) 30 mg delayed release capsule, Take 30 mg by mouth daily , Disp: , Rfl:     ezetimibe (ZETIA) 10 mg tablet, TAKE 5MG (ONE-HALF TABLET) BY MOUTH DAILY FOR CHOLESTEROL, Disp: , Rfl:     glucose 40 %, Take by mouth once, Disp: , Rfl:     insulin aspart (NovoLOG) 100 units/mL injection, Inject under the skin Via insulin pump, Disp: , Rfl:     levothyroxine 125 mcg tablet, Take 1 tablet (125 mcg total) by mouth daily in the early morning, Disp: 30 tablet, Rfl: 0    lisinopril (ZESTRIL) 5 mg tablet, Take 1 tablet (5 mg total) by mouth daily, Disp: 30 tablet, Rfl: 3    pantoprazole (PROTONIX) 40 mg tablet, Take 40 mg by mouth, Disp: , Rfl:     tamsulosin (FLOMAX) 0 4 mg, Take 0 4 mg by mouth daily with dinner  , Disp: , Rfl:     Laboratory Results:  Results from last 7 days   Lab Units 07/21/20  0818   POTASSIUM mmol/L 4 5   CHLORIDE mmol/L 102   CO2 mmol/L 31   BUN mg/dL 34*   CREATININE mg/dL 1 25   CALCIUM mg/dL 9 6       Results for orders placed or performed in visit on 76/21/21   Basic metabolic panel   Result Value Ref Range Sodium 136 136 - 145 mmol/L    Potassium 4 5 3 5 - 5 3 mmol/L    Chloride 102 100 - 108 mmol/L    CO2 31 21 - 32 mmol/L    ANION GAP 3 (L) 4 - 13 mmol/L    BUN 34 (H) 5 - 25 mg/dL    Creatinine 1 25 0 60 - 1 30 mg/dL    Glucose, Fasting 82 65 - 99 mg/dL    Calcium 9 6 8 3 - 10 1 mg/dL    eGFR 56 ml/min/1 73sq m   Protein / creatinine ratio, urine   Result Value Ref Range    Creatinine, Ur 97 7 mg/dL    Protein Urine Random 15 mg/dL    Prot/Creat Ratio, Ur 0 15 (H) 0 00 - 0 10   Urinalysis with microscopic   Result Value Ref Range    Clarity, UA Clear Clear, Other    Color, UA Yellow Straw, Yellow, Pale Yellow    Specific [place], UA 1 015 1 003 - 1 040    pH, UA 6 0 4 5, 5 0, 5 5, 6 0, 6 5, 7 0, 7 5, 8 0    Glucose,  (1/10%) (A) Negative mg/dl    Ketones, UA Negative Negative mg/dl    Blood, UA Negative Negative    Protein, UA 15 (Trace) (A) Negative mg/dl    Nitrite, UA Negative Negative    Bilirubin, UA Negative Negative    Leukocytes, UA 25 0 (A) Negative    WBC, UA 0-1 (A) None Seen, 0-5, 5-55, 5-65 /hpf    RBC, UA 0-1 (A) None Seen, 0-5 /hpf    Hyaline Casts, UA 0-1 (A) (none) /lpf    Bacteria, UA None Seen None Seen, Occasional /hpf    Epithelial Cells Occasional None Seen, Occasional /hpf    MUCUS THREADS Occasional (A) None Seen    UROBILINOGEN UA Negative 1 0, Negative mg/dL

## 2020-07-22 NOTE — LETTER
July 22, 2020     Roger Forebs MD  [address]  [place] [address]    Patient: Brittany Landry   YOB: 1945   Date of Visit: 7/22/2020       Dear Dr Sow Linker:    Thank you for referring Tejinder Velazco to me for evaluation  Below are my notes for this consultation  If you have questions, please do not hesitate to call me  I look forward to following your patient along with you  Sincerely,        Isabel Jackson MD        CC: No Recipients  Isabel Jackson MD  7/22/2020 11:39 AM  Sign at close encounter  [address] 76 y o  male MRN: [id number]  7/22/2020    Reason for Visit: CKD III    ASSESSMENT and PLAN:    I had the pleasure of seeing Mr Navneet Engle today in the renal clinic for the continued management of CKD III  70-year-old male with a past medical history of diabetes (30+ years, type II), diabetic retinopathy, neuropathy, gastroparesis, CKD, hyperlipidemia, hypertension (diagnosed for 5 years), thrombocytopenia, episode of DKA in July, prior echocardiogram with EF 55%, osteoporosis, admission in March with acute kidney injury and was admitted for lethargy and vomiting, was found to have DKA, quit smoking 30+ years ago (smoked for 20 years), R LE ulcerations who presents for f/u evaluation for CKD thru telemedicine visit      Since last being seen, patient had debridement of RLE ulcer by Podiatry team on 2/2020       Further debridements in march 2020 and April 2020 of ulcer     1) CKD III - baseline Cr may be 1 2-1 5 mg/dL?      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  -in March of 2019 with a peak creatinine of 3 1 mg/dL that had improved to 1 mg by discharge,  -then has had multiple episodes of acute kidney injury in June and also in July during admission of DKA  -creatinine on October 30th is 1 4 mg/dL  -prior CT scan in October 2017 with unremarkable kidneys, no hydronephrosis  -urinalysis in the office was 300 protein, otherwise bland  -urinalysis in [address] prior was negative to 1+ protein  -microalbumin to creatinine ratio most recent October was 80 but has been as high as 384  -hemoglobin A1c is 8 8% in October has mainly been uncontrolled since 2012  - to note, pt is on pantoprazole for 2-3 years   - 12/2019 - ACEi held completely due to rising Cr and marginal BP  - 1/15/2020 - started on amlodipine due to rising BP   - 5/18/2020 - K 5 3 but slightly hemolyzed  Cr baseline 1 4 mg/dL  Microscopic hematuria 4-10 RBC  UPCR slightly higher 0 22    -July 21st-creatinine stable 1 25  Patient seen in appointment July 22nd  Denies complaints  Stopping amlodipine  Starting lisinopril      Pt had rising Cr in October 2019 from baseline  Has had mult episodes of KAZ, long standing uncontrolled DM (though proteinuria stable), HTN, PVD which are all contributing likely to kidney disease        Rise in Cr may have been secondary to ACEi/Hypotension/autoregulatory failure  Cr improved to baseline 1 2 mg/dL with holding of ACEi and therefore renal doplar study was checked which was unrevealing for renal art stenosis       Plan:  - patient saw the urologist   Awaiting urine cytology  Patient follows the urology closely  Had microscopic hematuria prior also   -stop amlodipine  -start lisinopril once a day  -start lisinopril 5 milligrams  -BMP in 1 week  -full set of lab work before next appointment in 3-4 months     2) HTN     -stop amlodipine on July 22nd  -start lisinopril once a day July 23rd     3) Osteoporosis     - per Primary and Endo in [facility]     4) Hyperkalemia     - low K diet  - improved    See above regarding ACE-inhibitor     5) ALINA     - did not tolerate mask  - advised to talk to PCP     6) RLE wound with PVD and MD     - diffuse b/l atherosclerotic disease  - follows with Podiatry closely     7) BPH     - sees Urology  - on tamsulosin     8) DM     - has DM pump  - per endocrinology team  - gastroparesis     9) MBD     - PTH - 56 9 on 5/2020  -check lab work next appointment     10) microscopic hematuria     - pt follows at [facility]   - will need cysto if continues  -patient saw the urologist   Had ultrasound which was normal per the patient  Was advised to have urine cytology  I asked the patient to have the urologist send records when he goes for his next appointment  11) BPH    - follows with Urology  - is to have urine cytology per pt      It was a pleasure evaluating your patient  Thank you for allowing our team to participate in the care of Mr Naren Parker  Please do not hesitate to contact our team if further issues/questions shall arise in the interim    It was a pleasure evaluating your patient in the office today  Thank you for allowing our team to participate in the care of Mr Ramesh Heredia  Please do not hesitate to contact our team if further issues/questions shall arise in the interim  No problem-specific Assessment & Plan notes found for this encounter  HPI:    Wounds on lower extremity closed  No recent fevers, chills, nausea, vomiting  PATIENT INSTRUCTIONS:    Patient Instructions   1) Avoid NSAIDS - (Example - motrin, advil, ibuprofen, aleve, exederin, etc)  2) Always follow a low salt diet  3) STOP amlodipine  4) START lisinopril 5 mg daily  5) BMP labwork in 1 week   6) please check BP at home 2-3 times per week  If rises above 145 (top number) please call  7) full labs in 3-4 months and appt in 4 month        OBJECTIVE:  Current Weight:    There were no vitals filed for this visit  There is no height or weight on file to calculate BMI  REVIEW OF SYSTEMS:    Review of Systems   Constitutional: Negative  Negative for appetite change and fatigue  HENT: Negative  Eyes: Negative  Respiratory: Negative  Negative for shortness of breath  Cardiovascular: Negative  Negative for leg swelling  Gastrointestinal: Negative  Endocrine: Negative  Genitourinary: Negative  Negative for difficulty urinating  Musculoskeletal: Negative  Allergic/Immunologic: Negative  Neurological: Negative  Hematological: Negative  Psychiatric/Behavioral: Negative  All other systems reviewed and are negative  PHYSICAL EXAM:      Physical Exam   Constitutional: He appears well-developed and well-nourished  No distress  HENT:   Head: Normocephalic  Eyes: No scleral icterus  Neck: Normal range of motion  Pulmonary/Chest: Effort normal  No respiratory distress  Psychiatric: He has a normal mood and affect   His behavior is normal  Judgment and thought content normal        Medications:    Current Outpatient Medications:     aspirin 81 mg chewable tablet, Chew 81 mg daily, Disp: , Rfl:     Atorvastatin Calcium (LIPITOR PO), Take 10 mg by mouth daily 1/2 tab daily, Disp: , Rfl:     CALCIUM CITRATE PO, Take 2,000 mg by mouth daily , Disp: , Rfl:     Cholecalciferol (VITAMIN D3) 2000 units capsule, Take 2,000 Units by mouth daily, Disp: , Rfl:     Continuous Blood Gluc Sensor (DEXCOM G6 SENSOR) MISC, As directed dx:250 03, Disp: , Rfl:     cyanocobalamin (VITAMIN B-12) 1,000 mcg tablet, Take 2,000 mcg by mouth daily, Disp: , Rfl:     DULoxetine (CYMBALTA) 30 mg delayed release capsule, Take 30 mg by mouth daily , Disp: , Rfl:     ezetimibe (ZETIA) 10 mg tablet, TAKE 5MG (ONE-HALF TABLET) BY MOUTH DAILY FOR CHOLESTEROL, Disp: , Rfl:     glucose 40 %, Take by mouth once, Disp: , Rfl:     insulin aspart (NovoLOG) 100 units/mL injection, Inject under the skin Via insulin pump, Disp: , Rfl:     levothyroxine 125 mcg tablet, Take 1 tablet (125 mcg total) by mouth daily in the early morning, Disp: 30 tablet, Rfl: 0    lisinopril (ZESTRIL) 5 mg tablet, Take 1 tablet (5 mg total) by mouth daily, Disp: 30 tablet, Rfl: 3    pantoprazole (PROTONIX) 40 mg tablet, Take 40 mg by mouth, Disp: , Rfl:     tamsulosin (FLOMAX) 0 4 mg, Take 0 4 mg by mouth daily with dinner  , Disp: , Rfl:     Laboratory Results:  Results from last 7 days   Lab Units 07/21/20  0818   POTASSIUM mmol/L 4 5   CHLORIDE mmol/L 102   CO2 mmol/L 31   BUN mg/dL 34*   CREATININE mg/dL 1 25   CALCIUM mg/dL 9 6       Results for orders placed or performed in visit on 92/36/60   Basic metabolic panel   Result Value Ref Range    Sodium 136 136 - 145 mmol/L    Potassium 4 5 3 5 - 5 3 mmol/L    Chloride 102 100 - 108 mmol/L    CO2 31 21 - 32 mmol/L    ANION GAP 3 (L) 4 - 13 mmol/L    BUN 34 (H) 5 - 25 mg/dL    Creatinine 1 25 0 60 - 1 30 mg/dL    Glucose, Fasting 82 65 - 99 mg/dL    Calcium 9 6 8 3 - 10 1 mg/dL    eGFR 56 ml/min/1 73sq m   Protein / creatinine ratio, urine   Result Value Ref Range    Creatinine, Ur 97 7 mg/dL    Protein Urine Random 15 mg/dL    Prot/Creat Ratio, Ur 0 15 (H) 0 00 - 0 10   Urinalysis with microscopic   Result Value Ref Range    Clarity, UA Clear Clear, Other    Color, UA Yellow Straw, Yellow, Pale Yellow    Specific [place], UA 1 015 1 003 - 1 040    pH, UA 6 0 4 5, 5 0, 5 5, 6 0, 6 5, 7 0, 7 5, 8 0    Glucose,  (1/10%) (A) Negative mg/dl    Ketones, UA Negative Negative mg/dl    Blood, UA Negative Negative    Protein, UA 15 (Trace) (A) Negative mg/dl    Nitrite, UA Negative Negative    Bilirubin, UA Negative Negative    Leukocytes, UA 25 0 (A) Negative    WBC, UA 0-1 (A) None Seen, 0-5, 5-55, 5-65 /hpf    RBC, UA 0-1 (A) None Seen, 0-5 /hpf    Hyaline Casts, UA 0-1 (A) (none) /lpf    Bacteria, UA None Seen None Seen, Occasional /hpf    Epithelial Cells Occasional None Seen, Occasional /hpf    MUCUS THREADS Occasional (A) None Seen    UROBILINOGEN UA Negative 1 0, Negative mg/dL

## 2020-07-22 NOTE — PATIENT INSTRUCTIONS
1) Avoid NSAIDS - (Example - motrin, advil, ibuprofen, aleve, exederin, etc)  2) Always follow a low salt diet  3) STOP amlodipine  4) START lisinopril 5 mg daily  5) BMP labwork in 1 week   6) please check BP at home 2-3 times per week   If rises above 145 (top number) please call  7) full labs in 3-4 months and appt in 4 month

## 2020-07-28 NOTE — PROGRESS NOTES
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, with long-term current use of insulin ([facility])     5  Stage 3 chronic kidney disease ([address] )         Discussion/Summary:    - Complete Heart Block:  Medtronic dual-chamber pacer in place  Follows with our device Clinic  100% V paced  - dyslipidemia:  Numbers are controlled with low dose of atorvastatin as well as Zetia  He is diabetic  Indicated that typically we would use a higher dose of statin, but he had side effects with this previously  - diabetes:  Discussed this being a coronary risk equivalent  Lipid-lowering therapy as noted above  On a low dose of lisinopril and blood pressure is controlled  Chronic kidney disease is known  81 mg of aspirin     - diastolic dysfunction echocardiogram:  Without any evidence clinically of congestive heart failure  - CKD: Follows with Dr Gwendolyn Denny  Creatinine most recently 1 25, previously mid 1's  History of Present Illness:     80-year-old gentleman who I am seeing for the 1st time today  He has a history of complete heart block and had a Medtronic dual-chamber pacemaker placed in 2015  He has done well since that time  He is 100% ventricular paced  He is type 2 diabetic  He has insulin pump in place  Last echocardiogram was done in January of 2020 at the [facility]  This was ordered by his Trevor1 Lucas Devries  He has diastolic dysfunction, but normal LV function and no significant valvular disease  He is on a low dose of atorvastatin    Was previously on a higher dose with his primary care physician years ago, but his wife tells me that he had toxicity with this  He is on Zetia as well  Last lipid profile was well controlled  Chronic kidney disease, follows with Dr Aiden Cooley  Most recent creatinine was 1 25    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 for results  Anemia    Arthritis    Type 2 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, left eye ([facility] )    Overview Addendum 7/26/2019 10:22 AM by Kristy Landaverde RN     Overview:   ICD10 clean up  Updated ICD-10 code per guidelines, from ophthalmology note 10/18/18, Also has E11 3311 Type 2 DM with moderate NPDR with macular edema of right eye           Lab Results   Component Value Date    HGBA1C 9 2 03/23/2020         Diabetic peripheral neuropathy ([facility] ) (Chronic)      Lab Results   Component Value Date    HGBA1C 9 2 03/23/2020         DM type 2, not at goal [facility])      Lab Results   Component Value Date    HGBA1C 9 2 03/23/2020         Essential hypertension (Chronic)    Overview Signed 1/25/2018  1:46 PM by Darrion Kyle MD     Last Assessment & Plan:   Blood pressure today to goal <140/90, though one episode of dizziness in hot weather not related to hypoglycemia  Continue to monitor, but consider decreased anti-hypertensives if recurrent           Hyperlipidemia (Chronic)    Insulin pump status    Overview Signed 1/25/2018  1:46 PM by Darrion Kyle MD     Last Assessment & Plan:   See T1DM         Medtronic dual chamber PM    Screening for colon cancer    Overview Signed 4/16/2018 11:21 AM by Gela Rooney MA     Added automatically from request for surgery [id number]         Encounter for diabetic foot exam ([facility] )    Risk for falls    Stage 3 chronic kidney disease ([facility] )    Thrombocytopenia ([facility] ) (Chronic)    High anion gap metabolic acidosis    Transaminitis    Hyponatremia    Low bicarbonate    Pacemaker (Chronic)    Type 2 diabetes mellitus with hyperglycemia ([facility] [facility] )    Overview Signed 7/30/2019  7:39 AM by Emiliana Grey RN     Updated per ICD-10 guidelines, provider appproved           Lab Results   Component Value Date    HGBA1C 9 2 03/23/2020         Vitreous hemorrhage of left eye ([facility] )    Transition of care performed with sharing of clinical summary    Microalbuminuria    Complete heart block ([facility] )    Preoperative clearance    Status post split thickness skin graft    Acquired absence of other left toe(s) ([facility] )    Overview Signed 7/30/2019  7:42 AM by Emiliana Grey RN     Added per ICD-10 guidelines-provider approved         Type 2 diabetes mellitus with stage 3 chronic kidney disease ([facility] )    Overview Signed 7/30/2019  7:43 AM by Emiliana Grey RN     Added per ICD-10 guidelines-provider approved           Lab Results   Component Value Date    HGBA1C 9 2 03/23/2020         Personal history of diabetic foot ulcer    Open wound of right lower leg        Past Medical History:   Diagnosis Date    Arthritis     Cardiac disease     Closed fracture of fibula, lateral malleolus, right     Complete heart block ([facility] )     Diabetes mellitus ([facility] )     Disease of thyroid gland     External incisional dehiscence     GERD (gastroesophageal reflux disease)     Heart disease     Muscular deconditioning     Pacemaker     Sleep apnea     cannot tolerate cpap    Thyroid disease      Social History     Tobacco Use    Smoking status: Former Smoker     Types: Pipe    Smokeless tobacco: Never Used   Substance Use Topics    Alcohol use:  Yes     Alcohol/week: 0 0 standard drinks     Frequency: Never     Drinks per session: Patient refused     Binge frequency: Never     Comment: socially    Drug use: No      Family History   Problem Relation Age of Onset    Diabetes Mother     Heart disease Mother     Thyroid disease Mother         Disorder     Cancer Brother     Gout Brother     Diabetes Maternal Grandmother      Past Surgical History:   Procedure Laterality Date    CARDIAC PACEMAKER PLACEMENT Left 2015    CARDIAC PACEMAKER PLACEMENT      CARDIAC SURGERY      CATARACT EXTRACTION      MUSCLE FLAP      right foot    NERVE BLOCK      Transforaminal Epidural Lumbar with Fluoroscopic Guidance     VA COLONOSCOPY FLX DX W/COLLJ SPEC WHEN PFRMD N/A 5/4/2018    Procedure: EGD AND COLONOSCOPY;  Surgeon: Sue Camara MD;  Location: AN  GI LAB;   Service: Gastroenterology    VA SPLIT GRFT TRUNK,ARM,LEG <100 SQCM Right 6/10/2019    Procedure: SKIN GRAFT SPLIT THICKNESS (STSG)  EXTREMITY right leg;  Surgeon: Aditi Martinez DPM;  Location:  MAIN OR;  Service: Podiatry    TOE AMPUTATION      left 5th toe       Current Outpatient Medications:     aspirin 81 mg chewable tablet, Chew 81 mg daily, Disp: , Rfl:     Atorvastatin Calcium (LIPITOR PO), Take 10 mg by mouth daily 1/2 tab daily, Disp: , Rfl:     CALCIUM CITRATE PO, Take 2,000 mg by mouth daily , Disp: , Rfl:     Cholecalciferol (VITAMIN D3) 2000 units capsule, Take 2,000 Units by mouth daily, Disp: , Rfl:     Continuous Blood Gluc Sensor (DEXCOM G6 SENSOR) MISC, As directed dx:250 03, Disp: , Rfl:     cyanocobalamin (VITAMIN B-12) 1,000 mcg tablet, Take 2,000 mcg by mouth daily, Disp: , Rfl:     DULoxetine (CYMBALTA) 30 mg delayed release capsule, Take 30 mg by mouth daily , Disp: , Rfl:     ezetimibe (ZETIA) 10 mg tablet, TAKE 5MG (ONE-HALF TABLET) BY MOUTH DAILY FOR CHOLESTEROL, Disp: , Rfl:     glucose 40 %, Take by mouth once, Disp: , Rfl:     insulin aspart (NovoLOG) 100 units/mL injection, Inject under the skin Via insulin pump, Disp: , Rfl:    levothyroxine 125 mcg tablet, Take 1 tablet (125 mcg total) by mouth daily in the early morning, Disp: 30 tablet, Rfl: 0    lisinopril (ZESTRIL) 5 mg tablet, Take 1 tablet (5 mg total) by mouth daily, Disp: 30 tablet, Rfl: 3    pantoprazole (PROTONIX) 40 mg tablet, Take 40 mg by mouth, Disp: , Rfl:     tamsulosin (FLOMAX) 0 4 mg, Take 0 4 mg by mouth daily with dinner  , Disp: , Rfl:   Allergies   Allergen Reactions    Contrast  [Iodinated Diagnostic Agents]     Lipitor  [Atorvastatin Calcium] Drowsiness    Ibuprofen      sensitive    Iodine Rash    Simvastatin Anxiety     Other reaction(s): Malaise (finding)       Vitals:    07/28/20 0813   BP: 128/80   BP Location: Left arm   Patient Position: Sitting   Cuff Size: Standard   Pulse: 93   Temp: 97 8 °F (36 6 °C)   TempSrc: Temporal   Weight: 67 7 kg (149 lb 4 8 oz)   Height: 5' 5" (1 651 m)     Vitals:    07/28/20 0813   Weight: 67 7 kg (149 lb 4 8 oz)      Height: 5' 5" (165 1 cm)   Body mass index is 24 84 kg/m²  Physical Exam:  GENERAL: elderly man, NAD  HEENT:  PERRL, EOMI, no scleral icterus, no conjunctival pallor  NECK:  Supple, No elevated JVP, no thyromegaly, no carotid bruits  HEART:  Regular rate and rhythm, normal S1/S2, no S3/S4, no murmur or rub  LUNGS:  Clear to auscultation bilaterally  ABDOMEN:  Soft, non-tender, positive bowel sounds, no rebound or guarding  EXTREMITIES:  Stockings in place  VASCULAR:  Diminished pulses  NEURO: walks with cane  SKIN: Normal without suspicious lesions on exposed skin      ROS:  Positive for joint pains, fatigue, sleepiness  Except as noted in HPI, is otherwise reviewed in detail and a 12 point review of systems is negative      Labs:  Lab Results   Component Value Date     (L) 06/24/2015    K 4 5 07/21/2020     07/21/2020    CREATININE 1 25 07/21/2020    BUN 34 (H) 07/21/2020    CO2 31 07/21/2020    ALT 21 12/04/2019    AST 17 12/04/2019    INR 1 03 06/23/2015    GLUF 82 07/21/2020 HGBA1C 9 2 03/23/2020    WBC 5 46 05/18/2020    HGB 12 5 05/18/2020    HCT 38 0 05/18/2020     (L) 05/18/2020       Lab Results   Component Value Date    CHOL 255 11/10/2014    CHOL 139 04/17/2014     Lab Results   Component Value Date    LDLCALC 35 07/14/2019    LDLCALC 62 04/24/2019    LDLCALC 47 07/09/2018     Lab Results   Component Value Date    HDL 72 (H) 07/14/2019    HDL 93 (H) 04/24/2019    HDL 68 (H) 07/09/2018     Lab Results   Component Value Date    TRIG 19 07/14/2019    TRIG 33 04/24/2019    TRIG 97 07/09/2018     Testing:  Echo LVHN 1/2020:  CONCLUSIONS    Definity Echo Contrast was utilized due to suboptimal echocardiographic imaging  Normal left ventricular systolic function  Normal right ventricular size and function  Pacer wire seen in right ventricle  Moderate diastolic dysfunction with     elevated filling pressures  Moderate diastolic dysfunction with elevated filling pressures  Visually Estimated LV Ejection Fraction is:60%      AORTIC VALVE    Structurally normal aortic valve without significant stenosis or     regurgitation  MITRAL VALVE    Mild mitral annular calcification  TRICUSPID VALVE    Structurally normal tricuspid valve  Trace tricuspid regurgitation  PULMONIC VALVE    The pulmonic valve is not well visualized  DOPPLER HEMODYNAMICS    Normal pulmonary artery systolic pressure  Moderate diastolic     dysfunction with elevated filling pressures  Candie Rider

## 2020-08-18 PROBLEM — L97.511 RIGHT FOOT ULCER, LIMITED TO BREAKDOWN OF SKIN (HCC): Status: ACTIVE | Noted: 2020-01-01

## 2020-08-18 NOTE — PATIENT INSTRUCTIONS
Debridement   WHAT YOU NEED TO KNOW:   Debridement is the removal of infected, damaged, or dead tissue so a wound can heal properly  You may need more than one debridement  DISCHARGE INSTRUCTIONS:   Medicines:   · Medicines  can help decrease pain or prevent or treat an infection  · Take your medicine as directed  Contact your healthcare provider if you think your medicine is not helping or if you have side effects  Tell him of her if you are allergic to any medicine  Keep a list of the medicines, vitamins, and herbs you take  Include the amounts, and when and why you take them  Bring the list or the pill bottles to follow-up visits  Carry your medicine list with you in case of an emergency  Follow up with your healthcare provider as directed: You may need to return to have your wound checked  Write down your questions so you remember to ask them during your visits  Care for your wound as directed:   · Keep your wound clean and dry  You may need to cover your wound when you bathe  · Limit movements,  such as stretching, to prevent bleeding, tearing, and swelling in your wound  · Protect your wound  Avoid sunlight for at least 6 months  Apply mild, unscented lotion or cream to the skin around your wound to keep it moist     · Do not smoke  If you smoke, it is never too late to quit  Smoking decreases blood flow to the wound and delays healing  Ask for information if you need help quitting  · Drink liquids as directed  Ask how much liquid to drink each day and which liquids are best for you  Liquids help keep your skin moist so your wound can heal      · Eat a variety of healthy foods  Foods rich in protein, such as meat, eggs, and dairy products, help repair tissue  Carbohydrate-rich foods, such as bread and cereals, help increase cell growth and decrease the risk for wound infection  Do not have caffeine  Ask if you should take vitamins   Vitamins A and C may help tissue formation and increase scar tissue strength  Contact your healthcare provider if:   · You have a fever  · Your pain gets worse or does not go away, even after treatment  · Your skin is red, swollen, or draining pus  · You have questions or concerns about your condition or care  Return to the emergency department if:   · Blood soaks through your bandage  · You have severe pain  © 2017 [facility] Information is for End User's use only and may not be sold, redistributed or otherwise used for commercial purposes  All illustrations and images included in CareNotes® are the copyrighted property of [employer]  or Kirill Ulrich  The above information is an  only  It is not intended as medical advice for individual conditions or treatments  Talk to your doctor, nurse or pharmacist before following any medical regimen to see if it is safe and effective for you

## 2020-08-18 NOTE — PROGRESS NOTES
This patient was seen on 8/18/2020  Assessment:    Problem List Items Addressed This Visit        Endocrine    Diabetic peripheral neuropathy ([facility] ) - Primary (Chronic)       Musculoskeletal and Integument    Right foot ulcer, limited to breakdown of skin ([facility] )       Other    Open wound of right lower leg          Plan:  DEBRIDEMENT NOTE    A formal timeout including patient identification, laterality and existing allergies using [facility] protocol was conducted  Procedure performed: Debridement of dermis/epidermis- < 20 sq cm (83552)     A formal timeout including patient identification, laterality and existing allergies using [facility] protocol was conducted  Debridement is indicated due to devitalized tissue present in open wound bed  Anesthetic used as needed prior to procedure to prevent discomfort  Under aseptic technique,the wound was thoroughly explored for undermining, deep sinus tracts and bone involvement  Sterile instrumentation including scalpel used to excise the clearly delineated devitalized epidermis/dermis tissue to promote wound healing  I thoroughly explored the wound for deep infection or exposure of deep tissues  Minimal, if any, blood loss noted  Patient tolerated debridement without complications  The wound was dressed  Wound dressing technique and frequency described to patient  I am to be called if any signs of infection develop such as erythema, increased wound size or significant change in appearance of wound, odor, pain, increased or change in color of drainage, swelling, fever, chills, nightsweats  I reviewed these signs with the patient  I recommended limiting weight bearing to bathroom priveleges and meal table and to use any prescribed offloading devices in an effort to allow proper rest and healing of the wounded area  I explained that good wound care and compliance are necessary to allow healing and to prevent toe loss or limb loss         Silver alginate recommended and applied  Wound Products ordered to home     Diagnoses and all orders for this visit:    Diabetic peripheral neuropathy ([address] )    Open wound of right lower leg, initial encounter    Right foot ulcer, limited to breakdown of skin ([facility])          Subjective:      Patient ID: Althea Grant is a 76 y o  male  HPI    The following portions of the patient's history were reviewed and updated as appropriate: allergies, current medications, past family history, past medical history, past social history, past surgical history and problem list     Review of Systems   Constitutional: Positive for fatigue  Negative for activity change, appetite change, chills, diaphoresis, fever and unexpected weight change  Respiratory: Negative  Cardiovascular: Negative  Gastrointestinal: Negative  Musculoskeletal: Negative  Skin: Positive for wound  Neurological: Positive for numbness  Psychiatric/Behavioral: Negative  Objective:      /67   Pulse 85   Temp (!) 95 °F (35 °C)   Ht 5' 5" (1 651 m) Comment: verbal  Wt 67 5 kg (148 lb 12 8 oz)   BMI 24 76 kg/m²          Physical Exam  Vitals signs and nursing note reviewed  Constitutional:       General: He is not in acute distress  Appearance: He is well-developed  He is not diaphoretic  HENT:      Head: Normocephalic  Eyes:      Pupils: Pupils are equal, round, and reactive to light  Neck:      Musculoskeletal: Normal range of motion  Cardiovascular:      Pulses:           Dorsalis pedis pulses are 1+ on the right side and 1+ on the left side  Posterior tibial pulses are 1+ on the right side and 1+ on the left side  Pulmonary:      Effort: Pulmonary effort is normal       Breath sounds: Normal breath sounds  Abdominal:      General: Bowel sounds are normal       Palpations: Abdomen is soft  Feet:      Right foot:      Skin integrity: No ulcer, skin breakdown, erythema, warmth, callus or dry skin     Neurological:      Mental Status: He is alert and oriented to person, place, and time             Wound # 1  Location: right lateral foot  Length 4 0cm: Width 1 5cm: Depth 0 1cm:   Deepest Tissue Noted in Base: Dermis  Probe to Bone?: No  Peripheral Skin Description: Intact  Granulation: 50% Fibrotic Tissue: 50% Necrotic Tissue: 0%  Drainage Amount: Scant  Signs of Infection: None  Total debrided 12 square centimeters

## 2020-08-25 NOTE — PROGRESS NOTES
This patient was seen on 8/25/2020  Assessment:    Problem List Items Addressed This Visit        Endocrine    Diabetic peripheral neuropathy ([facility] ) - Primary (Chronic)       Musculoskeletal and Integument    Right foot ulcer, limited to breakdown of skin ([facility] )       Other    Open wound of right lower leg          Plan:  DEBRIDEMENT NOTE    A formal timeout including patient identification, laterality and existing allergies using [facility] protocol was conducted  Procedure Performed: Debridement of subcutaneous tissue- >20 sq cm (71366)  Under aseptic technique, the wound was thoroughly explored and bluntly probed for undermining, deep sinus tracts and bone involvement  Sterile instrumentation including scalpel, forceps and curette used to excise the clearly delineated devitalized subcutaneous tissue (fibrotic tissue and necrotic non-viable portions of fat) exposing viable tissue  After debridement, bleeding in the wound bed base was noted indicated viable subcutaneous tissue had been exposed  Bleeding controlled with gentle pressure  Patient tolerated debridement without complications  The wound was dressed  Wound dressing technique and frequency described to patient  I am to be called if any signs of infection develop such as erythema, increased wound size or significant change in appearance of wound, odor, pain, increased or change in color of drainage, swelling, fever, chills, nightsweats  I reviewed these signs with the patient  I recommended limiting WB to bathroom priveleges and meal table and to use any prescribed offloading devices in an effort to allow proper rest and healing of the wounded area  I explained that good wound care and compliance are necessary to allow healing and to prevent toe loss or limb loss  Silver alginate applied to wound bed, covered with gauze and secured with tape  Reviewed dressing technique with pt and wife  To be changed every other day and pt to limit WB     Pt verbalizes understanding  Diagnoses and all orders for this visit:    Diabetic peripheral neuropathy ([facility] )    Right foot ulcer, limited to breakdown of skin ([facility] )    Open wound of right lower leg, initial encounter          Subjective:      Patient ID: Ebenezer Lopez is a 76 y o  male  Pt arrives for wound care follow up  Presents with dressing intact and states he is changing the dressing as ordered  Pt denies problems  The following portions of the patient's history were reviewed and updated as appropriate: allergies, current medications, past family history, past medical history, past social history, past surgical history and problem list     Review of Systems   Constitutional: Positive for fatigue  Negative for activity change, appetite change, chills, diaphoresis, fever and unexpected weight change  Respiratory: Negative  Cardiovascular: Negative  Gastrointestinal: Negative  Musculoskeletal: Negative  Skin: Positive for wound  Neurological: Positive for numbness  Psychiatric/Behavioral: Negative  Objective:      /79   Pulse 80   Temp (!) 95 8 °F (35 4 °C)   Ht 5' 5" (1 651 m) Comment: verbal  Wt 66 8 kg (147 lb 3 2 oz)   BMI 24 50 kg/m²          Physical Exam  Vitals signs and nursing note reviewed  Constitutional:       General: He is not in acute distress  Appearance: He is well-developed  He is not diaphoretic  HENT:      Head: Normocephalic  Eyes:      Pupils: Pupils are equal, round, and reactive to light  Neck:      Musculoskeletal: Normal range of motion  Cardiovascular:      Pulses:           Dorsalis pedis pulses are 1+ on the right side and 1+ on the left side  Posterior tibial pulses are 1+ on the right side and 1+ on the left side  Pulmonary:      Effort: Pulmonary effort is normal       Breath sounds: Normal breath sounds  Abdominal:      General: Bowel sounds are normal       Palpations: Abdomen is soft     Feet: Right foot:      Skin integrity: No ulcer, skin breakdown, erythema, warmth, callus or dry skin  Neurological:      Mental Status: He is alert and oriented to person, place, and time             Wound # 1  Location: right lateral foot  Length 1 2cm: Width 1 4cm: Depth 0 2cm:   Deepest Tissue Noted in Base: SQ  Probe to Bone?: No  Peripheral Skin Description: Intact  Granulation: 80% Fibrotic Tissue: 20% Necrotic Tissue: 0%  Drainage Amount: moderate  Signs of Infection: None  Total debrided 1 68 square centimeters

## 2020-08-26 NOTE — PATIENT INSTRUCTIONS
Debridement   WHAT YOU NEED TO KNOW:   Debridement is the removal of infected, damaged, or dead tissue so a wound can heal properly  You may need more than one debridement  DISCHARGE INSTRUCTIONS:   Medicines:   · Medicines  can help decrease pain or prevent or treat an infection  · Take your medicine as directed  Contact your healthcare provider if you think your medicine is not helping or if you have side effects  Tell him of her if you are allergic to any medicine  Keep a list of the medicines, vitamins, and herbs you take  Include the amounts, and when and why you take them  Bring the list or the pill bottles to follow-up visits  Carry your medicine list with you in case of an emergency  Follow up with your healthcare provider as directed: You may need to return to have your wound checked  Write down your questions so you remember to ask them during your visits  Care for your wound as directed:   · Keep your wound clean and dry  You may need to cover your wound when you bathe  · Limit movements,  such as stretching, to prevent bleeding, tearing, and swelling in your wound  · Protect your wound  Avoid sunlight for at least 6 months  Apply mild, unscented lotion or cream to the skin around your wound to keep it moist     · Do not smoke  If you smoke, it is never too late to quit  Smoking decreases blood flow to the wound and delays healing  Ask for information if you need help quitting  · Drink liquids as directed  Ask how much liquid to drink each day and which liquids are best for you  Liquids help keep your skin moist so your wound can heal      · Eat a variety of healthy foods  Foods rich in protein, such as meat, eggs, and dairy products, help repair tissue  Carbohydrate-rich foods, such as bread and cereals, help increase cell growth and decrease the risk for wound infection  Do not have caffeine  Ask if you should take vitamins   Vitamins A and C may help tissue formation and increase scar tissue strength  Contact your healthcare provider if:   · You have a fever  · Your pain gets worse or does not go away, even after treatment  · Your skin is red, swollen, or draining pus  · You have questions or concerns about your condition or care  Return to the emergency department if:   · Blood soaks through your bandage  · You have severe pain  © 2017 [facility]– [place] Information is for End User's use only and may not be sold, redistributed or otherwise used for commercial purposes  All illustrations and images included in CareNotes® are the copyrighted property of [employer]  or Kirill Ulrich  The above information is an  only  It is not intended as medical advice for individual conditions or treatments  Talk to your doctor, nurse or pharmacist before following any medical regimen to see if it is safe and effective for you

## 2020-09-01 NOTE — PROGRESS NOTES
This patient was seen on 9/1/2020  Assessment:    Problem List Items Addressed This Visit        Endocrine    Diabetic peripheral neuropathy (HCC) (Chronic)       Musculoskeletal and Integument    Right foot ulcer, limited to breakdown of skin ([facility] [address] ) - Primary          Plan:  I spent time discussing the need for better compliance in staying off of the foot or risk worsening of the wound, osteomyelitis or toe amputation  The wound requires debridement today  A formal timeout including patient identification, laterality and existing allergies [facility] was conducted  Procedure Performed: Debridement of subcutaneous tissue- >20 sq cm (06221)  Under aseptic technique, the wound was thoroughly explored and bluntly probed for undermining, deep sinus tracts and bone involvement  Sterile instrumentation including scalpel, forceps and curette used to excise the clearly delineated devitalized subcutaneous tissue (fibrotic tissue and necrotic non-viable portions of fat) exposing viable tissue  After debridement, bleeding in the wound bed base was noted indicated viable subcutaneous tissue had been exposed  Bleeding controlled with gentle pressure  Patient tolerated debridement without complications  The wound was dressed  Wound dressing technique and frequency described to patient  I am to be called if any signs of infection develop such as erythema, increased wound size or significant change in appearance of wound, odor, pain, increased or change in color of drainage, swelling, fever, chills, nightsweats  I reviewed these signs with the patient  I recommended limiting WB to bathroom priveleges and meal table and to use any prescribed offloading devices in an effort to allow proper rest and healing of the wounded area  I explained that good wound care and compliance are necessary to allow healing and to prevent toe loss or limb loss       Silver alginate applied to wound, covered with gauze and secured with tape  Felt pad x2 applied to assist in offloading  To be changed every other day  Pt to wear wedge shoe with every step and limit WB  Pt verbalizes understanding  Diagnoses and all orders for this visit:    Right foot ulcer, limited to breakdown of skin ([address] )    Diabetic peripheral neuropathy (HCC)          Subjective:      Patient ID: Ramesh Heredia is a 76 y o  male  Pt arrives for wound care follow up  Presents with dressing intact and states he is changing the dressing as ordered  Pt denies problems  Pt wearing regular footwear  He admits that he isn't really staying off of the foot much  The following portions of the patient's history were reviewed and updated as appropriate: allergies, current medications, past family history, past medical history, past social history, past surgical history and problem list     Review of Systems   Constitutional: Positive for fatigue  Negative for activity change, appetite change, chills, diaphoresis, fever and unexpected weight change  Respiratory: Negative  Cardiovascular: Negative  Gastrointestinal: Negative  Musculoskeletal: Negative  Skin: Positive for wound  Neurological: Positive for numbness  Psychiatric/Behavioral: Negative  Objective:      /76   Pulse 88   Temp (!) 96 °F (35 6 °C)   Ht 5' 5" (1 651 m) Comment: verbal  BMI 24 50 kg/m²          Physical Exam  Vitals signs and nursing note reviewed  Constitutional:       General: He is not in acute distress  Appearance: He is well-developed  He is not diaphoretic  HENT:      Head: Normocephalic  Eyes:      Pupils: Pupils are equal, round, and reactive to light  Neck:      Musculoskeletal: Normal range of motion  Cardiovascular:      Pulses:           Dorsalis pedis pulses are 1+ on the right side and 1+ on the left side  Posterior tibial pulses are 1+ on the right side and 1+ on the left side     Pulmonary:      Effort: Pulmonary effort is normal       Breath sounds: Normal breath sounds  Abdominal:      General: Bowel sounds are normal       Palpations: Abdomen is soft  Feet:      Right foot:      Skin integrity: No ulcer, skin breakdown, erythema, warmth, callus or dry skin  Neurological:      Mental Status: He is alert and oriented to person, place, and time             Wound # 1  Location: right lateral foot  Length 1 2cm: Width 1 4cm: Depth 0 2cm:   Deepest Tissue Noted in Base: SQ  Probe to Bone?: No  Peripheral Skin Description: Intact  Granulation: 80% Fibrotic Tissue: 20% Necrotic Tissue: 0%  Drainage Amount: moderate  Signs of Infection: None  Total debrided 1 68 square centimeters

## 2020-09-01 NOTE — PATIENT INSTRUCTIONS
Debridement   WHAT YOU NEED TO KNOW:   Debridement is the removal of infected, damaged, or dead tissue so a wound can heal properly  You may need more than one debridement  DISCHARGE INSTRUCTIONS:   Medicines:   · Medicines  can help decrease pain or prevent or treat an infection  · Take your medicine as directed  Contact your healthcare provider if you think your medicine is not helping or if you have side effects  Tell him of her if you are allergic to any medicine  Keep a list of the medicines, vitamins, and herbs you take  Include the amounts, and when and why you take them  Bring the list or the pill bottles to follow-up visits  Carry your medicine list with you in case of an emergency  Follow up with your healthcare provider as directed: You may need to return to have your wound checked  Write down your questions so you remember to ask them during your visits  Care for your wound as directed:   · Keep your wound clean and dry  You may need to cover your wound when you bathe  · Limit movements,  such as stretching, to prevent bleeding, tearing, and swelling in your wound  · Protect your wound  Avoid sunlight for at least 6 months  Apply mild, unscented lotion or cream to the skin around your wound to keep it moist     · Do not smoke  If you smoke, it is never too late to quit  Smoking decreases blood flow to the wound and delays healing  Ask for information if you need help quitting  · Drink liquids as directed  Ask how much liquid to drink each day and which liquids are best for you  Liquids help keep your skin moist so your wound can heal      · Eat a variety of healthy foods  Foods rich in protein, such as meat, eggs, and dairy products, help repair tissue  Carbohydrate-rich foods, such as bread and cereals, help increase cell growth and decrease the risk for wound infection  Do not have caffeine  Ask if you should take vitamins   Vitamins A and C may help tissue formation and increase scar tissue strength  Contact your healthcare provider if:   · You have a fever  · Your pain gets worse or does not go away, even after treatment  · Your skin is red, swollen, or draining pus  · You have questions or concerns about your condition or care  Return to the emergency department if:   · Blood soaks through your bandage  · You have severe pain  © 2017 [address]  Information is for End User's use only and may not be sold, redistributed or otherwise used for commercial purposes  All illustrations and images included in CareNotes® are the copyrighted property of [employer]  or Kirill Ulrich  The above information is an  only  It is not intended as medical advice for individual conditions or treatments  Talk to your doctor, nurse or pharmacist before following any medical regimen to see if it is safe and effective for you

## 2020-09-08 NOTE — PROGRESS NOTES
This patient was seen on 9/8/2020  Assessment:    Problem List Items Addressed This Visit     None          Plan:  DEBRIDEMENT NOTE    A formal timeout including patient identification, laterality and existing allergies using [facility] protocol was conducted  Procedure Performed: Debridement of subcutaneous tissue- >20 sq cm (72211)  Under aseptic technique, the wound was thoroughly explored and bluntly probed for undermining, deep sinus tracts and bone involvement  Sterile instrumentation including scalpel, forceps and curette used to excise the clearly delineated devitalized subcutaneous tissue (fibrotic tissue and necrotic non-viable portions of fat) exposing viable tissue  After debridement, bleeding in the wound bed base was noted indicated viable subcutaneous tissue had been exposed  Bleeding controlled with gentle pressure  Patient tolerated debridement without complications  The wound was dressed  Wound dressing technique and frequency described to patient  I am to be called if any signs of infection develop such as erythema, increased wound size or significant change in appearance of wound, odor, pain, increased or change in color of drainage, swelling, fever, chills, nightsweats  I reviewed these signs with the patient  I recommended limiting WB to bathroom priveleges and meal table and to use any prescribed offloading devices in an effort to allow proper rest and healing of the wounded area  I explained that good wound care and compliance are necessary to allow healing and to prevent toe loss or limb loss  Silver alginate applied to wound bed, covered with gauze and secured with tape  To be changed every other day  Felt pad placed to assist in offloading  Pt to continue to wear wedge shoe with every step and use walker  Pt verbalizes understanding  There are no diagnoses linked to this encounter  Subjective:      Patient ID: Vanda Rothman is a 76 y o  male      Pt arrives for wound care follow up  Presents with dressing intact and wearing wedge shoe  Pt using walker to assist in ambulation  Pt states he is changing the dressing as ordered  Pt denies problems  The following portions of the patient's history were reviewed and updated as appropriate: allergies, current medications, past family history, past medical history, past social history, past surgical history and problem list     Review of Systems   Constitutional: Positive for fatigue  Negative for activity change, appetite change, chills, diaphoresis, fever and unexpected weight change  Respiratory: Negative  Cardiovascular: Negative  Gastrointestinal: Negative  Musculoskeletal: Negative  Skin: Positive for wound  Neurological: Positive for numbness  Psychiatric/Behavioral: Negative            Objective:      /74   Pulse 86   Temp (!) 97 °F (36 1 °C)   Ht 5' 5" (1 651 m) Comment: verbal  Wt 65 6 kg (144 lb 11 2 oz) Comment: verbal, scale not working  BMI 24 08 kg/m²          Physical Exam      Wound # 1  Location: right lateral foot  Length 1 2cm: Width 1 4cm: Depth 0 2cm:   Deepest Tissue Noted in Base: SQ  Probe to Bone?: No  Peripheral Skin Description: Intact  Granulation: 90% Fibrotic Tissue: 10% Necrotic Tissue: 0%  Drainage Amount: moderate  Signs of Infection: None  Total debrided 1 68 square centimeters

## 2020-09-08 NOTE — PATIENT INSTRUCTIONS
Debridement   WHAT YOU NEED TO KNOW:   Debridement is the removal of infected, damaged, or dead tissue so a wound can heal properly  You may need more than one debridement  DISCHARGE INSTRUCTIONS:   Medicines:   · Medicines  can help decrease pain or prevent or treat an infection  · Take your medicine as directed  Contact your healthcare provider if you think your medicine is not helping or if you have side effects  Tell him of her if you are allergic to any medicine  Keep a list of the medicines, vitamins, and herbs you take  Include the amounts, and when and why you take them  Bring the list or the pill bottles to follow-up visits  Carry your medicine list with you in case of an emergency  Follow up with your healthcare provider as directed: You may need to return to have your wound checked  Write down your questions so you remember to ask them during your visits  Care for your wound as directed:   · Keep your wound clean and dry  You may need to cover your wound when you bathe  · Limit movements,  such as stretching, to prevent bleeding, tearing, and swelling in your wound  · Protect your wound  Avoid sunlight for at least 6 months  Apply mild, unscented lotion or cream to the skin around your wound to keep it moist     · Do not smoke  If you smoke, it is never too late to quit  Smoking decreases blood flow to the wound and delays healing  Ask for information if you need help quitting  · Drink liquids as directed  Ask how much liquid to drink each day and which liquids are best for you  Liquids help keep your skin moist so your wound can heal      · Eat a variety of healthy foods  Foods rich in protein, such as meat, eggs, and dairy products, help repair tissue  Carbohydrate-rich foods, such as bread and cereals, help increase cell growth and decrease the risk for wound infection  Do not have caffeine  Ask if you should take vitamins   Vitamins A and C may help tissue formation and increase scar tissue strength  Contact your healthcare provider if:   · You have a fever  · Your pain gets worse or does not go away, even after treatment  · Your skin is red, swollen, or draining pus  · You have questions or concerns about your condition or care  Return to the emergency department if:   · Blood soaks through your bandage  · You have severe pain  © 2017 [address]  Information is for End User's use only and may not be sold, redistributed or otherwise used for commercial purposes  All illustrations and images included in CareNotes® are the copyrighted property of [employer]  or Kirill Ulrich  The above information is an  only  It is not intended as medical advice for individual conditions or treatments  Talk to your doctor, nurse or pharmacist before following any medical regimen to see if it is safe and effective for you

## 2020-09-10 NOTE — PROGRESS NOTES
This patient was seen on 9/15/2020  Assessment:    Problem List Items Addressed This Visit        Endocrine    Diabetic peripheral neuropathy ([facility] [address] ) - Primary (Chronic)       Musculoskeletal and Integument    Right foot ulcer, limited to breakdown of skin ([facility] [address] )          Plan:  DEBRIDEMENT NOTE    A formal timeout including patient identification, laterality and existing allergies using [facility] protocol was conducted  Procedure Performed: Debridement of subcutaneous tissue- >20 sq cm (34140)  Under aseptic technique, the wound was thoroughly explored and bluntly probed for undermining, deep sinus tracts and bone involvement  Sterile instrumentation including scalpel, forceps and curette used to excise the clearly delineated devitalized subcutaneous tissue (fibrotic tissue and necrotic non-viable portions of fat) exposing viable tissue  After debridement, bleeding in the wound bed base was noted indicated viable subcutaneous tissue had been exposed  Bleeding controlled with gentle pressure  Patient tolerated debridement without complications  The wound was dressed  Wound dressing technique and frequency described to patient  I am to be called if any signs of infection develop such as erythema, increased wound size or significant change in appearance of wound, odor, pain, increased or change in color of drainage, swelling, fever, chills, nightsweats  I reviewed these signs with the patient  I recommended limiting WB to bathroom priveleges and meal table and to use any prescribed offloading devices in an effort to allow proper rest and healing of the wounded area  I explained that good wound care and compliance are necessary to allow healing and to prevent toe loss or limb loss  Collagen applied to wound, covered with gauze and secured with tape  Felt pad x2 applied to assist in offloading  To be changed every other day using silver alginate  Pt to wear wedge shoe with every step and limit WB   Pt verbalizes understanding  Due to length of time of this wound being open, xrays are ordered today of the foot to rule out osteomyelitis  Diagnoses and all orders for this visit:    Diabetic peripheral neuropathy ([address] )    Right foot ulcer, limited to breakdown of skin (HCC)          Subjective:      Patient ID: Lien Castro is a 76 y o  male  Pt arrives for wound care follow up  Pt presents with dressing intact and states he is changing the dressing as directed  Pt wearing wedge shoe and using walker  The following portions of the patient's history were reviewed and updated as appropriate: allergies, current medications, past family history, past medical history, past social history, past surgical history and problem list     Review of Systems   Constitutional: Positive for fatigue  Negative for activity change, appetite change, chills, diaphoresis, fever and unexpected weight change  Respiratory: Negative  Cardiovascular: Negative  Gastrointestinal: Negative  Musculoskeletal: Negative  Skin: Positive for wound  Neurological: Positive for numbness  Psychiatric/Behavioral: Negative  Objective:      /67   Pulse 87   Temp (!) 96 °F (35 6 °C)   Ht 5' 5" (1 651 m) Comment: verbal  Wt 65 3 kg (144 lb) Comment: verbal, scale not working  BMI 23 96 kg/m²          Physical Exam  Vitals signs and nursing note reviewed  Constitutional:       General: He is not in acute distress  Appearance: He is well-developed  He is not diaphoretic  HENT:      Head: Normocephalic  Eyes:      Pupils: Pupils are equal, round, and reactive to light  Neck:      Musculoskeletal: Normal range of motion  Cardiovascular:      Pulses:           Dorsalis pedis pulses are 1+ on the right side and 1+ on the left side  Posterior tibial pulses are 1+ on the right side and 1+ on the left side     Pulmonary:      Effort: Pulmonary effort is normal       Breath sounds: Normal breath sounds  Abdominal:      General: Bowel sounds are normal       Palpations: Abdomen is soft  Feet:      Right foot:      Skin integrity: No ulcer, skin breakdown, erythema, warmth, callus or dry skin  Neurological:      Mental Status: He is alert and oriented to person, place, and time             Wound # 1  Location: right lateral foot  Length 1 2cm: Width 1 4cm: Depth 0 2cm:   Deepest Tissue Noted in Base: SQ  Probe to Bone?: No  Peripheral Skin Description: Intact  Granulation: 90% Fibrotic Tissue: 10% Necrotic Tissue: 0%  Drainage Amount: moderate  Signs of Infection: None  Total debrided 1 68 square centimeters

## 2020-09-15 NOTE — PATIENT INSTRUCTIONS
Debridement   WHAT YOU NEED TO KNOW:   Debridement is the removal of infected, damaged, or dead tissue so a wound can heal properly  You may need more than one debridement  DISCHARGE INSTRUCTIONS:   Medicines:   · Medicines  can help decrease pain or prevent or treat an infection  · Take your medicine as directed  Contact your healthcare provider if you think your medicine is not helping or if you have side effects  Tell him of her if you are allergic to any medicine  Keep a list of the medicines, vitamins, and herbs you take  Include the amounts, and when and why you take them  Bring the list or the pill bottles to follow-up visits  Carry your medicine list with you in case of an emergency  Follow up with your healthcare provider as directed: You may need to return to have your wound checked  Write down your questions so you remember to ask them during your visits  Care for your wound as directed:   · Keep your wound clean and dry  You may need to cover your wound when you bathe  · Limit movements,  such as stretching, to prevent bleeding, tearing, and swelling in your wound  · Protect your wound  Avoid sunlight for at least 6 months  Apply mild, unscented lotion or cream to the skin around your wound to keep it moist     · Do not smoke  If you smoke, it is never too late to quit  Smoking decreases blood flow to the wound and delays healing  Ask for information if you need help quitting  · Drink liquids as directed  Ask how much liquid to drink each day and which liquids are best for you  Liquids help keep your skin moist so your wound can heal      · Eat a variety of healthy foods  Foods rich in protein, such as meat, eggs, and dairy products, help repair tissue  Carbohydrate-rich foods, such as bread and cereals, help increase cell growth and decrease the risk for wound infection  Do not have caffeine  Ask if you should take vitamins   Vitamins A and C may help tissue formation and increase scar tissue strength  Contact your healthcare provider if:   · You have a fever  · Your pain gets worse or does not go away, even after treatment  · Your skin is red, swollen, or draining pus  · You have questions or concerns about your condition or care  Return to the emergency department if:   · Blood soaks through your bandage  · You have severe pain  © 2017 [address] [employer]  Information is for End User's use only and may not be sold, redistributed or otherwise used for commercial purposes  All illustrations and images included in CareNotes® are the copyrighted property of [employer]  or Kirill Ulrich  The above information is an  only  It is not intended as medical advice for individual conditions or treatments  Talk to your doctor, nurse or pharmacist before following any medical regimen to see if it is safe and effective for you

## 2020-09-17 NOTE — PROGRESS NOTES
This patient was seen on 9/22/2020  Assessment:    Problem List Items Addressed This Visit        Endocrine    Diabetic peripheral neuropathy (HCC) (Chronic)       Musculoskeletal and Integument    Right foot ulcer, limited to breakdown of skin ([facility] [address] ) - Primary          Plan:  Plain radiographs dated 9/17/20 reviewed noting no signs of osteomyelitis  A formal timeout including patient identification, laterality and existing allergies using [facility] was conducted  Procedure Performed: Debridement of subcutaneous tissue- >20 sq cm (51180)  Under aseptic technique, the wound was thoroughly explored and bluntly probed for undermining, deep sinus tracts and bone involvement  Sterile instrumentation including scalpel, forceps and curette used to excise the clearly delineated devitalized subcutaneous tissue (fibrotic tissue and necrotic non-viable portions of fat) exposing viable tissue  After debridement, bleeding in the wound bed base was noted indicated viable subcutaneous tissue had been exposed  Bleeding controlled with gentle pressure  Patient tolerated debridement without complications  The wound was dressed  Wound dressing technique and frequency described to patient  I am to be called if any signs of infection develop such as erythema, increased wound size or significant change in appearance of wound, odor, pain, increased or change in color of drainage, swelling, fever, chills, nightsweats  I reviewed these signs with the patient  I recommended limiting WB to bathroom priveleges and meal table and to use any prescribed offloading devices in an effort to allow proper rest and healing of the wounded area  I explained that good wound care and compliance are necessary to allow healing and to prevent toe loss or limb loss  Collagen and silver alginate applied to wound, covered with gauze and secured with tape  Felt pad x2 applied to assist in offloading   To be changed every other day using silver alginate  Pt to wear wedge shoe with every step and limit WB  Pt verbalizes understanding          Diagnoses and all orders for this visit:    Right foot ulcer, limited to breakdown of skin ([address] )    Diabetic peripheral neuropathy (HCC)          Subjective:      Patient ID: Reza Judd is a 76 y o  male  Pt arrives for wound care follow up  Presents with dressing intact and wearing wedge shoe  Pt states he is changing the dressing as ordered and denies problems  Pt using walker to assist in ambulation         The following portions of the patient's history were reviewed and updated as appropriate: allergies, current medications, past family history, past medical history, past social history, past surgical history and problem list     Review of Systems      Objective:      /76   Pulse 90   Temp (!) 96 °F (35 6 °C)   Ht 5' 5" (1 651 m) Comment: verbal  Wt 65 3 kg (144 lb) Comment: verbal, scale not working  BMI 23 96 kg/m²          Physical Exam      Wound # 1  Location: right lateral foot  Length 1 2cm: Width 1 4cm: Depth 0 2cm:   Deepest Tissue Noted in Base: SQ  Probe to Bone?: No  Peripheral Skin Description: Intact  Granulation: 90% Fibrotic Tissue: 10% Necrotic Tissue: 0%  Drainage Amount: moderate  Signs of Infection: None  Total debrided 1 68 square centimeters

## 2020-09-22 NOTE — PATIENT INSTRUCTIONS
Debridement   WHAT YOU NEED TO KNOW:   Debridement is the removal of infected, damaged, or dead tissue so a wound can heal properly  You may need more than one debridement  DISCHARGE INSTRUCTIONS:   Medicines:   · Medicines  can help decrease pain or prevent or treat an infection  · Take your medicine as directed  Contact your healthcare provider if you think your medicine is not helping or if you have side effects  Tell him of her if you are allergic to any medicine  Keep a list of the medicines, vitamins, and herbs you take  Include the amounts, and when and why you take them  Bring the list or the pill bottles to follow-up visits  Carry your medicine list with you in case of an emergency  Follow up with your healthcare provider as directed: You may need to return to have your wound checked  Write down your questions so you remember to ask them during your visits  Care for your wound as directed:   · Keep your wound clean and dry  You may need to cover your wound when you bathe  · Limit movements,  such as stretching, to prevent bleeding, tearing, and swelling in your wound  · Protect your wound  Avoid sunlight for at least 6 months  Apply mild, unscented lotion or cream to the skin around your wound to keep it moist     · Do not smoke  If you smoke, it is never too late to quit  Smoking decreases blood flow to the wound and delays healing  Ask for information if you need help quitting  · Drink liquids as directed  Ask how much liquid to drink each day and which liquids are best for you  Liquids help keep your skin moist so your wound can heal      · Eat a variety of healthy foods  Foods rich in protein, such as meat, eggs, and dairy products, help repair tissue  Carbohydrate-rich foods, such as bread and cereals, help increase cell growth and decrease the risk for wound infection  Do not have caffeine  Ask if you should take vitamins   Vitamins A and C may help tissue formation and increase scar tissue strength  Contact your healthcare provider if:   · You have a fever  · Your pain gets worse or does not go away, even after treatment  · Your skin is red, swollen, or draining pus  · You have questions or concerns about your condition or care  Return to the emergency department if:   · Blood soaks through your bandage  · You have severe pain  © 2017 [employer]  Information is for End User's use only and may not be sold, redistributed or otherwise used for commercial purposes  All illustrations and images included in CareNotes® are the copyrighted property of [employer]  or Kirill Ulrich  The above information is an  only  It is not intended as medical advice for individual conditions or treatments  Talk to your doctor, nurse or pharmacist before following any medical regimen to see if it is safe and effective for you

## 2020-10-07 PROBLEM — S81.801A OPEN WOUND OF RIGHT LOWER LEG: Status: RESOLVED | Noted: 2020-01-01 | Resolved: 2020-01-01

## 2020-10-13 NOTE — PROGRESS NOTES
Referred by: Jada Cloud PA-C; Medical Diagnosis (from order):    Diagnosis Information      Diagnosis    V58.89, 840.4 (ICD-9-CM) - S46.012D (ICD-10-CM) - Traumatic incomplete tear of left rotator cuff, subsequent encounter    715.91 (ICD-9-CM) - M19.012 (ICD-10-CM) - Arthrosis of left acromioclavicular joint    727.9 (ICD-9-CM) - M67.922 (ICD-10-CM) - Biceps tendinopathy, left                Physical Therapy -  Daily Treatment Note    Visit:  8   Diagnosis Precautions: Avoid sub acromial compression    SUBJECTIVE                                                                                                             10/13/20: 5 weeks post op.  Pt is doing well.  He started  back at work yesterday. He was fine during the day while at work but by the end of the day, he felt pain in the AC junction.      Functional Change: Due to precautions/restrictions, he has to be careful with movement.  Difficulty with putting clothes/ADL's.  Pt is sleeping better.    Current functional limitations:     Pain / Symptoms:  Pain/symptom is: intermittent  Pain rating (out of 10): Current: 4   Location: R biceps   Quality / Description: sharp, shooting.    OBJECTIVE                                                                                                                     Range of Motion (ROM)   (degrees unless noted; active unless noted; norms in ( ); negative=lacking to 0, positive=beyond 0)   Shoulder:     - Flexion (180):        • Left: 130  Passive: 140 pain     - Abduction (180):        • Left: 110  Passive: 130 pain     - Internal Rotation at 90° (70-90):         • Left: 45 pain    - External Rotation at 90° (90):         • Left: 65 pain  Details / Comments: With pulling sensation at end range     Strength  (out of 5 unless noted, standard test position unless noted, lbs tested with hand held dynamometer)   Shoulder:     - Flexion:         • Left: pain, 3-     - Abduction:        • Left: pain, 3-    - Internal  This patient was seen on 1/21/2020  Assessment:    Problem List Items Addressed This Visit        Endocrine    Diabetic peripheral neuropathy ([facility] [address] ) - Primary (Chronic)       Other    Non-pressure chronic ulcer of right calf with [facility] layer exposed [facility]          Plan:  A formal timeout including patient identification, laterality and existing allergies using [facility] protocol was conducted  Procedure Performed: Debridement of subcutaneous tissue- <20 sq cm (78005)  Anesthetic used as needed to reduce discomfort  Under aseptic technique, the wound was thoroughly explored for undermining, deep sinus tracts and bone involvement  Sterile instrumentation including scalpel used to excise the clearly delineated devitalized subcutaneous tissue exposing viable tissue  Bleeding controlled with gentle pressure  Patient tolerated debridement without complications  The wound was dressed  Wound dressing technique and frequency described to patient  I am to be called if any signs of infection develop such as erythema, increased wound size or significant change in appearance of wound, odor, pain, increased or change in color of drainage, swelling, fever, chills, nightsweats  I reviewed these signs with the patient  I recommended limiting leg dependency and to use any prescribed compression devices in an effort to allow proper fluid control and healing of the wounded area  I was very clear that sleeping with legs dependent is to be avoided at all costs  I explained that good wound care and compliance are necessary to allow healing and to prevent limb loss  Alginate applied to wound  Unna boot applied using paste wrap and coban  Applied from base of toes to infrapatellar notch  Unna boot care and precautions discussed and explained to pt/family  1  Leave wrap dry and intact  2   If wrap is too tight (feels like a tourniquet, toes turning purple), elevate the leg above heart level, if no relief, remove wrap (no scissors) Rotation:          • Left (at 90°): 3-    - External Rotation:         • Left (at 90°): 3-       TREATMENT                                                                                                                  Therapeutic Exercise:   RPM x 6 min  *YTB L sh flex/ext/abd/add  *supine cane sh flex 3# wtx 10 x 10 sec hold  *sup cane sh abd x 10 x 10 sec hold-NP  *sup elbow winging x 10 x 10 sec hold  *inf cap stretch x 3 x 20 sec hold  *post cap stretch x 3 x 20 sec hold  *wall climb sh flex x 5 x 10 sec hold  *wall push ups x 20  *counter top push ups x 10  Scapular retraction*      Manual Therapy:  STM/scap mobilization    Neuromuscular Re-Education:  Work on proper muscle activation in front of mirror to decrease substitution  Sh shrugs with BUE resting on wall and elevated.      Home Exercise Program: AAROM shoulder flexion on table*  AAROM shoulder abduction on table*  Scapular retraction*  Codman's exercise*  Patient educated on purchasing information on pulleys, educated on use for home program     ASSESSMENT                                                                                                             10/13/20: Pt shows improved L shoulder motions including ER/IR at 90 sh abd.  Pain is localizing on AC joint which is not unusual.  Continue and progress as tolerated per protocol.     Patient initially injured his left shoulder on 11/1/2019, attempted PT in early 2020 without improvement therefore underwent underwent surgery 9/11/2020 - LEFT Shoulder Arthroscopic Distal Clavicle Resection, Acromioplasty, Debridement of Rotator Cuff and Biceps Tenotomy. Presents to therapy post surgery with increased pain, decreased ROM and strength.   Pain/symptoms after session: 2    Patient Education:   Results of above outlined education: Verbalizes understanding and Demonstrates understanding    Patient progress, plan of care and goals discussed between providing therapist and assistant. and  and call office  3  If wrap becomes too loose, remove wrap (no scissors) and call the office  Pt/family verbalize understanding     Diagnoses and all orders for this visit:    Diabetic peripheral neuropathy ([address] )    Non-pressure chronic ulcer of right calf with fat layer exposed ([address] )          Subjective:      Patient ID: Vonda Bateman is a 76 y o  male  PT presents for wound care follow up  Presents with dressing intact and denies problems  The following portions of the patient's history were reviewed and updated as appropriate: allergies, current medications, past family history, past medical history, past social history, past surgical history and problem list     Review of Systems   Constitutional: Negative  Objective:      /52   Pulse 82   Ht 5' 8" (1 727 m) Comment: verbal  Wt 64 3 kg (141 lb 12 8 oz)   BMI 21 56 kg/m²          Physical Exam   Constitutional: He appears well-developed and well-nourished  No distress  Skin: He is not diaphoretic  Nursing note and vitals reviewed          Wound # 1   Location: right post lower leg  Length cm:1 Width1 cm: Depth 0 2cm:   Deepest Tissue Noted in Base: SQ  Probe to Bone?: no  Peripheral Skin Description: dry  Granulation: 90% Fibrotic Tissue: 10% Necrotic Tissue: 0%  Drainage Amount: moderate  Signs of Infection: no  Total debrided  1 square centimeters Assistant to modify based on patient progress and response to treatment.        PLAN                                                                                                                           Suggestions for next session as indicated: Progress per plan of care      GOALS                                                                                                                       Long Term Goals: To be met by end of plan of care:      Home Exercise Program: Independent with progressed and modified home exercise program (HEP)      Status:  Progressing/ongoing    Pain: Decrease pain/symptoms to 1 at rest for decreased symptom severity    Status:  Progressing/ongoing  Strength: Improve involved strength to  4, left shoulder    Status:  Progressing/ongoing  Range of Motion: Improve involved range of motion (ROM) to   At least 150 degrees flexion and abduction to assist with overhead reaching and donning/doffing clothing    Status:  Progressing/ongoing    Patient Reported Outcome Measure: Improvement in function /disability/impairment as indicated by Quick DASH (minimal clinically important difference = 15.91) < or =     Status:  Progressing/ongoing      Procedures and total treatment time documented Time Entry flowsheet.

## 2020-12-10 PROBLEM — L97.511 RIGHT FOOT ULCER, LIMITED TO BREAKDOWN OF SKIN (HCC): Status: RESOLVED | Noted: 2020-01-01 | Resolved: 2020-01-01

## 2021-01-01 ENCOUNTER — APPOINTMENT (INPATIENT)
Dept: RADIOLOGY | Facility: HOSPITAL | Age: 76
DRG: 644 | End: 2021-01-01
Payer: COMMERCIAL

## 2021-01-01 ENCOUNTER — APPOINTMENT (INPATIENT)
Dept: RADIOLOGY | Facility: HOSPITAL | Age: 76
DRG: 463 | End: 2021-01-01
Payer: COMMERCIAL

## 2021-01-01 ENCOUNTER — APPOINTMENT (EMERGENCY)
Dept: RADIOLOGY | Facility: HOSPITAL | Age: 76
DRG: 167 | End: 2021-01-01
Payer: COMMERCIAL

## 2021-01-01 ENCOUNTER — APPOINTMENT (EMERGENCY)
Dept: RADIOLOGY | Facility: HOSPITAL | Age: 76
DRG: 644 | End: 2021-01-01
Payer: COMMERCIAL

## 2021-01-01 ENCOUNTER — TELEPHONE (OUTPATIENT)
Dept: PODIATRY | Facility: CLINIC | Age: 76
End: 2021-01-01

## 2021-01-01 ENCOUNTER — PROCEDURE VISIT (OUTPATIENT)
Dept: PODIATRY | Facility: CLINIC | Age: 76
End: 2021-01-01
Payer: COMMERCIAL

## 2021-01-01 ENCOUNTER — HOSPITAL ENCOUNTER (EMERGENCY)
Facility: HOSPITAL | Age: 76
Discharge: HOME | End: 2021-03-24
Attending: EMERGENCY MEDICINE | Admitting: EMERGENCY MEDICINE
Payer: COMMERCIAL

## 2021-01-01 ENCOUNTER — TELEPHONE (OUTPATIENT)
Dept: NEUROSURGERY | Facility: CLINIC | Age: 76
End: 2021-01-01

## 2021-01-01 ENCOUNTER — APPOINTMENT (INPATIENT)
Dept: RADIOLOGY | Facility: HOSPITAL | Age: 76
DRG: 288 | End: 2021-01-01
Attending: INTERNAL MEDICINE
Payer: COMMERCIAL

## 2021-01-01 ENCOUNTER — TELEPHONE (OUTPATIENT)
Dept: RADIOLOGY | Facility: HOSPITAL | Age: 76
End: 2021-01-01

## 2021-01-01 ENCOUNTER — HOSPITAL ENCOUNTER (OUTPATIENT)
Dept: INFUSION CENTER | Facility: HOSPITAL | Age: 76
Discharge: HOME | End: 2021-02-12
Payer: COMMERCIAL

## 2021-01-01 ENCOUNTER — APPOINTMENT (INPATIENT)
Dept: RADIOLOGY | Facility: HOSPITAL | Age: 76
DRG: 288 | End: 2021-01-01
Payer: COMMERCIAL

## 2021-01-01 ENCOUNTER — TRANSCRIBE ORDERS (OUTPATIENT)
Dept: ADMINISTRATIVE | Facility: HOSPITAL | Age: 76
End: 2021-01-01

## 2021-01-01 ENCOUNTER — APPOINTMENT (INPATIENT)
Dept: NON INVASIVE DIAGNOSTICS | Facility: HOSPITAL | Age: 76
DRG: 288 | End: 2021-01-01
Payer: COMMERCIAL

## 2021-01-01 ENCOUNTER — TELEMEDICINE (OUTPATIENT)
Dept: FAMILY MEDICINE CLINIC | Facility: CLINIC | Age: 76
End: 2021-01-01
Payer: COMMERCIAL

## 2021-01-01 ENCOUNTER — HOSPITAL ENCOUNTER (INPATIENT)
Facility: HOSPITAL | Age: 76
LOS: 8 days | DRG: 463 | End: 2021-04-10
Attending: PHYSICAL MEDICINE & REHABILITATION | Admitting: PHYSICAL MEDICINE & REHABILITATION
Payer: COMMERCIAL

## 2021-01-01 ENCOUNTER — HOSPITAL ENCOUNTER (INPATIENT)
Facility: HOSPITAL | Age: 76
LOS: 5 days | DRG: 644 | End: 2021-04-02
Attending: EMERGENCY MEDICINE | Admitting: FAMILY MEDICINE
Payer: COMMERCIAL

## 2021-01-01 ENCOUNTER — HOSPITAL ENCOUNTER (OUTPATIENT)
Dept: RADIOLOGY | Facility: HOSPITAL | Age: 76
Discharge: HOME | End: 2021-03-02
Payer: COMMERCIAL

## 2021-01-01 ENCOUNTER — TRANSITIONAL CARE MANAGEMENT (OUTPATIENT)
Dept: FAMILY MEDICINE CLINIC | Facility: CLINIC | Age: 76
End: 2021-01-01

## 2021-01-01 ENCOUNTER — HOSPITAL ENCOUNTER (EMERGENCY)
Facility: HOSPITAL | Age: 76
Discharge: HOME | End: 2021-03-21
Attending: EMERGENCY MEDICINE | Admitting: EMERGENCY MEDICINE
Payer: COMMERCIAL

## 2021-01-01 ENCOUNTER — APPOINTMENT (INPATIENT)
Dept: NON INVASIVE DIAGNOSTICS | Facility: HOSPITAL | Age: 76
DRG: 644 | End: 2021-01-01
Payer: COMMERCIAL

## 2021-01-01 ENCOUNTER — PROCEDURE VISIT (OUTPATIENT)
Dept: PODIATRY | Facility: CLINIC | Age: 76
End: 2021-01-01
Payer: OTHER GOVERNMENT

## 2021-01-01 ENCOUNTER — IMMUNIZATIONS (OUTPATIENT)
Dept: FAMILY MEDICINE CLINIC | Facility: HOSPITAL | Age: 76
End: 2021-01-01

## 2021-01-01 ENCOUNTER — HOSPITAL ENCOUNTER (INPATIENT)
Facility: HOSPITAL | Age: 76
LOS: 3 days | Discharge: HOME | DRG: 167 | End: 2021-02-08
Attending: EMERGENCY MEDICINE | Admitting: INTERNAL MEDICINE
Payer: COMMERCIAL

## 2021-01-01 ENCOUNTER — OFFICE VISIT (OUTPATIENT)
Dept: PODIATRY | Facility: CLINIC | Age: 76
End: 2021-01-01
Payer: COMMERCIAL

## 2021-01-01 ENCOUNTER — HOSPITAL ENCOUNTER (INPATIENT)
Facility: HOSPITAL | Age: 76
LOS: 11 days | DRG: 288 | End: 2021-04-21
Attending: GENERAL PRACTICE | Admitting: STUDENT IN AN ORGANIZED HEALTH CARE EDUCATION/TRAINING PROGRAM
Payer: COMMERCIAL

## 2021-01-01 ENCOUNTER — TELEPHONE (OUTPATIENT)
Dept: FAMILY MEDICINE CLINIC | Facility: CLINIC | Age: 76
End: 2021-01-01

## 2021-01-01 VITALS — WEIGHT: 150 LBS | BODY MASS INDEX: 24.11 KG/M2 | HEIGHT: 66 IN

## 2021-01-01 VITALS
TEMPERATURE: 97.6 F | OXYGEN SATURATION: 86 % | SYSTOLIC BLOOD PRESSURE: 147 MMHG | RESPIRATION RATE: 20 BRPM | HEART RATE: 100 BPM | BODY MASS INDEX: 24.06 KG/M2 | HEIGHT: 68 IN | WEIGHT: 158.73 LBS | DIASTOLIC BLOOD PRESSURE: 79 MMHG

## 2021-01-01 VITALS — HEIGHT: 65 IN | BODY MASS INDEX: 24.96 KG/M2

## 2021-01-01 VITALS
TEMPERATURE: 100.8 F | RESPIRATION RATE: 18 BRPM | HEART RATE: 100 BPM | OXYGEN SATURATION: 97 % | SYSTOLIC BLOOD PRESSURE: 126 MMHG | DIASTOLIC BLOOD PRESSURE: 71 MMHG

## 2021-01-01 VITALS
TEMPERATURE: 97.6 F | DIASTOLIC BLOOD PRESSURE: 68 MMHG | OXYGEN SATURATION: 96 % | HEART RATE: 82 BPM | SYSTOLIC BLOOD PRESSURE: 125 MMHG | RESPIRATION RATE: 16 BRPM

## 2021-01-01 VITALS
SYSTOLIC BLOOD PRESSURE: 142 MMHG | BODY MASS INDEX: 22.82 KG/M2 | HEART RATE: 96 BPM | DIASTOLIC BLOOD PRESSURE: 82 MMHG | WEIGHT: 145.7 LBS

## 2021-01-01 VITALS
WEIGHT: 155.87 LBS | OXYGEN SATURATION: 37 % | DIASTOLIC BLOOD PRESSURE: 57 MMHG | SYSTOLIC BLOOD PRESSURE: 108 MMHG | HEIGHT: 68 IN | BODY MASS INDEX: 23.62 KG/M2 | TEMPERATURE: 100.4 F

## 2021-01-01 VITALS
DIASTOLIC BLOOD PRESSURE: 57 MMHG | HEART RATE: 94 BPM | SYSTOLIC BLOOD PRESSURE: 91 MMHG | HEIGHT: 65 IN | BODY MASS INDEX: 24.99 KG/M2 | WEIGHT: 150 LBS

## 2021-01-01 VITALS
BODY MASS INDEX: 22.82 KG/M2 | WEIGHT: 145.72 LBS | OXYGEN SATURATION: 97 % | HEART RATE: 90 BPM | DIASTOLIC BLOOD PRESSURE: 65 MMHG | TEMPERATURE: 98 F | RESPIRATION RATE: 16 BRPM | SYSTOLIC BLOOD PRESSURE: 131 MMHG

## 2021-01-01 VITALS
SYSTOLIC BLOOD PRESSURE: 131 MMHG | RESPIRATION RATE: 18 BRPM | BODY MASS INDEX: 24.21 KG/M2 | HEIGHT: 66 IN | HEART RATE: 77 BPM | TEMPERATURE: 98.2 F | OXYGEN SATURATION: 97 % | DIASTOLIC BLOOD PRESSURE: 68 MMHG

## 2021-01-01 VITALS
WEIGHT: 145 LBS | SYSTOLIC BLOOD PRESSURE: 110 MMHG | DIASTOLIC BLOOD PRESSURE: 62 MMHG | BODY MASS INDEX: 23.4 KG/M2 | HEART RATE: 102 BPM

## 2021-01-01 VITALS
BODY MASS INDEX: 22.87 KG/M2 | HEART RATE: 99 BPM | SYSTOLIC BLOOD PRESSURE: 100 MMHG | WEIGHT: 146 LBS | DIASTOLIC BLOOD PRESSURE: 59 MMHG

## 2021-01-01 VITALS
HEART RATE: 95 BPM | TEMPERATURE: 98.2 F | HEIGHT: 68 IN | WEIGHT: 173.28 LBS | DIASTOLIC BLOOD PRESSURE: 83 MMHG | BODY MASS INDEX: 26.26 KG/M2 | RESPIRATION RATE: 20 BRPM | SYSTOLIC BLOOD PRESSURE: 152 MMHG | OXYGEN SATURATION: 93 %

## 2021-01-01 DIAGNOSIS — K21.9 GASTROESOPHAGEAL REFLUX DISEASE WITHOUT ESOPHAGITIS: Chronic | ICD-10-CM

## 2021-01-01 DIAGNOSIS — R77.8 ELEVATED TROPONIN: Primary | ICD-10-CM

## 2021-01-01 DIAGNOSIS — I38 ENDOCARDITIS: ICD-10-CM

## 2021-01-01 DIAGNOSIS — J90 BILATERAL PLEURAL EFFUSION: ICD-10-CM

## 2021-01-01 DIAGNOSIS — E11.9 DM TYPE 2, NOT AT GOAL (HCC): ICD-10-CM

## 2021-01-01 DIAGNOSIS — N17.9 ACUTE KIDNEY INJURY (HCC): ICD-10-CM

## 2021-01-01 DIAGNOSIS — L97.512 RIGHT FOOT ULCER, WITH FAT LAYER EXPOSED (HCC): Primary | ICD-10-CM

## 2021-01-01 DIAGNOSIS — R78.81 GRAM-POSITIVE BACTEREMIA: ICD-10-CM

## 2021-01-01 DIAGNOSIS — R97.20 ELEVATED PSA: ICD-10-CM

## 2021-01-01 DIAGNOSIS — I10 ESSENTIAL HYPERTENSION: Chronic | ICD-10-CM

## 2021-01-01 DIAGNOSIS — L97.512 RIGHT FOOT ULCER, WITH FAT LAYER EXPOSED (HCC): ICD-10-CM

## 2021-01-01 DIAGNOSIS — D72.829 LEUKOCYTOSIS: ICD-10-CM

## 2021-01-01 DIAGNOSIS — E11.3592 TYPE 2 DIABETES MELLITUS WITH LEFT EYE AFFECTED BY PROLIFERATIVE RETINOPATHY WITHOUT MACULAR EDEMA, WITH LONG-TERM CURRENT USE OF INSULIN (HCC): ICD-10-CM

## 2021-01-01 DIAGNOSIS — J96.01 ACUTE RESPIRATORY FAILURE WITH HYPOXIA (HCC): ICD-10-CM

## 2021-01-01 DIAGNOSIS — Z23 ENCOUNTER FOR IMMUNIZATION: Primary | ICD-10-CM

## 2021-01-01 DIAGNOSIS — E03.9 HYPOTHYROIDISM: Primary | Chronic | ICD-10-CM

## 2021-01-01 DIAGNOSIS — I50.33 ACUTE ON CHRONIC DIASTOLIC HEART FAILURE (HCC): ICD-10-CM

## 2021-01-01 DIAGNOSIS — I10 BENIGN ESSENTIAL HTN: Primary | ICD-10-CM

## 2021-01-01 DIAGNOSIS — S91.309A MULTIPLE OPEN WOUNDS OF FOOT: ICD-10-CM

## 2021-01-01 DIAGNOSIS — R31.9 HEMATURIA: ICD-10-CM

## 2021-01-01 DIAGNOSIS — R79.89 ELEVATED TSH: ICD-10-CM

## 2021-01-01 DIAGNOSIS — B34.9 VIRAL INFECTION, UNSPECIFIED: ICD-10-CM

## 2021-01-01 DIAGNOSIS — U07.1 COVID-19: Primary | ICD-10-CM

## 2021-01-01 DIAGNOSIS — R77.8 ELEVATED TROPONIN: ICD-10-CM

## 2021-01-01 DIAGNOSIS — S32.019D CLOSED FRACTURE OF FIRST LUMBAR VERTEBRA WITH ROUTINE HEALING, UNSPECIFIED FRACTURE MORPHOLOGY, SUBSEQUENT ENCOUNTER: ICD-10-CM

## 2021-01-01 DIAGNOSIS — Z03.818 ENCOUNTER FOR OBSERVATION FOR SUSPECTED EXPOSURE TO OTHER BIOLOGICAL AGENTS RULED OUT: ICD-10-CM

## 2021-01-01 DIAGNOSIS — Z79.4 TYPE 2 DIABETES MELLITUS WITH LEFT EYE AFFECTED BY PROLIFERATIVE RETINOPATHY WITHOUT MACULAR EDEMA, WITH LONG-TERM CURRENT USE OF INSULIN (HCC): ICD-10-CM

## 2021-01-01 DIAGNOSIS — N17.9 AKI (ACUTE KIDNEY INJURY) (HCC): ICD-10-CM

## 2021-01-01 DIAGNOSIS — U07.1 COVID-19: ICD-10-CM

## 2021-01-01 DIAGNOSIS — R19.7 DIARRHEA, UNSPECIFIED TYPE: ICD-10-CM

## 2021-01-01 DIAGNOSIS — E11.3592: ICD-10-CM

## 2021-01-01 DIAGNOSIS — E11.42 DIABETIC PERIPHERAL NEUROPATHY (HCC): Chronic | ICD-10-CM

## 2021-01-01 DIAGNOSIS — B34.9 VIRAL SYNDROME: Primary | ICD-10-CM

## 2021-01-01 DIAGNOSIS — E11.42 DIABETIC PERIPHERAL NEUROPATHY (HCC): Primary | Chronic | ICD-10-CM

## 2021-01-01 DIAGNOSIS — R65.10 SIRS (SYSTEMIC INFLAMMATORY RESPONSE SYNDROME) (HCC): ICD-10-CM

## 2021-01-01 DIAGNOSIS — R97.20 ELEVATED PSA: Primary | ICD-10-CM

## 2021-01-01 DIAGNOSIS — Z23 ENCOUNTER FOR IMMUNIZATION: ICD-10-CM

## 2021-01-01 DIAGNOSIS — T50.Z95A ADVERSE EFFECT OF VACCINE, INITIAL ENCOUNTER: Primary | ICD-10-CM

## 2021-01-01 DIAGNOSIS — R80.9 PROTEINURIA: ICD-10-CM

## 2021-01-01 DIAGNOSIS — R73.9 HYPERGLYCEMIA: ICD-10-CM

## 2021-01-01 DIAGNOSIS — E11.9 DM TYPE 2, NOT AT GOAL (HCC): Primary | ICD-10-CM

## 2021-01-01 DIAGNOSIS — R53.83 FATIGUE: Primary | ICD-10-CM

## 2021-01-01 DIAGNOSIS — R19.7 DIARRHEA: ICD-10-CM

## 2021-01-01 DIAGNOSIS — R26.2 AMBULATORY DYSFUNCTION: ICD-10-CM

## 2021-01-01 DIAGNOSIS — E87.1 HYPONATREMIA: ICD-10-CM

## 2021-01-01 DIAGNOSIS — N18.30 STAGE 3 CHRONIC KIDNEY DISEASE, UNSPECIFIED WHETHER STAGE 3A OR 3B CKD (HCC): ICD-10-CM

## 2021-01-01 DIAGNOSIS — S32.019A L1 VERTEBRAL FRACTURE (HCC): ICD-10-CM

## 2021-01-01 DIAGNOSIS — R78.81 BACTEREMIA: ICD-10-CM

## 2021-01-01 LAB
25(OH)D3 SERPL-MCNC: 29.3 NG/ML (ref 30–100)
ABO GROUP BLD BPU: NORMAL
ABO GROUP BLD: NORMAL
ACTH PLAS-MCNC: 20 PG/ML (ref 7.2–63.3)
ALBUMIN SERPL BCP-MCNC: 2.4 G/DL (ref 3.5–5)
ALBUMIN SERPL BCP-MCNC: 2.4 G/DL (ref 3.5–5)
ALBUMIN SERPL BCP-MCNC: 2.6 G/DL (ref 3.5–5)
ALBUMIN SERPL BCP-MCNC: 2.7 G/DL (ref 3.5–5)
ALBUMIN SERPL BCP-MCNC: 2.7 G/DL (ref 3.5–5)
ALBUMIN SERPL BCP-MCNC: 3 G/DL (ref 3.5–5)
ALBUMIN SERPL ELPH-MCNC: 2.29 G/DL (ref 3.5–5)
ALBUMIN SERPL ELPH-MCNC: 36.9 % (ref 52–65)
ALBUMIN UR ELPH-MCNC: 58.4 %
ALDOST SERPL-MCNC: <1 NG/DL (ref 0–30)
ALP SERPL-CCNC: 102 U/L (ref 46–116)
ALP SERPL-CCNC: 104 U/L (ref 46–116)
ALP SERPL-CCNC: 113 U/L (ref 46–116)
ALP SERPL-CCNC: 116 U/L (ref 46–116)
ALP SERPL-CCNC: 145 U/L (ref 46–116)
ALP SERPL-CCNC: 99 U/L (ref 46–116)
ALPHA1 GLOB MFR UR ELPH: 5.5 %
ALPHA1 GLOB SERPL ELPH-MCNC: 0.61 G/DL (ref 0.1–0.4)
ALPHA1 GLOB SERPL ELPH-MCNC: 9.9 % (ref 2.5–5)
ALPHA2 GLOB MFR UR ELPH: 9.1 %
ALPHA2 GLOB SERPL ELPH-MCNC: 0.87 G/DL (ref 0.4–1.2)
ALPHA2 GLOB SERPL ELPH-MCNC: 14.1 % (ref 7–13)
ALT SERPL W P-5'-P-CCNC: 21 U/L (ref 12–78)
ALT SERPL W P-5'-P-CCNC: 24 U/L (ref 12–78)
ALT SERPL W P-5'-P-CCNC: 29 U/L (ref 12–78)
ALT SERPL W P-5'-P-CCNC: 33 U/L (ref 12–78)
ALT SERPL W P-5'-P-CCNC: 46 U/L (ref 12–78)
ALT SERPL W P-5'-P-CCNC: 51 U/L (ref 12–78)
AMMONIA PLAS-SCNC: 15 UMOL/L (ref 11–35)
AMMONIA PLAS-SCNC: 21 UMOL/L (ref 11–35)
AMMONIA PLAS-SCNC: 24 UMOL/L (ref 11–35)
AMMONIA PLAS-SCNC: <10 UMOL/L (ref 11–35)
AMORPH URATE CRY URNS QL MICRO: ABNORMAL /HPF
ANION GAP SERPL CALCULATED.3IONS-SCNC: 10 MMOL/L (ref 4–13)
ANION GAP SERPL CALCULATED.3IONS-SCNC: 11 MMOL/L (ref 4–13)
ANION GAP SERPL CALCULATED.3IONS-SCNC: 12 MMOL/L (ref 4–13)
ANION GAP SERPL CALCULATED.3IONS-SCNC: 12 MMOL/L (ref 4–13)
ANION GAP SERPL CALCULATED.3IONS-SCNC: 16 MMOL/L (ref 4–13)
ANION GAP SERPL CALCULATED.3IONS-SCNC: 5 MMOL/L (ref 4–13)
ANION GAP SERPL CALCULATED.3IONS-SCNC: 6 MMOL/L (ref 4–13)
ANION GAP SERPL CALCULATED.3IONS-SCNC: 7 MMOL/L (ref 4–13)
ANION GAP SERPL CALCULATED.3IONS-SCNC: 8 MMOL/L (ref 4–13)
ANION GAP SERPL CALCULATED.3IONS-SCNC: 9 MMOL/L (ref 4–13)
APPEARANCE FLD: CLEAR
ARTERIAL PATENCY WRIST A: YES
AST SERPL W P-5'-P-CCNC: 103 U/L (ref 5–45)
AST SERPL W P-5'-P-CCNC: 20 U/L (ref 5–45)
AST SERPL W P-5'-P-CCNC: 23 U/L (ref 5–45)
AST SERPL W P-5'-P-CCNC: 27 U/L (ref 5–45)
AST SERPL W P-5'-P-CCNC: 37 U/L (ref 5–45)
AST SERPL W P-5'-P-CCNC: 73 U/L (ref 5–45)
ATRIAL RATE: 92 BPM
ATRIAL RATE: 93 BPM
ATRIAL RATE: 94 BPM
ATRIAL RATE: 97 BPM
B-GLOBULIN MFR UR ELPH: 7 %
BACTERIA BLD CULT: ABNORMAL
BACTERIA BLD CULT: NORMAL
BACTERIA SPEC BFLD CULT: NO GROWTH
BACTERIA SPEC BFLD CULT: NO GROWTH
BACTERIA UR CULT: NORMAL
BACTERIA UR QL AUTO: ABNORMAL /HPF
BASE EX.OXY STD BLDV CALC-SCNC: 77.8 % (ref 60–80)
BASE EX.OXY STD BLDV CALC-SCNC: 86.9 % (ref 60–80)
BASE EX.OXY STD BLDV CALC-SCNC: 96.4 % (ref 60–80)
BASE EXCESS BLDA CALC-SCNC: -2 MMOL/L (ref -2–3)
BASE EXCESS BLDA CALC-SCNC: 1.5 MMOL/L
BASE EXCESS BLDA CALC-SCNC: 6 MMOL/L (ref -2–3)
BASE EXCESS BLDV CALC-SCNC: -1.2 MMOL/L
BASE EXCESS BLDV CALC-SCNC: 1.5 MMOL/L
BASE EXCESS BLDV CALC-SCNC: 1.9 MMOL/L
BASOPHILS # BLD AUTO: 0 THOUSANDS/ΜL (ref 0–0.1)
BASOPHILS # BLD AUTO: 0.01 THOUSANDS/ΜL (ref 0–0.1)
BASOPHILS # BLD AUTO: 0.01 THOUSANDS/ΜL (ref 0–0.1)
BASOPHILS # BLD AUTO: 0.02 THOUSANDS/ΜL (ref 0–0.1)
BASOPHILS # BLD AUTO: 0.03 THOUSANDS/ΜL (ref 0–0.1)
BASOPHILS # BLD AUTO: 0.04 THOUSANDS/ΜL (ref 0–0.1)
BASOPHILS # BLD AUTO: 0.04 THOUSANDS/ΜL (ref 0–0.1)
BASOPHILS # BLD AUTO: 0.05 THOUSANDS/ΜL (ref 0–0.1)
BASOPHILS # BLD AUTO: 0.06 THOUSANDS/ΜL (ref 0–0.1)
BASOPHILS NFR BLD AUTO: 0 % (ref 0–1)
BETA GLOB ABNORMAL SERPL ELPH-MCNC: 0.42 G/DL (ref 0.4–0.8)
BETA-HYDROXYBUTYRATE: 0.2 MMOL/L
BETA1 GLOB SERPL ELPH-MCNC: 6.8 % (ref 5–13)
BETA2 GLOB SERPL ELPH-MCNC: 9 % (ref 2–8)
BETA2+GAMMA GLOB SERPL ELPH-MCNC: 0.56 G/DL (ref 0.2–0.5)
BILIRUB DIRECT SERPL-MCNC: 0.31 MG/DL (ref 0–0.2)
BILIRUB SERPL-MCNC: 0.31 MG/DL (ref 0.2–1)
BILIRUB SERPL-MCNC: 0.39 MG/DL (ref 0.2–1)
BILIRUB SERPL-MCNC: 0.46 MG/DL (ref 0.2–1)
BILIRUB SERPL-MCNC: 0.53 MG/DL (ref 0.2–1)
BILIRUB SERPL-MCNC: 0.63 MG/DL (ref 0.2–1)
BILIRUB SERPL-MCNC: 0.77 MG/DL (ref 0.2–1)
BILIRUB UR QL STRIP: ABNORMAL
BILIRUB UR QL STRIP: NEGATIVE
BILIRUB UR QL STRIP: NEGATIVE
BLD GP AB SCN SERPL QL: NEGATIVE
BLD GP AB SCN SERPL QL: NEGATIVE
BPU ID: NORMAL
BUN SERPL-MCNC: 16 MG/DL (ref 5–25)
BUN SERPL-MCNC: 17 MG/DL (ref 5–25)
BUN SERPL-MCNC: 17 MG/DL (ref 5–25)
BUN SERPL-MCNC: 20 MG/DL (ref 5–25)
BUN SERPL-MCNC: 22 MG/DL (ref 5–25)
BUN SERPL-MCNC: 22 MG/DL (ref 5–25)
BUN SERPL-MCNC: 25 MG/DL (ref 5–25)
BUN SERPL-MCNC: 27 MG/DL (ref 5–25)
BUN SERPL-MCNC: 30 MG/DL (ref 5–25)
BUN SERPL-MCNC: 36 MG/DL (ref 5–25)
BUN SERPL-MCNC: 37 MG/DL (ref 5–25)
BUN SERPL-MCNC: 39 MG/DL (ref 5–25)
BUN SERPL-MCNC: 41 MG/DL (ref 5–25)
BUN SERPL-MCNC: 43 MG/DL (ref 5–25)
BUN SERPL-MCNC: 43 MG/DL (ref 5–25)
BUN SERPL-MCNC: 44 MG/DL (ref 5–25)
BUN SERPL-MCNC: 45 MG/DL (ref 5–25)
BUN SERPL-MCNC: 46 MG/DL (ref 5–25)
BUN SERPL-MCNC: 48 MG/DL (ref 5–25)
BUN SERPL-MCNC: 48 MG/DL (ref 5–25)
BUN SERPL-MCNC: 51 MG/DL (ref 5–25)
BUN SERPL-MCNC: 51 MG/DL (ref 5–25)
BUN SERPL-MCNC: 53 MG/DL (ref 5–25)
BUN SERPL-MCNC: 55 MG/DL (ref 5–25)
BUN SERPL-MCNC: 58 MG/DL (ref 5–25)
BUN SERPL-MCNC: 59 MG/DL (ref 5–25)
BUN SERPL-MCNC: 63 MG/DL (ref 5–25)
BUN SERPL-MCNC: 65 MG/DL (ref 5–25)
BUN SERPL-MCNC: 69 MG/DL (ref 5–25)
BUN SERPL-MCNC: 70 MG/DL (ref 5–25)
BUN SERPL-MCNC: 75 MG/DL (ref 5–25)
BUN SERPL-MCNC: 78 MG/DL (ref 5–25)
C3 SERPL-MCNC: 123 MG/DL (ref 90–180)
C4 SERPL-MCNC: 17 MG/DL (ref 10–40)
CALCIUM ALBUM COR SERPL-MCNC: 8.8 MG/DL (ref 8.3–10.1)
CALCIUM ALBUM COR SERPL-MCNC: 9 MG/DL (ref 8.3–10.1)
CALCIUM ALBUM COR SERPL-MCNC: 9 MG/DL (ref 8.3–10.1)
CALCIUM ALBUM COR SERPL-MCNC: 9.7 MG/DL (ref 8.3–10.1)
CALCIUM ALBUM COR SERPL-MCNC: 9.8 MG/DL (ref 8.3–10.1)
CALCIUM SERPL-MCNC: 7.3 MG/DL (ref 8.3–10.1)
CALCIUM SERPL-MCNC: 7.5 MG/DL (ref 8.3–10.1)
CALCIUM SERPL-MCNC: 7.6 MG/DL (ref 8.3–10.1)
CALCIUM SERPL-MCNC: 7.7 MG/DL (ref 8.3–10.1)
CALCIUM SERPL-MCNC: 7.7 MG/DL (ref 8.3–10.1)
CALCIUM SERPL-MCNC: 7.8 MG/DL (ref 8.3–10.1)
CALCIUM SERPL-MCNC: 7.9 MG/DL (ref 8.3–10.1)
CALCIUM SERPL-MCNC: 8 MG/DL (ref 8.3–10.1)
CALCIUM SERPL-MCNC: 8 MG/DL (ref 8.3–10.1)
CALCIUM SERPL-MCNC: 8.2 MG/DL (ref 8.3–10.1)
CALCIUM SERPL-MCNC: 8.2 MG/DL (ref 8.3–10.1)
CALCIUM SERPL-MCNC: 8.3 MG/DL (ref 8.3–10.1)
CALCIUM SERPL-MCNC: 8.4 MG/DL (ref 8.3–10.1)
CALCIUM SERPL-MCNC: 8.5 MG/DL (ref 8.3–10.1)
CALCIUM SERPL-MCNC: 8.6 MG/DL (ref 8.3–10.1)
CALCIUM SERPL-MCNC: 8.6 MG/DL (ref 8.3–10.1)
CALCIUM SERPL-MCNC: 8.7 MG/DL (ref 8.3–10.1)
CALCIUM SERPL-MCNC: 8.8 MG/DL (ref 8.3–10.1)
CALCIUM SERPL-MCNC: 8.9 MG/DL (ref 8.3–10.1)
CAMPYLOBACTER DNA SPEC NAA+PROBE: NORMAL
CHLORIDE SERPL-SCNC: 100 MMOL/L (ref 100–108)
CHLORIDE SERPL-SCNC: 101 MMOL/L (ref 100–108)
CHLORIDE SERPL-SCNC: 102 MMOL/L (ref 100–108)
CHLORIDE SERPL-SCNC: 102 MMOL/L (ref 100–108)
CHLORIDE SERPL-SCNC: 103 MMOL/L (ref 100–108)
CHLORIDE SERPL-SCNC: 104 MMOL/L (ref 100–108)
CHLORIDE SERPL-SCNC: 105 MMOL/L (ref 100–108)
CHLORIDE SERPL-SCNC: 94 MMOL/L (ref 100–108)
CHLORIDE SERPL-SCNC: 95 MMOL/L (ref 100–108)
CHLORIDE SERPL-SCNC: 96 MMOL/L (ref 100–108)
CHLORIDE SERPL-SCNC: 97 MMOL/L (ref 100–108)
CHLORIDE SERPL-SCNC: 97 MMOL/L (ref 100–108)
CHLORIDE SERPL-SCNC: 98 MMOL/L (ref 100–108)
CHLORIDE SERPL-SCNC: 99 MMOL/L (ref 100–108)
CHLORIDE UR-SCNC: <10 MMOL/L
CHOLEST SERPL-MCNC: 87 MG/DL (ref 50–200)
CK MB SERPL-MCNC: 1.2 % (ref 0–2.5)
CK MB SERPL-MCNC: 3.4 NG/ML (ref 0–5)
CK SERPL-CCNC: 293 U/L (ref 39–308)
CK SERPL-CCNC: 86 U/L (ref 39–308)
CLARITY UR: ABNORMAL
CLARITY UR: CLEAR
CO2 SERPL-SCNC: 17 MMOL/L (ref 21–32)
CO2 SERPL-SCNC: 19 MMOL/L (ref 21–32)
CO2 SERPL-SCNC: 22 MMOL/L (ref 21–32)
CO2 SERPL-SCNC: 23 MMOL/L (ref 21–32)
CO2 SERPL-SCNC: 24 MMOL/L (ref 21–32)
CO2 SERPL-SCNC: 25 MMOL/L (ref 21–32)
CO2 SERPL-SCNC: 26 MMOL/L (ref 21–32)
CO2 SERPL-SCNC: 27 MMOL/L (ref 21–32)
CO2 SERPL-SCNC: 28 MMOL/L (ref 21–32)
COARSE GRAN CASTS URNS QL MICRO: ABNORMAL /LPF
COARSE GRAN CASTS URNS QL MICRO: ABNORMAL /LPF
COLOR FLD: NORMAL
COLOR UR: ABNORMAL
COLOR UR: YELLOW
CORTIS AM PEAK SERPL-MCNC: 16 UG/DL (ref 4.2–22.4)
CORTIS AM PEAK SERPL-MCNC: 27.2 UG/DL (ref 4.2–22.4)
CORTIS SERPL-MCNC: 30.1 UG/DL
CREAT SERPL-MCNC: 1.05 MG/DL (ref 0.6–1.3)
CREAT SERPL-MCNC: 1.07 MG/DL (ref 0.6–1.3)
CREAT SERPL-MCNC: 1.09 MG/DL (ref 0.6–1.3)
CREAT SERPL-MCNC: 1.18 MG/DL (ref 0.6–1.3)
CREAT SERPL-MCNC: 1.18 MG/DL (ref 0.6–1.3)
CREAT SERPL-MCNC: 1.22 MG/DL (ref 0.6–1.3)
CREAT SERPL-MCNC: 1.22 MG/DL (ref 0.6–1.3)
CREAT SERPL-MCNC: 1.23 MG/DL (ref 0.6–1.3)
CREAT SERPL-MCNC: 1.29 MG/DL (ref 0.6–1.3)
CREAT SERPL-MCNC: 1.34 MG/DL (ref 0.6–1.3)
CREAT SERPL-MCNC: 1.53 MG/DL (ref 0.6–1.3)
CREAT SERPL-MCNC: 1.55 MG/DL (ref 0.6–1.3)
CREAT SERPL-MCNC: 1.55 MG/DL (ref 0.6–1.3)
CREAT SERPL-MCNC: 1.57 MG/DL (ref 0.6–1.3)
CREAT SERPL-MCNC: 1.87 MG/DL (ref 0.6–1.3)
CREAT SERPL-MCNC: 2.1 MG/DL (ref 0.6–1.3)
CREAT SERPL-MCNC: 2.12 MG/DL (ref 0.6–1.3)
CREAT SERPL-MCNC: 2.2 MG/DL (ref 0.6–1.3)
CREAT SERPL-MCNC: 2.28 MG/DL (ref 0.6–1.3)
CREAT SERPL-MCNC: 2.35 MG/DL (ref 0.6–1.3)
CREAT SERPL-MCNC: 2.37 MG/DL (ref 0.6–1.3)
CREAT SERPL-MCNC: 2.38 MG/DL (ref 0.6–1.3)
CREAT SERPL-MCNC: 2.62 MG/DL (ref 0.6–1.3)
CREAT SERPL-MCNC: 2.64 MG/DL (ref 0.6–1.3)
CREAT SERPL-MCNC: 2.68 MG/DL (ref 0.6–1.3)
CREAT SERPL-MCNC: 2.79 MG/DL (ref 0.6–1.3)
CREAT SERPL-MCNC: 2.96 MG/DL (ref 0.6–1.3)
CREAT SERPL-MCNC: 3.07 MG/DL (ref 0.6–1.3)
CREAT SERPL-MCNC: 3.15 MG/DL (ref 0.6–1.3)
CREAT SERPL-MCNC: 3.29 MG/DL (ref 0.6–1.3)
CREAT UR-MCNC: 146 MG/DL
CROSSMATCH: NORMAL
CRP SERPL QL: 100.5 MG/L
CRP SERPL QL: 121.6 MG/L
CRP SERPL QL: 99.5 MG/L
D DIMER PPP FEU-MCNC: 0.74 UG/ML FEU
D DIMER PPP FEU-MCNC: 0.82 UG/ML FEU
D DIMER PPP FEU-MCNC: 0.96 UG/ML FEU
D DIMER PPP FEU-MCNC: 3.02 UG/ML FEU
EOSINOPHIL # BLD AUTO: 0 THOUSAND/ΜL (ref 0–0.61)
EOSINOPHIL # BLD AUTO: 0.01 THOUSAND/ΜL (ref 0–0.61)
EOSINOPHIL # BLD AUTO: 0.02 THOUSAND/ΜL (ref 0–0.61)
EOSINOPHIL # BLD AUTO: 0.02 THOUSAND/ΜL (ref 0–0.61)
EOSINOPHIL # BLD AUTO: 0.03 THOUSAND/ΜL (ref 0–0.61)
EOSINOPHIL # BLD AUTO: 0.03 THOUSAND/ΜL (ref 0–0.61)
EOSINOPHIL # BLD AUTO: 0.04 THOUSAND/ΜL (ref 0–0.61)
EOSINOPHIL # BLD AUTO: 0.05 THOUSAND/ΜL (ref 0–0.61)
EOSINOPHIL # BLD AUTO: 0.06 THOUSAND/ΜL (ref 0–0.61)
EOSINOPHIL # BLD AUTO: 0.07 THOUSAND/ΜL (ref 0–0.61)
EOSINOPHIL # BLD AUTO: 0.09 THOUSAND/ΜL (ref 0–0.61)
EOSINOPHIL NFR BLD AUTO: 0 % (ref 0–6)
EOSINOPHIL NFR BLD AUTO: 1 % (ref 0–6)
EOSINOPHIL NFR URNS MANUAL: 0 %
ERYTHROCYTE [DISTWIDTH] IN BLOOD BY AUTOMATED COUNT: 13.3 % (ref 11.6–15.1)
ERYTHROCYTE [DISTWIDTH] IN BLOOD BY AUTOMATED COUNT: 13.4 % (ref 11.6–15.1)
ERYTHROCYTE [DISTWIDTH] IN BLOOD BY AUTOMATED COUNT: 13.5 % (ref 11.6–15.1)
ERYTHROCYTE [DISTWIDTH] IN BLOOD BY AUTOMATED COUNT: 14.2 % (ref 11.6–15.1)
ERYTHROCYTE [DISTWIDTH] IN BLOOD BY AUTOMATED COUNT: 14.2 % (ref 11.6–15.1)
ERYTHROCYTE [DISTWIDTH] IN BLOOD BY AUTOMATED COUNT: 14.3 % (ref 11.6–15.1)
ERYTHROCYTE [DISTWIDTH] IN BLOOD BY AUTOMATED COUNT: 14.5 % (ref 11.6–15.1)
ERYTHROCYTE [DISTWIDTH] IN BLOOD BY AUTOMATED COUNT: 14.6 % (ref 11.6–15.1)
ERYTHROCYTE [DISTWIDTH] IN BLOOD BY AUTOMATED COUNT: 14.7 % (ref 11.6–15.1)
ERYTHROCYTE [DISTWIDTH] IN BLOOD BY AUTOMATED COUNT: 14.8 % (ref 11.6–15.1)
ERYTHROCYTE [DISTWIDTH] IN BLOOD BY AUTOMATED COUNT: 14.8 % (ref 11.6–15.1)
ERYTHROCYTE [DISTWIDTH] IN BLOOD BY AUTOMATED COUNT: 14.9 % (ref 11.6–15.1)
ERYTHROCYTE [DISTWIDTH] IN BLOOD BY AUTOMATED COUNT: 15 % (ref 11.6–15.1)
ERYTHROCYTE [DISTWIDTH] IN BLOOD BY AUTOMATED COUNT: 15 % (ref 11.6–15.1)
ERYTHROCYTE [DISTWIDTH] IN BLOOD BY AUTOMATED COUNT: 15.7 % (ref 11.6–15.1)
ERYTHROCYTE [DISTWIDTH] IN BLOOD BY AUTOMATED COUNT: 16 % (ref 11.6–15.1)
ERYTHROCYTE [DISTWIDTH] IN BLOOD BY AUTOMATED COUNT: 16.1 % (ref 11.6–15.1)
ERYTHROCYTE [DISTWIDTH] IN BLOOD BY AUTOMATED COUNT: 16.6 % (ref 11.6–15.1)
ERYTHROCYTE [DISTWIDTH] IN BLOOD BY AUTOMATED COUNT: 16.9 % (ref 11.6–15.1)
ERYTHROCYTE [DISTWIDTH] IN BLOOD BY AUTOMATED COUNT: 17.5 % (ref 11.6–15.1)
ERYTHROCYTE [DISTWIDTH] IN BLOOD BY AUTOMATED COUNT: 17.8 % (ref 11.6–15.1)
ERYTHROCYTE [DISTWIDTH] IN BLOOD BY AUTOMATED COUNT: 18 % (ref 11.6–15.1)
ERYTHROCYTE [DISTWIDTH] IN BLOOD BY AUTOMATED COUNT: 18.2 % (ref 11.6–15.1)
ERYTHROCYTE [DISTWIDTH] IN BLOOD BY AUTOMATED COUNT: 18.4 % (ref 11.6–15.1)
ERYTHROCYTE [DISTWIDTH] IN BLOOD BY AUTOMATED COUNT: 18.7 % (ref 11.6–15.1)
ERYTHROCYTE [DISTWIDTH] IN BLOOD BY AUTOMATED COUNT: 19 % (ref 11.6–15.1)
EST. AVERAGE GLUCOSE BLD GHB EST-MCNC: 174 MG/DL
FERRITIN SERPL-MCNC: 126 NG/ML (ref 8–388)
FERRITIN SERPL-MCNC: 144 NG/ML (ref 8–388)
FERRITIN SERPL-MCNC: 266 NG/ML (ref 8–388)
FERRITIN SERPL-MCNC: 384 NG/ML (ref 8–388)
FINE GRAN CASTS URNS QL MICRO: ABNORMAL /LPF
FINE GRAN CASTS URNS QL MICRO: ABNORMAL /LPF
FLUAV RNA RESP QL NAA+PROBE: NEGATIVE
FLUBV RNA RESP QL NAA+PROBE: NEGATIVE
GAMMA GLOB ABNORMAL SERPL ELPH-MCNC: 1.44 G/DL (ref 0.5–1.6)
GAMMA GLOB MFR UR ELPH: 20 %
GAMMA GLOB SERPL ELPH-MCNC: 23.3 % (ref 12–22)
GFR SERPL CREATININE-BSD FRML MDRD: 17 ML/MIN/1.73SQ M
GFR SERPL CREATININE-BSD FRML MDRD: 18 ML/MIN/1.73SQ M
GFR SERPL CREATININE-BSD FRML MDRD: 19 ML/MIN/1.73SQ M
GFR SERPL CREATININE-BSD FRML MDRD: 20 ML/MIN/1.73SQ M
GFR SERPL CREATININE-BSD FRML MDRD: 21 ML/MIN/1.73SQ M
GFR SERPL CREATININE-BSD FRML MDRD: 22 ML/MIN/1.73SQ M
GFR SERPL CREATININE-BSD FRML MDRD: 23 ML/MIN/1.73SQ M
GFR SERPL CREATININE-BSD FRML MDRD: 23 ML/MIN/1.73SQ M
GFR SERPL CREATININE-BSD FRML MDRD: 26 ML/MIN/1.73SQ M
GFR SERPL CREATININE-BSD FRML MDRD: 27 ML/MIN/1.73SQ M
GFR SERPL CREATININE-BSD FRML MDRD: 28 ML/MIN/1.73SQ M
GFR SERPL CREATININE-BSD FRML MDRD: 30 ML/MIN/1.73SQ M
GFR SERPL CREATININE-BSD FRML MDRD: 30 ML/MIN/1.73SQ M
GFR SERPL CREATININE-BSD FRML MDRD: 34 ML/MIN/1.73SQ M
GFR SERPL CREATININE-BSD FRML MDRD: 42 ML/MIN/1.73SQ M
GFR SERPL CREATININE-BSD FRML MDRD: 43 ML/MIN/1.73SQ M
GFR SERPL CREATININE-BSD FRML MDRD: 43 ML/MIN/1.73SQ M
GFR SERPL CREATININE-BSD FRML MDRD: 44 ML/MIN/1.73SQ M
GFR SERPL CREATININE-BSD FRML MDRD: 51 ML/MIN/1.73SQ M
GFR SERPL CREATININE-BSD FRML MDRD: 54 ML/MIN/1.73SQ M
GFR SERPL CREATININE-BSD FRML MDRD: 57 ML/MIN/1.73SQ M
GFR SERPL CREATININE-BSD FRML MDRD: 58 ML/MIN/1.73SQ M
GFR SERPL CREATININE-BSD FRML MDRD: 58 ML/MIN/1.73SQ M
GFR SERPL CREATININE-BSD FRML MDRD: 60 ML/MIN/1.73SQ M
GFR SERPL CREATININE-BSD FRML MDRD: 60 ML/MIN/1.73SQ M
GFR SERPL CREATININE-BSD FRML MDRD: 66 ML/MIN/1.73SQ M
GFR SERPL CREATININE-BSD FRML MDRD: 68 ML/MIN/1.73SQ M
GFR SERPL CREATININE-BSD FRML MDRD: 69 ML/MIN/1.73SQ M
GLUCOSE FLD-MCNC: 148 MG/DL
GLUCOSE FLD-MCNC: 226 MG/DL
GLUCOSE P FAST SERPL-MCNC: 103 MG/DL (ref 65–99)
GLUCOSE P FAST SERPL-MCNC: 163 MG/DL (ref 65–99)
GLUCOSE P FAST SERPL-MCNC: 166 MG/DL (ref 65–99)
GLUCOSE P FAST SERPL-MCNC: 390 MG/DL (ref 65–99)
GLUCOSE P FAST SERPL-MCNC: 43 MG/DL (ref 65–99)
GLUCOSE SERPL-MCNC: 100 MG/DL (ref 65–140)
GLUCOSE SERPL-MCNC: 103 MG/DL (ref 65–140)
GLUCOSE SERPL-MCNC: 104 MG/DL (ref 65–140)
GLUCOSE SERPL-MCNC: 105 MG/DL (ref 65–140)
GLUCOSE SERPL-MCNC: 108 MG/DL (ref 65–140)
GLUCOSE SERPL-MCNC: 110 MG/DL (ref 65–140)
GLUCOSE SERPL-MCNC: 113 MG/DL (ref 65–140)
GLUCOSE SERPL-MCNC: 113 MG/DL (ref 65–140)
GLUCOSE SERPL-MCNC: 114 MG/DL (ref 65–140)
GLUCOSE SERPL-MCNC: 115 MG/DL (ref 65–140)
GLUCOSE SERPL-MCNC: 116 MG/DL (ref 65–140)
GLUCOSE SERPL-MCNC: 118 MG/DL (ref 65–140)
GLUCOSE SERPL-MCNC: 118 MG/DL (ref 65–140)
GLUCOSE SERPL-MCNC: 123 MG/DL (ref 65–140)
GLUCOSE SERPL-MCNC: 123 MG/DL (ref 65–140)
GLUCOSE SERPL-MCNC: 127 MG/DL (ref 65–140)
GLUCOSE SERPL-MCNC: 128 MG/DL (ref 65–140)
GLUCOSE SERPL-MCNC: 129 MG/DL (ref 65–140)
GLUCOSE SERPL-MCNC: 131 MG/DL (ref 65–140)
GLUCOSE SERPL-MCNC: 132 MG/DL (ref 65–140)
GLUCOSE SERPL-MCNC: 133 MG/DL (ref 65–140)
GLUCOSE SERPL-MCNC: 134 MG/DL (ref 65–140)
GLUCOSE SERPL-MCNC: 135 MG/DL (ref 65–140)
GLUCOSE SERPL-MCNC: 136 MG/DL (ref 65–140)
GLUCOSE SERPL-MCNC: 138 MG/DL (ref 65–140)
GLUCOSE SERPL-MCNC: 141 MG/DL (ref 65–140)
GLUCOSE SERPL-MCNC: 145 MG/DL (ref 65–140)
GLUCOSE SERPL-MCNC: 148 MG/DL (ref 65–140)
GLUCOSE SERPL-MCNC: 148 MG/DL (ref 65–140)
GLUCOSE SERPL-MCNC: 149 MG/DL (ref 65–140)
GLUCOSE SERPL-MCNC: 152 MG/DL (ref 65–140)
GLUCOSE SERPL-MCNC: 154 MG/DL (ref 65–140)
GLUCOSE SERPL-MCNC: 154 MG/DL (ref 65–140)
GLUCOSE SERPL-MCNC: 155 MG/DL (ref 65–140)
GLUCOSE SERPL-MCNC: 155 MG/DL (ref 65–140)
GLUCOSE SERPL-MCNC: 156 MG/DL (ref 65–140)
GLUCOSE SERPL-MCNC: 157 MG/DL (ref 65–140)
GLUCOSE SERPL-MCNC: 158 MG/DL (ref 65–140)
GLUCOSE SERPL-MCNC: 159 MG/DL (ref 65–140)
GLUCOSE SERPL-MCNC: 161 MG/DL (ref 65–140)
GLUCOSE SERPL-MCNC: 162 MG/DL (ref 65–140)
GLUCOSE SERPL-MCNC: 162 MG/DL (ref 65–140)
GLUCOSE SERPL-MCNC: 163 MG/DL (ref 65–140)
GLUCOSE SERPL-MCNC: 164 MG/DL (ref 65–140)
GLUCOSE SERPL-MCNC: 164 MG/DL (ref 65–140)
GLUCOSE SERPL-MCNC: 166 MG/DL (ref 65–140)
GLUCOSE SERPL-MCNC: 167 MG/DL (ref 65–140)
GLUCOSE SERPL-MCNC: 168 MG/DL (ref 65–140)
GLUCOSE SERPL-MCNC: 168 MG/DL (ref 65–140)
GLUCOSE SERPL-MCNC: 171 MG/DL (ref 65–140)
GLUCOSE SERPL-MCNC: 173 MG/DL (ref 65–140)
GLUCOSE SERPL-MCNC: 173 MG/DL (ref 65–140)
GLUCOSE SERPL-MCNC: 176 MG/DL (ref 65–140)
GLUCOSE SERPL-MCNC: 177 MG/DL (ref 65–140)
GLUCOSE SERPL-MCNC: 177 MG/DL (ref 65–140)
GLUCOSE SERPL-MCNC: 178 MG/DL (ref 65–140)
GLUCOSE SERPL-MCNC: 179 MG/DL (ref 65–140)
GLUCOSE SERPL-MCNC: 180 MG/DL (ref 65–140)
GLUCOSE SERPL-MCNC: 182 MG/DL (ref 65–140)
GLUCOSE SERPL-MCNC: 183 MG/DL (ref 65–140)
GLUCOSE SERPL-MCNC: 183 MG/DL (ref 65–140)
GLUCOSE SERPL-MCNC: 184 MG/DL (ref 65–140)
GLUCOSE SERPL-MCNC: 185 MG/DL (ref 65–140)
GLUCOSE SERPL-MCNC: 186 MG/DL (ref 65–140)
GLUCOSE SERPL-MCNC: 187 MG/DL (ref 65–140)
GLUCOSE SERPL-MCNC: 190 MG/DL (ref 65–140)
GLUCOSE SERPL-MCNC: 192 MG/DL (ref 65–140)
GLUCOSE SERPL-MCNC: 193 MG/DL (ref 65–140)
GLUCOSE SERPL-MCNC: 195 MG/DL (ref 65–140)
GLUCOSE SERPL-MCNC: 195 MG/DL (ref 65–140)
GLUCOSE SERPL-MCNC: 197 MG/DL (ref 65–140)
GLUCOSE SERPL-MCNC: 198 MG/DL (ref 65–140)
GLUCOSE SERPL-MCNC: 199 MG/DL (ref 65–140)
GLUCOSE SERPL-MCNC: 200 MG/DL (ref 65–140)
GLUCOSE SERPL-MCNC: 201 MG/DL (ref 65–140)
GLUCOSE SERPL-MCNC: 202 MG/DL (ref 65–140)
GLUCOSE SERPL-MCNC: 206 MG/DL (ref 65–140)
GLUCOSE SERPL-MCNC: 206 MG/DL (ref 65–140)
GLUCOSE SERPL-MCNC: 208 MG/DL (ref 65–140)
GLUCOSE SERPL-MCNC: 209 MG/DL (ref 65–140)
GLUCOSE SERPL-MCNC: 212 MG/DL (ref 65–140)
GLUCOSE SERPL-MCNC: 213 MG/DL (ref 65–140)
GLUCOSE SERPL-MCNC: 214 MG/DL (ref 65–140)
GLUCOSE SERPL-MCNC: 216 MG/DL (ref 65–140)
GLUCOSE SERPL-MCNC: 217 MG/DL (ref 65–140)
GLUCOSE SERPL-MCNC: 221 MG/DL (ref 65–140)
GLUCOSE SERPL-MCNC: 221 MG/DL (ref 65–140)
GLUCOSE SERPL-MCNC: 226 MG/DL (ref 65–140)
GLUCOSE SERPL-MCNC: 227 MG/DL (ref 65–140)
GLUCOSE SERPL-MCNC: 227 MG/DL (ref 65–140)
GLUCOSE SERPL-MCNC: 232 MG/DL (ref 65–140)
GLUCOSE SERPL-MCNC: 237 MG/DL (ref 65–140)
GLUCOSE SERPL-MCNC: 239 MG/DL (ref 65–140)
GLUCOSE SERPL-MCNC: 242 MG/DL (ref 65–140)
GLUCOSE SERPL-MCNC: 244 MG/DL (ref 65–140)
GLUCOSE SERPL-MCNC: 246 MG/DL (ref 65–140)
GLUCOSE SERPL-MCNC: 250 MG/DL (ref 65–140)
GLUCOSE SERPL-MCNC: 252 MG/DL (ref 65–140)
GLUCOSE SERPL-MCNC: 252 MG/DL (ref 65–140)
GLUCOSE SERPL-MCNC: 255 MG/DL (ref 65–140)
GLUCOSE SERPL-MCNC: 256 MG/DL (ref 65–140)
GLUCOSE SERPL-MCNC: 265 MG/DL (ref 65–140)
GLUCOSE SERPL-MCNC: 266 MG/DL (ref 65–140)
GLUCOSE SERPL-MCNC: 272 MG/DL (ref 65–140)
GLUCOSE SERPL-MCNC: 276 MG/DL (ref 65–140)
GLUCOSE SERPL-MCNC: 278 MG/DL (ref 65–140)
GLUCOSE SERPL-MCNC: 280 MG/DL (ref 65–140)
GLUCOSE SERPL-MCNC: 281 MG/DL (ref 65–140)
GLUCOSE SERPL-MCNC: 288 MG/DL (ref 65–140)
GLUCOSE SERPL-MCNC: 289 MG/DL (ref 65–140)
GLUCOSE SERPL-MCNC: 291 MG/DL (ref 65–140)
GLUCOSE SERPL-MCNC: 294 MG/DL (ref 65–140)
GLUCOSE SERPL-MCNC: 294 MG/DL (ref 65–140)
GLUCOSE SERPL-MCNC: 300 MG/DL (ref 65–140)
GLUCOSE SERPL-MCNC: 300 MG/DL (ref 65–140)
GLUCOSE SERPL-MCNC: 306 MG/DL (ref 65–140)
GLUCOSE SERPL-MCNC: 314 MG/DL (ref 65–140)
GLUCOSE SERPL-MCNC: 316 MG/DL (ref 65–140)
GLUCOSE SERPL-MCNC: 325 MG/DL (ref 65–140)
GLUCOSE SERPL-MCNC: 351 MG/DL (ref 65–140)
GLUCOSE SERPL-MCNC: 377 MG/DL (ref 65–140)
GLUCOSE SERPL-MCNC: 385 MG/DL (ref 65–140)
GLUCOSE SERPL-MCNC: 390 MG/DL (ref 65–140)
GLUCOSE SERPL-MCNC: 396 MG/DL (ref 65–140)
GLUCOSE SERPL-MCNC: 409 MG/DL (ref 65–140)
GLUCOSE SERPL-MCNC: 42 MG/DL (ref 65–140)
GLUCOSE SERPL-MCNC: 425 MG/DL (ref 65–140)
GLUCOSE SERPL-MCNC: 43 MG/DL (ref 65–140)
GLUCOSE SERPL-MCNC: 44 MG/DL (ref 65–140)
GLUCOSE SERPL-MCNC: 46 MG/DL (ref 65–140)
GLUCOSE SERPL-MCNC: 48 MG/DL (ref 65–140)
GLUCOSE SERPL-MCNC: 52 MG/DL (ref 65–140)
GLUCOSE SERPL-MCNC: 53 MG/DL (ref 65–140)
GLUCOSE SERPL-MCNC: 54 MG/DL (ref 65–140)
GLUCOSE SERPL-MCNC: 56 MG/DL (ref 65–140)
GLUCOSE SERPL-MCNC: 69 MG/DL (ref 65–140)
GLUCOSE SERPL-MCNC: 72 MG/DL (ref 65–140)
GLUCOSE SERPL-MCNC: 74 MG/DL (ref 65–140)
GLUCOSE SERPL-MCNC: 74 MG/DL (ref 65–140)
GLUCOSE SERPL-MCNC: 80 MG/DL (ref 65–140)
GLUCOSE SERPL-MCNC: 81 MG/DL (ref 65–140)
GLUCOSE SERPL-MCNC: 84 MG/DL (ref 65–140)
GLUCOSE SERPL-MCNC: 85 MG/DL (ref 65–140)
GLUCOSE SERPL-MCNC: 89 MG/DL (ref 65–140)
GLUCOSE SERPL-MCNC: 90 MG/DL (ref 65–140)
GLUCOSE SERPL-MCNC: 91 MG/DL (ref 65–140)
GLUCOSE SERPL-MCNC: 93 MG/DL (ref 65–140)
GLUCOSE SERPL-MCNC: 95 MG/DL (ref 65–140)
GLUCOSE UR STRIP-MCNC: ABNORMAL MG/DL
GLUCOSE UR STRIP-MCNC: NEGATIVE MG/DL
GLUCOSE UR STRIP-MCNC: NEGATIVE MG/DL
GRAM STN SPEC: ABNORMAL
GRAM STN SPEC: NORMAL
HBA1C MFR BLD: 7.7 %
HCO3 BLDA-SCNC: 21.2 MMOL/L (ref 24–30)
HCO3 BLDA-SCNC: 25.5 MMOL/L (ref 22–28)
HCO3 BLDA-SCNC: 28.5 MMOL/L (ref 22–28)
HCO3 BLDV-SCNC: 23.5 MMOL/L (ref 24–30)
HCO3 BLDV-SCNC: 25.2 MMOL/L (ref 24–30)
HCO3 BLDV-SCNC: 26.2 MMOL/L (ref 24–30)
HCT VFR BLD AUTO: 21.1 % (ref 36.5–49.3)
HCT VFR BLD AUTO: 22.6 % (ref 36.5–49.3)
HCT VFR BLD AUTO: 23.1 % (ref 36.5–49.3)
HCT VFR BLD AUTO: 23.6 % (ref 36.5–49.3)
HCT VFR BLD AUTO: 23.8 % (ref 36.5–49.3)
HCT VFR BLD AUTO: 24.1 % (ref 36.5–49.3)
HCT VFR BLD AUTO: 24.3 % (ref 36.5–49.3)
HCT VFR BLD AUTO: 24.5 % (ref 36.5–49.3)
HCT VFR BLD AUTO: 24.6 % (ref 36.5–49.3)
HCT VFR BLD AUTO: 25 % (ref 36.5–49.3)
HCT VFR BLD AUTO: 25 % (ref 36.5–49.3)
HCT VFR BLD AUTO: 25.1 % (ref 36.5–49.3)
HCT VFR BLD AUTO: 25.3 % (ref 36.5–49.3)
HCT VFR BLD AUTO: 25.8 % (ref 36.5–49.3)
HCT VFR BLD AUTO: 26 % (ref 36.5–49.3)
HCT VFR BLD AUTO: 26.2 % (ref 36.5–49.3)
HCT VFR BLD AUTO: 26.2 % (ref 36.5–49.3)
HCT VFR BLD AUTO: 26.4 % (ref 36.5–49.3)
HCT VFR BLD AUTO: 27.2 % (ref 36.5–49.3)
HCT VFR BLD AUTO: 27.3 % (ref 36.5–49.3)
HCT VFR BLD AUTO: 27.6 % (ref 36.5–49.3)
HCT VFR BLD AUTO: 28.9 % (ref 36.5–49.3)
HCT VFR BLD AUTO: 29.4 % (ref 36.5–49.3)
HCT VFR BLD AUTO: 31.5 % (ref 36.5–49.3)
HCT VFR BLD AUTO: 34.8 % (ref 36.5–49.3)
HCT VFR BLD AUTO: 35.4 % (ref 36.5–49.3)
HCT VFR BLD AUTO: 35.9 % (ref 36.5–49.3)
HCT VFR BLD AUTO: 36.3 % (ref 36.5–49.3)
HCT VFR BLD CALC: 21 % (ref 36.5–49.3)
HCT VFR BLD CALC: 24 % (ref 36.5–49.3)
HDLC SERPL-MCNC: 16 MG/DL
HEMOCCULT STL QL: NEGATIVE
HEMOCCULT STL QL: NEGATIVE
HEMOCCULT STL QL: NORMAL
HGB BLD-MCNC: 10.3 G/DL (ref 12–17)
HGB BLD-MCNC: 10.8 G/DL (ref 12–17)
HGB BLD-MCNC: 11 G/DL (ref 12–17)
HGB BLD-MCNC: 11.1 G/DL (ref 12–17)
HGB BLD-MCNC: 11.7 G/DL (ref 12–17)
HGB BLD-MCNC: 6.8 G/DL (ref 12–17)
HGB BLD-MCNC: 7.3 G/DL (ref 12–17)
HGB BLD-MCNC: 7.4 G/DL (ref 12–17)
HGB BLD-MCNC: 7.5 G/DL (ref 12–17)
HGB BLD-MCNC: 7.5 G/DL (ref 12–17)
HGB BLD-MCNC: 7.7 G/DL (ref 12–17)
HGB BLD-MCNC: 7.8 G/DL (ref 12–17)
HGB BLD-MCNC: 7.8 G/DL (ref 12–17)
HGB BLD-MCNC: 7.9 G/DL (ref 12–17)
HGB BLD-MCNC: 7.9 G/DL (ref 12–17)
HGB BLD-MCNC: 8 G/DL (ref 12–17)
HGB BLD-MCNC: 8.1 G/DL (ref 12–17)
HGB BLD-MCNC: 8.1 G/DL (ref 12–17)
HGB BLD-MCNC: 8.2 G/DL (ref 12–17)
HGB BLD-MCNC: 8.6 G/DL (ref 12–17)
HGB BLD-MCNC: 8.7 G/DL (ref 12–17)
HGB BLD-MCNC: 8.8 G/DL (ref 12–17)
HGB BLD-MCNC: 8.9 G/DL (ref 12–17)
HGB BLD-MCNC: 9 G/DL (ref 12–17)
HGB BLD-MCNC: 9.5 G/DL (ref 12–17)
HGB BLD-MCNC: 9.8 G/DL (ref 12–17)
HGB BLDA-MCNC: 7.1 G/DL (ref 12–17)
HGB BLDA-MCNC: 8.2 G/DL (ref 12–17)
HGB UR QL STRIP.AUTO: ABNORMAL
HOLD SPECIMEN: NORMAL
HYALINE CASTS #/AREA URNS LPF: ABNORMAL /LPF
IGG/ALB SER: 0.58 {RATIO} (ref 1.1–1.8)
IMM GRANULOCYTES # BLD AUTO: 0.01 THOUSAND/UL (ref 0–0.2)
IMM GRANULOCYTES # BLD AUTO: 0.03 THOUSAND/UL (ref 0–0.2)
IMM GRANULOCYTES # BLD AUTO: 0.04 THOUSAND/UL (ref 0–0.2)
IMM GRANULOCYTES # BLD AUTO: 0.09 THOUSAND/UL (ref 0–0.2)
IMM GRANULOCYTES # BLD AUTO: 0.11 THOUSAND/UL (ref 0–0.2)
IMM GRANULOCYTES # BLD AUTO: 0.12 THOUSAND/UL (ref 0–0.2)
IMM GRANULOCYTES # BLD AUTO: 0.13 THOUSAND/UL (ref 0–0.2)
IMM GRANULOCYTES # BLD AUTO: 0.13 THOUSAND/UL (ref 0–0.2)
IMM GRANULOCYTES # BLD AUTO: 0.15 THOUSAND/UL (ref 0–0.2)
IMM GRANULOCYTES # BLD AUTO: 0.16 THOUSAND/UL (ref 0–0.2)
IMM GRANULOCYTES # BLD AUTO: 0.18 THOUSAND/UL (ref 0–0.2)
IMM GRANULOCYTES # BLD AUTO: 0.19 THOUSAND/UL (ref 0–0.2)
IMM GRANULOCYTES # BLD AUTO: 0.19 THOUSAND/UL (ref 0–0.2)
IMM GRANULOCYTES # BLD AUTO: 0.21 THOUSAND/UL (ref 0–0.2)
IMM GRANULOCYTES # BLD AUTO: 0.21 THOUSAND/UL (ref 0–0.2)
IMM GRANULOCYTES # BLD AUTO: 0.23 THOUSAND/UL (ref 0–0.2)
IMM GRANULOCYTES # BLD AUTO: 0.24 THOUSAND/UL (ref 0–0.2)
IMM GRANULOCYTES # BLD AUTO: 0.29 THOUSAND/UL (ref 0–0.2)
IMM GRANULOCYTES # BLD AUTO: 0.3 THOUSAND/UL (ref 0–0.2)
IMM GRANULOCYTES NFR BLD AUTO: 0 % (ref 0–2)
IMM GRANULOCYTES NFR BLD AUTO: 1 % (ref 0–2)
IMM GRANULOCYTES NFR BLD AUTO: 2 % (ref 0–2)
INTERPRETATION UR IFE-IMP: NORMAL
IRON SATN MFR SERPL: 8 %
IRON SERPL-MCNC: 15 UG/DL (ref 65–175)
KETONES UR STRIP-MCNC: ABNORMAL MG/DL
L PNEUMO1 AG UR QL IA.RAPID: NEGATIVE
LACTATE SERPL-SCNC: 1.3 MMOL/L (ref 0.5–2)
LACTATE SERPL-SCNC: 1.4 MMOL/L (ref 0.5–2)
LACTATE SERPL-SCNC: 1.7 MMOL/L (ref 0.5–2)
LDH FLD L TO P-CCNC: 122 U/L
LDH FLD L TO P-CCNC: 85 U/L
LDH SERPL-CCNC: 347 U/L (ref 81–234)
LDLC SERPL CALC-MCNC: 47 MG/DL (ref 0–100)
LEUKOCYTE ESTERASE UR QL STRIP: ABNORMAL
LEUKOCYTE ESTERASE UR QL STRIP: NEGATIVE
LIPASE SERPL-CCNC: 17 U/L (ref 73–393)
LIPASE SERPL-CCNC: 20 U/L (ref 73–393)
LYMPHOCYTES # BLD AUTO: 0.25 THOUSANDS/ΜL (ref 0.6–4.47)
LYMPHOCYTES # BLD AUTO: 0.33 THOUSANDS/ΜL (ref 0.6–4.47)
LYMPHOCYTES # BLD AUTO: 0.4 THOUSANDS/ΜL (ref 0.6–4.47)
LYMPHOCYTES # BLD AUTO: 0.42 THOUSANDS/ΜL (ref 0.6–4.47)
LYMPHOCYTES # BLD AUTO: 0.43 THOUSANDS/ΜL (ref 0.6–4.47)
LYMPHOCYTES # BLD AUTO: 0.49 THOUSANDS/ΜL (ref 0.6–4.47)
LYMPHOCYTES # BLD AUTO: 0.55 THOUSANDS/ΜL (ref 0.6–4.47)
LYMPHOCYTES # BLD AUTO: 0.59 THOUSANDS/ΜL (ref 0.6–4.47)
LYMPHOCYTES # BLD AUTO: 0.59 THOUSANDS/ΜL (ref 0.6–4.47)
LYMPHOCYTES # BLD AUTO: 0.6 THOUSANDS/ΜL (ref 0.6–4.47)
LYMPHOCYTES # BLD AUTO: 0.62 THOUSANDS/ΜL (ref 0.6–4.47)
LYMPHOCYTES # BLD AUTO: 0.63 THOUSANDS/ΜL (ref 0.6–4.47)
LYMPHOCYTES # BLD AUTO: 0.64 THOUSANDS/ΜL (ref 0.6–4.47)
LYMPHOCYTES # BLD AUTO: 0.67 THOUSANDS/ΜL (ref 0.6–4.47)
LYMPHOCYTES # BLD AUTO: 0.7 THOUSANDS/ΜL (ref 0.6–4.47)
LYMPHOCYTES # BLD AUTO: 0.72 THOUSANDS/ΜL (ref 0.6–4.47)
LYMPHOCYTES # BLD AUTO: 0.74 THOUSANDS/ΜL (ref 0.6–4.47)
LYMPHOCYTES # BLD AUTO: 0.78 THOUSANDS/ΜL (ref 0.6–4.47)
LYMPHOCYTES # BLD AUTO: 0.79 THOUSANDS/ΜL (ref 0.6–4.47)
LYMPHOCYTES # BLD AUTO: 0.81 THOUSANDS/ΜL (ref 0.6–4.47)
LYMPHOCYTES # BLD AUTO: 0.89 THOUSANDS/ΜL (ref 0.6–4.47)
LYMPHOCYTES # BLD AUTO: 0.89 THOUSANDS/ΜL (ref 0.6–4.47)
LYMPHOCYTES # BLD AUTO: 0.99 THOUSANDS/ΜL (ref 0.6–4.47)
LYMPHOCYTES NFR BLD AUTO: 17 %
LYMPHOCYTES NFR BLD AUTO: 22 %
LYMPHOCYTES NFR BLD AUTO: 3 % (ref 14–44)
LYMPHOCYTES NFR BLD AUTO: 3 % (ref 14–44)
LYMPHOCYTES NFR BLD AUTO: 4 % (ref 14–44)
LYMPHOCYTES NFR BLD AUTO: 5 % (ref 14–44)
LYMPHOCYTES NFR BLD AUTO: 6 % (ref 14–44)
LYMPHOCYTES NFR BLD AUTO: 8 % (ref 14–44)
MAGNESIUM SERPL-MCNC: 2.1 MG/DL (ref 1.6–2.6)
MAGNESIUM SERPL-MCNC: 2.1 MG/DL (ref 1.6–2.6)
MAGNESIUM SERPL-MCNC: 2.5 MG/DL (ref 1.6–2.6)
MCH RBC QN AUTO: 27.3 PG (ref 26.8–34.3)
MCH RBC QN AUTO: 27.6 PG (ref 26.8–34.3)
MCH RBC QN AUTO: 27.7 PG (ref 26.8–34.3)
MCH RBC QN AUTO: 27.8 PG (ref 26.8–34.3)
MCH RBC QN AUTO: 27.8 PG (ref 26.8–34.3)
MCH RBC QN AUTO: 27.9 PG (ref 26.8–34.3)
MCH RBC QN AUTO: 27.9 PG (ref 26.8–34.3)
MCH RBC QN AUTO: 28 PG (ref 26.8–34.3)
MCH RBC QN AUTO: 28.1 PG (ref 26.8–34.3)
MCH RBC QN AUTO: 28.1 PG (ref 26.8–34.3)
MCH RBC QN AUTO: 28.3 PG (ref 26.8–34.3)
MCH RBC QN AUTO: 28.4 PG (ref 26.8–34.3)
MCH RBC QN AUTO: 28.5 PG (ref 26.8–34.3)
MCH RBC QN AUTO: 28.6 PG (ref 26.8–34.3)
MCH RBC QN AUTO: 28.7 PG (ref 26.8–34.3)
MCH RBC QN AUTO: 28.8 PG (ref 26.8–34.3)
MCH RBC QN AUTO: 28.9 PG (ref 26.8–34.3)
MCH RBC QN AUTO: 29.1 PG (ref 26.8–34.3)
MCH RBC QN AUTO: 29.5 PG (ref 26.8–34.3)
MCHC RBC AUTO-ENTMCNC: 30.5 G/DL (ref 31.4–37.4)
MCHC RBC AUTO-ENTMCNC: 30.6 G/DL (ref 31.4–37.4)
MCHC RBC AUTO-ENTMCNC: 30.9 G/DL (ref 31.4–37.4)
MCHC RBC AUTO-ENTMCNC: 31.2 G/DL (ref 31.4–37.4)
MCHC RBC AUTO-ENTMCNC: 31.3 G/DL (ref 31.4–37.4)
MCHC RBC AUTO-ENTMCNC: 31.4 G/DL (ref 31.4–37.4)
MCHC RBC AUTO-ENTMCNC: 31.5 G/DL (ref 31.4–37.4)
MCHC RBC AUTO-ENTMCNC: 31.6 G/DL (ref 31.4–37.4)
MCHC RBC AUTO-ENTMCNC: 31.7 G/DL (ref 31.4–37.4)
MCHC RBC AUTO-ENTMCNC: 31.8 G/DL (ref 31.4–37.4)
MCHC RBC AUTO-ENTMCNC: 31.8 G/DL (ref 31.4–37.4)
MCHC RBC AUTO-ENTMCNC: 31.9 G/DL (ref 31.4–37.4)
MCHC RBC AUTO-ENTMCNC: 31.9 G/DL (ref 31.4–37.4)
MCHC RBC AUTO-ENTMCNC: 32 G/DL (ref 31.4–37.4)
MCHC RBC AUTO-ENTMCNC: 32.2 G/DL (ref 31.4–37.4)
MCHC RBC AUTO-ENTMCNC: 32.3 G/DL (ref 31.4–37.4)
MCHC RBC AUTO-ENTMCNC: 32.4 G/DL (ref 31.4–37.4)
MCHC RBC AUTO-ENTMCNC: 32.4 G/DL (ref 31.4–37.4)
MCHC RBC AUTO-ENTMCNC: 32.6 G/DL (ref 31.4–37.4)
MCHC RBC AUTO-ENTMCNC: 32.7 G/DL (ref 31.4–37.4)
MCHC RBC AUTO-ENTMCNC: 32.8 G/DL (ref 31.4–37.4)
MCHC RBC AUTO-ENTMCNC: 32.8 G/DL (ref 31.4–37.4)
MCHC RBC AUTO-ENTMCNC: 32.9 G/DL (ref 31.4–37.4)
MCHC RBC AUTO-ENTMCNC: 33.1 G/DL (ref 31.4–37.4)
MCHC RBC AUTO-ENTMCNC: 33.2 G/DL (ref 31.4–37.4)
MCHC RBC AUTO-ENTMCNC: 33.3 G/DL (ref 31.4–37.4)
MCV RBC AUTO: 85 FL (ref 82–98)
MCV RBC AUTO: 86 FL (ref 82–98)
MCV RBC AUTO: 87 FL (ref 82–98)
MCV RBC AUTO: 88 FL (ref 82–98)
MCV RBC AUTO: 90 FL (ref 82–98)
MCV RBC AUTO: 90 FL (ref 82–98)
MCV RBC AUTO: 91 FL (ref 82–98)
MCV RBC AUTO: 92 FL (ref 82–98)
MCV RBC AUTO: 92 FL (ref 82–98)
MCV RBC AUTO: 93 FL (ref 82–98)
MCV RBC AUTO: 95 FL (ref 82–98)
MONO+MESO NFR FLD MANUAL: 2 %
MONO+MESO NFR FLD MANUAL: 2 %
MONOCYTES # BLD AUTO: 0.34 THOUSAND/ΜL (ref 0.17–1.22)
MONOCYTES # BLD AUTO: 0.74 THOUSAND/ΜL (ref 0.17–1.22)
MONOCYTES # BLD AUTO: 0.78 THOUSAND/ΜL (ref 0.17–1.22)
MONOCYTES # BLD AUTO: 0.84 THOUSAND/ΜL (ref 0.17–1.22)
MONOCYTES # BLD AUTO: 0.84 THOUSAND/ΜL (ref 0.17–1.22)
MONOCYTES # BLD AUTO: 0.85 THOUSAND/ΜL (ref 0.17–1.22)
MONOCYTES # BLD AUTO: 0.92 THOUSAND/ΜL (ref 0.17–1.22)
MONOCYTES # BLD AUTO: 1.01 THOUSAND/ΜL (ref 0.17–1.22)
MONOCYTES # BLD AUTO: 1.03 THOUSAND/ΜL (ref 0.17–1.22)
MONOCYTES # BLD AUTO: 1.04 THOUSAND/ΜL (ref 0.17–1.22)
MONOCYTES # BLD AUTO: 1.05 THOUSAND/ΜL (ref 0.17–1.22)
MONOCYTES # BLD AUTO: 1.1 THOUSAND/ΜL (ref 0.17–1.22)
MONOCYTES # BLD AUTO: 1.19 THOUSAND/ΜL (ref 0.17–1.22)
MONOCYTES # BLD AUTO: 1.23 THOUSAND/ΜL (ref 0.17–1.22)
MONOCYTES # BLD AUTO: 1.25 THOUSAND/ΜL (ref 0.17–1.22)
MONOCYTES # BLD AUTO: 1.29 THOUSAND/ΜL (ref 0.17–1.22)
MONOCYTES # BLD AUTO: 1.3 THOUSAND/ΜL (ref 0.17–1.22)
MONOCYTES # BLD AUTO: 1.35 THOUSAND/ΜL (ref 0.17–1.22)
MONOCYTES # BLD AUTO: 1.4 THOUSAND/ΜL (ref 0.17–1.22)
MONOCYTES # BLD AUTO: 1.6 THOUSAND/ΜL (ref 0.17–1.22)
MONOCYTES # BLD AUTO: 1.62 THOUSAND/ΜL (ref 0.17–1.22)
MONOCYTES # BLD AUTO: 2.06 THOUSAND/ΜL (ref 0.17–1.22)
MONOCYTES # BLD AUTO: 2.11 THOUSAND/ΜL (ref 0.17–1.22)
MONOCYTES NFR BLD AUTO: 10 %
MONOCYTES NFR BLD AUTO: 10 % (ref 4–12)
MONOCYTES NFR BLD AUTO: 10 % (ref 4–12)
MONOCYTES NFR BLD AUTO: 11 % (ref 4–12)
MONOCYTES NFR BLD AUTO: 11 % (ref 4–12)
MONOCYTES NFR BLD AUTO: 12 % (ref 4–12)
MONOCYTES NFR BLD AUTO: 12 % (ref 4–12)
MONOCYTES NFR BLD AUTO: 15 % (ref 4–12)
MONOCYTES NFR BLD AUTO: 15 % (ref 4–12)
MONOCYTES NFR BLD AUTO: 5 % (ref 4–12)
MONOCYTES NFR BLD AUTO: 7 % (ref 4–12)
MONOCYTES NFR BLD AUTO: 8 %
MONOCYTES NFR BLD AUTO: 8 % (ref 4–12)
MONOCYTES NFR BLD AUTO: 9 % (ref 4–12)
MRSA NOSE QL CULT: NORMAL
NASAL CANNULA: 15
NASAL CANNULA: 15
NEUTROPHILS # BLD AUTO: 10.6 THOUSANDS/ΜL (ref 1.85–7.62)
NEUTROPHILS # BLD AUTO: 11.75 THOUSANDS/ΜL (ref 1.85–7.62)
NEUTROPHILS # BLD AUTO: 12.75 THOUSANDS/ΜL (ref 1.85–7.62)
NEUTROPHILS # BLD AUTO: 12.77 THOUSANDS/ΜL (ref 1.85–7.62)
NEUTROPHILS # BLD AUTO: 12.78 THOUSANDS/ΜL (ref 1.85–7.62)
NEUTROPHILS # BLD AUTO: 12.9 THOUSANDS/ΜL (ref 1.85–7.62)
NEUTROPHILS # BLD AUTO: 13.01 THOUSANDS/ΜL (ref 1.85–7.62)
NEUTROPHILS # BLD AUTO: 13.29 THOUSANDS/ΜL (ref 1.85–7.62)
NEUTROPHILS # BLD AUTO: 13.51 THOUSANDS/ΜL (ref 1.85–7.62)
NEUTROPHILS # BLD AUTO: 13.66 THOUSANDS/ΜL (ref 1.85–7.62)
NEUTROPHILS # BLD AUTO: 13.75 THOUSANDS/ΜL (ref 1.85–7.62)
NEUTROPHILS # BLD AUTO: 13.83 THOUSANDS/ΜL (ref 1.85–7.62)
NEUTROPHILS # BLD AUTO: 13.9 THOUSANDS/ΜL (ref 1.85–7.62)
NEUTROPHILS # BLD AUTO: 14.2 THOUSANDS/ΜL (ref 1.85–7.62)
NEUTROPHILS # BLD AUTO: 14.21 THOUSANDS/ΜL (ref 1.85–7.62)
NEUTROPHILS # BLD AUTO: 15.44 THOUSANDS/ΜL (ref 1.85–7.62)
NEUTROPHILS # BLD AUTO: 16.11 THOUSANDS/ΜL (ref 1.85–7.62)
NEUTROPHILS # BLD AUTO: 16.44 THOUSANDS/ΜL (ref 1.85–7.62)
NEUTROPHILS # BLD AUTO: 3.28 THOUSANDS/ΜL (ref 1.85–7.62)
NEUTROPHILS # BLD AUTO: 5.9 THOUSANDS/ΜL (ref 1.85–7.62)
NEUTROPHILS # BLD AUTO: 6.88 THOUSANDS/ΜL (ref 1.85–7.62)
NEUTROPHILS # BLD AUTO: 7 THOUSANDS/ΜL (ref 1.85–7.62)
NEUTROPHILS # BLD AUTO: 7.23 THOUSANDS/ΜL (ref 1.85–7.62)
NEUTS SEG NFR BLD AUTO: 66 %
NEUTS SEG NFR BLD AUTO: 73 %
NEUTS SEG NFR BLD AUTO: 78 % (ref 43–75)
NEUTS SEG NFR BLD AUTO: 80 % (ref 43–75)
NEUTS SEG NFR BLD AUTO: 82 % (ref 43–75)
NEUTS SEG NFR BLD AUTO: 83 % (ref 43–75)
NEUTS SEG NFR BLD AUTO: 84 % (ref 43–75)
NEUTS SEG NFR BLD AUTO: 85 % (ref 43–75)
NEUTS SEG NFR BLD AUTO: 85 % (ref 43–75)
NEUTS SEG NFR BLD AUTO: 86 % (ref 43–75)
NEUTS SEG NFR BLD AUTO: 87 % (ref 43–75)
NEUTS SEG NFR BLD AUTO: 88 % (ref 43–75)
NEUTS SEG NFR BLD AUTO: 88 % (ref 43–75)
NEUTS SEG NFR BLD AUTO: 89 % (ref 43–75)
NITRITE UR QL STRIP: NEGATIVE
NITRITE UR QL STRIP: POSITIVE
NON-SQ EPI CELLS URNS QL MICRO: ABNORMAL /HPF
NONHDLC SERPL-MCNC: 71 MG/DL
NRBC BLD AUTO-RTO: 0 /100 WBCS
NT-PROBNP SERPL-MCNC: 2875 PG/ML
NT-PROBNP SERPL-MCNC: 6446 PG/ML
NT-PROBNP SERPL-MCNC: 9997 PG/ML
O2 CT BLDA-SCNC: 3.4 ML/DL (ref 16–23)
O2 CT BLDV-SCNC: 10.2 ML/DL
O2 CT BLDV-SCNC: 14.9 ML/DL
O2 CT BLDV-SCNC: 9.3 ML/DL
OSMOLALITY UR/SERPL-RTO: 284 MMOL/KG (ref 282–298)
OSMOLALITY UR: 422 MMOL/KG
OSMOLALITY UR: 524 MMOL/KG
OTHER STN SPEC: ABNORMAL
OXYHGB MFR BLDA: 31.8 % (ref 94–97)
P AXIS: 37 DEGREES
P AXIS: 41 DEGREES
P AXIS: 41 DEGREES
P AXIS: 78 DEGREES
PCO2 BLD: 22 MMOL/L (ref 21–32)
PCO2 BLD: 28.3 MM HG (ref 42–50)
PCO2 BLD: 29 MMOL/L (ref 21–32)
PCO2 BLD: 31.9 MM HG (ref 36–44)
PCO2 BLDA: 37.6 MM HG (ref 36–44)
PCO2 BLDV: 36.3 MM HG (ref 42–50)
PCO2 BLDV: 39.2 MM HG (ref 42–50)
PCO2 BLDV: 39.3 MM HG (ref 42–50)
PH BLD: 7.48 [PH] (ref 7.3–7.4)
PH BLD: 7.56 [PH] (ref 7.35–7.45)
PH BLDA: 7.45 [PH] (ref 7.35–7.45)
PH BLDV: 7.4 [PH] (ref 7.3–7.4)
PH BLDV: 7.44 [PH] (ref 7.3–7.4)
PH BLDV: 7.46 [PH] (ref 7.3–7.4)
PH BODY FLUID: 7.4
PH BODY FLUID: 7.6
PH UR STRIP.AUTO: 5 [PH]
PH UR STRIP.AUTO: 5 [PH]
PH UR STRIP.AUTO: 5.5 [PH]
PHOSPHATE SERPL-MCNC: 3.6 MG/DL (ref 2.3–4.1)
PHOSPHATE SERPL-MCNC: 4.4 MG/DL (ref 2.3–4.1)
PLATELET # BLD AUTO: 100 THOUSANDS/UL (ref 149–390)
PLATELET # BLD AUTO: 100 THOUSANDS/UL (ref 149–390)
PLATELET # BLD AUTO: 101 THOUSANDS/UL (ref 149–390)
PLATELET # BLD AUTO: 114 THOUSANDS/UL (ref 149–390)
PLATELET # BLD AUTO: 115 THOUSANDS/UL (ref 149–390)
PLATELET # BLD AUTO: 119 THOUSANDS/UL (ref 149–390)
PLATELET # BLD AUTO: 122 THOUSANDS/UL (ref 149–390)
PLATELET # BLD AUTO: 136 THOUSANDS/UL (ref 149–390)
PLATELET # BLD AUTO: 139 THOUSANDS/UL (ref 149–390)
PLATELET # BLD AUTO: 142 THOUSANDS/UL (ref 149–390)
PLATELET # BLD AUTO: 147 THOUSANDS/UL (ref 149–390)
PLATELET # BLD AUTO: 151 THOUSANDS/UL (ref 149–390)
PLATELET # BLD AUTO: 156 THOUSANDS/UL (ref 149–390)
PLATELET # BLD AUTO: 157 THOUSANDS/UL (ref 149–390)
PLATELET # BLD AUTO: 168 THOUSANDS/UL (ref 149–390)
PLATELET # BLD AUTO: 168 THOUSANDS/UL (ref 149–390)
PLATELET # BLD AUTO: 194 THOUSANDS/UL (ref 149–390)
PLATELET # BLD AUTO: 196 THOUSANDS/UL (ref 149–390)
PLATELET # BLD AUTO: 198 THOUSANDS/UL (ref 149–390)
PLATELET # BLD AUTO: 203 THOUSANDS/UL (ref 149–390)
PLATELET # BLD AUTO: 209 THOUSANDS/UL (ref 149–390)
PLATELET # BLD AUTO: 224 THOUSANDS/UL (ref 149–390)
PLATELET # BLD AUTO: 233 THOUSANDS/UL (ref 149–390)
PLATELET # BLD AUTO: 275 THOUSANDS/UL (ref 149–390)
PLATELET # BLD AUTO: 276 THOUSANDS/UL (ref 149–390)
PLATELET # BLD AUTO: 75 THOUSANDS/UL (ref 149–390)
PLATELET # BLD AUTO: 79 THOUSANDS/UL (ref 149–390)
PLATELET # BLD AUTO: 96 THOUSANDS/UL (ref 149–390)
PMV BLD AUTO: 10 FL (ref 8.9–12.7)
PMV BLD AUTO: 10 FL (ref 8.9–12.7)
PMV BLD AUTO: 10.1 FL (ref 8.9–12.7)
PMV BLD AUTO: 10.2 FL (ref 8.9–12.7)
PMV BLD AUTO: 10.3 FL (ref 8.9–12.7)
PMV BLD AUTO: 10.5 FL (ref 8.9–12.7)
PMV BLD AUTO: 10.6 FL (ref 8.9–12.7)
PMV BLD AUTO: 10.8 FL (ref 8.9–12.7)
PMV BLD AUTO: 8.6 FL (ref 8.9–12.7)
PMV BLD AUTO: 8.7 FL (ref 8.9–12.7)
PMV BLD AUTO: 8.7 FL (ref 8.9–12.7)
PMV BLD AUTO: 8.8 FL (ref 8.9–12.7)
PMV BLD AUTO: 8.9 FL (ref 8.9–12.7)
PMV BLD AUTO: 9 FL (ref 8.9–12.7)
PMV BLD AUTO: 9.1 FL (ref 8.9–12.7)
PMV BLD AUTO: 9.2 FL (ref 8.9–12.7)
PMV BLD AUTO: 9.2 FL (ref 8.9–12.7)
PMV BLD AUTO: 9.3 FL (ref 8.9–12.7)
PMV BLD AUTO: 9.4 FL (ref 8.9–12.7)
PMV BLD AUTO: 9.6 FL (ref 8.9–12.7)
PMV BLD AUTO: 9.7 FL (ref 8.9–12.7)
PMV BLD AUTO: 9.8 FL (ref 8.9–12.7)
PMV BLD AUTO: 9.8 FL (ref 8.9–12.7)
PMV BLD AUTO: 9.9 FL (ref 8.9–12.7)
PO2 BLD: 57 MM HG (ref 35–45)
PO2 BLD: 76 MM HG (ref 75–129)
PO2 BLDA: 21.5 MM HG (ref 75–129)
PO2 BLDV: 182 MM HG (ref 35–45)
PO2 BLDV: 46.8 MM HG (ref 35–45)
PO2 BLDV: 60 MM HG (ref 35–45)
POTASSIUM BLD-SCNC: 4.3 MMOL/L (ref 3.5–5.3)
POTASSIUM BLD-SCNC: 4.4 MMOL/L (ref 3.5–5.3)
POTASSIUM SERPL-SCNC: 3.2 MMOL/L (ref 3.5–5.3)
POTASSIUM SERPL-SCNC: 3.2 MMOL/L (ref 3.5–5.3)
POTASSIUM SERPL-SCNC: 3.7 MMOL/L (ref 3.5–5.3)
POTASSIUM SERPL-SCNC: 3.7 MMOL/L (ref 3.5–5.3)
POTASSIUM SERPL-SCNC: 3.8 MMOL/L (ref 3.5–5.3)
POTASSIUM SERPL-SCNC: 3.9 MMOL/L (ref 3.5–5.3)
POTASSIUM SERPL-SCNC: 4 MMOL/L (ref 3.5–5.3)
POTASSIUM SERPL-SCNC: 4.1 MMOL/L (ref 3.5–5.3)
POTASSIUM SERPL-SCNC: 4.2 MMOL/L (ref 3.5–5.3)
POTASSIUM SERPL-SCNC: 4.3 MMOL/L (ref 3.5–5.3)
POTASSIUM SERPL-SCNC: 4.4 MMOL/L (ref 3.5–5.3)
POTASSIUM SERPL-SCNC: 4.5 MMOL/L (ref 3.5–5.3)
POTASSIUM SERPL-SCNC: 4.7 MMOL/L (ref 3.5–5.3)
POTASSIUM SERPL-SCNC: 4.7 MMOL/L (ref 3.5–5.3)
POTASSIUM SERPL-SCNC: 4.8 MMOL/L (ref 3.5–5.3)
POTASSIUM SERPL-SCNC: 4.8 MMOL/L (ref 3.5–5.3)
POTASSIUM SERPL-SCNC: 4.9 MMOL/L (ref 3.5–5.3)
POTASSIUM SERPL-SCNC: 4.9 MMOL/L (ref 3.5–5.3)
POTASSIUM SERPL-SCNC: 5 MMOL/L (ref 3.5–5.3)
POTASSIUM SERPL-SCNC: 5.5 MMOL/L (ref 3.5–5.3)
POTASSIUM SERPL-SCNC: 5.6 MMOL/L (ref 3.5–5.3)
PR INTERVAL: 204 MS
PR INTERVAL: 212 MS
PR INTERVAL: 218 MS
PR INTERVAL: 220 MS
PROCALCITONIN SERPL-MCNC: 0.73 NG/ML
PROCALCITONIN SERPL-MCNC: 0.8 NG/ML
PROCALCITONIN SERPL-MCNC: 0.93 NG/ML
PROCALCITONIN SERPL-MCNC: 1.01 NG/ML
PROCALCITONIN SERPL-MCNC: 1.02 NG/ML
PROCALCITONIN SERPL-MCNC: 1.08 NG/ML
PROT FLD-MCNC: <2 G/DL
PROT FLD-MCNC: <2 G/DL
PROT PATTERN SERPL ELPH-IMP: ABNORMAL
PROT PATTERN UR ELPH-IMP: ABNORMAL
PROT SERPL-MCNC: 6.1 G/DL (ref 6.4–8.2)
PROT SERPL-MCNC: 6.2 G/DL (ref 6.4–8.2)
PROT SERPL-MCNC: 6.6 G/DL (ref 6.4–8.2)
PROT SERPL-MCNC: 6.6 G/DL (ref 6.4–8.2)
PROT SERPL-MCNC: 6.7 G/DL (ref 6.4–8.2)
PROT SERPL-MCNC: 6.8 G/DL (ref 6.4–8.2)
PROT SERPL-MCNC: 7.1 G/DL (ref 6.4–8.2)
PROT SERPL-MCNC: 7.3 G/DL (ref 6.4–8.2)
PROT UR STRIP-MCNC: ABNORMAL MG/DL
PROT UR-MCNC: 262 MG/DL
QRS AXIS: -82 DEGREES
QRS AXIS: 270 DEGREES
QRSD INTERVAL: 158 MS
QRSD INTERVAL: 158 MS
QRSD INTERVAL: 160 MS
QRSD INTERVAL: 174 MS
QT INTERVAL: 390 MS
QT INTERVAL: 392 MS
QT INTERVAL: 398 MS
QT INTERVAL: 410 MS
QTC INTERVAL: 484 MS
QTC INTERVAL: 494 MS
QTC INTERVAL: 495 MS
QTC INTERVAL: 512 MS
RBC # BLD AUTO: 2.42 MILLION/UL (ref 3.88–5.62)
RBC # BLD AUTO: 2.62 MILLION/UL (ref 3.88–5.62)
RBC # BLD AUTO: 2.63 MILLION/UL (ref 3.88–5.62)
RBC # BLD AUTO: 2.68 MILLION/UL (ref 3.88–5.62)
RBC # BLD AUTO: 2.72 MILLION/UL (ref 3.88–5.62)
RBC # BLD AUTO: 2.75 MILLION/UL (ref 3.88–5.62)
RBC # BLD AUTO: 2.79 MILLION/UL (ref 3.88–5.62)
RBC # BLD AUTO: 2.8 MILLION/UL (ref 3.88–5.62)
RBC # BLD AUTO: 2.82 MILLION/UL (ref 3.88–5.62)
RBC # BLD AUTO: 2.83 MILLION/UL (ref 3.88–5.62)
RBC # BLD AUTO: 2.84 MILLION/UL (ref 3.88–5.62)
RBC # BLD AUTO: 2.88 MILLION/UL (ref 3.88–5.62)
RBC # BLD AUTO: 2.88 MILLION/UL (ref 3.88–5.62)
RBC # BLD AUTO: 2.9 MILLION/UL (ref 3.88–5.62)
RBC # BLD AUTO: 2.91 MILLION/UL (ref 3.88–5.62)
RBC # BLD AUTO: 2.93 MILLION/UL (ref 3.88–5.62)
RBC # BLD AUTO: 3.04 MILLION/UL (ref 3.88–5.62)
RBC # BLD AUTO: 3.04 MILLION/UL (ref 3.88–5.62)
RBC # BLD AUTO: 3.11 MILLION/UL (ref 3.88–5.62)
RBC # BLD AUTO: 3.14 MILLION/UL (ref 3.88–5.62)
RBC # BLD AUTO: 3.15 MILLION/UL (ref 3.88–5.62)
RBC # BLD AUTO: 3.38 MILLION/UL (ref 3.88–5.62)
RBC # BLD AUTO: 3.42 MILLION/UL (ref 3.88–5.62)
RBC # BLD AUTO: 3.58 MILLION/UL (ref 3.88–5.62)
RBC # BLD AUTO: 3.71 MILLION/UL (ref 3.88–5.62)
RBC # BLD AUTO: 3.81 MILLION/UL (ref 3.88–5.62)
RBC # BLD AUTO: 3.91 MILLION/UL (ref 3.88–5.62)
RBC # BLD AUTO: 3.97 MILLION/UL (ref 3.88–5.62)
RBC #/AREA URNS AUTO: ABNORMAL /HPF
RH BLD: POSITIVE
RSV RNA RESP QL NAA+PROBE: NEGATIVE
SALMONELLA DNA SPEC QL NAA+PROBE: NORMAL
SAO2 % BLD FROM PO2: 92 % (ref 60–85)
SAO2 % BLD FROM PO2: 97 % (ref 60–85)
SARS-COV-2 RNA RESP QL NAA+PROBE: POSITIVE
SARS-COV-2 RNA RESP QL NAA+PROBE: POSITIVE
SHIGA TOXIN STX GENE SPEC NAA+PROBE: NORMAL
SHIGELLA DNA SPEC QL NAA+PROBE: NORMAL
SITE: NORMAL
SITE: NORMAL
SODIUM 24H UR-SCNC: 10 MOL/L
SODIUM 24H UR-SCNC: 63 MOL/L
SODIUM BLD-SCNC: 132 MMOL/L (ref 136–145)
SODIUM BLD-SCNC: 137 MMOL/L (ref 136–145)
SODIUM SERPL-SCNC: 127 MMOL/L (ref 136–145)
SODIUM SERPL-SCNC: 128 MMOL/L (ref 136–145)
SODIUM SERPL-SCNC: 129 MMOL/L (ref 136–145)
SODIUM SERPL-SCNC: 130 MMOL/L (ref 136–145)
SODIUM SERPL-SCNC: 131 MMOL/L (ref 136–145)
SODIUM SERPL-SCNC: 132 MMOL/L (ref 136–145)
SODIUM SERPL-SCNC: 132 MMOL/L (ref 136–145)
SODIUM SERPL-SCNC: 133 MMOL/L (ref 136–145)
SODIUM SERPL-SCNC: 134 MMOL/L (ref 136–145)
SODIUM SERPL-SCNC: 135 MMOL/L (ref 136–145)
SODIUM SERPL-SCNC: 136 MMOL/L (ref 136–145)
SODIUM SERPL-SCNC: 137 MMOL/L (ref 136–145)
SODIUM SERPL-SCNC: 137 MMOL/L (ref 136–145)
SODIUM SERPL-SCNC: 138 MMOL/L (ref 136–145)
SP GR UR STRIP.AUTO: 1.01 (ref 1–1.03)
SP GR UR STRIP.AUTO: 1.02 (ref 1–1.03)
SPECIMEN EXPIRATION DATE: NORMAL
SPECIMEN EXPIRATION DATE: NORMAL
SPECIMEN SOURCE: ABNORMAL
T WAVE AXIS: 79 DEGREES
T WAVE AXIS: 84 DEGREES
T WAVE AXIS: 86 DEGREES
T WAVE AXIS: 87 DEGREES
T4 FREE SERPL-MCNC: 1.04 NG/DL (ref 0.76–1.46)
T4 FREE SERPL-MCNC: 1.09 NG/DL (ref 0.76–1.46)
T4 FREE SERPL-MCNC: 1.1 NG/DL (ref 0.76–1.46)
TIBC SERPL-MCNC: 190 UG/DL (ref 250–450)
TOTAL CELLS COUNTED SPEC: 100
TOTAL CELLS COUNTED SPEC: 100
TRIGL SERPL-MCNC: 118 MG/DL
TROPONIN I SERPL-MCNC: 0.06 NG/ML
TROPONIN I SERPL-MCNC: 0.07 NG/ML
TROPONIN I SERPL-MCNC: 0.08 NG/ML
TROPONIN I SERPL-MCNC: 0.08 NG/ML
TROPONIN I SERPL-MCNC: 0.09 NG/ML
TROPONIN I SERPL-MCNC: 0.32 NG/ML
TROPONIN I SERPL-MCNC: 0.38 NG/ML
TROPONIN I SERPL-MCNC: 0.47 NG/ML
TROPONIN I SERPL-MCNC: <0.02 NG/ML
TSH SERPL DL<=0.05 MIU/L-ACNC: 0.45 UIU/ML (ref 0.36–3.74)
TSH SERPL DL<=0.05 MIU/L-ACNC: 4.28 UIU/ML (ref 0.36–3.74)
TSH SERPL DL<=0.05 MIU/L-ACNC: 5.69 UIU/ML (ref 0.36–3.74)
TSH SERPL DL<=0.05 MIU/L-ACNC: 6.43 UIU/ML (ref 0.36–3.74)
UNIT DISPENSE STATUS: NORMAL
UNIT PRODUCT CODE: NORMAL
UNIT RH: NORMAL
UROBILINOGEN UR QL STRIP.AUTO: 0.2 E.U./DL
UROBILINOGEN UR QL STRIP.AUTO: 1 E.U./DL
UROBILINOGEN UR QL STRIP.AUTO: 2 E.U./DL
UUN 24H UR-MCNC: 334 MG/DL
VANCOMYCIN SERPL-MCNC: 37.5 UG/ML
VANCOMYCIN TROUGH SERPL-MCNC: 16.3 UG/ML (ref 10–20)
VANCOMYCIN TROUGH SERPL-MCNC: 37.2 UG/ML (ref 10–20)
VENTRICULAR RATE: 92 BPM
VENTRICULAR RATE: 93 BPM
VENTRICULAR RATE: 94 BPM
VENTRICULAR RATE: 97 BPM
WBC # BLD AUTO: 12.49 THOUSAND/UL (ref 4.31–10.16)
WBC # BLD AUTO: 13.74 THOUSAND/UL (ref 4.31–10.16)
WBC # BLD AUTO: 14.15 THOUSAND/UL (ref 4.31–10.16)
WBC # BLD AUTO: 14.35 THOUSAND/UL (ref 4.31–10.16)
WBC # BLD AUTO: 14.52 THOUSAND/UL (ref 4.31–10.16)
WBC # BLD AUTO: 14.57 THOUSAND/UL (ref 4.31–10.16)
WBC # BLD AUTO: 14.83 THOUSAND/UL (ref 4.31–10.16)
WBC # BLD AUTO: 14.9 THOUSAND/UL (ref 4.31–10.16)
WBC # BLD AUTO: 14.91 THOUSAND/UL (ref 4.31–10.16)
WBC # BLD AUTO: 15.19 THOUSAND/UL (ref 4.31–10.16)
WBC # BLD AUTO: 15.47 THOUSAND/UL (ref 4.31–10.16)
WBC # BLD AUTO: 15.6 THOUSAND/UL (ref 4.31–10.16)
WBC # BLD AUTO: 15.61 THOUSAND/UL (ref 4.31–10.16)
WBC # BLD AUTO: 15.67 THOUSAND/UL (ref 4.31–10.16)
WBC # BLD AUTO: 15.73 THOUSAND/UL (ref 4.31–10.16)
WBC # BLD AUTO: 15.81 THOUSAND/UL (ref 4.31–10.16)
WBC # BLD AUTO: 16.44 THOUSAND/UL (ref 4.31–10.16)
WBC # BLD AUTO: 16.69 THOUSAND/UL (ref 4.31–10.16)
WBC # BLD AUTO: 16.85 THOUSAND/UL (ref 4.31–10.16)
WBC # BLD AUTO: 17.3 THOUSAND/UL (ref 4.31–10.16)
WBC # BLD AUTO: 18.45 THOUSAND/UL (ref 4.31–10.16)
WBC # BLD AUTO: 19.24 THOUSAND/UL (ref 4.31–10.16)
WBC # BLD AUTO: 19.36 THOUSAND/UL (ref 4.31–10.16)
WBC # BLD AUTO: 3.97 THOUSAND/UL (ref 4.31–10.16)
WBC # BLD AUTO: 7.07 THOUSAND/UL (ref 4.31–10.16)
WBC # BLD AUTO: 8.39 THOUSAND/UL (ref 4.31–10.16)
WBC # BLD AUTO: 8.78 THOUSAND/UL (ref 4.31–10.16)
WBC # BLD AUTO: 8.99 THOUSAND/UL (ref 4.31–10.16)
WBC # FLD MANUAL: 124 /UL
WBC # FLD MANUAL: 460 /UL
WBC #/AREA URNS AUTO: ABNORMAL /HPF

## 2021-01-01 PROCEDURE — 93306 TTE W/DOPPLER COMPLETE: CPT

## 2021-01-01 PROCEDURE — 82533 TOTAL CORTISOL: CPT | Performed by: INTERNAL MEDICINE

## 2021-01-01 PROCEDURE — 80048 BASIC METABOLIC PNL TOTAL CA: CPT | Performed by: PHYSICIAN ASSISTANT

## 2021-01-01 PROCEDURE — 82948 REAGENT STRIP/BLOOD GLUCOSE: CPT

## 2021-01-01 PROCEDURE — 97167 OT EVAL HIGH COMPLEX 60 MIN: CPT

## 2021-01-01 PROCEDURE — 84300 ASSAY OF URINE SODIUM: CPT | Performed by: INTERNAL MEDICINE

## 2021-01-01 PROCEDURE — 94760 N-INVAS EAR/PLS OXIMETRY 1: CPT

## 2021-01-01 PROCEDURE — 78580 LUNG PERFUSION IMAGING: CPT

## 2021-01-01 PROCEDURE — 11042 DBRDMT SUBQ TIS 1ST 20SQCM/<: CPT | Performed by: PODIATRIST

## 2021-01-01 PROCEDURE — 99232 SBSQ HOSP IP/OBS MODERATE 35: CPT | Performed by: INTERNAL MEDICINE

## 2021-01-01 PROCEDURE — 83930 ASSAY OF BLOOD OSMOLALITY: CPT | Performed by: INTERNAL MEDICINE

## 2021-01-01 PROCEDURE — 99233 SBSQ HOSP IP/OBS HIGH 50: CPT | Performed by: INTERNAL MEDICINE

## 2021-01-01 PROCEDURE — 0JBQ0ZZ EXCISION OF RIGHT FOOT SUBCUTANEOUS TISSUE AND FASCIA, OPEN APPROACH: ICD-10-PCS | Performed by: PODIATRIST

## 2021-01-01 PROCEDURE — 97530 THERAPEUTIC ACTIVITIES: CPT

## 2021-01-01 PROCEDURE — 97110 THERAPEUTIC EXERCISES: CPT

## 2021-01-01 PROCEDURE — 87505 NFCT AGENT DETECTION GI: CPT | Performed by: INTERNAL MEDICINE

## 2021-01-01 PROCEDURE — 99285 EMERGENCY DEPT VISIT HI MDM: CPT | Performed by: EMERGENCY MEDICINE

## 2021-01-01 PROCEDURE — 85379 FIBRIN DEGRADATION QUANT: CPT | Performed by: INTERNAL MEDICINE

## 2021-01-01 PROCEDURE — 97116 GAIT TRAINING THERAPY: CPT

## 2021-01-01 PROCEDURE — 99222 1ST HOSP IP/OBS MODERATE 55: CPT | Performed by: INTERNAL MEDICINE

## 2021-01-01 PROCEDURE — 87186 SC STD MICRODIL/AGAR DIL: CPT | Performed by: EMERGENCY MEDICINE

## 2021-01-01 PROCEDURE — 96365 THER/PROPH/DIAG IV INF INIT: CPT

## 2021-01-01 PROCEDURE — 72197 MRI PELVIS W/O & W/DYE: CPT

## 2021-01-01 PROCEDURE — 83880 ASSAY OF NATRIURETIC PEPTIDE: CPT | Performed by: PHYSICIAN ASSISTANT

## 2021-01-01 PROCEDURE — 82553 CREATINE MB FRACTION: CPT | Performed by: INTERNAL MEDICINE

## 2021-01-01 PROCEDURE — 80202 ASSAY OF VANCOMYCIN: CPT | Performed by: INTERNAL MEDICINE

## 2021-01-01 PROCEDURE — 97535 SELF CARE MNGMENT TRAINING: CPT

## 2021-01-01 PROCEDURE — 80048 BASIC METABOLIC PNL TOTAL CA: CPT | Performed by: INTERNAL MEDICINE

## 2021-01-01 PROCEDURE — 71045 X-RAY EXAM CHEST 1 VIEW: CPT

## 2021-01-01 PROCEDURE — 82024 ASSAY OF ACTH: CPT | Performed by: INTERNAL MEDICINE

## 2021-01-01 PROCEDURE — 86900 BLOOD TYPING SEROLOGIC ABO: CPT | Performed by: INTERNAL MEDICINE

## 2021-01-01 PROCEDURE — 84145 PROCALCITONIN (PCT): CPT | Performed by: PHYSICIAN ASSISTANT

## 2021-01-01 PROCEDURE — 82728 ASSAY OF FERRITIN: CPT | Performed by: INTERNAL MEDICINE

## 2021-01-01 PROCEDURE — 36415 COLL VENOUS BLD VENIPUNCTURE: CPT

## 2021-01-01 PROCEDURE — 80048 BASIC METABOLIC PNL TOTAL CA: CPT | Performed by: EMERGENCY MEDICINE

## 2021-01-01 PROCEDURE — 92526 ORAL FUNCTION THERAPY: CPT

## 2021-01-01 PROCEDURE — 3066F NEPHROPATHY DOC TX: CPT | Performed by: FAMILY MEDICINE

## 2021-01-01 PROCEDURE — 80048 BASIC METABOLIC PNL TOTAL CA: CPT | Performed by: PHYSICAL MEDICINE & REHABILITATION

## 2021-01-01 PROCEDURE — G1004 CDSM NDSC: HCPCS

## 2021-01-01 PROCEDURE — 97530 THERAPEUTIC ACTIVITIES: CPT | Performed by: PHYSICAL THERAPIST

## 2021-01-01 PROCEDURE — 88305 TISSUE EXAM BY PATHOLOGIST: CPT | Performed by: PATHOLOGY

## 2021-01-01 PROCEDURE — NC001 PR NO CHARGE

## 2021-01-01 PROCEDURE — 89051 BODY FLUID CELL COUNT: CPT | Performed by: STUDENT IN AN ORGANIZED HEALTH CARE EDUCATION/TRAINING PROGRAM

## 2021-01-01 PROCEDURE — 85025 COMPLETE CBC W/AUTO DIFF WBC: CPT | Performed by: NURSE PRACTITIONER

## 2021-01-01 PROCEDURE — 83690 ASSAY OF LIPASE: CPT | Performed by: EMERGENCY MEDICINE

## 2021-01-01 PROCEDURE — 85014 HEMATOCRIT: CPT

## 2021-01-01 PROCEDURE — 85027 COMPLETE CBC AUTOMATED: CPT | Performed by: INTERNAL MEDICINE

## 2021-01-01 PROCEDURE — 85025 COMPLETE CBC W/AUTO DIFF WBC: CPT | Performed by: INTERNAL MEDICINE

## 2021-01-01 PROCEDURE — 84295 ASSAY OF SERUM SODIUM: CPT

## 2021-01-01 PROCEDURE — 99222 1ST HOSP IP/OBS MODERATE 55: CPT | Performed by: PODIATRIST

## 2021-01-01 PROCEDURE — 84157 ASSAY OF PROTEIN OTHER: CPT | Performed by: INTERNAL MEDICINE

## 2021-01-01 PROCEDURE — 83935 ASSAY OF URINE OSMOLALITY: CPT | Performed by: INTERNAL MEDICINE

## 2021-01-01 PROCEDURE — 93010 ELECTROCARDIOGRAM REPORT: CPT | Performed by: INTERNAL MEDICINE

## 2021-01-01 PROCEDURE — 84145 PROCALCITONIN (PCT): CPT | Performed by: NURSE PRACTITIONER

## 2021-01-01 PROCEDURE — 85379 FIBRIN DEGRADATION QUANT: CPT | Performed by: NURSE PRACTITIONER

## 2021-01-01 PROCEDURE — 99498 ADVNCD CARE PLAN ADDL 30 MIN: CPT | Performed by: INTERNAL MEDICINE

## 2021-01-01 PROCEDURE — 72100 X-RAY EXAM L-S SPINE 2/3 VWS: CPT

## 2021-01-01 PROCEDURE — 1111F DSCHRG MED/CURRENT MED MERGE: CPT | Performed by: FAMILY MEDICINE

## 2021-01-01 PROCEDURE — 92523 SPEECH SOUND LANG COMPREHEN: CPT

## 2021-01-01 PROCEDURE — 82140 ASSAY OF AMMONIA: CPT | Performed by: NURSE PRACTITIONER

## 2021-01-01 PROCEDURE — 82088 ASSAY OF ALDOSTERONE: CPT | Performed by: INTERNAL MEDICINE

## 2021-01-01 PROCEDURE — 1160F RVW MEDS BY RX/DR IN RCRD: CPT | Performed by: FAMILY MEDICINE

## 2021-01-01 PROCEDURE — NC001 PR NO CHARGE: Performed by: INTERNAL MEDICINE

## 2021-01-01 PROCEDURE — 99232 SBSQ HOSP IP/OBS MODERATE 35: CPT | Performed by: PODIATRIST

## 2021-01-01 PROCEDURE — 86850 RBC ANTIBODY SCREEN: CPT | Performed by: PHYSICIAN ASSISTANT

## 2021-01-01 PROCEDURE — 81001 URINALYSIS AUTO W/SCOPE: CPT | Performed by: NURSE PRACTITIONER

## 2021-01-01 PROCEDURE — 80053 COMPREHEN METABOLIC PANEL: CPT | Performed by: EMERGENCY MEDICINE

## 2021-01-01 PROCEDURE — 86901 BLOOD TYPING SEROLOGIC RH(D): CPT | Performed by: PHYSICAL MEDICINE & REHABILITATION

## 2021-01-01 PROCEDURE — 84439 ASSAY OF FREE THYROXINE: CPT | Performed by: INTERNAL MEDICINE

## 2021-01-01 PROCEDURE — 83605 ASSAY OF LACTIC ACID: CPT | Performed by: EMERGENCY MEDICINE

## 2021-01-01 PROCEDURE — 83880 ASSAY OF NATRIURETIC PEPTIDE: CPT | Performed by: NURSE PRACTITIONER

## 2021-01-01 PROCEDURE — 84443 ASSAY THYROID STIM HORMONE: CPT | Performed by: INTERNAL MEDICINE

## 2021-01-01 PROCEDURE — 97163 PT EVAL HIGH COMPLEX 45 MIN: CPT

## 2021-01-01 PROCEDURE — 97129 THER IVNTJ 1ST 15 MIN: CPT

## 2021-01-01 PROCEDURE — 82803 BLOOD GASES ANY COMBINATION: CPT

## 2021-01-01 PROCEDURE — 0001A SARS-COV-2 / COVID-19 MRNA VACCINE (PFIZER-BIONTECH) 30 MCG: CPT

## 2021-01-01 PROCEDURE — 86901 BLOOD TYPING SEROLOGIC RH(D): CPT | Performed by: INTERNAL MEDICINE

## 2021-01-01 PROCEDURE — 82436 ASSAY OF URINE CHLORIDE: CPT | Performed by: INTERNAL MEDICINE

## 2021-01-01 PROCEDURE — 82550 ASSAY OF CK (CPK): CPT | Performed by: INTERNAL MEDICINE

## 2021-01-01 PROCEDURE — 99233 SBSQ HOSP IP/OBS HIGH 50: CPT | Performed by: STUDENT IN AN ORGANIZED HEALTH CARE EDUCATION/TRAINING PROGRAM

## 2021-01-01 PROCEDURE — 86160 COMPLEMENT ANTIGEN: CPT | Performed by: INTERNAL MEDICINE

## 2021-01-01 PROCEDURE — 99239 HOSP IP/OBS DSCHRG MGMT >30: CPT | Performed by: INTERNAL MEDICINE

## 2021-01-01 PROCEDURE — 83615 LACTATE (LD) (LDH) ENZYME: CPT | Performed by: STUDENT IN AN ORGANIZED HEALTH CARE EDUCATION/TRAINING PROGRAM

## 2021-01-01 PROCEDURE — 82947 ASSAY GLUCOSE BLOOD QUANT: CPT

## 2021-01-01 PROCEDURE — 80048 BASIC METABOLIC PNL TOTAL CA: CPT | Performed by: NURSE PRACTITIONER

## 2021-01-01 PROCEDURE — 86140 C-REACTIVE PROTEIN: CPT | Performed by: INTERNAL MEDICINE

## 2021-01-01 PROCEDURE — 99222 1ST HOSP IP/OBS MODERATE 55: CPT | Performed by: STUDENT IN AN ORGANIZED HEALTH CARE EDUCATION/TRAINING PROGRAM

## 2021-01-01 PROCEDURE — 84484 ASSAY OF TROPONIN QUANT: CPT | Performed by: INTERNAL MEDICINE

## 2021-01-01 PROCEDURE — 94002 VENT MGMT INPAT INIT DAY: CPT

## 2021-01-01 PROCEDURE — 83735 ASSAY OF MAGNESIUM: CPT | Performed by: INTERNAL MEDICINE

## 2021-01-01 PROCEDURE — 84484 ASSAY OF TROPONIN QUANT: CPT | Performed by: EMERGENCY MEDICINE

## 2021-01-01 PROCEDURE — 99213 OFFICE O/P EST LOW 20 MIN: CPT | Performed by: PODIATRIST

## 2021-01-01 PROCEDURE — 85025 COMPLETE CBC W/AUTO DIFF WBC: CPT | Performed by: PHYSICAL MEDICINE & REHABILITATION

## 2021-01-01 PROCEDURE — 0W9B3ZX DRAINAGE OF LEFT PLEURAL CAVITY, PERCUTANEOUS APPROACH, DIAGNOSTIC: ICD-10-PCS | Performed by: INTERNAL MEDICINE

## 2021-01-01 PROCEDURE — 87081 CULTURE SCREEN ONLY: CPT | Performed by: INTERNAL MEDICINE

## 2021-01-01 PROCEDURE — 84484 ASSAY OF TROPONIN QUANT: CPT | Performed by: PHYSICIAN ASSISTANT

## 2021-01-01 PROCEDURE — 93325 DOPPLER ECHO COLOR FLOW MAPG: CPT | Performed by: INTERNAL MEDICINE

## 2021-01-01 PROCEDURE — 99222 1ST HOSP IP/OBS MODERATE 55: CPT | Performed by: GENERAL PRACTICE

## 2021-01-01 PROCEDURE — 87040 BLOOD CULTURE FOR BACTERIA: CPT | Performed by: INTERNAL MEDICINE

## 2021-01-01 PROCEDURE — 0JBQ0ZZ EXCISION OF RIGHT FOOT SUBCUTANEOUS TISSUE AND FASCIA, OPEN APPROACH: ICD-10-PCS | Performed by: INTERNAL MEDICINE

## 2021-01-01 PROCEDURE — 83615 LACTATE (LD) (LDH) ENZYME: CPT | Performed by: INTERNAL MEDICINE

## 2021-01-01 PROCEDURE — 93312 ECHO TRANSESOPHAGEAL: CPT

## 2021-01-01 PROCEDURE — 99223 1ST HOSP IP/OBS HIGH 75: CPT | Performed by: INTERNAL MEDICINE

## 2021-01-01 PROCEDURE — 99285 EMERGENCY DEPT VISIT HI MDM: CPT

## 2021-01-01 PROCEDURE — 83986 ASSAY PH BODY FLUID NOS: CPT | Performed by: STUDENT IN AN ORGANIZED HEALTH CARE EDUCATION/TRAINING PROGRAM

## 2021-01-01 PROCEDURE — 93005 ELECTROCARDIOGRAM TRACING: CPT

## 2021-01-01 PROCEDURE — 84100 ASSAY OF PHOSPHORUS: CPT | Performed by: GENERAL PRACTICE

## 2021-01-01 PROCEDURE — 93312 ECHO TRANSESOPHAGEAL: CPT | Performed by: INTERNAL MEDICINE

## 2021-01-01 PROCEDURE — 84165 PROTEIN E-PHORESIS SERUM: CPT | Performed by: PATHOLOGY

## 2021-01-01 PROCEDURE — 85025 COMPLETE CBC W/AUTO DIFF WBC: CPT | Performed by: PHYSICIAN ASSISTANT

## 2021-01-01 PROCEDURE — 30233N1 TRANSFUSION OF NONAUTOLOGOUS RED BLOOD CELLS INTO PERIPHERAL VEIN, PERCUTANEOUS APPROACH: ICD-10-PCS | Performed by: INTERNAL MEDICINE

## 2021-01-01 PROCEDURE — 96367 TX/PROPH/DG ADDL SEQ IV INF: CPT

## 2021-01-01 PROCEDURE — 86334 IMMUNOFIX E-PHORESIS SERUM: CPT | Performed by: INTERNAL MEDICINE

## 2021-01-01 PROCEDURE — 97130 THER IVNTJ EA ADDL 15 MIN: CPT

## 2021-01-01 PROCEDURE — 1036F TOBACCO NON-USER: CPT | Performed by: FAMILY MEDICINE

## 2021-01-01 PROCEDURE — 82140 ASSAY OF AMMONIA: CPT | Performed by: INTERNAL MEDICINE

## 2021-01-01 PROCEDURE — 81001 URINALYSIS AUTO W/SCOPE: CPT | Performed by: INTERNAL MEDICINE

## 2021-01-01 PROCEDURE — 87205 SMEAR GRAM STAIN: CPT | Performed by: INTERNAL MEDICINE

## 2021-01-01 PROCEDURE — 80048 BASIC METABOLIC PNL TOTAL CA: CPT | Performed by: FAMILY MEDICINE

## 2021-01-01 PROCEDURE — 0241U HB NFCT DS VIR RESP RNA 4 TRGT: CPT | Performed by: EMERGENCY MEDICINE

## 2021-01-01 PROCEDURE — 82550 ASSAY OF CK (CPK): CPT | Performed by: EMERGENCY MEDICINE

## 2021-01-01 PROCEDURE — 0002A SARS-COV-2 / COVID-19 MRNA VACCINE (PFIZER-BIONTECH) 30 MCG: CPT

## 2021-01-01 PROCEDURE — 82728 ASSAY OF FERRITIN: CPT | Performed by: PHYSICAL MEDICINE & REHABILITATION

## 2021-01-01 PROCEDURE — 99220 PR INITIAL OBSERVATION CARE/DAY 70 MINUTES: CPT | Performed by: INTERNAL MEDICINE

## 2021-01-01 PROCEDURE — 82945 GLUCOSE OTHER FLUID: CPT | Performed by: INTERNAL MEDICINE

## 2021-01-01 PROCEDURE — 99231 SBSQ HOSP IP/OBS SF/LOW 25: CPT | Performed by: INTERNAL MEDICINE

## 2021-01-01 PROCEDURE — 96360 HYDRATION IV INFUSION INIT: CPT

## 2021-01-01 PROCEDURE — 91300 SARS-COV-2 / COVID-19 MRNA VACCINE (PFIZER-BIONTECH) 30 MCG: CPT

## 2021-01-01 PROCEDURE — 99238 HOSP IP/OBS DSCHRG MGMT 30/<: CPT | Performed by: STUDENT IN AN ORGANIZED HEALTH CARE EDUCATION/TRAINING PROGRAM

## 2021-01-01 PROCEDURE — 92611 MOTION FLUOROSCOPY/SWALLOW: CPT

## 2021-01-01 PROCEDURE — 86900 BLOOD TYPING SEROLOGIC ABO: CPT | Performed by: PHYSICIAN ASSISTANT

## 2021-01-01 PROCEDURE — 81001 URINALYSIS AUTO W/SCOPE: CPT | Performed by: EMERGENCY MEDICINE

## 2021-01-01 PROCEDURE — 36415 COLL VENOUS BLD VENIPUNCTURE: CPT | Performed by: EMERGENCY MEDICINE

## 2021-01-01 PROCEDURE — 32555 ASPIRATE PLEURA W/ IMAGING: CPT | Performed by: INTERNAL MEDICINE

## 2021-01-01 PROCEDURE — 99212 OFFICE O/P EST SF 10 MIN: CPT | Performed by: FAMILY MEDICINE

## 2021-01-01 PROCEDURE — XW033E5 INTRODUCTION OF REMDESIVIR ANTI-INFECTIVE INTO PERIPHERAL VEIN, PERCUTANEOUS APPROACH, NEW TECHNOLOGY GROUP 5: ICD-10-PCS | Performed by: INTERNAL MEDICINE

## 2021-01-01 PROCEDURE — 84166 PROTEIN E-PHORESIS/URINE/CSF: CPT | Performed by: INTERNAL MEDICINE

## 2021-01-01 PROCEDURE — 99233 SBSQ HOSP IP/OBS HIGH 50: CPT | Performed by: PHYSICAL MEDICINE & REHABILITATION

## 2021-01-01 PROCEDURE — 83540 ASSAY OF IRON: CPT | Performed by: PHYSICAL MEDICINE & REHABILITATION

## 2021-01-01 PROCEDURE — 85027 COMPLETE CBC AUTOMATED: CPT | Performed by: PHYSICIAN ASSISTANT

## 2021-01-01 PROCEDURE — 82805 BLOOD GASES W/O2 SATURATION: CPT | Performed by: PHYSICIAN ASSISTANT

## 2021-01-01 PROCEDURE — 83550 IRON BINDING TEST: CPT | Performed by: PHYSICAL MEDICINE & REHABILITATION

## 2021-01-01 PROCEDURE — 93308 TTE F-UP OR LMTD: CPT | Performed by: INTERNAL MEDICINE

## 2021-01-01 PROCEDURE — 99284 EMERGENCY DEPT VISIT MOD MDM: CPT | Performed by: EMERGENCY MEDICINE

## 2021-01-01 PROCEDURE — 84145 PROCALCITONIN (PCT): CPT | Performed by: INTERNAL MEDICINE

## 2021-01-01 PROCEDURE — 99232 SBSQ HOSP IP/OBS MODERATE 35: CPT | Performed by: FAMILY MEDICINE

## 2021-01-01 PROCEDURE — 84132 ASSAY OF SERUM POTASSIUM: CPT

## 2021-01-01 PROCEDURE — 83880 ASSAY OF NATRIURETIC PEPTIDE: CPT | Performed by: INTERNAL MEDICINE

## 2021-01-01 PROCEDURE — 83735 ASSAY OF MAGNESIUM: CPT | Performed by: GENERAL PRACTICE

## 2021-01-01 PROCEDURE — 85025 COMPLETE CBC W/AUTO DIFF WBC: CPT | Performed by: EMERGENCY MEDICINE

## 2021-01-01 PROCEDURE — P9016 RBC LEUKOCYTES REDUCED: HCPCS

## 2021-01-01 PROCEDURE — A9540 TC99M MAA: HCPCS

## 2021-01-01 PROCEDURE — 71250 CT THORAX DX C-: CPT

## 2021-01-01 PROCEDURE — 92610 EVALUATE SWALLOWING FUNCTION: CPT

## 2021-01-01 PROCEDURE — 74176 CT ABD & PELVIS W/O CONTRAST: CPT

## 2021-01-01 PROCEDURE — 82570 ASSAY OF URINE CREATININE: CPT | Performed by: INTERNAL MEDICINE

## 2021-01-01 PROCEDURE — 3725F SCREEN DEPRESSION PERFORMED: CPT | Performed by: FAMILY MEDICINE

## 2021-01-01 PROCEDURE — 87449 NOS EACH ORGANISM AG IA: CPT | Performed by: INTERNAL MEDICINE

## 2021-01-01 PROCEDURE — 97110 THERAPEUTIC EXERCISES: CPT | Performed by: PHYSICAL THERAPIST

## 2021-01-01 PROCEDURE — 80053 COMPREHEN METABOLIC PANEL: CPT | Performed by: INTERNAL MEDICINE

## 2021-01-01 PROCEDURE — 84132 ASSAY OF SERUM POTASSIUM: CPT | Performed by: INTERNAL MEDICINE

## 2021-01-01 PROCEDURE — 86900 BLOOD TYPING SEROLOGIC ABO: CPT | Performed by: PHYSICAL MEDICINE & REHABILITATION

## 2021-01-01 PROCEDURE — 84155 ASSAY OF PROTEIN SERUM: CPT | Performed by: STUDENT IN AN ORGANIZED HEALTH CARE EDUCATION/TRAINING PROGRAM

## 2021-01-01 PROCEDURE — 99291 CRITICAL CARE FIRST HOUR: CPT | Performed by: STUDENT IN AN ORGANIZED HEALTH CARE EDUCATION/TRAINING PROGRAM

## 2021-01-01 PROCEDURE — 87070 CULTURE OTHR SPECIMN AEROBIC: CPT | Performed by: INTERNAL MEDICINE

## 2021-01-01 PROCEDURE — 99223 1ST HOSP IP/OBS HIGH 75: CPT | Performed by: PHYSICAL MEDICINE & REHABILITATION

## 2021-01-01 PROCEDURE — 82140 ASSAY OF AMMONIA: CPT | Performed by: PHYSICIAN ASSISTANT

## 2021-01-01 PROCEDURE — 93320 DOPPLER ECHO COMPLETE: CPT | Performed by: INTERNAL MEDICINE

## 2021-01-01 PROCEDURE — 84157 ASSAY OF PROTEIN OTHER: CPT | Performed by: STUDENT IN AN ORGANIZED HEALTH CARE EDUCATION/TRAINING PROGRAM

## 2021-01-01 PROCEDURE — 83605 ASSAY OF LACTIC ACID: CPT | Performed by: NURSE PRACTITIONER

## 2021-01-01 PROCEDURE — 82306 VITAMIN D 25 HYDROXY: CPT | Performed by: STUDENT IN AN ORGANIZED HEALTH CARE EDUCATION/TRAINING PROGRAM

## 2021-01-01 PROCEDURE — 87086 URINE CULTURE/COLONY COUNT: CPT | Performed by: EMERGENCY MEDICINE

## 2021-01-01 PROCEDURE — 76377 3D RENDER W/INTRP POSTPROCES: CPT

## 2021-01-01 PROCEDURE — 80061 LIPID PANEL: CPT | Performed by: INTERNAL MEDICINE

## 2021-01-01 PROCEDURE — 99223 1ST HOSP IP/OBS HIGH 75: CPT | Performed by: THORACIC SURGERY (CARDIOTHORACIC VASCULAR SURGERY)

## 2021-01-01 PROCEDURE — U0005 INFEC AGEN DETEC AMPLI PROBE: HCPCS | Performed by: FAMILY MEDICINE

## 2021-01-01 PROCEDURE — 84443 ASSAY THYROID STIM HORMONE: CPT | Performed by: EMERGENCY MEDICINE

## 2021-01-01 PROCEDURE — 86901 BLOOD TYPING SEROLOGIC RH(D): CPT | Performed by: PHYSICIAN ASSISTANT

## 2021-01-01 PROCEDURE — 96366 THER/PROPH/DIAG IV INF ADDON: CPT

## 2021-01-01 PROCEDURE — 87186 SC STD MICRODIL/AGAR DIL: CPT | Performed by: INTERNAL MEDICINE

## 2021-01-01 PROCEDURE — 93321 DOPPLER ECHO F-UP/LMTD STD: CPT | Performed by: INTERNAL MEDICINE

## 2021-01-01 PROCEDURE — A9585 GADOBUTROL INJECTION: HCPCS | Performed by: INTERNAL MEDICINE

## 2021-01-01 PROCEDURE — 84165 PROTEIN E-PHORESIS SERUM: CPT | Performed by: INTERNAL MEDICINE

## 2021-01-01 PROCEDURE — 99283 EMERGENCY DEPT VISIT LOW MDM: CPT

## 2021-01-01 PROCEDURE — 87070 CULTURE OTHR SPECIMN AEROBIC: CPT | Performed by: STUDENT IN AN ORGANIZED HEALTH CARE EDUCATION/TRAINING PROGRAM

## 2021-01-01 PROCEDURE — 87040 BLOOD CULTURE FOR BACTERIA: CPT | Performed by: PHYSICIAN ASSISTANT

## 2021-01-01 PROCEDURE — 87077 CULTURE AEROBIC IDENTIFY: CPT | Performed by: INTERNAL MEDICINE

## 2021-01-01 PROCEDURE — 36600 WITHDRAWAL OF ARTERIAL BLOOD: CPT

## 2021-01-01 PROCEDURE — 99232 SBSQ HOSP IP/OBS MODERATE 35: CPT | Performed by: PHYSICIAN ASSISTANT

## 2021-01-01 PROCEDURE — 82140 ASSAY OF AMMONIA: CPT | Performed by: EMERGENCY MEDICINE

## 2021-01-01 PROCEDURE — 84100 ASSAY OF PHOSPHORUS: CPT | Performed by: INTERNAL MEDICINE

## 2021-01-01 PROCEDURE — 93306 TTE W/DOPPLER COMPLETE: CPT | Performed by: INTERNAL MEDICINE

## 2021-01-01 PROCEDURE — 84166 PROTEIN E-PHORESIS/URINE/CSF: CPT | Performed by: PATHOLOGY

## 2021-01-01 PROCEDURE — 0W993ZX DRAINAGE OF RIGHT PLEURAL CAVITY, PERCUTANEOUS APPROACH, DIAGNOSTIC: ICD-10-PCS | Performed by: INTERNAL MEDICINE

## 2021-01-01 PROCEDURE — 83986 ASSAY PH BODY FLUID NOS: CPT | Performed by: INTERNAL MEDICINE

## 2021-01-01 PROCEDURE — 93308 TTE F-UP OR LMTD: CPT

## 2021-01-01 PROCEDURE — 86334 IMMUNOFIX E-PHORESIS SERUM: CPT | Performed by: PATHOLOGY

## 2021-01-01 PROCEDURE — U0003 INFECTIOUS AGENT DETECTION BY NUCLEIC ACID (DNA OR RNA); SEVERE ACUTE RESPIRATORY SYNDROME CORONAVIRUS 2 (SARS-COV-2) (CORONAVIRUS DISEASE [COVID-19]), AMPLIFIED PROBE TECHNIQUE, MAKING USE OF HIGH THROUGHPUT TECHNOLOGIES AS DESCRIBED BY CMS-2020-01-R: HCPCS | Performed by: FAMILY MEDICINE

## 2021-01-01 PROCEDURE — 86923 COMPATIBILITY TEST ELECTRIC: CPT

## 2021-01-01 PROCEDURE — 82805 BLOOD GASES W/O2 SATURATION: CPT | Performed by: INTERNAL MEDICINE

## 2021-01-01 PROCEDURE — 83605 ASSAY OF LACTIC ACID: CPT | Performed by: PHYSICIAN ASSISTANT

## 2021-01-01 PROCEDURE — 99222 1ST HOSP IP/OBS MODERATE 55: CPT | Performed by: PHYSICIAN ASSISTANT

## 2021-01-01 PROCEDURE — 99497 ADVNCD CARE PLAN 30 MIN: CPT | Performed by: INTERNAL MEDICINE

## 2021-01-01 PROCEDURE — 86850 RBC ANTIBODY SCREEN: CPT | Performed by: PHYSICAL MEDICINE & REHABILITATION

## 2021-01-01 PROCEDURE — 87205 SMEAR GRAM STAIN: CPT | Performed by: STUDENT IN AN ORGANIZED HEALTH CARE EDUCATION/TRAINING PROGRAM

## 2021-01-01 PROCEDURE — 82805 BLOOD GASES W/O2 SATURATION: CPT | Performed by: EMERGENCY MEDICINE

## 2021-01-01 PROCEDURE — 71046 X-RAY EXAM CHEST 2 VIEWS: CPT

## 2021-01-01 PROCEDURE — 3051F HG A1C>EQUAL 7.0%<8.0%: CPT | Performed by: FAMILY MEDICINE

## 2021-01-01 PROCEDURE — 89051 BODY FLUID CELL COUNT: CPT | Performed by: INTERNAL MEDICINE

## 2021-01-01 PROCEDURE — M0239 BAMLANIVIMAB-XXXX INFUSION: HCPCS | Performed by: FAMILY MEDICINE

## 2021-01-01 PROCEDURE — 87040 BLOOD CULTURE FOR BACTERIA: CPT | Performed by: EMERGENCY MEDICINE

## 2021-01-01 PROCEDURE — 99233 SBSQ HOSP IP/OBS HIGH 50: CPT | Performed by: THORACIC SURGERY (CARDIOTHORACIC VASCULAR SURGERY)

## 2021-01-01 PROCEDURE — 82272 OCCULT BLD FECES 1-3 TESTS: CPT | Performed by: PHYSICAL MEDICINE & REHABILITATION

## 2021-01-01 PROCEDURE — 4010F ACE/ARB THERAPY RXD/TAKEN: CPT | Performed by: FAMILY MEDICINE

## 2021-01-01 PROCEDURE — 84439 ASSAY OF FREE THYROXINE: CPT | Performed by: EMERGENCY MEDICINE

## 2021-01-01 PROCEDURE — 82010 KETONE BODYS QUAN: CPT | Performed by: EMERGENCY MEDICINE

## 2021-01-01 PROCEDURE — 74230 X-RAY XM SWLNG FUNCJ C+: CPT

## 2021-01-01 PROCEDURE — 82945 GLUCOSE OTHER FLUID: CPT | Performed by: STUDENT IN AN ORGANIZED HEALTH CARE EDUCATION/TRAINING PROGRAM

## 2021-01-01 PROCEDURE — 87077 CULTURE AEROBIC IDENTIFY: CPT | Performed by: EMERGENCY MEDICINE

## 2021-01-01 PROCEDURE — 80076 HEPATIC FUNCTION PANEL: CPT | Performed by: EMERGENCY MEDICINE

## 2021-01-01 PROCEDURE — 85025 COMPLETE CBC W/AUTO DIFF WBC: CPT | Performed by: FAMILY MEDICINE

## 2021-01-01 PROCEDURE — 88112 CYTOPATH CELL ENHANCE TECH: CPT | Performed by: PATHOLOGY

## 2021-01-01 PROCEDURE — 70450 CT HEAD/BRAIN W/O DYE: CPT

## 2021-01-01 PROCEDURE — 99221 1ST HOSP IP/OBS SF/LOW 40: CPT | Performed by: INTERNAL MEDICINE

## 2021-01-01 PROCEDURE — 99252 IP/OBS CONSLTJ NEW/EST SF 35: CPT | Performed by: INTERNAL MEDICINE

## 2021-01-01 PROCEDURE — 84540 ASSAY OF URINE/UREA-N: CPT | Performed by: INTERNAL MEDICINE

## 2021-01-01 PROCEDURE — 83036 HEMOGLOBIN GLYCOSYLATED A1C: CPT | Performed by: INTERNAL MEDICINE

## 2021-01-01 RX ORDER — INSULIN GLARGINE 100 [IU]/ML
15 INJECTION, SOLUTION SUBCUTANEOUS
Status: DISCONTINUED | OUTPATIENT
Start: 2021-01-01 | End: 2021-01-01

## 2021-01-01 RX ORDER — ASPIRIN 325 MG
325 TABLET ORAL ONCE
Status: COMPLETED | OUTPATIENT
Start: 2021-01-01 | End: 2021-01-01

## 2021-01-01 RX ORDER — MULTIVITAMIN/IRON/FOLIC ACID 18MG-0.4MG
1 TABLET ORAL DAILY
Status: DISCONTINUED | OUTPATIENT
Start: 2021-01-01 | End: 2021-01-01

## 2021-01-01 RX ORDER — FUROSEMIDE 10 MG/ML
80 INJECTION INTRAMUSCULAR; INTRAVENOUS EVERY 8 HOURS
Status: DISCONTINUED | OUTPATIENT
Start: 2021-01-01 | End: 2021-01-01

## 2021-01-01 RX ORDER — AMLODIPINE BESYLATE 5 MG/1
5 TABLET ORAL DAILY
Status: DISCONTINUED | OUTPATIENT
Start: 2021-01-01 | End: 2021-01-01

## 2021-01-01 RX ORDER — LEVOTHYROXINE SODIUM 0.12 MG/1
125 TABLET ORAL
Status: DISCONTINUED | OUTPATIENT
Start: 2021-01-01 | End: 2021-01-01 | Stop reason: HOSPADM

## 2021-01-01 RX ORDER — SENNOSIDES 8.6 MG
1 TABLET ORAL
Status: DISCONTINUED | OUTPATIENT
Start: 2021-01-01 | End: 2021-01-01

## 2021-01-01 RX ORDER — SODIUM CHLORIDE 9 MG/ML
50 INJECTION, SOLUTION INTRAVENOUS CONTINUOUS
Status: DISPENSED | OUTPATIENT
Start: 2021-01-01 | End: 2021-01-01

## 2021-01-01 RX ORDER — MELATONIN
4000 DAILY
Status: DISCONTINUED | OUTPATIENT
Start: 2021-01-01 | End: 2021-01-01 | Stop reason: HOSPADM

## 2021-01-01 RX ORDER — ATORVASTATIN CALCIUM 10 MG/1
5 TABLET, FILM COATED ORAL DAILY
Status: DISCONTINUED | OUTPATIENT
Start: 2021-01-01 | End: 2021-01-01

## 2021-01-01 RX ORDER — LISINOPRIL 5 MG/1
5 TABLET ORAL DAILY
Status: DISCONTINUED | OUTPATIENT
Start: 2021-01-01 | End: 2021-01-01

## 2021-01-01 RX ORDER — SODIUM CHLORIDE 9 MG/ML
75 INJECTION, SOLUTION INTRAVENOUS CONTINUOUS
Status: DISCONTINUED | OUTPATIENT
Start: 2021-01-01 | End: 2021-01-01

## 2021-01-01 RX ORDER — SODIUM CHLORIDE 9 MG/ML
20 INJECTION, SOLUTION INTRAVENOUS ONCE
Status: CANCELLED | OUTPATIENT
Start: 2021-01-01

## 2021-01-01 RX ORDER — PROPOFOL 10 MG/ML
INJECTION, EMULSION INTRAVENOUS AS NEEDED
Status: DISCONTINUED | OUTPATIENT
Start: 2021-01-01 | End: 2021-01-01

## 2021-01-01 RX ORDER — CHLORHEXIDINE GLUCONATE 0.12 MG/ML
15 RINSE ORAL EVERY 12 HOURS SCHEDULED
Status: DISCONTINUED | OUTPATIENT
Start: 2021-01-01 | End: 2021-01-01

## 2021-01-01 RX ORDER — DEXMEDETOMIDINE 100 UG/ML
.1-.7 INJECTION, SOLUTION, CONCENTRATE INTRAVENOUS
Status: DISCONTINUED | OUTPATIENT
Start: 2021-01-01 | End: 2021-01-01 | Stop reason: HOSPADM

## 2021-01-01 RX ORDER — PANTOPRAZOLE SODIUM 40 MG/1
40 TABLET, DELAYED RELEASE ORAL
Status: DISCONTINUED | OUTPATIENT
Start: 2021-01-01 | End: 2021-01-01 | Stop reason: HOSPADM

## 2021-01-01 RX ORDER — ASCORBIC ACID 500 MG
1000 TABLET ORAL EVERY 12 HOURS SCHEDULED
Status: DISCONTINUED | OUTPATIENT
Start: 2021-01-01 | End: 2021-01-01 | Stop reason: HOSPADM

## 2021-01-01 RX ORDER — CEFDINIR 300 MG/1
300 CAPSULE ORAL EVERY 12 HOURS SCHEDULED
Qty: 8 CAPSULE | Refills: 0 | Status: SHIPPED | OUTPATIENT
Start: 2021-01-01 | End: 2021-01-01

## 2021-01-01 RX ORDER — FERROUS SULFATE 325(65) MG
325 TABLET ORAL
Status: DISCONTINUED | OUTPATIENT
Start: 2021-01-01 | End: 2021-01-01

## 2021-01-01 RX ORDER — NOREPINEPHRINE BITARTRATE 1 MG/ML
INJECTION, SOLUTION INTRAVENOUS
Status: COMPLETED
Start: 2021-01-01 | End: 2021-01-01

## 2021-01-01 RX ORDER — LOPERAMIDE HYDROCHLORIDE 2 MG/1
4 CAPSULE ORAL 4 TIMES DAILY PRN
Qty: 30 CAPSULE | Refills: 0 | Status: SHIPPED | OUTPATIENT
Start: 2021-01-01 | End: 2021-01-01 | Stop reason: HOSPADM

## 2021-01-01 RX ORDER — INSULIN GLARGINE 100 [IU]/ML
14 INJECTION, SOLUTION SUBCUTANEOUS
Status: DISCONTINUED | OUTPATIENT
Start: 2021-01-01 | End: 2021-01-01 | Stop reason: HOSPADM

## 2021-01-01 RX ORDER — ONDANSETRON 4 MG/1
4 TABLET, ORALLY DISINTEGRATING ORAL EVERY 6 HOURS PRN
Status: DISCONTINUED | OUTPATIENT
Start: 2021-01-01 | End: 2021-01-01 | Stop reason: HOSPADM

## 2021-01-01 RX ORDER — DULOXETIN HYDROCHLORIDE 30 MG/1
30 CAPSULE, DELAYED RELEASE ORAL DAILY
Status: CANCELLED | OUTPATIENT
Start: 2021-01-01

## 2021-01-01 RX ORDER — LISINOPRIL 2.5 MG/1
2.5 TABLET ORAL DAILY
Status: DISCONTINUED | OUTPATIENT
Start: 2021-01-01 | End: 2021-01-01 | Stop reason: HOSPADM

## 2021-01-01 RX ORDER — DULOXETIN HYDROCHLORIDE 30 MG/1
30 CAPSULE, DELAYED RELEASE ORAL DAILY
Status: DISCONTINUED | OUTPATIENT
Start: 2021-01-01 | End: 2021-01-01 | Stop reason: HOSPADM

## 2021-01-01 RX ORDER — FUROSEMIDE 10 MG/ML
40 INJECTION INTRAMUSCULAR; INTRAVENOUS ONCE
Status: COMPLETED | OUTPATIENT
Start: 2021-01-01 | End: 2021-01-01

## 2021-01-01 RX ORDER — DULOXETIN HYDROCHLORIDE 30 MG/1
30 CAPSULE, DELAYED RELEASE ORAL DAILY
Status: DISCONTINUED | OUTPATIENT
Start: 2021-01-01 | End: 2021-01-01

## 2021-01-01 RX ORDER — SODIUM CHLORIDE 1000 MG
1 TABLET, SOLUBLE MISCELLANEOUS
Status: DISCONTINUED | OUTPATIENT
Start: 2021-01-01 | End: 2021-01-01 | Stop reason: HOSPADM

## 2021-01-01 RX ORDER — SIMETHICONE 80 MG
80 TABLET,CHEWABLE ORAL 4 TIMES DAILY PRN
Status: DISCONTINUED | OUTPATIENT
Start: 2021-01-01 | End: 2021-01-01 | Stop reason: HOSPADM

## 2021-01-01 RX ORDER — ASPIRIN 81 MG/1
81 TABLET, CHEWABLE ORAL DAILY
Status: DISCONTINUED | OUTPATIENT
Start: 2021-01-01 | End: 2021-01-01 | Stop reason: HOSPADM

## 2021-01-01 RX ORDER — LORAZEPAM 0.5 MG/1
0.5 TABLET ORAL ONCE
Status: COMPLETED | OUTPATIENT
Start: 2021-01-01 | End: 2021-01-01

## 2021-01-01 RX ORDER — ACETAMINOPHEN 325 MG/1
650 TABLET ORAL EVERY 6 HOURS PRN
Status: CANCELLED | OUTPATIENT
Start: 2021-01-01

## 2021-01-01 RX ORDER — DOCUSATE SODIUM 100 MG/1
100 CAPSULE, LIQUID FILLED ORAL 2 TIMES DAILY
Status: DISCONTINUED | OUTPATIENT
Start: 2021-01-01 | End: 2021-01-01

## 2021-01-01 RX ORDER — POTASSIUM CHLORIDE 20 MEQ/1
40 TABLET, EXTENDED RELEASE ORAL DAILY
Status: DISCONTINUED | OUTPATIENT
Start: 2021-01-01 | End: 2021-01-01

## 2021-01-01 RX ORDER — DEXTROSE MONOHYDRATE 25 G/50ML
50 INJECTION, SOLUTION INTRAVENOUS ONCE
Status: COMPLETED | OUTPATIENT
Start: 2021-01-01 | End: 2021-01-01

## 2021-01-01 RX ORDER — INSULIN GLARGINE 100 [IU]/ML
12 INJECTION, SOLUTION SUBCUTANEOUS
Status: DISCONTINUED | OUTPATIENT
Start: 2021-01-01 | End: 2021-01-01

## 2021-01-01 RX ORDER — INSULIN GLARGINE 100 [IU]/ML
6 INJECTION, SOLUTION SUBCUTANEOUS
Status: DISCONTINUED | OUTPATIENT
Start: 2021-01-01 | End: 2021-01-01 | Stop reason: HOSPADM

## 2021-01-01 RX ORDER — ATORVASTATIN CALCIUM 10 MG/1
5 TABLET, FILM COATED ORAL DAILY
Status: DISCONTINUED | OUTPATIENT
Start: 2021-01-01 | End: 2021-01-01 | Stop reason: HOSPADM

## 2021-01-01 RX ORDER — LORAZEPAM 2 MG/ML
0.25 INJECTION INTRAMUSCULAR ONCE
Status: COMPLETED | OUTPATIENT
Start: 2021-01-01 | End: 2021-01-01

## 2021-01-01 RX ORDER — PANTOPRAZOLE SODIUM 40 MG/1
40 TABLET, DELAYED RELEASE ORAL
Status: DISCONTINUED | OUTPATIENT
Start: 2021-01-01 | End: 2021-01-01

## 2021-01-01 RX ORDER — LORAZEPAM 0.5 MG/1
0.5 TABLET ORAL EVERY 8 HOURS PRN
Status: DISCONTINUED | OUTPATIENT
Start: 2021-01-01 | End: 2021-01-01

## 2021-01-01 RX ORDER — HEPARIN SODIUM 5000 [USP'U]/ML
5000 INJECTION, SOLUTION INTRAVENOUS; SUBCUTANEOUS EVERY 8 HOURS SCHEDULED
Status: DISCONTINUED | OUTPATIENT
Start: 2021-01-01 | End: 2021-01-01

## 2021-01-01 RX ORDER — ONDANSETRON 4 MG/1
4 TABLET, ORALLY DISINTEGRATING ORAL EVERY 6 HOURS PRN
Status: CANCELLED | OUTPATIENT
Start: 2021-01-01

## 2021-01-01 RX ORDER — SODIUM BICARBONATE 650 MG/1
650 TABLET ORAL
Status: DISCONTINUED | OUTPATIENT
Start: 2021-01-01 | End: 2021-01-01 | Stop reason: HOSPADM

## 2021-01-01 RX ORDER — POLYETHYLENE GLYCOL 3350 17 G/17G
17 POWDER, FOR SOLUTION ORAL DAILY PRN
Status: DISCONTINUED | OUTPATIENT
Start: 2021-01-01 | End: 2021-01-01 | Stop reason: HOSPADM

## 2021-01-01 RX ORDER — ALBUTEROL SULFATE 90 UG/1
3 AEROSOL, METERED RESPIRATORY (INHALATION) ONCE AS NEEDED
Status: DISCONTINUED | OUTPATIENT
Start: 2021-01-01 | End: 2021-01-01 | Stop reason: HOSPADM

## 2021-01-01 RX ORDER — HEPARIN SODIUM 5000 [USP'U]/ML
5000 INJECTION, SOLUTION INTRAVENOUS; SUBCUTANEOUS EVERY 8 HOURS SCHEDULED
Status: DISCONTINUED | OUTPATIENT
Start: 2021-01-01 | End: 2021-01-01 | Stop reason: HOSPADM

## 2021-01-01 RX ORDER — ONDANSETRON 2 MG/ML
4 INJECTION INTRAMUSCULAR; INTRAVENOUS EVERY 6 HOURS PRN
Status: DISCONTINUED | OUTPATIENT
Start: 2021-01-01 | End: 2021-01-01 | Stop reason: HOSPADM

## 2021-01-01 RX ORDER — LOPERAMIDE HYDROCHLORIDE 2 MG/1
4 CAPSULE ORAL 4 TIMES DAILY PRN
Status: DISCONTINUED | OUTPATIENT
Start: 2021-01-01 | End: 2021-01-01 | Stop reason: HOSPADM

## 2021-01-01 RX ORDER — BISACODYL 10 MG
10 SUPPOSITORY, RECTAL RECTAL DAILY PRN
Status: CANCELLED | OUTPATIENT
Start: 2021-01-01

## 2021-01-01 RX ORDER — LACTULOSE 20 G/30ML
20 SOLUTION ORAL ONCE
Status: COMPLETED | OUTPATIENT
Start: 2021-01-01 | End: 2021-01-01

## 2021-01-01 RX ORDER — DOCUSATE SODIUM 100 MG/1
100 CAPSULE, LIQUID FILLED ORAL 2 TIMES DAILY
Status: CANCELLED | OUTPATIENT
Start: 2021-01-01

## 2021-01-01 RX ORDER — LOPERAMIDE HYDROCHLORIDE 2 MG/1
2 CAPSULE ORAL 4 TIMES DAILY PRN
Status: DISCONTINUED | OUTPATIENT
Start: 2021-01-01 | End: 2021-01-01

## 2021-01-01 RX ORDER — ACETAMINOPHEN 325 MG/1
650 TABLET ORAL EVERY 6 HOURS PRN
Status: DISCONTINUED | OUTPATIENT
Start: 2021-01-01 | End: 2021-01-01 | Stop reason: HOSPADM

## 2021-01-01 RX ORDER — ASPIRIN 81 MG/1
81 TABLET, CHEWABLE ORAL DAILY
Status: CANCELLED | OUTPATIENT
Start: 2021-01-01

## 2021-01-01 RX ORDER — SODIUM CHLORIDE 9 MG/ML
20 INJECTION, SOLUTION INTRAVENOUS ONCE
Status: COMPLETED | OUTPATIENT
Start: 2021-01-01 | End: 2021-01-01

## 2021-01-01 RX ORDER — LORAZEPAM 2 MG/ML
1 INJECTION INTRAMUSCULAR
Status: DISCONTINUED | OUTPATIENT
Start: 2021-01-01 | End: 2021-01-01 | Stop reason: HOSPADM

## 2021-01-01 RX ORDER — INSULIN GLARGINE 100 [IU]/ML
6 INJECTION, SOLUTION SUBCUTANEOUS
Status: DISCONTINUED | OUTPATIENT
Start: 2021-01-01 | End: 2021-01-01

## 2021-01-01 RX ORDER — ATORVASTATIN CALCIUM 10 MG/1
5 TABLET, FILM COATED ORAL DAILY
Status: CANCELLED | OUTPATIENT
Start: 2021-01-01

## 2021-01-01 RX ORDER — INSULIN GLARGINE 100 [IU]/ML
6 INJECTION, SOLUTION SUBCUTANEOUS
Status: CANCELLED | OUTPATIENT
Start: 2021-01-01

## 2021-01-01 RX ORDER — LABETALOL 20 MG/4 ML (5 MG/ML) INTRAVENOUS SYRINGE
10 EVERY 6 HOURS PRN
Status: DISCONTINUED | OUTPATIENT
Start: 2021-01-01 | End: 2021-01-01 | Stop reason: HOSPADM

## 2021-01-01 RX ORDER — TAMSULOSIN HYDROCHLORIDE 0.4 MG/1
0.4 CAPSULE ORAL
Status: CANCELLED | OUTPATIENT
Start: 2021-01-01

## 2021-01-01 RX ORDER — BUMETANIDE 0.25 MG/ML
1 INJECTION, SOLUTION INTRAMUSCULAR; INTRAVENOUS EVERY 8 HOURS
Status: DISCONTINUED | OUTPATIENT
Start: 2021-01-01 | End: 2021-01-01

## 2021-01-01 RX ORDER — ALBUMIN (HUMAN) 12.5 G/50ML
25 SOLUTION INTRAVENOUS ONCE
Status: COMPLETED | OUTPATIENT
Start: 2021-01-01 | End: 2021-01-01

## 2021-01-01 RX ORDER — POLYETHYLENE GLYCOL 3350 17 G/17G
17 POWDER, FOR SOLUTION ORAL 2 TIMES DAILY
Status: DISCONTINUED | OUTPATIENT
Start: 2021-01-01 | End: 2021-01-01

## 2021-01-01 RX ORDER — LISINOPRIL 10 MG/1
10 TABLET ORAL DAILY
Status: DISCONTINUED | OUTPATIENT
Start: 2021-01-01 | End: 2021-01-01

## 2021-01-01 RX ORDER — INSULIN GLARGINE 100 [IU]/ML
8 INJECTION, SOLUTION SUBCUTANEOUS
Status: DISCONTINUED | OUTPATIENT
Start: 2021-01-01 | End: 2021-01-01

## 2021-01-01 RX ORDER — BUMETANIDE 0.25 MG/ML
1 INJECTION, SOLUTION INTRAMUSCULAR; INTRAVENOUS ONCE
Status: COMPLETED | OUTPATIENT
Start: 2021-01-01 | End: 2021-01-01

## 2021-01-01 RX ORDER — HEPARIN SODIUM 5000 [USP'U]/ML
5000 INJECTION, SOLUTION INTRAVENOUS; SUBCUTANEOUS EVERY 8 HOURS SCHEDULED
Status: CANCELLED | OUTPATIENT
Start: 2021-01-01

## 2021-01-01 RX ORDER — TAMSULOSIN HYDROCHLORIDE 0.4 MG/1
0.4 CAPSULE ORAL
Status: DISCONTINUED | OUTPATIENT
Start: 2021-01-01 | End: 2021-01-01 | Stop reason: HOSPADM

## 2021-01-01 RX ORDER — MELATONIN
4000 DAILY
Status: CANCELLED | OUTPATIENT
Start: 2021-01-01

## 2021-01-01 RX ORDER — AMLODIPINE BESYLATE 5 MG/1
5 TABLET ORAL DAILY
Qty: 30 TABLET | Refills: 5 | Status: SHIPPED | OUTPATIENT
Start: 2021-01-01 | End: 2021-01-01 | Stop reason: HOSPADM

## 2021-01-01 RX ORDER — INSULIN GLARGINE 100 [IU]/ML
10 INJECTION, SOLUTION SUBCUTANEOUS
Status: DISCONTINUED | OUTPATIENT
Start: 2021-01-01 | End: 2021-01-01

## 2021-01-01 RX ORDER — FERROUS SULFATE 325(65) MG
325 TABLET ORAL
Status: DISCONTINUED | OUTPATIENT
Start: 2021-01-01 | End: 2021-01-01 | Stop reason: HOSPADM

## 2021-01-01 RX ORDER — POTASSIUM CHLORIDE 20 MEQ/1
40 TABLET, EXTENDED RELEASE ORAL ONCE
Status: COMPLETED | OUTPATIENT
Start: 2021-01-01 | End: 2021-01-01

## 2021-01-01 RX ORDER — AZITHROMYCIN 500 MG/1
500 TABLET, FILM COATED ORAL EVERY 24 HOURS
Status: DISCONTINUED | OUTPATIENT
Start: 2021-01-01 | End: 2021-01-01

## 2021-01-01 RX ORDER — FUROSEMIDE 10 MG/ML
20 INJECTION INTRAMUSCULAR; INTRAVENOUS ONCE
Status: COMPLETED | OUTPATIENT
Start: 2021-01-01 | End: 2021-01-01

## 2021-01-01 RX ORDER — INSULIN GLARGINE 100 [IU]/ML
5 INJECTION, SOLUTION SUBCUTANEOUS
Status: DISCONTINUED | OUTPATIENT
Start: 2021-01-01 | End: 2021-01-01

## 2021-01-01 RX ORDER — DEXTROSE MONOHYDRATE 25 G/50ML
INJECTION, SOLUTION INTRAVENOUS
Status: COMPLETED
Start: 2021-01-01 | End: 2021-01-01

## 2021-01-01 RX ORDER — SODIUM CHLORIDE 9 MG/ML
3 INJECTION INTRAVENOUS
Status: CANCELLED | OUTPATIENT
Start: 2021-01-01

## 2021-01-01 RX ORDER — BUMETANIDE 0.25 MG/ML
2 INJECTION, SOLUTION INTRAMUSCULAR; INTRAVENOUS EVERY 8 HOURS
Status: DISCONTINUED | OUTPATIENT
Start: 2021-01-01 | End: 2021-01-01

## 2021-01-01 RX ORDER — SODIUM CHLORIDE 1000 MG
1 TABLET, SOLUBLE MISCELLANEOUS 2 TIMES DAILY WITH MEALS
Status: DISCONTINUED | OUTPATIENT
Start: 2021-01-01 | End: 2021-01-01

## 2021-01-01 RX ORDER — BISACODYL 10 MG
10 SUPPOSITORY, RECTAL RECTAL DAILY PRN
Status: DISCONTINUED | OUTPATIENT
Start: 2021-01-01 | End: 2021-01-01 | Stop reason: HOSPADM

## 2021-01-01 RX ORDER — CEPHALEXIN 500 MG/1
500 CAPSULE ORAL EVERY 12 HOURS SCHEDULED
Qty: 8 CAPSULE | Refills: 0 | Status: CANCELLED | OUTPATIENT
Start: 2021-01-01 | End: 2021-01-01

## 2021-01-01 RX ORDER — BUMETANIDE 0.25 MG/ML
2 INJECTION, SOLUTION INTRAMUSCULAR; INTRAVENOUS 2 TIMES DAILY
Status: DISCONTINUED | OUTPATIENT
Start: 2021-01-01 | End: 2021-01-01

## 2021-01-01 RX ORDER — MELATONIN
4000 DAILY
Status: DISCONTINUED | OUTPATIENT
Start: 2021-01-01 | End: 2021-01-01

## 2021-01-01 RX ORDER — LEVOTHYROXINE SODIUM 0.12 MG/1
125 TABLET ORAL
Status: CANCELLED | OUTPATIENT
Start: 2021-01-01

## 2021-01-01 RX ORDER — INSULIN GLARGINE 100 [IU]/ML
7 INJECTION, SOLUTION SUBCUTANEOUS
Status: DISCONTINUED | OUTPATIENT
Start: 2021-01-01 | End: 2021-01-01

## 2021-01-01 RX ORDER — CEPHALEXIN 500 MG/1
500 CAPSULE ORAL EVERY 12 HOURS SCHEDULED
Status: DISCONTINUED | OUTPATIENT
Start: 2021-01-01 | End: 2021-01-01

## 2021-01-01 RX ORDER — DEXTROSE MONOHYDRATE 25 G/50ML
25 INJECTION, SOLUTION INTRAVENOUS ONCE
Status: COMPLETED | OUTPATIENT
Start: 2021-01-01 | End: 2021-01-01

## 2021-01-01 RX ORDER — POTASSIUM CHLORIDE 20 MEQ/1
40 TABLET, EXTENDED RELEASE ORAL EVERY 6 HOURS
Status: DISCONTINUED | OUTPATIENT
Start: 2021-01-01 | End: 2021-01-01

## 2021-01-01 RX ORDER — SODIUM CHLORIDE 1000 MG
1 TABLET, SOLUBLE MISCELLANEOUS
Status: CANCELLED | OUTPATIENT
Start: 2021-01-01

## 2021-01-01 RX ORDER — SODIUM BICARBONATE 650 MG/1
650 TABLET ORAL
Status: DISCONTINUED | OUTPATIENT
Start: 2021-01-01 | End: 2021-01-01

## 2021-01-01 RX ORDER — ALPRAZOLAM 0.25 MG/1
0.25 TABLET ORAL 3 TIMES DAILY PRN
Status: DISCONTINUED | OUTPATIENT
Start: 2021-01-01 | End: 2021-01-01

## 2021-01-01 RX ORDER — TAMSULOSIN HYDROCHLORIDE 0.4 MG/1
0.4 CAPSULE ORAL
Status: DISCONTINUED | OUTPATIENT
Start: 2021-01-01 | End: 2021-01-01

## 2021-01-01 RX ORDER — HYDROMORPHONE HCL/PF 1 MG/ML
0.5 SYRINGE (ML) INJECTION
Status: DISCONTINUED | OUTPATIENT
Start: 2021-01-01 | End: 2021-01-01 | Stop reason: HOSPADM

## 2021-01-01 RX ORDER — EZETIMIBE 10 MG/1
5 TABLET ORAL
Status: DISCONTINUED | OUTPATIENT
Start: 2021-01-01 | End: 2021-01-01 | Stop reason: HOSPADM

## 2021-01-01 RX ORDER — LABETALOL 20 MG/4 ML (5 MG/ML) INTRAVENOUS SYRINGE
10 EVERY 6 HOURS PRN
Status: CANCELLED | OUTPATIENT
Start: 2021-01-01

## 2021-01-01 RX ORDER — ALBUTEROL SULFATE 90 UG/1
3 AEROSOL, METERED RESPIRATORY (INHALATION) ONCE AS NEEDED
Status: CANCELLED | OUTPATIENT
Start: 2021-01-01

## 2021-01-01 RX ORDER — LISINOPRIL 2.5 MG/1
2.5 TABLET ORAL DAILY
Status: DISCONTINUED | OUTPATIENT
Start: 2021-01-01 | End: 2021-01-01

## 2021-01-01 RX ORDER — LANOLIN ALCOHOL/MO/W.PET/CERES
3 CREAM (GRAM) TOPICAL
Status: DISCONTINUED | OUTPATIENT
Start: 2021-01-01 | End: 2021-01-01

## 2021-01-01 RX ORDER — KETAMINE HYDROCHLORIDE 50 MG/ML
INJECTION, SOLUTION, CONCENTRATE INTRAMUSCULAR; INTRAVENOUS AS NEEDED
Status: DISCONTINUED | OUTPATIENT
Start: 2021-01-01 | End: 2021-01-01

## 2021-01-01 RX ORDER — DOXYCYCLINE HYCLATE 100 MG/1
100 CAPSULE ORAL EVERY 12 HOURS SCHEDULED
Qty: 9 CAPSULE | Refills: 0 | Status: SHIPPED | OUTPATIENT
Start: 2021-01-01 | End: 2021-01-01

## 2021-01-01 RX ORDER — MELATONIN
2000 DAILY
Status: DISCONTINUED | OUTPATIENT
Start: 2021-01-01 | End: 2021-01-01 | Stop reason: HOSPADM

## 2021-01-01 RX ORDER — ASPIRIN 81 MG/1
81 TABLET, CHEWABLE ORAL DAILY
Status: DISCONTINUED | OUTPATIENT
Start: 2021-01-01 | End: 2021-01-01

## 2021-01-01 RX ORDER — PANTOPRAZOLE SODIUM 40 MG/1
40 TABLET, DELAYED RELEASE ORAL
Status: CANCELLED | OUTPATIENT
Start: 2021-01-01

## 2021-01-01 RX ORDER — FUROSEMIDE 10 MG/ML
40 INJECTION INTRAMUSCULAR; INTRAVENOUS
Status: DISCONTINUED | OUTPATIENT
Start: 2021-01-01 | End: 2021-01-01

## 2021-01-01 RX ORDER — FUROSEMIDE 10 MG/ML
80 INJECTION INTRAMUSCULAR; INTRAVENOUS ONCE
Status: COMPLETED | OUTPATIENT
Start: 2021-01-01 | End: 2021-01-01

## 2021-01-01 RX ORDER — SODIUM CHLORIDE 9 MG/ML
100 INJECTION, SOLUTION INTRAVENOUS CONTINUOUS
Status: DISCONTINUED | OUTPATIENT
Start: 2021-01-01 | End: 2021-01-01

## 2021-01-01 RX ORDER — DEXAMETHASONE SODIUM PHOSPHATE 4 MG/ML
6 INJECTION, SOLUTION INTRA-ARTICULAR; INTRALESIONAL; INTRAMUSCULAR; INTRAVENOUS; SOFT TISSUE EVERY 24 HOURS
Status: DISCONTINUED | OUTPATIENT
Start: 2021-01-01 | End: 2021-01-01 | Stop reason: HOSPADM

## 2021-01-01 RX ORDER — TORSEMIDE 5 MG/1
5 TABLET ORAL DAILY
Status: DISCONTINUED | OUTPATIENT
Start: 2021-01-01 | End: 2021-01-01 | Stop reason: HOSPADM

## 2021-01-01 RX ORDER — FERROUS SULFATE 325(65) MG
325 TABLET ORAL
Status: CANCELLED | OUTPATIENT
Start: 2021-01-01

## 2021-01-01 RX ORDER — FAMOTIDINE 20 MG/1
20 TABLET, FILM COATED ORAL DAILY
Status: DISCONTINUED | OUTPATIENT
Start: 2021-01-01 | End: 2021-01-01 | Stop reason: HOSPADM

## 2021-01-01 RX ORDER — VANCOMYCIN HYDROCHLORIDE 1 G/200ML
15 INJECTION, SOLUTION INTRAVENOUS DAILY PRN
Status: DISCONTINUED | OUTPATIENT
Start: 2021-01-01 | End: 2021-01-01

## 2021-01-01 RX ORDER — AMLODIPINE BESYLATE 5 MG/1
5 TABLET ORAL DAILY
Status: DISCONTINUED | OUTPATIENT
Start: 2021-01-01 | End: 2021-01-01 | Stop reason: HOSPADM

## 2021-01-01 RX ORDER — ONDANSETRON 2 MG/ML
4 INJECTION INTRAMUSCULAR; INTRAVENOUS EVERY 6 HOURS PRN
Status: CANCELLED | OUTPATIENT
Start: 2021-01-01

## 2021-01-01 RX ORDER — SODIUM BICARBONATE 650 MG/1
650 TABLET ORAL
Status: CANCELLED | OUTPATIENT
Start: 2021-01-01

## 2021-01-01 RX ORDER — ACETAMINOPHEN 325 MG/1
650 TABLET ORAL ONCE AS NEEDED
Status: CANCELLED | OUTPATIENT
Start: 2021-01-01

## 2021-01-01 RX ORDER — POTASSIUM CHLORIDE 20 MEQ/1
20 TABLET, EXTENDED RELEASE ORAL ONCE
Status: COMPLETED | OUTPATIENT
Start: 2021-01-01 | End: 2021-01-01

## 2021-01-01 RX ORDER — LISINOPRIL 2.5 MG/1
2.5 TABLET ORAL DAILY
Status: CANCELLED | OUTPATIENT
Start: 2021-01-01

## 2021-01-01 RX ORDER — BUMETANIDE 0.25 MG/ML
1 INJECTION INTRAMUSCULAR; INTRAVENOUS CONTINUOUS
Status: DISCONTINUED | OUTPATIENT
Start: 2021-01-01 | End: 2021-01-01

## 2021-01-01 RX ORDER — DEXTROSE MONOHYDRATE 25 G/50ML
INJECTION, SOLUTION INTRAVENOUS
Status: DISPENSED
Start: 2021-01-01 | End: 2021-01-01

## 2021-01-01 RX ORDER — DOCUSATE SODIUM 100 MG/1
100 CAPSULE, LIQUID FILLED ORAL 2 TIMES DAILY
Status: DISCONTINUED | OUTPATIENT
Start: 2021-01-01 | End: 2021-01-01 | Stop reason: HOSPADM

## 2021-01-01 RX ORDER — SODIUM CHLORIDE 1000 MG
1 TABLET, SOLUBLE MISCELLANEOUS
Status: DISCONTINUED | OUTPATIENT
Start: 2021-01-01 | End: 2021-01-01

## 2021-01-01 RX ORDER — SODIUM CHLORIDE, SODIUM GLUCONATE, SODIUM ACETATE, POTASSIUM CHLORIDE, MAGNESIUM CHLORIDE, SODIUM PHOSPHATE, DIBASIC, AND POTASSIUM PHOSPHATE .53; .5; .37; .037; .03; .012; .00082 G/100ML; G/100ML; G/100ML; G/100ML; G/100ML; G/100ML; G/100ML
500 INJECTION, SOLUTION INTRAVENOUS ONCE
Status: COMPLETED | OUTPATIENT
Start: 2021-01-01 | End: 2021-01-01

## 2021-01-01 RX ORDER — ZINC SULFATE 50(220)MG
220 CAPSULE ORAL DAILY
Qty: 4 CAPSULE | Refills: 0 | Status: SHIPPED | OUTPATIENT
Start: 2021-01-01 | End: 2021-01-01 | Stop reason: HOSPADM

## 2021-01-01 RX ORDER — ONDANSETRON 4 MG/1
4 TABLET, ORALLY DISINTEGRATING ORAL EVERY 6 HOURS PRN
Status: DISCONTINUED | OUTPATIENT
Start: 2021-01-01 | End: 2021-01-01

## 2021-01-01 RX ORDER — POLYETHYLENE GLYCOL 3350 17 G/17G
17 POWDER, FOR SOLUTION ORAL DAILY PRN
Status: CANCELLED | OUTPATIENT
Start: 2021-01-01

## 2021-01-01 RX ORDER — FUROSEMIDE 40 MG/1
40 TABLET ORAL ONCE
Status: COMPLETED | OUTPATIENT
Start: 2021-01-01 | End: 2021-01-01

## 2021-01-01 RX ORDER — SODIUM CHLORIDE 9 MG/ML
INJECTION, SOLUTION INTRAVENOUS CONTINUOUS PRN
Status: DISCONTINUED | OUTPATIENT
Start: 2021-01-01 | End: 2021-01-01

## 2021-01-01 RX ORDER — LOPERAMIDE HYDROCHLORIDE 2 MG/1
4 CAPSULE ORAL 4 TIMES DAILY PRN
Status: DISCONTINUED | OUTPATIENT
Start: 2021-01-01 | End: 2021-01-01

## 2021-01-01 RX ORDER — AZITHROMYCIN 250 MG/1
250 TABLET, FILM COATED ORAL EVERY 24 HOURS
Status: DISCONTINUED | OUTPATIENT
Start: 2021-01-01 | End: 2021-01-01

## 2021-01-01 RX ORDER — LIDOCAINE HYDROCHLORIDE 10 MG/ML
INJECTION, SOLUTION EPIDURAL; INFILTRATION; INTRACAUDAL; PERINEURAL AS NEEDED
Status: DISCONTINUED | OUTPATIENT
Start: 2021-01-01 | End: 2021-01-01

## 2021-01-01 RX ORDER — INSULIN GLARGINE 100 [IU]/ML
5 INJECTION, SOLUTION SUBCUTANEOUS EVERY MORNING
Status: DISCONTINUED | OUTPATIENT
Start: 2021-01-01 | End: 2021-01-01

## 2021-01-01 RX ORDER — TORSEMIDE 5 MG/1
5 TABLET ORAL DAILY
Status: CANCELLED | OUTPATIENT
Start: 2021-01-01

## 2021-01-01 RX ORDER — ACETAMINOPHEN 325 MG/1
650 TABLET ORAL ONCE AS NEEDED
Status: DISCONTINUED | OUTPATIENT
Start: 2021-01-01 | End: 2021-01-01 | Stop reason: HOSPADM

## 2021-01-01 RX ORDER — SODIUM CHLORIDE 9 MG/ML
3 INJECTION INTRAVENOUS
Status: DISCONTINUED | OUTPATIENT
Start: 2021-01-01 | End: 2021-01-01 | Stop reason: HOSPADM

## 2021-01-01 RX ORDER — ZINC SULFATE 50(220)MG
220 CAPSULE ORAL DAILY
Status: DISCONTINUED | OUTPATIENT
Start: 2021-01-01 | End: 2021-01-01 | Stop reason: HOSPADM

## 2021-01-01 RX ORDER — LORAZEPAM 0.5 MG/1
0.25 TABLET ORAL ONCE
Status: COMPLETED | OUTPATIENT
Start: 2021-01-01 | End: 2021-01-01

## 2021-01-01 RX ORDER — AZITHROMYCIN 500 MG/1
500 TABLET, FILM COATED ORAL ONCE
Status: COMPLETED | OUTPATIENT
Start: 2021-01-01 | End: 2021-01-01

## 2021-01-01 RX ORDER — DOXYCYCLINE HYCLATE 100 MG/1
100 CAPSULE ORAL EVERY 12 HOURS SCHEDULED
Status: DISCONTINUED | OUTPATIENT
Start: 2021-01-01 | End: 2021-01-01 | Stop reason: HOSPADM

## 2021-01-01 RX ORDER — TORSEMIDE 5 MG/1
5 TABLET ORAL DAILY
Status: DISCONTINUED | OUTPATIENT
Start: 2021-01-01 | End: 2021-01-01

## 2021-01-01 RX ADMIN — VANCOMYCIN HYDROCHLORIDE 750 MG: 750 INJECTION, SOLUTION INTRAVENOUS at 10:27

## 2021-01-01 RX ADMIN — DOCUSATE SODIUM 100 MG: 100 CAPSULE, LIQUID FILLED ORAL at 09:57

## 2021-01-01 RX ADMIN — INSULIN LISPRO 3 UNITS: 100 INJECTION, SOLUTION INTRAVENOUS; SUBCUTANEOUS at 07:38

## 2021-01-01 RX ADMIN — POTASSIUM CHLORIDE 40 MEQ: 1500 TABLET, EXTENDED RELEASE ORAL at 11:45

## 2021-01-01 RX ADMIN — BUMETANIDE 2 MG: 0.25 INJECTION, SOLUTION INTRAMUSCULAR; INTRAVENOUS at 05:24

## 2021-01-01 RX ADMIN — Medication 1 G: at 11:13

## 2021-01-01 RX ADMIN — CEFEPIME HYDROCHLORIDE 1000 MG: 2 INJECTION, POWDER, FOR SOLUTION INTRAVENOUS at 10:11

## 2021-01-01 RX ADMIN — AMLODIPINE BESYLATE 5 MG: 5 TABLET ORAL at 08:36

## 2021-01-01 RX ADMIN — VANCOMYCIN HYDROCHLORIDE 750 MG: 750 INJECTION, SOLUTION INTRAVENOUS at 11:31

## 2021-01-01 RX ADMIN — INSULIN LISPRO 1 UNITS: 100 INJECTION, SOLUTION INTRAVENOUS; SUBCUTANEOUS at 17:56

## 2021-01-01 RX ADMIN — Medication 2000 UNITS: at 09:33

## 2021-01-01 RX ADMIN — TAMSULOSIN HYDROCHLORIDE 0.4 MG: 0.4 CAPSULE ORAL at 16:09

## 2021-01-01 RX ADMIN — SODIUM BICARBONATE 650 MG TABLET 650 MG: at 17:05

## 2021-01-01 RX ADMIN — INSULIN LISPRO 1 UNITS: 100 INJECTION, SOLUTION INTRAVENOUS; SUBCUTANEOUS at 22:46

## 2021-01-01 RX ADMIN — Medication 4000 UNITS: at 09:09

## 2021-01-01 RX ADMIN — EZETIMIBE 5 MG: 10 TABLET ORAL at 23:27

## 2021-01-01 RX ADMIN — LORAZEPAM 0.25 MG: 2 INJECTION INTRAMUSCULAR; INTRAVENOUS at 02:55

## 2021-01-01 RX ADMIN — FERROUS SULFATE TAB 325 MG (65 MG ELEMENTAL FE) 325 MG: 325 (65 FE) TAB at 08:26

## 2021-01-01 RX ADMIN — INSULIN LISPRO 2 UNITS: 100 INJECTION, SOLUTION INTRAVENOUS; SUBCUTANEOUS at 08:26

## 2021-01-01 RX ADMIN — Medication 1 G: at 09:03

## 2021-01-01 RX ADMIN — HEPARIN SODIUM 5000 UNITS: 5000 INJECTION INTRAVENOUS; SUBCUTANEOUS at 06:37

## 2021-01-01 RX ADMIN — SODIUM BICARBONATE 650 MG TABLET 650 MG: at 16:47

## 2021-01-01 RX ADMIN — INSULIN LISPRO 3 UNITS: 100 INJECTION, SOLUTION INTRAVENOUS; SUBCUTANEOUS at 18:46

## 2021-01-01 RX ADMIN — LEVOTHYROXINE SODIUM 125 MCG: 125 TABLET ORAL at 06:23

## 2021-01-01 RX ADMIN — SODIUM CHLORIDE 1000 ML: 0.9 INJECTION, SOLUTION INTRAVENOUS at 14:38

## 2021-01-01 RX ADMIN — CEPHALEXIN 500 MG: 500 CAPSULE ORAL at 09:25

## 2021-01-01 RX ADMIN — TORSEMIDE 5 MG: 5 TABLET ORAL at 10:10

## 2021-01-01 RX ADMIN — CEPHALEXIN 500 MG: 500 CAPSULE ORAL at 08:26

## 2021-01-01 RX ADMIN — LEVOTHYROXINE SODIUM 125 MCG: 125 TABLET ORAL at 05:23

## 2021-01-01 RX ADMIN — FERROUS SULFATE TAB 325 MG (65 MG ELEMENTAL FE) 325 MG: 325 (65 FE) TAB at 08:31

## 2021-01-01 RX ADMIN — INSULIN LISPRO 2 UNITS: 100 INJECTION, SOLUTION INTRAVENOUS; SUBCUTANEOUS at 22:17

## 2021-01-01 RX ADMIN — LISINOPRIL 2.5 MG: 2.5 TABLET ORAL at 09:35

## 2021-01-01 RX ADMIN — ATORVASTATIN CALCIUM 5 MG: 10 TABLET, FILM COATED ORAL at 09:16

## 2021-01-01 RX ADMIN — LORAZEPAM 0.25 MG: 0.5 TABLET ORAL at 06:38

## 2021-01-01 RX ADMIN — CEFEPIME HYDROCHLORIDE 1000 MG: 2 INJECTION, POWDER, FOR SOLUTION INTRAVENOUS at 21:30

## 2021-01-01 RX ADMIN — PANTOPRAZOLE SODIUM 40 MG: 40 TABLET, DELAYED RELEASE ORAL at 06:38

## 2021-01-01 RX ADMIN — DULOXETINE 30 MG: 30 CAPSULE, DELAYED RELEASE ORAL at 10:32

## 2021-01-01 RX ADMIN — HEPARIN SODIUM 5000 UNITS: 5000 INJECTION INTRAVENOUS; SUBCUTANEOUS at 13:11

## 2021-01-01 RX ADMIN — DOCUSATE SODIUM 100 MG: 100 CAPSULE, LIQUID FILLED ORAL at 16:24

## 2021-01-01 RX ADMIN — ASPIRIN 81 MG: 81 TABLET, CHEWABLE ORAL at 08:31

## 2021-01-01 RX ADMIN — INSULIN LISPRO 2 UNITS: 100 INJECTION, SOLUTION INTRAVENOUS; SUBCUTANEOUS at 08:58

## 2021-01-01 RX ADMIN — HEPARIN SODIUM 5000 UNITS: 5000 INJECTION INTRAVENOUS; SUBCUTANEOUS at 13:24

## 2021-01-01 RX ADMIN — LIDOCAINE HYDROCHLORIDE 100 MG: 10 INJECTION, SOLUTION EPIDURAL; INFILTRATION; INTRACAUDAL; PERINEURAL at 12:24

## 2021-01-01 RX ADMIN — FAMOTIDINE 20 MG: 20 TABLET ORAL at 09:32

## 2021-01-01 RX ADMIN — LORAZEPAM 1 MG: 2 INJECTION INTRAMUSCULAR; INTRAVENOUS at 23:45

## 2021-01-01 RX ADMIN — INSULIN LISPRO 2 UNITS: 100 INJECTION, SOLUTION INTRAVENOUS; SUBCUTANEOUS at 08:57

## 2021-01-01 RX ADMIN — Medication 4000 UNITS: at 09:16

## 2021-01-01 RX ADMIN — ASPIRIN 81 MG: 81 TABLET, CHEWABLE ORAL at 08:02

## 2021-01-01 RX ADMIN — INSULIN LISPRO 1 UNITS: 100 INJECTION, SOLUTION INTRAVENOUS; SUBCUTANEOUS at 09:32

## 2021-01-01 RX ADMIN — INSULIN LISPRO 2 UNITS: 100 INJECTION, SOLUTION INTRAVENOUS; SUBCUTANEOUS at 17:30

## 2021-01-01 RX ADMIN — ACETAMINOPHEN 650 MG: 325 TABLET, FILM COATED ORAL at 23:16

## 2021-01-01 RX ADMIN — INSULIN GLARGINE 6 UNITS: 100 INJECTION, SOLUTION SUBCUTANEOUS at 21:26

## 2021-01-01 RX ADMIN — Medication 4000 UNITS: at 10:02

## 2021-01-01 RX ADMIN — CEFEPIME HYDROCHLORIDE 1000 MG: 2 INJECTION, POWDER, FOR SOLUTION INTRAVENOUS at 21:23

## 2021-01-01 RX ADMIN — TAMSULOSIN HYDROCHLORIDE 0.4 MG: 0.4 CAPSULE ORAL at 16:44

## 2021-01-01 RX ADMIN — ATORVASTATIN CALCIUM 5 MG: 10 TABLET, FILM COATED ORAL at 10:02

## 2021-01-01 RX ADMIN — SODIUM CHLORIDE 100 ML/HR: 0.9 INJECTION, SOLUTION INTRAVENOUS at 11:48

## 2021-01-01 RX ADMIN — INSULIN LISPRO 2 UNITS: 100 INJECTION, SOLUTION INTRAVENOUS; SUBCUTANEOUS at 23:01

## 2021-01-01 RX ADMIN — FERROUS SULFATE TAB 325 MG (65 MG ELEMENTAL FE) 325 MG: 325 (65 FE) TAB at 09:09

## 2021-01-01 RX ADMIN — AMLODIPINE BESYLATE 5 MG: 5 TABLET ORAL at 08:10

## 2021-01-01 RX ADMIN — REMDESIVIR 100 MG: 100 INJECTION, POWDER, LYOPHILIZED, FOR SOLUTION INTRAVENOUS at 20:09

## 2021-01-01 RX ADMIN — REMDESIVIR 100 MG: 100 INJECTION, POWDER, LYOPHILIZED, FOR SOLUTION INTRAVENOUS at 19:58

## 2021-01-01 RX ADMIN — INSULIN GLARGINE 5 UNITS: 100 INJECTION, SOLUTION SUBCUTANEOUS at 15:37

## 2021-01-01 RX ADMIN — Medication 1 G: at 18:47

## 2021-01-01 RX ADMIN — FUROSEMIDE 80 MG: 10 INJECTION, SOLUTION INTRAVENOUS at 16:43

## 2021-01-01 RX ADMIN — LEVOTHYROXINE SODIUM 125 MCG: 125 TABLET ORAL at 06:37

## 2021-01-01 RX ADMIN — HEPARIN SODIUM 5000 UNITS: 5000 INJECTION INTRAVENOUS; SUBCUTANEOUS at 21:53

## 2021-01-01 RX ADMIN — AMLODIPINE BESYLATE 5 MG: 5 TABLET ORAL at 09:45

## 2021-01-01 RX ADMIN — HEPARIN SODIUM 5000 UNITS: 5000 INJECTION INTRAVENOUS; SUBCUTANEOUS at 13:13

## 2021-01-01 RX ADMIN — PANTOPRAZOLE SODIUM 40 MG: 40 TABLET, DELAYED RELEASE ORAL at 06:05

## 2021-01-01 RX ADMIN — ASPIRIN 81 MG: 81 TABLET, CHEWABLE ORAL at 09:14

## 2021-01-01 RX ADMIN — SODIUM BICARBONATE 650 MG TABLET 650 MG: at 08:31

## 2021-01-01 RX ADMIN — HEPARIN SODIUM 5000 UNITS: 5000 INJECTION INTRAVENOUS; SUBCUTANEOUS at 05:08

## 2021-01-01 RX ADMIN — INSULIN LISPRO 1 UNITS: 100 INJECTION, SOLUTION INTRAVENOUS; SUBCUTANEOUS at 16:41

## 2021-01-01 RX ADMIN — ATORVASTATIN CALCIUM 5 MG: 10 TABLET, FILM COATED ORAL at 09:10

## 2021-01-01 RX ADMIN — HEPARIN SODIUM 5000 UNITS: 5000 INJECTION INTRAVENOUS; SUBCUTANEOUS at 21:23

## 2021-01-01 RX ADMIN — HEPARIN SODIUM 5000 UNITS: 5000 INJECTION INTRAVENOUS; SUBCUTANEOUS at 21:29

## 2021-01-01 RX ADMIN — ASPIRIN 81 MG: 81 TABLET, CHEWABLE ORAL at 10:03

## 2021-01-01 RX ADMIN — BUMETANIDE 2 MG: 0.25 INJECTION, SOLUTION INTRAMUSCULAR; INTRAVENOUS at 21:31

## 2021-01-01 RX ADMIN — ASPIRIN 81 MG: 81 TABLET, CHEWABLE ORAL at 09:31

## 2021-01-01 RX ADMIN — TAMSULOSIN HYDROCHLORIDE 0.4 MG: 0.4 CAPSULE ORAL at 18:20

## 2021-01-01 RX ADMIN — ALBUMIN (HUMAN) 25 G: 0.25 INJECTION, SOLUTION INTRAVENOUS at 17:29

## 2021-01-01 RX ADMIN — CEFEPIME HYDROCHLORIDE 1000 MG: 2 INJECTION, POWDER, FOR SOLUTION INTRAVENOUS at 23:43

## 2021-01-01 RX ADMIN — CEPHALEXIN 500 MG: 500 CAPSULE ORAL at 21:10

## 2021-01-01 RX ADMIN — PANTOPRAZOLE SODIUM 40 MG: 40 TABLET, DELAYED RELEASE ORAL at 05:23

## 2021-01-01 RX ADMIN — OXYCODONE HYDROCHLORIDE AND ACETAMINOPHEN 1000 MG: 500 TABLET ORAL at 20:09

## 2021-01-01 RX ADMIN — LORAZEPAM 0.5 MG: 0.5 TABLET ORAL at 21:53

## 2021-01-01 RX ADMIN — HEPARIN SODIUM 5000 UNITS: 5000 INJECTION INTRAVENOUS; SUBCUTANEOUS at 21:32

## 2021-01-01 RX ADMIN — ASPIRIN 81 MG: 81 TABLET, CHEWABLE ORAL at 08:26

## 2021-01-01 RX ADMIN — BUMETANIDE 1 MG: 0.25 INJECTION, SOLUTION INTRAMUSCULAR; INTRAVENOUS at 21:27

## 2021-01-01 RX ADMIN — FERROUS SULFATE TAB 325 MG (65 MG ELEMENTAL FE) 325 MG: 325 (65 FE) TAB at 10:02

## 2021-01-01 RX ADMIN — CEFTRIAXONE 1000 MG: 10 INJECTION, POWDER, FOR SOLUTION INTRAVENOUS at 12:28

## 2021-01-01 RX ADMIN — TAMSULOSIN HYDROCHLORIDE 0.4 MG: 0.4 CAPSULE ORAL at 17:12

## 2021-01-01 RX ADMIN — FUROSEMIDE 80 MG: 10 INJECTION, SOLUTION INTRAVENOUS at 17:06

## 2021-01-01 RX ADMIN — AZITHROMYCIN MONOHYDRATE 250 MG: 250 TABLET ORAL at 18:19

## 2021-01-01 RX ADMIN — POTASSIUM CHLORIDE 20 MEQ: 1500 TABLET, EXTENDED RELEASE ORAL at 08:07

## 2021-01-01 RX ADMIN — ASPIRIN 81 MG: 81 TABLET, CHEWABLE ORAL at 08:29

## 2021-01-01 RX ADMIN — INSULIN GLARGINE 6 UNITS: 100 INJECTION, SOLUTION SUBCUTANEOUS at 21:21

## 2021-01-01 RX ADMIN — Medication 1 G: at 12:02

## 2021-01-01 RX ADMIN — Medication 1 PACKET: at 08:26

## 2021-01-01 RX ADMIN — HEPARIN SODIUM 5000 UNITS: 5000 INJECTION INTRAVENOUS; SUBCUTANEOUS at 21:51

## 2021-01-01 RX ADMIN — HEPARIN SODIUM 5000 UNITS: 5000 INJECTION INTRAVENOUS; SUBCUTANEOUS at 05:10

## 2021-01-01 RX ADMIN — Medication 4000 UNITS: at 08:31

## 2021-01-01 RX ADMIN — Medication 4000 UNITS: at 09:35

## 2021-01-01 RX ADMIN — LEVOTHYROXINE SODIUM 125 MCG: 125 TABLET ORAL at 05:44

## 2021-01-01 RX ADMIN — PANTOPRAZOLE SODIUM 40 MG: 40 TABLET, DELAYED RELEASE ORAL at 06:44

## 2021-01-01 RX ADMIN — HEPARIN SODIUM 5000 UNITS: 5000 INJECTION INTRAVENOUS; SUBCUTANEOUS at 06:24

## 2021-01-01 RX ADMIN — HEPARIN SODIUM 5000 UNITS: 5000 INJECTION INTRAVENOUS; SUBCUTANEOUS at 14:39

## 2021-01-01 RX ADMIN — CEFTRIAXONE 1000 MG: 10 INJECTION, POWDER, FOR SOLUTION INTRAVENOUS at 12:34

## 2021-01-01 RX ADMIN — FERROUS SULFATE TAB 325 MG (65 MG ELEMENTAL FE) 325 MG: 325 (65 FE) TAB at 08:30

## 2021-01-01 RX ADMIN — DOCUSATE SODIUM 100 MG: 100 CAPSULE, LIQUID FILLED ORAL at 18:18

## 2021-01-01 RX ADMIN — DOCUSATE SODIUM 100 MG: 100 CAPSULE, LIQUID FILLED ORAL at 09:15

## 2021-01-01 RX ADMIN — TAMSULOSIN HYDROCHLORIDE 0.4 MG: 0.4 CAPSULE ORAL at 16:47

## 2021-01-01 RX ADMIN — SODIUM BICARBONATE 650 MG TABLET 650 MG: at 08:29

## 2021-01-01 RX ADMIN — DOCUSATE SODIUM 100 MG: 100 CAPSULE, LIQUID FILLED ORAL at 09:59

## 2021-01-01 RX ADMIN — DULOXETINE HYDROCHLORIDE 30 MG: 30 CAPSULE, DELAYED RELEASE ORAL at 08:10

## 2021-01-01 RX ADMIN — ATORVASTATIN CALCIUM 5 MG: 10 TABLET, FILM COATED ORAL at 09:36

## 2021-01-01 RX ADMIN — SODIUM CHLORIDE 100 ML/HR: 0.9 INJECTION, SOLUTION INTRAVENOUS at 01:03

## 2021-01-01 RX ADMIN — INSULIN LISPRO 3 UNITS: 100 INJECTION, SOLUTION INTRAVENOUS; SUBCUTANEOUS at 11:41

## 2021-01-01 RX ADMIN — DULOXETINE 30 MG: 30 CAPSULE, DELAYED RELEASE ORAL at 08:25

## 2021-01-01 RX ADMIN — Medication 4000 UNITS: at 08:29

## 2021-01-01 RX ADMIN — HEPARIN SODIUM 5000 UNITS: 5000 INJECTION INTRAVENOUS; SUBCUTANEOUS at 06:44

## 2021-01-01 RX ADMIN — INSULIN LISPRO 2 UNITS: 100 INJECTION, SOLUTION INTRAVENOUS; SUBCUTANEOUS at 16:28

## 2021-01-01 RX ADMIN — ATORVASTATIN CALCIUM 5 MG: 10 TABLET, FILM COATED ORAL at 09:57

## 2021-01-01 RX ADMIN — FUROSEMIDE 80 MG: 10 INJECTION, SOLUTION INTRAVENOUS at 08:27

## 2021-01-01 RX ADMIN — INSULIN GLARGINE 5 UNITS: 100 INJECTION, SOLUTION SUBCUTANEOUS at 21:24

## 2021-01-01 RX ADMIN — HEPARIN SODIUM 5000 UNITS: 5000 INJECTION INTRAVENOUS; SUBCUTANEOUS at 14:47

## 2021-01-01 RX ADMIN — Medication 4000 UNITS: at 09:14

## 2021-01-01 RX ADMIN — TORSEMIDE 5 MG: 5 TABLET ORAL at 16:47

## 2021-01-01 RX ADMIN — HEPARIN SODIUM 5000 UNITS: 5000 INJECTION INTRAVENOUS; SUBCUTANEOUS at 21:22

## 2021-01-01 RX ADMIN — HEPARIN SODIUM 5000 UNITS: 5000 INJECTION INTRAVENOUS; SUBCUTANEOUS at 14:14

## 2021-01-01 RX ADMIN — POTASSIUM CHLORIDE 40 MEQ: 1500 TABLET, EXTENDED RELEASE ORAL at 08:29

## 2021-01-01 RX ADMIN — DOCUSATE SODIUM 100 MG: 100 CAPSULE, LIQUID FILLED ORAL at 17:28

## 2021-01-01 RX ADMIN — Medication 4000 UNITS: at 08:53

## 2021-01-01 RX ADMIN — INSULIN LISPRO 1 UNITS: 100 INJECTION, SOLUTION INTRAVENOUS; SUBCUTANEOUS at 10:04

## 2021-01-01 RX ADMIN — ATORVASTATIN CALCIUM 5 MG: 10 TABLET, FILM COATED ORAL at 10:09

## 2021-01-01 RX ADMIN — HEPARIN SODIUM 5000 UNITS: 5000 INJECTION INTRAVENOUS; SUBCUTANEOUS at 22:09

## 2021-01-01 RX ADMIN — INSULIN LISPRO 2 UNITS: 100 INJECTION, SOLUTION INTRAVENOUS; SUBCUTANEOUS at 17:03

## 2021-01-01 RX ADMIN — TAMSULOSIN HYDROCHLORIDE 0.4 MG: 0.4 CAPSULE ORAL at 17:04

## 2021-01-01 RX ADMIN — SODIUM CHLORIDE 100 ML/HR: 0.9 INJECTION, SOLUTION INTRAVENOUS at 15:18

## 2021-01-01 RX ADMIN — HEPARIN SODIUM 5000 UNITS: 5000 INJECTION INTRAVENOUS; SUBCUTANEOUS at 05:23

## 2021-01-01 RX ADMIN — ASPIRIN 81 MG: 81 TABLET, CHEWABLE ORAL at 08:25

## 2021-01-01 RX ADMIN — FUROSEMIDE 80 MG: 10 INJECTION, SOLUTION INTRAVENOUS at 08:31

## 2021-01-01 RX ADMIN — ATORVASTATIN CALCIUM 5 MG: 10 TABLET, FILM COATED ORAL at 09:32

## 2021-01-01 RX ADMIN — SODIUM CHLORIDE 100 ML/HR: 0.9 INJECTION, SOLUTION INTRAVENOUS at 22:00

## 2021-01-01 RX ADMIN — FUROSEMIDE 40 MG: 40 TABLET ORAL at 17:16

## 2021-01-01 RX ADMIN — INSULIN LISPRO 2 UNITS: 100 INJECTION, SOLUTION INTRAVENOUS; SUBCUTANEOUS at 23:26

## 2021-01-01 RX ADMIN — SODIUM CHLORIDE 100 ML/HR: 0.9 INJECTION, SOLUTION INTRAVENOUS at 10:42

## 2021-01-01 RX ADMIN — CYANOCOBALAMIN TAB 500 MCG 2000 MCG: 500 TAB at 08:09

## 2021-01-01 RX ADMIN — BUMETANIDE 2 MG: 0.25 INJECTION, SOLUTION INTRAMUSCULAR; INTRAVENOUS at 04:04

## 2021-01-01 RX ADMIN — DOCUSATE SODIUM 100 MG: 100 CAPSULE, LIQUID FILLED ORAL at 17:33

## 2021-01-01 RX ADMIN — HEPARIN SODIUM 5000 UNITS: 5000 INJECTION INTRAVENOUS; SUBCUTANEOUS at 14:58

## 2021-01-01 RX ADMIN — Medication 4000 UNITS: at 09:01

## 2021-01-01 RX ADMIN — KETAMINE HYDROCHLORIDE 20 MG: 50 INJECTION, SOLUTION INTRAMUSCULAR; INTRAVENOUS at 12:24

## 2021-01-01 RX ADMIN — INSULIN LISPRO 4 UNITS: 100 INJECTION, SOLUTION INTRAVENOUS; SUBCUTANEOUS at 11:39

## 2021-01-01 RX ADMIN — INSULIN GLARGINE 7 UNITS: 100 INJECTION, SOLUTION SUBCUTANEOUS at 22:16

## 2021-01-01 RX ADMIN — CEFEPIME HYDROCHLORIDE 1000 MG: 2 INJECTION, POWDER, FOR SOLUTION INTRAVENOUS at 10:27

## 2021-01-01 RX ADMIN — FUROSEMIDE 80 MG: 10 INJECTION, SOLUTION INTRAVENOUS at 23:53

## 2021-01-01 RX ADMIN — DEXAMETHASONE SODIUM PHOSPHATE 6 MG: 4 INJECTION, SOLUTION INTRA-ARTICULAR; INTRALESIONAL; INTRAMUSCULAR; INTRAVENOUS; SOFT TISSUE at 17:45

## 2021-01-01 RX ADMIN — ASPIRIN 81 MG: 81 TABLET, CHEWABLE ORAL at 09:35

## 2021-01-01 RX ADMIN — AMLODIPINE BESYLATE 5 MG: 5 TABLET ORAL at 08:06

## 2021-01-01 RX ADMIN — CEFTRIAXONE 1000 MG: 10 INJECTION, POWDER, FOR SOLUTION INTRAVENOUS at 12:16

## 2021-01-01 RX ADMIN — ACETAMINOPHEN 650 MG: 325 TABLET, FILM COATED ORAL at 13:14

## 2021-01-01 RX ADMIN — INSULIN LISPRO 2 UNITS: 100 INJECTION, SOLUTION INTRAVENOUS; SUBCUTANEOUS at 21:31

## 2021-01-01 RX ADMIN — LEVOTHYROXINE SODIUM 125 MCG: 125 TABLET ORAL at 05:25

## 2021-01-01 RX ADMIN — LEVOTHYROXINE SODIUM 125 MCG: 125 TABLET ORAL at 05:41

## 2021-01-01 RX ADMIN — HEPARIN SODIUM 5000 UNITS: 5000 INJECTION INTRAVENOUS; SUBCUTANEOUS at 06:39

## 2021-01-01 RX ADMIN — SODIUM CHLORIDE 50 ML/HR: 0.9 INJECTION, SOLUTION INTRAVENOUS at 17:17

## 2021-01-01 RX ADMIN — LEVOTHYROXINE SODIUM 125 MCG: 125 TABLET ORAL at 05:08

## 2021-01-01 RX ADMIN — FUROSEMIDE 40 MG: 10 INJECTION, SOLUTION INTRAMUSCULAR; INTRAVENOUS at 14:00

## 2021-01-01 RX ADMIN — DOCUSATE SODIUM 100 MG: 100 CAPSULE, LIQUID FILLED ORAL at 17:04

## 2021-01-01 RX ADMIN — CEPHALEXIN 500 MG: 500 CAPSULE ORAL at 21:44

## 2021-01-01 RX ADMIN — TAMSULOSIN HYDROCHLORIDE 0.4 MG: 0.4 CAPSULE ORAL at 17:05

## 2021-01-01 RX ADMIN — ATORVASTATIN CALCIUM 5 MG: 10 TABLET, FILM COATED ORAL at 08:07

## 2021-01-01 RX ADMIN — HEPARIN SODIUM 5000 UNITS: 5000 INJECTION INTRAVENOUS; SUBCUTANEOUS at 15:19

## 2021-01-01 RX ADMIN — CEFEPIME HYDROCHLORIDE 1000 MG: 2 INJECTION, POWDER, FOR SOLUTION INTRAVENOUS at 10:42

## 2021-01-01 RX ADMIN — PANTOPRAZOLE SODIUM 40 MG: 40 TABLET, DELAYED RELEASE ORAL at 06:02

## 2021-01-01 RX ADMIN — CEPHALEXIN 500 MG: 500 CAPSULE ORAL at 21:37

## 2021-01-01 RX ADMIN — GADOBUTROL 7 ML: 604.72 INJECTION INTRAVENOUS at 12:16

## 2021-01-01 RX ADMIN — SODIUM CHLORIDE 75 ML/HR: 0.9 INJECTION, SOLUTION INTRAVENOUS at 06:48

## 2021-01-01 RX ADMIN — INSULIN LISPRO 4 UNITS: 100 INJECTION, SOLUTION INTRAVENOUS; SUBCUTANEOUS at 07:32

## 2021-01-01 RX ADMIN — ATORVASTATIN CALCIUM 5 MG: 10 TABLET, FILM COATED ORAL at 08:06

## 2021-01-01 RX ADMIN — INSULIN LISPRO 2 UNITS: 100 INJECTION, SOLUTION INTRAVENOUS; SUBCUTANEOUS at 08:30

## 2021-01-01 RX ADMIN — ALBUMIN (HUMAN) 25 G: 0.25 INJECTION, SOLUTION INTRAVENOUS at 13:35

## 2021-01-01 RX ADMIN — HEPARIN SODIUM 5000 UNITS: 5000 INJECTION INTRAVENOUS; SUBCUTANEOUS at 05:24

## 2021-01-01 RX ADMIN — ACETAMINOPHEN 650 MG: 325 TABLET, FILM COATED ORAL at 15:08

## 2021-01-01 RX ADMIN — INSULIN LISPRO 3 UNITS: 100 INJECTION, SOLUTION INTRAVENOUS; SUBCUTANEOUS at 16:45

## 2021-01-01 RX ADMIN — LEVOTHYROXINE SODIUM 125 MCG: 125 TABLET ORAL at 05:10

## 2021-01-01 RX ADMIN — ASPIRIN 81 MG: 81 TABLET, CHEWABLE ORAL at 09:56

## 2021-01-01 RX ADMIN — REMDESIVIR 200 MG: 100 INJECTION, POWDER, LYOPHILIZED, FOR SOLUTION INTRAVENOUS at 23:39

## 2021-01-01 RX ADMIN — MELATONIN TAB 3 MG 3 MG: 3 TAB at 00:53

## 2021-01-01 RX ADMIN — INSULIN LISPRO 6 UNITS: 100 INJECTION, SOLUTION INTRAVENOUS; SUBCUTANEOUS at 17:13

## 2021-01-01 RX ADMIN — BUMETANIDE 2 MG: 0.25 INJECTION, SOLUTION INTRAMUSCULAR; INTRAVENOUS at 13:27

## 2021-01-01 RX ADMIN — INSULIN LISPRO 3 UNITS: 100 INJECTION, SOLUTION INTRAVENOUS; SUBCUTANEOUS at 09:02

## 2021-01-01 RX ADMIN — INSULIN LISPRO 2 UNITS: 100 INJECTION, SOLUTION INTRAVENOUS; SUBCUTANEOUS at 16:06

## 2021-01-01 RX ADMIN — TAMSULOSIN HYDROCHLORIDE 0.4 MG: 0.4 CAPSULE ORAL at 17:19

## 2021-01-01 RX ADMIN — HEPARIN SODIUM 5000 UNITS: 5000 INJECTION INTRAVENOUS; SUBCUTANEOUS at 13:10

## 2021-01-01 RX ADMIN — HEPARIN SODIUM 5000 UNITS: 5000 INJECTION INTRAVENOUS; SUBCUTANEOUS at 21:01

## 2021-01-01 RX ADMIN — HEPARIN SODIUM 5000 UNITS: 5000 INJECTION INTRAVENOUS; SUBCUTANEOUS at 14:31

## 2021-01-01 RX ADMIN — PHENYLEPHRINE HYDROCHLORIDE 200 MCG: 10 INJECTION INTRAVENOUS at 12:35

## 2021-01-01 RX ADMIN — INSULIN LISPRO 6 UNITS: 100 INJECTION, SOLUTION INTRAVENOUS; SUBCUTANEOUS at 12:32

## 2021-01-01 RX ADMIN — TAMSULOSIN HYDROCHLORIDE 0.4 MG: 0.4 CAPSULE ORAL at 18:18

## 2021-01-01 RX ADMIN — INSULIN LISPRO 6 UNITS: 100 INJECTION, SOLUTION INTRAVENOUS; SUBCUTANEOUS at 09:32

## 2021-01-01 RX ADMIN — HEPARIN SODIUM 5000 UNITS: 5000 INJECTION INTRAVENOUS; SUBCUTANEOUS at 06:42

## 2021-01-01 RX ADMIN — ATORVASTATIN CALCIUM 5 MG: 10 TABLET, FILM COATED ORAL at 09:02

## 2021-01-01 RX ADMIN — Medication 1 G: at 08:17

## 2021-01-01 RX ADMIN — LEVOTHYROXINE SODIUM 125 MCG: 125 TABLET ORAL at 06:42

## 2021-01-01 RX ADMIN — DOCUSATE SODIUM 100 MG: 100 CAPSULE, LIQUID FILLED ORAL at 17:16

## 2021-01-01 RX ADMIN — HEPARIN SODIUM 5000 UNITS: 5000 INJECTION INTRAVENOUS; SUBCUTANEOUS at 05:14

## 2021-01-01 RX ADMIN — DEXAMETHASONE SODIUM PHOSPHATE 6 MG: 4 INJECTION, SOLUTION INTRA-ARTICULAR; INTRALESIONAL; INTRAMUSCULAR; INTRAVENOUS; SOFT TISSUE at 18:24

## 2021-01-01 RX ADMIN — DOXYCYCLINE 100 MG: 100 CAPSULE ORAL at 20:09

## 2021-01-01 RX ADMIN — INSULIN LISPRO 5 UNITS: 100 INJECTION, SOLUTION INTRAVENOUS; SUBCUTANEOUS at 11:47

## 2021-01-01 RX ADMIN — TAMSULOSIN HYDROCHLORIDE 0.4 MG: 0.4 CAPSULE ORAL at 16:28

## 2021-01-01 RX ADMIN — SODIUM BICARBONATE 650 MG TABLET 650 MG: at 16:44

## 2021-01-01 RX ADMIN — ASPIRIN 81 MG: 81 TABLET, CHEWABLE ORAL at 10:02

## 2021-01-01 RX ADMIN — ATORVASTATIN CALCIUM 5 MG: 10 TABLET, FILM COATED ORAL at 10:34

## 2021-01-01 RX ADMIN — SODIUM CHLORIDE 75 ML/HR: 0.9 INJECTION, SOLUTION INTRAVENOUS at 13:05

## 2021-01-01 RX ADMIN — SODIUM CHLORIDE 1000 ML: 0.9 INJECTION, SOLUTION INTRAVENOUS at 18:03

## 2021-01-01 RX ADMIN — CEFEPIME HYDROCHLORIDE 2000 MG: 2 INJECTION, POWDER, FOR SOLUTION INTRAVENOUS at 04:54

## 2021-01-01 RX ADMIN — INSULIN GLARGINE 12 UNITS: 100 INJECTION, SOLUTION SUBCUTANEOUS at 22:23

## 2021-01-01 RX ADMIN — BUMETANIDE 1 MG: 0.25 INJECTION, SOLUTION INTRAMUSCULAR; INTRAVENOUS at 05:15

## 2021-01-01 RX ADMIN — Medication 1 G: at 16:48

## 2021-01-01 RX ADMIN — HEPARIN SODIUM 5000 UNITS: 5000 INJECTION INTRAVENOUS; SUBCUTANEOUS at 05:41

## 2021-01-01 RX ADMIN — INSULIN LISPRO 1 UNITS: 100 INJECTION, SOLUTION INTRAVENOUS; SUBCUTANEOUS at 21:30

## 2021-01-01 RX ADMIN — DOCUSATE SODIUM 100 MG: 100 CAPSULE, LIQUID FILLED ORAL at 18:20

## 2021-01-01 RX ADMIN — HEPARIN SODIUM 5000 UNITS: 5000 INJECTION INTRAVENOUS; SUBCUTANEOUS at 21:26

## 2021-01-01 RX ADMIN — SODIUM CHLORIDE: 9 INJECTION, SOLUTION INTRAVENOUS at 11:53

## 2021-01-01 RX ADMIN — DOCUSATE SODIUM 100 MG: 100 CAPSULE, LIQUID FILLED ORAL at 08:17

## 2021-01-01 RX ADMIN — AMLODIPINE BESYLATE 5 MG: 5 TABLET ORAL at 09:33

## 2021-01-01 RX ADMIN — DEXTROSE MONOHYDRATE 50 ML: 25 INJECTION, SOLUTION INTRAVENOUS at 16:40

## 2021-01-01 RX ADMIN — SODIUM BICARBONATE 650 MG TABLET 650 MG: at 08:26

## 2021-01-01 RX ADMIN — INSULIN LISPRO 3 UNITS: 100 INJECTION, SOLUTION INTRAVENOUS; SUBCUTANEOUS at 21:45

## 2021-01-01 RX ADMIN — DOCUSATE SODIUM 100 MG: 100 CAPSULE, LIQUID FILLED ORAL at 16:09

## 2021-01-01 RX ADMIN — HEPARIN SODIUM 5000 UNITS: 5000 INJECTION INTRAVENOUS; SUBCUTANEOUS at 16:28

## 2021-01-01 RX ADMIN — SODIUM BICARBONATE 650 MG TABLET 650 MG: at 16:33

## 2021-01-01 RX ADMIN — ATORVASTATIN CALCIUM 5 MG: 10 TABLET, FILM COATED ORAL at 08:31

## 2021-01-01 RX ADMIN — CEFEPIME HYDROCHLORIDE 1000 MG: 2 INJECTION, POWDER, FOR SOLUTION INTRAVENOUS at 22:16

## 2021-01-01 RX ADMIN — PROPOFOL 20 MG: 10 INJECTION, EMULSION INTRAVENOUS at 12:30

## 2021-01-01 RX ADMIN — FAMOTIDINE 20 MG: 20 TABLET ORAL at 08:06

## 2021-01-01 RX ADMIN — SODIUM CHLORIDE 250 ML: 0.9 INJECTION, SOLUTION INTRAVENOUS at 13:51

## 2021-01-01 RX ADMIN — POLYETHYLENE GLYCOL 3350 17 G: 17 POWDER, FOR SOLUTION ORAL at 18:45

## 2021-01-01 RX ADMIN — INSULIN GLARGINE 6 UNITS: 100 INJECTION, SOLUTION SUBCUTANEOUS at 21:31

## 2021-01-01 RX ADMIN — TAMSULOSIN HYDROCHLORIDE 0.4 MG: 0.4 CAPSULE ORAL at 17:33

## 2021-01-01 RX ADMIN — FERROUS SULFATE TAB 325 MG (65 MG ELEMENTAL FE) 325 MG: 325 (65 FE) TAB at 08:00

## 2021-01-01 RX ADMIN — ATORVASTATIN CALCIUM 5 MG: 10 TABLET, FILM COATED ORAL at 08:02

## 2021-01-01 RX ADMIN — HEPARIN SODIUM 5000 UNITS: 5000 INJECTION INTRAVENOUS; SUBCUTANEOUS at 13:33

## 2021-01-01 RX ADMIN — PANTOPRAZOLE SODIUM 40 MG: 40 TABLET, DELAYED RELEASE ORAL at 05:11

## 2021-01-01 RX ADMIN — DULOXETINE 30 MG: 30 CAPSULE, DELAYED RELEASE ORAL at 08:53

## 2021-01-01 RX ADMIN — ASPIRIN 81 MG: 81 TABLET, CHEWABLE ORAL at 10:33

## 2021-01-01 RX ADMIN — INSULIN GLARGINE 6 UNITS: 100 INJECTION, SOLUTION SUBCUTANEOUS at 21:38

## 2021-01-01 RX ADMIN — HEPARIN SODIUM 5000 UNITS: 5000 INJECTION INTRAVENOUS; SUBCUTANEOUS at 23:00

## 2021-01-01 RX ADMIN — DULOXETINE 30 MG: 30 CAPSULE, DELAYED RELEASE ORAL at 08:17

## 2021-01-01 RX ADMIN — CYANOCOBALAMIN TAB 500 MCG 2000 MCG: 500 TAB at 08:06

## 2021-01-01 RX ADMIN — Medication 1 PACKET: at 08:37

## 2021-01-01 RX ADMIN — DOXYCYCLINE 100 MG: 100 CAPSULE ORAL at 12:16

## 2021-01-01 RX ADMIN — DEXAMETHASONE SODIUM PHOSPHATE 6 MG: 4 INJECTION, SOLUTION INTRA-ARTICULAR; INTRALESIONAL; INTRAMUSCULAR; INTRAVENOUS; SOFT TISSUE at 20:10

## 2021-01-01 RX ADMIN — INSULIN LISPRO 1 UNITS: 100 INJECTION, SOLUTION INTRAVENOUS; SUBCUTANEOUS at 16:31

## 2021-01-01 RX ADMIN — INSULIN LISPRO 1 UNITS: 100 INJECTION, SOLUTION INTRAVENOUS; SUBCUTANEOUS at 21:24

## 2021-01-01 RX ADMIN — DULOXETINE 30 MG: 30 CAPSULE, DELAYED RELEASE ORAL at 09:43

## 2021-01-01 RX ADMIN — LISINOPRIL 5 MG: 5 TABLET ORAL at 10:33

## 2021-01-01 RX ADMIN — ASPIRIN 81 MG: 81 TABLET, CHEWABLE ORAL at 09:08

## 2021-01-01 RX ADMIN — HEPARIN SODIUM 5000 UNITS: 5000 INJECTION INTRAVENOUS; SUBCUTANEOUS at 22:30

## 2021-01-01 RX ADMIN — ZINC SULFATE 220 MG (50 MG) CAPSULE 220 MG: CAPSULE at 08:10

## 2021-01-01 RX ADMIN — INSULIN LISPRO 2 UNITS: 100 INJECTION, SOLUTION INTRAVENOUS; SUBCUTANEOUS at 17:02

## 2021-01-01 RX ADMIN — TAMSULOSIN HYDROCHLORIDE 0.4 MG: 0.4 CAPSULE ORAL at 16:24

## 2021-01-01 RX ADMIN — BUMETANIDE 2 MG: 0.25 INJECTION, SOLUTION INTRAMUSCULAR; INTRAVENOUS at 20:31

## 2021-01-01 RX ADMIN — INSULIN LISPRO 1 UNITS: 100 INJECTION, SOLUTION INTRAVENOUS; SUBCUTANEOUS at 12:01

## 2021-01-01 RX ADMIN — INSULIN LISPRO 1 UNITS: 100 INJECTION, SOLUTION INTRAVENOUS; SUBCUTANEOUS at 12:02

## 2021-01-01 RX ADMIN — HEPARIN SODIUM 5000 UNITS: 5000 INJECTION INTRAVENOUS; SUBCUTANEOUS at 05:44

## 2021-01-01 RX ADMIN — DOCUSATE SODIUM 100 MG: 100 CAPSULE, LIQUID FILLED ORAL at 09:17

## 2021-01-01 RX ADMIN — SODIUM CHLORIDE 100 ML/HR: 0.9 INJECTION, SOLUTION INTRAVENOUS at 08:50

## 2021-01-01 RX ADMIN — HEPARIN SODIUM 5000 UNITS: 5000 INJECTION INTRAVENOUS; SUBCUTANEOUS at 21:10

## 2021-01-01 RX ADMIN — TAMSULOSIN HYDROCHLORIDE 0.4 MG: 0.4 CAPSULE ORAL at 16:31

## 2021-01-01 RX ADMIN — PANTOPRAZOLE SODIUM 40 MG: 40 TABLET, DELAYED RELEASE ORAL at 05:10

## 2021-01-01 RX ADMIN — INSULIN LISPRO 1 UNITS: 100 INJECTION, SOLUTION INTRAVENOUS; SUBCUTANEOUS at 17:49

## 2021-01-01 RX ADMIN — INSULIN LISPRO 1 UNITS: 100 INJECTION, SOLUTION INTRAVENOUS; SUBCUTANEOUS at 08:16

## 2021-01-01 RX ADMIN — TAMSULOSIN HYDROCHLORIDE 0.4 MG: 0.4 CAPSULE ORAL at 17:26

## 2021-01-01 RX ADMIN — FERROUS SULFATE TAB 325 MG (65 MG ELEMENTAL FE) 325 MG: 325 (65 FE) TAB at 08:18

## 2021-01-01 RX ADMIN — HEPARIN SODIUM 5000 UNITS: 5000 INJECTION INTRAVENOUS; SUBCUTANEOUS at 21:37

## 2021-01-01 RX ADMIN — HEPARIN SODIUM 5000 UNITS: 5000 INJECTION INTRAVENOUS; SUBCUTANEOUS at 06:12

## 2021-01-01 RX ADMIN — Medication 4000 UNITS: at 08:26

## 2021-01-01 RX ADMIN — INSULIN LISPRO 2 UNITS: 100 INJECTION, SOLUTION INTRAVENOUS; SUBCUTANEOUS at 12:02

## 2021-01-01 RX ADMIN — INSULIN LISPRO 5 UNITS: 100 INJECTION, SOLUTION INTRAVENOUS; SUBCUTANEOUS at 07:32

## 2021-01-01 RX ADMIN — FUROSEMIDE 80 MG: 10 INJECTION, SOLUTION INTRAVENOUS at 23:35

## 2021-01-01 RX ADMIN — INSULIN LISPRO 2 UNITS: 100 INJECTION, SOLUTION INTRAVENOUS; SUBCUTANEOUS at 16:46

## 2021-01-01 RX ADMIN — HEPARIN SODIUM 5000 UNITS: 5000 INJECTION INTRAVENOUS; SUBCUTANEOUS at 21:44

## 2021-01-01 RX ADMIN — FUROSEMIDE 40 MG: 10 INJECTION, SOLUTION INTRAVENOUS at 03:59

## 2021-01-01 RX ADMIN — INSULIN LISPRO 1 UNITS: 100 INJECTION, SOLUTION INTRAVENOUS; SUBCUTANEOUS at 17:13

## 2021-01-01 RX ADMIN — INSULIN LISPRO 1 UNITS: 100 INJECTION, SOLUTION INTRAVENOUS; SUBCUTANEOUS at 16:45

## 2021-01-01 RX ADMIN — LEVOTHYROXINE SODIUM 125 MCG: 125 TABLET ORAL at 06:12

## 2021-01-01 RX ADMIN — INSULIN LISPRO 2 UNITS: 100 INJECTION, SOLUTION INTRAVENOUS; SUBCUTANEOUS at 10:01

## 2021-01-01 RX ADMIN — PHENYLEPHRINE HYDROCHLORIDE 200 MCG: 10 INJECTION INTRAVENOUS at 12:41

## 2021-01-01 RX ADMIN — INSULIN LISPRO 2 UNITS: 100 INJECTION, SOLUTION INTRAVENOUS; SUBCUTANEOUS at 09:34

## 2021-01-01 RX ADMIN — DOCUSATE SODIUM 100 MG: 100 CAPSULE, LIQUID FILLED ORAL at 09:01

## 2021-01-01 RX ADMIN — INSULIN LISPRO 4 UNITS: 100 INJECTION, SOLUTION INTRAVENOUS; SUBCUTANEOUS at 11:47

## 2021-01-01 RX ADMIN — LISINOPRIL 2.5 MG: 2.5 TABLET ORAL at 10:02

## 2021-01-01 RX ADMIN — INSULIN LISPRO 3 UNITS: 100 INJECTION, SOLUTION INTRAVENOUS; SUBCUTANEOUS at 21:36

## 2021-01-01 RX ADMIN — TAMSULOSIN HYDROCHLORIDE 0.4 MG: 0.4 CAPSULE ORAL at 15:59

## 2021-01-01 RX ADMIN — LOPERAMIDE HYDROCHLORIDE 2 MG: 2 CAPSULE ORAL at 08:07

## 2021-01-01 RX ADMIN — HEPARIN SODIUM 5000 UNITS: 5000 INJECTION INTRAVENOUS; SUBCUTANEOUS at 14:41

## 2021-01-01 RX ADMIN — HEPARIN SODIUM 5000 UNITS: 5000 INJECTION INTRAVENOUS; SUBCUTANEOUS at 23:24

## 2021-01-01 RX ADMIN — VANCOMYCIN HYDROCHLORIDE 750 MG: 750 INJECTION, SOLUTION INTRAVENOUS at 11:47

## 2021-01-01 RX ADMIN — HEPARIN SODIUM 5000 UNITS: 5000 INJECTION INTRAVENOUS; SUBCUTANEOUS at 22:46

## 2021-01-01 RX ADMIN — HEPARIN SODIUM 5000 UNITS: 5000 INJECTION INTRAVENOUS; SUBCUTANEOUS at 05:11

## 2021-01-01 RX ADMIN — INSULIN LISPRO 2 UNITS: 100 INJECTION, SOLUTION INTRAVENOUS; SUBCUTANEOUS at 16:33

## 2021-01-01 RX ADMIN — SODIUM CHLORIDE 4 UNITS/HR: 9 INJECTION, SOLUTION INTRAVENOUS at 00:37

## 2021-01-01 RX ADMIN — HEPARIN SODIUM 5000 UNITS: 5000 INJECTION INTRAVENOUS; SUBCUTANEOUS at 21:17

## 2021-01-01 RX ADMIN — INSULIN LISPRO 1 UNITS: 100 INJECTION, SOLUTION INTRAVENOUS; SUBCUTANEOUS at 21:28

## 2021-01-01 RX ADMIN — ASPIRIN 81 MG: 81 TABLET, CHEWABLE ORAL at 08:06

## 2021-01-01 RX ADMIN — INSULIN LISPRO 1 UNITS: 100 INJECTION, SOLUTION INTRAVENOUS; SUBCUTANEOUS at 17:34

## 2021-01-01 RX ADMIN — PANTOPRAZOLE SODIUM 40 MG: 40 TABLET, DELAYED RELEASE ORAL at 06:37

## 2021-01-01 RX ADMIN — CEFEPIME HYDROCHLORIDE 1000 MG: 2 INJECTION, POWDER, FOR SOLUTION INTRAVENOUS at 21:32

## 2021-01-01 RX ADMIN — PROPOFOL 80 MG: 10 INJECTION, EMULSION INTRAVENOUS at 12:24

## 2021-01-01 RX ADMIN — EZETIMIBE 5 MG: 10 TABLET ORAL at 22:17

## 2021-01-01 RX ADMIN — TAMSULOSIN HYDROCHLORIDE 0.4 MG: 0.4 CAPSULE ORAL at 17:18

## 2021-01-01 RX ADMIN — BUMETANIDE 1 MG: 0.25 INJECTION, SOLUTION INTRAMUSCULAR; INTRAVENOUS at 00:13

## 2021-01-01 RX ADMIN — INSULIN LISPRO 1 UNITS: 100 INJECTION, SOLUTION INTRAVENOUS; SUBCUTANEOUS at 09:49

## 2021-01-01 RX ADMIN — DULOXETINE 30 MG: 30 CAPSULE, DELAYED RELEASE ORAL at 09:35

## 2021-01-01 RX ADMIN — HEPARIN SODIUM 5000 UNITS: 5000 INJECTION INTRAVENOUS; SUBCUTANEOUS at 14:22

## 2021-01-01 RX ADMIN — OXYCODONE HYDROCHLORIDE AND ACETAMINOPHEN 1000 MG: 500 TABLET ORAL at 20:35

## 2021-01-01 RX ADMIN — SODIUM CHLORIDE 100 ML/HR: 0.9 INJECTION, SOLUTION INTRAVENOUS at 22:25

## 2021-01-01 RX ADMIN — PANTOPRAZOLE SODIUM 40 MG: 40 TABLET, DELAYED RELEASE ORAL at 05:44

## 2021-01-01 RX ADMIN — INSULIN GLARGINE 6 UNITS: 100 INJECTION, SOLUTION SUBCUTANEOUS at 22:30

## 2021-01-01 RX ADMIN — ACETAMINOPHEN 650 MG: 325 TABLET, FILM COATED ORAL at 08:37

## 2021-01-01 RX ADMIN — DOCUSATE SODIUM 100 MG: 100 CAPSULE, LIQUID FILLED ORAL at 17:49

## 2021-01-01 RX ADMIN — INSULIN GLARGINE 6 UNITS: 100 INJECTION, SOLUTION SUBCUTANEOUS at 21:17

## 2021-01-01 RX ADMIN — Medication 1 G: at 17:27

## 2021-01-01 RX ADMIN — DULOXETINE 30 MG: 30 CAPSULE, DELAYED RELEASE ORAL at 09:10

## 2021-01-01 RX ADMIN — ALPRAZOLAM 0.25 MG: 0.25 TABLET ORAL at 01:40

## 2021-01-01 RX ADMIN — TAMSULOSIN HYDROCHLORIDE 0.4 MG: 0.4 CAPSULE ORAL at 17:27

## 2021-01-01 RX ADMIN — PANTOPRAZOLE SODIUM 40 MG: 40 TABLET, DELAYED RELEASE ORAL at 06:42

## 2021-01-01 RX ADMIN — INSULIN LISPRO 1 UNITS: 100 INJECTION, SOLUTION INTRAVENOUS; SUBCUTANEOUS at 11:09

## 2021-01-01 RX ADMIN — LEVOTHYROXINE SODIUM 125 MCG: 125 TABLET ORAL at 05:11

## 2021-01-01 RX ADMIN — HEPARIN SODIUM 5000 UNITS: 5000 INJECTION INTRAVENOUS; SUBCUTANEOUS at 05:25

## 2021-01-01 RX ADMIN — Medication 1 G: at 07:38

## 2021-01-01 RX ADMIN — HEPARIN SODIUM 5000 UNITS: 5000 INJECTION INTRAVENOUS; SUBCUTANEOUS at 13:47

## 2021-01-01 RX ADMIN — HEPARIN SODIUM 5000 UNITS: 5000 INJECTION INTRAVENOUS; SUBCUTANEOUS at 06:38

## 2021-01-01 RX ADMIN — POTASSIUM CHLORIDE 40 MEQ: 1500 TABLET, EXTENDED RELEASE ORAL at 08:10

## 2021-01-01 RX ADMIN — INSULIN GLARGINE 12 UNITS: 100 INJECTION, SOLUTION SUBCUTANEOUS at 21:54

## 2021-01-01 RX ADMIN — FUROSEMIDE 80 MG: 10 INJECTION, SOLUTION INTRAVENOUS at 08:26

## 2021-01-01 RX ADMIN — BUMETANIDE 2 MG: 0.25 INJECTION, SOLUTION INTRAMUSCULAR; INTRAVENOUS at 11:46

## 2021-01-01 RX ADMIN — Medication 1 G: at 11:38

## 2021-01-01 RX ADMIN — ATORVASTATIN CALCIUM 5 MG: 10 TABLET, FILM COATED ORAL at 08:26

## 2021-01-01 RX ADMIN — DULOXETINE 30 MG: 30 CAPSULE, DELAYED RELEASE ORAL at 08:35

## 2021-01-01 RX ADMIN — CEFEPIME HYDROCHLORIDE 2000 MG: 2 INJECTION, POWDER, FOR SOLUTION INTRAVENOUS at 17:18

## 2021-01-01 RX ADMIN — INSULIN LISPRO 3 UNITS: 100 INJECTION, SOLUTION INTRAVENOUS; SUBCUTANEOUS at 17:19

## 2021-01-01 RX ADMIN — INSULIN LISPRO 1 UNITS: 100 INJECTION, SOLUTION INTRAVENOUS; SUBCUTANEOUS at 11:59

## 2021-01-01 RX ADMIN — DEXTROSE MONOHYDRATE 50 ML: 500 INJECTION PARENTERAL at 23:33

## 2021-01-01 RX ADMIN — INSULIN LISPRO 3 UNITS: 100 INJECTION, SOLUTION INTRAVENOUS; SUBCUTANEOUS at 17:06

## 2021-01-01 RX ADMIN — BUMETANIDE 2 MG: 0.25 INJECTION, SOLUTION INTRAMUSCULAR; INTRAVENOUS at 11:54

## 2021-01-01 RX ADMIN — ASPIRIN 81 MG: 81 TABLET, CHEWABLE ORAL at 08:08

## 2021-01-01 RX ADMIN — DOXYCYCLINE 100 MG: 100 CAPSULE ORAL at 08:06

## 2021-01-01 RX ADMIN — FUROSEMIDE 20 MG: 10 INJECTION, SOLUTION INTRAMUSCULAR; INTRAVENOUS at 14:38

## 2021-01-01 RX ADMIN — HEPARIN SODIUM 5000 UNITS: 5000 INJECTION INTRAVENOUS; SUBCUTANEOUS at 22:16

## 2021-01-01 RX ADMIN — SODIUM CHLORIDE, SODIUM LACTATE, POTASSIUM CHLORIDE, AND CALCIUM CHLORIDE 500 ML: .6; .31; .03; .02 INJECTION, SOLUTION INTRAVENOUS at 16:55

## 2021-01-01 RX ADMIN — Medication 4000 UNITS: at 10:03

## 2021-01-01 RX ADMIN — INSULIN GLARGINE 14 UNITS: 100 INJECTION, SOLUTION SUBCUTANEOUS at 17:13

## 2021-01-01 RX ADMIN — FERROUS SULFATE TAB 325 MG (65 MG ELEMENTAL FE) 325 MG: 325 (65 FE) TAB at 07:57

## 2021-01-01 RX ADMIN — ACETAMINOPHEN 650 MG: 325 TABLET ORAL at 14:05

## 2021-01-01 RX ADMIN — CEFEPIME HYDROCHLORIDE 1000 MG: 2 INJECTION, POWDER, FOR SOLUTION INTRAVENOUS at 10:24

## 2021-01-01 RX ADMIN — FAMOTIDINE 20 MG: 20 TABLET ORAL at 08:10

## 2021-01-01 RX ADMIN — SODIUM BICARBONATE 650 MG TABLET 650 MG: at 09:16

## 2021-01-01 RX ADMIN — INSULIN LISPRO 1 UNITS: 100 INJECTION, SOLUTION INTRAVENOUS; SUBCUTANEOUS at 17:05

## 2021-01-01 RX ADMIN — LISINOPRIL 10 MG: 10 TABLET ORAL at 09:16

## 2021-01-01 RX ADMIN — OXYCODONE HYDROCHLORIDE AND ACETAMINOPHEN 1000 MG: 500 TABLET ORAL at 09:32

## 2021-01-01 RX ADMIN — INSULIN LISPRO 2 UNITS: 100 INJECTION, SOLUTION INTRAVENOUS; SUBCUTANEOUS at 11:39

## 2021-01-01 RX ADMIN — INSULIN LISPRO 2 UNITS: 100 INJECTION, SOLUTION INTRAVENOUS; SUBCUTANEOUS at 22:16

## 2021-01-01 RX ADMIN — DEXTROSE MONOHYDRATE 25 ML: 500 INJECTION PARENTERAL at 04:26

## 2021-01-01 RX ADMIN — DOCUSATE SODIUM 100 MG: 100 CAPSULE, LIQUID FILLED ORAL at 17:27

## 2021-01-01 RX ADMIN — VANCOMYCIN HYDROCHLORIDE 750 MG: 750 INJECTION, SOLUTION INTRAVENOUS at 11:41

## 2021-01-01 RX ADMIN — ATORVASTATIN CALCIUM 5 MG: 10 TABLET, FILM COATED ORAL at 08:17

## 2021-01-01 RX ADMIN — HEPARIN SODIUM 5000 UNITS: 5000 INJECTION INTRAVENOUS; SUBCUTANEOUS at 05:57

## 2021-01-01 RX ADMIN — LEVOTHYROXINE SODIUM 125 MCG: 125 TABLET ORAL at 06:44

## 2021-01-01 RX ADMIN — CEPHALEXIN 500 MG: 500 CAPSULE ORAL at 09:02

## 2021-01-01 RX ADMIN — AMLODIPINE BESYLATE 5 MG: 5 TABLET ORAL at 08:26

## 2021-01-01 RX ADMIN — INSULIN LISPRO 3 UNITS: 100 INJECTION, SOLUTION INTRAVENOUS; SUBCUTANEOUS at 12:33

## 2021-01-01 RX ADMIN — INSULIN LISPRO 1 UNITS: 100 INJECTION, SOLUTION INTRAVENOUS; SUBCUTANEOUS at 17:29

## 2021-01-01 RX ADMIN — DULOXETINE 30 MG: 30 CAPSULE, DELAYED RELEASE ORAL at 09:01

## 2021-01-01 RX ADMIN — DOCUSATE SODIUM 100 MG: 100 CAPSULE, LIQUID FILLED ORAL at 17:19

## 2021-01-01 RX ADMIN — HEPARIN SODIUM 5000 UNITS: 5000 INJECTION INTRAVENOUS; SUBCUTANEOUS at 06:34

## 2021-01-01 RX ADMIN — OXYCODONE HYDROCHLORIDE AND ACETAMINOPHEN 1000 MG: 500 TABLET ORAL at 08:06

## 2021-01-01 RX ADMIN — SODIUM CHLORIDE 100 ML/HR: 0.9 INJECTION, SOLUTION INTRAVENOUS at 03:01

## 2021-01-01 RX ADMIN — FERROUS SULFATE TAB 325 MG (65 MG ELEMENTAL FE) 325 MG: 325 (65 FE) TAB at 09:56

## 2021-01-01 RX ADMIN — ATORVASTATIN CALCIUM 5 MG: 10 TABLET, FILM COATED ORAL at 08:25

## 2021-01-01 RX ADMIN — Medication 1 G: at 08:31

## 2021-01-01 RX ADMIN — SODIUM BICARBONATE 650 MG TABLET 650 MG: at 18:20

## 2021-01-01 RX ADMIN — INSULIN LISPRO 3 UNITS: 100 INJECTION, SOLUTION INTRAVENOUS; SUBCUTANEOUS at 07:51

## 2021-01-01 RX ADMIN — CEFEPIME HYDROCHLORIDE 1000 MG: 2 INJECTION, POWDER, FOR SOLUTION INTRAVENOUS at 21:00

## 2021-01-01 RX ADMIN — SODIUM CHLORIDE 1000 ML: 0.9 INJECTION, SOLUTION INTRAVENOUS at 18:30

## 2021-01-01 RX ADMIN — ASPIRIN 81 MG: 81 TABLET, CHEWABLE ORAL at 09:43

## 2021-01-01 RX ADMIN — SODIUM CHLORIDE 20 ML/HR: 0.9 INJECTION, SOLUTION INTRAVENOUS at 08:43

## 2021-01-01 RX ADMIN — SODIUM CHLORIDE 1000 ML: 0.9 INJECTION, SOLUTION INTRAVENOUS at 16:05

## 2021-01-01 RX ADMIN — ATORVASTATIN CALCIUM 5 MG: 10 TABLET, FILM COATED ORAL at 09:14

## 2021-01-01 RX ADMIN — Medication 1 PACKET: at 09:36

## 2021-01-01 RX ADMIN — ASPIRIN 81 MG: 81 TABLET, CHEWABLE ORAL at 09:59

## 2021-01-01 RX ADMIN — INSULIN LISPRO 1 UNITS: 100 INJECTION, SOLUTION INTRAVENOUS; SUBCUTANEOUS at 21:06

## 2021-01-01 RX ADMIN — BUMETANIDE 2 MG: 0.25 INJECTION, SOLUTION INTRAMUSCULAR; INTRAVENOUS at 20:19

## 2021-01-01 RX ADMIN — PHENYLEPHRINE HYDROCHLORIDE 200 MCG: 10 INJECTION INTRAVENOUS at 12:47

## 2021-01-01 RX ADMIN — INSULIN LISPRO 2 UNITS: 100 INJECTION, SOLUTION INTRAVENOUS; SUBCUTANEOUS at 17:20

## 2021-01-01 RX ADMIN — PANTOPRAZOLE SODIUM 40 MG: 40 TABLET, DELAYED RELEASE ORAL at 06:00

## 2021-01-01 RX ADMIN — INSULIN LISPRO 3 UNITS: 100 INJECTION, SOLUTION INTRAVENOUS; SUBCUTANEOUS at 11:22

## 2021-01-01 RX ADMIN — VANCOMYCIN HYDROCHLORIDE 750 MG: 750 INJECTION, SOLUTION INTRAVENOUS at 11:29

## 2021-01-01 RX ADMIN — INSULIN LISPRO 1 UNITS: 100 INJECTION, SOLUTION INTRAVENOUS; SUBCUTANEOUS at 11:45

## 2021-01-01 RX ADMIN — INSULIN LISPRO 3 UNITS: 100 INJECTION, SOLUTION INTRAVENOUS; SUBCUTANEOUS at 16:01

## 2021-01-01 RX ADMIN — Medication 1 G: at 08:26

## 2021-01-01 RX ADMIN — Medication 4000 UNITS: at 08:02

## 2021-01-01 RX ADMIN — Medication 1 PACKET: at 10:10

## 2021-01-01 RX ADMIN — DEXTROSE MONOHYDRATE 50 ML: 500 INJECTION PARENTERAL at 06:34

## 2021-01-01 RX ADMIN — ACETAMINOPHEN 650 MG: 325 TABLET, FILM COATED ORAL at 23:31

## 2021-01-01 RX ADMIN — ATORVASTATIN CALCIUM 5 MG: 10 TABLET, FILM COATED ORAL at 09:15

## 2021-01-01 RX ADMIN — INSULIN LISPRO 2 UNITS: 100 INJECTION, SOLUTION INTRAVENOUS; SUBCUTANEOUS at 18:45

## 2021-01-01 RX ADMIN — CEFEPIME HYDROCHLORIDE 1000 MG: 2 INJECTION, POWDER, FOR SOLUTION INTRAVENOUS at 10:06

## 2021-01-01 RX ADMIN — EZETIMIBE 5 MG: 10 TABLET ORAL at 21:50

## 2021-01-01 RX ADMIN — ATORVASTATIN CALCIUM 5 MG: 10 TABLET, FILM COATED ORAL at 09:44

## 2021-01-01 RX ADMIN — TAMSULOSIN HYDROCHLORIDE 0.4 MG: 0.4 CAPSULE ORAL at 16:08

## 2021-01-01 RX ADMIN — AZITHROMYCIN 500 MG: 500 TABLET, FILM COATED ORAL at 19:13

## 2021-01-01 RX ADMIN — LEVOTHYROXINE SODIUM 125 MCG: 125 TABLET ORAL at 06:38

## 2021-01-01 RX ADMIN — VANCOMYCIN HYDROCHLORIDE 750 MG: 750 INJECTION, SOLUTION INTRAVENOUS at 10:08

## 2021-01-01 RX ADMIN — INSULIN GLARGINE 6 UNITS: 100 INJECTION, SOLUTION SUBCUTANEOUS at 21:10

## 2021-01-01 RX ADMIN — DOCUSATE SODIUM 100 MG: 100 CAPSULE, LIQUID FILLED ORAL at 08:27

## 2021-01-01 RX ADMIN — DOCUSATE SODIUM 100 MG: 100 CAPSULE, LIQUID FILLED ORAL at 08:30

## 2021-01-01 RX ADMIN — CEFEPIME HYDROCHLORIDE 1000 MG: 2 INJECTION, POWDER, FOR SOLUTION INTRAVENOUS at 10:34

## 2021-01-01 RX ADMIN — INSULIN LISPRO 1 UNITS: 100 INJECTION, SOLUTION INTRAVENOUS; SUBCUTANEOUS at 12:25

## 2021-01-01 RX ADMIN — POLYETHYLENE GLYCOL 3350 17 G: 17 POWDER, FOR SOLUTION ORAL at 16:30

## 2021-01-01 RX ADMIN — BISACODYL 10 MG: 10 SUPPOSITORY RECTAL at 17:16

## 2021-01-01 RX ADMIN — ACETAMINOPHEN 650 MG: 325 TABLET, FILM COATED ORAL at 08:18

## 2021-01-01 RX ADMIN — BUMETANIDE 2 MG: 0.25 INJECTION, SOLUTION INTRAMUSCULAR; INTRAVENOUS at 13:33

## 2021-01-01 RX ADMIN — HEPARIN SODIUM 5000 UNITS: 5000 INJECTION INTRAVENOUS; SUBCUTANEOUS at 13:21

## 2021-01-01 RX ADMIN — HEPARIN SODIUM 5000 UNITS: 5000 INJECTION INTRAVENOUS; SUBCUTANEOUS at 14:02

## 2021-01-01 RX ADMIN — ZINC SULFATE 220 MG (50 MG) CAPSULE 220 MG: CAPSULE at 08:06

## 2021-01-01 RX ADMIN — DULOXETINE 30 MG: 30 CAPSULE, DELAYED RELEASE ORAL at 09:16

## 2021-01-01 RX ADMIN — BUMETANIDE 2 MG: 0.25 INJECTION, SOLUTION INTRAMUSCULAR; INTRAVENOUS at 03:30

## 2021-01-01 RX ADMIN — CEPHALEXIN 500 MG: 500 CAPSULE ORAL at 08:31

## 2021-01-01 RX ADMIN — LEVOTHYROXINE SODIUM 125 MCG: 125 TABLET ORAL at 06:00

## 2021-01-01 RX ADMIN — PANTOPRAZOLE SODIUM 40 MG: 40 TABLET, DELAYED RELEASE ORAL at 06:12

## 2021-01-01 RX ADMIN — INSULIN LISPRO 1 UNITS: 100 INJECTION, SOLUTION INTRAVENOUS; SUBCUTANEOUS at 07:38

## 2021-01-01 RX ADMIN — INSULIN LISPRO 2 UNITS: 100 INJECTION, SOLUTION INTRAVENOUS; SUBCUTANEOUS at 12:32

## 2021-01-01 RX ADMIN — LEVOTHYROXINE SODIUM 125 MCG: 125 TABLET ORAL at 05:15

## 2021-01-01 RX ADMIN — HEPARIN SODIUM 5000 UNITS: 5000 INJECTION INTRAVENOUS; SUBCUTANEOUS at 06:00

## 2021-01-01 RX ADMIN — INSULIN LISPRO 2 UNITS: 100 INJECTION, SOLUTION INTRAVENOUS; SUBCUTANEOUS at 21:30

## 2021-01-01 RX ADMIN — INSULIN LISPRO 2 UNITS: 100 INJECTION, SOLUTION INTRAVENOUS; SUBCUTANEOUS at 11:53

## 2021-01-01 RX ADMIN — Medication 1 G: at 16:01

## 2021-01-01 RX ADMIN — Medication 4000 UNITS: at 09:59

## 2021-01-01 RX ADMIN — LEVOTHYROXINE SODIUM 125 MCG: 125 TABLET ORAL at 06:03

## 2021-01-01 RX ADMIN — TORSEMIDE 5 MG: 5 TABLET ORAL at 11:08

## 2021-01-01 RX ADMIN — HEPARIN SODIUM 5000 UNITS: 5000 INJECTION INTRAVENOUS; SUBCUTANEOUS at 23:37

## 2021-01-01 RX ADMIN — INSULIN LISPRO 1 UNITS: 100 INJECTION, SOLUTION INTRAVENOUS; SUBCUTANEOUS at 11:37

## 2021-01-01 RX ADMIN — INSULIN GLARGINE 6 UNITS: 100 INJECTION, SOLUTION SUBCUTANEOUS at 22:09

## 2021-01-01 RX ADMIN — LACTULOSE 20 G: 10 SOLUTION ORAL at 15:39

## 2021-01-01 RX ADMIN — ASPIRIN 81 MG: 81 TABLET, CHEWABLE ORAL at 08:10

## 2021-01-01 RX ADMIN — LEVOTHYROXINE SODIUM 125 MCG: 125 TABLET ORAL at 05:57

## 2021-01-01 RX ADMIN — LORAZEPAM 0.5 MG: 0.5 TABLET ORAL at 19:26

## 2021-01-01 RX ADMIN — INSULIN GLARGINE 6 UNITS: 100 INJECTION, SOLUTION SUBCUTANEOUS at 21:37

## 2021-01-01 RX ADMIN — BUMETANIDE 1 MG: 0.25 INJECTION, SOLUTION INTRAMUSCULAR; INTRAVENOUS at 11:42

## 2021-01-01 RX ADMIN — ATORVASTATIN CALCIUM 5 MG: 10 TABLET, FILM COATED ORAL at 08:10

## 2021-01-01 RX ADMIN — HEPARIN SODIUM 5000 UNITS: 5000 INJECTION INTRAVENOUS; SUBCUTANEOUS at 13:27

## 2021-01-01 RX ADMIN — HEPARIN SODIUM 5000 UNITS: 5000 INJECTION INTRAVENOUS; SUBCUTANEOUS at 06:45

## 2021-01-01 RX ADMIN — Medication 4000 UNITS: at 08:17

## 2021-01-01 RX ADMIN — INSULIN LISPRO 2 UNITS: 100 INJECTION, SOLUTION INTRAVENOUS; SUBCUTANEOUS at 12:01

## 2021-01-01 RX ADMIN — ATORVASTATIN CALCIUM 5 MG: 10 TABLET, FILM COATED ORAL at 08:29

## 2021-01-01 RX ADMIN — DOCUSATE SODIUM 100 MG: 100 CAPSULE, LIQUID FILLED ORAL at 18:45

## 2021-01-01 RX ADMIN — HEPARIN SODIUM 5000 UNITS: 5000 INJECTION INTRAVENOUS; SUBCUTANEOUS at 06:03

## 2021-01-01 RX ADMIN — POTASSIUM CHLORIDE 40 MEQ: 1500 TABLET, EXTENDED RELEASE ORAL at 09:14

## 2021-01-01 RX ADMIN — PHENYLEPHRINE HYDROCHLORIDE 200 MCG: 10 INJECTION INTRAVENOUS at 12:44

## 2021-01-01 RX ADMIN — ATORVASTATIN CALCIUM 5 MG: 10 TABLET, FILM COATED ORAL at 09:59

## 2021-01-01 RX ADMIN — INSULIN LISPRO 1 UNITS: 100 INJECTION, SOLUTION INTRAVENOUS; SUBCUTANEOUS at 08:25

## 2021-01-01 RX ADMIN — FERROUS SULFATE TAB 325 MG (65 MG ELEMENTAL FE) 325 MG: 325 (65 FE) TAB at 09:17

## 2021-01-01 RX ADMIN — INSULIN LISPRO 1 UNITS: 100 INJECTION, SOLUTION INTRAVENOUS; SUBCUTANEOUS at 10:01

## 2021-01-01 RX ADMIN — TAMSULOSIN HYDROCHLORIDE 0.4 MG: 0.4 CAPSULE ORAL at 17:49

## 2021-01-01 RX ADMIN — DOCUSATE SODIUM 100 MG: 100 CAPSULE, LIQUID FILLED ORAL at 09:35

## 2021-01-01 RX ADMIN — SODIUM CHLORIDE 50 ML/HR: 0.9 INJECTION, SOLUTION INTRAVENOUS at 20:14

## 2021-01-01 RX ADMIN — INSULIN LISPRO 2 UNITS: 100 INJECTION, SOLUTION INTRAVENOUS; SUBCUTANEOUS at 21:39

## 2021-01-01 RX ADMIN — ACETAMINOPHEN 650 MG: 325 TABLET ORAL at 00:54

## 2021-01-01 RX ADMIN — ASPIRIN 81 MG: 81 TABLET, CHEWABLE ORAL at 08:35

## 2021-01-01 RX ADMIN — INSULIN GLARGINE 5 UNITS: 100 INJECTION, SOLUTION SUBCUTANEOUS at 21:29

## 2021-01-01 RX ADMIN — INSULIN LISPRO 1 UNITS: 100 INJECTION, SOLUTION INTRAVENOUS; SUBCUTANEOUS at 09:12

## 2021-01-01 RX ADMIN — INSULIN LISPRO 2 UNITS: 100 INJECTION, SOLUTION INTRAVENOUS; SUBCUTANEOUS at 11:09

## 2021-01-01 RX ADMIN — CEFTRIAXONE 1000 MG: 1 INJECTION, POWDER, FOR SOLUTION INTRAMUSCULAR; INTRAVENOUS at 18:30

## 2021-01-01 RX ADMIN — Medication 1 MG/HR: at 19:27

## 2021-01-01 RX ADMIN — Medication 2000 UNITS: at 08:11

## 2021-01-01 RX ADMIN — ASPIRIN 81 MG: 81 TABLET, CHEWABLE ORAL at 09:01

## 2021-01-01 RX ADMIN — DULOXETINE 30 MG: 30 CAPSULE, DELAYED RELEASE ORAL at 08:30

## 2021-01-01 RX ADMIN — HEPARIN SODIUM 5000 UNITS: 5000 INJECTION INTRAVENOUS; SUBCUTANEOUS at 22:18

## 2021-01-01 RX ADMIN — TAMSULOSIN HYDROCHLORIDE 0.4 MG: 0.4 CAPSULE ORAL at 16:00

## 2021-01-01 RX ADMIN — INSULIN LISPRO 4 UNITS: 100 INJECTION, SOLUTION INTRAVENOUS; SUBCUTANEOUS at 10:05

## 2021-01-01 RX ADMIN — INSULIN LISPRO 4 UNITS: 100 INJECTION, SOLUTION INTRAVENOUS; SUBCUTANEOUS at 11:13

## 2021-01-01 RX ADMIN — BUMETANIDE 2 MG: 0.25 INJECTION, SOLUTION INTRAMUSCULAR; INTRAVENOUS at 12:33

## 2021-01-01 RX ADMIN — PANTOPRAZOLE SODIUM 40 MG: 40 TABLET, DELAYED RELEASE ORAL at 06:06

## 2021-01-01 RX ADMIN — ASPIRIN 325 MG ORAL TABLET 325 MG: 325 PILL ORAL at 18:29

## 2021-01-01 RX ADMIN — HEPARIN SODIUM 5000 UNITS: 5000 INJECTION INTRAVENOUS; SUBCUTANEOUS at 06:23

## 2021-01-01 RX ADMIN — INSULIN GLARGINE 5 UNITS: 100 INJECTION, SOLUTION SUBCUTANEOUS at 10:15

## 2021-01-01 RX ADMIN — INSULIN LISPRO 4 UNITS: 100 INJECTION, SOLUTION INTRAVENOUS; SUBCUTANEOUS at 16:09

## 2021-01-01 RX ADMIN — ASPIRIN 81 MG: 81 TABLET, CHEWABLE ORAL at 09:16

## 2021-01-01 RX ADMIN — MELATONIN TAB 3 MG 3 MG: 3 TAB at 21:01

## 2021-01-01 RX ADMIN — HEPARIN SODIUM 5000 UNITS: 5000 INJECTION INTRAVENOUS; SUBCUTANEOUS at 05:48

## 2021-01-01 RX ADMIN — PANTOPRAZOLE SODIUM 40 MG: 40 TABLET, DELAYED RELEASE ORAL at 06:23

## 2021-01-01 RX ADMIN — ASPIRIN 81 MG: 81 TABLET, CHEWABLE ORAL at 08:17

## 2021-01-01 RX ADMIN — DOXYCYCLINE 100 MG: 100 CAPSULE ORAL at 20:35

## 2021-01-01 RX ADMIN — METRONIDAZOLE 500 MG: 500 INJECTION, SOLUTION INTRAVENOUS at 19:53

## 2021-01-01 RX ADMIN — Medication 4000 UNITS: at 08:07

## 2021-01-01 RX ADMIN — SODIUM CHLORIDE 100 ML/HR: 0.9 INJECTION, SOLUTION INTRAVENOUS at 15:00

## 2021-01-01 RX ADMIN — Medication 2000 UNITS: at 08:07

## 2021-01-01 RX ADMIN — CEFEPIME HYDROCHLORIDE 1000 MG: 2 INJECTION, POWDER, FOR SOLUTION INTRAVENOUS at 21:24

## 2021-01-01 RX ADMIN — SODIUM CHLORIDE 100 ML/HR: 0.9 INJECTION, SOLUTION INTRAVENOUS at 23:26

## 2021-01-01 RX ADMIN — Medication 4000 UNITS: at 09:57

## 2021-01-01 RX ADMIN — Medication 1 PACKET: at 08:56

## 2021-01-01 RX ADMIN — DEXTROSE MONOHYDRATE 50 ML: 25 INJECTION, SOLUTION INTRAVENOUS at 06:34

## 2021-01-01 RX ADMIN — INSULIN LISPRO 1 UNITS: 100 INJECTION, SOLUTION INTRAVENOUS; SUBCUTANEOUS at 16:10

## 2021-01-01 RX ADMIN — BUMETANIDE 2 MG: 0.25 INJECTION, SOLUTION INTRAMUSCULAR; INTRAVENOUS at 17:07

## 2021-01-01 RX ADMIN — INSULIN GLARGINE 6 UNITS: 100 INJECTION, SOLUTION SUBCUTANEOUS at 21:53

## 2021-01-01 RX ADMIN — HEPARIN SODIUM 5000 UNITS: 5000 INJECTION INTRAVENOUS; SUBCUTANEOUS at 14:00

## 2021-01-01 RX ADMIN — HEPARIN SODIUM 5000 UNITS: 5000 INJECTION INTRAVENOUS; SUBCUTANEOUS at 21:54

## 2021-01-01 RX ADMIN — BUMETANIDE 2 MG: 0.25 INJECTION, SOLUTION INTRAMUSCULAR; INTRAVENOUS at 21:23

## 2021-01-01 RX ADMIN — DOXYCYCLINE 100 MG: 100 CAPSULE ORAL at 09:32

## 2021-01-01 RX ADMIN — HEPARIN SODIUM 5000 UNITS: 5000 INJECTION INTRAVENOUS; SUBCUTANEOUS at 14:03

## 2021-01-01 RX ADMIN — DULOXETINE 30 MG: 30 CAPSULE, DELAYED RELEASE ORAL at 10:02

## 2021-01-01 RX ADMIN — INSULIN GLARGINE 6 UNITS: 100 INJECTION, SOLUTION SUBCUTANEOUS at 21:45

## 2021-01-01 RX ADMIN — ATORVASTATIN CALCIUM 5 MG: 10 TABLET, FILM COATED ORAL at 08:54

## 2021-01-01 RX ADMIN — INSULIN GLARGINE 5 UNITS: 100 INJECTION, SOLUTION SUBCUTANEOUS at 21:01

## 2021-01-01 RX ADMIN — INSULIN LISPRO 2 UNITS: 100 INJECTION, SOLUTION INTRAVENOUS; SUBCUTANEOUS at 21:16

## 2021-01-01 RX ADMIN — INSULIN LISPRO 4 UNITS: 100 INJECTION, SOLUTION INTRAVENOUS; SUBCUTANEOUS at 07:52

## 2021-01-01 RX ADMIN — HEPARIN SODIUM 5000 UNITS: 5000 INJECTION INTRAVENOUS; SUBCUTANEOUS at 21:46

## 2021-01-01 RX ADMIN — INSULIN LISPRO 1 UNITS: 100 INJECTION, SOLUTION INTRAVENOUS; SUBCUTANEOUS at 17:26

## 2021-01-01 RX ADMIN — FERROUS SULFATE TAB 325 MG (65 MG ELEMENTAL FE) 325 MG: 325 (65 FE) TAB at 09:01

## 2021-01-01 RX ADMIN — DULOXETINE HYDROCHLORIDE 30 MG: 30 CAPSULE, DELAYED RELEASE ORAL at 09:32

## 2021-01-01 RX ADMIN — ZINC SULFATE 220 MG (50 MG) CAPSULE 220 MG: CAPSULE at 09:33

## 2021-01-01 RX ADMIN — INSULIN GLARGINE 6 UNITS: 100 INJECTION, SOLUTION SUBCUTANEOUS at 21:46

## 2021-01-01 RX ADMIN — POTASSIUM CHLORIDE 40 MEQ: 1500 TABLET, EXTENDED RELEASE ORAL at 09:59

## 2021-01-01 RX ADMIN — CYANOCOBALAMIN TAB 500 MCG 2000 MCG: 500 TAB at 09:33

## 2021-01-01 RX ADMIN — INSULIN LISPRO 3 UNITS: 100 INJECTION, SOLUTION INTRAVENOUS; SUBCUTANEOUS at 15:58

## 2021-01-01 RX ADMIN — ASPIRIN 81 MG: 81 TABLET, CHEWABLE ORAL at 08:54

## 2021-01-01 RX ADMIN — DOCUSATE SODIUM 100 MG: 100 CAPSULE, LIQUID FILLED ORAL at 08:07

## 2021-01-01 RX ADMIN — HEPARIN SODIUM 5000 UNITS: 5000 INJECTION INTRAVENOUS; SUBCUTANEOUS at 22:24

## 2021-01-01 RX ADMIN — Medication 1 G: at 17:05

## 2021-01-01 RX ADMIN — DEXTROSE MONOHYDRATE 50 ML: 25 INJECTION, SOLUTION INTRAVENOUS at 21:30

## 2021-01-01 RX ADMIN — DOCUSATE SODIUM 100 MG: 100 CAPSULE, LIQUID FILLED ORAL at 17:26

## 2021-01-01 RX ADMIN — TAMSULOSIN HYDROCHLORIDE 0.4 MG: 0.4 CAPSULE ORAL at 23:28

## 2021-01-01 RX ADMIN — BUMETANIDE 2 MG: 0.25 INJECTION, SOLUTION INTRAMUSCULAR; INTRAVENOUS at 05:23

## 2021-01-01 RX ADMIN — INSULIN GLARGINE 5 UNITS: 100 INJECTION, SOLUTION SUBCUTANEOUS at 21:32

## 2021-01-01 RX ADMIN — SODIUM CHLORIDE 700 MG: 9 INJECTION, SOLUTION INTRAVENOUS at 09:00

## 2021-01-01 RX ADMIN — LISINOPRIL 2.5 MG: 2.5 TABLET ORAL at 08:54

## 2021-01-01 RX ADMIN — LEVOTHYROXINE SODIUM 125 MCG: 125 TABLET ORAL at 05:52

## 2021-01-01 RX ADMIN — PANTOPRAZOLE SODIUM 40 MG: 40 TABLET, DELAYED RELEASE ORAL at 05:09

## 2021-01-01 RX ADMIN — HEPARIN SODIUM 5000 UNITS: 5000 INJECTION INTRAVENOUS; SUBCUTANEOUS at 21:38

## 2021-01-01 RX ADMIN — PANTOPRAZOLE SODIUM 40 MG: 40 TABLET, DELAYED RELEASE ORAL at 06:22

## 2021-01-01 RX ADMIN — HEPARIN SODIUM 5000 UNITS: 5000 INJECTION INTRAVENOUS; SUBCUTANEOUS at 15:58

## 2021-01-01 RX ADMIN — POTASSIUM CHLORIDE 40 MEQ: 1500 TABLET, EXTENDED RELEASE ORAL at 09:56

## 2021-01-01 RX ADMIN — DULOXETINE HYDROCHLORIDE 30 MG: 30 CAPSULE, DELAYED RELEASE ORAL at 08:06

## 2021-01-01 RX ADMIN — INSULIN GLARGINE 15 UNITS: 100 INJECTION, SOLUTION SUBCUTANEOUS at 23:24

## 2021-01-01 RX ADMIN — INSULIN LISPRO 2 UNITS: 100 INJECTION, SOLUTION INTRAVENOUS; SUBCUTANEOUS at 16:10

## 2021-01-01 RX ADMIN — OXYCODONE HYDROCHLORIDE AND ACETAMINOPHEN 1000 MG: 500 TABLET ORAL at 08:10

## 2021-01-01 RX ADMIN — ATORVASTATIN CALCIUM 5 MG: 10 TABLET, FILM COATED ORAL at 08:36

## 2021-01-01 RX ADMIN — SODIUM CHLORIDE, SODIUM LACTATE, POTASSIUM CHLORIDE, AND CALCIUM CHLORIDE 500 ML: .6; .31; .03; .02 INJECTION, SOLUTION INTRAVENOUS at 19:09

## 2021-01-01 RX ADMIN — VANCOMYCIN HYDROCHLORIDE 750 MG: 750 INJECTION, SOLUTION INTRAVENOUS at 11:53

## 2021-01-01 RX ADMIN — OXYCODONE HYDROCHLORIDE AND ACETAMINOPHEN 1000 MG: 500 TABLET ORAL at 23:28

## 2021-01-01 RX ADMIN — HEPARIN SODIUM 5000 UNITS: 5000 INJECTION INTRAVENOUS; SUBCUTANEOUS at 05:52

## 2021-01-01 RX ADMIN — Medication 1 G: at 17:34

## 2021-01-01 RX ADMIN — TAMSULOSIN HYDROCHLORIDE 0.4 MG: 0.4 CAPSULE ORAL at 15:39

## 2021-01-01 RX ADMIN — TAMSULOSIN HYDROCHLORIDE 0.4 MG: 0.4 CAPSULE ORAL at 17:57

## 2021-01-01 RX ADMIN — INSULIN LISPRO 4 UNITS: 100 INJECTION, SOLUTION INTRAVENOUS; SUBCUTANEOUS at 11:42

## 2021-01-01 RX ADMIN — SODIUM CHLORIDE, SODIUM GLUCONATE, SODIUM ACETATE, POTASSIUM CHLORIDE, MAGNESIUM CHLORIDE, SODIUM PHOSPHATE, DIBASIC, AND POTASSIUM PHOSPHATE 500 ML: .53; .5; .37; .037; .03; .012; .00082 INJECTION, SOLUTION INTRAVENOUS at 09:26

## 2021-01-01 RX ADMIN — INSULIN LISPRO 2 UNITS: 100 INJECTION, SOLUTION INTRAVENOUS; SUBCUTANEOUS at 12:38

## 2021-01-01 RX ADMIN — VANCOMYCIN HYDROCHLORIDE 750 MG: 750 INJECTION, SOLUTION INTRAVENOUS at 11:45

## 2021-01-01 RX ADMIN — HEPARIN SODIUM 5000 UNITS: 5000 INJECTION INTRAVENOUS; SUBCUTANEOUS at 13:50

## 2021-01-01 RX ADMIN — Medication 1 PACKET: at 09:22

## 2021-01-21 NOTE — PROGRESS NOTES
This patient was seen on 1/21/2021  My role is Podiatrist / Wound Specialist    SUBJECTIVE    Chief Complaint:  Diabetic foot wound     Patient ID: Dorian Pride is a 76 y o  male  Pt arrives for wound care follow up  Presents with dressing intact and wearing wedge shoe  Pt is changing the dressing as ordered  Pt states he fell on Tuesday due to a low BG level  Pt was evaluated by EMS but refused to go to the hospital        The following portions of the patient's history were reviewed and updated as appropriate: allergies, current medications, past family history, past medical history, past social history, past surgical history and problem list     Review of Systems   Constitutional: Negative for activity change, appetite change, chills, diaphoresis, fatigue, fever and unexpected weight change  Respiratory: Negative  Cardiovascular: Negative  Gastrointestinal: Negative  Musculoskeletal: Negative  Skin: Negative for wound  Neurological: Positive for numbness  Psychiatric/Behavioral: Negative  OBJECTIVE      Ht 5' 5" (1 651 m)   BMI 24 96 kg/m²     Foot/Ankle Musculoskeletal Exam    General        Constitutional: well-developed    Neurological: alert    Neurovascular      Neurovascular - Right        Dorsalis pedis: 1+      Posterior tibial: 1+      Neurovascular - Left        Dorsalis pedis: 1+      Posterior tibial: 1+       Physical Exam  Vitals signs and nursing note reviewed  Constitutional:       General: He is not in acute distress  Appearance: He is well-developed  He is not diaphoretic  HENT:      Head: Normocephalic  Eyes:      Pupils: Pupils are equal, round, and reactive to light  Neck:      Musculoskeletal: Normal range of motion  Cardiovascular:      Pulses:           Dorsalis pedis pulses are 1+ on the right side and 1+ on the left side  Posterior tibial pulses are 1+ on the right side and 1+ on the left side     Pulmonary:      Effort: Pulmonary effort is normal       Breath sounds: Normal breath sounds  Abdominal:      General: Bowel sounds are normal       Palpations: Abdomen is soft  Feet:      Right foot:      Protective Sensation: 10 sites tested  0 sites sensed  Skin integrity: No ulcer, skin breakdown, erythema, warmth, callus or dry skin  Left foot:      Protective Sensation: 10 sites tested  0 sites sensed  Neurological:      General: No focal deficit present  Mental Status: He is alert and oriented to person, place, and time  Sensory: Sensory deficit present  Wound # 1  Location: right plantar foot   Length 0 8cm: Width 0 8cm: Depth 0 2cm:   Deepest Tissue Noted in Base:   Probe to Bone?: no  Peripheral Skin Description: intact  Granulation:90% Fibrotic Tissue: 10% Necrotic Tissue: 0%  Drainage Amount: minimal  Signs of Infection: no  Total debrided 0 64 square centimeters     ASSESSMENT     There are no diagnoses linked to this encounter  Problem List Items Addressed This Visit     None            Problems:    Chronic Problem with Exacerbation, Progression, or Side Effects of Treatment:  1  Open diabetic foot ulcer    PLAN      A formal timeout including patient identification, laterality and existing allergies [facility] was conducted  Procedure Performed: Debridement of subcutaneous tissue- >20 sq cm (50568)  Under aseptic technique, the wound was thoroughly explored and bluntly probed for undermining, deep sinus tracts and bone involvement  Sterile instrumentation including scalpel, forceps and curette used to excise the clearly delineated devitalized subcutaneous tissue (fibrotic tissue and necrotic non-viable portions of fat) exposing viable tissue  After debridement, bleeding in the wound bed base was noted indicated viable subcutaneous tissue had been exposed  Bleeding controlled with gentle pressure  Patient tolerated debridement without complications  The wound was dressed  Wound dressing technique and frequency described to patient  I am to be called if any signs of infection develop such as erythema, increased wound size or significant change in appearance of wound, odor, pain, increased or change in color of drainage, swelling, fever, chills, nightsweats  I reviewed these signs with the patient  I recommended limiting WB to bathroom priveleges and meal table and to use any prescribed offloading devices in an effort to allow proper rest and healing of the wounded area  I explained that good wound care and compliance are necessary to allow healing and to prevent toe loss or limb loss  Dermagran gauze applied to the wound bed, covered with gauze and tape  Football dressing applied using cast padding and coban  To remain dry and intact, continue to wear wedge shoe with every step  Return in one week       1

## 2021-01-28 NOTE — PROGRESS NOTES
This patient was seen on 1/28/2021  My role is Wound Specialist    SUBJECTIVE    Chief Complaint:  Diabetic foot wound     Patient ID: Jada Lin is a 76 y o  male  Pt arrives for wound care follow up  Presents with dressing intact and denies problems  Pt wearing wedge shoe and using single point cane  The following portions of the patient's history were reviewed and updated as appropriate: allergies, current medications, past family history, past medical history, past social history, past surgical history and problem list     Review of Systems   Constitutional: Negative for activity change, appetite change, chills, diaphoresis, fatigue, fever and unexpected weight change  Respiratory: Negative  Cardiovascular: Negative  Gastrointestinal: Negative  Musculoskeletal: Negative  Skin: Negative for wound  Neurological: Positive for numbness  Psychiatric/Behavioral: Negative  OBJECTIVE      BP 91/57   Pulse 94   Ht 5' 5" (1 651 m) Comment: verbal  Wt 68 kg (150 lb)   BMI 24 96 kg/m²     Foot/Ankle Musculoskeletal Exam    General        Constitutional: well-developed    Neurological: alert    Neurovascular      Neurovascular - Right        Dorsalis pedis: 1+      Posterior tibial: 1+      Neurovascular - Left        Dorsalis pedis: 1+      Posterior tibial: 1+       Physical Exam  Vitals signs and nursing note reviewed  Constitutional:       General: He is not in acute distress  Appearance: He is well-developed  He is not diaphoretic  HENT:      Head: Normocephalic  Eyes:      Pupils: Pupils are equal, round, and reactive to light  Neck:      Musculoskeletal: Normal range of motion  Cardiovascular:      Pulses:           Dorsalis pedis pulses are 1+ on the right side and 1+ on the left side  Posterior tibial pulses are 1+ on the right side and 1+ on the left side     Pulmonary:      Effort: Pulmonary effort is normal       Breath sounds: Normal breath sounds  Abdominal:      General: Bowel sounds are normal       Palpations: Abdomen is soft  Feet:      Right foot:      Protective Sensation: 10 sites tested  0 sites sensed  Skin integrity: No ulcer, skin breakdown, erythema, warmth, callus or dry skin  Left foot:      Protective Sensation: 10 sites tested  0 sites sensed  Neurological:      General: No focal deficit present  Mental Status: He is alert and oriented to person, place, and time  Sensory: Sensory deficit present  Wound # 1  Location: right plantar foot   Length 0 8cm: Width 0 8cm: Depth 0 2cm:   Deepest Tissue Noted in Base:   Probe to Bone?: no  Peripheral Skin Description: intact  Granulation:90% Fibrotic Tissue: 10% Necrotic Tissue: 0%  Drainage Amount: minimal  Signs of Infection: no  Total debrided 0 64 square centimeters    ASSESSMENT     There are no diagnoses linked to this encounter  Problem List Items Addressed This Visit     None            Problems:    Diabetic foot ulcer      PLAN  DEBRIDEMENT NOTE    A formal timeout including patient identification, laterality and existing allergies [facility] was conducted  Procedure Performed: Debridement of subcutaneous tissue- >20 sq cm (55937)  Under aseptic technique, the wound was thoroughly explored and bluntly probed for undermining, deep sinus tracts and bone involvement  Sterile instrumentation including scalpel, forceps and curette used to excise the clearly delineated devitalized subcutaneous tissue (fibrotic tissue and necrotic non-viable portions of fat) exposing viable tissue  After debridement, bleeding in the wound bed base was noted indicated viable subcutaneous tissue had been exposed  Bleeding controlled with gentle pressure  Patient tolerated debridement without complications  The wound was dressed  Wound dressing technique and frequency described to patient   I am to be called if any signs of infection develop such as erythema, increased wound size or significant change in appearance of wound, odor, pain, increased or change in color of drainage, swelling, fever, chills, nightsweats  I reviewed these signs with the patient  I recommended limiting WB to bathroom priveleges and meal table and to use any prescribed offloading devices in an effort to allow proper rest and healing of the wounded area  I explained that good wound care and compliance are necessary to allow healing and to prevent toe loss or limb loss  Dermagran gauze applied to the wound bed, covered with gauze and tape  Felt pads applied for offloading  Football dressing applied using cast padding and coban  To remain dry and intact, continue to wear wedge shoe with every step  Return in one week       Data Reviewed / Tests Ordered / Procedures Performed / Recommendations:  1  Review prior external note  2  Review result  3  Test Ordered  4  Independent Historian Information  5  Independent Interpretation of Test  6  Test Interpretation Discussion with Patient  6  Discussion of Management /  Recommendations  7  Procedures Performed  8  Referrals Made  9  Prescriptions Given  10  Risk of Complications and/or Morbidity or Mortality  11  Discussion Regarding Surgery  12  Discussion Regarding Hospitalization  13   Drug Therapy Requiring Toxicity Monitoring

## 2021-02-05 PROBLEM — U07.1 COVID-19: Status: ACTIVE | Noted: 2021-01-01

## 2021-02-05 PROBLEM — Z79.4 TYPE 2 DIABETES MELLITUS WITH HYPERGLYCEMIA, WITH LONG-TERM CURRENT USE OF INSULIN (HCC): Status: ACTIVE | Noted: 2019-07-30

## 2021-02-05 PROBLEM — E11.65 TYPE 2 DIABETES MELLITUS WITH HYPERGLYCEMIA, WITH LONG-TERM CURRENT USE OF INSULIN (HCC): Status: ACTIVE | Noted: 2019-07-30

## 2021-02-05 PROBLEM — R77.8 ELEVATED TROPONIN LEVEL NOT DUE MYOCARDIAL INFARCTION: Status: ACTIVE | Noted: 2019-03-31

## 2021-02-05 NOTE — ASSESSMENT & PLAN NOTE
Lab Results   Component Value Date    SARSCOV2 Positive (A) 02/05/2021       Workup:  · Imaging:  Xr Chest 1 View Portable    Result Date: 2/5/2021  Impression: No acute cardiopulmonary disease    · Oxygen:  Saturating 88% on room air when talking to patient    COVID Stage: MILD    Plan:  · Labs:  · Blood type and screen  · Cardiac markers - troponin, CK, BNP  · Inflammatory Markers - CRP, D-Dimer, Ferritin  · CMP tomorrow a m   · CBC with diff tomorrow a m    · Procalcitonin today in morning  · Meds  · Vitamin D3 2000 units daily  · Vitamin C 1000 BID  · Zinc 220 mg x 7 days, will hold MVI due to allergy to iodine  · Hold Remdesevir due to creatinine clearance less than 30  · Famotidine 20 mg PO OD due to creatinen clearance  · Dexamethasone 6 mg IV daily times 10 days  · Consideration for convalescent plasma therapy - if no improvement    · Anticoag: while waiting for D-dimer will do heparin 5000 units Q 8  · Non-Pharmacologic interventions  · Self proning if able  · Incentive Spirometry

## 2021-02-05 NOTE — PROGRESS NOTES
Patient's wife called stating he has been tired and has some congestion  COVID ordered  Will follow up with patient when results received  Patient advised to check my chart for results over the weekend

## 2021-02-05 NOTE — ASSESSMENT & PLAN NOTE
Recent Labs     02/05/21  1554   TROPONINI 0 06*     · Elevated troponin denies any chest pain, no EKG changes seen, likely non MI related troponin elevation due to COVID and recent Srini I  · Was placed on telemetry in the ER  · Will trend troponin x2

## 2021-02-05 NOTE — ASSESSMENT & PLAN NOTE
Lab Results   Component Value Date    HGBA1C 8 8 (H) 11/06/2020       No results for input(s): POCGLU in the last 72 hours      · Will recheck A1c  · Will discontinue insulin pump for now  · Will start the patient on insulin drip non pregnant protocol  · Accu-Chek every 2 hours  · Diabetic diet  · Endocrinology consult  IV fluids gentle hydration

## 2021-02-05 NOTE — ASSESSMENT & PLAN NOTE
Pseudohyponatremia corrected sodium 134 likely due to hyperglycemia    · Will recheck in the morning

## 2021-02-05 NOTE — ASSESSMENT & PLAN NOTE
Lab Results   Component Value Date    HGBA1C 8 8 (H) 11/06/2020       No results for input(s): POCGLU in the last 72 hours      Blood Sugar Average: Last 72 hrs:    · Will recheck A1c  · Will discontinue insulin pump for now  · Will start the patient on insulin drip non pregnant protocol  · Accu-Chek every 2 hours  · Diabetic diet  · Endocrinology consult  · IV fluids gentle hydration
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
## 2021-02-05 NOTE — ED PROVIDER NOTES
History  Chief Complaint   Patient presents with    Generalized Body Aches     Pt presents to the ED with c/o fatigue, body aches for several days  History provided by:  Patient   used: No    Generalized Body Aches  Associated symptoms: cough, fatigue and myalgias    Associated symptoms: no abdominal pain, no fever, no headaches, no nausea, no shortness of breath and no vomiting    51-year-old male with history of IDDM presented for evaluation of generalized fatigue, muscle aches over the last few days  Denies fever, chills, chest pain  Does have slight cough but denies shortness of breath  Was tested for COVID earlier today  Results pending  Reports his continuous glucose monitor has been reading in the 400s for the last few days  He has gotten bolus doses of insulin but has not seem to improve his glucose  States he has not confirmed with fingerstick  Lab work prior to my evaluation shows blood glucose greater than 400 but no acidosis  Creatinine is also significantly elevated  Exam he does have a small abrasion on his right frontal scalp  Reports that he scraped his head on a shelf today  No LOC, pain  Takes 81 mg aspirin  Plan additional labs, fluids, re-evaluate  Likely admission for hydration for KAZ correction, management of hyperglycemia  Prior to Admission Medications   Prescriptions Last Dose Informant Patient Reported? Taking?    Atorvastatin Calcium (LIPITOR PO)  Self Yes No   Sig: Take 10 mg by mouth daily 1/2 tab daily   CALCIUM CITRATE PO  Self Yes No   Sig: Take 2,000 mg by mouth daily    Cholecalciferol (VITAMIN D3) 2000 units capsule  Self Yes No   Sig: Take 2,000 Units by mouth daily   Continuous Blood Gluc Sensor (DEXCOM G6 SENSOR) MISC  Self Yes No   Sig: As directed dx:250 03   DULoxetine (CYMBALTA) 30 mg delayed release capsule  Self Yes No   Sig: Take 30 mg by mouth daily    Patiromer Sorbitex Calcium 8 4 g PACK   No No   Sig: Take 1 each by mouth daily Take one packet on Saturday and one on Sunday  Then labs Monday     amLODIPine (NORVASC) 5 mg tablet  Self Yes No   Sig: Take 5 mg by mouth daily   amLODIPine (NORVASC) 5 mg tablet   No No   Sig: Take 1 tablet (5 mg total) by mouth daily   aspirin 81 mg chewable tablet  Self Yes No   Sig: Chew 81 mg daily   cyanocobalamin (VITAMIN B-12) 1,000 mcg tablet  Self Yes No   Sig: Take 2,000 mcg by mouth daily   ezetimibe (ZETIA) 10 mg tablet  Self Yes No   Sig: TAKE 5MG (ONE-HALF TABLET) BY MOUTH DAILY FOR CHOLESTEROL   glucose 40 %  Self Yes No   Sig: Take by mouth once   glucose 77 4 %   Yes No   Sig: TAKE 1/2 TUBE BY MOUTH  AS NEEDED FOR LOW BLOOD SUGAR   insulin aspart (NovoLOG) 100 units/mL injection  Self Yes No   Sig: Inject under the skin Via insulin pump   levothyroxine 125 mcg tablet  Self No No   Sig: Take 1 tablet (125 mcg total) by mouth daily in the early morning   lisinopril (ZESTRIL) 5 mg tablet  Self No No   Sig: Take 1 tablet (5 mg total) by mouth daily   pantoprazole (PROTONIX) 40 mg tablet  Self Yes No   Sig: Take 40 mg by mouth   tamsulosin (FLOMAX) 0 4 mg  Self Yes No   Sig: Take 0 4 mg by mouth daily with dinner        Facility-Administered Medications: None       Past Medical History:   Diagnosis Date    Arthritis     Cardiac disease     Closed fracture of fibula, lateral malleolus, right     Complete heart block (HCC)     Diabetes mellitus (HCC)     Disease of thyroid gland     External incisional dehiscence     GERD (gastroesophageal reflux disease)     Heart disease     Muscular deconditioning     Pacemaker     Sleep apnea     cannot tolerate cpap    Thyroid disease        Past Surgical History:   Procedure Laterality Date    CARDIAC PACEMAKER PLACEMENT Left 2015    CARDIAC PACEMAKER PLACEMENT      CARDIAC SURGERY      CATARACT EXTRACTION      MUSCLE FLAP      right foot    NERVE BLOCK      Transforaminal Epidural Lumbar with Fluoroscopic Guidance     GA COLONOSCOPY FLX DX W/COLLJ SPEC WHEN PFRMD N/A 5/4/2018    Procedure: EGD AND COLONOSCOPY;  Surgeon: Bert Medina MD;  Location: [facility]; Service: Gastroenterology    DE SPLIT [address] <100 SQCM Right 6/10/2019    Procedure: SKIN GRAFT SPLIT THICKNESS (STSG)  EXTREMITY right leg;  Surgeon: Jeanette Gautam DPM;  Location: [facility];  Service: Podiatry    TOE AMPUTATION      left 5th toe       Family History   Problem Relation Age of Onset    Diabetes Mother     Heart disease Mother     Thyroid disease Mother         Disorder     Cancer Brother     Gout Brother     Diabetes Maternal Grandmother      I have reviewed and agree with the history as documented  E-Cigarette/Vaping    E-Cigarette Use Never User      E-Cigarette/Vaping Substances    Nicotine No     THC No     CBD No     Flavoring No     Other No     Unknown No      Social History     Tobacco Use    Smoking status: Former Smoker     Types: Pipe    Smokeless tobacco: Never Used   Substance Use Topics    Alcohol use: Yes     Alcohol/week: 0 0 standard drinks     Frequency: Never     Drinks per session: Patient refused     Binge frequency: Never     Comment: socially    Drug use: No       Review of Systems   Constitutional: Positive for activity change, appetite change and fatigue  Negative for fever  Respiratory: Positive for cough  Negative for chest tightness and shortness of breath  Gastrointestinal: Negative for abdominal pain, nausea and vomiting  Musculoskeletal: Positive for myalgias  Negative for back pain and neck pain  Neurological: Negative for dizziness, weakness and headaches  All other systems reviewed and are negative  Physical Exam  Physical Exam  Vitals signs and nursing note reviewed  Constitutional:       Appearance: Normal appearance  HENT:      Head: Normocephalic  Comments: Small scalp abrasion in the right frontal area  No hematoma    Neck:      Musculoskeletal: Normal range of motion and neck supple  Cardiovascular:      Rate and Rhythm: Normal rate and regular rhythm  Pulmonary:      Effort: Pulmonary effort is normal       Comments: Some coarse breath sounds, mostly right lower lobe  Abdominal:      General: There is no distension  Palpations: Abdomen is soft  Tenderness: There is no abdominal tenderness  Comments: Insulin pump, continuous glucose monitor present  Musculoskeletal: Normal range of motion  General: No tenderness  Skin:     General: Skin is warm and dry  Neurological:      General: No focal deficit present  Mental Status: He is alert and oriented to person, place, and time     Psychiatric:         Mood and Affect: Mood normal          Behavior: Behavior normal          Vital Signs  ED Triage Vitals   Temperature Pulse Respirations Blood Pressure SpO2   02/05/21 1425 02/05/21 1423 02/05/21 1423 02/05/21 1423 02/05/21 1423   99 8 °F (37 7 °C) 96 18 113/58 99 %      Temp Source Heart Rate Source Patient Position - Orthostatic VS BP Location FiO2 (%)   02/05/21 1425 02/05/21 1423 -- 02/05/21 1423 --   Oral Monitor  Right arm       Pain Score       --                  Vitals:    02/05/21 1423   BP: 113/58   Pulse: 96         Visual Acuity      ED Medications  Medications - No data to display    Diagnostic Studies  Results Reviewed     Procedure Component Value Units Date/Time    CBC and differential [[id number]]  (Abnormal) Collected: 02/05/21 1427    Lab Status: Final result Specimen: Blood from Arm, Right Updated: 02/05/21 1516     WBC 7 07 Thousand/uL      RBC 3 71 Million/uL      Hemoglobin 10 8 g/dL      Hematocrit 35 4 %      MCV 95 fL      MCH 29 1 pg      MCHC 30 5 g/dL      RDW 13 4 %      MPV 8 9 fL      Platelets 75 Thousands/uL      nRBC 0 /100 WBCs      Neutrophils Relative 84 %      Immat GRANS % 0 %      Lymphocytes Relative 4 %      Monocytes Relative 12 %      Eosinophils Relative 0 %      Basophils Relative 0 %      Neutrophils Absolute 5 90 Thousands/µL      Immature Grans Absolute 0 03 Thousand/uL      Lymphocytes Absolute 0 25 Thousands/µL      Monocytes Absolute 0 85 Thousand/µL      Eosinophils Absolute 0 01 Thousand/µL      Basophils Absolute 0 03 Thousands/µL     Lipase [[id number]]  (Abnormal) Collected: 02/05/21 1427    Lab Status: Final result Specimen: Blood from Arm, Right Updated: 02/05/21 1503     Lipase 20 u/L     Comprehensive metabolic panel [[id number]]  (Abnormal) Collected: 02/05/21 1427    Lab Status: Final result Specimen: Blood from Arm, Right Updated: 02/05/21 1503     Sodium 129 mmol/L      Potassium 4 9 mmol/L      Chloride 96 mmol/L      CO2 22 mmol/L      ANION GAP 11 mmol/L      BUN 48 mg/dL      Creatinine 2 10 mg/dL      Glucose 425 mg/dL      Calcium 8 2 mg/dL      Corrected Calcium 9 0 mg/dL      AST 37 U/L      ALT 33 U/L      Alkaline Phosphatase 113 U/L      Total Protein 7 1 g/dL      Albumin 3 0 g/dL      Total Bilirubin 0 53 mg/dL      eGFR 30 ml/min/1 73sq m     Narrative:      Vitaly guidelines for Chronic Kidney Disease (CKD):     Stage 1 with normal or high GFR (GFR > 90 mL/min/1 73 square meters)    Stage 2 Mild CKD (GFR = 60-89 mL/min/1 73 square meters)    Stage 3A Moderate CKD (GFR = 45-59 mL/min/1 73 square meters)    Stage 3B Moderate CKD (GFR = 30-44 mL/min/1 73 square meters)    Stage 4 Severe CKD (GFR = 15-29 mL/min/1 73 square meters)    Stage 5 End Stage CKD (GFR <15 mL/min/1 73 square meters)  Note: GFR calculation is accurate only with a steady state creatinine                 No orders to display              Procedures  ECG 12 Lead Documentation Only    Date/Time: 2/5/2021 4:01 PM  Performed by: Juan Carlos Harrison MD  Authorized by: Juan Carlos Harrison MD     Indications / Diagnosis:  Weakness  ECG reviewed by me, the ED Provider: yes    Patient location:  ED  Previous ECG:     Previous ECG:  Compared to current    Comparison ECG info:  7/13/19 Similarity:  No change  Rate:     ECG rate:  92  Rhythm:     Rhythm: paced    Pacing:     Capture:  Complete  Ectopy:     Ectopy: none    QRS:     QRS axis:  Left  Conduction:     Conduction: abnormal      Abnormal conduction: non-specific intraventricular conduction delay    ST segments:     ST segments:  Normal  T waves:     T waves: normal               ED Course  ED Course as of Feb 05 1638 Fri Feb 05, 2021   1618 Troponin I(!): 0 06   1618 Sodium(!): 129   1618 Creatinine(!): 2 10   1618 Glucose, Random(!): 425   1618 BUN(!): 48   1624 Patient's wife reported she tested positive for COVID  MDM  Number of Diagnoses or Management Options  KAZ (acute kidney injury) ([facility] [address] ): new and requires workup  Elevated troponin:   Hyperglycemia: new and requires workup  Viral syndrome: new and requires workup  Diagnosis management comments: 79-year-old male with history of IDDM presented for evaluation of generalized body aches, fatigue, weakness over the last few days  Slight cough  No fever but does have elevated temperature here  Wife just tested positive for COVID  Lab work was remarkable for hyperglycemia as well as KAZ and slightly elevated troponin  He denies chest pain, shortness of breath  Chest x-ray unremarkable  Amount and/or Complexity of Data Reviewed  Clinical lab tests: ordered and reviewed  Tests in the radiology section of CPT®: ordered and reviewed  Discuss the patient with other providers: yes  Independent visualization of images, tracings, or specimens: yes    Patient Progress  Patient progress: stable      Disposition  Final diagnoses:   None     ED Disposition     None      Follow-up Information    None         Patient's Medications   Discharge Prescriptions    No medications on file     No discharge procedures on file      PDMP Review     None          ED Provider  Electronically Signed by           Luz Bang MD  02/08/21 [id number]

## 2021-02-05 NOTE — ASSESSMENT & PLAN NOTE
KAZ on top of CKD baseline creatinine seems to be around 1 3-1 6     · Patient has not been drinking for the past few days due to loss of appetite  · Received 2 L normal saline in the ER  · Will do gentle hydration normal saline at 50 cc an hour for 24 hours

## 2021-02-05 NOTE — ASSESSMENT & PLAN NOTE
Lab Results   Component Value Date     SARSCOV2 Positive (A) 02/05/2021         Workup:  · Imaging:  Xr Chest 1 View Portable     Result Date: 2/5/2021  Impression: No acute cardiopulmonary disease     · Oxygen:  Saturating 88% on room air when talking to patient     COVID Stage: MILD     Plan:  · Labs:  ? Blood type and screen  ? Cardiac markers - troponin, CK, BNP  ? Inflammatory Markers - CRP, D-Dimer, Ferritin  · CMP tomorrow a m   · CBC with diff tomorrow a m  · Procalcitonin today in morning  · Meds  ? Vitamin D3 2000 units daily  ? Vitamin C 1000 BID  ? Zinc 220 mg x 7 days, will hold MVI due to allergy to iodine  ? Hold Remdesevir due to creatinine clearance less than 30  ? Famotidine 20 mg PO OD due to creatinen clearance  ? Dexamethasone 6 mg IV daily times 10 days  ? Consideration for convalescent plasma therapy - if no improvement     · Anticoag: while waiting for D-dimer will do heparin 5000 units Q 8  · Non-Pharmacologic interventions  ? Self proning if able  ?  Incentive Spirometry
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
## 2021-02-06 PROBLEM — N17.9 AKI (ACUTE KIDNEY INJURY) (HCC): Status: RESOLVED | Noted: 2019-03-31 | Resolved: 2021-01-01

## 2021-02-06 NOTE — CONSULTS
Consult - Podiatry   Sidney Anne 76 y o  male MRN: [id number]  Unit/Bed#: S -01 Encounter: [id number]    Assessment/Plan     Assessment:  1  Peripheral neuropathy  2  Neuropathic ulcer right foot, [place] [address]  3  Acute COVID infection    Plan:  1  Wound needed debridement see procedure below  Wound is not infected  There is no acute sign of infection or probe to bone  There is no need for any further imaging or surgical intervention  A bulky padded dressing was placed on the wound  He does have an appointment with his podiatrist next week  The dressing will be changed approximately every 3 days while the patient remains in house  Weight bear to heel in Wedge shoe which he already has  2  Procedure: Verbal Consent was obtained and time out performed  Wound located right plantar forefoot was  excisionally Surgical- Subcutaneous  (CPT: 59129) debrided using scapel  Pre-debridement wound measures  0 3 x 0 3 x 0 2 cm  Pain was controlled by Lack of sensation due to Neuropathy   Post debridement measurement  0 5 x 0 5 x 0 3 cm for a total of 0 25 square centimeters  Post debridement picture was not obtained secondary to COVID restrictions and not having access to camera  Wound appearing granular with resh bleeding tissue, viable  100%  of wound debrided  Tissue debrided includes depth of Epidermis, Dermis and Subcutaneous with devitalized tissue being debrided including Hyperkeratosis, Slough, Fibrous and Biofilm  Moderate amount of bleeding bleeding was noted during procedure  Hemostasis was achieved using pressure  Pain noted during procedure rated as 0  Pain noted after procedure rated as 0  Procedure was well tolerated by patient  Acute or Chronic Illness or Injury that Poses a Threat to Life or Bodily Injury:  1  Diabetic neuropathy with chronic ulcer     Dysvascular and Diabetic Lower Extremity Ulcer Risk Factors     1   The lifetime risk of developing a foot ulcer for someone with diabetes is 25%  [iv]  2  Every year, about 1-4% of people with diabetes develop a new foot ulcer  [iv]  3  Between 10-15% of diabetic foot ulcers do not heal [v]  4  Of diabetic foot ulcers that do not heal, 25% will require amputation  [v]     iv Vy Turner V , Mecca Polanco and Hector OG , Foot Ulcers in the Diabetic Patient, Prevention and Treatment  Vascular Health Risk Management  2007 Feb; 3(1): 65-76  v PALAK Cabrera , NANETTE Rios , LENKA Paredes , RAO Fatima , PALAK Frey T , Risk factors for lower extremity amputation in patients with diabetic foot ulcers: a hospital-based case-control study  Diabetic Foot & Ankle, 2015  6:10  History of Present Illness     HPI:  Shade Sandy is a 76 y o  male who presents with acute COVID infection  Podiatry was asked to examine his lower extremities  He has no chronic foot ulcer to the right foot  He has been going to podiatry regularly  He has been getting offloading dressings and walking to his heel in a wedge shoe  He states the wound is almost healed  He has no pain in his feet secondary to neuropathy  The wound is dressed and intact currently  Patient is admitted for COVID and states he is feeling much better today  He states he is breathing just fine       Consults  Review of Systems   Constitutional: Negative  HENT: Negative  Eyes: Negative  Respiratory:  Cough  Cardiovascular: Negative  Gastrointestinal: Negative  Musculoskeletal:  Negative   Skin:  Ulcer right foot   Neurological:  Numbness in feet      Psych: negative      Historical Information   Past Medical History:   Diagnosis Date    Arthritis     Cardiac disease     Closed fracture of fibula, lateral malleolus, right     Complete heart block (HCC)     Diabetes mellitus ([facility] [address] )     Disease of thyroid gland     External incisional dehiscence     GERD (gastroesophageal reflux disease)     Heart disease     Muscular deconditioning     Pacemaker     Sleep apnea cannot tolerate cpap    Thyroid disease      Past Surgical History:   Procedure Laterality Date    CARDIAC PACEMAKER PLACEMENT Left 2015    CARDIAC PACEMAKER PLACEMENT      CARDIAC SURGERY      CATARACT EXTRACTION      MUSCLE FLAP      right foot    NERVE BLOCK      Transforaminal Epidural Lumbar with Fluoroscopic Guidance     VT COLONOSCOPY FLX DX W/COLLJ SPEC WHEN PFRMD N/A 5/4/2018    Procedure: EGD AND COLONOSCOPY;  Surgeon: Pat Rayo MD;  [facility];   Service: Gastroenterology    [address] SPL[address] <100 SQCM Right 6/10/2019    Procedure: SKIN GRAFT SPLIT THICKNESS (STSG)  EXTREMITY right leg;  Surgeon: Vincent Tay DPM;  Location:  MAIN OR;  Service: Podiatry    TOE AMPUTATION      left 5th toe     Social History   Social History     Substance and Sexual Activity   Alcohol Use Yes    Alcohol/week: 0 0 standard drinks    Frequency: Never    Drinks per session: Patient refused    Binge frequency: Never    Comment: socially     Social History     Substance and Sexual Activity   Drug Use No     Social History     Tobacco Use   Smoking Status Former Smoker    Types: Pipe   Smokeless Tobacco Never Used     Family History:   Family History   Problem Relation Age of Onset    Diabetes Mother     Heart disease Mother     Thyroid disease Mother         Disorder     Cancer Brother     Gout Brother     Diabetes Maternal Grandmother        Meds/Allergies   Medications Prior to Admission   Medication    amLODIPine (NORVASC) 5 mg tablet    aspirin 81 mg chewable tablet    Atorvastatin Calcium (LIPITOR PO)    CALCIUM CITRATE PO    Cholecalciferol (VITAMIN D3) 2000 units capsule    Continuous Blood Gluc Sensor (DEXCOM G6 SENSOR) MISC    cyanocobalamin (VITAMIN B-12) 1,000 mcg tablet    DULoxetine (CYMBALTA) 30 mg delayed release capsule    ezetimibe (ZETIA) 10 mg tablet    glucose 40 %    glucose 77 4 %    insulin aspart (NovoLOG) 100 units/mL injection    levothyroxine 125 mcg tablet    pantoprazole (PROTONIX) 40 mg tablet    tamsulosin (FLOMAX) 0 4 mg     Allergies   Allergen Reactions    Contrast  [Iodinated Diagnostic Agents]     Lipitor  [Atorvastatin Calcium] Drowsiness    Ibuprofen      sensitive    Iodine Rash    Simvastatin Anxiety     Other reaction(s): Malaise (finding)       Objective   First Vitals:   Blood Pressure: 113/58 (02/05/21 1423)  Pulse: 96 (02/05/21 1423)  Temperature: 99 8 °F (37 7 °C) (02/05/21 1425)  Temp Source: Oral (02/05/21 1425)  Respirations: 18 (02/05/21 1423)  Height: 5' 6" (167 6 cm) (02/05/21 1925)  SpO2: 99 % (02/05/21 1423)    Current Vitals:   Blood Pressure: 110/58 (02/06/21 0744)  Pulse: 88 (02/06/21 0744)  Temperature: 99 5 °F (37 5 °C) (02/06/21 0744)  Temp Source: Oral (02/06/21 0744)  Respirations: 18 (02/06/21 0744)  Height: 5' 6" (167 6 cm) (02/05/21 1925)  SpO2: 97 % (02/06/21 0744)        /58 (BP Location: Left arm)   Pulse 88   Temp 99 5 °F (37 5 °C) (Oral)   Resp 18   Ht 5' 6" (1 676 m)   SpO2 97%   BMI 24 21 kg/m²   Physical Exam:  General:    Alert, cooperative, no distress   Head:     Normocephalic, without obvious abnormality, atraumatic                   Skin:   There is a 0 3 x 0 3 x 0 1 cm ulceration of the plantar aspect of the right forefoot  Wound is fibrotic  Mild periwound hyperkeratosis  There is minimal drainage  There is no pus or cellulitis  There is no probe to bone  Lungs:     Respirations unlabored  Mild cough but no wheeze   Chest wall:    No tenderness or deformity   Heart/vasc:    Regular rate and rhythm  Faintly palpable pedal pulses  Abdomen:     Soft, non-tender, no distention           Lower MSK:   No calf pain with compression  Bilateral lower extremity edema noted  Psychiatric:  AAOx3, no depression           Neurologic:   Lack of pinprick sensation bilateral lower extremity  Lack of vibratory sensation       Lab Results:   Admission on 02/05/2021 Component Date Value    WBC 02/05/2021 7 07     RBC 02/05/2021 3 71*    Hemoglobin 02/05/2021 10 8*    Hematocrit 02/05/2021 35 4*    MCV 02/05/2021 95     MCH 02/05/2021 29 1     MCHC 02/05/2021 30 5*    RDW 02/05/2021 13 4     MPV 02/05/2021 8 9     Platelets 54/23/6608 75*    nRBC 02/05/2021 0     Neutrophils Relative 02/05/2021 84*    Immat GRANS % 02/05/2021 0     Lymphocytes Relative 02/05/2021 4*    Monocytes Relative 02/05/2021 12     Eosinophils Relative 02/05/2021 0     Basophils Relative 02/05/2021 0     Neutrophils Absolute 02/05/2021 5 90     Immature Grans Absolute 02/05/2021 0 03     Lymphocytes Absolute 02/05/2021 0 25*    Monocytes Absolute 02/05/2021 0 85     Eosinophils Absolute 02/05/2021 0 01     Basophils Absolute 02/05/2021 0 03     Sodium 02/05/2021 129*    Potassium 02/05/2021 4 9     Chloride 02/05/2021 96*    CO2 02/05/2021 22     ANION GAP 02/05/2021 11     BUN 02/05/2021 48*    Creatinine 02/05/2021 2 10*    Glucose 02/05/2021 425*    Calcium 02/05/2021 8 2*    Corrected Calcium 02/05/2021 9 0     AST 02/05/2021 37     ALT 02/05/2021 33     Alkaline Phosphatase 02/05/2021 113     Total Protein 02/05/2021 7 1     Albumin 02/05/2021 3 0*    Total Bilirubin 02/05/2021 0 53     eGFR 02/05/2021 30     Lipase 02/05/2021 20*    Extra Tube 02/05/2021 tube on hold for add ons     BETA-HYDROXYBUTYRATE 02/05/2021 0 2     Troponin I 02/05/2021 0 06*    SARS-CoV-2 02/05/2021 Positive*    INFLUENZA A PCR 02/05/2021 Negative     INFLUENZA B PCR 02/05/2021 Negative     RSV PCR 02/05/2021 Negative     TSH 3RD GENERATON 02/05/2021 0 447     Hemoglobin A1C 02/05/2021 7 7*    EAG 02/05/2021 174     Troponin I 02/05/2021 0 08*    Total CK 02/05/2021 293     NT-proBNP 02/05/2021 2,875*    CRP 02/05/2021 99 5*    D-Dimer, Quant 02/05/2021 0 82*    Ferritin 02/06/2021 126     Procalcitonin 02/05/2021 1 02*    POC Glucose 02/05/2021 53*    CK-MB Index 02/05/2021 1 2     CK-MB 02/05/2021 3 4     Troponin I 02/05/2021 0 09*    POC Glucose 02/05/2021 90     POC Glucose 02/05/2021 93     Ventricular Rate 02/05/2021 92     Atrial Rate 02/05/2021 92     SD Interval 02/05/2021 204     QRSD Interval 02/05/2021 174     QT Interval 02/05/2021 392     QTC Interval 02/05/2021 484     P Axis 02/05/2021 78     QRS Axis 02/05/2021 -82     T Wave Axis 02/05/2021 84     POC Glucose 02/06/2021 244*    POC Glucose 02/06/2021 214*    Troponin I 02/06/2021 0 08*    WBC 02/06/2021 3 97*    RBC 02/06/2021 3 81*    Hemoglobin 02/06/2021 11 0*    Hematocrit 02/06/2021 34 8*    MCV 02/06/2021 91     MCH 02/06/2021 28 9     MCHC 02/06/2021 31 6     RDW 02/06/2021 13 3     MPV 02/06/2021 9 8     Platelets 27/38/2744 79*    nRBC 02/06/2021 0     Neutrophils Relative 02/06/2021 83*    Immat GRANS % 02/06/2021 0     Lymphocytes Relative 02/06/2021 8*    Monocytes Relative 02/06/2021 9     Eosinophils Relative 02/06/2021 0     Basophils Relative 02/06/2021 0     Neutrophils Absolute 02/06/2021 3 28     Immature Grans Absolute 02/06/2021 0 01     Lymphocytes Absolute 02/06/2021 0 33*    Monocytes Absolute 02/06/2021 0 34     Eosinophils Absolute 02/06/2021 0 00     Basophils Absolute 02/06/2021 0 01     Sodium 02/06/2021 135*    Potassium 02/06/2021 4 5     Chloride 02/06/2021 104     CO2 02/06/2021 23     ANION GAP 02/06/2021 8     BUN 02/06/2021 41*    Creatinine 02/06/2021 1 22     Glucose 02/06/2021 113     Calcium 02/06/2021 7 8*    Corrected Calcium 02/06/2021 8 8     AST 02/06/2021 73*    ALT 02/06/2021 46     Alkaline Phosphatase 02/06/2021 102     Total Protein 02/06/2021 6 6     Albumin 02/06/2021 2 7*    Total Bilirubin 02/06/2021 0 39     eGFR 02/06/2021 58     D-Dimer, Quant 02/06/2021 0 96*    ABO Grouping 02/06/2021 A     Rh Factor 02/06/2021 Positive     POC Glucose 02/06/2021 149*    POC Glucose 02/06/2021 118     POC Glucose 02/06/2021 123     POC Glucose 02/06/2021 127     POC Glucose 02/06/2021 115     POC Glucose 02/06/2021 138                              Imaging: I have personally reviewed pertinent films in PACS      Code Status: Level 1 - Full Code        Portions of the record may have been created with voice recognition software  Occasional wrong word or "sound a like" substitutions may have occurred due to the inherent limitations of voice recognition software  Read the chart carefully and recognize, using context, where substitutions have occurred

## 2021-02-06 NOTE — TELEMEDICINE
REQUIRED DOCUMENTATION:     1  This service was provided via Telemedicine  2  Provider located at home office  3  TeleMed provider: Vane Cortés MD   4  Identify all parties in room with patient during tele consult:  Nurse   5  After connecting through [employer], patient was identified by name and date of birth and assistant checked wristband  Patient was then informed that this was a Telemedicine visit and that the exam was being conducted confidentially over secure lines  My office door was closed  No one else was in the room  Patient acknowledged consent and understanding of privacy and security of the Telemedicine visit, and gave us permission to have the assistant stay in the room in order to assist with the history and to conduct the exam   I informed the patient that I have reviewed their record in Epic and presented the opportunity for them to ask any questions regarding the visit today  The patient agreed to participate  Consultation - Sheridan Membreno 76 y o  male MRN: [id number]    Unit/Bed#: S -01 Encounter: [id number]      Assessment/Plan     29-year-old male with longstanding history of type 1 diabetes with multiple complications managed on insulin pump and sensor therapy as outpatient is currently being treated for COVID-19 pneumonia  And has been started on IV steroids  - currently sugars are fairly stable on IV insulin infusion  He has had poor oral intake and nausea so will continue IV insulin infusion for now  Will monitor fingersticks q 2 hours on IV insulin infusion  As he feels better and starts to eat will transition to basal bolus insulin therapy  He can restart his insulin pump and sensor on discharge      Hypothyroidism-on levothyroxine  COVID-19 pneumonia - management as per primary team      CC: Diabetes Consult    History of Present Illness     HPI: Sheridan Membreno is a 76y o  year old male with 1 Diabetes Presented to the hospital with complaints of myalgias, headaches and poor oral intake- he tested positive for COVID-19 and has been started on steroids  Endocrine consultation is requested for management of diabetes  He has longstanding history of type 1 diabetes with multiple complications including retinopathy, nephropathy, neuropathy, gastroparesis, history of prior toe amputation  He is managed on insulin pump and sensor therapy as outpatient  He states at home he gets frequent fasting hypoglycemia and rest of the day blood sugars run high  Since admission to the hospital insulin pump has been discontinued and he has been started on IV insulin infusion  he currently complains of nausea and was not able to eat anything for lunch  He did eat a little bit for breakfast but had  vomiting after that  Inpatient consult to Endocrinology  Consult performed by: Pino Nguyen MD  Consult ordered by: Kenneth Gregg MD          Review of Systems   Constitutional: Positive for appetite change and fatigue  Negative for unexpected weight change  Respiratory: Negative for shortness of breath  Gastrointestinal: Positive for nausea and vomiting  Musculoskeletal: Positive for arthralgias and myalgias  Skin: Positive for wound  All other systems reviewed and are negative        Historical Information   Past Medical History:   Diagnosis Date    Arthritis     Cardiac disease     Closed fracture of fibula, lateral malleolus, right     Complete heart block ([facility] [address] )     Diabetes mellitus ([facility] [address] )     Disease of thyroid gland     External incisional dehiscence     GERD (gastroesophageal reflux disease)     Heart disease     Muscular deconditioning     Pacemaker     Sleep apnea     cannot tolerate cpap    Thyroid disease      Past Surgical History:   Procedure Laterality Date    CARDIAC PACEMAKER PLACEMENT Left 2015    CARDIAC PACEMAKER PLACEMENT      CARDIAC SURGERY      CATARACT EXTRACTION      MUSCLE FLAP      right foot    NERVE BLOCK Transforaminal Epidural Lumbar with Fluoroscopic Guidance     AL COLONOSCOPY FLX DX W/COLLJ SPEC WHEN PFRMD N/A 5/4/2018    Procedure: EGD AND COLONOSCOPY;  Surgeon: Felecia Ortiz MD;  [facility];   Service: Gastroenterology    [address] <100 SQCM Right 6/10/2019    Procedure: SKIN GRAFT SPLIT THICKNESS (STSG)  EXTREMITY right leg;  Surgeon: Kim Navarro DPM;  Location:  MAIN OR;  Service: Podiatry    TOE AMPUTATION      left 5th toe     Social History   Social History     Substance and Sexual Activity   Alcohol Use Yes    Alcohol/week: 0 0 standard drinks    Frequency: Never    Drinks per session: Patient refused    Binge frequency: Never    Comment: socially     Social History     Substance and Sexual Activity   Drug Use No     Social History     Tobacco Use   Smoking Status Former Smoker    Types: Pipe   Smokeless Tobacco Never Used     Family History:   Family History   Problem Relation Age of Onset    Diabetes Mother     Heart disease Mother     Thyroid disease Mother         Disorder     Cancer Brother     Gout Brother     Diabetes Maternal Grandmother        Meds/Allergies   Current Facility-Administered Medications   Medication Dose Route Frequency Provider Last Rate Last Admin    acetaminophen (TYLENOL) tablet 650 mg  650 mg Oral Q6H PRN Chilo Rob MD        amLODIPine (NORVASC) tablet 5 mg  5 mg Oral Daily Chilo Rob MD   5 mg at 02/06/21 0810    ascorbic acid (VITAMIN C) tablet 1,000 mg  1,000 mg Oral Q12H [address] Chilo Rob MD   1,000 mg at 02/06/21 0810    aspirin chewable tablet 81 mg  81 mg Oral Daily Chilo Rob MD   81 mg at 02/06/21 0810    atorvastatin (LIPITOR) tablet 5 mg  5 mg Oral Daily Chilo Rob MD   5 mg at 02/06/21 0810    cefTRIAXone (ROCEPHIN) 1,000 mg in dextrose 5 % 50 mL IVPB  1,000 mg Intravenous Q24H Chilo Rob  mL/hr at 02/06/21 1216 1,000 mg at 02/06/21 1216    cholecalciferol (VITAMIN D3) tablet 2,000 Units  2,000 Units Oral Daily Butch Jones MD   2,000 Units at 02/06/21 1441    cyanocobalamin (VITAMIN B-12) tablet 2,000 mcg  2,000 mcg Oral Daily Butch Jones MD   2,000 mcg at 02/06/21 0809    dexamethasone (DECADRON) injection 6 mg  6 mg Intravenous Q24H Butch Jones MD   6 mg at 02/05/21 2010    doxycycline hyclate (VIBRAMYCIN) capsule 100 mg  100 mg Oral Q12H [facility] Butch Jones MD   100 mg at 02/06/21 1216    DULoxetine (CYMBALTA) delayed release capsule 30 mg  30 mg Oral Daily Butch Jones MD   30 mg at 02/06/21 0810    ezetimibe (ZETIA) tablet 5 mg  5 mg Oral HS Butch Jones MD   5 mg at 02/05/21 2327    famotidine (PEPCID) tablet 20 mg  20 mg Oral Daily Butch Jones MD   20 mg at 02/06/21 0810    heparin (porcine) subcutaneous injection 5,000 Units  5,000 Units Subcutaneous [facility] Butch Jones MD   5,000 Units at 02/06/21 1431    insulin lispro (HumaLOG) 100 units/mL subcutaneous injection 1-5 Units  1-5 Units Subcutaneous TID AC Butch Jones MD   Stopped at 02/06/21 0757    insulin lispro (HumaLOG) 100 units/mL subcutaneous injection 1-5 Units  1-5 Units Subcutaneous HS Butch Jones MD        insulin regular (HumuLIN R,NovoLIN R) 1 Units/mL in sodium chloride 0 9 % 100 mL infusion  0 3-21 Units/hr Intravenous Titrated Beverly Laird PA-C 1 mL/hr at 02/06/21 1400 1 Units/hr at 02/06/21 1400    levothyroxine tablet 125 mcg  125 mcg Oral Early Morning Butch Jones MD   125 mcg at 02/06/21 0600    magnesium hydroxide (MILK OF MAGNESIA) oral suspension 30 mL  30 mL Oral Daily PRN Butch Jones MD        [facility]dansetron [facility]) injection 4 mg  4 mg Intravenous Q6H PRN Butch Jones MD        [facility]) 100 mg in sodium chloride 0 9 % 250 mL IVPB  100 mg Intravenous Q24H Chuy Hernandez MD        [facility]) chewable tablet 80 mg  80 mg Oral 4x Daily PRN Vanessa Torres MD        sodium chloride 0 9 % infusion  50 mL/hr Intravenous Continuous Vanessa Torres MD 50 mL/hr at 02/05/21 2014 50 mL/hr at 02/05/21 2014    tamsulosin (FLOMAX) capsule 0 4 mg  0 4 mg Oral Daily With Dinner Vanessa Torres MD   0 4 mg at 02/05/21 2328    zinc sulfate (ZINCATE) capsule 220 mg  220 mg Oral Daily Vanessa Torres MD   220 mg at 02/06/21 5455     Allergies   Allergen Reactions    Contrast  [Iodinated Diagnostic Agents]     Lipitor  [Atorvastatin Calcium] Drowsiness    Ibuprofen      sensitive    Iodine Rash    Simvastatin Anxiety     Other reaction(s): Malaise (finding)       Objective   Vitals: Blood pressure 146/81, pulse 85, temperature 99 5 °F (37 5 °C), temperature source Oral, resp  rate 18, height 5' 6" (1 676 m), SpO2 94 %  Intake/Output Summary (Last 24 hours) at 2/6/2021 1610  Last data filed at 2/6/2021 1428  Gross per 24 hour   Intake 1250 ml   Output 800 ml   Net 450 ml     Invasive Devices     Peripheral Intravenous Line            Peripheral IV 02/05/21 Left Forearm 1 day                Physical Exam  Vitals signs reviewed  Constitutional:       Appearance: Normal appearance  HENT:      Head: Normocephalic and atraumatic  Eyes:      General: No scleral icterus  Pulmonary:      Effort: Pulmonary effort is normal  No respiratory distress  Neurological:      General: No focal deficit present  Mental Status: He is alert and oriented to person, place, and time  Psychiatric:         Mood and Affect: Mood normal          Behavior: Behavior normal          Thought Content: Thought content normal          Judgment: Judgment normal          The history was obtained from the review of the chart, patient      Lab Results:   Results from last 7 days   Lab Units 02/05/21  1427   HEMOGLOBIN A1C % 7 7*     Lab Results Component Value Date    WBC 3 97 (L) 02/06/2021    HGB 11 0 (L) 02/06/2021    HCT 34 8 (L) 02/06/2021    MCV 91 02/06/2021    PLT 79 (L) 02/06/2021     Lab Results   Component Value Date/Time    BUN 41 (H) 02/06/2021 06:36 AM    BUN 24 06/24/2015 06:00 AM     (L) 06/24/2015 06:00 AM    K 4 5 02/06/2021 06:36 AM    K 4 9 06/24/2015 06:00 AM     02/06/2021 06:36 AM    CL 96 (L) 06/24/2015 06:00 AM    CO2 23 02/06/2021 06:36 AM    CO2 22 07/13/2019 04:32 PM    CREATININE 1 22 02/06/2021 06:36 AM    CREATININE 0 96 06/24/2015 06:00 AM    AST 73 (H) 02/06/2021 06:36 AM    AST 14 06/21/2015 04:21 AM    ALT 46 02/06/2021 06:36 AM    ALT 24 06/21/2015 04:21 AM    ALB 2 7 (L) 02/06/2021 06:36 AM    ALB 2 8 (L) 06/21/2015 04:21 AM     No results for input(s): CHOL, HDL, LDL, TRIG, VLDL in the last 72 hours  No results found for: Aj Chen  POC Glucose (mg/dl)   Date Value   02/06/2021 141 (H)   02/06/2021 154 (H)   02/06/2021 138   02/06/2021 127   02/06/2021 123   02/06/2021 115   02/06/2021 118   02/06/2021 149 (H)   02/06/2021 214 (H)   02/06/2021 244 (H)       Imaging Studies: I have personally reviewed pertinent reports  Portions of the record may have been created with voice recognition software

## 2021-02-06 NOTE — ASSESSMENT & PLAN NOTE
Lab Results   Component Value Date    HGBA1C 7 7 (H) 02/05/2021       Recent Labs     02/06/21  0645 02/06/21  0743 02/06/21  0807 02/06/21  0959   POCGLU 115 123 127 138     ? Required 18 units in the previous 8 hours  ?  endocrinology consult is pending in regard to either restarting pump or SC insulin

## 2021-02-06 NOTE — QUICK NOTE
D/W On[facility]nie for endo re: plans for insulin overnight  Recommend giving patient glucose (food, drink, IV Dextrose) and starting on lowest-algorithm insulin infusion   Q2 BS checks and repeated glucose dosing if hypoglycemic

## 2021-02-06 NOTE — PLAN OF CARE
Problem: PAIN - ADULT  Goal: Verbalizes/displays adequate comfort level or baseline comfort level  Description: Interventions:  - Encourage patient to monitor pain and request assistance  - Assess pain using appropriate pain scale  - Administer analgesics based on type and severity of pain and evaluate response  - Implement non-pharmacological measures as appropriate and evaluate response  - Consider cultural and social influences on pain and pain management  - Notify physician/advanced practitioner if interventions unsuccessful or patient reports new pain  Outcome: Progressing     Problem: INFECTION - ADULT  Goal: Absence or prevention of progression during hospitalization  Description: INTERVENTIONS:  - Assess and monitor for signs and symptoms of infection  - Monitor lab/diagnostic results  - Monitor all insertion sites, i e  indwelling lines, tubes, and drains  - [place] appropriate cooling/warming therapies per order  - Administer medications as ordered  - Instruct and encourage patient and family to use good hand hygiene technique  - Identify and instruct in appropriate isolation precautions for identified infection/condition  Outcome: Progressing     Problem: SAFETY ADULT  Goal: Patient will remain free of falls  Description: INTERVENTIONS:  - Assess patient frequently for physical needs  -  Identify cognitive and physical deficits and behaviors that affect risk of falls    -  [place] fall precautions as indicated by assessment   - Educate patient/family on patient safety including physical limitations  - Instruct patient to call for assistance with activity based on assessment  - Modify environment to reduce risk of injury  - Consider OT/PT consult to assist with strengthening/mobility  Outcome: Progressing  Goal: Maintain or return to baseline ADL function  Description: INTERVENTIONS:  -  Assess patient's ability to carry out ADLs; assess patient's baseline for ADL function and identify physical deficits which impact ability to perform ADLs (bathing, care of mouth/teeth, toileting, grooming, dressing, etc )  - Assess/evaluate cause of self-care deficits   - Assess range of motion  - Assess patient's mobility; develop plan if impaired  - Assess patient's need for assistive devices and provide as appropriate  - Encourage maximum independence but intervene and supervise when necessary  - Involve family in performance of ADLs  - Assess for home care needs following discharge   - Consider OT consult to assist with ADL evaluation and planning for discharge  - Provide patient education as appropriate  Outcome: Progressing  Goal: Maintain or return mobility status to optimal level  Description: INTERVENTIONS:  - Assess patient's baseline mobility status (ambulation, transfers, stairs, etc )    - Identify cognitive and physical deficits and behaviors that affect mobility  - Identify mobility aids required to assist with transfers and/or ambulation (gait belt, sit-to-stand, lift, walker, cane, etc )  - [place] fall precautions as indicated by assessment  - Record patient progress and toleration of activity level on Mobility SBAR; progress patient to next Phase/Stage  - Instruct patient to call for assistance with activity based on assessment  - Consider rehabilitation consult to assist with strengthening/weightbearing, etc   Outcome: Progressing     Problem: DISCHARGE PLANNING  Goal: Discharge to home or other facility with appropriate resources  Description: INTERVENTIONS:  - Identify barriers to discharge w/patient and caregiver  - Arrange for needed discharge resources and transportation as appropriate  - Identify discharge learning needs (meds, wound care, etc )  - Arrange for interpretive services to assist at discharge as needed  - Refer to Case Management Department for coordinating discharge planning if the patient needs post-hospital services based on physician/advanced practitioner order or complex needs related to functional status, cognitive ability, or social support system  Outcome: Progressing     Problem: CARDIOVASCULAR - ADULT  Goal: Absence of cardiac dysrhythmias or at baseline rhythm  Description: INTERVENTIONS:  - Continuous cardiac monitoring, vital signs, obtain 12 lead EKG if ordered  - Administer antiarrhythmic and heart rate control medications as ordered  - Monitor electrolytes and administer replacement therapy as ordered  Outcome: Progressing     Problem: METABOLIC, FLUID AND ELECTROLYTES - ADULT  Goal: Electrolytes maintained within normal limits  Description: INTERVENTIONS:  - Monitor labs and assess patient for signs and symptoms of electrolyte imbalances  - Administer electrolyte replacement as ordered  - Monitor response to electrolyte replacements, including repeat lab results as appropriate  - Instruct patient on fluid and nutrition as appropriate  Outcome: Progressing  Goal: Glucose maintained within target range  Description: INTERVENTIONS:  - Monitor Blood Glucose as ordered  - Assess for signs and symptoms of hyperglycemia and hypoglycemia  - Administer ordered medications to maintain glucose within target range  - Assess nutritional intake and initiate nutrition service referral as needed  Outcome: Progressing     Problem: RESPIRATORY - ADULT  Goal: Achieves optimal ventilation and oxygenation  Description: INTERVENTIONS:  - Assess for changes in respiratory status  - Assess for changes in mentation and behavior  - Position to facilitate oxygenation and minimize respiratory effort  - Oxygen administered by appropriate delivery if ordered  - Initiate smoking cessation education as indicated  - Encourage broncho-pulmonary hygiene including cough, deep breathe, Incentive Spirometry  - Assess the need for suctioning and aspirate as needed  - Assess and instruct to report SOB or any respiratory difficulty  - Respiratory Therapy support as indicated  Outcome: Progressing     Problem: Prexisting or High Potential for Compromised Skin Integrity  Goal: Skin integrity is maintained or improved  Description: INTERVENTIONS:  - Identify patients at risk for skin breakdown  - Assess and monitor skin integrity  - Assess and monitor nutrition and hydration status  - Monitor labs   - Assess for incontinence   - Turn and reposition patient  - Assist with mobility/ambulation  - Relieve pressure over bony prominences  - Avoid friction and shearing  - Provide appropriate hygiene as needed including keeping skin clean and dry  - Evaluate need for skin moisturizer/barrier cream  - Collaborate with interdisciplinary team   - Patient/family teaching  - Consider wound care consult   Outcome: Progressing     Problem: Potential for Falls  Goal: Patient will remain free of falls  Description: INTERVENTIONS:  - Assess patient frequently for physical needs  -  Identify cognitive and physical deficits and behaviors that affect risk of falls    -  [employer] fall precautions as indicated by assessment   - Educate patient/family on patient safety including physical limitations  - Instruct patient to call for assistance with activity based on assessment  - Modify environment to reduce risk of injury  - Consider OT/PT consult to assist with strengthening/mobility  Outcome: Progressing

## 2021-02-06 NOTE — ED NOTES
Patient awake and alert x 4  Asymptomatic for low blood sugar  No complaints at this time  No distress noted         Bradley Faye RN  02/05/21 0442

## 2021-02-06 NOTE — ASSESSMENT & PLAN NOTE
Lab Results   Component Value Date     SARSCOV2 Positive (A) 02/05/2021         Workup:  · Imaging:  Xr Chest 1 View Portable     Result Date: 2/5/2021  Impression: No acute cardiopulmonary disease     · Oxygen:  Saturating 88% on room air when talking to patient     COVID Stage: MILD     Plan:  · Labs:  ? Blood type and screen  ? Cardiac markers - troponin, CK, BNP  ? Inflammatory Markers - CRP, D-Dimer, Ferritin  · CMP tomorrow a m   · CBC with diff tomorrow a m  · Procalcitonin - trend  · Meds  ? Vitamin D3 2000 units daily  ? Vitamin C 1000 BID  ? Zinc 220 mg x 7 days, will hold MVI due to allergy to iodine  ? Remdesivir  ? Famotidine 20 mg PO OD due to creatinen clearance  ? Dexamethasone 6 mg IV daily times 10 days  ? Consideration for convalescent plasma therapy - if no improvement  ? Initiate ceftriaxone and Doxycycline in the settings of elevated procalcitonin      · Anticoag: heparin 5000 units Q 8  · Non-Pharmacologic interventions  ? Self proning if able  ?  Incentive Spirometry

## 2021-02-06 NOTE — PLAN OF CARE
Problem: PAIN - ADULT  Goal: Verbalizes/displays adequate comfort level or baseline comfort level  Description: Interventions:  - Encourage patient to monitor pain and request assistance  - Assess pain using appropriate pain scale  - Administer analgesics based on type and severity of pain and evaluate response  - Implement non-pharmacological measures as appropriate and evaluate response  - Consider cultural and social influences on pain and pain management  - Notify physician/advanced practitioner if interventions unsuccessful or patient reports new pain  Outcome: Progressing     Problem: INFECTION - ADULT  Goal: Absence or prevention of progression during hospitalization  Description: INTERVENTIONS:  - Assess and monitor for signs and symptoms of infection  - Monitor lab/diagnostic results  - Monitor all insertion sites, i e  indwelling lines, tubes, and drains  - [place] appropriate cooling/warming therapies per order  - Administer medications as ordered  - Instruct and encourage patient and family to use good hand hygiene technique  - Identify and instruct in appropriate isolation precautions for identified infection/condition  Outcome: Progressing     Problem: SAFETY ADULT  Goal: Patient will remain free of falls  Description: INTERVENTIONS:  - Assess patient frequently for physical needs  -  Identify cognitive and physical deficits and behaviors that affect risk of falls    -  [place] fall precautions as indicated by assessment   - Educate patient/family on patient safety including physical limitations  - Instruct patient to call for assistance with activity based on assessment  - Modify environment to reduce risk of injury  - Consider OT/PT consult to assist with strengthening/mobility  Outcome: Progressing  Goal: Maintain or return to baseline ADL function  Description: INTERVENTIONS:  -  Assess patient's ability to carry out ADLs; assess patient's baseline for ADL function and identify physical deficits which impact ability to perform ADLs (bathing, care of mouth/teeth, toileting, grooming, dressing, etc )  - Assess/evaluate cause of self-care deficits   - Assess range of motion  - Assess patient's mobility; develop plan if impaired  - Assess patient's need for assistive devices and provide as appropriate  - Encourage maximum independence but intervene and supervise when necessary  - Involve family in performance of ADLs  - Assess for home care needs following discharge   - Consider OT consult to assist with ADL evaluation and planning for discharge  - Provide patient education as appropriate  Outcome: Progressing  Goal: Maintain or return mobility status to optimal level  Description: INTERVENTIONS:  - Assess patient's baseline mobility status (ambulation, transfers, stairs, etc )    - Identify cognitive and physical deficits and behaviors that affect mobility  - Identify mobility aids required to assist with transfers and/or ambulation (gait belt, sit-to-stand, lift, walker, cane, etc )  - [place] fall precautions as indicated by assessment  - Record patient progress and toleration of activity level on Mobility SBAR; progress patient to next Phase/Stage  - Instruct patient to call for assistance with activity based on assessment  - Consider rehabilitation consult to assist with strengthening/weightbearing, etc   Outcome: Progressing     Problem: DISCHARGE PLANNING  Goal: Discharge to home or other facility with appropriate resources  Description: INTERVENTIONS:  - Identify barriers to discharge w/patient and caregiver  - Arrange for needed discharge resources and transportation as appropriate  - Identify discharge learning needs (meds, wound care, etc )  - Arrange for interpretive services to assist at discharge as needed  - Refer to Case Management Department for coordinating discharge planning if the patient needs post-hospital services based on physician/advanced practitioner order or complex needs related to functional status, cognitive ability, or social support system  Outcome: Progressing     Problem: Knowledge Deficit  Goal: Patient/family/caregiver demonstrates understanding of disease process, treatment plan, medications, and discharge instructions  Description: Complete learning assessment and assess knowledge base    Interventions:  - Provide teaching at level of understanding  - Provide teaching via preferred learning methods  Outcome: Progressing     Problem: CARDIOVASCULAR - ADULT  Goal: Absence of cardiac dysrhythmias or at baseline rhythm  Description: INTERVENTIONS:  - Continuous cardiac monitoring, vital signs, obtain 12 lead EKG if ordered  - Administer antiarrhythmic and heart rate control medications as ordered  - Monitor electrolytes and administer replacement therapy as ordered  Outcome: Progressing     Problem: RESPIRATORY - ADULT  Goal: Achieves optimal ventilation and oxygenation  Description: INTERVENTIONS:  - Assess for changes in respiratory status  - Assess for changes in mentation and behavior  - Position to facilitate oxygenation and minimize respiratory effort  - Oxygen administered by appropriate delivery if ordered  - Initiate smoking cessation education as indicated  - Encourage broncho-pulmonary hygiene including cough, deep breathe, Incentive Spirometry  - Assess the need for suctioning and aspirate as needed  - Assess and instruct to report SOB or any respiratory difficulty  - Respiratory Therapy support as indicated  Outcome: Progressing     Problem: METABOLIC, FLUID AND ELECTROLYTES - ADULT  Goal: Electrolytes maintained within normal limits  Description: INTERVENTIONS:  - Monitor labs and assess patient for signs and symptoms of electrolyte imbalances  - Administer electrolyte replacement as ordered  - Monitor response to electrolyte replacements, including repeat lab results as appropriate  - Instruct patient on fluid and nutrition as appropriate  Outcome: Progressing  Goal: Glucose maintained within target range  Description: INTERVENTIONS:  - Monitor Blood Glucose as ordered  - Assess for signs and symptoms of hyperglycemia and hypoglycemia  - Administer ordered medications to maintain glucose within target range  - Assess nutritional intake and initiate nutrition service referral as needed  Outcome: Progressing

## 2021-02-06 NOTE — QUICK NOTE
Was called by the nurse, patient insulin pump has been off since admission to the ER , notified by the nurse that patient was hypoglycemic on admission to the floor 53 now blood glucose of 90, reported that insulin drip was never started, will place the patient on sliding scale insulin, with recheck on midnight and at 4:00 a m , asked the nurse to call slim with elevated blood glucose since patient may need to go back to an insulin drip due to infection and steroids and patient is currently off his insulin pump  Discuss regarding troponin not yet drawn

## 2021-02-06 NOTE — ASSESSMENT & PLAN NOTE
Pseudohyponatremia on admission corrected sodium 134 likely due to hyperglycemia     · Morning labs still pending    · Will continue to monitor with BMP  · Continue gentle hydration with IV normal saline 50 cc per hour

## 2021-02-06 NOTE — ASSESSMENT & PLAN NOTE
Recent Labs     02/05/21  1554   TROPONINI 0 06*      · Elevated troponin denies any chest pain, no acute ST-T changes seen on EKG, likely non MI related troponin elevation due to COVID and recent Srini   · No events on telemetry
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
## 2021-02-06 NOTE — ASSESSMENT & PLAN NOTE
KAZ on top of CKD baseline creatinine seems to be around 1 3-1 6, creatinine is 2 1 on admission - back to baseline after IV fluid  · Most likely prerenal secondary to poor oral intake  · Received 2 L normal saline in the ER  · Continue with gentle hydration normal saline at 50 cc for another 10 hours then will d/c and encourage po intake     · Avoid hypotension nephrotoxins  · Monitor with daily BMP

## 2021-02-06 NOTE — ED NOTES
JOHANNA John aware of low blood sugar and given Orange Juice       Alexander Rosario RN  02/05/21 [id number]

## 2021-02-07 PROBLEM — E10.65 HYPERGLYCEMIA DUE TO TYPE 1 DIABETES MELLITUS (HCC): Status: ACTIVE | Noted: 2021-01-01

## 2021-02-07 NOTE — ASSESSMENT & PLAN NOTE
Pseudohyponatremia on admission corrected sodium 134 likely due to hyperglycemia  Recent Labs     02/05/21  1427 02/06/21  0636 02/07/21  1403   SODIUM 129* 135* 134*     ·  Continue to monitor BMP

## 2021-02-07 NOTE — QUICK NOTE
Spoke with nurse , patient has good appetite - will switch to basal bolus insulin therapy starting at dinner time

## 2021-02-07 NOTE — PLAN OF CARE
Problem: PAIN - ADULT  Goal: Verbalizes/displays adequate comfort level or baseline comfort level  Description: Interventions:  - Encourage patient to monitor pain and request assistance  - Assess pain using appropriate pain scale  - Administer analgesics based on type and severity of pain and evaluate response  - Implement non-pharmacological measures as appropriate and evaluate response  - Consider cultural and social influences on pain and pain management  - Notify physician/advanced practitioner if interventions unsuccessful or patient reports new pain  Outcome: Progressing     Problem: INFECTION - ADULT  Goal: Absence or prevention of progression during hospitalization  Description: INTERVENTIONS:  - Assess and monitor for signs and symptoms of infection  - Monitor lab/diagnostic results  - Monitor all insertion sites, i e  indwelling lines, tubes, and drains  - [place] appropriate cooling/warming therapies per order  - Administer medications as ordered  - Instruct and encourage patient and family to use good hand hygiene technique  - Identify and instruct in appropriate isolation precautions for identified infection/condition  Outcome: Progressing     Problem: SAFETY ADULT  Goal: Patient will remain free of falls  Description: INTERVENTIONS:  - Assess patient frequently for physical needs  -  Identify cognitive and physical deficits and behaviors that affect risk of falls    -  [place] fall precautions as indicated by assessment   - Educate patient/family on patient safety including physical limitations  - Instruct patient to call for assistance with activity based on assessment  - Modify environment to reduce risk of injury  - Consider OT/PT consult to assist with strengthening/mobility  Outcome: Progressing  Goal: Maintain or return to baseline ADL function  Description: INTERVENTIONS:  -  Assess patient's ability to carry out ADLs; assess patient's baseline for ADL function and identify physical deficits which impact ability to perform ADLs (bathing, care of mouth/teeth, toileting, grooming, dressing, etc )  - Assess/evaluate cause of self-care deficits   - Assess range of motion  - Assess patient's mobility; develop plan if impaired  - Assess patient's need for assistive devices and provide as appropriate  - Encourage maximum independence but intervene and supervise when necessary  - Involve family in performance of ADLs  - Assess for home care needs following discharge   - Consider OT consult to assist with ADL evaluation and planning for discharge  - Provide patient education as appropriate  Outcome: Progressing  Goal: Maintain or return mobility status to optimal level  Description: INTERVENTIONS:  - Assess patient's baseline mobility status (ambulation, transfers, stairs, etc )    - Identify cognitive and physical deficits and behaviors that affect mobility  - Identify mobility aids required to assist with transfers and/or ambulation (gait belt, sit-to-stand, lift, walker, cane, etc )  - [place] fall precautions as indicated by assessment  - Record patient progress and toleration of activity level on Mobility SBAR; progress patient to next Phase/Stage  - Instruct patient to call for assistance with activity based on assessment  - Consider rehabilitation consult to assist with strengthening/weightbearing, etc   Outcome: Progressing     Problem: DISCHARGE PLANNING  Goal: Discharge to home or other facility with appropriate resources  Description: INTERVENTIONS:  - Identify barriers to discharge w/patient and caregiver  - Arrange for needed discharge resources and transportation as appropriate  - Identify discharge learning needs (meds, wound care, etc )  - Arrange for interpretive services to assist at discharge as needed  - Refer to Case Management Department for coordinating discharge planning if the patient needs post-hospital services based on physician/advanced practitioner order or complex needs related to functional status, cognitive ability, or social support system  Outcome: Progressing     Problem: DISCHARGE PLANNING  Goal: Discharge to home or other facility with appropriate resources  Description: INTERVENTIONS:  - Identify barriers to discharge w/patient and caregiver  - Arrange for needed discharge resources and transportation as appropriate  - Identify discharge learning needs (meds, wound care, etc )  - Arrange for interpretive services to assist at discharge as needed  - Refer to Case Management Department for coordinating discharge planning if the patient needs post-hospital services based on physician/advanced practitioner order or complex needs related to functional status, cognitive ability, or social support system  Outcome: Progressing     Problem: RESPIRATORY - ADULT  Goal: Achieves optimal ventilation and oxygenation  Description: INTERVENTIONS:  - Assess for changes in respiratory status  - Assess for changes in mentation and behavior  - Position to facilitate oxygenation and minimize respiratory effort  - Oxygen administered by appropriate delivery if ordered  - Initiate smoking cessation education as indicated  - Encourage broncho-pulmonary hygiene including cough, deep breathe, Incentive Spirometry  - Assess the need for suctioning and aspirate as needed  - Assess and instruct to report SOB or any respiratory difficulty  - Respiratory Therapy support as indicated  Outcome: Progressing

## 2021-02-07 NOTE — ASSESSMENT & PLAN NOTE
Lab Results   Component Value Date    EGFR 54 02/07/2021    EGFR 58 02/06/2021    EGFR 30 02/05/2021    CREATININE 1 29 02/07/2021    CREATININE 1 22 02/06/2021    CREATININE 2 10 (H) 02/05/2021   · Continue to monitor BMP

## 2021-02-07 NOTE — ASSESSMENT & PLAN NOTE
Lab Results   Component Value Date     SARSCOV2 Positive (A) 02/05/2021         Workup:  · Imaging:  Xr Chest 1 View Portable     Result Date: 2/5/2021  Impression: No acute cardiopulmonary disease     · Oxygen:  Saturating 95% on room air when talking to patient     COVID Stage: MILD     Plan:  · Labs:  ? Blood type and screen  ? Cardiac markers -   ? Troponin 0 08 --> 0 09 --> 0 08  ? CK: 293  Recent Labs     02/05/21  1427 02/06/21  0255 02/06/21  0636 02/07/21  0429 02/07/21  1403   FERRITIN  --  126 144  --  266   CRP 99 5*  --  121 6*  --  100 5*   DDIMER 0 82*  --  0 96* 0 74*  --    ?   ·   · Meds  ? Vitamin D3 2000 units daily  ? Vitamin C 1000 BID  ? Zinc 220 mg x 7 days, will hold MVI due to allergy to iodine  ? Remdesivir  ? Famotidine 20 mg PO OD due to creatinen clearance  ? Dexamethasone 6 mg IV daily times 10 days  ? Consideration for convalescent plasma therapy - if no improvement  ? Initiate ceftriaxone and Doxycycline in the settings of elevated procalcitonin  ? Order morning procalcitonin      · Anticoag: heparin 5000 units Q 8  · Non-Pharmacologic interventions  ? Self proning if able  ?  Incentive Spirometry

## 2021-02-07 NOTE — PLAN OF CARE
Problem: PAIN - ADULT  Goal: Verbalizes/displays adequate comfort level or baseline comfort level  Description: Interventions:  - Encourage patient to monitor pain and request assistance  - Assess pain using appropriate pain scale  - Administer analgesics based on type and severity of pain and evaluate response  - Implement non-pharmacological measures as appropriate and evaluate response  - Consider cultural and social influences on pain and pain management  - Notify physician/advanced practitioner if interventions unsuccessful or patient reports new pain  Outcome: Progressing     Problem: INFECTION - ADULT  Goal: Absence or prevention of progression during hospitalization  Description: INTERVENTIONS:  - Assess and monitor for signs and symptoms of infection  - Monitor lab/diagnostic results  - Monitor all insertion sites, i e  indwelling lines, tubes, and drains  - [place] appropriate cooling/warming therapies per order  - Administer medications as ordered  - Instruct and encourage patient and family to use good hand hygiene technique  - Identify and instruct in appropriate isolation precautions for identified infection/condition  Outcome: Progressing     Problem: SAFETY ADULT  Goal: Patient will remain free of falls  Description: INTERVENTIONS:  - Assess patient frequently for physical needs  -  Identify cognitive and physical deficits and behaviors that affect risk of falls    -  [place] fall precautions as indicated by assessment   - Educate patient/family on patient safety including physical limitations  - Instruct patient to call for assistance with activity based on assessment  - Modify environment to reduce risk of injury  - Consider OT/PT consult to assist with strengthening/mobility  Outcome: Progressing  Goal: Maintain or return to baseline ADL function  Description: INTERVENTIONS:  -  Assess patient's ability to carry out ADLs; assess patient's baseline for ADL function and identify physical deficits which impact ability to perform ADLs (bathing, care of mouth/teeth, toileting, grooming, dressing, etc )  - Assess/evaluate cause of self-care deficits   - Assess range of motion  - Assess patient's mobility; develop plan if impaired  - Assess patient's need for assistive devices and provide as appropriate  - Encourage maximum independence but intervene and supervise when necessary  - Involve family in performance of ADLs  - Assess for home care needs following discharge   - Consider OT consult to assist with ADL evaluation and planning for discharge  - Provide patient education as appropriate  Outcome: Progressing  Goal: Maintain or return mobility status to optimal level  Description: INTERVENTIONS:  - Assess patient's baseline mobility status (ambulation, transfers, stairs, etc )    - Identify cognitive and physical deficits and behaviors that affect mobility  - Identify mobility aids required to assist with transfers and/or ambulation (gait belt, sit-to-stand, lift, walker, cane, etc )  - [place] fall precautions as indicated by assessment  - Record patient progress and toleration of activity level on Mobility SBAR; progress patient to next Phase/Stage  - Instruct patient to call for assistance with activity based on assessment  - Consider rehabilitation consult to assist with strengthening/weightbearing, etc   Outcome: Progressing     Problem: DISCHARGE PLANNING  Goal: Discharge to home or other facility with appropriate resources  Description: INTERVENTIONS:  - Identify barriers to discharge w/patient and caregiver  - Arrange for needed discharge resources and transportation as appropriate  - Identify discharge learning needs (meds, wound care, etc )  - Arrange for interpretive services to assist at discharge as needed  - Refer to Case Management Department for coordinating discharge planning if the patient needs post-hospital services based on physician/advanced practitioner order or complex needs related to functional status, cognitive ability, or social support system  Outcome: Progressing

## 2021-02-07 NOTE — ASSESSMENT & PLAN NOTE
Lab Results   Component Value Date    HGBA1C 7 7 (H) 02/05/2021       Recent Labs     02/07/21  0741 02/07/21  0802 02/07/21  0959 02/07/21  1223   POCGLU 179* 162* 227* 132     ? Endocrinology recommending switch to basal bolus today at dinner time  ? Lantus 14 units  ? Humalog 6 units TID  ?  Sliding scale

## 2021-02-07 NOTE — ASSESSMENT & PLAN NOTE
Lab Results   Component Value Date    HGBA1C 7 7 (H) 02/05/2021       Recent Labs     02/07/21  0802 02/07/21  0959 02/07/21  1223 02/07/21  1315   POCGLU 162* 227* 132 158*       Blood Sugar Average: Last 72 hrs:  (P) 66 [id number]   · Patient has good PO intake  · Will transition to basal bolus today  · Plan of basal bolus - roughly 25% of 24 hour insulin needs for Lantus BID today plus sliding scale   Will overlap insulin drip by 1 hour    Plan:   · Endocrinology has placed their recommendations, switch to basal bolus at dinner time with Lantus 14, Humalog 6 units TID and sliding scale - appreciate recs

## 2021-02-07 NOTE — PLAN OF CARE
Problem: PAIN - ADULT  Goal: Verbalizes/displays adequate comfort level or baseline comfort level  Description: Interventions:  - Encourage patient to monitor pain and request assistance  - Assess pain using appropriate pain scale  - Administer analgesics based on type and severity of pain and evaluate response  - Implement non-pharmacological measures as appropriate and evaluate response  - Consider cultural and social influences on pain and pain management  - Notify physician/advanced practitioner if interventions unsuccessful or patient reports new pain  Outcome: Progressing     Problem: INFECTION - ADULT  Goal: Absence or prevention of progression during hospitalization  Description: INTERVENTIONS:  - Assess and monitor for signs and symptoms of infection  - Monitor lab/diagnostic results  - Monitor all insertion sites, i e  indwelling lines, tubes, and drains  - [place] appropriate cooling/warming therapies per order  - Administer medications as ordered  - Instruct and encourage patient and family to use good hand hygiene technique  - Identify and instruct in appropriate isolation precautions for identified infection/condition  Outcome: Progressing     Problem: SAFETY ADULT  Goal: Patient will remain free of falls  Description: INTERVENTIONS:  - Assess patient frequently for physical needs  -  Identify cognitive and physical deficits and behaviors that affect risk of falls    -  [place] fall precautions as indicated by assessment   - Educate patient/family on patient safety including physical limitations  - Instruct patient to call for assistance with activity based on assessment  - Modify environment to reduce risk of injury  - Consider OT/PT consult to assist with strengthening/mobility  Outcome: Progressing  Goal: Maintain or return to baseline ADL function  Description: INTERVENTIONS:  -  Assess patient's ability to carry out ADLs; assess patient's baseline for ADL function and identify physical deficits which impact ability to perform ADLs (bathing, care of mouth/teeth, toileting, grooming, dressing, etc )  - Assess/evaluate cause of self-care deficits   - Assess range of motion  - Assess patient's mobility; develop plan if impaired  - Assess patient's need for assistive devices and provide as appropriate  - Encourage maximum independence but intervene and supervise when necessary  - Involve family in performance of ADLs  - Assess for home care needs following discharge   - Consider OT consult to assist with ADL evaluation and planning for discharge  - Provide patient education as appropriate  Outcome: Progressing  Goal: Maintain or return mobility status to optimal level  Description: INTERVENTIONS:  - Assess patient's baseline mobility status (ambulation, transfers, stairs, etc )    - Identify cognitive and physical deficits and behaviors that affect mobility  - Identify mobility aids required to assist with transfers and/or ambulation (gait belt, sit-to-stand, lift, walker, cane, etc )  - [place] fall precautions as indicated by assessment  - Record patient progress and toleration of activity level on Mobility SBAR; progress patient to next Phase/Stage  - Instruct patient to call for assistance with activity based on assessment  - Consider rehabilitation consult to assist with strengthening/weightbearing, etc   Outcome: Progressing     Problem: DISCHARGE PLANNING  Goal: Discharge to home or other facility with appropriate resources  Description: INTERVENTIONS:  - Identify barriers to discharge w/patient and caregiver  - Arrange for needed discharge resources and transportation as appropriate  - Identify discharge learning needs (meds, wound care, etc )  - Arrange for interpretive services to assist at discharge as needed  - Refer to Case Management Department for coordinating discharge planning if the patient needs post-hospital services based on physician/advanced practitioner order or complex needs related to functional status, cognitive ability, or social support system  Outcome: Progressing     Problem: Knowledge Deficit  Goal: Patient/family/caregiver demonstrates understanding of disease process, treatment plan, medications, and discharge instructions  Description: Complete learning assessment and assess knowledge base    Interventions:  - Provide teaching at level of understanding  - Provide teaching via preferred learning methods  Outcome: Progressing     Problem: CARDIOVASCULAR - ADULT  Goal: Absence of cardiac dysrhythmias or at baseline rhythm  Description: INTERVENTIONS:  - Continuous cardiac monitoring, vital signs, obtain 12 lead EKG if ordered  - Administer antiarrhythmic and heart rate control medications as ordered  - Monitor electrolytes and administer replacement therapy as ordered  Outcome: Progressing     Problem: RESPIRATORY - ADULT  Goal: Achieves optimal ventilation and oxygenation  Description: INTERVENTIONS:  - Assess for changes in respiratory status  - Assess for changes in mentation and behavior  - Position to facilitate oxygenation and minimize respiratory effort  - Oxygen administered by appropriate delivery if ordered  - Initiate smoking cessation education as indicated  - Encourage broncho-pulmonary hygiene including cough, deep breathe, Incentive Spirometry  - Assess the need for suctioning and aspirate as needed  - Assess and instruct to report SOB or any respiratory difficulty  - Respiratory Therapy support as indicated  Outcome: Progressing     Problem: METABOLIC, FLUID AND ELECTROLYTES - ADULT  Goal: Electrolytes maintained within normal limits  Description: INTERVENTIONS:  - Monitor labs and assess patient for signs and symptoms of electrolyte imbalances  - Administer electrolyte replacement as ordered  - Monitor response to electrolyte replacements, including repeat lab results as appropriate  - Instruct patient on fluid and nutrition as appropriate  Outcome: Progressing  Goal: Glucose maintained within target range  Description: INTERVENTIONS:  - Monitor Blood Glucose as ordered  - Assess for signs and symptoms of hyperglycemia and hypoglycemia  - Administer ordered medications to maintain glucose within target range  - Assess nutritional intake and initiate nutrition service referral as needed  Outcome: Progressing     Problem: Prexisting or High Potential for Compromised Skin Integrity  Goal: Skin integrity is maintained or improved  Description: INTERVENTIONS:  - Identify patients at risk for skin breakdown  - Assess and monitor skin integrity  - Assess and monitor nutrition and hydration status  - Monitor labs   - Assess for incontinence   - Turn and reposition patient  - Assist with mobility/ambulation  - Relieve pressure over bony prominences  - Avoid friction and shearing  - Provide appropriate hygiene as needed including keeping skin clean and dry  - Evaluate need for skin moisturizer/barrier cream  - Collaborate with interdisciplinary team   - Patient/family teaching  - Consider wound care consult   Outcome: Progressing     Problem: Potential for Falls  Goal: Patient will remain free of falls  Description: INTERVENTIONS:  - Assess patient frequently for physical needs  -  Identify cognitive and physical deficits and behaviors that affect risk of falls    -  [place] fall precautions as indicated by assessment   - Educate patient/family on patient safety including physical limitations  - Instruct patient to call for assistance with activity based on assessment  - Modify environment to reduce risk of injury  - Consider OT/PT consult to assist with strengthening/mobility  Outcome: Progressing
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
## 2021-02-08 PROBLEM — R77.8 ELEVATED TROPONIN LEVEL NOT DUE MYOCARDIAL INFARCTION: Status: RESOLVED | Noted: 2019-03-31 | Resolved: 2021-01-01

## 2021-02-08 PROBLEM — E10.9 TYPE 1 DIABETES MELLITUS (HCC): Status: ACTIVE | Noted: 2019-07-30

## 2021-02-08 PROBLEM — E87.5 HYPERKALEMIA: Status: RESOLVED | Noted: 2019-03-31 | Resolved: 2021-01-01

## 2021-02-08 PROBLEM — R19.7 DIARRHEA: Status: ACTIVE | Noted: 2021-01-01

## 2021-02-08 NOTE — PROGRESS NOTES
Progress Note - Easton Vidales 1945, 76 y o  male MRN: [id number]    Unit/Bed#: S -01 Encounter: [id number]    Primary Care Provider: Karena Felix MD   Date and time admitted to hospital: 2/5/2021  3:27 PM        * COVID-19  Assessment & Plan  Lab Results   Component Value Date     SARSCOV2 Positive (A) 02/05/2021      · Patient is doing well today  He denies shortness of breath, cough and chest pain  Breathing on room air with saturation over 95%     Workup:  · Imaging:  Xr Chest 1 View Portable     Result Date: 2/5/2021  Impression: No acute cardiopulmonary disease     · Oxygen:  Saturating 95% on room air when talking to patient     COVID Stage: MILD     Plan:  · Labs:  ? Blood type and screen  ? Cardiac markers -   ? Troponin 0 08 --> 0 09 --> 0 08  ? CK: 293  Recent Labs     02/05/21  1427 02/06/21  0255 02/06/21  0636 02/07/21  0429 02/07/21  1403   FERRITIN  --  126 144  --  266   CRP 99 5*  --  121 6*  --  100 5*   DDIMER 0 82*  --  0 96* 0 74*  --      ·   · Meds  ? Vitamin D3 2000 units daily  ? Vitamin C 1000 BID  ? Zinc 220 mg x 7 days, will hold MVI due to allergy to iodine  ? Remdesivir day 4/5  ? Famotidine 20 mg PO OD due to creatinen clearance  ? Dexamethasone 6 mg IV daily times 10 days , day 4  ? Initiate ceftriaxone and Doxycycline in the settings of elevated procalcitonin  Day 3  ? Order morning procalcitonin      · Anticoag: heparin 5000 units Q 8  · Non-Pharmacologic interventions  ? Self proning if able  ? Incentive Spirometry      Type 1 diabetes mellitus [facility])  Assessment & Plan  Lab Results   Component Value Date    HGBA1C 7 7 (H) 02/05/2021       Recent Labs     02/07/21  1546 02/07/21  1756 02/07/21  2119 02/08/21  0744   POCGLU 198* 291* 232* 182*     ? Blood glucose level is better this morning ranging between 145-180  ? Endocrinology recommending switch to basal bolus today at dinner time  ? Lantus 14 units  ? Humalog 6 units TID  ?  Sliding scale with each meal  ? Hypoglycemia protocol  ? Maintain blood glucose within 140-180  ? Patient is also on IV dexamethasone which is playing a role in his elevated blood glucose levels    Right foot ulcer, with fat layer exposed ([address] )  Assessment & Plan    · Podiatry consult  · Wound was debrided, no signs of infection and no further imaging or surgical intervention needed  Dressing placed on wound and changed every 3 days while inpatient  Follow up with podiatrist in 1 week      Hyponatremia  Assessment & Plan    Recent Labs     21  0636 21  1403 21  0542   SODIUM 135* 134* 132*   · Sodium level is 132 corrected to 133 for elevated blood glucose  · Start fluid restriction 1 5 L  ·  Continue to monitor BMP    Stage 3 chronic kidney disease  Assessment & Plan  Lab Results   Component Value Date    EGFR 66 2021    EGFR 54 2021    EGFR 58 2021    CREATININE 1 09 2021    CREATININE 1 29 2021    CREATININE 1 22 2021   · Stable,  Continue to monitor BMP      Hypothyroidism  Assessment & Plan  · TSH within normal  · Continue levothyroxine      Hyperkalemia-resolved as of 2021  Assessment & Plan  Potassium is back to normal          VTE Pharmacologic Prophylaxis:   Pharmacologic: Heparin  Mechanical VTE Prophylaxis in Place: Yes    Discussions with Specialists or Other Care Team Provider:  Nursing and case management    Education and Discussions with Family / Patient:  Patient and his wife    Current Length of Stay: 3 day(s)    Current Patient Status: Inpatient     Discharge Plan / Estimated Discharge Date: Within 24-48 hours    Code Status: Level 1 - Full Code      Subjective:   Patient has no complaints this morning    He is sitting on the chair waiting for his breakfast with no distress    Objective:     Vitals:   Temp (24hrs), Av 8 °F (36 6 °C), Min:97 4 °F (36 3 °C), Max:98 2 °F (36 8 °C)    Temp:  [97 4 °F (36 3 °C)-98 2 °F (36 8 °C)] 98 2 °F (36 8 °C)  HR:  [72-79] 77  Resp:  [17-18] 18  BP: (131-157)/(68-83) 131/68  SpO2:  [96 %-97 %] 97 %  Body mass index is 24 21 kg/m²  Input and Output Summary (last 24 hours): Intake/Output Summary (Last 24 hours) at 2/8/2021 0959  Last data filed at 2/8/2021 0500  Gross per 24 hour   Intake 590 ml   Output 1250 ml   Net -660 ml       Physical Exam:     Physical Exam  Vitals signs and nursing note reviewed  Constitutional:       General: He is not in acute distress  Appearance: He is not ill-appearing  HENT:      Head: Normocephalic and atraumatic  Cardiovascular:      Rate and Rhythm: Normal rate and regular rhythm  Pulses: Normal pulses  Heart sounds: Normal heart sounds  Abdominal:      General: Bowel sounds are normal       Palpations: Abdomen is soft  Tenderness: There is no abdominal tenderness  Musculoskeletal:      Right lower leg: No edema  Left lower leg: No edema  Neurological:      General: No focal deficit present  Mental Status: He is alert and oriented to person, place, and time  Psychiatric:         Mood and Affect: Mood normal          Behavior: Behavior normal            Additional Data:     Labs:    Results from last 7 days   Lab Units 02/07/21  1403   WBC Thousand/uL 8 99   HEMOGLOBIN g/dL 11 7*   HEMATOCRIT % 35 9*   PLATELETS Thousands/uL 100*   NEUTROS PCT % 80*   LYMPHS PCT % 5*   MONOS PCT % 15*   EOS PCT % 0     Results from last 7 days   Lab Units 02/08/21  0542  02/07/21  1403   POTASSIUM mmol/L 4 7   < > 5 6*   CHLORIDE mmol/L 101  --  102   CO2 mmol/L 22  --  25   BUN mg/dL 39*  --  43*   CREATININE mg/dL 1 09  --  1 29   CALCIUM mg/dL 8 0*  --  8 0*   ALK PHOS U/L  --   --  104   ALT U/L  --   --  51   AST U/L  --   --  103*    < > = values in this interval not displayed  * I Have Reviewed All Lab Data Listed Above  * Additional Pertinent Lab Tests Reviewed:  All Labs Within Last 24 Hours Reviewed    Imaging:    Imaging Reports Reviewed Today Include: na  Imaging Personally Reviewed by Myself Includes:  na    Recent Cultures (last 7 days):           Last 24 Hours Medication List:   Current Facility-Administered Medications   Medication Dose Route Frequency Provider Last Rate    acetaminophen  650 mg Oral Q6H PRN Chun Ward MD      amLODIPine  5 mg Oral Daily Chun Ward MD      ascorbic acid  1,000 mg Oral Q12H [address] Chun Ward MD      aspirin  81 mg Oral Daily Chun Ward MD      atorvastatin  5 mg Oral Daily Chun Ward MD      cefTRIAXone  1,000 mg Intravenous Q24H Chun Ward MD 1,000 mg (02/07/21 1228)    cholecalciferol  2,000 Units Oral Daily Chun Ward MD      vitamin B-12  2,000 mcg Oral Daily Chun Ward MD      dexamethasone  6 mg Intravenous Q24H Chun Ward MD      doxycycline hyclate  100 mg Oral Q12H [address] Chun Ward MD      DULoxetine  30 mg Oral Daily Chun Ward MD      ezetimibe  5 mg Oral HS Chun Ward MD      famotidine  20 mg Oral Daily Chun Ward MD      heparin (porcine)  5,000 Units Subcutaneous [facility] Chun Ward MD      insulin glargine  14 Units Subcutaneous Daily With Azeem aBll MD      insulin lispro  1-5 Units Subcutaneous TID With Meals Donald Christie MD      insulin lispro  1-5 Units Subcutaneous HS Donald Christie MD      insulin lispro  6 Units Subcutaneous TID With Meals Donald Christie MD      levothyroxine  125 mcg Oral Early Morning Chun Ward MD      loperamide  4 mg Oral 4x Daily PRN Jessica , DO      magnesium hydroxide  30 mL Oral Daily PRN Chun Ward MD      ondansetron  4 mg Intravenous Q6H PRN Chun Ward MD      remdesivir  100 mg Intravenous Q24H Wilbur Hsu MD      simethicone  80 mg Oral 4x Daily PRN MD Bita Hill tamsulosin  0 4 mg Oral Daily With Che DELAROSA [de-identified], MD      zinc sulfate  220 mg Oral Daily Jeroem Rodriguez MD          Today, Patient Was Seen By: Orestes Mcdowell MD    ** Please Note: This note has been constructed using a voice recognition system   **

## 2021-02-08 NOTE — ASSESSMENT & PLAN NOTE
· Podiatry consult  · Wound was debrided, no signs of infection and no further imaging or surgical intervention needed  Dressing placed on wound and changed every 3 days   Follow up with podiatrist in 1 week

## 2021-02-08 NOTE — ASSESSMENT & PLAN NOTE
Lab Results   Component Value Date    HGBA1C 7 7 (H) 02/05/2021       Recent Labs     02/07/21  1546 02/07/21  1756 02/07/21  2119 02/08/21  0744   POCGLU 198* 291* 232* 182*     ? Blood glucose level is better this morning ranging between 145-180  ? Endocrinology recommending switch to basal bolus today at dinner time  ? Lantus 14 units  ? Humalog 6 units TID  ? Sliding scale with each meal  ? Hypoglycemia protocol  ? Maintain blood glucose within 140-180  ?  Patient is also on IV dexamethasone which is playing a role in his elevated blood glucose levels

## 2021-02-08 NOTE — ASSESSMENT & PLAN NOTE
· Podiatry consult  · Wound was debrided, no signs of infection and no further imaging or surgical intervention needed  Dressing placed on wound and changed every 3 days while inpatient   Follow up with podiatrist in 1 week

## 2021-02-08 NOTE — ASSESSMENT & PLAN NOTE
Lab Results   Component Value Date    EGFR 66 02/08/2021    EGFR 54 02/07/2021    EGFR 58 02/06/2021    CREATININE 1 09 02/08/2021    CREATININE 1 29 02/07/2021    CREATININE 1 22 02/06/2021   · Stable,  Continue to monitor BMP

## 2021-02-08 NOTE — ASSESSMENT & PLAN NOTE
Recent Labs     02/06/21  0636 02/07/21  1403 02/08/21  0542   SODIUM 135* 134* 132*   · Sodium level is 132 corrected to 133 for elevated blood glucose  · Maintain good p o   Intake  · Follow-up with primary doctor within 1 week for further monitoring

## 2021-02-08 NOTE — DISCHARGE INSTR - AVS FIRST PAGE
Dear Mei Davis,     It was our pleasure to care for you here at [facility]  It is our hope that we were always able to exceed the expected standards for your care during your stay  You were hospitalized due to COVID-19  You were cared for on the 3rd floor by Susana Lopez MD under the service of James Rodriguez,  with the [facility] who covers for your primary care physician (PCP), Tong Milligan MD, while you were hospitalized  If you have any questions or concerns related to this hospitalization, you may contact us at [phone]  For follow up as well as any medication refills, we recommend that you follow up with your primary care physician  A registered nurse will reach out to you by phone within a few days after your discharge to answer any additional questions that you may have after going home  However, at this time we provide for you here, the most important instructions / recommendations at discharge:     · Notable Medication Adjustments -   · You will be discharged with 2 antibiotics which she will need to take for another 4 days:  · Cefdinir (Omnicef ) 300mg every 12 hours  · Doxycycline 100 mg every 12 hours  · Imodium ( loperamide ) 4 mg , 4 times as needed for diarrhea  · You will continue with your current settings of insulin pump and will follow-up with your endocrinologist for further adjustments  · Continue to take vitamin-C and zinc supplement for another 4 days to complete 7 days course per COVID-19 protocol  · Continue with your current dose of vitamin-D    · Testing Required after Discharge -   · None  · Important follow up information -   · Follow-up with your primary doctor within 1 week  You can call to schedule a tele visit appointment  · Follow-up with your podiatrist ( foot doctor ) within 1 week  · Follow-up with endocrinologist within 2 weeks    · All contact information are provided on the next page  · Other Instructions -   · Please refer to the next page for COVID-19 home care instructions  · Please check her blood glucose with each meal   · Call your primary doctor immediately if you start having shortness of breath or spiking fevers  · Please review this entire after visit summary as additional general instructions including medication list, appointments, activity, diet, any pertinent wound care, and other additional recommendations from your care team that may be provided for you        Sincerely,     Wilfredo Mccracken MD

## 2021-02-08 NOTE — ASSESSMENT & PLAN NOTE
Lab Results   Component Value Date     SARSCOV2 Positive (A) 02/05/2021      · Patient is doing well today  He denies shortness of breath, cough and chest pain  Breathing on room air with saturation over 95%   · Continue vitamin-C and zinc supplements to finish 7 day course  · Continue current dose of vitamin-D  · Continue doxycycline 100 mg b i d  and switch ceftriaxone to Keflex to finish 7 days course of antibiotics    · No indications for anticoagulant and steroids on discharge per protocol  · Outpatient follow-up in 1 week

## 2021-02-08 NOTE — QUICK NOTE
Initial potassium today elevated at 5 6  Repeat potassium 5, within normal limits  Will monitor morning BMP

## 2021-02-08 NOTE — PROGRESS NOTES
[facility] Attending Physician Supervision Note - Discharge Note    I have seen and examined Geri Bone personally and have reviewed the medical record independently  I have reviewed all components of the note performed and documented by the resident physician  I personally performed all the required components for patient evaluation and examined the patient  I was the supervising / collaborating physician on 02/08/2021 in the morning   I agree with the resident's findings and plan of care with the following additions / exceptions: The patient states that he feels well today  He denies any shortness of breath or significant coughing  The patient is no longer having diarrhea and does feel like the Imodium did help  The patient has no abdominal pain or nausea  His appetite is doing well  The patient's glucose levels have been doing okay on the subcutaneous regimen off of the IV insulin infusion  On exam, the patient's lungs are slightly decreased but no wheezing, rhonchi, or rales are noted  The breathing pattern is unlabored  The patient is sitting up in the chair when I examined him  His heart is with a regular rate rhythm normal S1-S2 heart sounds  Extremities are with no edema or calf tenderness  Abdomen is soft, nondistended, nontender to palpation  Bowel sounds are present and normoactive  Patient is awake alert oriented x3  He appears to be at his baseline mental status  Skin is warm dry and intact  No rashes or lesions  No diaphoresis  In terms of assessment and plan:  · COVID-19 Infection -   · Vitamin C, Vitamin D, Zinc  · Inpatient treatments only (will discontinue on discharge): · Remdesivir   · Dexamethasone 6 mg IV daily  · Heparin for VTE prophylaxis  · On room air, oxygen saturations are 95-97%  · Diarrhea Treatment -   · Imodium, which was increased on 02/07/2021  · Monitor BMs    · Type 1 IDDM on insulin pump with Hyperglycemia POA -   · Insulin drip used initially  · Switched to [place] insulin while inpatient  · Insulin sliding scale  · Monitor blood sugars  · Plan is for patient to return to pump when discharged  · Endocrinology following  · CKD III with KAZ POA -   · Baseline creatinine seems to possibly be around 1 2 - 1 4   · Resolved as of 02/06/2021 (Cr 1 22)  · Currently off IV fluids  · Monitor I/O  Particularly monitor PO intake  · BMP monitoring  · Avoid hypotension and avoid nephrotoxins  · Non-MI troponin elevation -  · Supportive care  No further testing needed currently  · Monitor for symptoms  · Hypothyroidism - Synthroid  Monitor for any symptoms  · Diabetic Foot Ulcer -   · Podiatry did bedside debridement on 02/06/2021  · Bulky dressing with dressing changes every 3 days  · Prevalon boots / offloading  · Weight bearing with wedge show which he has already  · Follow up outpatient with podiatry next week (already has appointment  · Hypertension - Continue amlodipine  Monitor blood pressures  Education and Discussions with Family / Patient:  Family updated by resident physician today  Time Spent for Care: 32 minutes  More than 50% of total time spent on counseling and coordination of care as described above  I attest that this information is true, accurate, and complete to the best of my knowledge      Ellie Machado DO

## 2021-02-08 NOTE — ASSESSMENT & PLAN NOTE
Lab Results   Component Value Date    HGBA1C 7 7 (H) 02/05/2021       Recent Labs     02/07/21  1546 02/07/21  1756 02/07/21  2119 02/08/21  0744   POCGLU 198* 291* 232* 182*     ? Stable for discharge and will be back to his insulin pump per Endocrinology  ?  Outpatient follow-up with Endocrinology within 1 week for further monitoring and management

## 2021-02-08 NOTE — ASSESSMENT & PLAN NOTE
Lab Results   Component Value Date     SARSCOV2 Positive (A) 02/05/2021      · Patient is doing well today  He denies shortness of breath, cough and chest pain  Breathing on room air with saturation over 95%     Workup:  · Imaging:  Xr Chest 1 View Portable     Result Date: 2/5/2021  Impression: No acute cardiopulmonary disease     · Oxygen:  Saturating 95% on room air when talking to patient     COVID Stage: MILD     Plan:  · Labs:  ? Blood type and screen  ? Cardiac markers -   ? Troponin 0 08 --> 0 09 --> 0 08  ? CK: 293  Recent Labs     02/05/21  1427 02/06/21  0255 02/06/21  0636 02/07/21  0429 02/07/21  1403   FERRITIN  --  126 144  --  266   CRP 99 5*  --  121 6*  --  100 5*   DDIMER 0 82*  --  0 96* 0 74*  --      ·   · Meds  ? Vitamin D3 2000 units daily  ? Vitamin C 1000 BID  ? Zinc 220 mg x 7 days, will hold MVI due to allergy to iodine  ? Remdesivir day 4/5  ? Famotidine 20 mg PO OD due to creatinen clearance  ? Dexamethasone 6 mg IV daily times 10 days , day 4  ? Initiate ceftriaxone and Doxycycline in the settings of elevated procalcitonin  Day 3  ? Order morning procalcitonin      · Anticoag: heparin 5000 units Q 8  · Non-Pharmacologic interventions  ? Self proning if able  ?  Incentive Spirometry

## 2021-02-08 NOTE — PATIENT INSTRUCTIONS
Debridement   WHAT YOU NEED TO KNOW:   Debridement is the removal of infected, damaged, or dead tissue so a wound can heal properly  You may need more than one debridement  DISCHARGE INSTRUCTIONS:   Medicines:   · Medicines  can help decrease pain or prevent or treat an infection  · Take your medicine as directed  Contact your healthcare provider if you think your medicine is not helping or if you have side effects  Tell him of her if you are allergic to any medicine  Keep a list of the medicines, vitamins, and herbs you take  Include the amounts, and when and why you take them  Bring the list or the pill bottles to follow-up visits  Carry your medicine list with you in case of an emergency  Follow up with your healthcare provider as directed: You may need to return to have your wound checked  Write down your questions so you remember to ask them during your visits  Care for your wound as directed:   · Keep your wound clean and dry  You may need to cover your wound when you bathe  · Limit movements,  such as stretching, to prevent bleeding, tearing, and swelling in your wound  · Protect your wound  Avoid sunlight for at least 6 months  Apply mild, unscented lotion or cream to the skin around your wound to keep it moist     · Do not smoke  If you smoke, it is never too late to quit  Smoking decreases blood flow to the wound and delays healing  Ask for information if you need help quitting  · Drink liquids as directed  Ask how much liquid to drink each day and which liquids are best for you  Liquids help keep your skin moist so your wound can heal      · Eat a variety of healthy foods  Foods rich in protein, such as meat, eggs, and dairy products, help repair tissue  Carbohydrate-rich foods, such as bread and cereals, help increase cell growth and decrease the risk for wound infection  Do not have caffeine  Ask if you should take vitamins   Vitamins A and C may help tissue formation and increase scar tissue strength  Contact your healthcare provider if:   · You have a fever  · Your pain gets worse or does not go away, even after treatment  · Your skin is red, swollen, or draining pus  · You have questions or concerns about your condition or care  Return to the emergency department if:   · Blood soaks through your bandage  · You have severe pain  © Copyright [address] Information is for End User's use only and may not be sold, redistributed or otherwise used for commercial purposes  All illustrations and images included in CareNotes® are the copyrighted property of [employer]  or [facility] [employer] Tejada   The above information is an  only  It is not intended as medical advice for individual conditions or treatments  Talk to your doctor, nurse or pharmacist before following any medical regimen to see if it is safe and effective for you

## 2021-02-08 NOTE — ASSESSMENT & PLAN NOTE
Recent Labs     02/06/21  0636 02/07/21  1403 02/08/21  0542   SODIUM 135* 134* 132*   · Sodium level is 132 corrected to 133 for elevated blood glucose  · Start fluid restriction 1 5 L  ·  Continue to monitor BMP

## 2021-02-08 NOTE — DISCHARGE INSTRUCTIONS

## 2021-02-08 NOTE — DISCHARGE SUMMARY
Discharge- Neva Villalobos 1945, 76 y o  male MRN: [id number]    Unit/Bed#: S -01 Encounter: [id number]    Primary Care Provider: Deward Hodgkins, MD   Date and time admitted to hospital: 2/5/2021  3:27 PM        * COVID-19  Assessment & Plan  Lab Results   Component Value Date     SARSCOV2 Positive (A) 02/05/2021      · Patient is doing well today  He denies shortness of breath, cough and chest pain  Breathing on room air with saturation over 95%   · Continue vitamin-C and zinc supplements to finish 7 day course  · Continue current dose of vitamin-D  · Continue doxycycline 100 mg b i d  and switch ceftriaxone to cefdinir 300 mg bid to finish 7 days course of antibiotics  · No indications for anticoagulant and steroids on discharge per protocol  · Outpatient follow-up in 1 week    Type 1 diabetes mellitus [facility])  Assessment & Plan  Lab Results   Component Value Date    HGBA1C 7 7 (H) 02/05/2021       Recent Labs     02/07/21  1546 02/07/21  1756 02/07/21  2119 02/08/21  0744   POCGLU 198* 291* 232* 182*     ? Stable for discharge and will be back to his insulin pump per Endocrinology  ? Outpatient follow-up with Endocrinology within 1 week for further monitoring and management    Diarrhea  Assessment & Plan  · Improved  · Imodium 4 mg 4 times daily p r n  Right foot ulcer, with fat layer exposed ([address] )  Assessment & Plan    · Podiatry consult  · Wound was debrided, no signs of infection and no further imaging or surgical intervention needed  Dressing placed on wound and changed every 3 days  Follow up with podiatrist in 1 week      Hyponatremia  Assessment & Plan    Recent Labs     02/06/21  0636 02/07/21  1403 02/08/21  0542   SODIUM 135* 134* 132*   · Sodium level is 132 corrected to 133 for elevated blood glucose  · Maintain good p o   Intake  · Follow-up with primary doctor within 1 week for further monitoring    Stage 3 chronic kidney disease  Assessment & Plan  Lab Results   Component Value Date    EGFR 66 02/08/2021    EGFR 54 02/07/2021    EGFR 58 02/06/2021    CREATININE 1 09 02/08/2021    CREATININE 1 29 02/07/2021    CREATININE 1 22 02/06/2021   · Stable,  Continue to monitor BMP with primary doctor      Hypothyroidism  Assessment & Plan  · TSH within normal  · Continue levothyroxine          Discharging Resident Physician: Daina Sow MD  Attending: Patrick Mcfadden DO  PCP: Joni Williamson MD  Admission Date: 2/5/2021  Discharge Date: 02/08/21    Disposition:     Home    Reason for Admission:  COVID-19    Consultations During Hospital Stay:  · None    Procedures Performed:     · None    Significant Findings / Test Results:     · Positive COVID-19    Incidental Findings:   · None     Test Results Pending at Discharge (will require follow up): · None     Outpatient Tests Requested:  · None    Complications:  None    Hospital Course:     Shaheen Durbin is a 76 y o  male patient who originally presented to the hospital on 2/5/2021 due to  2 days muscle aches, stiffnes, headache, generally weak, loss appetite, has not been drinking for the past few days  He was tested positive for COVID-19 and was started on mild pathway with IV remdesivir, IV dexamethasone and vitamins  His symptoms improved  He was also found to have elevated blood glucose and was started on insulin drip per Endocrinology recommendations which was subsequently transitioned to subcutaneous insulin  His blood glucose today were within goal which is 140-180  He is saturating 95% on room air  He has no complaints today and stable for discharge  Will transition IV antibiotics to oral to complete 7 days course  Plan of care discussed with the patient and he is agreeable    He will follow-up with his primary doctor, Podiatry and Endocrinology within the next 2 weeks    Condition at Discharge: stable     Discharge Day Visit / Exam:     Subjective:  He is doing well and has no complaints  Vitals: Blood Pressure: 131/68 (02/08/21 0746)  Pulse: 77 (02/08/21 0746)  Temperature: 98 2 °F (36 8 °C) (02/08/21 0746)  Temp Source: Oral (02/08/21 0746)  Respirations: 18 (02/08/21 0746)  Height: 5' 6" (167 6 cm) (02/05/21 1925)  SpO2: 97 % (02/08/21 0746)  Exam:   Physical Exam  Vitals signs and nursing note reviewed  Constitutional:       General: He is not in acute distress  Appearance: Normal appearance  He is not ill-appearing  Neck:      Musculoskeletal: Neck supple  Cardiovascular:      Rate and Rhythm: Normal rate and regular rhythm  Pulses: Normal pulses  Heart sounds: Normal heart sounds  Pulmonary:      Effort: Pulmonary effort is normal  No respiratory distress  Breath sounds: Normal breath sounds  Abdominal:      General: Bowel sounds are normal       Palpations: Abdomen is soft  Tenderness: There is no abdominal tenderness  Musculoskeletal:      Right lower leg: No edema  Left lower leg: No edema  Skin:     General: Skin is warm and dry  Neurological:      General: No focal deficit present  Mental Status: He is alert and oriented to person, place, and time  Psychiatric:         Mood and Affect: Mood normal          Behavior: Behavior normal          Discussion with Family:  Patient's wife    Discharge instructions/Information to patient and family:   See after visit summary for information provided to patient and family  Provisions for Follow-Up Care:  See after visit summary for information related to follow-up care and any pertinent home health orders  Planned Readmission:  No     Discharge Medications:  See after visit summary for reconciled discharge medications provided to patient and family        ** Please Note: This note has been constructed using a voice recognition system **

## 2021-02-08 NOTE — ASSESSMENT & PLAN NOTE
Lab Results   Component Value Date    EGFR 66 02/08/2021    EGFR 54 02/07/2021    EGFR 58 02/06/2021    CREATININE 1 09 02/08/2021    CREATININE 1 29 02/07/2021    CREATININE 1 22 02/06/2021   · Stable,  Continue to monitor BMP with primary doctor

## 2021-02-09 NOTE — TELEPHONE ENCOUNTER
Jas Garcia called, he just got out of the hospital and he is to weak to come to the office  He had COVID  While he was in the hospital they put a football dressing on his foot  He will keep his 2/17/21 appointment

## 2021-02-09 NOTE — UTILIZATION REVIEW
Initial Clinical Review    Admission: Date/Time/Statement:   Admission Orders (From admission, onward)     Ordered        02/05/21 1652  Inpatient Admission  Once                   Orders Placed This Encounter   Procedures    Inpatient Admission     Standing Status:   Standing     Number of Occurrences:   1     Order Specific Question:   Level of Care     Answer:   Med Surg [16]     Order Specific Question:   Estimated length of stay     Answer:   More than 2 Midnights     Order Specific Question:   Certification     Answer:   I certify that inpatient services are medically necessary for this patient for a duration of greater than two midnights  See H&P and MD Progress Notes for additional information about the patient's course of treatment  ED Arrival Information     Expected Arrival Acuity Means of Arrival Escorted By Service Admission Type    - 2/5/2021 14:10 Urgent Wheelchair Spouse Hospitalist Urgent    [address]        Chief Complaint   Patient presents with    Generalized Body Aches     Pt presents to the ED with c/o fatigue, body aches for several days  HPI:   76 y o  male with PMH of arthritis, diabetes type 2 with insulin pump, chronic right foot ulcer,  CKD, GERD, pacemaker, thyroid disease who presents to the ED from home with two days of myalgias, headache, generalized malaise, decreased p o  Intake  Patient took his blood sugar at home and noted it to be in the 400s despite compliance with his insulin pump  Tested positive for COVID in the ED  Room air saturation  88% with improvement with supplemental nasal cannula oxygen  ED labs revealed elevated creatinine (baseline 1 3-1 6) and elevated glucose and troponin  Plan: Inpatient Med Surg admission for evaluation and treatment of COVID 19,   Hyperglycemia, KAZ and elevated troponin:  Trend inflammatory and cardiac markers, CBC and CMP in a m , procalcitonin   Hold Remdesevir due to creatine clearance <30  IV dexamethasone, Vitamin C, D, and zinc  Pepcid 20 mg  PO  Consider convalescent plasma if no improvement  IV fluids, start insulin drip, consult Endocrinology and Podiatry  2/6 Internal Medicine: The patient is currently on 2 L nasal cannula with oxygen saturations around 97-98%  Lungs are slightly decreased but otherwise no wheezing, rhonchi, or rales currently  Initiate ceftriaxone and doxycycline in the setting of elevated procalcitonin  Creatinine back to baseline, continue IV hydration for another 10 hours  Start Remdesivir  Podiatry consult:  Neuropathic ulcer right foot, [place] 1Wound needed debridement  Pre-debridement wound measures  0 3 x 0 3 x 0 2 cm   Pain was controlled by Lack of sensation due to Neuropathy   Post debridement measurement  0 5 x 0 5 x 0 3 cm for a total of 0 25 square  Centimeters  100%  of wound debrided  Wound is not infected  There is no acute sign of infection or probe to bone  There is no need for any further imaging or surgical intervention  A bulky padded dressing was placed on the wound  He does have an appointment with his podiatrist next week  The dressing will be changed approximately every 3 days while the patient remains in house  Weight bear to heel in Wedge shoe which he already has  Endocrinology:   currently sugars are fairly stable on IV insulin infusion  He has had poor oral intake and nausea so will continue IV insulin infusion for now  Will monitor fingersticks q 2 hours on IV insulin infusion  As he feels better and starts to eat will transition to basal bolus insulin therapy  He can restart his insulin pump and sensor on discharge          ED Triage Vitals   Temperature Pulse Respirations Blood Pressure SpO2   02/05/21 1425 02/05/21 1423 02/05/21 1423 02/05/21 1423 02/05/21 1423   99 8 °F (37 7 °C) 96 18 113/58 99 %      Temp Source Heart Rate Source Patient Position - Orthostatic VS BP Location FiO2 (%)   02/05/21 1425 02/05/21 1423 02/05/21 1625 02/05/21 1423 --   Oral Monitor Lying Right arm       Pain Score       02/05/21 2019       No Pain          Wt Readings from Last 1 Encounters:   01/28/21 68 kg (150 lb)     Additional Vital Signs:       Date and Time Temp Pulse SpO2   O2 Resp BP   02/06/21 2216 99 °F (37 2 °C) 80 95 %  18 140/85   02/06/21 2200 -- -- --  -- --   02/06/21 1500 99 5 °F (37 5 °C) 85 94 %  18 146/81   02/06/21 0744 99 5 °F (37 5 °C) 88 97 %  18 110/58   02/05/21 2322 99 7 °F (37 6 °C) 95 90 %  16 109/60   02/05/21 2019 -- -- --  -- --   02/05/21 1925 99 4 °F (37 4 °C) 92 93 %  2L NC 17 127/63   02/05/21 1625 -- 94 94 %  18 128/76           Pertinent Labs/Diagnostic Test Results:     2/5 CXR:   No acute cardiopulmonary disease       2/5 EKG:       Sinus rhythm  Atrial-sensed ventricular-paced rhythm  When compared with ECG of 13-JUL-2019 16:36,  No significant change was found        Results from last 7 days   Lab Units 02/05/21  1624 02/05/21  1359   SARS-COV-2  Positive* Positive*     Results from last 7 days   Lab Units 02/07/21  1403 02/06/21  0636 02/05/21  1427   WBC Thousand/uL 8 99 3 97* 7 07   HEMOGLOBIN g/dL 11 7* 11 0* 10 8*   HEMATOCRIT % 35 9* 34 8* 35 4*   PLATELETS Thousands/uL 100* 79* 75*   NEUTROS ABS Thousands/µL 7 23 3 28 5 90         Results from last 7 days   Lab Units 02/08/21  0542 02/07/21  1858 02/07/21  1403 02/06/21  0636 02/05/21  1427   SODIUM mmol/L 132*  --  134* 135* 129*   POTASSIUM mmol/L 4 7 5 0 5 6* 4 5 4 9   CHLORIDE mmol/L 101  --  102 104 96*   CO2 mmol/L 22  --  25 23 22   ANION GAP mmol/L 9  --  7 8 11   BUN mg/dL 39*  --  43* 41* 48*   CREATININE mg/dL 1 09  --  1 29 1 22 2 10*   EGFR ml/min/1 73sq m 66  --  54 58 30   CALCIUM mg/dL 8 0*  --  8 0* 7 8* 8 2*     Results from last 7 days   Lab Units 02/07/21  1403 02/06/21  0636 02/05/21  1427   AST U/L 103* 73* 37   ALT U/L 51 46 33   ALK PHOS U/L 104 102 113   TOTAL PROTEIN g/dL 6 6 6 6 7 1   ALBUMIN g/dL 2 7* 2 7* 3 0*   TOTAL BILIRUBIN mg/dL 0 31 0 39 0 53     Results from last 7 days   Lab Units 02/08/21  1123 02/08/21  0744 02/07/21  2119 02/07/21  1756 02/07/21  1546 02/07/21  1349 02/07/21  1315 02/07/21  1223 02/07/21  0959 02/07/21  0802 02/07/21  0741 02/07/21  0611   POC GLUCOSE mg/dl 325* 182* 232* 291* 198* 127 158* 132 227* 162* 179* 177*     Results from last 7 days   Lab Units 02/08/21  0542 02/07/21  1403 02/06/21  0636 02/05/21  1427   GLUCOSE RANDOM mg/dL 145* 113 113 425*         Results from last 7 days   Lab Units 02/05/21  1427   HEMOGLOBIN A1C % 7 7*   EAG mg/dl 174     BETA-HYDROXYBUTYRATE   Date Value Ref Range Status   02/05/2021 0 2 <0 6 mmol/L Final                  Results from last 7 days   Lab Units 02/05/21  1427   CK TOTAL U/L 293   CK MB INDEX % 1 2   CK MB ng/mL 3 4     Results from last 7 days   Lab Units 02/06/21  0255 02/05/21  2350 02/05/21  2028 02/05/21  1554   TROPONIN I ng/mL 0 08* 0 09* 0 08* 0 06*     Results from last 7 days   Lab Units 02/07/21  0429 02/06/21  0636 02/05/21  1427   D-DIMER QUANTITATIVE ug/ml FEU 0 74* 0 96* 0 82*         Results from last 7 days   Lab Units 02/05/21  1427   TSH 3RD GENERATON uIU/mL 0 447     Results from last 7 days   Lab Units 02/06/21  0636 02/05/21  2028   PROCALCITONIN ng/ml 0 80* 1 02*                 Results from last 7 days   Lab Units 02/05/21  1427   NT-PRO BNP pg/mL 2,875*     Results from last 7 days   Lab Units 02/07/21  1403 02/06/21  0636 02/06/21  0255   FERRITIN ng/mL 266 144 126         Results from last 7 days   Lab Units 02/05/21  1427   LIPASE u/L 20*     Results from last 7 days   Lab Units 02/07/21  1403 02/06/21  0636 02/05/21  1427   CRP mg/L 100 5* 121 6* 99 5*             Results from last 7 days   Lab Units 02/05/21  1624   INFLUENZA A PCR  Negative   INFLUENZA B PCR  Negative   RSV PCR  Negative               ED Treatment:   Medication Administration from 02/05/2021 1410 to 02/05/2021 1919       Date/Time Order Dose Route Action Action by Comments     02/05/2021 1802 sodium chloride 0 9 % bolus 1,000 mL 0 mL Intravenous Stopped Arcadio Nelson RN      02/05/2021 1605 sodium chloride 0 9 % bolus 1,000 mL 1,000 mL Intravenous [address] Jeanine Hannon RN      02/05/2021 1803 sodium chloride 0 9 % bolus 1,000 mL 1,000 mL Intravenous New Bag Arcadio Nelson RN         Past Medical History:   Diagnosis Date    Arthritis     Cardiac disease     Closed fracture of fibula, lateral malleolus, right     Complete heart block [facility]     Diabetes mellitus ([facility] )     Disease of thyroid gland     External incisional dehiscence     GERD (gastroesophageal reflux disease)     Heart disease     Muscular deconditioning     Pacemaker     Sleep apnea     cannot tolerate cpap    Thyroid disease      Present on Admission:   Hypothyroidism   Stage 3 chronic kidney disease   (Resolved) KAZ (acute kidney injury) ([facility] )   Hyponatremia   Right foot ulcer, with fat layer exposed ([facility] )      Admitting Diagnosis: Viral syndrome [B34 9]  Fatigue [R53 83]  Hyperglycemia [R73 9]  Body aches [R52]  Elevated troponin [R77 8]  KAZ (acute kidney injury) ([facility] ) [N17 9]  Right foot ulcer, with fat layer exposed (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, with long-term current use of insulin ([facility]) [U03 0900, Z79 4]  Age/Sex: 76 y o  male       Admission Orders:    Telemetry,       Scheduled Medications:         Last 24 Hours Medication List:          Medication Dose Route Frequency    acetaminophen  650 mg Oral Q6H PRN    amLODIPine  5 mg Oral Daily    ascorbic acid  1,000 mg Oral Q12H [facility]    aspirin  81 mg Oral Daily    atorvastatin  5 mg Oral Daily    cefTRIAXone  1,000 mg Intravenous Q24H    cholecalciferol  2,000 Units Oral Daily    vitamin B-12  2,000 mcg Oral Daily    dexamethasone  6 mg Intravenous Q24H    doxycycline hyclate  100 mg Oral Q12H [facility]    DULoxetine  30 mg Oral Daily    ezetimibe  5 mg Oral HS    famotidine  20 mg Oral Daily    heparin (porcine)  5,000 Units Subcutaneous Q8H [facility]    insulin lispro  1-5 Units Subcutaneous TID AC    insulin lispro  1-5 Units Subcutaneous HS    levothyroxine  125 mcg Oral Early Morning    magnesium hydroxide  30 mL Oral Daily PRN    ondansetron  4 mg Intravenous Q6H PRN    remdesivir  100 mg Intravenous Q24H    simethicone  80 mg Oral 4x Daily PRN    tamsulosin  0 4 mg Oral Daily With Dinner    zinc sulfate  220 mg Oral Daily        insulin regular (HumuLIN R,NovoLIN R) 1 Units/mL in sodium chloride 0 9 % 100 mL infusion   Rate: 0 3-21 mL/hr Dose: 0 3-21 Units/hr  Freq: Titrated Route: IV  Start: 02/05/21 2215       sodium chloride 0 9 % infusion   Rate: 50 mL/hr Dose: 50 mL/hr  Freq: Continuous Route: IV  Indications of Use: IV Hydration  Start: 02/05/21 1815 End: 02/06/21 2013      IP CONSULT TO ENDOCRINOLOGY  IP CONSULT TO PODIATRY    Network Utilization Review Department  ATTENTION: Please call with any questions or concerns to [phone] and carefully listen to the prompts so that you are directed to the right person  All voicemails are confidential   Jose Alfredo Lee all requests for admission clinical reviews, approved or denied determinations and any other requests to dedicated fax number below belonging to the campus where the patient is receiving treatment   List of dedicated fax numbers for the Facilities:  [address] DENIALS (Administrative/Medical Necessity) [phone]   [address] (Maternity/NICU/Pediatrics) [phone]   [address] [place] [id number] [facility] [address] (  Eren Solo "Jaquelin" 103) [address] [facility] [address] Erica Werner 1481 [address] [place]) [facility] [phone]

## 2021-02-09 NOTE — UTILIZATION REVIEW
Continued Stay Review    Date: 2/7/21                        Patient Class: Inpatient  Level of Care: Med Surg    HPI:75 y o  male initially admitted on 2/5/21 with COVID 19, hyperglycemia, KAZ and elevated troponin  Placed on IV steroid, Remdesivir initially held due to KAZ, placed on insulin gtt and IV fluids  Initially required 2L O2 supplemental oxygen  Endocrinology and Podiatry on consult  2/7 Internal Medicine: Patient has good PO intake, switch patient to SC insulin and d/c insulin gtt  Per Encdocrinology, will switch to basal bolus insulin therapy starting with dinner time  Weaned to room air, continue Remdesivir, dexamethasone and vitamins  Repeat procalcitonin in the a m  Initial potassium elevated, repeat WNL  BMP in a m      2/8 Discharged to home/self care       Pertinent Labs/Diagnostic Results:   Results from last 7 days   Lab Units 02/05/21  1624 02/05/21  1359   SARS-COV-2  Positive* Positive*     Results from last 7 days   Lab Units 02/07/21  1403 02/06/21  0636 02/05/21  1427   WBC Thousand/uL 8 99 3 97* 7 07   HEMOGLOBIN g/dL 11 7* 11 0* 10 8*   HEMATOCRIT % 35 9* 34 8* 35 4*   PLATELETS Thousands/uL 100* 79* 75*   NEUTROS ABS Thousands/µL 7 23 3 28 5 90         Results from last 7 days   Lab Units 02/08/21  0542 02/07/21  1858 02/07/21  1403 02/06/21  0636 02/05/21  1427   SODIUM mmol/L 132*  --  134* 135* 129*   POTASSIUM mmol/L 4 7 5 0 5 6* 4 5 4 9   CHLORIDE mmol/L 101  --  102 104 96*   CO2 mmol/L 22  --  25 23 22   ANION GAP mmol/L 9  --  7 8 11   BUN mg/dL 39*  --  43* 41* 48*   CREATININE mg/dL 1 09  --  1 29 1 22 2 10*   EGFR ml/min/1 73sq m 66  --  54 58 30   CALCIUM mg/dL 8 0*  --  8 0* 7 8* 8 2*     Results from last 7 days   Lab Units 02/07/21  1403 02/06/21  0636 02/05/21  1427   AST U/L 103* 73* 37   ALT U/L 51 46 33   ALK PHOS U/L 104 102 113   TOTAL PROTEIN g/dL 6 6 6 6 7 1   ALBUMIN g/dL 2 7* 2 7* 3 0*   TOTAL BILIRUBIN mg/dL 0 31 0 39 0 53     Results from last 7 days Lab Units 02/08/21  1123 02/08/21  0744 02/07/21  2119 02/07/21  1756 02/07/21  1546 02/07/21  1349 02/07/21  1315 02/07/21  1223 02/07/21  0959 02/07/21  0802 02/07/21  0741 02/07/21  0611   POC GLUCOSE mg/dl 325* 182* 232* 291* 198* 127 158* 132 227* 162* 179* 177*     Results from last 7 days   Lab Units 02/08/21  0542 02/07/21  1403 02/06/21  0636 02/05/21  1427   GLUCOSE RANDOM mg/dL 145* 113 113 425*         Results from last 7 days   Lab Units 02/05/21  1427   HEMOGLOBIN A1C % 7 7*   EAG mg/dl 174     BETA-HYDROXYBUTYRATE   Date Value Ref Range Status   02/05/2021 0 2 <0 6 mmol/L Final                  Results from last 7 days   Lab Units 02/05/21  1427   CK TOTAL U/L 293   CK MB INDEX % 1 2   CK MB ng/mL 3 4     Results from last 7 days   Lab Units 02/06/21  0255 02/05/21  2350 02/05/21  2028 02/05/21  1554   TROPONIN I ng/mL 0 08* 0 09* 0 08* 0 06*     Results from last 7 days   Lab Units 02/07/21  0429 02/06/21  0636 02/05/21  1427   D-DIMER QUANTITATIVE ug/ml FEU 0 74* 0 96* 0 82*         Results from last 7 days   Lab Units 02/05/21  1427   TSH 3RD GENERATON uIU/mL 0 447     Results from last 7 days   Lab Units 02/06/21  0636 02/05/21  2028   PROCALCITONIN ng/ml 0 80* 1 02*                 Results from last 7 days   Lab Units 02/05/21  1427   NT-PRO BNP pg/mL 2,875*     Results from last 7 days   Lab Units 02/07/21  1403 02/06/21  0636 02/06/21  0255   FERRITIN ng/mL 266 144 126         Results from last 7 days   Lab Units 02/05/21  1427   LIPASE u/L 20*     Results from last 7 days   Lab Units 02/07/21  1403 02/06/21  0636 02/05/21  1427   CRP mg/L 100 5* 121 6* 99 5*             Results from last 7 days   Lab Units 02/05/21  1624   INFLUENZA A PCR  Negative   INFLUENZA B PCR  Negative   RSV PCR  Negative       Vital Signs:     Date/Time  Temp  Pulse  Resp  BP  MAP (mmHg)  SpO2  O2 Device   02/08/21 0746  98 2 °F (36 8 °C)  77  18  131/68  93  97 %  None (Room air)   02/07/21 2100  97 7 °F (36 5 °C) 79  17  157/83  --  96 %  None (Room air)   02/07/21 2000  --  --  --  --  --  --  None (Room air)   02/07/21 1500  97 4 °F (36 3 °C)Abnormal   72  18  144/68  --  97 %  --   02/07/21 0742  98 3 °F (36 8 °C)  82  18  119/76  94  95 %  None (Room air)   02/06/21 2216  99 °F (37 2 °C)  80  18  140/85  --  95 %  None (Room air)   02/06/21 1500  99 5 °F (37 5 °C)  85  18  146/81  107  94 %  None (Room air)   02/06/21 0744  99 5 °F (37 5 °C)  88  18  110/58  78  97 %  Nasal cannula             Medications:   Scheduled Medications:        Last 24 Hours Medication List:          Medication Dose Route Frequency    acetaminophen  650 mg Oral Q6H PRN    amLODIPine  5 mg Oral Daily    ascorbic acid  1,000 mg Oral Q12H CALIN    aspirin  81 mg Oral Daily    atorvastatin  5 mg Oral Daily    cefTRIAXone  1,000 mg Intravenous Q24H    cholecalciferol  2,000 Units Oral Daily    vitamin B-12  2,000 mcg Oral Daily    dexamethasone  6 mg Intravenous Q24H    doxycycline hyclate  100 mg Oral Q12H CALIN    DULoxetine  30 mg Oral Daily    ezetimibe  5 mg Oral HS    famotidine  20 mg Oral Daily    heparin (porcine)  5,000 Units Subcutaneous Q8H [address]    insulin glargine  14 Units Subcutaneous Daily With Dinner    [START ON 2/8/2021] insulin lispro  1-5 Units Subcutaneous TID With Meals    insulin lispro  1-5 Units Subcutaneous HS    insulin lispro  6 Units Subcutaneous TID With Meals    levothyroxine  125 mcg Oral Early Morning    loperamide  4 mg Oral 4x Daily PRN    magnesium hydroxide  30 mL Oral Daily PRN    ondansetron  4 mg Intravenous Q6H PRN    remdesivir  100 mg Intravenous Q24H    simethicone  80 mg Oral 4x Daily PRN    tamsulosin  0 4 mg Oral Daily With Dinner    zinc sulfate  220 mg Oral Daily       insulin regular (HumuLIN R,NovoLIN R) 1 Units/mL in sodium chloride 0 9 % 100 mL infusion   Rate: 0 3-21 mL/hr Dose: 0 3-21 Units/hr  Freq: Titrated Route: IV  Start: 02/05/21 2215 End: 02/07/21 36          Network Utilization Review Department  ATTENTION: Please call with any questions or concerns to [phone] and carefully listen to the prompts so that you are directed to the right person  All voicemails are confidential   Fadi Ramos all requests for admission clinical reviews, approved or denied determinations and any other requests to dedicated fax number below belonging to the campus where the patient is receiving treatment   List of dedicated fax numbers for the Facilities:  [address] DENIALS (Administrative/Medical Necessity) [phone]   [address] (Maternity/NICU/Pediatrics) [phone]   [address] [place] [address] Dr Myrtle Cochran [phone] (  Eren Solo "Jaquelin" 103) [address] [place] [address] Erica Rivera [address] [place] [address] [phone]

## 2021-02-09 NOTE — UTILIZATION REVIEW
Notification of Inpatient Admission/Inpatient Authorization Request   This is a Notification of Inpatient Admission for [facility]  Be advised that this patient was admitted to our facility under Inpatient Status  Contact Taffy Lefort at [phone] for additional admission information  [facility] DE[facility]  DEDICATED [phone]  Patient Name:   Janna Sotelo   YOB: 1945       [address]   [address]:   Abril Bahena  Tax ID: [id number]  NPI: [id number] Attending Provider/NPI:  Address:  Phone: Karen Lazaro Do [[id number]]  Same as the facility  [phone]   Place of Service Code: 24 Place of Service Name:  [address] [place] [address]   Start Date: 2/5/21 1652     Discharge Date & Time: 2/8/2021  1:03 PM    Type of Admission: Inpatient Status Discharge Disposition   (if discharged): Home/Self Care   Patient Diagnoses: Viral syndrome [B34 9]  Fatigue [R53 83]  Hyperglycemia [R73 9]  Body aches [R52]  Elevated troponin [R77 8]  KAZ (acute kidney injury) ([address] ) [N17 9]  Right foot ulcer, with fat layer exposed ([address] ) [L97 512]  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, with long-term current use of insulin ([address] ) [L64 1160, Z79 4]     Orders: Admission Orders (From admission, onward)     Ordered        02/05/21 1652  Inpatient Admission  Once                    Assigned Utilization Review Contact: Taffy Lefort  Utilization   Network Utilization Review Department  Phone: [phone]; Fax [phone]  Email: [email or web address]   ATTENTION PAYERS: Please call the assigned Utilization  directly with any questions or concerns ALL voicemails in the department are confidential  Send all requests for admission clinical reviews, approved or denied determinations and any other requests to dedicated fax number belonging to the campus where the patient is receiving treatment

## 2021-02-10 NOTE — TELEPHONE ENCOUNTER
Phone call to pt to cancel his 2/17 appointment  Pt tested positive for COVID on 2/5, unable to be seen in office until 2/19  Pt has appointment for 2/24  Instructed pt to remove the dressing the hospital applied and start dressing the wound with silver gel and dsd daily  Continue with wedge shoe and walker  Call with signs and symptoms of infection  Pt verbalizes understanding

## 2021-02-11 NOTE — PROGRESS NOTES
Pt confirmed apt for tomorrow morning at [facility]   No questions or concerns at time of call

## 2021-02-11 NOTE — PROGRESS NOTES
COVID-19 Virtual Visit     Assessment/Plan:    Problem List Items Addressed This Visit     None         Disposition:     I recommended continued isolation until at least 24 hours have passed since recovery defined as resolution of fever without the use of fever-reducing medications AND improvement in COVID symptoms AND 10 days have passed since onset of symptoms (or 10 days have passed since date of first positive viral diagnostic test for asymptomatic patients)  Patient continues to experience symptoms of intense fatigue  Denies any symptoms of shortness of breath or tightness in his chest   Patient discussing about monoclonal antibody treatment  His wife received the antibodies and noticed significant improvement in her symptoms  Patient scheduled to receive treatment for tomorrow  Patient is a candidate for Bamlanivimab  They were counseled in regards to risks, benefits, and side effects of this infusion  Possible side effects of bamlanivimab are: Allergic reactions   Allergic reactions can happen during and after infusion with bamlanivimab which include:    Fever, chills, nausea, headache, shortness of breath, low blood pressure, wheezing, swelling of your lips, face, or throat, rash including hives, itching, muscle aches, and dizziness  The side effects of getting any medicine by vein may include brief pain, bleeding, bruising of the skin, soreness, swelling, and possible infection at the infusion site  These are not all the possible side effects of bamlanivimab  Not a lot of people have been given bamlanivimab  Serious and unexpected side effects may happen  Gio Fu is still being studied so it is possible that all of the risks are not known at this time   Please note that this drug was approved under the Emergency Use Authorization of the FDA and has not gone through the full, formal FDA approval process    It is possible that bamlanivimab could interfere with your body's own ability to fight off a future infection of SARS-CoV-2  Similarly, bamlanivimab may reduce your bodys immune response to a vaccine for SARS-CoV-2  Specific studies have not been conducted to address these possible risks  Currently there is no data or safety or efficacy of COVID-19 vaccination in persons who received monoclonal antibodies as part of COVID-19 treatment  Treatment should be deferred for at least 90 days to avoid interference of the treatment with vaccine-induced immune responses (this is based on estimated half-life of therapies and evidence suggesting reinfection is uncommon within 90 days of initial infection)  The patient consents to proceed with bamlanivimab infusion  *[email or web address]  [email or web address]-factsheet-hcp  pdf    I have spent 10 minutes directly with the patient  Greater than 50% of this time was spent in counseling/coordination of care regarding: prognosis, risks and benefits of treatment options, instructions for management, patient and family education, importance of treatment compliance, risk factor reductions and impressions  Encounter provider Cisco Delatorre MD    Provider located at [address] [place] [phone]    Recent Visits  No visits were found meeting these conditions  Showing recent visits within past 7 days and meeting all other requirements     Today's Visits  Date Type Provider Dept   02/11/21 Telemedicine Cisco Delatorre MD [facility]   Showing today's visits and meeting all other requirements     Future Appointments  No visits were found meeting these conditions  Showing future appointments within next 150 days and meeting all other requirements      This virtual check-in was done via [employer] and patient was informed that this is a secure, HIPAA-compliant platform  He agrees to proceed      Patient agrees to participate in a virtual check in via telephone or video visit instead of presenting to the office to address urgent/immediate medical needs  Patient is aware this is a billable service  After connecting through [facility], the patient was identified by name and date of birth  Geri Bone was informed that this was a telemedicine visit and that the exam was being conducted confidentially over secure lines  My office door was closed  No one else was in the room  Geri Bone acknowledged consent and understanding of privacy and security of the telemedicine visit  I informed the patient that I have reviewed his record in Epic and presented the opportunity for him to ask any questions regarding the visit today  The patient agreed to participate  Subjective:   Geri Bone is a 76 y o  male who has been screened for COVID-19  Symptom change since last report: unchanged  Patient's symptoms include fatigue, malaise, sore throat and cough  Patient denies fever, chills, congestion, anosmia, loss of taste, shortness of breath, chest tightness, abdominal pain, nausea, vomiting, diarrhea, myalgias and headaches  Kumar Mclaughlin has been staying home and has isolated themselves in his home  He is taking care to not share personal items and is cleaning all surfaces that are touched often, like counters, tabletops, and doorknobs using household cleaning sprays or wipes  He is wearing a mask when he leaves his room       Date of positive COVID-19 PCR: 2/5/2021    Lab Results   Component Value Date    SARSCOV2 Positive (A) 02/05/2021     Past Medical History:   Diagnosis Date    Arthritis     Cardiac disease     Closed fracture of fibula, lateral malleolus, right     Complete heart block ([address] )     Diabetes mellitus ([address] )     Disease of thyroid gland     External incisional dehiscence     GERD (gastroesophageal reflux disease)     Heart disease     Muscular deconditioning     Pacemaker     Sleep apnea     cannot tolerate cpap    Thyroid disease      Past Surgical History:   Procedure Laterality Date    CARDIAC PACEMAKER PLACEMENT Left 2015    CARDIAC PACEMAKER PLACEMENT      CARDIAC SURGERY      CATARACT EXTRACTION      MUSCLE FLAP      right foot    NERVE BLOCK      Transforaminal Epidural Lumbar with Fluoroscopic Guidance     ND COLONOSCOPY FLX DX W/COLLJ SPEC WHEN PFRMD N/A 5/4/2018    Procedure: EGD AND COLONOSCOPY;  Surgeon: Elvin Garnica MD;  Location: [facility];   Service: Gastroenterology    ND SPLIT GRFT TRUNK,ARM,LEG <100 SQCM Right 6/10/2019    Procedure: SKIN GRAFT SPLIT THICKNESS (STSG)  EXTREMITY right leg;  Surgeon: Jasbir Sharma DPM;  Location: [facility];  Service: Podiatry    TOE AMPUTATION      left 5th toe     Current Outpatient Medications   Medication Sig Dispense Refill    amLODIPine (NORVASC) 5 mg tablet Take 5 mg by mouth daily      ascorbic acid (VITAMIN C) 1000 MG tablet Take 1 tablet (1,000 mg total) by mouth daily for 4 days 4 tablet 0    aspirin 81 mg chewable tablet Chew 81 mg daily      Atorvastatin Calcium (LIPITOR PO) Take 5 mg by mouth daily       CALCIUM CITRATE PO Take 2,000 mg by mouth daily       cefdinir (OMNICEF) 300 mg capsule Take 1 capsule (300 mg total) by mouth every 12 (twelve) hours for 4 days 8 capsule 0    Cholecalciferol (VITAMIN D3) 2000 units capsule Take 2,000 Units by mouth daily      Continuous Blood Gluc Sensor (DEXCOM G6 SENSOR) MISC As directed dx:250 03      cyanocobalamin (VITAMIN B-12) 1,000 mcg tablet Take 2,000 mcg by mouth daily      doxycycline hyclate (VIBRAMYCIN) 100 mg capsule Take 1 capsule (100 mg total) by mouth every 12 (twelve) hours for 9 doses 9 capsule 0    DULoxetine (CYMBALTA) 30 mg delayed release capsule Take 30 mg by mouth daily       ezetimibe (ZETIA) 10 mg tablet TAKE 5MG (ONE-HALF TABLET) BY MOUTH DAILY FOR CHOLESTEROL      glucose 40 % Take by mouth once      glucose 77 4 % TAKE 1/2 TUBE BY MOUTH  AS NEEDED FOR LOW BLOOD SUGAR      insulin aspart (NovoLOG) 100 units/mL injection Inject under the skin Via insulin pump      loperamide (IMODIUM) 2 mg capsule Take 2 capsules (4 mg total) by mouth 4 (four) times a day as needed for diarrhea 30 capsule 0    pantoprazole (PROTONIX) 40 mg tablet Take 40 mg by mouth      tamsulosin (FLOMAX) 0 4 mg Take 0 4 mg by mouth daily with dinner        zinc sulfate (ZINCATE) 220 mg capsule Take 1 capsule (220 mg total) by mouth daily for 4 doses 4 capsule 0    levothyroxine 125 mcg tablet Take 1 tablet (125 mcg total) by mouth daily in the early morning 30 tablet 0     No current facility-administered medications for this visit  Allergies   Allergen Reactions    Contrast  [Iodinated Diagnostic Agents]     Lipitor  [Atorvastatin Calcium] Drowsiness    Ibuprofen      sensitive    Iodine Rash    Simvastatin Anxiety     Other reaction(s): Malaise (finding)       Review of Systems   Constitutional: Positive for fatigue  Negative for chills and fever  HENT: Positive for sore throat  Negative for congestion  Respiratory: Positive for cough  Negative for chest tightness and shortness of breath  Cardiovascular: Negative  Negative for chest pain and palpitations  Gastrointestinal: Negative  Negative for abdominal pain, diarrhea, nausea and vomiting  Endocrine: Negative  Genitourinary: Negative  Musculoskeletal: Negative  Negative for myalgias  Allergic/Immunologic: Negative  Neurological: Negative  Negative for headaches  Psychiatric/Behavioral: Negative  Objective:    Vitals:    02/11/21 0832   Weight: 68 kg (150 lb)   Height: 5' 6" (1 676 m)       Physical Exam  Constitutional:       General: He is not in acute distress  Appearance: He is well-developed  Pulmonary:      Effort: Pulmonary effort is normal    Neurological:      Mental Status: He is alert and oriented to person, place, and time  Psychiatric:         Behavior: Behavior normal          Thought Content:  Thought content normal  Judgment: Judgment normal        VIRTUAL VISIT DISCLAIMER    Puneetcyndee Freitas acknowledges that he has consented to an online visit or consultation  He understands that the online visit is based solely on information provided by him, and that, in the absence of a face-to-face physical evaluation by the physician, the diagnosis he receives is both limited and provisional in terms of accuracy and completeness  This is not intended to replace a full medical face-to-face evaluation by the physician  Jillian Freitas understands and accepts these terms

## 2021-02-12 NOTE — UTILIZATION REVIEW
Notification of Discharge  This is a Notification of Discharge from our facility [address] [place] [address]  Please be advised that this patient has been discharge from our facility  Below you will find the admission and discharge date and time including the patients disposition  PRESENTATION DATE: 2/5/2021  3:27 PM  OBS ADMISSION DATE:   IP ADMISSION DATE: 2/5/21 1652   DISCHARGE DATE: 2/8/2021  1:03 PM  DISPOSITION: Home/Self Care Home/Self Care   Admission Orders listed below:  Admission Orders (From admission, onward)     Ordered        02/05/21 1652  Inpatient Admission  Once                   Please contact the UR Department if additional information is required to close this patient's authorization/case  [address] Utilization Review Department  Main: [phone] x carefully listen to the prompts  All voicemails are confidential   [email or web address]  Send all requests for admission clinical reviews, approved or denied determinations and any other requests to dedicated fax number below belonging to the campus where the patient is receiving treatment   List of dedicated fax numbers:  [address] DENIALS (Administrative/Medical Necessity) [phone]   [address] (Maternity/NICU/Pediatrics) [phone]   Gregg Sandra [phone]   Divya No [phone]   Lucila Mcclendon [phone]   Saleem Parsons [facility] [address] [place] [address] [phone]   [facility]  [phone]   [address] [facility]  [address] [place] [address] [address] [facility] [phone]

## 2021-02-12 NOTE — PLAN OF CARE
Problem: PAIN - ADULT  Goal: Verbalizes/displays adequate comfort level or baseline comfort level  Description: Interventions:  - Encourage patient to monitor pain and request assistance  - Assess pain using appropriate pain scale  - Administer analgesics based on type and severity of pain and evaluate response  - Implement non-pharmacological measures as appropriate and evaluate response  - Consider cultural and social influences on pain and pain management  - Notify physician/advanced practitioner if interventions unsuccessful or patient reports new pain  Outcome: Progressing     Problem: INFECTION - ADULT  Goal: Absence or prevention of progression during hospitalization  Description: INTERVENTIONS:  - Assess and monitor for signs and symptoms of infection  - Monitor lab/diagnostic results  - Monitor all insertion sites, i e  indwelling lines, tubes, and drains  - Monitor endotracheal if appropriate and nasal secretions for changes in amount and color  - [place] appropriate cooling/warming therapies per order  - Administer medications as ordered  - Instruct and encourage patient and family to use good hand hygiene technique  - Identify and instruct in appropriate isolation precautions for identified infection/condition  Outcome: Progressing  Goal: Absence of fever/infection during neutropenic period  Description: INTERVENTIONS:  - Monitor WBC    Outcome: Progressing     Problem: SAFETY ADULT  Goal: Patient will remain free of falls  Description: INTERVENTIONS:  - Assess patient frequently for physical needs  -  Identify cognitive and physical deficits and behaviors that affect risk of falls    -  [place] fall precautions as indicated by assessment   - Educate patient/family on patient safety including physical limitations  - Instruct patient to call for assistance with activity based on assessment  - Modify environment to reduce risk of injury  - Consider OT/PT consult to assist with strengthening/mobility  Outcome: Progressing  Goal: Maintain or return to baseline ADL function  Description: INTERVENTIONS:  -  Assess patient's ability to carry out ADLs; assess patient's baseline for ADL function and identify physical deficits which impact ability to perform ADLs (bathing, care of mouth/teeth, toileting, grooming, dressing, etc )  - Assess/evaluate cause of self-care deficits   - Assess range of motion  - Assess patient's mobility; develop plan if impaired  - Assess patient's need for assistive devices and provide as appropriate  - Encourage maximum independence but intervene and supervise when necessary  - Involve family in performance of ADLs  - Assess for home care needs following discharge   - Consider OT consult to assist with ADL evaluation and planning for discharge  - Provide patient education as appropriate  Outcome: Progressing  Goal: Maintain or return mobility status to optimal level  Description: INTERVENTIONS:  - Assess patient's baseline mobility status (ambulation, transfers, stairs, etc )    - Identify cognitive and physical deficits and behaviors that affect mobility  - Identify mobility aids required to assist with transfers and/or ambulation (gait belt, sit-to-stand, lift, walker, cane, etc )  - [place] fall precautions as indicated by assessment  - Record patient progress and toleration of activity level on Mobility SBAR; progress patient to next Phase/Stage  - Instruct patient to call for assistance with activity based on assessment  - Consider rehabilitation consult to assist with strengthening/weightbearing, etc   Outcome: Progressing     Problem: DISCHARGE PLANNING  Goal: Discharge to home or other facility with appropriate resources  Description: INTERVENTIONS:  - Identify barriers to discharge w/patient and caregiver  - Arrange for needed discharge resources and transportation as appropriate  - Identify discharge learning needs (meds, wound care, etc )  - Arrange for interpretive services to assist at discharge as needed  - Refer to Case Management Department for coordinating discharge planning if the patient needs post-hospital services based on physician/advanced practitioner order or complex needs related to functional status, cognitive ability, or social support system  Outcome: Progressing     Problem: Knowledge Deficit  Goal: Patient/family/caregiver demonstrates understanding of disease process, treatment plan, medications, and discharge instructions  Description: Complete learning assessment and assess knowledge base    Interventions:  - Provide teaching at level of understanding  - Provide teaching via preferred learning methods  Outcome: Progressing

## 2021-02-12 NOTE — PROGRESS NOTES
Patient tolerated Bamlanivimab infusion and 1 hour post observation without incident  IV discontinued, catheter intact  Gauze applied to site  Discharge instructions reviewed with patient verbally  Copy of discharge instructions given to patient  Patient verbalized understanding of all discharge instructions  Patient discharged without incident   Pt escorted off unit by RN

## 2021-02-12 NOTE — PROGRESS NOTES
Patient tolerated bamlanivimab treatment  Vitals stable  No c/o pain, or side effects noted  Observation period beginning

## 2021-02-23 NOTE — PROGRESS NOTES
This patient was seen on 2/24/2021  My role is Foot , Ankle, and Wound Specialist    SUBJECTIVE    Chief Complaint:  wound     Patient ID: Geri Bone is a 76 y o  male  Pt arrives for wound care follow up  Presents with dressing intact  Pt [facility]  Pt dressing the wound  The following portions of the patient's history were reviewed and updated as appropriate: allergies, current medications, past family history, past medical history, past social history, past surgical history and problem list     Review of Systems      OBJECTIVE      /62   Pulse 102   Wt 65 8 kg (145 lb)   BMI 23 40 kg/m²     Foot/Ankle Musculoskeletal Exam     Physical Exam      Physical Exam    Wound # 1  Location: right plantar foot   Length 0 8cm: Width 0 8cm: Depth 0 2cm:   Deepest Tissue Noted in Base:   Probe to Bone?: no  Peripheral Skin Description: intact  Granulation:90% Fibrotic Tissue: 10% Necrotic Tissue: 0%  Drainage Amount: minimal  Signs of Infection: no  Total debrided 0 64 square centimeters  ASSESSMENT     There are no diagnoses linked to this encounter  Problem List Items Addressed This Visit     None            Problems:    Chronic illness / Problem not at goal with exacerbation, progression or side effects of treatment  1  Diabetic foot ulcer      PLAN  DEBRIDEMENT NOTE    A formal timeout including patient identification, laterality and existing allergies [facility] was conducted  Procedure Performed: Debridement of subcutaneous tissue- >20 sq cm (95090)  Under aseptic technique, the wound was thoroughly explored and bluntly probed for undermining, deep sinus tracts and bone involvement  Sterile instrumentation including scalpel, forceps and curette used to excise the clearly delineated devitalized subcutaneous tissue (fibrotic tissue and necrotic non-viable portions of fat) exposing viable tissue   After debridement, bleeding in the wound bed base was noted indicated viable subcutaneous tissue had been exposed  Bleeding controlled with gentle pressure  Patient tolerated debridement without complications  The wound was dressed  Wound dressing technique and frequency described to patient  I am to be called if any signs of infection develop such as erythema, increased wound size or significant change in appearance of wound, odor, pain, increased or change in color of drainage, swelling, fever, chills, nightsweats  I reviewed these signs with the patient  I recommended limiting WB to bathroom priveleges and meal table and to use any prescribed offloading devices in an effort to allow proper rest and healing of the wounded area  I explained that good wound care and compliance are necessary to allow healing and to prevent toe loss or limb loss  Collagen applied to the wound bed, covered with gauze and tape  In light of the failure of the wound to heal despite his professed consistent use of the wedge shoe and a period of bedrest during his COVID hospitalization, I am recommending use of the full length camwalker for all ambulation (he has the boot at home)    Return in one week

## 2021-02-24 NOTE — PATIENT INSTRUCTIONS
Debridement   WHAT YOU NEED TO KNOW:   Debridement is the removal of infected, damaged, or dead tissue so a wound can heal properly  You may need more than one debridement  DISCHARGE INSTRUCTIONS:   Medicines:   · Medicines  can help decrease pain or prevent or treat an infection  · Take your medicine as directed  Contact your healthcare provider if you think your medicine is not helping or if you have side effects  Tell him of her if you are allergic to any medicine  Keep a list of the medicines, vitamins, and herbs you take  Include the amounts, and when and why you take them  Bring the list or the pill bottles to follow-up visits  Carry your medicine list with you in case of an emergency  Follow up with your healthcare provider as directed: You may need to return to have your wound checked  Write down your questions so you remember to ask them during your visits  Care for your wound as directed:   · Keep your wound clean and dry  You may need to cover your wound when you bathe  · Limit movements,  such as stretching, to prevent bleeding, tearing, and swelling in your wound  · Protect your wound  Avoid sunlight for at least 6 months  Apply mild, unscented lotion or cream to the skin around your wound to keep it moist     · Do not smoke  If you smoke, it is never too late to quit  Smoking decreases blood flow to the wound and delays healing  Ask for information if you need help quitting  · Drink liquids as directed  Ask how much liquid to drink each day and which liquids are best for you  Liquids help keep your skin moist so your wound can heal      · Eat a variety of healthy foods  Foods rich in protein, such as meat, eggs, and dairy products, help repair tissue  Carbohydrate-rich foods, such as bread and cereals, help increase cell growth and decrease the risk for wound infection  Do not have caffeine  Ask if you should take vitamins   Vitamins A and C may help tissue formation and increase scar tissue strength  Contact your healthcare provider if:   · You have a fever  · Your pain gets worse or does not go away, even after treatment  · Your skin is red, swollen, or draining pus  · You have questions or concerns about your condition or care  Return to the emergency department if:   · Blood soaks through your bandage  · You have severe pain  © Copyright [address] Information is for End User's use only and may not be sold, redistributed or otherwise used for commercial purposes  All illustrations and images included in CareNotes® are the copyrighted property of [employer]  or [facility] Ranulfo Tejada   The above information is an  only  It is not intended as medical advice for individual conditions or treatments  Talk to your doctor, nurse or pharmacist before following any medical regimen to see if it is safe and effective for you

## 2021-03-02 NOTE — NURSING NOTE
Device interrogation for MRI  Normal device function prior to MRI  Leads and device meet all requirements per policy for MRI  Device programmed DOO 100bpm per Cardiology for MRI  Patient has no complaints  Vital signs stable throughout MRI  Normal device function post MRI  Device reprogrammed to prior settings per Cardiology

## 2021-03-02 NOTE — NURSING NOTE
Device programmed for MRI per Narayan Chester, Cardiology RN  Patient tolerated well  No complaints per patient  Vital signs stable throughout

## 2021-03-03 NOTE — PROGRESS NOTES
This patient was seen on 3/3/2021  My role is Foot , Ankle, and Wound Specialist    SUBJECTIVE    Chief Complaint:  Diabetic foot wound     Patient ID: Woodrow Schaumann is a 76 y o  male  Pt arrives for wound care follow up  Presents with dressing intact and wearing CAM walker  Pt denies problems  The following portions of the patient's history were reviewed and updated as appropriate: allergies, current medications, past family history, past medical history, past social history, past surgical history and problem list     Review of Systems   Constitutional: Negative for activity change, appetite change, chills, diaphoresis, fatigue, fever and unexpected weight change  Respiratory: Negative  Cardiovascular: Negative  Gastrointestinal: Negative  Musculoskeletal: Negative  Skin: Negative for wound  Neurological: Positive for numbness  Psychiatric/Behavioral: Negative  OBJECTIVE      /59   Pulse 99   Wt 66 2 kg (146 lb) Comment: wearing cam walker  BMI 22 87 kg/m²     Foot/Ankle Musculoskeletal Exam    General        Constitutional: well-developed    Neurological: alert    Neurovascular      Neurovascular - Right        Dorsalis pedis: 1+      Posterior tibial: 1+      Neurovascular - Left        Dorsalis pedis: 1+      Posterior tibial: 1+       Physical Exam  Vitals signs and nursing note reviewed  Constitutional:       General: He is not in acute distress  Appearance: He is well-developed  He is not diaphoretic  HENT:      Head: Normocephalic  Eyes:      Pupils: Pupils are equal, round, and reactive to light  Neck:      Musculoskeletal: Normal range of motion  Cardiovascular:      Pulses:           Dorsalis pedis pulses are 1+ on the right side and 1+ on the left side  Posterior tibial pulses are 1+ on the right side and 1+ on the left side  Pulmonary:      Effort: Pulmonary effort is normal       Breath sounds: Normal breath sounds  Abdominal:      General: Bowel sounds are normal       Palpations: Abdomen is soft  Feet:      Right foot:      Protective Sensation: 10 sites tested  0 sites sensed  Skin integrity: No ulcer, skin breakdown, erythema, warmth, callus or dry skin  Left foot:      Protective Sensation: 10 sites tested  0 sites sensed  Neurological:      General: No focal deficit present  Mental Status: He is alert and oriented to person, place, and time  Sensory: Sensory deficit present  Wound # 1  Location: right plantar foot   Length 0 4cm: Width 0 4cm: Depth 0 2cm:   Deepest Tissue Noted in Base:   Probe to Bone?: no  Peripheral Skin Description: intact  Granulation:90% Fibrotic Tissue: 10% Necrotic Tissue: 0%  Drainage Amount: minimal  Signs of Infection: no  Total debrided 0 16 square centimeters  ASSESSMENT     There are no diagnoses linked to this encounter  Problem List Items Addressed This Visit     None            Problems:      Chronic illness / Problem not at goal with exacerbation, progression or side effects of treatment  1  Diabetic foot ulcer    PLAN    Collagen applied to the wound bed, covered with gauze and tape  Pt to change daily using silver gel and dsd  Continue to wear the CAM walker with every step  Return in 2 weeks  Call if wound worsens or with signs/symtpoms of infection   Pt verbalizes understanding

## 2021-03-04 NOTE — PATIENT INSTRUCTIONS
Debridement   WHAT YOU NEED TO KNOW:   Debridement is the removal of infected, damaged, or dead tissue so a wound can heal properly  You may need more than one debridement  DISCHARGE INSTRUCTIONS:   Medicines:   · Medicines  can help decrease pain or prevent or treat an infection  · Take your medicine as directed  Contact your healthcare provider if you think your medicine is not helping or if you have side effects  Tell him of her if you are allergic to any medicine  Keep a list of the medicines, vitamins, and herbs you take  Include the amounts, and when and why you take them  Bring the list or the pill bottles to follow-up visits  Carry your medicine list with you in case of an emergency  Follow up with your healthcare provider as directed: You may need to return to have your wound checked  Write down your questions so you remember to ask them during your visits  Care for your wound as directed:   · Keep your wound clean and dry  You may need to cover your wound when you bathe  · Limit movements,  such as stretching, to prevent bleeding, tearing, and swelling in your wound  · Protect your wound  Avoid sunlight for at least 6 months  Apply mild, unscented lotion or cream to the skin around your wound to keep it moist     · Do not smoke  If you smoke, it is never too late to quit  Smoking decreases blood flow to the wound and delays healing  Ask for information if you need help quitting  · Drink liquids as directed  Ask how much liquid to drink each day and which liquids are best for you  Liquids help keep your skin moist so your wound can heal      · Eat a variety of healthy foods  Foods rich in protein, such as meat, eggs, and dairy products, help repair tissue  Carbohydrate-rich foods, such as bread and cereals, help increase cell growth and decrease the risk for wound infection  Do not have caffeine  Ask if you should take vitamins   Vitamins A and C may help tissue formation and increase scar tissue strength  Contact your healthcare provider if:   · You have a fever  · Your pain gets worse or does not go away, even after treatment  · Your skin is red, swollen, or draining pus  · You have questions or concerns about your condition or care  Return to the emergency department if:   · Blood soaks through your bandage  · You have severe pain  © Copyright [address] Information is for End User's use only and may not be sold, redistributed or otherwise used for commercial purposes  All illustrations and images included in CareNotes® are the copyrighted property of [employer]  or [facility] Ranulfo Tejada   The above information is an  only  It is not intended as medical advice for individual conditions or treatments  Talk to your doctor, nurse or pharmacist before following any medical regimen to see if it is safe and effective for you

## 2021-03-17 NOTE — PROGRESS NOTES
This patient was seen on 3/17/2021    My role is Foot , Ankle, and Wound Specialist    SUBJECTIVE    Chief Complaint:  Diabetic foot wound     Patient ID: Kavin Martinez is a 76 y o  male  Pt arrives for wound care follow up  Dressing intact and wearing CAM walker  Pt having difficulty walking in the CAM walker  The following portions of the patient's history were reviewed and updated as appropriate: allergies, current medications, past family history, past medical history, past social history, past surgical history and problem list     Review of Systems   Constitutional: Negative for activity change, appetite change, chills, diaphoresis, fatigue, fever and unexpected weight change  Respiratory: Negative  Cardiovascular: Negative  Gastrointestinal: Negative  Musculoskeletal: Negative  Skin: Negative for wound  Neurological: Positive for numbness  Psychiatric/Behavioral: Negative  OBJECTIVE      /82   Pulse 96   Wt 66 1 kg (145 lb 11 2 oz)   BMI 22 82 kg/m²     Foot/Ankle Musculoskeletal Exam    General        Constitutional: well-developed    Neurological: alert    Neurovascular      Neurovascular - Right        Dorsalis pedis: 1+      Posterior tibial: 1+      Neurovascular - Left        Dorsalis pedis: 1+      Posterior tibial: 1+       Physical Exam  Vitals signs and nursing note reviewed  Constitutional:       General: He is not in acute distress  Appearance: He is well-developed  He is not diaphoretic  HENT:      Head: Normocephalic  Eyes:      Pupils: Pupils are equal, round, and reactive to light  Neck:      Musculoskeletal: Normal range of motion  Cardiovascular:      Pulses:           Dorsalis pedis pulses are 1+ on the right side and 1+ on the left side  Posterior tibial pulses are 1+ on the right side and 1+ on the left side  Pulmonary:      Effort: Pulmonary effort is normal       Breath sounds: Normal breath sounds     Abdominal: General: Bowel sounds are normal       Palpations: Abdomen is soft  Feet:      Right foot:      Protective Sensation: 10 sites tested  0 sites sensed  Skin integrity: No ulcer, skin breakdown, erythema, warmth, callus or dry skin  Left foot:      Protective Sensation: 10 sites tested  0 sites sensed  Neurological:      General: No focal deficit present  Mental Status: He is alert and oriented to person, place, and time  Sensory: Sensory deficit present  Wound # 1  Location: right plantar foot   Length 0 4cm: Width 0 4cm: Depth 0 2cm:   Deepest Tissue Noted in Base:   Probe to Bone?: no  Peripheral Skin Description: intact  Granulation:90% Fibrotic Tissue: 10% Necrotic Tissue: 0%  Drainage Amount: minimal  Signs of Infection: no  Total debrided 0 16 square centimeters      ASSESSMENT     There are no diagnoses linked to this encounter  Problem List Items Addressed This Visit     None            Problems:    Chronic illness / Problem not at goal with exacerbation, progression or side effects of treatment  1  Diabetic foot ulcer    PLAN  DEBRIDEMENT NOTE    A formal timeout including patient identification, laterality and existing allergies using [facility] protocol was conducted  Procedure Performed: Debridement of subcutaneous tissue- >20 sq cm (28021)  Under aseptic technique, the wound was thoroughly explored and bluntly probed for undermining, deep sinus tracts and bone involvement  Sterile instrumentation including scalpel, forceps and curette used to excise the clearly delineated devitalized subcutaneous tissue (fibrotic tissue and necrotic non-viable portions of fat) exposing viable tissue  After debridement, bleeding in the wound bed base was noted indicated viable subcutaneous tissue had been exposed  Bleeding controlled with gentle pressure  Patient tolerated debridement without complications  The wound was dressed         Wound dressing technique and frequency described to patient  I am to be called if any signs of infection develop such as erythema, increased wound size or significant change in appearance of wound, odor, pain, increased or change in color of drainage, swelling, fever, chills, nightsweats  I reviewed these signs with the patient  I recommended limiting WB to bathroom priveleges and meal table and to use any prescribed offloading devices in an effort to allow proper rest and healing of the wounded area  I explained that good wound care and compliance are necessary to allow healing and to prevent toe loss or limb loss  Collagen applied to wound bed, gauze and tape  Pt to change daily using silver gel and dsd  Continue with CAM walker, referral made to PT to assist in using the CAM walker

## 2021-03-21 NOTE — ED PROVIDER NOTES
History  Chief Complaint   Patient presents with    Chills     Chills and sleeping more for 4 days, had COVID vaccine 4 days ago  History provided by:  Patient and spouse  Malaise - 7 years or greater  Severity:  Moderate  Onset quality:  Gradual  Duration:  3 days  Timing:  Constant  Progression:  Worsening  Chronicity:  New  Context comment:  Patient got COVID vaccine 4 days ago, has felt weak tired generalized malaise and myalgias for 3 days no URI symptoms no abdominal pain no dysuria  Relieved by:  None tried  Worsened by:  Nothing  Ineffective treatments:  None tried  Associated symptoms: myalgias    Associated symptoms: no abdominal pain, no chest pain, no cough, no diarrhea, no dizziness, no dysuria, no fever, no frequency, no headaches, no nausea, no shortness of breath and no vomiting         Prior to Admission Medications   Prescriptions Last Dose Informant Patient Reported? Taking?    Atorvastatin Calcium (LIPITOR PO)  Self Yes No   Sig: Take 5 mg by mouth daily    CALCIUM CITRATE PO  Self Yes No   Sig: Take 2,000 mg by mouth daily    Cholecalciferol (VITAMIN D3) 2000 units capsule  Self Yes No   Sig: Take 2,000 Units by mouth daily   Continuous Blood Gluc Sensor (DEXCOM G6 SENSOR) MISC  Self Yes No   Sig: As directed dx:250 03   DULoxetine (CYMBALTA) 30 mg delayed release capsule  Self Yes No   Sig: Take 30 mg by mouth daily    amLODIPine (NORVASC) 5 mg tablet   No No   Sig: Take 1 tablet (5 mg total) by mouth daily   ascorbic acid (VITAMIN C) 1000 MG tablet   No No   Sig: Take 1 tablet (1,000 mg total) by mouth daily for 4 days   aspirin 81 mg chewable tablet  Self Yes No   Sig: Chew 81 mg daily   cyanocobalamin (VITAMIN B-12) 1,000 mcg tablet  Self Yes No   Sig: Take 2,000 mcg by mouth daily   ezetimibe (ZETIA) 10 mg tablet  Self Yes No   Sig: TAKE 5MG (ONE-HALF TABLET) BY MOUTH DAILY FOR CHOLESTEROL   glucose 40 %  Self Yes No   Sig: Take by mouth once   glucose 77 4 %   Yes No   Sig: TAKE 1/2 TUBE BY MOUTH  AS NEEDED FOR LOW BLOOD SUGAR   insulin aspart (NovoLOG) 100 units/mL injection  Self Yes No   Sig: Inject under the skin Via insulin pump   levothyroxine 125 mcg tablet  Self No No   Sig: Take 1 tablet (125 mcg total) by mouth daily in the early morning   loperamide (IMODIUM) 2 mg capsule   No No   Sig: Take 2 capsules (4 mg total) by mouth 4 (four) times a day as needed for diarrhea   pantoprazole (PROTONIX) 40 mg tablet  Self Yes No   Sig: Take 40 mg by mouth   tamsulosin (FLOMAX) 0 4 mg  Self Yes No   Sig: Take 0 4 mg by mouth daily with dinner     zinc sulfate (ZINCATE) 220 mg capsule   No No   Sig: Take 1 capsule (220 mg total) by mouth daily for 4 doses      Facility-Administered Medications: None       Past Medical History:   Diagnosis Date    Arthritis     Cardiac disease     Closed fracture of fibula, lateral malleolus, right     Complete heart block (HCC)     Diabetes mellitus (HCC)     Disease of thyroid gland     External incisional dehiscence     GERD (gastroesophageal reflux disease)     Heart disease     Muscular deconditioning     Pacemaker     Sleep apnea     cannot tolerate cpap    Thyroid disease        Past Surgical History:   Procedure Laterality Date    CARDIAC PACEMAKER PLACEMENT Left 2015    CARDIAC PACEMAKER PLACEMENT      CARDIAC SURGERY      CATARACT EXTRACTION      MUSCLE FLAP      right foot    NERVE BLOCK      Transforaminal Epidural Lumbar with Fluoroscopic Guidance     NV COLONOSCOPY FLX DX W/COLLJ SPEC WHEN PFRMD N/A 5/4/2018    Procedure: EGD AND COLONOSCOPY;  Surgeon: Elizabeth Al MD;  [facility];   Service: Gastroenterology    NV SPLIT [address] <100 SQCM Right 6/10/2019    Procedure: SKIN GRAFT SPLIT THICKNESS (STSG)  EXTREMITY right leg;  Surgeon: Celena Fajardo DPM;  Location: [facility] OR;  Service: Podiatry    TOE AMPUTATION      left 5th toe       Family History   Problem Relation Age of Onset    Diabetes Mother     Heart disease Mother     Thyroid disease Mother         Disorder     Cancer Brother     Gout Brother     Diabetes Maternal Grandmother      I have reviewed and agree with the history as documented  E-Cigarette/Vaping    E-Cigarette Use Never User      E-Cigarette/Vaping Substances    Nicotine No     THC No     CBD No     Flavoring No     Other No     Unknown No      Social History     Tobacco Use    Smoking status: Former Smoker     Types: Pipe    Smokeless tobacco: Never Used   Substance Use Topics    Alcohol use: Yes     Alcohol/week: 0 0 standard drinks     Frequency: Never     Binge frequency: Never     Comment: socially    Drug use: No       Review of Systems   Constitutional: Positive for activity change and appetite change  Negative for chills, diaphoresis and fever  HENT: Negative for congestion, sinus pressure and sore throat  Eyes: Negative for pain and visual disturbance  Respiratory: Negative for cough, chest tightness, shortness of breath, wheezing and stridor  Cardiovascular: Negative for chest pain and palpitations  Gastrointestinal: Negative for abdominal distention, abdominal pain, constipation, diarrhea, nausea and vomiting  Genitourinary: Negative for dysuria and frequency  Musculoskeletal: Positive for myalgias  Negative for neck pain and neck stiffness  Skin: Negative for rash  Neurological: Positive for weakness (Generalized, nonfocal)  Negative for dizziness, speech difficulty, light-headedness, numbness and headaches  Physical Exam  Physical Exam  Vitals signs reviewed  Constitutional:       General: He is not in acute distress  Appearance: He is well-developed  He is not diaphoretic  HENT:      Head: Normocephalic and atraumatic  Right Ear: External ear normal       Left Ear: External ear normal       Nose: Nose normal    Eyes:      General:         Right eye: No discharge  Left eye: No discharge        Pupils: Pupils are equal, round, and reactive to light  Neck:      Musculoskeletal: Normal range of motion and neck supple  Trachea: No tracheal deviation  Cardiovascular:      Rate and Rhythm: Normal rate and regular rhythm  Heart sounds: Normal heart sounds  No murmur  Pulmonary:      Effort: Pulmonary effort is normal  No respiratory distress  Breath sounds: Normal breath sounds  No stridor  Abdominal:      General: There is no distension  Palpations: Abdomen is soft  Tenderness: There is no abdominal tenderness  There is no guarding or rebound  Musculoskeletal: Normal range of motion  Skin:     General: Skin is warm and dry  Coloration: Skin is not pale  Findings: No erythema  Neurological:      General: No focal deficit present  Mental Status: He is alert and oriented to person, place, and time           Vital Signs  ED Triage Vitals [03/21/21 1322]   Temperature Pulse Respirations Blood Pressure SpO2   (!) 100 8 °F (38 2 °C) 100 18 126/71 97 %      Temp Source Heart Rate Source Patient Position - Orthostatic VS BP Location FiO2 (%)   Oral Monitor -- Left arm --      Pain Score       No Pain           Vitals:    03/21/21 1322   BP: 126/71   Pulse: 100         Visual Acuity      ED Medications  Medications   sodium chloride 0 9 % bolus 1,000 mL (1,000 mL Intravenous New Bag 3/21/21 1438)       Diagnostic Studies  Results Reviewed     Procedure Component Value Units Date/Time    Comprehensive metabolic panel [[id number]]  (Abnormal) Collected: 03/21/21 1412    Lab Status: Final result Specimen: Blood from Arm, Left Updated: 03/21/21 1444     Sodium 130 mmol/L      Potassium 4 9 mmol/L      Chloride 96 mmol/L      CO2 24 mmol/L      ANION GAP 10 mmol/L      BUN 37 mg/dL      Creatinine 1 55 mg/dL      Glucose 176 mg/dL      Calcium 8 6 mg/dL      Corrected Calcium 9 7 mg/dL      AST 27 U/L      ALT 21 U/L      Alkaline Phosphatase 99 U/L      Total Protein 6 8 g/dL Albumin 2 6 g/dL      Total Bilirubin 0 46 mg/dL      eGFR 43 ml/min/1 73sq m     Narrative:      National Kidney Disease Foundation guidelines for Chronic Kidney Disease (CKD):     Stage 1 with normal or high GFR (GFR > 90 mL/min/1 73 square meters)    Stage 2 Mild CKD (GFR = 60-89 mL/min/1 73 square meters)    Stage 3A Moderate CKD (GFR = 45-59 mL/min/1 73 square meters)    Stage 3B Moderate CKD (GFR = 30-44 mL/min/1 73 square meters)    Stage 4 Severe CKD (GFR = 15-29 mL/min/1 73 square meters)    Stage 5 End Stage CKD (GFR <15 mL/min/1 73 square meters)  Note: GFR calculation is accurate only with a steady state creatinine    CBC and differential [[id number]]  (Abnormal) Collected: 03/21/21 1412    Lab Status: Final result Specimen: Blood from Arm, Left Updated: 03/21/21 1425     WBC 8 39 Thousand/uL      RBC 3 91 Million/uL      Hemoglobin 11 1 g/dL      Hematocrit 36 3 %      MCV 93 fL      MCH 28 4 pg      MCHC 30 6 g/dL      RDW 14 2 %      MPV 8 8 fL      Platelets 453 Thousands/uL      nRBC 0 /100 WBCs      Neutrophils Relative 83 %      Immat GRANS % 1 %      Lymphocytes Relative 5 %      Monocytes Relative 11 %      Eosinophils Relative 0 %      Basophils Relative 0 %      Neutrophils Absolute 7 00 Thousands/µL      Immature Grans Absolute 0 04 Thousand/uL      Lymphocytes Absolute 0 40 Thousands/µL      Monocytes Absolute 0 92 Thousand/µL      Eosinophils Absolute 0 00 Thousand/µL      Basophils Absolute 0 03 Thousands/µL                  No orders to display              Procedures  Procedures         ED Course  ED Course as of Mar 21 1505   Sun Mar 21, 2021   1458 Mild acute kidney injury, given L of fluids patient does not want further workup will discharge                                              MDM  Number of Diagnoses or Management Options  Acute kidney injury [facility]): new and requires workup  Adverse effect of vaccine, initial encounter: new and requires workup  SIRS (systemic inflammatory response syndrome) [facility]): new and requires workup  Diagnosis management comments: 59-year-old male, nonfocal neurological examination, presented generalized weakness  , occurred day after getting 2nd COVID vaccine patient did have active COVID infection 6 weeks prior  will check blood work to ensure he is not anemic were evidence of renal failure or electrolyte abnormality  If unremarkable DC       Amount and/or Complexity of Data Reviewed  Clinical lab tests: ordered and reviewed  Decide to obtain previous medical records or to obtain history from someone other than the patient: yes  Obtain history from someone other than the patient: yes  Review and summarize past medical records: yes        Disposition  Final diagnoses: Adverse effect of vaccine, initial encounter - COVID   SIRS (systemic inflammatory response syndrome) (HCC)   Acute kidney injury ([facility] [address] )     Time reflects when diagnosis was documented in both MDM as applicable and the Disposition within this note     Time User Action Codes Description Comment    3/21/2021  3:04 PM Wali Isabel [T50  Z95A] Adverse effect of vaccine, initial encounter     3/21/2021  3:04 PM Ender Paci Modify [T50  Z95A] Adverse effect of vaccine, initial encounter COVID 5    3/21/2021  3:04 PM Goodwin Paci Modify [T50  Z95A] Adverse effect of vaccine, initial encounter COVI    3/21/2021  3:04 PM Ender Paci Modify [T50  Z95A] Adverse effect of vaccine, initial encounter COVID    3/21/2021  3:05 PM Guillermo Escudero Add [R65 10] SIRS (systemic inflammatory response syndrome) ([facility] [address] )     3/21/2021  3:05 PM Goodwin Paci Add [N17 9] Acute kidney injury [facility])       ED Disposition     ED Disposition Condition Date/Time Comment    Discharge Stable Sun Mar 21, 2021  3:04 PM Janna Sotelo discharge to home/self care              Follow-up Information     Follow up With Specialties Details Why Contact Info Additional [address] [facility] Emergency Department Emergency Medicine Go to  If symptoms worsen [address] [facility] [id number] [place] [address] Emergency Department, [address], [place], [place], [address]          Patient's Medications   Discharge Prescriptions    No medications on file     No discharge procedures on file      PDMP Review     None          ED Provider  Electronically Signed by           Parker Orourke DO  03/21/21 5070

## 2021-03-24 NOTE — TELEPHONE ENCOUNTER
Patients wife called regarding Ludivina Sandoval had his second COVID vaccine last Thursday  He was fine all day Thursday and on Friday was very fatigued  Patient did go to the ER on Sunday was were given fluids and discharged  Since then patient has been extremely tired and has been sleeping non stop he will rouse but not very easily  Per Dr Eula Fajardo patient needs to go back to the ER  Wife was in agreement

## 2021-03-24 NOTE — PATIENT INSTRUCTIONS
Debridement   WHAT YOU NEED TO KNOW:   Debridement is the removal of infected, damaged, or dead tissue so a wound can heal properly  You may need more than one debridement  DISCHARGE INSTRUCTIONS:   Medicines:   · Medicines  can help decrease pain or prevent or treat an infection  · Take your medicine as directed  Contact your healthcare provider if you think your medicine is not helping or if you have side effects  Tell him of her if you are allergic to any medicine  Keep a list of the medicines, vitamins, and herbs you take  Include the amounts, and when and why you take them  Bring the list or the pill bottles to follow-up visits  Carry your medicine list with you in case of an emergency  Follow up with your healthcare provider as directed: You may need to return to have your wound checked  Write down your questions so you remember to ask them during your visits  Care for your wound as directed:   · Keep your wound clean and dry  You may need to cover your wound when you bathe  · Limit movements,  such as stretching, to prevent bleeding, tearing, and swelling in your wound  · Protect your wound  Avoid sunlight for at least 6 months  Apply mild, unscented lotion or cream to the skin around your wound to keep it moist     · Do not smoke  If you smoke, it is never too late to quit  Smoking decreases blood flow to the wound and delays healing  Ask for information if you need help quitting  · Drink liquids as directed  Ask how much liquid to drink each day and which liquids are best for you  Liquids help keep your skin moist so your wound can heal      · Eat a variety of healthy foods  Foods rich in protein, such as meat, eggs, and dairy products, help repair tissue  Carbohydrate-rich foods, such as bread and cereals, help increase cell growth and decrease the risk for wound infection  Do not have caffeine  Ask if you should take vitamins   Vitamins A and C may help tissue formation and increase scar tissue strength  Contact your healthcare provider if:   · You have a fever  · Your pain gets worse or does not go away, even after treatment  · Your skin is red, swollen, or draining pus  · You have questions or concerns about your condition or care  Return to the emergency department if:   · Blood soaks through your bandage  · You have severe pain  © Copyright [address] Information is for End User's use only and may not be sold, redistributed or otherwise used for commercial purposes  All illustrations and images included in CareNotes® are the copyrighted property of [employer]  or [facility] Ranulfo Tejada   The above information is an  only  It is not intended as medical advice for individual conditions or treatments  Talk to your doctor, nurse or pharmacist before following any medical regimen to see if it is safe and effective for you

## 2021-03-25 NOTE — ED NOTES
Patient ambulated from Fast track room 1 to ED room 7 with walker  Denies any dizziness, steady gait       Primitivo Stevenson RN  03/24/21 0539

## 2021-03-25 NOTE — DISCHARGE INSTRUCTIONS
Diagnosis: fatigue      - activity as tolerated     - will need repea t blood work to check serum sodium in 1 week     - please return to  the er for any new/ worsening/concerning symptoms to you

## 2021-03-27 PROBLEM — R53.1 WEAKNESS: Status: ACTIVE | Noted: 2021-01-01

## 2021-03-27 PROBLEM — E86.0 DEHYDRATION WITH HYPONATREMIA: Status: ACTIVE | Noted: 2021-01-01

## 2021-03-27 PROBLEM — E87.1 DEHYDRATION WITH HYPONATREMIA: Status: ACTIVE | Noted: 2021-01-01

## 2021-03-27 NOTE — ED ATTENDING ATTESTATION
3/27/2021  IZahraa MD, saw and evaluated the patient  I have discussed the patient with the resident and agree with the resident's findings, Plan of Care, and MDM as documented in the resident's note, unless otherwise documented below  All available laboratory and imaging studies were reviewed by myself  I was present for key portions of any procedure(s) performed by the resident and I was immediately available to provide assistance  I agree with the current assessment done in the Emergency Department  I have conducted an independent evaluation of this patient  76year old male IDDM, hypothyroidism, complete heart block with BiV AICD presenting with generalized fatigue to the point of lethargy  Patient had COVID-19 on February 5th  He has just completed his COVID immunization series 10 days ago  Since Friday before last, March 19th, patient has had significant fatigue  He has been hospitalized on 03/21 and 3/24 and treated with fluids for dehydration and KAZ  After discharge to home, patient's wife notes that he was well for no more than 1 day, after that, he has been extremely fatigued to the point of lethargy, has been sleeping most of the time  Patient's wife notes the patient has had brownish diarrhea with episodes of incontinence  He has not had any blood in stool  He has not had any burning with urination  He has been on broad-spectrum antibiotics in February when he had COVID-19  Other than fatigue, lethargy, and diarrhea, patient's wife notes that patient's legs are weak which is what happens when he is sick with something  No recent falls or trauma      ROS:  Obtained from patient and patient's wife    Constitutional: denies fevers, chills, reports generalized fatigue  Visual/Eyes: no changes in vision  HENT: no rhinorrhea, no sore throat  Cardiac: no chest pain, no lower extremity edema  Respiratory:  Baseline shortness of breath with exertion, no worsening shortness of breath, no cough  GI: no abdominal pain  Has had diarrhea, nonbloody  :  Patient knows blood in urine which is new  Heme/Onc: no easy bruising  Endocrine: no diabetes  Neuro: no weakness or numbness, no headaches    Ten systems reviewed and negative unless otherwise noted in HPI and above    Physical Exam  Vitals:    03/27/21 1515   BP: 122/73   TempSrc: Tympanic   Pulse: 96   Resp: 20   Patient Position - Orthostatic VS: Sitting   Temp: 98 °F (36 7 °C)     SPO2 RA Rest      ED from 3/27/2021 in [address] [place] [address] Emergency Department   SpO2  96 %   SpO2 Activity  At Rest   O2 Device  None (Room air)   O2 Flow Rate  --        Constitutional:  Fatigued appearing 28-year-old male resting in bed, listless, but nontoxic  HEENT:  Normocephalic, atraumatic  Sclera anicteric, conjunctiva not injected  Moist oral mucosa  Cardiac:  Regular rate and rhythm, no murmurs, rubs, or gallops  2+ radial pulses  2+ posterior tibial and dorsalis pedis pulses and symmetric  Pacemaker in left upper chest   Respiratory:  Good air entry  Rales in LLL best heard posteriorly  Abdomen:  Nondistended  Bowel sounds present  There is generalized abdominal tenderness without focal tenderness with palpation, no rebound or guarding  Extremities:  No deformities, no edema  There is tenderness with palpation along L-spine  No step-offs  Right foot wound for which patient follows with Podiatry, there does not appear to be spreading erythema or crepitus on exam   Please see photograph below  Integument:  No rashes over exposed areas, cap refill less than 2 seconds  Neurologic:  Awake, alert, and oriented x3  Face is symmetric  4/5 strength in bilateral upper and lower extremities  No fasciculations  Psychiatric:  Normal affect    Media Information       Document Information    Clinical Image - Mobile Device   Right foot   03/27/2021 16:23   Attached To:    Hospital Encounter on 3/27/21   Source Information    Judge Johnson Abbey Vaughn MD  Be Ed         Labs  Labs Reviewed   CBC AND DIFFERENTIAL - Abnormal       Result Value Ref Range Status    WBC 15 73 (*) 4 31 - 10 16 Thousand/uL Final    RBC 3 42 (*) 3 88 - 5 62 Million/uL Final    Hemoglobin 9 8 (*) 12 0 - 17 0 g/dL Final    Hematocrit 29 4 (*) 36 5 - 49 3 % Final    MCV 86  82 - 98 fL Final    MCH 28 7  26 8 - 34 3 pg Final    MCHC 33 3  31 4 - 37 4 g/dL Final    RDW 14 3  11 6 - 15 1 % Final    MPV 9 2  8 9 - 12 7 fL Final    Platelets 335  860 - 390 Thousands/uL Final    nRBC 0  /100 WBCs Final    Neutrophils Relative 82 (*) 43 - 75 % Final    Immat GRANS % 2  0 - 2 % Final    Lymphocytes Relative 6 (*) 14 - 44 % Final    Monocytes Relative 10  4 - 12 % Final    Eosinophils Relative 0  0 - 6 % Final    Basophils Relative 0  0 - 1 % Final    Neutrophils Absolute 12 77 (*) 1 85 - 7 62 Thousands/µL Final    Immature Grans Absolute 0 30 (*) 0 00 - 0 20 Thousand/uL Final    Lymphocytes Absolute 0 99  0 60 - 4 47 Thousands/µL Final    Monocytes Absolute 1 62 (*) 0 17 - 1 22 Thousand/µL Final    Eosinophils Absolute 0 03  0 00 - 0 61 Thousand/µL Final    Basophils Absolute 0 02  0 00 - 0 10 Thousands/µL Final   BASIC METABOLIC PANEL - Abnormal    Sodium 128 (*) 136 - 145 mmol/L Final    Potassium 4 7  3 5 - 5 3 mmol/L Final    Chloride 96 (*) 100 - 108 mmol/L Final    CO2 24  21 - 32 mmol/L Final    ANION GAP 8  4 - 13 mmol/L Final    BUN 46 (*) 5 - 25 mg/dL Final    Creatinine 1 55 (*) 0 60 - 1 30 mg/dL Final    Comment: Standardized to IDMS reference method    Glucose 217 (*) 65 - 140 mg/dL Final    Comment: If the patient is fasting, the ADA then defines impaired fasting glucose as > 100 mg/dL and diabetes as > or equal to 123 mg/dL  Specimen collection should occur prior to Sulfasalazine administration due to the potential for falsely depressed results  Specimen collection should occur prior to Sulfapyridine administration due to the potential for falsely elevated results  Calcium 8 7  8 3 - 10 1 mg/dL Final    eGFR 43  ml/min/1 73sq m Final    Narrative:     [place] guidelines for Chronic Kidney Disease (CKD):     Stage 1 with normal or high GFR (GFR > 90 mL/min/1 73 square meters)    Stage 2 Mild CKD (GFR = 60-89 mL/min/1 73 square meters)    Stage 3A Moderate CKD (GFR = 45-59 mL/min/1 73 square meters)    Stage 3B Moderate CKD (GFR = 30-44 mL/min/1 73 square meters)    Stage 4 Severe CKD (GFR = 15-29 mL/min/1 73 square meters)    Stage 5 End Stage CKD (GFR <15 mL/min/1 73 square meters)  Note: GFR calculation is accurate only with a steady state creatinine   TROPONIN I - Abnormal    Troponin I 0 47 (*) <=0 04 ng/mL Final    Comment: Siemens Chemistry analyzer 99% cutoff is > 0 04 ng/mL in network labs     o cTnI 99% cutoff is useful only when applied to patients in the clinical setting of myocardial ischemia   o cTnI 99% cutoff should be interpreted in the context of clinical history, ECG findings and possibly cardiac imaging to establish correct diagnosis  o cTnI 99% cutoff may be suggestive but clearly not indicative of a coronary event without the clinical setting of myocardial ischemia  Results indicate test should be repeated on new specimen collected within 4-6 hours of the original   HEPATIC FUNCTION PANEL - Abnormal    Total Bilirubin 0 63  0 20 - 1 00 mg/dL Final    Comment: Use of this assay is not recommended for patients undergoing treatment with eltrombopag due to the potential for falsely elevated results  Bilirubin, Direct 0 31 (*) 0 00 - 0 20 mg/dL Final    Alkaline Phosphatase 145 (*) 46 - 116 U/L Final    AST 20  5 - 45 U/L Final    Comment: Specimen collection should occur prior to Sulfasalazine and/or Sulfapyridine administration due to the potential for falsely depressed results       ALT 24  12 - 78 U/L Final    Comment: Specimen collection should occur prior to Sulfasalazine and/or Sulfapyridine administration due to the potential for falsely depressed results  Total Protein 7 3  6 4 - 8 2 g/dL Final    Albumin 2 4 (*) 3 5 - 5 0 g/dL Final   LIPASE - Abnormal    Lipase 17 (*) 73 - 393 u/L Final   TSH, 3RD GENERATION WITH FREE T4 REFLEX - Abnormal    TSH 3RD GENERATON 6 430 (*) 0 358 - 3 740 uIU/mL Final    Narrative:     Patients undergoing fluorescein dye angiography may retain small amounts of fluorescein in the body for 48-72 hours post procedure  Samples containing fluorescein can produce falsely depressed TSH values  If the patient had this procedure,a specimen should be resubmitted post fluorescein clearance  UA W REFLEX TO MICROSCOPIC WITH REFLEX TO CULTURE - Abnormal    Color, UA Dk Yellow   Final    Clarity, UA Cloudy   Final    Specific Gravity, UA 1 022  1 003 - 1 030 Final    pH, UA 5 5  4 5, 5 0, 5 5, 6 0, 6 5, 7 0, 7 5, 8 0 Final    Leukocytes, UA Trace (*) Negative Final    Nitrite, UA Negative  Negative Final    Protein,  (3+) (*) Negative mg/dl Final    Glucose,  (1/10%) (*) Negative mg/dl Final    Ketones, UA 15 (1+) (*) Negative mg/dl Final    Urobilinogen, UA 1 0  0 2, 1 0 E U /dl E U /dl Final    Bilirubin, UA Interference- unable to analyze (*) Negative Final    Comment: The dipstick result may be falsely positive due to interfering substances  We recommend reliance upon serum bilirubin, liver & kidney function tests to guide patient care if clinically indicated      Blood, UA Large (*) Negative Final   URINE MICROSCOPIC - Abnormal    RBC, UA 30-50 (*) None Seen, 2-4 /hpf Final    WBC, UA 10-20 (*) None Seen, 2-4 /hpf Final    Epithelial Cells None Seen  None Seen, Occasional /hpf Final    Bacteria, UA None Seen  None Seen, Occasional /hpf Final    Hyaline Casts, UA 10-25 (*) None Seen /lpf Final   TROPONIN I - Abnormal    Troponin I 0 38 (*) <=0 04 ng/mL Final    Comment: Siemens Chemistry analyzer 99% cutoff is > 0 04 ng/mL in network labs     o cTnI 99% cutoff is useful only when applied to patients in the clinical setting of myocardial ischemia   o cTnI 99% cutoff should be interpreted in the context of clinical history, ECG findings and possibly cardiac imaging to establish correct diagnosis  o cTnI 99% cutoff may be suggestive but clearly not indicative of a coronary event without the clinical setting of myocardial ischemia  Results indicate test should be repeated on new specimen collected within 4-6 hours of the original   T4, FREE - Normal    Free T4 1 04  0 76 - 1 46 ng/dL Final    Comment: Specimen collection should occur prior to Sulfasalazine administration due to the potential for falsely elevated results  LACTIC ACID, PLASMA - Normal    LACTIC ACID 1 4  0 5 - 2 0 mmol/L Final    Narrative:     Result may be elevated if tourniquet was used during collection  Tests  CT abdomen pelvis wo contrast   Final Result         1  Partially imaged groundglass opacification in the superior segment of the right lower lobe and adjacent scarring compatible with recent COVID-19 pneumonia  2   No evidence of acute intra-abdominal or pelvic process  Workstation performed: [id number]         X-ray chest 1 view portable   Final Result      No acute cardiopulmonary disease  Workstation performed: [id number]             Procedures  ECG 12 Lead Documentation Only    Date/Time: 3/27/2021 5:45 PM  Performed by: Kojo Nielsen MD  Authorized by: Kojo Nielsen MD     Comments:      Atrial sensed Ventricular paced rhythm, ventricular rate 93, , QTC of 494, no excessive ST segment discordance or concordance is suggest STEMI, no significant change from prior EKG dated 03/24/2021                  ED Course  Medications   lactated ringers bolus 500 mL (0 mL Intravenous Stopped 3/27/21 1829)   ceftriaxone (ROCEPHIN) 1 g/50 mL in dextrose IVPB (0 mg Intravenous Stopped 3/27/21 1954)   metroNIDAZOLE (FLAGYL) IVPB (premix) 500 mg 100 mL (0 mg Intravenous Stopped 3/28/21 3636)   aspirin tablet 325 mg (325 mg Oral Given 3/27/21 0271)       28-year-old male with significant comorbidities presenting with worsening fatigue to the point of listlessness, diarrhea, and hematuria  Differential diagnosis includes underlying infection such as C difficile colitis, pneumonia, urinary tract infection, with other etiologies such as metabolic pathologies such as hypothyroidism versus another etiology of symptoms considered  Labs reveal leukocytosis with white blood cell count of 15 73, with hemoglobin of 9 8  CMP is with creatinine of 1 55, and not significantly changed from prior on 03/24, with hyponatremia of 128, again, similar to 3/24  TSH is elevated at 6 4 but T4 is normal at 1 04 consistent with subclinical hypothyroidism  Troponin is elevated at 0 47 which is new for the patient  Patient denies chest pain  Portable chest x-ray to my review is without infiltrates to suggest pneumonia  Lactate is normal at 1 4   UA is with negative nitrites, large blood, pyuria, and no wbc's  CT abdomen/pelvis reveals partially imaged ground-glass opacification of the right lower lobe compatible with recent COVID-19 pneumonia, no evidence of acute intra-abdominal or pelvic process, and does reveal L1 vertebral body height loss without retropulsion  Given leukocytosis and diarrhea, while C diff is pending, will start patient on Flagyl  Given elevated troponin, full-dose aspirin administered  Given hematuria and possibility of urinary tract infection, Rocephin administered  Patient admitted to [facility] for further evaluation and care      Clinical Impression  Final diagnoses:   Elevated troponin   Leukocytosis   Hyponatremia   Elevated TSH   Diarrhea   Proteinuria   Hematuria

## 2021-03-27 NOTE — ED PROVIDER NOTES
History  Chief Complaint   Patient presents with    Weakness - Generalized     pt c/o generalized weakness, diarrhea, body aches, and blood in urine for past few days  49-year-old male presenting to the emergency department with multiple complaints  Patient notes that he was given his 2nd immunization for COVID 10 days ago  Since then has noted fatigue  Patient notes new hematuria over the last few days  Myalgias  No fevers  Shortness of breath with exertion which is normal for the patient  Patient has a history of insulin-dependent diabetes, diabetic neuropathy, heart block with dual chamber ICD, hypertension, hypothyroidism  Patient was on antibiotics in February when he had COVID-19  No usage prior to onset of diarrhea  Patient has an on count able number of episodes of brown diarrhea daily  No blood  Denies abdominal pain  Denies sick contacts or suspect food  Patient seen in 2 emergency departments since the onset of the symptoms  Most recent urinalysis was nitrite positive with leuks, but a dirty catch  Not on antibiotics  Fatigue  Severity:  Severe  Onset quality:  Gradual  Timing:  Constant  Progression:  Worsening  Chronicity:  New  Relieved by:  Nothing  Worsened by:  Nothing  Ineffective treatments:  None tried      Prior to Admission Medications   Prescriptions Last Dose Informant Patient Reported? Taking?    Atorvastatin Calcium (LIPITOR PO)  Self Yes No   Sig: Take 5 mg by mouth daily    CALCIUM CITRATE PO  Self Yes No   Sig: Take 2,000 mg by mouth daily    Cholecalciferol (VITAMIN D3) 2000 units capsule  Self Yes No   Sig: Take 2,000 Units by mouth daily   Continuous Blood Gluc Sensor (DEXCOM G6 SENSOR) MISC  Self Yes No   Sig: As directed dx:250 03   DULoxetine (CYMBALTA) 30 mg delayed release capsule  Self Yes No   Sig: Take 30 mg by mouth daily    amLODIPine (NORVASC) 5 mg tablet   No No   Sig: Take 1 tablet (5 mg total) by mouth daily   ascorbic acid (VITAMIN C) 1000 MG tablet   No No   Sig: Take 1 tablet (1,000 mg total) by mouth daily for 4 days   aspirin 81 mg chewable tablet  Self Yes No   Sig: Chew 81 mg daily   cyanocobalamin (VITAMIN B-12) 1,000 mcg tablet  Self Yes No   Sig: Take 2,000 mcg by mouth daily   ezetimibe (ZETIA) 10 mg tablet  Self Yes No   Sig: TAKE 5MG (ONE-HALF TABLET) BY MOUTH DAILY FOR CHOLESTEROL   glucose 40 %  Self Yes No   Sig: Take by mouth once   glucose 77 4 %   Yes No   Sig: TAKE 1/2 TUBE BY MOUTH  AS NEEDED FOR LOW BLOOD SUGAR   insulin aspart (NovoLOG) 100 units/mL injection  Self Yes No   Sig: Inject under the skin Via insulin pump   levothyroxine 125 mcg tablet  Self No No   Sig: Take 1 tablet (125 mcg total) by mouth daily in the early morning   loperamide (IMODIUM) 2 mg capsule   No No   Sig: Take 2 capsules (4 mg total) by mouth 4 (four) times a day as needed for diarrhea   pantoprazole (PROTONIX) 40 mg tablet  Self Yes No   Sig: Take 40 mg by mouth   tamsulosin (FLOMAX) 0 4 mg  Self Yes No   Sig: Take 0 4 mg by mouth daily with dinner     zinc sulfate (ZINCATE) 220 mg capsule   No No   Sig: Take 1 capsule (220 mg total) by mouth daily for 4 doses      Facility-Administered Medications: None       Past Medical History:   Diagnosis Date    Arthritis     Cardiac disease     Closed fracture of fibula, lateral malleolus, right     Complete heart block (HCC)     Diabetes mellitus (HCC)     Disease of thyroid gland     External incisional dehiscence     GERD (gastroesophageal reflux disease)     Heart disease     Muscular deconditioning     Pacemaker     Sleep apnea     cannot tolerate cpap    Thyroid disease        Past Surgical History:   Procedure Laterality Date    CARDIAC PACEMAKER PLACEMENT Left 2015    CARDIAC PACEMAKER PLACEMENT      CARDIAC SURGERY      CATARACT EXTRACTION      MUSCLE FLAP      right foot    NERVE BLOCK      Transforaminal Epidural Lumbar with Fluoroscopic Guidance     OR COLONOSCOPY FLX DX W/COLLJ SPEC WHEN PFRMD N/A 5/4/2018    Procedure: EGD AND COLONOSCOPY;  Surgeon: Alisson Casper MD;  [facility]: [facility]; Service: Gastroenterology    GA SPLIT [address] <100 SQCM Right 6/10/2019    Procedure: SKIN GRAFT SPLIT THICKNESS (STSG)  EXTREMITY right leg;  Surgeon: Laila Abreu DPM;  Location:  MAIN OR;  Service: Podiatry    TOE AMPUTATION      left 5th toe       Family History   Problem Relation Age of Onset    Diabetes Mother     Heart disease Mother     Thyroid disease Mother         Disorder     Cancer Brother     Gout Brother     Diabetes Maternal Grandmother      I have reviewed and agree with the history as documented  E-Cigarette/Vaping    E-Cigarette Use Never User      E-Cigarette/Vaping Substances    Nicotine No     THC No     CBD No     Flavoring No     Other No     Unknown No      Social History     Tobacco Use    Smoking status: Former Smoker     Types: Pipe    Smokeless tobacco: Never Used   Substance Use Topics    Alcohol use: Yes     Alcohol/week: 0 0 standard drinks     Frequency: Never     Binge frequency: Never     Comment: socially    Drug use: No        Review of Systems   Constitutional: Positive for activity change, appetite change and fatigue  Genitourinary: Positive for hematuria  All other systems reviewed and are negative  Physical Exam  ED Triage Vitals [03/27/21 1515]   Temperature Pulse Respirations Blood Pressure SpO2   98 °F (36 7 °C) 96 20 122/73 96 %      Temp Source Heart Rate Source Patient Position - Orthostatic VS BP Location FiO2 (%)   Tympanic Monitor Sitting Left arm --      Pain Score       6             Orthostatic Vital Signs  Vitals:    03/27/21 1830 03/27/21 1900 03/27/21 2103 03/27/21 2230   BP: 156/75 149/99 145/92 140/87   Pulse: (!) 106 (!) 108 (!) 107 101   Patient Position - Orthostatic VS: Lying  Lying        Physical Exam  Vitals signs and nursing note reviewed     Constitutional:       General: He is not in acute distress  Appearance: He is well-developed  He is not diaphoretic  Comments: Fatigued appearing 79-year-old male  HENT:      Head: Normocephalic and atraumatic  Right Ear: External ear normal       Left Ear: External ear normal    Eyes:      Conjunctiva/sclera: Conjunctivae normal    Neck:      Vascular: No JVD  Trachea: No tracheal deviation  Cardiovascular:      Rate and Rhythm: Normal rate and regular rhythm  Heart sounds: Normal heart sounds  No murmur  Pulmonary:      Effort: No respiratory distress  Breath sounds: No stridor  Rales (  Right lower lobe) present  No wheezing  Abdominal:      General: Bowel sounds are normal  There is no distension  Palpations: Abdomen is soft  There is no mass  Tenderness: There is abdominal tenderness  There is no guarding or rebound  Genitourinary:     Comments: Deferred  Musculoskeletal:         General: No tenderness or deformity  Skin:     General: Skin is warm and dry  Capillary Refill: Capillary refill takes less than 2 seconds  Coloration: Skin is not pale  Findings: No erythema or rash  Neurological:      General: No focal deficit present  Cranial Nerves: No cranial nerve deficit  Sensory: No sensory deficit  Motor: Weakness (4/5 strength in lower extremities bilaterally ) present  No abnormal muscle tone  Coordination: Coordination normal    Psychiatric:         Behavior: Behavior normal          Thought Content:  Thought content normal          Judgment: Judgment normal          ED Medications  Medications   sodium chloride (PF) 0 9 % injection 3 mL (has no administration in time range)   sodium chloride 0 9 % infusion (has no administration in time range)   ceftriaxone (ROCEPHIN) 1 g/50 mL in dextrose IVPB (has no administration in time range)   Labetalol HCl (NORMODYNE) injection 10 mg (has no administration in time range)   amLODIPine (NORVASC) tablet 5 mg (has no administration in time range)   aspirin chewable tablet 81 mg (has no administration in time range)   atorvastatin (LIPITOR) tablet 5 mg (has no administration in time range)   DULoxetine (CYMBALTA) delayed release capsule 30 mg (has no administration in time range)   levothyroxine tablet 125 mcg (has no administration in time range)   loperamide (IMODIUM) capsule 4 mg (has no administration in time range)   pantoprazole (PROTONIX) EC tablet 40 mg (has no administration in time range)   tamsulosin (FLOMAX) capsule 0 4 mg (has no administration in time range)   acetaminophen (TYLENOL) tablet 650 mg (has no administration in time range)   ondansetron (ZOFRAN) injection 4 mg (has no administration in time range)   psyllium (METAMUCIL) 1 packet (has no administration in time range)   heparin (porcine) subcutaneous injection 5,000 Units (has no administration in time range)   insulin lispro (HumaLOG) 100 units/mL subcutaneous injection 1-5 Units (has no administration in time range)   insulin glargine (LANTUS) subcutaneous injection 15 Units 0 15 mL (has no administration in time range)   lactated ringers bolus 500 mL (0 mL Intravenous Stopped 3/27/21 1829)   ceftriaxone (ROCEPHIN) 1 g/50 mL in dextrose IVPB (0 mg Intravenous Stopped 3/27/21 1954)   metroNIDAZOLE (FLAGYL) IVPB (premix) 500 mg 100 mL (500 mg Intravenous New Bag 3/27/21 1953)   aspirin tablet 325 mg (325 mg Oral Given 3/27/21 1829)   sodium chloride 0 9 % bolus 1,000 mL (1,000 mL Intravenous New Bag 3/27/21 1830)       Diagnostic Studies  Results Reviewed     Procedure Component Value Units Date/Time    Troponin I [[id number]] Collected: 03/27/21 2317    Lab Status: No result Specimen: Blood from Arm, Right     Blood culture [[id number]] Collected: 03/27/21 1752    Lab Status: Preliminary result Specimen: Blood from Arm, Left Updated: 03/27/21 2102     Blood Culture Received in Microbiology Lab  Culture in Progress      Blood culture [[id number]] Collected: 03/27/21 1752    Lab Status: Preliminary result Specimen: Blood from Arm, Left Updated: 03/27/21 2102     Blood Culture Received in Microbiology Lab  Culture in Progress  Troponin I [[id number]]  (Abnormal) Collected: 03/27/21 2024    Lab Status: Final result Specimen: Blood from Arm, Right Updated: 03/27/21 2054     Troponin I 0 38 ng/mL     Urine Microscopic [[id number]]  (Abnormal) Collected: 03/27/21 1815    Lab Status: Final result Specimen: Urine, Clean Catch Updated: 03/27/21 1856     RBC, UA 30-50 /hpf      WBC, UA 10-20 /hpf      Epithelial Cells None Seen /hpf      Bacteria, UA None Seen /hpf      Hyaline Casts, UA 10-25 /lpf     Urine culture [[id number]] Collected: 03/27/21 1815    Lab Status: In process Specimen: Urine, Clean Catch Updated: 03/27/21 1856    UA w Reflex to Microscopic w Reflex to Culture [[id number]]  (Abnormal) Collected: 03/27/21 1815    Lab Status: Final result Specimen: Urine, Clean Catch Updated: 03/27/21 1853     Color, UA Dk Yellow     Clarity, UA Cloudy     Specific Gravity, UA 1 022     pH, UA 5 5     Leukocytes, UA Trace     Nitrite, UA Negative     Protein,  (3+) mg/dl      Glucose,  (1/10%) mg/dl      Ketones, UA 15 (1+) mg/dl      Urobilinogen, UA 1 0 E U /dl      Bilirubin, UA Interference- unable to analyze     Blood, UA Large    Lactic acid, plasma [[id number]]  (Normal) Collected: 03/27/21 1752    Lab Status: Final result Specimen: Blood from Arm, Left Updated: 03/27/21 1826     LACTIC ACID 1 4 mmol/L     Narrative:      Result may be elevated if tourniquet was used during collection      T4, free [id number]  (Normal) Collected: 03/27/21 1619    Lab Status: Final result Specimen: Blood from Arm, Right Updated: 03/27/21 1717     Free T4 1 04 ng/dL     Hepatic function panel [[id number]]  (Abnormal) Collected: 03/27/21 1619    Lab Status: Final result Specimen: Blood from Arm, Right Updated: 03/27/21 1702     Total Bilirubin 0 63 mg/dL      Bilirubin, Direct 0 31 mg/dL      Alkaline Phosphatase 145 U/L      AST 20 U/L      ALT 24 U/L      Total Protein 7 3 g/dL      Albumin 2 4 g/dL     TSH, 3rd generation with Free T4 reflex [[id number]]  (Abnormal) Collected: 03/27/21 1619    Lab Status: Final result Specimen: Blood from Arm, Right Updated: 03/27/21 1702     TSH 3RD GENERATON 6 430 uIU/mL     Narrative:      Patients undergoing fluorescein dye angiography may retain small amounts of fluorescein in the body for 48-72 hours post procedure  Samples containing fluorescein can produce falsely depressed TSH values  If the patient had this procedure,a specimen should be resubmitted post fluorescein clearance        Troponin I [[id number]]  (Abnormal) Collected: 03/27/21 1619    Lab Status: Final result Specimen: Blood from Arm, Right Updated: 03/27/21 1656     Troponin I 0 47 ng/mL     Basic metabolic panel [[id number]]  (Abnormal) Collected: 03/27/21 1619    Lab Status: Final result Specimen: Blood from Arm, Right Updated: 03/27/21 1655     Sodium 128 mmol/L      Potassium 4 7 mmol/L      Chloride 96 mmol/L      CO2 24 mmol/L      ANION GAP 8 mmol/L      BUN 46 mg/dL      Creatinine 1 55 mg/dL      Glucose 217 mg/dL      Calcium 8 7 mg/dL      eGFR 43 ml/min/1 73sq m     Narrative:      Vitaly guidelines for Chronic Kidney Disease (CKD):     Stage 1 with normal or high GFR (GFR > 90 mL/min/1 73 square meters)    Stage 2 Mild CKD (GFR = 60-89 mL/min/1 73 square meters)    Stage 3A Moderate CKD (GFR = 45-59 mL/min/1 73 square meters)    Stage 3B Moderate CKD (GFR = 30-44 mL/min/1 73 square meters)    Stage 4 Severe CKD (GFR = 15-29 mL/min/1 73 square meters)    Stage 5 End Stage CKD (GFR <15 mL/min/1 73 square meters)  Note: GFR calculation is accurate only with a steady state creatinine    Lipase [[id number]]  (Abnormal) Collected: 03/27/21 1619    Lab Status: Final result Specimen: Blood from Arm, Right Updated: 03/27/21 1655     Lipase 17 u/L CBC and differential [[id number]]  (Abnormal) Collected: 03/27/21 1619    Lab Status: Final result Specimen: Blood from Arm, Right Updated: 03/27/21 1635     WBC 15 73 Thousand/uL      RBC 3 42 Million/uL      Hemoglobin 9 8 g/dL      Hematocrit 29 4 %      MCV 86 fL      MCH 28 7 pg      MCHC 33 3 g/dL      RDW 14 3 %      MPV 9 2 fL      Platelets 914 Thousands/uL      nRBC 0 /100 WBCs      Neutrophils Relative 82 %      Immat GRANS % 2 %      Lymphocytes Relative 6 %      Monocytes Relative 10 %      Eosinophils Relative 0 %      Basophils Relative 0 %      Neutrophils Absolute 12 77 Thousands/µL      Immature Grans Absolute 0 30 Thousand/uL      Lymphocytes Absolute 0 99 Thousands/µL      Monocytes Absolute 1 62 Thousand/µL      Eosinophils Absolute 0 03 Thousand/µL      Basophils Absolute 0 02 Thousands/µL     Clostridium difficile toxin by PCR with EIA [[id number]]     Lab Status: No result Specimen: Stool     Stool Enteric Bacterial Panel by PCR [[id number]]     Lab Status: No result Specimen: Stool                  CT abdomen pelvis wo contrast   Final Result by Linad Spicer MD (03/27 1848)         1  Partially imaged groundglass opacification in the superior segment of the right lower lobe and adjacent scarring compatible with recent COVID-19 pneumonia  2   No evidence of acute intra-abdominal or pelvic process  Workstation performed: [id number]         X-ray chest 1 view portable    (Results Pending)         Procedures  Procedures      ED Course  ED Course as of Mar 27 2319   Sat Mar 27, 2021   1707 Troponin I(!): 0 47   1709 TSH 3RD GENERATON(!): 6 430   1709 WBC(!): 15 73   1709 Sodium(!): 128   1735 At baseline     Creatinine(!): 1 55   1737 ECG read by me  Paced ventricular rhythm    Ventricular rate 93 beats per minute, extreme right axis, no concordant ST elevations or concordinant ST depressions, , , P are 218      1855 POCT URINE PROTEIN(!): 300 (3+) Identification of Seniors at Risk      Most Recent Value   (ISAR) Identification of Seniors at Risk   Before the illness or injury that brought you to the Emergency, did you need someone to help you on a regular basis? 0 Filed at: 03/27/2021 1516   In the last 24 hours, have you needed more help than usual?  1 Filed at: 03/27/2021 1516   Have you been hospitalized for one or more nights during the past 6 months? 1 Filed at: 03/27/2021 1516   In general, do you see well?  0 Filed at: 03/27/2021 1516   In general, do you have serious problems with your memory? 0 Filed at: 03/27/2021 1516   Do you take more than three different medications every day? 1 Filed at: 03/27/2021 1516   ISAR Score  3 Filed at: 03/27/2021 1516                      JESSICA Risk Score      Most Recent Value   Age >= 72  1 Filed at: 03/27/2021 2013   Known CAD (stenosis >= 50%)  0 Filed at: 03/27/2021 2013   Recent (<=24 hrs) Service Angina  0 Filed at: 03/27/2021 2013   ST Deviation >= 0 5 mm  0 Filed at: 03/27/2021 2013   3+ CAD Risk Factors (FHx, HTN, HLP, DM, Smoker)  0 Filed at: 03/27/2021 2013   Aspirin Use Past 7 Days  1 Filed at: 03/27/2021 2013   Elevated Cardiac Markers  1 Filed at: 03/27/2021 2013   JESSICA Risk Score (Calculated)  3 Filed at: 03/27/2021 2013              MDM  Number of Diagnoses or Management Options  Diarrhea: new and requires workup  Elevated troponin: new and requires workup  Elevated TSH: new and requires workup  Hyponatremia: new and requires workup  Leukocytosis: new and requires workup  Proteinuria: new and requires workup  Diagnosis management comments: 42-year-old male presents with increasing fatigue over the last few weeks  Patient also notes hematuria  On physical examination, patient appears quite fatigued  Patient has decreased strength in his lower extremities, though patient's wife notes that this is common for him whenever he gets sick      Patient also has abdominal pain on examination  Patient has right-sided rales  Patient has a history of hypothyroidism  Will evaluate for hypothyroidism, anemia, electrolyte abnormalities, pneumonia, pneumothorax, arrhythmia, ACS, acute intra-abdominal abnormalities, urinary tract infection, C diff  Patient's previous urine specimen showed a urinary tract infection but was dirty catch  During urine specimen shows proteinuria, leukocytes, red blood cells  Started on antibiotics  Patient has elevated troponin  Likely type 2  No ischemia on ECG or arrhythmia  Patient is not having chest pain  CT of the abdomen pelvis without acute findings  Chest x-ray without acute findings per my read  Patient will be admitted to medicine for elevated troponin         Amount and/or Complexity of Data Reviewed  Clinical lab tests: ordered and reviewed  Tests in the radiology section of CPT®: ordered and reviewed  Review and summarize past medical records: yes  Discuss the patient with other providers: yes  Independent visualization of images, tracings, or specimens: yes    Risk of Complications, Morbidity, and/or Mortality  Presenting problems: high  Diagnostic procedures: high  Management options: high        Disposition  Final diagnoses:   Elevated troponin   Leukocytosis   Hyponatremia   Elevated TSH   Diarrhea   Proteinuria   Hematuria     Time reflects when diagnosis was documented in both MDM as applicable and the Disposition within this note     Time User Action Codes Description Comment    3/27/2021  5:34 PM Euna Taiwanese Add [R77 8] Elevated troponin     3/27/2021  5:34 PM Thermon Santos [D72 829] Leukocytosis     3/27/2021  5:34 PM Thermon Santos [E87 1] Hyponatremia     3/27/2021  5:34 PM Euna Taiwanese Add [R79 89] Elevated TSH     3/27/2021  5:34 PM Euna Taiwanese Add [R19 7] Diarrhea     3/27/2021  7:08 PM Euna Taiwanese Add [R80 9] Proteinuria     3/27/2021  8:17 PM Sarah Barn Add [N70 7270,  Z79 4] 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, with long-term current use of insulin ([address] )     3/27/2021 11:19 PM Ramonita Lee Add [R31 9] Hematuria       ED Disposition     ED Disposition Condition Date/Time Comment    Admit Stable Sat Mar 27, 2021  7:20 PM Case was discussed with Dr Alvarado Pagan and the patient's admission status was agreed to be Admission Status: observation status to the service of Dr Alvarado Pagan           Follow-up Information    None         Current Discharge Medication List      CONTINUE these medications which have NOT CHANGED    Details   amLODIPine (NORVASC) 5 mg tablet Take 1 tablet (5 mg total) by mouth daily  Qty: 30 tablet, Refills: 5    Associated Diagnoses: Benign essential HTN      ascorbic acid (VITAMIN C) 1000 MG tablet Take 1 tablet (1,000 mg total) by mouth daily for 4 days  Qty: 4 tablet, Refills: 0    Associated Diagnoses: COVID-19      aspirin 81 mg chewable tablet Chew 81 mg daily      Atorvastatin Calcium (LIPITOR PO) Take 5 mg by mouth daily       CALCIUM CITRATE PO Take 2,000 mg by mouth daily       Cholecalciferol (VITAMIN D3) 2000 units capsule Take 2,000 Units by mouth daily      Continuous Blood Gluc Sensor (DEXCOM G6 SENSOR) MISC As directed dx:250 03      cyanocobalamin (VITAMIN B-12) 1,000 mcg tablet Take 2,000 mcg by mouth daily      DULoxetine (CYMBALTA) 30 mg delayed release capsule Take 30 mg by mouth daily       ezetimibe (ZETIA) 10 mg tablet TAKE 5MG (ONE-HALF TABLET) BY MOUTH DAILY FOR CHOLESTEROL      glucose 40 % Take by mouth once      glucose 77 4 % TAKE 1/2 TUBE BY MOUTH  AS NEEDED FOR LOW BLOOD SUGAR      insulin aspart (NovoLOG) 100 units/mL injection Inject under the skin Via insulin pump      levothyroxine 125 mcg tablet Take 1 tablet (125 mcg total) by mouth daily in the early morning  Qty: 30 tablet, Refills: 0    Associated Diagnoses: Acquired hypothyroidism      loperamide (IMODIUM) 2 mg capsule Take 2 capsules (4 mg total) by mouth 4 (four) times a day as needed for diarrhea  Qty: 30 capsule, Refills: 0    Associated Diagnoses: Diarrhea, unspecified type      pantoprazole (PROTONIX) 40 mg tablet Take 40 mg by mouth      tamsulosin (FLOMAX) 0 4 mg Take 0 4 mg by mouth daily with dinner        zinc sulfate (ZINCATE) 220 mg capsule Take 1 capsule (220 mg total) by mouth daily for 4 doses  Qty: 4 capsule, Refills: 0    Associated Diagnoses: COVID-19           No discharge procedures on file  PDMP Review     None           ED Provider  Attending physically available and evaluated Julien Folres  NOEMI managed the patient along with the ED Attending      Electronically Signed by         Keshia Barillas, DO  03/27/21 [address] [facility] [address] [facility], DO  03/27/21 0639

## 2021-03-27 NOTE — ED NOTES
Tele #[phone] available for patient per Liliam Alexander on [address] [place] [address]  03/27/21 3734

## 2021-03-28 PROBLEM — R78.81 BACTEREMIA: Status: ACTIVE | Noted: 2021-01-01

## 2021-03-28 NOTE — ASSESSMENT & PLAN NOTE
Loose BM's since COVID infection  Metamucil order for bulking  Stool studies are pending, C diff not collected yet  CT abd benign  Imodium prn

## 2021-03-28 NOTE — ASSESSMENT & PLAN NOTE
Likely post covid viral syndrome weakness with poor p o   Intake  Vague renal symptoms increased frequency but without dysuria or suprapubic pain, likely due to elevated BG's  UA with protein and glucose  Rocephin until UCx is negative  IVF and encourage PO intake

## 2021-03-28 NOTE — ASSESSMENT & PLAN NOTE
Likely post covid viral syndrome weakness  Vague renal symptoms increased frequency but without dysuria or suprapubic pain, likely due to elevated BG's  UA with protein and glucose  Rocephin until UCx is negative  IVF and encourage PO intake

## 2021-03-28 NOTE — UTILIZATION REVIEW
Initial Clinical Review    OBS 3/27 @ 1920 UPGRADED TO INPATIENT 3/28 @ 1534 FOR BACTEREMIA WITH NEED FOR IV ABX AND IVF FOR DEHYDRATION WITH HYPONATREMIA    03/28/21 1535  Inpatient Admission Once     Question Answer Comment   Level of Care Med Surg        03/28/21 1534     ED Arrival Information     Expected Arrival Acuity Means of Arrival Escorted By Service Admission Type    - 3/27/2021 14:49 Urgent Walk-In Self Hospitalist Urgent    Arrival Complaint    weakness        Chief Complaint   Patient presents with    Weakness - Generalized     pt c/o generalized weakness, diarrhea, body aches, and blood in urine for past few days  Assessment/Plan: 76 y o  male with PMHx of complete HR s/p PPM, T2 DM, CKD III, HTN, hypothyroidism who presents to ED with weakness  Patient had COVID infection in February and since then has been sleeping a lot with decreased oral intake  He also noticed increased urination with inability to control blood sugars per insulin pump  He also c/o loose frequent stools  In the ED, trop elevated  Na 130, BUN 37, Cr 1 55  UA + protein and glucose  CT abd wnl  Admit observation to M/S/Tele unit with dehydration with hyponatremia and elevated troponin  IVF's  Encourage po intake  Stools for cx and C-ciff  Metamucil ordered  Trend trop  Telm monitoring  Cards consult  Rocephin until UCX neg  Continue home po meds  Fingerstick glucose checks with pt insulin pump  Endocrine consult 3/28 -- -he was hypoglycemic this morning so agree with decreasing Lantus to 12 units at bedtime  Decrease Humalog to 2 units before meals  Monitor blood sugars before meals and bedtime  Advised to bring in pump and sensor supplies so he can restart  3/28 -- - 2 blood cultures positive for Gram-positive rods which is likely GI or  source, pending Ucx  He had red colored urine without dysuria as well as diarrhea since recent COVID infection  Switch rocephin to IV cefepime 2 g q 12 and Flagyl 500 Q 8  F/u sensitivities on blood cx and will need a repeat blood cx  ED Triage Vitals [03/27/21 1515]   Temperature Pulse Respirations Blood Pressure SpO2   98 °F (36 7 °C) 96 20 122/73 96 %      Temp Source Heart Rate Source Patient Position - Orthostatic VS BP Location FiO2 (%)   Tympanic Monitor Sitting Left arm --      Pain Score       6          Wt Readings from Last 1 Encounters:   03/27/21 72 kg (158 lb 11 7 oz)     Additional Vital Signs:   Date/Time  Temp  Pulse  Resp  BP  MAP (mmHg)  SpO2  O2 Device   03/28/21 06:59:03  97 7 °F (36 5 °C)  101  20  131/80  97  93 %  --   03/28/21 03:04:24  98 6 °F (37 °C)  98  20  141/85  104  93 %  --   03/27/21 22:30:11  100 3 °F (37 9 °C)  101  18  140/87  105  92 %  None (Room air)   03/27/21 21:03:38  98 5 °F (36 9 °C)  107Abnormal   20  145/92  110  93 %  None (Room air)   03/27/21 1900  --  108Abnormal   18  149/99  114  95 %  None (Room air)   03/27/21 1830  --  106Abnormal   20  156/75  108  95 %  None (Room air)   03/27/21 1730  --  102  16  169/72  103  95 %  None (Room air)   03/27/21 1610  --  --  --  --  --  --  None (Room air)   03/27/21 1515  98 °F (36 7 °C)  96  20  122/73  --  96 %  None (Room air)       Pertinent Labs/Diagnostic Test Results:   CT a/p 3/27 -- 1   Partially imaged groundglass opacification in the superior segment of the right lower lobe and adjacent scarring compatible with recent COVID-19 pneumonia     2   No evidence of acute intra-abdominal or pelvic process      Results from last 7 days   Lab Units 03/28/21  0631 03/27/21  1619 03/24/21  1643 03/21/21  1412   WBC Thousand/uL 14 52* 15 73* 8 78 8 39   HEMOGLOBIN g/dL 9 5* 9 8* 10 3* 11 1*   HEMATOCRIT % 28 9* 29 4* 31 5* 36 3*   PLATELETS Thousands/uL 198 196 96* 100*   NEUTROS ABS Thousands/µL  --  12 77* 6 88 7 00     Results from last 7 days   Lab Units 03/28/21  0631 03/27/21  1619 03/24/21  1643 03/21/21  1412   SODIUM mmol/L 133* 128* 129* 130*   POTASSIUM mmol/L 4 2 4 7 4 5 4 9 CHLORIDE mmol/L 104 96* 95* 96*   CO2 mmol/L 24 24 23 24   ANION GAP mmol/L 5 8 11 10   BUN mg/dL 30* 46* 51* 37*   CREATININE mg/dL 1 07 1 55* 1 57* 1 55*   EGFR ml/min/1 73sq m 68 43 42 43   CALCIUM mg/dL 7 9* 8 7 8 5 8 6     Results from last 7 days   Lab Units 03/27/21  1619 03/24/21  1909 03/24/21  1643 03/21/21  1412   AST U/L 20  --  23 27   ALT U/L 24  --  29 21   ALK PHOS U/L 145*  --  116 99   TOTAL PROTEIN g/dL 7 3  --  6 7 6 8   ALBUMIN g/dL 2 4*  --  2 4* 2 6*   TOTAL BILIRUBIN mg/dL 0 63  --  0 77 0 46   BILIRUBIN DIRECT mg/dL 0 31*  --   --   --    AMMONIA umol/L  --  15  --   --      Results from last 7 days   Lab Units 03/28/21  0809 03/28/21  0308 03/27/21  2324   POC GLUCOSE mg/dl 84 148* 216*     Results from last 7 days   Lab Units 03/28/21  0631 03/27/21  1619 03/24/21  1643 03/21/21  1412   GLUCOSE RANDOM mg/dL 43* 217* 276* 176*             BETA-HYDROXYBUTYRATE   Date Value Ref Range Status   02/05/2021 0 2 <0 6 mmol/L Final          Results from last 7 days   Lab Units 03/24/21  1909   PH ANDRA  7 460*   PCO2 ANDRA mm Hg 36 3*   PO2 ANDRA mm Hg 182 0*   HCO3 ANDRA mmol/L 25 2   BASE EXC ANDRA mmol/L 1 5   O2 CONTENT ANDRA ml/dL 14 9   O2 HGB, VENOUS % 96 4*         Results from last 7 days   Lab Units 03/24/21  1909   CK TOTAL U/L 86     Results from last 7 days   Lab Units 03/27/21  2317 03/27/21 2024 03/27/21  1619 03/24/21  1643   TROPONIN I ng/mL 0 32* 0 38* 0 47* <0 02             Results from last 7 days   Lab Units 03/27/21  1619   TSH 3RD GENERATON uIU/mL 6 430*         Results from last 7 days   Lab Units 03/27/21  1752   LACTIC ACID mmol/L 1 4     Results from last 7 days   Lab Units 03/27/21  1619   LIPASE u/L 17*     Results from last 7 days   Lab Units 03/27/21  1815 03/24/21  2046   CLARITY UA  Cloudy Clear   COLOR UA  Dk Yellow Yellow   SPEC GRAV UA  1 022 1 010   PH UA  5 5 5 5   GLUCOSE UA mg/dl 100 (1/10%)* Negative   KETONES UA mg/dl 15 (1+)* Trace*   BLOOD UA  Large* Moderate* PROTEIN UA mg/dl 300 (3+)* 30 (1+)*   NITRITE UA  Negative Positive*   BILIRUBIN UA  Interference- unable to analyze* Negative   UROBILINOGEN UA E U /dl 1 0 0 2   LEUKOCYTES UA  Trace* Negative   WBC UA /hpf 10-20* 2-4   RBC UA /hpf 30-50* 4-10*   BACTERIA UA /hpf None Seen Occasional   EPITHELIAL CELLS WET PREP /hpf None Seen Occasional       Results from last 7 days   Lab Units 03/27/21  1752   BLOOD CULTURE  Received in Microbiology Lab  Culture in Progress  Received in Microbiology Lab  Culture in Progress         ED Treatment:   Medication Administration from 03/27/2021 1449 to 03/27/2021 2102       Date/Time Order Dose Route Action     03/27/2021 1655 lactated ringers bolus 500 mL 500 mL Intravenous New Bag     03/27/2021 1830 ceftriaxone (ROCEPHIN) 1 g/50 mL in dextrose IVPB 1,000 mg Intravenous New Bag     03/27/2021 1953 metroNIDAZOLE (FLAGYL) IVPB (premix) 500 mg 100 mL 500 mg Intravenous New Bag     03/27/2021 1829 aspirin tablet 325 mg 325 mg Oral Given     03/27/2021 1830 sodium chloride 0 9 % bolus 1,000 mL 1,000 mL Intravenous New Bag     Past Medical History:   Diagnosis Date    Arthritis     Cardiac disease     Closed fracture of fibula, lateral malleolus, right     Complete heart block ([address] )     Diabetes mellitus ([address] )     Disease of thyroid gland     External incisional dehiscence     GERD (gastroesophageal reflux disease)     Heart disease     Muscular deconditioning     Pacemaker     Sleep apnea     cannot tolerate cpap    Thyroid disease      Present on Admission:   Hypothyroidism   Type 2 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, left eye (HCC)   Essential hypertension   Diarrhea   Complete heart block (HCC)   Dehydration with hyponatremia   Stage 3 chronic kidney disease      Admitting Diagnosis: Diarrhea [R19 7]  Proteinuria [R80 9]  Hyponatremia [E87 1]  Leukocytosis [D72 829]  Weakness [R53 1]  Elevated TSH [R79 89]  Elevated troponin [R77 8]  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, with long-term current use of insulin ([facility]) [J71 7323, Z79 4]  Age/Sex: 76 y o  male  Admission Orders:  Scheduled Medications:  amLODIPine, 5 mg, Oral, Daily  aspirin, 81 mg, Oral, Daily  atorvastatin, 5 mg, Oral, Daily  cefTRIAXone, 1,000 mg, Intravenous, Q24H  DULoxetine, 30 mg, Oral, Daily  heparin (porcine), 5,000 Units, Subcutaneous, Q8H CALIN  insulin glargine, 15 Units, Subcutaneous, HS  insulin lispro, 1-5 Units, Subcutaneous, 4x Daily (AC & HS)  insulin lispro, 1-5 Units, Subcutaneous, Once  levothyroxine, 125 mcg, Oral, Early Morning  pantoprazole, 40 mg, Oral, Early Morning  psyllium, 1 packet, Oral, Daily  tamsulosin, 0 4 mg, Oral, Daily With Dinner      Continuous IV Infusions:  sodium chloride, 100 mL/hr, Intravenous, Continuous      PRN Meds:  acetaminophen, 650 mg, Oral, Q6H PRN  Labetalol HCl, 10 mg, Intravenous, Q6H PRN  loperamide, 4 mg, Oral, 4x Daily PRN  ondansetron, 4 mg, Intravenous, Q6H PRN  sodium chloride (PF), 3 mL, Intravenous, Q1H PRN        IP CONSULT TO CASE MANAGEMENT  IP CONSULT TO CARDIOLOGY  IP CONSULT TO ENDOCRINOLOGY    Network Utilization Review Department  ATTENTION: Please call with any questions or concerns to [phone] and carefully listen to the prompts so that you are directed to the right person  All voicemails are confidential   Santy Quintero all requests for admission clinical reviews, approved or denied determinations and any other requests to dedicated fax number below belonging to the campus where the patient is receiving treatment   List of dedicated fax numbers for the Facilities:  [address] DENIALS (Administrative/Medical Necessity) [phone]   [address] [place] [address] (Maternity/NICU/Pediatrics) 261 [facility],[address] [place] [address] [phone]   [place] [address] [place] [address] [place] [address] ([address] [address] [place] [address] Erica Werner [address] [phone]   [place] [address] [phone]

## 2021-03-28 NOTE — ASSESSMENT & PLAN NOTE
Lab Results   Component Value Date    HGBA1C 7 7 (H) 02/05/2021       Recent Labs     03/27/21  2324 03/28/21  0308 03/28/21  0809 03/28/21  1128   POCGLU 216* 148* 84 93       Blood Sugar Average: Last 72 hrs:  (P) 135 25currently with insulin pump, BG's are uncontrolled  Basal insulin with SSI ordered, accuchecks, diabetic diet  Endo consulted, they recommended 2 units with meals

## 2021-03-28 NOTE — CASE MANAGEMENT
LOS: 1  Not a bundle  Readmission risk: N/A  CM spoke with pt and discussed role of CM  Pt lives with his wife in a multi-story home with ramp at entrance  Pt is independent in ADLs  Pt has DME including: cane, RW, transport chair, s/c, and stair-glide  Pt has hx HHC, unsure of agency used  Preference for pharmacy is the [facility] or [facility] in [place], [place]  No MH/D&A tx hx  PCP- Jesús Andino MD ([phone])  No POA  Main contact: Wife- Coral Vance ([phone])  Family will transport upon d/c     CM reviewed d/c planning process including the following: identifying help at home, patient preference for d/c planning needs, Discharge Lounge, [facility] Meds to Bed program, availability of treatment team to discuss questions or concerns patient and/or family may have regarding understanding medications and recognizing signs and symptoms once discharged  CM also encouraged patient to follow up with all recommended appointments after discharge  Patient advised of importance for patient and family to participate in managing patients medical well being

## 2021-03-28 NOTE — ASSESSMENT & PLAN NOTE
- 2 blood cultures positive for Gram-positive rods which is likely GI or  source, pending urine culture  - he has had red colored urine without dysuria as well as diarrhea since recent COVID infection  - he was on Rocephin will switch to IV cefepime 2 g q 12 and Flagyl 500 Q 8  - follow-up sensitivities on blood culture and will need a repeat blood culture  - continue to monitor vitals

## 2021-03-28 NOTE — PLAN OF CARE
Problem: Potential for Falls  Goal: Patient will remain free of falls  Description: INTERVENTIONS:  - Assess patient frequently for physical needs  -  Identify cognitive and physical deficits and behaviors that affect risk of falls    -  [place] fall precautions as indicated by assessment   - Educate patient/family on patient safety including physical limitations  - Instruct patient to call for assistance with activity based on assessment  - Modify environment to reduce risk of injury  - Consider OT/PT consult to assist with strengthening/mobility  Outcome: Progressing     Problem: Prexisting or High Potential for Compromised Skin Integrity  Goal: Skin integrity is maintained or improved  Description: INTERVENTIONS:  - Identify patients at risk for skin breakdown  - Assess and monitor skin integrity  - Assess and monitor nutrition and hydration status  - Monitor labs   - Assess for incontinence   - Turn and reposition patient  - Assist with mobility/ambulation  - Relieve pressure over bony prominences  - Avoid friction and shearing  - Provide appropriate hygiene as needed including keeping skin clean and dry  - Evaluate need for skin moisturizer/barrier cream  - Collaborate with interdisciplinary team   - Patient/family teaching  - Consider wound care consult   Outcome: Progressing     Problem: PAIN - ADULT  Goal: Verbalizes/displays adequate comfort level or baseline comfort level  Description: Interventions:  - Encourage patient to monitor pain and request assistance  - Assess pain using appropriate pain scale  - Administer analgesics based on type and severity of pain and evaluate response  - Implement non-pharmacological measures as appropriate and evaluate response  - Consider cultural and social influences on pain and pain management  - Notify physician/advanced practitioner if interventions unsuccessful or patient reports new pain  Outcome: Progressing     Problem: INFECTION - ADULT  Goal: Absence or prevention of progression during hospitalization  Description: INTERVENTIONS:  - Assess and monitor for signs and symptoms of infection  - Monitor lab/diagnostic results  - Monitor all insertion sites, i e  indwelling lines, tubes, and drains  - Monitor endotracheal if appropriate and nasal secretions for changes in amount and color  - [place] appropriate cooling/warming therapies per order  - Administer medications as ordered  - Instruct and encourage patient and family to use good hand hygiene technique  - Identify and instruct in appropriate isolation precautions for identified infection/condition  Outcome: Progressing  Goal: Absence of fever/infection during neutropenic period  Description: INTERVENTIONS:  - Monitor WBC    Outcome: Progressing     Problem: SAFETY ADULT  Goal: Patient will remain free of falls  Description: INTERVENTIONS:  - Assess patient frequently for physical needs  -  Identify cognitive and physical deficits and behaviors that affect risk of falls    -  [place] fall precautions as indicated by assessment   - Educate patient/family on patient safety including physical limitations  - Instruct patient to call for assistance with activity based on assessment  - Modify environment to reduce risk of injury  - Consider OT/PT consult to assist with strengthening/mobility  Outcome: Progressing  Goal: Maintain or return to baseline ADL function  Description: INTERVENTIONS:  -  Assess patient's ability to carry out ADLs; assess patient's baseline for ADL function and identify physical deficits which impact ability to perform ADLs (bathing, care of mouth/teeth, toileting, grooming, dressing, etc )  - Assess/evaluate cause of self-care deficits   - Assess range of motion  - Assess patient's mobility; develop plan if impaired  - Assess patient's need for assistive devices and provide as appropriate  - Encourage maximum independence but intervene and supervise when necessary  - Involve family in performance of ADLs  - Assess for home care needs following discharge   - Consider OT consult to assist with ADL evaluation and planning for discharge  - Provide patient education as appropriate  Outcome: Progressing  Goal: Maintain or return mobility status to optimal level  Description: INTERVENTIONS:  - Assess patient's baseline mobility status (ambulation, transfers, stairs, etc )    - Identify cognitive and physical deficits and behaviors that affect mobility  - Identify mobility aids required to assist with transfers and/or ambulation (gait belt, sit-to-stand, lift, walker, cane, etc )  - [place] fall precautions as indicated by assessment  - Record patient progress and toleration of activity level on Mobility SBAR; progress patient to next Phase/Stage  - Instruct patient to call for assistance with activity based on assessment  - Consider rehabilitation consult to assist with strengthening/weightbearing, etc   Outcome: Progressing     Problem: DISCHARGE PLANNING  Goal: Discharge to home or other facility with appropriate resources  Description: INTERVENTIONS:  - Identify barriers to discharge w/patient and caregiver  - Arrange for needed discharge resources and transportation as appropriate  - Identify discharge learning needs (meds, wound care, etc )  - Arrange for interpretive services to assist at discharge as needed  - Refer to Case Management Department for coordinating discharge planning if the patient needs post-hospital services based on physician/advanced practitioner order or complex needs related to functional status, cognitive ability, or social support system  Outcome: Progressing     Problem: Knowledge Deficit  Goal: Patient/family/caregiver demonstrates understanding of disease process, treatment plan, medications, and discharge instructions  Description: Complete learning assessment and assess knowledge base    Interventions:  - Provide teaching at level of understanding  - Provide teaching via preferred learning methods  Outcome: Progressing

## 2021-03-28 NOTE — ASSESSMENT & PLAN NOTE
Likely non MI troponin elevation  Trop mildly elevated on admission  EKG w/o ST changes  Trend trops  Continue ASA and Lipitor  Cards consulted given recent COVID infection, may need echo

## 2021-03-28 NOTE — PROGRESS NOTES
[address] [facility]  Progress Note - Eden Alexis 1945, 76 y o  male MRN: [id number]  Unit/Bed#: [facility] 920-01 Encounter: [id number]  Primary Care Provider: Nabeel Lrener MD   Date and time admitted to hospital: 3/27/2021  3:35 PM    Dehydration with hyponatremia  Assessment & Plan  In the setting of poor PO intake and diarrhea  IVF  Encourage PO intake    Weakness  Assessment & Plan  Likely post covid viral syndrome weakness with poor p o   Intake  Vague renal symptoms increased frequency but without dysuria or suprapubic pain, likely due to elevated BG's  UA with protein and glucose  Rocephin until UCx is negative  IVF and encourage PO intake    Diarrhea  Assessment & Plan  Loose BM's since COVID infection  Metamucil order for bulking  Stool studies are pending, C diff not collected yet  CT abd benign  Imodium prn    Complete heart block (HCC)  Assessment & Plan  S/p PPM     Elevated troponin  Assessment & Plan  Likely non MI troponin elevation  Trop mildly elevated on admission  EKG w/o ST changes  Trend trops  Continue ASA and Lipitor  Cards consulted given recent COVID infection, may need echo    Stage 3 chronic kidney disease  Assessment & Plan  Lab Results   Component Value Date    EGFR 68 03/28/2021    EGFR 43 03/27/2021    EGFR 42 03/24/2021    CREATININE 1 07 03/28/2021    CREATININE 1 55 (H) 03/27/2021    CREATININE 1 57 (H) 03/24/2021   Cr at baseline  Monitor BUN/Cr  Avoid nephrotoxic agents and hypotension    Essential hypertension  Assessment & Plan  Continue Norvasc  Labetalol prn    Type 2 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, left eye [facility])  Assessment & Plan  Lab Results   Component Value Date    HGBA1C 7 7 (H) 02/05/2021       Recent Labs     03/27/21  2324 03/28/21  0308 03/28/21  0809 03/28/21  1128   POCGLU 216* 148* 84 93       Blood Sugar Average: Last 72 hrs:  (P) 135 25currently with insulin pump, BG's are uncontrolled  Basal insulin with SSI ordered, accuchecks, diabetic diet  Endo consulted, they recommended 2 units with meals    Hypothyroidism  Assessment & Plan  Continue levothyroxine    * Bacteremia  Assessment & Plan  - 2 blood cultures positive for Gram-positive rods which is likely GI or  source, pending urine culture  - he has had red colored urine without dysuria as well as diarrhea since recent COVID infection  - he was on Rocephin will switch to IV cefepime 2 g q 12 and Flagyl 500 Q 8  - follow-up sensitivities on blood culture and will need a repeat blood culture  - continue to monitor vitals        VTE Pharmacologic Prophylaxis:   Pharmacologic: Heparin  Mechanical VTE Prophylaxis in Place: Yes    Patient Centered Rounds: I have performed bedside rounds with nursing staff today  Discussions with Specialists or Other Care Team Provider:  None    Education and Discussions with Family / Patient:  Discussed with wife    Time Spent for Care: 30 minutes  More than 50% of total time spent on counseling and coordination of care as described above  Current Length of Stay: 0 day(s)    Current Patient Status: Inpatient   Certification Statement: The patient will continue to require additional inpatient hospital stay due to need for IV antibiotics for bacteremia, need for IV hydration for dehydration with hyponatremia     Discharge Plan: Discharge home when medically stable     Code Status: Level 1 - Full Code      Subjective:   Patient and examined  He reports he still not eating much and has a lot of fatigue since is COVID infection  He was also having diarrhea since his COVID infection but has not had a bowel movement yet to collect his stool studies  He is feeling little bit better with his IV fluids  He denies urinary symptoms such as dysuria however reports that he did notice his urine was red in color       Objective:     Vitals:   Temp (24hrs), Av 4 °F (36 9 °C), Min:97 4 °F (36 3 °C), Max:100 3 °F (37 9 °C)    Temp:  [97 4 °F (36 3 °C)-100 3 °F (37 9 °C)] 97 7 °F (36 5 °C)  HR:  [] 95  Resp:  [16-20] 18  BP: (131-169)/(72-99) 136/75  SpO2:  [91 %-95 %] 92 %  Body mass index is 24 14 kg/m²  Input and Output Summary (last 24 hours): Intake/Output Summary (Last 24 hours) at 3/28/2021 1554  Last data filed at 3/28/2021 1504  Gross per 24 hour   Intake 2163 33 ml   Output 500 ml   Net 1663 33 ml       Physical Exam:     Physical Exam  Vitals signs reviewed  Constitutional:       Appearance: Normal appearance  HENT:      Head: Normocephalic and atraumatic  Mouth/Throat:      Mouth: Mucous membranes are moist    Eyes:      General:         Right eye: No discharge  Left eye: No discharge  Conjunctiva/sclera: Conjunctivae normal    Neck:      Musculoskeletal: Neck supple  Cardiovascular:      Rate and Rhythm: Normal rate and regular rhythm  Pulses: Normal pulses  Heart sounds: No murmur  Abdominal:      General: Bowel sounds are normal  There is no distension  Palpations: Abdomen is soft  Tenderness: There is no abdominal tenderness  There is no guarding or rebound  Musculoskeletal: Normal range of motion  Right lower leg: No edema  Left lower leg: No edema  Skin:     General: Skin is warm  Findings: No rash  Neurological:      General: No focal deficit present  Mental Status: He is alert and oriented to person, place, and time     Psychiatric:         Mood and Affect: Mood normal          Behavior: Behavior normal            Additional Data:     Labs:    Results from last 7 days   Lab Units 03/28/21  0631 03/27/21  1619   WBC Thousand/uL 14 52* 15 73*   HEMOGLOBIN g/dL 9 5* 9 8*   HEMATOCRIT % 28 9* 29 4*   PLATELETS Thousands/uL 198 196   NEUTROS PCT %  --  82*   LYMPHS PCT %  --  6*   MONOS PCT %  --  10   EOS PCT %  --  0     Results from last 7 days   Lab Units 03/28/21  0631 03/27/21  1619   SODIUM mmol/L 133* 128*   POTASSIUM mmol/L 4 2 4 7   CHLORIDE mmol/L 104 96*   CO2 mmol/L 24 24   BUN mg/dL 30* 46*   CREATININE mg/dL 1 07 1 55*   ANION GAP mmol/L 5 8   CALCIUM mg/dL 7 9* 8 7   ALBUMIN g/dL  --  2 4*   TOTAL BILIRUBIN mg/dL  --  0 63   ALK PHOS U/L  --  145*   ALT U/L  --  24   AST U/L  --  20   GLUCOSE RANDOM mg/dL 43* 217*         Results from last 7 days   Lab Units 03/28/21  1128 03/28/21  0809 03/28/21  0308 03/27/21  2324   POC GLUCOSE mg/dl 93 84 148* 216*         Results from last 7 days   Lab Units 03/27/21  1752   LACTIC ACID mmol/L 1 4           * I Have Reviewed All Lab Data Listed Above  * Additional Pertinent Lab Tests Reviewed:  All Labs Within Last 24 Hours Reviewed    Imaging:    Imaging Reports Reviewed Today Include:  None  Imaging Personally Reviewed by Myself Includes:  None    Recent Cultures (last 7 days):     Results from last 7 days   Lab Units 03/27/21  1752   GRAM STAIN RESULT  Gram positive rods*  Gram positive rods*       Last 24 Hours Medication List:   Current Facility-Administered Medications   Medication Dose Route Frequency Provider Last Rate    acetaminophen  650 mg Oral Q6H PRN Cira Rosen MD      amLODIPine  5 mg Oral Daily Cira Rosen MD      aspirin  81 mg Oral Daily Cira Rosen MD      atorvastatin  5 mg Oral Daily Cira Rosen MD      cefepime  2,000 mg Intravenous Q12H [facility],       DULoxetine  30 mg Oral Daily Cira Rosen MD      heparin (porcine)  5,000 Units Subcutaneous [facility] Cira Rosen MD      insulin glargine  12 Units Subcutaneous HS Stanislav Perales MD      insulin lispro  1-4 Units Subcutaneous TID AC Stanislav Perales MD      insulin lispro  2 Units Subcutaneous TID With Meals Stanislav Perales MD      Labetalol HCl  10 mg Intravenous Q6H PRN Cira Rosen MD      levothyroxine  125 mcg Oral Early Morning Cira Rosen MD      loperamide  4 mg Oral 4x Daily PRN Cira Rosen MD      ondansetron  4 mg Intravenous Q6H PRN Oneil Bernal MD      pantoprazole  40 mg Oral Early Morning Oneil Bernal MD      psyllium  1 packet Oral Daily Oneil Bernal MD      sodium chloride (PF)  3 mL Intravenous Q1H PRN Oneil Bernal MD      sodium chloride  100 mL/hr Intravenous Continuous Oneil Bernal  mL/hr (03/28/21 0850)    tamsulosin  0 4 mg Oral Daily With Mel Wiggins MD          Today, Patient Was Seen By: Keshia Vega DO    ** Please Note: Dictation voice to text software may have been used in the creation of this document   **

## 2021-03-28 NOTE — ASSESSMENT & PLAN NOTE
Lab Results   Component Value Date    EGFR 68 03/28/2021    EGFR 43 03/27/2021    EGFR 42 03/24/2021    CREATININE 1 07 03/28/2021    CREATININE 1 55 (H) 03/27/2021    CREATININE 1 57 (H) 03/24/2021   Cr at baseline  Monitor BUN/Cr  Avoid nephrotoxic agents and hypotension

## 2021-03-28 NOTE — ASSESSMENT & PLAN NOTE
Loose BM's since COVID infection  Metamucil order for bulking  Stool studies are pending  CT abd benign  Imodium prn

## 2021-03-28 NOTE — CONSULTS
Consultation - Cardiology  Lien Castro 76 y o  male MRN: [id number]  Unit/Bed#: [facility] 920-01 Encounter: [id number]          Inpatient consult to Cardiology     Performed by  Karly Tang DO     Authorized by Jerome Batres MD            History of Present Illness   Physician Requesting Consult: Eusebio Shipley DO  Reason for Consult / Principal Problem:  Elevated troponin, recent COVID infection    Assessment/Plan    Non MI troponin elevation  - most likely in setting of KAZ,  Diarrhea and decreased p o  intake  - patient without cardiac complaints    Hypertension, hyperlipidemia  -continue with amlodipine 5 mg q d , atorvastatin 5 mg q d  Would recommend to restart home ezetimibe 10 mg q d  Recent COVID infection  - follow-up TTE    Diarrhea,  Decreased p o  intake  - improving  Management per primary team       HPI: Lien Castro is a 76y o  year old male with past medical history of a complete heart block (on [employer] pacemaker 100% V-paced),  hypertension, diabetes type 2, CKD, hyperlipidemia, recent COVID infection in February of 2021 presented with weakness, diarrhea, decreased p o  Intake  Patient had 2 ER visits since being discharged in February with weakness  denies chest pain, shortness of breath, paroxysmal nocturnal dyspnea, orthopnea, lower extremity edema  He was found to  Have KAZ on CKD with creatinine 1 55  Troponin peaked at 0 47 without ischemic EKG changes  Patient was started on antibiotics and IV fluids with improvement in creatinine level  Primary Cardiologist: Dr Sim Herrera:  V-paced, episode of nonsustained VT with heart rate of 150    Cardiac testing:   [facility]2020 [facility]    Normal left ventricular chamber size  Normal left ventricular systolic     function  Normal regional wall motion  Normal left ventricular wall     thickness  Estimated left ventricular ejection fraction is 60%        RIGHT VENTRICLE    Normal right ventricular size and function  Pacer wire seen in right     ventricle  LEFT ATRIUM    Normal left atrial size  RIGHT ATRIUM    Normal right atrial size  AORTA    Normal aortic root for body surface area  PERICARDIUM    Normal pericardium without effusion  VEINS    Normal size inferior vena cava with normal inspiratory collapse consistent     with normal right atrial pressures (0-5mmhg)  Other Comments      Review of Systems  ROS as noted above, otherwise 12 point review of systems was performed and is negative  Historical Information   Past Medical History:   Diagnosis Date    Arthritis     Cardiac disease     Closed fracture of fibula, lateral malleolus, right     Complete heart block (HCC)     Diabetes mellitus ([address] )     Disease of thyroid gland     External incisional dehiscence     GERD (gastroesophageal reflux disease)     Heart disease     Muscular deconditioning     Pacemaker     Sleep apnea     cannot tolerate cpap    Thyroid disease      Past Surgical History:   Procedure Laterality Date    CARDIAC PACEMAKER PLACEMENT Left 2015    CARDIAC PACEMAKER PLACEMENT      CARDIAC SURGERY      CATARACT EXTRACTION      MUSCLE FLAP      right foot    NERVE BLOCK      Transforaminal Epidural Lumbar with Fluoroscopic Guidance     [place] COLONOSCOPY FLX DX W/COLLJ SPEC WHEN PFRMD N/A 5/4/2018    Procedure: EGD AND COLONOSCOPY;  Surgeon: Rich Liu MD;  [facility]: [facility];   Service: Gastroenterology    [place] SPLIT [address] <100 SQCM Right 6/10/2019    Procedure: SKIN GRAFT SPLIT THICKNESS (STSG)  EXTREMITY right leg;  Surgeon: Nathalie Cardenas DPM;  Location:  MAIN OR;  Service: Podiatry    TOE AMPUTATION      left 5th toe     Social History     Substance and Sexual Activity   Alcohol Use Yes    Alcohol/week: 0 0 standard drinks    Frequency: Never    Binge frequency: Never    Comment: socially     Social History     Substance and Sexual Activity Drug Use No     Social History     Tobacco Use   Smoking Status Former Smoker    Types: Pipe   Smokeless Tobacco Never Used     Family History: non-contributory    Meds/Allergies   Hospital Medications:   Current Facility-Administered Medications   Medication Dose Route Frequency    acetaminophen (TYLENOL) tablet 650 mg  650 mg Oral Q6H PRN    amLODIPine (NORVASC) tablet 5 mg  5 mg Oral Daily    aspirin chewable tablet 81 mg  81 mg Oral Daily    atorvastatin (LIPITOR) tablet 5 mg  5 mg Oral Daily    ceftriaxone (ROCEPHIN) 1 g/50 mL in dextrose IVPB  1,000 mg Intravenous Q24H    DULoxetine (CYMBALTA) delayed release capsule 30 mg  30 mg Oral Daily    heparin (porcine) subcutaneous injection 5,000 Units  5,000 Units Subcutaneous Q8H [facility]    insulin glargine (LANTUS) subcutaneous injection 15 Units 0 15 mL  15 Units Subcutaneous HS    insulin lispro (HumaLOG) 100 units/mL subcutaneous injection 1-5 Units  1-5 Units Subcutaneous 4x Daily (AC & HS)    insulin lispro (HumaLOG) 100 units/mL subcutaneous injection 1-5 Units  1-5 Units Subcutaneous Once    Labetalol HCl (NORMODYNE) injection 10 mg  10 mg Intravenous Q6H PRN    levothyroxine tablet 125 mcg  125 mcg Oral Early Morning    loperamide (IMODIUM) capsule 4 mg  4 mg Oral 4x Daily PRN    ondansetron (ZOFRAN) injection 4 mg  4 mg Intravenous Q6H PRN    pantoprazole (PROTONIX) EC tablet 40 mg  40 mg Oral Early Morning    psyllium (METAMUCIL) 1 packet  1 packet Oral Daily    sodium chloride (PF) 0 9 % injection 3 mL  3 mL Intravenous Q1H PRN    sodium chloride 0 9 % infusion  100 mL/hr Intravenous Continuous    tamsulosin (FLOMAX) capsule 0 4 mg  0 4 mg Oral Daily With Dinner     Home Medications:   Medications Prior to Admission   Medication    amLODIPine (NORVASC) 5 mg tablet    ascorbic acid (VITAMIN C) 1000 MG tablet    aspirin 81 mg chewable tablet    Atorvastatin Calcium (LIPITOR PO)    CALCIUM CITRATE PO    Cholecalciferol (VITAMIN D3) 2000 units capsule    Continuous Blood Gluc Sensor (DEXCOM G6 SENSOR) MISC    cyanocobalamin (VITAMIN B-12) 1,000 mcg tablet    DULoxetine (CYMBALTA) 30 mg delayed release capsule    ezetimibe (ZETIA) 10 mg tablet    glucose 40 %    glucose 77 4 %    insulin aspart (NovoLOG) 100 units/mL injection    levothyroxine 125 mcg tablet    loperamide (IMODIUM) 2 mg capsule    pantoprazole (PROTONIX) 40 mg tablet    tamsulosin (FLOMAX) 0 4 mg    zinc sulfate (ZINCATE) 220 mg capsule       Allergies   Allergen Reactions    Contrast  [Iodinated Diagnostic Agents]     Lipitor  [Atorvastatin Calcium] Drowsiness    Ibuprofen      sensitive    Iodine Rash    Simvastatin Anxiety     Other reaction(s): Malaise (finding)       Objective   Vitals: Blood pressure 131/80, pulse 101, temperature 97 7 °F (36 5 °C), resp  rate 20, height 5' 8" (1 727 m), weight 72 kg (158 lb 11 7 oz), SpO2 93 %    Orthostatic Blood Pressures      Most Recent Value   Blood Pressure  131/80 filed at 03/28/2021 6329   Patient Position - Orthostatic VS  Lying filed at 03/27/2021 2103          Intake/Output Summary (Last 24 hours) at 3/28/2021 4660  Last data filed at 3/28/2021 0601  Gross per 24 hour   Intake 808 33 ml   Output 500 ml   Net 308 33 ml       Invasive Devices     Peripheral Intravenous Line            Peripheral IV 03/27/21 Left Antecubital less than 1 day    Peripheral IV 03/27/21 Right Antecubital less than 1 day              Physical Exam   GEN: NAD, alert and oriented, well appearing  SKIN: dry without significant lesions or rashes  HEENT: NCAT, PERRL, EOMs intact  NECK: No JVD or carotid bruits appreciated  CARDIOVASCULAR: RRR, normal S1, S2 without murmurs, rubs, or gallops appreciated  LUNGS: Clear to auscultation bilaterally without wheezes, rhonchi, or rales  ABDOMEN: Soft, mild tenderness, nondistended  EXTREMITIES/VASCULAR: perfused without clubbing, cyanosis, trace edema b/l  PSYCH: Normal mood and affect    Lab Results: I have personally reviewed pertinent lab results  Results from last 7 days   Lab Units 03/28/21  0631 03/27/21  1619 03/24/21  1643   WBC Thousand/uL 14 52* 15 73* 8 78   HEMOGLOBIN g/dL 9 5* 9 8* 10 3*   HEMATOCRIT % 28 9* 29 4* 31 5*   PLATELETS Thousands/uL 198 196 96*     Results from last 7 days   Lab Units 03/28/21  0631 03/27/21  1619 03/24/21  1643   POTASSIUM mmol/L 4 2 4 7 4 5   CHLORIDE mmol/L 104 96* 95*   CO2 mmol/L 24 24 23   BUN mg/dL 30* 46* 51*   CREATININE mg/dL 1 07 1 55* 1 57*   CALCIUM mg/dL 7 9* 8 7 8 5             Imaging: I have personally reviewed pertinent reports

## 2021-03-28 NOTE — ASSESSMENT & PLAN NOTE
Endocrinology was consulted and current admission, recommendations to continue levothyroxine 125 mcg, elevated TSH-6 43, free T4 within normal limits      Thyroid function panel results could be acute stress response with acute bacteremia  Endo recommendations to follow-up outpatient in 4 to 6 weeks for repeat testing prior to changing dose

## 2021-03-28 NOTE — H&P
[address] Ashlee Vazquez 1945, 76 y o  male MRN: [id number]  Unit/Bed#: ED 05 Encounter: [id number]  Primary Care Provider: Marcelina Acuna MD   Date and time admitted to hospital: 3/27/2021  3:35 PM    Dehydration with hyponatremia  Assessment & Plan  In the setting of poor PO intake and diarrhea  IVF  Encourage PO intake    Diarrhea  Assessment & Plan  Loose BM's since COVID infection  Metamucil order for bulking  Stool studies are pending  CT abd benign  Imodium prn    Complete heart block (HCC)  Assessment & Plan  S/p PPM     Elevated troponin  Assessment & Plan  Likely non MI troponin elevation  Trop mildly elevated on admission  EKG w/o ST changes  Trend trops  Continue ASA and Lipitor  Cards consulted given recent COVID infection, may need echo    Stage 3 chronic kidney disease  Assessment & Plan  Lab Results   Component Value Date    EGFR 43 03/27/2021    EGFR 42 03/24/2021    EGFR 43 03/21/2021    CREATININE 1 55 (H) 03/27/2021    CREATININE 1 57 (H) 03/24/2021    CREATININE 1 55 (H) 03/21/2021   Cr at baseline  Monitor BUN/Cr  Avoid nephrotoxic agents and hypotension    Essential hypertension  Assessment & Plan  Continue Norvasc  Labetalol prn    Type 2 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, left eye (HCC)  Assessment & Plan  Lab Results   Component Value Date    HGBA1C 7 7 (H) 02/05/2021       No results for input(s): POCGLU in the last 72 hours      Blood Sugar Average: Last 72 hrs:  currently with insulin pump, BG's are uncontrolled  Basal insulin with SSI ordered, accuchecks, diabetic diet  Endo consulted    Hypothyroidism  Assessment & Plan  Continue levothyroxine    * Weakness  Assessment & Plan  Likely post covid viral syndrome weakness  Vague renal symptoms increased frequency but without dysuria or suprapubic pain, likely due to elevated BG's  UA with protein and glucose  Rocephin until UCx is negative  IVF and encourage PO intake      VTE Prophylaxis: Heparin  / sequential compression device   Code Status: FULL  POLST: There is no POLST form on file for this patient (pre-hospital)  Discussion with family: dicsussed plan with patient and wife    Anticipated Length of Stay:  Patient will be admitted on an Observation basis with an anticipated length of stay of  Less than 2 midnights  Justification for Hospital Stay: MI rule out and IVF for weakness and post viral syndrome    Total Time for Visit, including Counseling / Coordination of Care: 1 hour  Greater than 50% of this total time spent on direct patient counseling and coordination of care  Chief Complaint:   weakness    History of Present Illness:    Linn Victoria is a 76 y o  male who presents with weakness  Patient had COVID infection in February  Since arriving home he is been sleeping a lot  Not really taking in much by mouth  He denies any fevers or chills or productive cough  He has noticed increased urination with inability to control blood sugars per insulin pump  He also complains loose stools he denies any watery bowel movements  He states they have been frequent  At times unable to hold in until he makes to the bathroom  In the ED patient was found to have elevated troponin  He denies any chest pain, shortness of breath lightheadedness, nausea, vomiting, palpitations  Review of Systems:    Review of Systems   Constitutional: Positive for appetite change and fatigue  Negative for activity change, chills, diaphoresis and fever  HENT: Negative for congestion, rhinorrhea, sinus pressure, sinus pain and sore throat  Eyes: Negative  Respiratory: Negative for cough, chest tightness and shortness of breath  Cardiovascular: Negative for chest pain, palpitations and leg swelling  Gastrointestinal: Positive for diarrhea  Negative for abdominal distention, abdominal pain, constipation, nausea and vomiting     Endocrine: Negative  Genitourinary: Positive for frequency  Negative for difficulty urinating, dysuria, flank pain, hematuria and urgency  Musculoskeletal: Negative for back pain, gait problem and neck pain  Skin: Negative  Allergic/Immunologic: Negative  Neurological: Positive for weakness  Negative for dizziness, syncope, speech difficulty, light-headedness and headaches  Hematological: Negative  Psychiatric/Behavioral: Negative  All other systems reviewed and are negative  Past Medical and Surgical History:     Past Medical History:   Diagnosis Date    Arthritis     Cardiac disease     Closed fracture of fibula, lateral malleolus, right     Complete [facility] block ([facility] [address] [facility]     Diabetes mellitus ([facility] [address] [facility]     Disease of thyroid gland     External incisional dehiscence     GERD (gastroesophageal reflux disease)     Heart disease     Muscular deconditioning     Pacemaker     Sleep apnea     cannot tolerate cpap    Thyroid disease        Past Surgical History:   Procedure Laterality Date    CARDIAC PACEMAKER PLACEMENT Left 2015    CARDIAC PACEMAKER PLACEMENT      CARDIAC SURGERY      CATARACT EXTRACTION      MUSCLE FLAP      right foot    NERVE BLOCK      Transforaminal Epidural Lumbar with Fluoroscopic Guidance     ND COLONOSCOPY FLX DX W/COLLJ SPEC WHEN PFRMD N/A 5/4/2018    Procedure: EGD AND COLONOSCOPY;  Surgeon: Adina Ruth MD;  Location: [facility]; Service: Gastroenterology    ND SPLIT [address] <100 SQCM Right 6/10/2019    Procedure: SKIN GRAFT SPLIT THICKNESS (STSG)  EXTREMITY right leg;  Surgeon: Dana Ruano DPM;  Location: [facility] OR;  Service: Podiatry    TOE AMPUTATION      left 5th toe       Meds/Allergies:    Prior to Admission medications    Medication Sig Start Date End Date Taking?  Authorizing Provider   amLODIPine (NORVASC) 5 mg tablet Take 1 tablet (5 mg total) by mouth daily 3/2/21   Vincenzo Estrella MD   ascorbic acid (VITAMIN C) 1000 MG tablet Take 1 tablet (1,000 mg total) by mouth daily for 4 days 2/9/21 2/13/21  Abdifatah Guthrie MD   aspirin 81 mg chewable tablet Chew 81 mg daily    Historical Provider, MD   Atorvastatin Calcium (LIPITOR PO) Take 5 mg by mouth daily     Historical Provider, MD   CALCIUM CITRATE PO Take 2,000 mg by mouth daily  11/3/17   Historical Provider, MD   Cholecalciferol (VITAMIN D3) 2000 units capsule Take 2,000 Units by mouth daily    Historical Provider, MD   Continuous Blood Gluc Sensor (DEXCOM G6 SENSOR) MISC As directed dx:250 03 11/26/14   Historical Provider, MD   cyanocobalamin (VITAMIN B-12) 1,000 mcg tablet Take 2,000 mcg by mouth daily    Historical Provider, MD   DULoxetine (CYMBALTA) 30 mg delayed release capsule Take 30 mg by mouth daily     Historical Provider, MD   ezetimibe (ZETIA) 10 mg tablet TAKE 5MG (ONE-HALF TABLET) BY MOUTH DAILY FOR CHOLESTEROL    Historical Provider, MD   glucose 40 % Take by mouth once    Historical Provider, MD   glucose 77 4 % TAKE 1/2 TUBE BY MOUTH  AS NEEDED FOR LOW BLOOD SUGAR 6/8/20   Historical Provider, MD   insulin aspart (NovoLOG) 100 units/mL injection Inject under the skin Via insulin pump    Historical Provider, MD   levothyroxine 125 mcg tablet Take 1 tablet (125 mcg total) by mouth daily in the early morning 11/26/19   Darren Manriquez MD   loperamide (IMODIUM) 2 mg capsule Take 2 capsules (4 mg total) by mouth 4 (four) times a day as needed for diarrhea 2/8/21   Abdifatah Guthrie MD   pantoprazole (PROTONIX) 40 mg tablet Take 40 mg by mouth    Historical Provider, MD   tamsulosin (FLOMAX) 0 4 mg Take 0 4 mg by mouth daily with dinner      Historical Provider, MD   zinc sulfate (ZINCATE) 220 mg capsule Take 1 capsule (220 mg total) by mouth daily for 4 doses 2/9/21 2/13/21  Abdifatah Guthrie MD     I have reviewed home medications with patient family member  Allergies:    Allergies   Allergen Reactions    Contrast  [Iodinated Diagnostic Agents]     Lipitor [Atorvastatin Calcium] Drowsiness    Ibuprofen      sensitive    Iodine Rash    Simvastatin Anxiety     Other reaction(s): Malaise (finding)       Social History:     Marital Status: /Civil Union   Occupation:   Does not work  Patient Pre-hospital Living Situation:  With wife  Patient Pre-hospital Level of Mobility:  independent  Patient Pre-hospital Diet Restrictions:  none  Substance Use History:   Social History     Substance and Sexual Activity   Alcohol Use Yes    Alcohol/week: 0 0 standard drinks    Frequency: Never    Binge frequency: Never    Comment: socially     Social History     Tobacco Use   Smoking Status Former Smoker    Types: Pipe   Smokeless Tobacco Never Used     Social History     Substance and Sexual Activity   Drug Use No       Family History:    Family History   Problem Relation Age of Onset    Diabetes Mother     Heart disease Mother     Thyroid disease Mother         Disorder     Cancer Brother     Gout Brother     Diabetes Maternal Grandmother        Physical Exam:     Vitals:   Blood Pressure: 149/99 (03/27/21 1900)  Pulse: (!) 108 (03/27/21 1900)  Temperature: 98 °F (36 7 °C) (03/27/21 1515)  Temp Source: Tympanic (03/27/21 1515)  Respirations: 18 (03/27/21 1900)  Weight - Scale: 65 8 kg (145 lb) (03/27/21 1515)  SpO2: 95 % (03/27/21 1900)    Physical Exam  Vitals signs and nursing note reviewed  Constitutional:       Appearance: Normal appearance  He is normal weight  HENT:      Head: Normocephalic and atraumatic  Right Ear: External ear normal       Left Ear: External ear normal       Nose: Nose normal       Mouth/Throat:      Mouth: Mucous membranes are moist       Pharynx: Oropharynx is clear  Eyes:      Conjunctiva/sclera: Conjunctivae normal       Pupils: Pupils are equal, round, and reactive to light  Neck:      Musculoskeletal: Neck supple  No muscular tenderness  Cardiovascular:      Rate and Rhythm: Normal rate and regular rhythm        Pulses: Normal pulses  Heart sounds: Normal heart sounds  Pulmonary:      Effort: Pulmonary effort is normal       Breath sounds: Normal breath sounds  Abdominal:      General: Abdomen is flat  Bowel sounds are normal       Palpations: Abdomen is soft  Musculoskeletal:         General: No swelling or tenderness  Skin:     General: Skin is warm and dry  Capillary Refill: Capillary refill takes less than 2 seconds  Neurological:      General: No focal deficit present  Mental Status: He is alert and oriented to person, place, and time  Mental status is at baseline  Psychiatric:         Mood and Affect: Mood normal          Behavior: Behavior normal          Thought Content: Thought content normal          Judgment: Judgment normal          Additional Data:     Lab Results: I have personally reviewed pertinent reports  Results from last 7 days   Lab Units 03/27/21  1619   WBC Thousand/uL 15 73*   HEMOGLOBIN g/dL 9 8*   HEMATOCRIT % 29 4*   PLATELETS Thousands/uL 196   NEUTROS PCT % 82*   LYMPHS PCT % 6*   MONOS PCT % 10   EOS PCT % 0     Results from last 7 days   Lab Units 03/27/21  1619   SODIUM mmol/L 128*   POTASSIUM mmol/L 4 7   CHLORIDE mmol/L 96*   CO2 mmol/L 24   BUN mg/dL 46*   CREATININE mg/dL 1 55*   ANION GAP mmol/L 8   CALCIUM mg/dL 8 7   ALBUMIN g/dL 2 4*   TOTAL BILIRUBIN mg/dL 0 63   ALK PHOS U/L 145*   ALT U/L 24   AST U/L 20   GLUCOSE RANDOM mg/dL 217*                 Results from last 7 days   Lab Units 03/27/21  1752   LACTIC ACID mmol/L 1 4       Imaging: I have personally reviewed pertinent reports  CT abdomen pelvis wo contrast   Final Result by Jan Nieto MD (03/27 1848)         1  Partially imaged groundglass opacification in the superior segment of the right lower lobe and adjacent scarring compatible with recent COVID-19 pneumonia  2   No evidence of acute intra-abdominal or pelvic process           Workstation performed: [id number]         X-ray chest 1 view portable    (Results Pending)       EKG, Pathology, and Other Studies Reviewed on Admission:   · EKG: NO ST changes    Allscripts / Epic Records Reviewed: Yes     ** Please Note: This note has been constructed using a voice recognition system   **

## 2021-03-28 NOTE — ASSESSMENT & PLAN NOTE
Lab Results   Component Value Date    EGFR 43 03/27/2021    EGFR 42 03/24/2021    EGFR 43 03/21/2021    CREATININE 1 55 (H) 03/27/2021    CREATININE 1 57 (H) 03/24/2021    CREATININE 1 55 (H) 03/21/2021   Cr at baseline  Monitor BUN/Cr  Avoid nephrotoxic agents and hypotension

## 2021-03-28 NOTE — CONSULTS
Consultation - Dariela De La Garza 76 y o  male MRN: [id number]    Unit/Bed#: [facility]PHP 920-01 Encounter: [id number]      Assessment/Plan     Assessment: This is a 76y o -year-old male with with type 1 diabetes, hypothyroidism, HTN, hx of complete heart block on pacemaker, hx of covid was hospitalized for dehydration, hyponatremia and diarrhea and suspected UTI  Plan:    Type 1 DM - most recent A1C 7 7, not accurate pt with CKD  - patient received 15 units of Lantus last night, this morning blood sugar 43   - Adjust insulin regimen as follows: 12 units of lantus at bedtime + 2 units of humalog before meals (patient is on 75 g of carb diet) + ISF 80 with target     - from tomorrow: 12 units of lantus, 3 units of humalog before meals  - Patient was advised to bring all of his pump supplies and Dexcom so he will be ready to be transitioned back to his insulin pump prior to his discharge  Hypothyroidism - TSH 6 430, fT4 - 1 04  - given patient's age TSH 6 56 is reasonable  - continue levothyroxine 125 mcg daily    CC: Diabetes Consult    History of Present Illness     HPI: Dariela De La Garza is a 76y o  year old male with type 1 diabetes diagnosed many years ago  Patient says to me that he was initially diagnosed as type 2 and then became type 1 diabetic  He currently follows up for his insulin pump with [address] [place] [address]  He says his pump is being managed by cardiology  He admits to polyuria, polydipsia, nocturia and blurry vision, numbness in his feet  He admits to rare episodes of hypoglycemia and sometimes hyperglycemia spikes  He says he is a brittle diabetic  He started on insulin pump about 10 years ago  He is on tandem insulin pump with dexcom sensor for the past 2 years  He is currently using control IQ program through tandem pump  His family members have access to his dexcom and alert him of low and high sugars       He has hx of hospitalization for DKA 2x in 2019 and once EMS arrived for hypoglycemia but he was not hospitalized  He has complications of retinopathy, nephropathy, neuropathy, denies hx of stroke or MI  He was also diagnosed with hypothyroidism  He is currently on levothyroxine 125 mcg daily  His current tandem insulin pump settings are:   Basal rate 0 5 units, insulin to carb ratio 25, insulin sensitivity factor 80, target blood sugar 140 (pump showed me 110, but pt says that he has it in different setting as 140)  - patient is using basal and control IQ program on his pump    He admits to family history of mother with diabetes  Inpatient consult to Endocrinology     Performed by  Si Olszewski, MD     Authorized by Ken Scott MD              Review of Systems   Constitutional: Negative for fatigue and unexpected weight change  HENT: Negative for congestion, postnasal drip and sore throat  Eyes: Negative for pain and redness  Respiratory: Negative for cough, chest tightness, shortness of breath and wheezing  Cardiovascular: Negative for chest pain, palpitations and leg swelling  Gastrointestinal: Negative for abdominal pain, constipation, diarrhea, nausea and vomiting  Endocrine: Positive for polydipsia and polyuria  Genitourinary: Negative for dysuria  Musculoskeletal: Negative for arthralgias and back pain  Neurological: Negative for dizziness, light-headedness and headaches  Psychiatric/Behavioral: Negative for agitation  The patient is not nervous/anxious  All other systems reviewed and are negative        Historical Information   Past Medical History:   Diagnosis Date    Arthritis     Cardiac disease     Closed fracture of fibula, lateral malleolus, right     Complete heart block (HCC)     Diabetes mellitus ([address] )     Disease of thyroid gland     External incisional dehiscence     GERD (gastroesophageal reflux disease)     Heart disease     Muscular deconditioning     Pacemaker     Sleep apnea     cannot tolerate cpap    Thyroid disease Past Surgical History:   Procedure Laterality Date    CARDIAC PACEMAKER PLACEMENT Left 2015    CARDIAC PACEMAKER PLACEMENT      CARDIAC SURGERY      CATARACT EXTRACTION      MUSCLE FLAP      right foot    NERVE BLOCK      Transforaminal Epidural Lumbar with Fluoroscopic Guidance     TN COLONOSCOPY FLX DX W/COLLJ SPEC WHEN PFRMD N/A 5/4/2018    Procedure: EGD AND COLONOSCOPY;  Surgeon: Howie Miranda MD;  Location: [facility];   Service: Gastroenterology    TN SPLIT [address] <100 SQCM Right 6/10/2019    Procedure: SKIN GRAFT SPLIT THICKNESS (STSG)  EXTREMITY right leg;  Surgeon: Reinier Reyes DPM;  Location:  MAIN OR;  Service: Podiatry    TOE AMPUTATION      left 5th toe     Social History   Social History     Substance and Sexual Activity   Alcohol Use Yes    Alcohol/week: 0 0 standard drinks    Frequency: Never    Binge frequency: Never    Comment: socially     Social History     Substance and Sexual Activity   Drug Use No     Social History     Tobacco Use   Smoking Status Former Smoker    Types: Pipe   Smokeless Tobacco Never Used     Family History:   Family History   Problem Relation Age of Onset    Diabetes Mother     Heart disease Mother     Thyroid disease Mother         Disorder     Cancer Brother     Gout Brother     Diabetes Maternal Grandmother        Meds/Allergies   Current Facility-Administered Medications   Medication Dose Route Frequency Provider Last Rate Last Admin    acetaminophen (TYLENOL) tablet 650 mg  650 mg Oral Q6H PRN Candelaria Bruno MD        amLODIPine (NORVASC) tablet 5 mg  5 mg Oral Daily Candelaria Bruno MD   5 mg at 03/28/21 0836    aspirin chewable tablet 81 mg  81 mg Oral Daily Candelaria Bruno MD   81 mg at 03/28/21 0835    atorvastatin (LIPITOR) tablet 5 mg  5 mg Oral Daily Candelaria Bruno MD   5 mg at 03/28/21 0836    ceftriaxone (ROCEPHIN) 1 g/50 mL in dextrose IVPB  1,000 mg Intravenous Q24H Candelaria Bruno MD        DULoxetine (CYMBALTA) delayed release capsule 30 mg  30 mg Oral Daily Dawit Judge MD   30 mg at 03/28/21 0835    heparin (porcine) subcutaneous injection 5,000 Units  5,000 Units Subcutaneous [facility] Dawit Judge MD   5,000 Units at 03/28/21 0623    insulin glargine (LANTUS) subcutaneous injection 15 Units 0 15 mL  15 Units Subcutaneous HS Dawit Judge MD   15 Units at 03/27/21 2324    insulin lispro (HumaLOG) 100 units/mL subcutaneous injection 1-5 Units  1-5 Units Subcutaneous 4x Daily (AC & HS) Dawit Judge MD   2 Units at 03/27/21 2326    insulin lispro (HumaLOG) 100 units/mL subcutaneous injection 1-5 Units  1-5 Units Subcutaneous Once Leona Garcia PA-C        Labetalol HCl (NORMODYNE) injection 10 mg  10 mg Intravenous Q6H PRN Dawit Judge MD        levothyroxine tablet 125 mcg  125 mcg Oral Early Morning Dawit Judge MD   125 mcg at 03/28/21 3348    loperamide (IMODIUM) capsule 4 mg  4 mg Oral 4x Daily PRN Dawit Judge MD        ondansetron [facility]) injection 4 mg  4 mg Intravenous Q6H PRN Dawit Judge MD        pantoprazole (PROTONIX) EC tablet 40 mg  40 mg Oral Early Morning Dawit Judge MD   40 mg at 03/28/21 6485    psyllium (METAMUCIL) 1 packet  1 packet Oral Daily Dawit Judge MD   1 packet at 03/28/21 0837    sodium chloride (PF) 0 9 % injection 3 mL  3 mL Intravenous Q1H PRN Dawit Judge MD        sodium chloride 0 9 % infusion  100 mL/hr Intravenous Continuous Dawit Judge  mL/hr at 03/28/21 0850 100 mL/hr at 03/28/21 0850    tamsulosin (FLOMAX) capsule 0 4 mg  0 4 mg Oral Daily With Kimberly Garcia MD         Allergies   Allergen Reactions    Contrast  [Iodinated Diagnostic Agents]     Lipitor  [Atorvastatin Calcium] Drowsiness    Ibuprofen      sensitive    Iodine Rash    Simvastatin Anxiety     Other reaction(s): Malaise (finding)       Objective   Vitals: Blood pressure 131/80, pulse 101, temperature 97 7 °F (36 5 °C), resp   rate 20, height 5' 8" (1 727 m), weight 72 kg (158 lb 11 7 oz), SpO2 93 %  Intake/Output Summary (Last 24 hours) at 3/28/2021 0928  Last data filed at 3/28/2021 0846  Gross per 24 hour   Intake 1083 33 ml   Output 500 ml   Net 583 33 ml     Invasive Devices     Peripheral Intravenous Line            Peripheral IV 03/27/21 Left Antecubital less than 1 day    Peripheral IV 03/27/21 Right Antecubital less than 1 day                Physical Exam  Constitutional:       Appearance: He is well-developed  HENT:      Head: Normocephalic and atraumatic  Mouth/Throat:      Pharynx: No oropharyngeal exudate  Eyes:      Conjunctiva/sclera: Conjunctivae normal       Pupils: Pupils are equal, round, and reactive to light  Neck:      Musculoskeletal: Neck supple  Cardiovascular:      Rate and Rhythm: Normal rate and regular rhythm  Heart sounds: No murmur  Pulmonary:      Effort: Pulmonary effort is normal  No respiratory distress  Breath sounds: Normal breath sounds  Abdominal:      General: There is no distension  Palpations: Abdomen is soft  Tenderness: There is no abdominal tenderness  Skin:     General: Skin is warm and dry  Findings: No erythema  Neurological:      Mental Status: He is alert  Psychiatric:         Mood and Affect: Mood normal          The history was obtained from the review of the chart, patient      Lab Results:       Lab Results   Component Value Date    WBC 14 52 (H) 03/28/2021    HGB 9 5 (L) 03/28/2021    HCT 28 9 (L) 03/28/2021    MCV 86 03/28/2021     03/28/2021     Lab Results   Component Value Date/Time    BUN 30 (H) 03/28/2021 06:31 AM    BUN 24 06/24/2015 06:00 AM     (L) 06/24/2015 06:00 AM    K 4 2 03/28/2021 06:31 AM    K 4 9 06/24/2015 06:00 AM     03/28/2021 06:31 AM    CL 96 (L) 06/24/2015 06:00 AM    CO2 24 03/28/2021 06:31 AM    CO2 22 07/13/2019 04:32 PM    CREATININE 1 07 03/28/2021 06:31 AM    CREATININE 0 96 06/24/2015 06:00 AM    AST 20 03/27/2021 04:19 PM    AST 14 06/21/2015 04:21 AM    ALT 24 03/27/2021 04:19 PM    ALT 24 06/21/2015 04:21 AM    ALB 2 4 (L) 03/27/2021 04:19 PM    ALB 2 8 (L) 06/21/2015 04:21 AM     No results for input(s): CHOL, HDL, LDL, TRIG, VLDL in the last 72 hours  No results found for: Angelina Au  POC Glucose (mg/dl)   Date Value   03/28/2021 84   03/28/2021 148 (H)   03/27/2021 216 (H)   02/08/2021 325 (H)   02/08/2021 182 (H)   02/07/2021 232 (H)   02/07/2021 291 (H)   02/07/2021 198 (H)   02/07/2021 127   02/07/2021 158 (H)       Imaging Studies: I have personally reviewed pertinent reports  Portions of the record may have been created with voice recognition software

## 2021-03-28 NOTE — ASSESSMENT & PLAN NOTE
Lab Results   Component Value Date    HGBA1C 7 7 (H) 02/05/2021       No results for input(s): POCGLU in the last 72 hours      Blood Sugar Average: Last 72 hrs:  currently with insulin pump, BG's are uncontrolled  Basal insulin with SSI ordered, accuchecks, diabetic diet  Endo consulted

## 2021-03-28 NOTE — PROGRESS NOTES
Patient received novolog 2u and lantus 15u tonight  Patient was informed that we would be temporarily turning off his insulin pump until endocrinology assesses the patient  Pump is still connected to patient, and powered down

## 2021-03-29 NOTE — ASSESSMENT & PLAN NOTE
S/p PPM   Seen at bedside, denies any chest pain or shortness of breath    Cardiology on board, pending transthoracic echocardiogram

## 2021-03-29 NOTE — PLAN OF CARE
Problem: PHYSICAL THERAPY ADULT  Goal: Performs mobility at highest level of function for planned discharge setting  See evaluation for individualized goals  Description: Treatment/Interventions: Functional transfer training, LE strengthening/ROM, Elevations, Therapeutic exercise, Endurance training, Patient/family training, Equipment eval/education, Bed mobility, Gait training, Spoke to nursing  Equipment Recommended: [place] [employer]       See flowsheet documentation for full assessment, interventions and recommendations  Note: Prognosis: Fair  Problem List: Decreased strength, Decreased endurance, Impaired balance, Decreased mobility  Assessment: Pt is 76 y o  male seen for PT evaluation s/p admit to [facility] on 3/27/2021 w/ Gram-positive bacteremia  Pt admitted from home w/ increased weakness  PT consulted to assess pt's functional mobility and d/c needs  Order placed for PT eval and tx, w/ up w/ A order  Comorbidities affecting pt's physical performance at time of assessment include: anemia, Type 2 DM, HTN, CKD, arthritis, hx of pacemaker  Pt is a questionable historian  Pt reports living alone  Per CM note, pt resides w/ spouse in multi-level home w/ ramped entrance  Pt reports ambulating w/ RW PTA  Unknown level of support pt's spouse can provide at home  Personal factors affecting pt at time of IE include: inaccessible home environment, lives in two story house, ambulating w/ assistive device, inability to navigate community distances, decreased initiation and engagement, inability to perform IADLs and inability to perform ADLs  Please find objective findings from PT assessment regarding body systems outlined above with impairments and limitations including weakness, impaired balance, decreased endurance, gait deviations, decreased activity tolerance, decreased functional mobility tolerance, fall risk and decreased cognition  Pt performed bed mobility at Mod A  Pt performed STS at Mod A   Pt ambulated 10ft x2 w/ RW at [address]  The following objective measures performed on IE also reveal limitations: AM-PAC 6-Clicks: 72/69  Pt's clinical presentation is currently unstable/unpredictable seen in pt's presentation of recent admission for bacteremia requiring medical attention, recent decline in function as compared to baseline, multiple lines, fall risk, pain  Pt to benefit from continued PT tx to address deficits as defined above and maximize level of functional independent mobility and consistency  From PT/mobility standpoint, recommendation at time of d/c would be STR pending progress in order to facilitate return to PLOF  Barriers to Discharge: Inaccessible home environment, Decreased caregiver support     PT Discharge Recommendation: [phone]     PT - OK to Discharge: Yes(when medically cleared to rehab)    See flowsheet documentation for full assessment

## 2021-03-29 NOTE — ASSESSMENT & PLAN NOTE
Normocytic anemia, likely anemia of chronic disease  Prior iron studies showed finding consistent with anemia of chronic disease  Hemoglobin 8 9 grams/deciliter with no evidence of active bleeding  Denies seen any blood in stools  Denies tarry dark stools  Trend hemoglobin

## 2021-03-29 NOTE — PROGRESS NOTES
[address] [facility]  Progress Note - Anish Padilla 1945, 76 y o  male MRN: [id number]  Unit/Bed#: [facility] 920-01 Encounter: [id number]  Primary Care Provider: Lamount Cogan, MD   Date and time admitted to hospital: 3/27/2021  3:35 PM    * Gram-positive bacteremia  Assessment & Plan  Admitted complaining of generalized weakness increased urinary frequency and diarrhea  Spiking episode of fever of 100 3 on March 27, 2021  Has remained afebrile during the last 24 hours  Blood culture from March 27, 2021 2/2 sets of blood culture growing Gram-positive rods  Urine culture 03/27/2021-negative  Chest x-ray showed no acute cardiopulmonary disease  CT abdomen and pelvis showed no acute intra-abdominal abnormalities  Pulmonary finding compatible with recent COVID-19 pneumonia  (diagnosed on February 5, 2021)  Initially treated with ceftriaxone switched to cefepime  Ordered C difficile toxin by PCR with EIA and Stool enteric bacterial panel by PCR  Infectious disease consult, input appreciated  Dehydration with hyponatremia  Assessment & Plan  Most likely hypotonic hyponatremia in the setting of poor oral intake and concomitant diarrhea  Continue IV fluids with Na Cl 0 9% at 100 cc/hour  Encourage PO intake    Diarrhea  Assessment & Plan  Loose BM's since COVID infection  Admitted complaining of episode of diarrhea  Metamucil order for bulking (outpatient regimen)  Stool studies are pending, C diff not collected yet  CT abd benign  Stop Imodium p r n  Until infectious etiology of diarrhea ruled out  Complete heart block [facility])  Assessment & Plan  S/p PPM   Seen at bedside, denies any chest pain or shortness of breath    Cardiology on board, pending transthoracic echocardiogram     Elevated troponin level not due myocardial infarction  Assessment & Plan  Likely non MI troponin elevation  Trop mildly elevated on admission; 0 32<--  0 38<-- 0 47  EKG w/o ST changes  History of complete heart block status post pacemaker implant  Cardiology on board, pending transthoracic echocardiogram  Continue ASA and Lipitor      Stage 3 chronic kidney disease  Assessment & Plan  Lab Results   Component Value Date    EGFR 60 03/29/2021    EGFR 68 03/28/2021    EGFR 43 03/27/2021    CREATININE 1 18 03/29/2021    CREATININE 1 07 03/28/2021    CREATININE 1 55 (H) 03/27/2021   Cr at baseline  Monitor BUN/Cr  Avoid nephrotoxic agents and hypotension    Essential hypertension  Assessment & Plan  Blood pressure well controlled  Continue Norvasc  Labetalol prn    Type 2 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, left eye [facility])  Assessment & Plan  Lab Results   Component Value Date    HGBA1C 7 7 (H) 02/05/2021       Recent Labs     03/28/21  2147 03/29/21  0804 03/29/21  1124 03/29/21  1152   POCGLU 164* 180* 145* 128       Blood Sugar Average: Last 72 hrs:  (P) 120   On insulin pump as an outpatient  Blood glucose currently within acceptable parameters  On current hospitalization endocrinology was consulted  And a recommendations to decreased dose of Lantus to 12 units q h s , Humalog 2 units t i d  With meals, continue sliding scale insulin  Will adjust antihyperglycemic regimen as indicated  Anemia  Assessment & Plan  Normocytic anemia, likely anemia of chronic disease  Prior iron studies showed finding consistent with anemia of chronic disease  Hemoglobin 8 9 grams/deciliter with no evidence of active bleeding  Denies seen any blood in stools  Denies tarry dark stools  Trend hemoglobin  Hypothyroidism  Assessment & Plan  Endocrinology was consulted and current admission, recommendations to continue levothyroxine 125 mcg, elevated TSH-6 43, free T4 within normal limits      Thyroid function panel results could be acute stress response with acute bacteremia  Endo recommendations to follow-up outpatient in 4 to 6 weeks for repeat testing prior to changing dose        Subjective:   Patient has been seen at bedside, he was alert and cooperative  Denied any chest pain or shortness of breath at rest   Denies abdominal pain  Denies further episodes of diarrhea  Denied nausea vomiting  States he has been peeing without difficulty  Denies any dysuria  Has remained afebrile in the last 24 hours  Tolerating oral diet well  Denies focal neurological deficits  Denied upper respiratory symptoms  As per nursing staff patient had a rough night that he was awake most of the night time and needed to be placed on fall precautions  No additional complaints  Objective:     Vitals: Blood pressure 137/77, pulse 99, temperature 98 5 °F (36 9 °C), temperature source Oral, resp  rate 22, height 5' 8" (1 727 m), weight 72 kg (158 lb 11 7 oz), SpO2 94 %  ,Body mass index is 24 14 kg/m²  Intake/Output Summary (Last 24 hours) at 3/29/2021 1234  Last data filed at 3/29/2021 0449  Gross per 24 hour   Intake 2615 ml   Output 250 ml   Net 2365 ml         Physical Exam:     Physical Exam  Constitutional:       Appearance: Normal appearance  HENT:      Head: Normocephalic and atraumatic  Nose: Nose normal       Mouth/Throat:      Mouth: Mucous membranes are moist    Eyes:      Extraocular Movements: Extraocular movements intact  Neck:      Musculoskeletal: Normal range of motion and neck supple  Cardiovascular:      Rate and Rhythm: Normal rate and regular rhythm  Pulses: Normal pulses  Heart sounds: No murmur  No friction rub  No gallop  Pulmonary:      Effort: Pulmonary effort is normal  No respiratory distress  Breath sounds: No wheezing, rhonchi or rales  Abdominal:      General: There is no distension  Palpations: Abdomen is soft  Tenderness: There is no abdominal tenderness  There is no guarding or rebound  Musculoskeletal: Normal range of motion  General: Deformity present  No swelling or signs of injury        Right lower leg: No edema  Comments: 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  There is a callus on the 5th metatarsophalangeal joint (right) with skin breaks and hyperkeratotic changes, denies tenderness to palpation  Skin:     General: Skin is warm  Capillary Refill: Capillary refill takes less than 2 seconds  Coloration: Skin is not jaundiced  Findings: Lesion (Callus on the 5th right metatarsophalangeal joint) present  No bruising  Neurological:      General: No focal deficit present  Mental Status: He is alert and oriented to person, place, and time  Psychiatric:         Mood and Affect: Mood normal          Behavior: Behavior normal            Invasive Devices     Peripheral Intravenous Line            Peripheral IV 03/29/21 Right Forearm less than 1 day                        CBC:   Lab Results   Component Value Date    WBC 16 85 (H) 03/29/2021    HGB 8 9 (L) 03/29/2021    HCT 27 3 (L) 03/29/2021    MCV 87 03/29/2021     03/29/2021    MCH 28 3 03/29/2021    MCHC 32 6 03/29/2021    RDW 14 8 03/29/2021    MPV 9 2 03/29/2021    NRBC 0 03/29/2021   ,   CMP:   Lab Results   Component Value Date    K 4 8 03/29/2021     03/29/2021    CO2 24 03/29/2021    BUN 27 (H) 03/29/2021    CREATININE 1 18 03/29/2021    CALCIUM 7 9 (L) 03/29/2021    EGFR 60 03/29/2021   ,   PT/INR: No results found for: PT, INR,   Troponin: No results found for: TROPONINI,     Imaging Studies: I have personally reviewed pertinent reports  Counseling / Coordination of Care  Total time spent today  30 minutes  Greater than 50% of total time was spent with the patient and / or family counseling and / or coordination of care  I talked to patient's wife through the phone  All questions/concerns has been addressed  Will continue updating patient's contact on regular basis

## 2021-03-29 NOTE — PROGRESS NOTES
Cardiology Progress Note - Yoselin Gurrola 76 y o  male MRN: [id number]    Unit/Bed#: [facility] 920-01 Encounter: [id number]        Subjective:    No significant events overnight  Hypoxia over night  RLL infiltrate on chest xray  He has no dyspnea this morning  Review of Systems   Constitution: Positive for malaise/fatigue  Cardiovascular: Negative for chest pain  Respiratory: Negative for shortness of breath  Objective:   Vitals: Blood pressure 125/75, pulse 99, temperature 98 5 °F (36 9 °C), temperature source Oral, resp  rate 22, height 5' 8" (1 727 m), weight 72 kg (158 lb 11 7 oz), SpO2 94 %  , Body mass index is 24 14 kg/m² ,   Orthostatic Blood Pressures      Most Recent Value   Blood Pressure  125/75 filed at 03/28/2021 2139   Patient Position - Orthostatic VS  Lying filed at 03/27/2021 1161         Systolic (20RGX), GWC:823 , Min:125 , QYJ:548     Diastolic (83GQN), STR:42, Min:75, Max:77      Intake/Output Summary (Last 24 hours) at 3/29/2021 0838  Last data filed at 3/29/2021 0449  Gross per 24 hour   Intake 3250 ml   Output 250 ml   Net 3000 ml     Weight (last 2 days)     Date/Time   Weight    03/27/21 21:03:38   72 (158 73)    03/27/21 1515   65 8 (145)                    Physical Exam  Vitals signs reviewed  Constitutional:       Appearance: Normal appearance  HENT:      Head: Normocephalic  Nose: Nose normal       Mouth/Throat:      Mouth: Mucous membranes are moist       Pharynx: Oropharynx is clear  Eyes:      General: No scleral icterus  Conjunctiva/sclera: Conjunctivae normal    Neck:      Musculoskeletal: Normal range of motion and neck supple  Cardiovascular:      Rate and Rhythm: Normal rate and regular rhythm  Heart sounds: No murmur  No friction rub  No gallop  Pulmonary:      Effort: Pulmonary effort is normal  No respiratory distress  Breath sounds: Normal breath sounds  No wheezing or rales  Abdominal:      General: Abdomen is flat   Bowel sounds are normal  There is no distension  Palpations: Abdomen is soft  Tenderness: There is no abdominal tenderness  There is no guarding  Musculoskeletal:      Right lower leg: No edema  Left lower leg: No edema  Skin:     General: Skin is warm and dry  Neurological:      General: No focal deficit present  Mental Status: He is alert and oriented to person, place, and time     Psychiatric:         Mood and Affect: Mood normal          Behavior: Behavior normal            Laboratory Results:  Results from last 7 days   Lab Units 03/27/21  2317 03/27/21 2024 03/27/21  1619 03/24/21  1909   CK TOTAL U/L  --   --   --  86   TROPONIN I ng/mL 0 32* 0 38* 0 47*  --        CBC with diff:   Results from last 7 days   Lab Units 03/29/21  0616 03/28/21  0631 03/27/21 1619 03/24/21  1643   WBC Thousand/uL 16 85* 14 52* 15 73* 8 78   HEMOGLOBIN g/dL 8 9* 9 5* 9 8* 10 3*   HEMATOCRIT % 27 3* 28 9* 29 4* 31 5*   MCV fL 87 86 86 88   PLATELETS Thousands/uL 203 198 196 96*   MCH pg 28 3 28 1 28 7 28 8   MCHC g/dL 32 6 32 9 33 3 32 7   RDW % 14 8 14 5 14 3 14 2   MPV fL 9 2 9 3 9 2 9 1   NRBC AUTO /100 WBCs 0  --  0 0         CMP:  Results from last 7 days   Lab Units 03/29/21  0616 03/28/21 0631 03/27/21 1619 03/24/21  1643   POTASSIUM mmol/L 4 8 4 2 4 7 4 5   CHLORIDE mmol/L 101 104 96* 95*   CO2 mmol/L 24 24 24 23   BUN mg/dL 27* 30* 46* 51*   CREATININE mg/dL 1 18 1 07 1 55* 1 57*   CALCIUM mg/dL 7 9* 7 9* 8 7 8 5   AST U/L  --   --  20 23   ALT U/L  --   --  24 29   ALK PHOS U/L  --   --  145* 116   EGFR ml/min/1 73sq m 60 68 43 42         BMP:  Results from last 7 days   Lab Units 03/29/21  0616 03/28/21 0631 03/27/21 1619 03/24/21  1643   POTASSIUM mmol/L 4 8 4 2 4 7 4 5   CHLORIDE mmol/L 101 104 96* 95*   CO2 mmol/L 24 24 24 23   BUN mg/dL 27* 30* 46* 51*   CREATININE mg/dL 1 18 1 07 1 55* 1 57*   CALCIUM mg/dL 7 9* 7 9* 8 7 8 5       BNP: No results for input(s): BNP in the last 72 hours     Magnesium:       Coags:       TSH: No results found for: TSH    Hemoglobin A1C       Lipid Profile:       Cardiac testing:   Results for orders placed during the hospital encounter of 17   Echo complete with contrast if indicated    Narrative [facility] [address], [address] [facility]  [phone]    Transthoracic Echocardiogram  2D, M-mode, Doppler, and Color Doppler    Study date:  2017    Patient: Collin Santiago  MR number: [id number]  Account number: [de-identified]  : 1945  Age: 70 years  Gender: Male  Status: Outpatient  Location: [address] [place] [address]  Height: 68 in  Weight: 146 7 lb  BP: 140/ 76 mmHg    Indications: Palpitations  Diagnoses: I49 9 - Cardiac arrhythmia, unspecified    Sonographer:  Rosine Krabbe, RCS  Primary Physician:  Micheline Banda MD  Referring Physician:  Jules Oliver PA-C  Group:  [facility]  Interpreting Physician:  Savage Hines MD    SUMMARY    LEFT VENTRICLE:  Systolic function was normal  Ejection fraction was estimated to be 55 %  There were no regional wall motion abnormalities  VENTRICULAR SEPTUM:  There was dyssynergic motion  These changes are consistent with a conduction abnormality or paced rhythm  HISTORY: PRIOR HISTORY: Heart block s/p PPM, Hypertension, Diabetes    PROCEDURE: The study was performed in the [address] [place] [address]  This was a routine study  The transthoracic approach was used  The study included complete 2D imaging, M-mode, complete spectral Doppler, and color Doppler  The  heart rate was 83 bpm, at the start of the study  Images were obtained from the parasternal, apical, subcostal, and suprasternal notch acoustic windows  Image quality was adequate  LEFT VENTRICLE: Size was normal  Systolic function was normal  Ejection fraction was estimated to be 55 %  There were no regional wall motion abnormalities   Wall thickness was normal  DOPPLER: There was fusion of early and atrial  contributions to ventricular filling  VENTRICULAR SEPTUM: There was dyssynergic motion  These changes are consistent with a conduction abnormality or paced rhythm  RIGHT VENTRICLE: The size was normal  Systolic function was normal  A pacing wire was present  LEFT ATRIUM: Size was normal  Not well visualized  RIGHT ATRIUM: Size was normal  Not well visualized  MITRAL VALVE: There was mild to moderate annular calcification  Valve structure was normal  There was normal leaflet separation  DOPPLER: There was no evidence for stenosis  There was no significant regurgitation  AORTIC VALVE: The valve was trileaflet  Leaflets exhibited mildly increased thickness and normal cuspal separation  DOPPLER: There was no evidence for stenosis  There was no regurgitation  TRICUSPID VALVE: The valve structure was normal  There was normal leaflet separation  DOPPLER: There was no evidence for stenosis  There was mild to moderate regurgitation  Estimated peak PA pressure was 30 mmHg  PULMONIC VALVE: Leaflets exhibited normal thickness, no calcification, and normal cuspal separation  DOPPLER: The transpulmonic velocity was within the normal range  There was mild regurgitation  PERICARDIUM: There was no pericardial effusion  The pericardium was normal in appearance  AORTA: The root exhibited normal size      SYSTEM MEASUREMENT TABLES    2D  %FS: 33 83 %  AV Diam: 3 27 cm  EDV(Teich): 74 8 ml  EF(Cube): 71 03 %  EF(Teich): 63 15 %  ESV(Cube): 20 17 ml  ESV(Teich): 27 56 ml  IVSd: 1 cm  LA Area: 15 62 cm2  LA Diam: 3 04 cm  LVEDV MOD A4C: 60 51 ml  LVEF MOD A4C: 56 44 %  LVESV MOD A4C: 26 36 ml  LVIDd: 4 11 cm  LVIDs: 2 72 cm  LVLd A4C: 7 44 cm  LVLs A4C: 6 21 cm  LVPWd: 0 93 cm  RA Area: 14 07 cm2  RV Diam: 3 08 cm  SI(Cube): 27 62 ml/m2  SI(Teich): 26 39 ml/m2  SV MOD A4C: 34 15 ml  SV(Cube): 49 44 ml  SV(Teich): 47 24 ml    CW  TR MaxP 27 mmHg  TR Vmax: 2 3 m/s    MM  TAPSE: 1 79 cm    [facility] Accredited Echocardiography Laboratory    Prepared and electronically signed by    Shayla Jacob MD  Signed 26-Apr-2017 12:42:43       No results found for this or any previous visit  No results found for this or any previous visit  No results found for this or any previous visit      Meds/Allergies   current meds:   Current Facility-Administered Medications   Medication Dose Route Frequency    acetaminophen (TYLENOL) tablet 650 mg  650 mg Oral Q6H PRN    amLODIPine (NORVASC) tablet 5 mg  5 mg Oral Daily    aspirin chewable tablet 81 mg  81 mg Oral Daily    atorvastatin (LIPITOR) tablet 5 mg  5 mg Oral Daily    cefepime (MAXIPIME) 2,000 mg in dextrose 5 % 50 mL IVPB  2,000 mg Intravenous Q12H    DULoxetine (CYMBALTA) delayed release capsule 30 mg  30 mg Oral Daily    heparin (porcine) subcutaneous injection 5,000 Units  5,000 Units Subcutaneous Q8H [facility]    insulin glargine (LANTUS) subcutaneous injection 12 Units 0 12 mL  12 Units Subcutaneous HS    insulin lispro (HumaLOG) 100 units/mL subcutaneous injection 1-4 Units  1-4 Units Subcutaneous TID AC    insulin lispro (HumaLOG) 100 units/mL subcutaneous injection 2 Units  2 Units Subcutaneous TID With Meals    Labetalol HCl (NORMODYNE) injection 10 mg  10 mg Intravenous Q6H PRN    levothyroxine tablet 125 mcg  125 mcg Oral Early Morning    loperamide (IMODIUM) capsule 4 mg  4 mg Oral 4x Daily PRN    ondansetron (ZOFRAN) injection 4 mg  4 mg Intravenous Q6H PRN    pantoprazole (PROTONIX) EC tablet 40 mg  40 mg Oral Early Morning    psyllium (METAMUCIL) 1 packet  1 packet Oral Daily    sodium chloride (PF) 0 9 % injection 3 mL  3 mL Intravenous Q1H PRN    sodium chloride 0 9 % infusion  100 mL/hr Intravenous Continuous    tamsulosin (FLOMAX) capsule 0 4 mg  0 4 mg Oral Daily With Dinner     Medications Prior to Admission   Medication    amLODIPine (NORVASC) 5 mg tablet    ascorbic acid (VITAMIN C) 1000 MG tablet    aspirin 81 mg chewable tablet    Atorvastatin Calcium (LIPITOR PO)    CALCIUM CITRATE PO    Cholecalciferol (VITAMIN D3) 2000 units capsule    Continuous Blood Gluc Sensor (DEXCOM G6 SENSOR) MISC    cyanocobalamin (VITAMIN B-12) 1,000 mcg tablet    DULoxetine (CYMBALTA) 30 mg delayed release capsule    ezetimibe (ZETIA) 10 mg tablet    glucose 40 %    glucose 77 4 %    insulin aspart (NovoLOG) 100 units/mL injection    levothyroxine 125 mcg tablet    loperamide (IMODIUM) 2 mg capsule    pantoprazole (PROTONIX) 40 mg tablet    tamsulosin (FLOMAX) 0 4 mg    zinc sulfate (ZINCATE) 220 mg capsule       sodium chloride, 100 mL/hr, Last Rate: 100 mL/hr (03/28/21 2225)      Assessment:  Principal Problem:    Bacteremia  Active Problems:    Hypothyroidism    Type 2 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, left eye (HCC)    Essential hypertension    Stage 3 chronic kidney disease    Elevated troponin    Complete heart block (HCC)    Diarrhea    Weakness    Dehydration with hyponatremia    Plan:    Non MI trop elevation  History of complete heart block s/p PPM implant  Currently on antibiotics for Gram positive gonzalo bacteremia  We will review his echo when completed  Counseling / Coordination of Care  Total floor / unit time spent today 25 minutes  Greater than 50% of total time was spent with the patient and / or family counseling and / or coordination of care  A description of the counseling / coordination of care

## 2021-03-29 NOTE — ASSESSMENT & PLAN NOTE
Loose BM's since COVID infection  Admitted complaining of episode of diarrhea  Metamucil order for bulking (outpatient regimen)  Stool studies are pending, C diff not collected yet  CT abd benign  Stop Imodium p r n  Until infectious etiology of diarrhea ruled out

## 2021-03-29 NOTE — ASSESSMENT & PLAN NOTE
Admitted complaining of generalized weakness increased urinary frequency and diarrhea  Spiking episode of fever of 100 3 on March 27, 2021  Has remained afebrile during the last 24 hours  Blood culture from March 27, 2021 2/2 sets of blood culture growing Gram-positive rods  Urine culture 03/27/2021-negative  Chest x-ray showed no acute cardiopulmonary disease  CT abdomen and pelvis showed no acute intra-abdominal abnormalities  Pulmonary finding compatible with recent COVID-19 pneumonia  (diagnosed on February 5, 2021)  Initially treated with ceftriaxone switched to cefepime  Ordered C difficile toxin by PCR with EIA and Stool enteric bacterial panel by PCR  Infectious disease consult, input appreciated

## 2021-03-29 NOTE — ED PROCEDURE NOTE
PROCEDURE  ECG 12 Lead Documentation Only    Date/Time: 3/29/2021 10:49 AM  Performed by: Elda Hamlin MD  Authorized by: Elda Hamlin MD     Indications / Diagnosis:  Fatigue  ECG reviewed by me, the ED Provider: yes    Patient location:  ED  Previous ECG:     Previous ECG:  Compared to current    Comparison ECG info:  2/5/21    Similarity:  No change    Comparison to cardiac monitor: Yes    Interpretation:     Interpretation: non-specific    Rate:     ECG rate:  97    ECG rate assessment: normal    Rhythm:     Rhythm: paced    Pacing:     Capture:  Complete    Type of pacing:  Ventricular  Ectopy:     Ectopy: none    QRS:     QRS axis:  Right    QRS intervals:   Wide  Conduction:     Conduction: abnormal      Abnormal conduction: complete RBBB and 1st degree    ST segments:     ST segments:  Normal  T waves:     T waves: inverted      Inverted:  AVL, V1 and V2  Q waves:     Q waves:  II, III, aVF, V3, V4, V5 and V6  Other findings:     Other findings: poor R wave progression and prolonged qTc interval    Comments:      Appropriate disconcordence between qrs and st vectors          Elda Hamlin MD  03/29/21 6031

## 2021-03-29 NOTE — ASSESSMENT & PLAN NOTE
Likely non MI troponin elevation  Trop mildly elevated on admission; 0 32<--  0 38<-- 0 47  EKG w/o ST changes  History of complete heart block status post pacemaker implant  Cardiology on board, pending transthoracic echocardiogram  Continue ASA and Lipitor

## 2021-03-29 NOTE — ASSESSMENT & PLAN NOTE
Lab Results   Component Value Date    HGBA1C 7 7 (H) 02/05/2021       Recent Labs     03/28/21  2147 03/29/21  0804 03/29/21  1124 03/29/21  1152   POCGLU 164* 180* 145* 128       Blood Sugar Average: Last 72 hrs:  (P) 120   On insulin pump as an outpatient  Blood glucose currently within acceptable parameters  On current hospitalization endocrinology was consulted  And a recommendations to decreased dose of Lantus to 12 units q h s , Humalog 2 units t i d  With meals, continue sliding scale insulin  Will adjust antihyperglycemic regimen as indicated

## 2021-03-29 NOTE — TELEPHONE ENCOUNTER
Renu Vega called to cancel his 4/1/21 appointment  He just got out of the hospital  He was in for 3 weeks after he got his second COVID shot  He is home now but does not have enough energy to come into the office  He has another appointment for 4/14/21  I asked how his feet are doing and he says they are pretty good right now

## 2021-03-29 NOTE — QUICK NOTE
Notified by treatment team RNJesse regarding hypoxia event- dropped to 84% on RA, subsequently placed on 2 L O2 supplementation via NC  Patient reports feeling cold, notes chronic back pain, otherwise denies acute complaints  Denies fevers, headaches, blurry vision, lightheadedness/dizziness, SOB/ dyspnea, cough, chest pain/palpitations, N/V, acute numbness/tingling/weakness  Phys  Exam:  Vitals 21:39: 98 5F, , RR 24, /75, 94% on 2L O2 via NC  General: patient lying in bed curled up and covered in blankets  alert, awake, and interactive  NAD, not ill-appearing, not diaphoretic  Skin: warm, dry  HEENT: NCAT, PERRLA, EOMIs, normal conjunctiva, no visible signs of cyanosis  CV: RRR, no MRGs  No edema present b/l LEs  Lungs: no respiratory distress, CTA b/l, no WRRs  No cough  Abdomen: soft, non-distended, mild diffuse TTP (patient notes this is chronic)  MSK: ROM intact, moves upper/lower extremities symmetrically  Neuro: A&O x 4, no acute focal deficits, GCS 15  Follows commands  Psych: affect normal, cooperative    Hypoxia  · Suspect likely residual side effect from recent COVID infection (+ 2/19/2021)  · Patient currently asymptomatic- denies SOB/dyspnea, cough, chest pain  · During exam, SpO2 ranged from 88-92 on RA; denied symptoms off O2  · CXR 3/27/2021: "no acute cardiopulmonary disease"  · CT abd/pelvis 3/27/2021: "Partially imaged groundglass opacification in the superior segment of the right lower lobe and adjacent scarring compatible with recent COVID-19 pneumonia "  · CBC 3/28/2021: WBCs 14 52, down from 15 73  Monitor, CBC in AM   · BCs positive for gram + rods  Re-check new set of BCs  · Currently on cefepime 2000 mg IV q 12h    · Currently receiving IVFs for dehydration/hyponatremia- no signs of fluid overload (no lung crackles, no peripheral edema)  · No aspiration risks  · Continue to monitor VS and mental status  · Check CXR pa/lat

## 2021-03-29 NOTE — PLAN OF CARE
Problem: Potential for Falls  Goal: Patient will remain free of falls  Description: INTERVENTIONS:  - Assess patient frequently for physical needs  -  Identify cognitive and physical deficits and behaviors that affect risk of falls    -  [place] fall precautions as indicated by assessment   - Educate patient/family on patient safety including physical limitations  - Instruct patient to call for assistance with activity based on assessment  - Modify environment to reduce risk of injury  - Consider OT/PT consult to assist with strengthening/mobility  Outcome: Progressing     Problem: Prexisting or High Potential for Compromised Skin Integrity  Goal: Skin integrity is maintained or improved  Description: INTERVENTIONS:  - Identify patients at risk for skin breakdown  - Assess and monitor skin integrity  - Assess and monitor nutrition and hydration status  - Monitor labs   - Assess for incontinence   - Turn and reposition patient  - Assist with mobility/ambulation  - Relieve pressure over bony prominences  - Avoid friction and shearing  - Provide appropriate hygiene as needed including keeping skin clean and dry  - Evaluate need for skin moisturizer/barrier cream  - Collaborate with interdisciplinary team   - Patient/family teaching  - Consider wound care consult   Outcome: Progressing     Problem: PAIN - ADULT  Goal: Verbalizes/displays adequate comfort level or baseline comfort level  Description: Interventions:  - Encourage patient to monitor pain and request assistance  - Assess pain using appropriate pain scale  - Administer analgesics based on type and severity of pain and evaluate response  - Implement non-pharmacological measures as appropriate and evaluate response  - Consider cultural and social influences on pain and pain management  - Notify physician/advanced practitioner if interventions unsuccessful or patient reports new pain  Outcome: Progressing     Problem: INFECTION - ADULT  Goal: Absence or prevention of progression during hospitalization  Description: INTERVENTIONS:  - Assess and monitor for signs and symptoms of infection  - Monitor lab/diagnostic results  - Monitor all insertion sites, i e  indwelling lines, tubes, and drains  - Monitor endotracheal if appropriate and nasal secretions for changes in amount and color  - [place] appropriate cooling/warming therapies per order  - Administer medications as ordered  - Instruct and encourage patient and family to use good hand hygiene technique  - Identify and instruct in appropriate isolation precautions for identified infection/condition  Outcome: Progressing  Goal: Absence of fever/infection during neutropenic period  Description: INTERVENTIONS:  - Monitor WBC    Outcome: Progressing     Problem: SAFETY ADULT  Goal: Patient will remain free of falls  Description: INTERVENTIONS:  - Assess patient frequently for physical needs  -  Identify cognitive and physical deficits and behaviors that affect risk of falls    -  [place] fall precautions as indicated by assessment   - Educate patient/family on patient safety including physical limitations  - Instruct patient to call for assistance with activity based on assessment  - Modify environment to reduce risk of injury  - Consider OT/PT consult to assist with strengthening/mobility  Outcome: Progressing  Goal: Maintain or return to baseline ADL function  Description: INTERVENTIONS:  -  Assess patient's ability to carry out ADLs; assess patient's baseline for ADL function and identify physical deficits which impact ability to perform ADLs (bathing, care of mouth/teeth, toileting, grooming, dressing, etc )  - Assess/evaluate cause of self-care deficits   - Assess range of motion  - Assess patient's mobility; develop plan if impaired  - Assess patient's need for assistive devices and provide as appropriate  - Encourage maximum independence but intervene and supervise when necessary  - Involve family in performance of ADLs  - Assess for home care needs following discharge   - Consider OT consult to assist with ADL evaluation and planning for discharge  - Provide patient education as appropriate  Outcome: Progressing  Goal: Maintain or return mobility status to optimal level  Description: INTERVENTIONS:  - Assess patient's baseline mobility status (ambulation, transfers, stairs, etc )    - Identify cognitive and physical deficits and behaviors that affect mobility  - Identify mobility aids required to assist with transfers and/or ambulation (gait belt, sit-to-stand, lift, walker, cane, etc )  - [place] fall precautions as indicated by assessment  - Record patient progress and toleration of activity level on Mobility SBAR; progress patient to next Phase/Stage  - Instruct patient to call for assistance with activity based on assessment  - Consider rehabilitation consult to assist with strengthening/weightbearing, etc   Outcome: Progressing     Problem: DISCHARGE PLANNING  Goal: Discharge to home or other facility with appropriate resources  Description: INTERVENTIONS:  - Identify barriers to discharge w/patient and caregiver  - Arrange for needed discharge resources and transportation as appropriate  - Identify discharge learning needs (meds, wound care, etc )  - Arrange for interpretive services to assist at discharge as needed  - Refer to Case Management Department for coordinating discharge planning if the patient needs post-hospital services based on physician/advanced practitioner order or complex needs related to functional status, cognitive ability, or social support system  Outcome: Progressing     Problem: Nutrition/Hydration-ADULT  Goal: Nutrient/Hydration intake appropriate for improving, restoring or maintaining nutritional needs  Description: Monitor and assess patient's nutrition/hydration status for malnutrition  Collaborate with interdisciplinary team and initiate plan and interventions as ordered  Monitor patient's weight and dietary intake as ordered or per policy  Utilize nutrition screening tool and intervene as necessary  Determine patient's food preferences and provide high-protein, high-caloric foods as appropriate  INTERVENTIONS:  - Monitor oral intake, urinary output, labs, and treatment plans  - Assess nutrition and hydration status and recommend course of action  - Evaluate amount of meals eaten  - Assist patient with eating if necessary   - Allow adequate time for meals  - Recommend/ encourage appropriate diets, oral nutritional supplements, and vitamin/mineral supplements  - Order, calculate, and assess calorie counts as needed  - Recommend, monitor, and adjust tube feedings and TPN/PPN based on assessed needs  - Assess need for intravenous fluids  - Provide specific nutrition/hydration education as appropriate  - Include patient/family/caregiver in decisions related to nutrition  Outcome: Progressing     Problem: Knowledge Deficit  Goal: Patient/family/caregiver demonstrates understanding of disease process, treatment plan, medications, and discharge instructions  Description: Complete learning assessment and assess knowledge base    Interventions:  - Provide teaching at level of understanding  - Provide teaching via preferred learning methods  Outcome: Not Progressing

## 2021-03-29 NOTE — ASSESSMENT & PLAN NOTE
Most likely hypotonic hyponatremia in the setting of poor oral intake and concomitant diarrhea    Continue IV fluids with Na Cl 0 9% at 100 cc/hour  Encourage PO intake

## 2021-03-29 NOTE — ASSESSMENT & PLAN NOTE
Lab Results   Component Value Date    EGFR 60 03/29/2021    EGFR 68 03/28/2021    EGFR 43 03/27/2021    CREATININE 1 18 03/29/2021    CREATININE 1 07 03/28/2021    CREATININE 1 55 (H) 03/27/2021   Cr at baseline  Monitor BUN/Cr  Avoid nephrotoxic agents and hypotension

## 2021-03-29 NOTE — TELEPHONE ENCOUNTER
Noel Burns called because she said Tarik Mckeon is very confused   He has not been in the hospital for three weeks  He did go three times, however, they just admitted him on Saturday, 3/27/21    He is still in the hospital

## 2021-03-29 NOTE — PHYSICAL THERAPY NOTE
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, with long-term current use of insulin ([address] ) [Y96 5658, Z79 4]    Problem List  Patient Active Problem List   Diagnosis    Acid reflux    Hypothyroidism    Anemia    Arthritis    Type 2 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, left eye ([address] )    Diabetic peripheral neuropathy ([address] )    DM type 2, not at goal [facility])    Essential hypertension    Hyperlipidemia    Insulin pump status    Medtronic dual chamber PM    Screening for colon cancer    Encounter for diabetic foot exam ([address] )    Risk for falls    Stage 3 chronic kidney disease    Thrombocytopenia (HCC)    High anion gap metabolic acidosis    Elevated troponin level not due myocardial infarction    Hyponatremia    Low bicarbonate    Pacemaker    Type 2 diabetes mellitus with hyperglycemia (HCC)    Vitreous hemorrhage of left eye (HCC)    Transition of care performed with sharing of clinical summary    Microalbuminuria    Complete heart block (HCC)    Preoperative clearance    Status post split thickness skin graft    Acquired absence of other left toe(s) (HCC)    Type 1 diabetes mellitus ([address] )    Right foot ulcer, with fat layer exposed ([address] )    Personal history of diabetic foot ulcer    COVID-19    Hyperglycemia due to type 1 diabetes mellitus ([address] )    Diarrhea    Weakness    Dehydration with hyponatremia    Gram-positive bacteremia       Past Medical History  Past Medical History:   Diagnosis Date    Arthritis     Cardiac disease     Closed fracture of fibula, lateral malleolus, right     Complete heart block ([address] )     Diabetes mellitus ([address] )     Disease of thyroid gland     External incisional dehiscence     GERD (gastroesophageal reflux disease)     Heart disease     Muscular deconditioning     Pacemaker     Sleep apnea     cannot tolerate cpap    Thyroid disease        Past Surgical History  Past Surgical History:   Procedure Laterality Date    CARDIAC PACEMAKER PLACEMENT Left 2015    CARDIAC PACEMAKER PLACEMENT      CARDIAC SURGERY      CATARACT EXTRACTION      MUSCLE FLAP      right foot    NERVE BLOCK      Transforaminal Epidural Lumbar with Fluoroscopic Guidance     NH COLONOSCOPY FLX DX W/COLLJ SPEC WHEN PFRMD N/A 5/4/2018    Procedure: EGD AND COLONOSCOPY;  Surgeon: Valeria Coley MD;  Location: [facility]  GI LAB; Service: Gastroenterology    NH SPLIT [address] <100 SQCM Right 6/10/2019    Procedure: SKIN GRAFT SPLIT THICKNESS (STSG)  EXTREMITY right leg;  Surgeon: Ruth Ann Carroll DPM;  Location: [facility] OR;  Service: Podiatry    TOE AMPUTATION      left 5th toe        03/29/21 1309   PT Last Visit   PT Visit Date 03/29/21   Note Type   Note type Evaluation   Pain Assessment   Pain Assessment Tool 0-10   Pain Score No Pain   Home Living   Type of [facility] Multi-level;Ramped entrance   [address] [place] [address] chair   Home Equipment Walker;Cane;Stair glide   Additional Comments Pt is a questionable historian  Pt reports living alone  Per CM note, pt resides w/ wife in multi-level house w/ ramped entrance  Pt reports ambulating w/ RW PRN PTA    Prior Function   Level of [place] Independent with ADLs and functional mobility   Lives With Spouse   Receives Help From Family   ADL Assistance Independent   IADLs Independent   Falls in the last 6 months 0   Comments Pt is a questionable historian- unsure how much support pt's spouse can provide at home   Restrictions/Precautions   Weight Bearing Precautions Per Order No   Other Precautions Cognitive; Bed Alarm;Multiple lines;O2;Fall Risk;Contact/isolation  (3L O2)   General   Family/Caregiver Present No   Cognition   Arousal/Participation Alert   Orientation Level Oriented to place;Oriented to person;Disoriented to time;Disoriented to situation   Memory Decreased recall of precautions   Following Commands Follows one step commands with increased time or repetition   Comments Pt appears very lethargic upon PT arrival  Pt requires continuous verbal and tactile stimulation to remain awake  RLE Assessment   RLE Assessment   (grossly 4/5)   LLE Assessment   LLE Assessment   (grossly 4/5)   Coordination   Movements are Fluid and Coordinated 1   Bed Mobility   Supine to Sit 3  Moderate assistance   Additional items Assist x 1;HOB elevated; Increased time required;Verbal cues   Sit to Supine 3  Moderate assistance   Additional items Assist x 1; Increased time required;Verbal cues;LE management   Additional Comments Pt lying supine upon PT arrival  Pt returned lying supine at end of session w/ bed alarm activated and all needs within reach   Transfers   Sit to Stand 3  Moderate assistance   Additional items Assist x 1; Increased time required;Verbal cues   Stand to Sit 3  Moderate assistance   Additional items Assist x 1; Increased time required;Verbal cues   Additional Comments Transfers w/ RW  Pt sat EOB ~3 minutes and required Min A to maintain upright position  Pt is retropulsive in standing   Ambulation/Elevation   Gait pattern Excessively slow; Short stride; Inconsistent rahul;Decreased foot clearance;Shuffling   Gait Assistance 4  Minimal assist   Additional items Assist x 1;Verbal cues   Assistive Device Rolling walker   Distance 10ft x2   Stair Management Assistance Not tested   Balance   Static Sitting Fair -   Dynamic Sitting Poor +   Static Standing Poor +   Dynamic Standing Poor +   Ambulatory Poor +   Endurance Deficit   Endurance Deficit Yes   Endurance Deficit Description weakness, fatigue   Activity Tolerance   Activity Tolerance Patient limited by fatigue Medical Staff Made Aware [facility]   Nurse Made Aware yes   Assessment   Prognosis Fair   Problem List Decreased strength;Decreased endurance; Impaired balance;Decreased mobility   Assessment Pt is 76 y o  male seen for PT evaluation s/p admit to [facility] on 3/27/2021 w/ Gram-positive bacteremia  Pt admitted from home w/ increased weakness  PT consulted to assess pt's functional mobility and d/c needs  Order placed for PT eval and tx, w/ up w/ A order  Comorbidities affecting pt's physical performance at time of assessment include: anemia, Type 2 DM, HTN, CKD, arthritis, hx of pacemaker  Pt is a questionable historian  Pt reports living alone  Per CM note, pt resides w/ spouse in multi-level home w/ ramped entrance  Pt reports ambulating w/ RW PTA  Unknown level of support pt's spouse can provide at home  Personal factors affecting pt at time of IE include: inaccessible home environment, lives in two story house, ambulating w/ assistive device, inability to navigate community distances, decreased initiation and engagement, inability to perform IADLs and inability to perform ADLs  Please find objective findings from PT assessment regarding body systems outlined above with impairments and limitations including weakness, impaired balance, decreased endurance, gait deviations, decreased activity tolerance, decreased functional mobility tolerance, fall risk and decreased cognition  Pt performed bed mobility at Mod A  Pt performed STS at Mod A  Pt ambulated 10ft x2 w/ RW at [address] [place] [address]  The following objective measures performed on IE also reveal limitations: AM-PAC 6-Clicks: 75/07  Pt's clinical presentation is currently unstable/unpredictable seen in pt's presentation of recent admission for bacteremia requiring medical attention, recent decline in function as compared to baseline, multiple lines, fall risk, pain   Pt to benefit from continued PT tx to address deficits as defined above and maximize level of functional independent mobility and consistency  From PT/mobility standpoint, recommendation at time of d/c would be STR pending progress in order to facilitate return to PLOF  Barriers to Discharge Inaccessible home environment;Decreased caregiver support   Goals   Patient Goals to sleep   STG Expiration Date 04/12/21   Short Term Goal #1 1  Pt will demonstrate the ability to perform all aspects of bed mobility at Mod I in onder to maximize functional independence and decrease burden on caregivers  2 Pt will demonstrate the ability to perform all functional transfers at Mod I in order to maximize functional independence and decrease burden on caregivers  3 Pt will demonstrate the ability to ambulate at least 150ft with least restrictive device at Mod I in order to maximize functional independence  4 Pt will demonstrate the ability to negotiate 3 steps at Mod I in order to return to household/community mobility  5 Pt will demonstrate improved balance by one grade in order to decrease risk of falls  6 Pt will increase b/l LE strength by 1 grade in order to increase ease of functional mobility and transfers   PT Treatment Day 0   Plan   Treatment/Interventions Functional transfer training;LE strengthening/ROM; Elevations; Therapeutic exercise; Endurance training;Patient/family training;Equipment eval/education; Bed mobility;Gait training;Spoke to nursing   PT Frequency   (3-5x/wk)   Recommendation   PT Discharge Recommendation Post-Acute Rehabilitation Services   Equipment Recommended Walker   PT - OK to Discharge Yes  (when medically cleared to rehab)   Milena 8 in Bed Without Bedrails 3   Lying on Back to Sitting on Edge of Flat Bed 2   Moving Bed to Chair 2   Standing Up From Chair 2   Walk in Room 3   Climb 3-5 Stairs 2   Basic Mobility Inpatient Raw Score 14   Basic Mobility Standardized Score 35 55   Modified [place] Scale   Modified [place]ia Scale 4   The patient's AM-PAC Basic Mobility Inpatient Short Form Raw Score is 14, Standardized Score is 35 55  A standardized score less than 42 9 suggests the patient may benefit from discharge to post-acute rehabilitation services  Please also refer to the recommendation of the Physical Therapist for safe discharge planning      Theta NAHUN Villagran, MELCHORT

## 2021-03-29 NOTE — ED PROVIDER NOTES
History  Chief Complaint   Patient presents with    Weakness - Generalized     pt presents with worsening weakness/tiredness  seen here on sunday and sent home, was told it was due to the covid vaccine     76 yr male with recent 2nd covid vaccine  Since with increasing fatigue and according to wife sleeping most of day -- pt denies any other comps- no fevers/ uri/cough / no cp/sog/angulo/ no ad pain - no gu/ comps- no rash -- pt has chronic r foot wound - gets wound care - pt is minimally ambulatory at baseline       History provided by:  Patient and spouse   used: No        Prior to Admission Medications   Prescriptions Last Dose Informant Patient Reported? Taking?    Atorvastatin Calcium (LIPITOR PO)  Self Yes No   Sig: Take 5 mg by mouth daily    CALCIUM CITRATE PO  Self Yes No   Sig: Take 2,000 mg by mouth daily    Cholecalciferol (VITAMIN D3) 2000 units capsule  Self Yes No   Sig: Take 2,000 Units by mouth daily   Continuous Blood Gluc Sensor (DEXCOM G6 SENSOR) MISC  Self Yes No   Sig: As directed dx:250 03   DULoxetine (CYMBALTA) 30 mg delayed release capsule  Self Yes No   Sig: Take 30 mg by mouth daily    amLODIPine (NORVASC) 5 mg tablet   No No   Sig: Take 1 tablet (5 mg total) by mouth daily   ascorbic acid (VITAMIN C) 1000 MG tablet   No No   Sig: Take 1 tablet (1,000 mg total) by mouth daily for 4 days   aspirin 81 mg chewable tablet  Self Yes No   Sig: Chew 81 mg daily   cyanocobalamin (VITAMIN B-12) 1,000 mcg tablet  Self Yes No   Sig: Take 2,000 mcg by mouth daily   ezetimibe (ZETIA) 10 mg tablet  Self Yes No   Sig: TAKE 5MG (ONE-HALF TABLET) BY MOUTH DAILY FOR CHOLESTEROL   glucose 40 %  Self Yes No   Sig: Take by mouth once   glucose 77 4 %   Yes No   Sig: TAKE 1/2 TUBE BY MOUTH  AS NEEDED FOR LOW BLOOD SUGAR   insulin aspart (NovoLOG) 100 units/mL injection  Self Yes No   Sig: Inject under the skin Via insulin pump   levothyroxine 125 mcg tablet  Self No No   Sig: Take 1 tablet (125 mcg total) by mouth daily in the early morning   loperamide (IMODIUM) 2 mg capsule   No No   Sig: Take 2 capsules (4 mg total) by mouth 4 (four) times a day as needed for diarrhea   pantoprazole (PROTONIX) 40 mg tablet  Self Yes No   Sig: Take 40 mg by mouth   tamsulosin (FLOMAX) 0 4 mg  Self Yes No   Sig: Take 0 4 mg by mouth daily with dinner     zinc sulfate (ZINCATE) 220 mg capsule   No No   Sig: Take 1 capsule (220 mg total) by mouth daily for 4 doses      Facility-Administered Medications: None       Past Medical History:   Diagnosis Date    Arthritis     Cardiac disease     Closed fracture of fibula, lateral malleolus, right     Complete heart block (HCC)     Diabetes mellitus ([facility] [address] )     Disease of thyroid gland     External incisional dehiscence     GERD (gastroesophageal reflux disease)     Heart disease     Muscular deconditioning     Pacemaker     Sleep apnea     cannot tolerate cpap    Thyroid disease        Past Surgical History:   Procedure Laterality Date    CARDIAC PACEMAKER PLACEMENT Left 2015    CARDIAC PACEMAKER PLACEMENT      CARDIAC SURGERY      CATARACT EXTRACTION      MUSCLE FLAP      right foot    NERVE BLOCK      Transforaminal Epidural Lumbar with Fluoroscopic Guidance     ME COLONOSCOPY FLX DX W/COLLJ SPEC WHEN PFRMD N/A 5/4/2018    Procedure: EGD AND COLONOSCOPY;  Surgeon: Pat Rayo MD;  [facility];   Service: Gastroenterology    ME SPLIT [address] <100 SQCM Right 6/10/2019    Procedure: SKIN GRAFT SPLIT THICKNESS (STSG)  EXTREMITY right leg;  Surgeon: Vincent Tay DPM;  Location:  MAIN OR;  Service: Podiatry    TOE AMPUTATION      left 5th toe       Family History   Problem Relation Age of Onset    Diabetes Mother     Heart disease Mother     Thyroid disease Mother         Disorder     Cancer Brother     Gout Brother     Diabetes Maternal Grandmother      I have reviewed and agree with the history as documented  E-Cigarette/Vaping    E-Cigarette Use Never User      E-Cigarette/Vaping Substances    Nicotine No     THC No     CBD No     Flavoring No     Other No     Unknown No      Social History     Tobacco Use    Smoking status: Former Smoker     Types: Pipe    Smokeless tobacco: Never Used   Substance Use Topics    Alcohol use: Yes     Alcohol/week: 0 0 standard drinks     Frequency: Never     Binge frequency: Never     Comment: socially    Drug use: No       Review of Systems   Constitutional: Positive for activity change and fatigue  Negative for appetite change, chills, diaphoresis, fever and unexpected weight change  HENT: Negative  Eyes: Negative  Respiratory: Negative  Cardiovascular: Negative  Gastrointestinal: Negative  Endocrine: Negative  Genitourinary: Negative  Musculoskeletal: Negative  Skin: Negative  Allergic/Immunologic: Negative  Neurological: Negative  Hematological: Negative  Psychiatric/Behavioral: Negative  Physical Exam  Physical Exam  Vitals signs and nursing note reviewed  Constitutional:       General: He is not in acute distress  Appearance: Normal appearance  He is not ill-appearing, toxic-appearing or diaphoretic  Comments: avss-- pulse ox 97 % on ra- interpretation is normal- no intervention - in nad   HENT:      Head: Normocephalic and atraumatic  Comments: No scalp tenderness/contusion/ hematoma     Right Ear: Tympanic membrane, ear canal and external ear normal  There is no impacted cerumen  Left Ear: Tympanic membrane, ear canal and external ear normal  There is no impacted cerumen  Nose: Nose normal  No congestion or rhinorrhea  Mouth/Throat:      Mouth: Mucous membranes are moist       Pharynx: Oropharynx is clear  No oropharyngeal exudate or posterior oropharyngeal erythema  Eyes:      General: No scleral icterus  Right eye: No discharge  Left eye: No discharge  Extraocular Movements: Extraocular movements intact  Conjunctiva/sclera: Conjunctivae normal       Pupils: Pupils are equal, round, and reactive to light  Comments: Mm pink   Neck:      Musculoskeletal: Normal range of motion and neck supple  No neck rigidity or muscular tenderness  Vascular: No carotid bruit  Comments: No pmt c/t/l/s spine   Cardiovascular:      Rate and Rhythm: Normal rate and regular rhythm  Pulses: Normal pulses  Heart sounds: Normal heart sounds  No murmur  No friction rub  No gallop  Pulmonary:      Effort: Pulmonary effort is normal  No respiratory distress  Breath sounds: Normal breath sounds  No stridor  No wheezing, rhonchi or rales  Chest:      Chest wall: No tenderness  Abdominal:      General: Bowel sounds are normal  There is no distension  Palpations: Abdomen is soft  There is no mass  Tenderness: There is no abdominal tenderness  There is no right CVA tenderness, left CVA tenderness, guarding or rebound  Hernia: No hernia is present  Comments: Soft nt/nd- no hsm- no cva tenderness/ no peritoneal signs- no pulsatile abd mass/bruit/ tenderness   Musculoskeletal: Normal range of motion  General: No swelling, tenderness, deformity or signs of injury  Right lower leg: Edema present  Left lower leg: Edema present  Comments: Equal bilateral radial pulses- 1 plus ble pretibial edema- right foot- healing 4th web space wound- no signs of surrounding infection    Lymphadenopathy:      Cervical: No cervical adenopathy  Skin:     General: Skin is warm  Capillary Refill: Capillary refill takes less than 2 seconds  Coloration: Skin is pale  Skin is not jaundiced  Findings: No bruising, erythema, lesion or rash  Neurological:      General: No focal deficit present  Mental Status: He is alert and oriented to person, place, and time  Mental status is at baseline        Cranial Nerves: No cranial nerve deficit  Sensory: No sensory deficit  Motor: No weakness        Comments: Non focal neuro exam    Psychiatric:         Mood and Affect: Mood normal          Behavior: Behavior normal          Vital Signs  ED Triage Vitals   Temperature Pulse Respirations Blood Pressure SpO2   03/24/21 1628 03/24/21 1627 03/24/21 1627 03/24/21 1627 03/24/21 1627   98 °F (36 7 °C) 100 18 105/60 99 %      Temp Source Heart Rate Source Patient Position - Orthostatic VS BP Location FiO2 (%)   03/24/21 1628 03/24/21 1627 03/24/21 1741 03/24/21 1741 --   Oral Monitor Lying Right arm       Pain Score       --                  Vitals:    03/24/21 1627 03/24/21 1741 03/24/21 2030 03/24/21 2255   BP: 105/60 138/74 156/84 131/65   Pulse: 100 93 86 90   Patient Position - Orthostatic VS:  Lying Lying Lying         Visual Acuity      ED Medications  Medications   lactated ringers bolus 500 mL (0 mL Intravenous Stopped 3/24/21 2037)       Diagnostic Studies  Results Reviewed     Procedure Component Value Units Date/Time    Urine Microscopic [[id number]]  (Abnormal) Collected: 03/24/21 2046    Lab Status: Final result Specimen: Urine, Clean Catch Updated: 03/24/21 2123     RBC, UA 4-10 /hpf      WBC, UA 2-4 /hpf      Epithelial Cells Occasional /hpf      Bacteria, UA Occasional /hpf      Hyaline Casts, UA 2-4 /lpf     UA (URINE) with reflex to Scope [[id number]]  (Abnormal) Collected: 03/24/21 2046    Lab Status: Final result Specimen: Urine, Clean Catch Updated: 03/24/21 2059     Color, UA Yellow     Clarity, UA Clear     Specific Gravity, UA 1 010     pH, UA 5 5     Leukocytes, UA Negative     Nitrite, UA Positive     Protein, UA 30 (1+) mg/dl      Glucose, UA Negative mg/dl      Ketones, UA Trace mg/dl      Urobilinogen, UA 0 2 E U /dl      Bilirubin, UA Negative     Blood, UA Moderate    CK [[id number]]  (Normal) Collected: 03/24/21 1909    Lab Status: Final result Specimen: Blood from Arm, Right Updated: 03/24/21 1946     Total CK 86 U/L     Ammonia [[id number]]  (Normal) Collected: 03/24/21 1909    Lab Status: Final result Specimen: Blood from Arm, Right Updated: 03/24/21 1934     Ammonia 15 umol/L     Blood gas, venous [[id number]]  (Abnormal) Collected: 03/24/21 1909    Lab Status: Final result Specimen: Blood from Arm, Right Updated: 03/24/21 1925     pH, Yusuf 7 460     pCO2, Yusuf 36 3 mm Hg      pO2, Yusuf 182 0 mm Hg      HCO3, Yusuf 25 2 mmol/L      Base Excess, Yusuf 1 5 mmol/L      O2 Content, Yusuf 14 9 ml/dL      O2 HGB, VENOUS 96 4 %     Comprehensive metabolic panel [[id number]]  (Abnormal) Collected: 03/24/21 1643    Lab Status: Final result Specimen: Blood from Arm, Left Updated: 03/24/21 1717     Sodium 129 mmol/L      Potassium 4 5 mmol/L      Chloride 95 mmol/L      CO2 23 mmol/L      ANION GAP 11 mmol/L      BUN 51 mg/dL      Creatinine 1 57 mg/dL      Glucose 276 mg/dL      Calcium 8 5 mg/dL      Corrected Calcium 9 8 mg/dL      AST 23 U/L      ALT 29 U/L      Alkaline Phosphatase 116 U/L      Total Protein 6 7 g/dL      Albumin 2 4 g/dL      Total Bilirubin 0 77 mg/dL      eGFR 42 ml/min/1 73sq m     Narrative:      [place] guidelines for Chronic Kidney Disease (CKD):     Stage 1 with normal or high GFR (GFR > 90 mL/min/1 73 square meters)    Stage 2 Mild CKD (GFR = 60-89 mL/min/1 73 square meters)    Stage 3A Moderate CKD (GFR = 45-59 mL/min/1 73 square meters)    Stage 3B Moderate CKD (GFR = 30-44 mL/min/1 73 square meters)    Stage 4 Severe CKD (GFR = 15-29 mL/min/1 73 square meters)    Stage 5 End Stage CKD (GFR <15 mL/min/1 73 square meters)  Note: GFR calculation is accurate only with a steady state creatinine    Troponin I [[id number]]  (Normal) Collected: 03/24/21 1643    Lab Status: Final result Specimen: Blood from Arm, Left Updated: 03/24/21 1707     Troponin I <0 02 ng/mL     CBC and differential [[id number]]  (Abnormal) Collected: 03/24/21 1643    Lab Status: Final result Specimen: Blood from Arm, Left Updated: 03/24/21 1653     WBC 8 78 Thousand/uL      RBC 3 58 Million/uL      Hemoglobin 10 3 g/dL      Hematocrit 31 5 %      MCV 88 fL      MCH 28 8 pg      MCHC 32 7 g/dL      RDW 14 2 %      MPV 9 1 fL      Platelets 96 Thousands/uL      nRBC 0 /100 WBCs      Neutrophils Relative 78 %      Immat GRANS % 1 %      Lymphocytes Relative 6 %      Monocytes Relative 15 %      Eosinophils Relative 0 %      Basophils Relative 0 %      Neutrophils Absolute 6 88 Thousands/µL      Immature Grans Absolute 0 04 Thousand/uL      Lymphocytes Absolute 0 49 Thousands/µL      Monocytes Absolute 1 35 Thousand/µL      Eosinophils Absolute 0 00 Thousand/µL      Basophils Absolute 0 02 Thousands/µL                  No orders to display              Procedures  Procedures         ED Course  ED Course as of Mar 29 1040   Wed Mar 24, 2021   1842 - er md note- recent er vitis- labs reviewed by er md                                 SBIRT 22yo+      Most Recent Value   SBIRT (25 yo +)   In order to provide better care to our patients, we are screening all of our patients for alcohol and drug use  Would it be okay to ask you these screening questions? Unable to answer at this time Filed at: 03/24/2021 1926                    MDM    Disposition  Final diagnoses:   Fatigue     Time reflects when diagnosis was documented in both MDM as applicable and the Disposition within this note     Time User Action Codes Description Comment    3/24/2021 10:45 PM Sigifredo Fang Add [R53 83] Fatigue       ED Disposition     ED Disposition Condition Date/Time Comment    Discharge Stable Wed Mar 24, 2021 [address] discharge to home/self care              Follow-up Information    None         Discharge Medication List as of 3/24/2021 10:48 PM      CONTINUE these medications which have NOT CHANGED    Details   amLODIPine (NORVASC) 5 mg tablet Take 1 tablet (5 mg total) by mouth daily, Starting Tue 3/2/2021, Normal      ascorbic acid (VITAMIN C) 1000 MG tablet Take 1 tablet (1,000 mg total) by mouth daily for 4 days, Starting Tue 2/9/2021, Until Sat 2/13/2021, Normal      aspirin 81 mg chewable tablet Chew 81 mg daily, Historical Med      Atorvastatin Calcium (LIPITOR PO) Take 5 mg by mouth daily , Historical Med      CALCIUM CITRATE PO Take 2,000 mg by mouth daily , Starting Fri 11/3/2017, Historical Med      Cholecalciferol (VITAMIN D3) 2000 units capsule Take 2,000 Units by mouth daily, Historical Med      Continuous Blood Gluc Sensor (DEXCOM G6 SENSOR) MISC As directed dx:250 03, Historical Med      cyanocobalamin (VITAMIN B-12) 1,000 mcg tablet Take 2,000 mcg by mouth daily, Historical Med      DULoxetine (CYMBALTA) 30 mg delayed release capsule Take 30 mg by mouth daily , Historical Med      ezetimibe (ZETIA) 10 mg tablet TAKE 5MG (ONE-HALF TABLET) BY MOUTH DAILY FOR CHOLESTEROL, Historical Med      glucose 40 % Take by mouth once, Historical Med      glucose 77 4 % TAKE 1/2 TUBE BY MOUTH  AS NEEDED FOR LOW BLOOD SUGAR, Historical Med      insulin aspart (NovoLOG) 100 units/mL injection Inject under the skin Via insulin pump, Historical Med      levothyroxine 125 mcg tablet Take 1 tablet (125 mcg total) by mouth daily in the early morning, Starting Tue 11/26/2019, No Print      loperamide (IMODIUM) 2 mg capsule Take 2 capsules (4 mg total) by mouth 4 (four) times a day as needed for diarrhea, Starting Mon 2/8/2021, Normal      pantoprazole (PROTONIX) 40 mg tablet Take 40 mg by mouth, Historical Med      tamsulosin (FLOMAX) 0 4 mg Take 0 4 mg by mouth daily with dinner  , Historical Med      zinc sulfate (ZINCATE) 220 mg capsule Take 1 capsule (220 mg total) by mouth daily for 4 doses, Starting Tue 2/9/2021, Until Sat 2/13/2021, Normal           Outpatient Discharge Orders   Basic metabolic panel   Standing Status: Future Standing Exp   Date: 03/24/22       PDMP Review     None          ED Provider  Electronically Signed by Nicola Bello MD  03/29/21 7229

## 2021-03-29 NOTE — CONSULTS
Consultation - Infectious Disease   Dov Alba 76 y o  male MRN: [id number]  Unit/Bed#: [facility] 920-01 Encounter: [id number]      IMPRESSION & RECOMMENDATIONS:   Impression/Recommendations: This is a 76 y o  male, with multiple medical problems, was hospitalized for 3 days back in early February with COVID  Patient has persistent generalized fatigue and now diarrhea  He now has GPR growing in both admission blood cultures  1  Bacteremia  There was growth of GPR on what looks like 2 admission blood cultures  However, on further review, it appears that only 1 blood culture was drawn at the same time and same site, but inoculated 2 different sets  Patient is clinically and systemically well, without evidence of active infection clinically  I suspect this is contaminated blood draw  With patient clinically well, will observe him off antibiotic to avoid potential antibiotic toxicities  Discontinue cefepime and observe patient off antibiotic  Follow-up on final blood culture results  Follow-up on repeat blood culture results  Monitor temperature/WBC  Monitor hemodynamics  2  Loose stool, POA  This is most likely secondary to prolonged COVID symptoms  This has improved with conservative management  Abdominal exam is benign  Abdomen/pelvis CT is benign  Monitor for recurrent symptoms  3  Hypernatremia secondary to dehydration  This is improved with IV fluid hydration  IV fluid hydration per primary service  4  Recent COVID-19  Patient completed treatment course with remdesivir and systemic corticosteroid  CXR and CT with mild residual ground-glass opacities  Patient is minimal respiratory symptoms  No further specific COVID treatment necessary  No isolation necessary at this point  5  Mild leukocytosis  No obvious active infection  WBC remains stable in the mid teens  Monitor WBC  6  KAZ, POA, superimposed on CKD  This is likely secondary to prerenal state from dehydration  Creatinine is now at baseline  Monitor creatinine  7  DM, somewhat poorly controlled, with hyperglycemia and elevated hemoglobin A1c on admission  Management per primary service  Previous hospitalization records reviewed in detail  Discussed with patient in detail regarding the above plan  Discussed with slim service  Thank you for this consultation  We will follow along with you  HISTORY OF PRESENT ILLNESS:  Reason for Consult:  Bacteremia  HPI: Joana Cross is a 76 y o  male, with multiple medical problems, was admitted here in early February with COVID  He was treated with a course of remdesivir and systemic corticosteroid and did well and was discharged home after 72 hours of hospitalization  Patient states that his energy has not improved  He still has significant generalized weakness and he sleeps a lot  He also started having loose stools with increased urination but no dysuria  For these reasons, he came back to the ER on 03/27 for evaluation  On presentation, patient had mild leukocytosis but no fever  He was admitted for workup of his diarrhea and for IV fluid hydration  Patient was given ceftriaxone admission, transition to cefepime  Blood cultures were obtained  Both sets on admission are now growing GPR  For all these reasons, we are asked to evaluate the patient  At present, patient states that he is still quite weak generally  No focal weakness  No abdominal or flank pain  No dysuria  No dyspnea/cough  Loose stool has improved  REVIEW OF SYSTEMS:  A complete system-based review was done  Except for what is noted in HPI above, ROS of systems is otherwise negative      PAST MEDICAL HISTORY:  Past Medical History:   Diagnosis Date    Arthritis     Cardiac disease     Closed fracture of fibula, lateral malleolus, right     Complete heart block ([address] )     Diabetes mellitus ([address] )     Disease of thyroid gland     External incisional dehiscence     GERD (gastroesophageal reflux disease)     Heart disease     Muscular deconditioning     Pacemaker     Sleep apnea     cannot tolerate cpap    Thyroid disease      Past Surgical History:   Procedure Laterality Date    CARDIAC PACEMAKER PLACEMENT Left 2015    CARDIAC PACEMAKER PLACEMENT      CARDIAC SURGERY      CATARACT EXTRACTION      MUSCLE FLAP      right foot    NERVE BLOCK      Transforaminal Epidural Lumbar with Fluoroscopic Guidance     ID COLONOSCOPY FLX DX W/COLLJ SPEC WHEN PFRMD N/A 2018    Procedure: EGD AND COLONOSCOPY;  Surgeon: Wesley Jama MD;  [facility]; Service: Gastroenterology    ID SPLIT [address] <100 SQCM Right 6/10/2019    Procedure: SKIN GRAFT SPLIT THICKNESS (STSG)  EXTREMITY right leg;  Surgeon: Denia Albright DPM;  Location:  MAIN OR;  Service: Podiatry    TOE AMPUTATION      left 5th toe     Problem list reviewed  FAMILY HISTORY:  Non-contributory    SOCIAL HISTORY:  Social History     Substance and Sexual Activity   Alcohol Use Yes    Alcohol/week: 0 0 standard drinks    Frequency: Never    Binge frequency: Never    Comment: socially     Social History     Substance and Sexual Activity   Drug Use No     Social History     Tobacco Use   Smoking Status Former Smoker    Types: Pipe   Smokeless Tobacco Never Used       ALLERGIES:  Allergies   Allergen Reactions    Contrast  [Iodinated Diagnostic Agents]     Lipitor  [Atorvastatin Calcium] Drowsiness    Ibuprofen      sensitive    Iodine Rash    Simvastatin Anxiety     Other reaction(s): Malaise (finding)       MEDICATIONS:  All current active medications have been reviewed  Patient is currently on IV cefepime      PHYSICAL EXAM:  Vitals:  Temp:  [97 7 °F (36 5 °C)-98 5 °F (36 9 °C)] 98 5 °F (36 9 °C)  HR:  [] 99  Resp:  [18-24] 22  BP: (125-137)/(75-77) 137/77  SpO2:  [92 %-94 %] 94 %  Temp (24hrs), Av 1 °F (36 7 °C), Min:97 7 °F (36 5 °C), Max:98 5 °F (36 9 °C)  Current: Temperature: 98 5 °F (36 9 °C)     Physical Exam:  General:  Well-nourished, well-developed, chronically ill-appearing, in no acute distress  Awake, alert and oriented x 3  Eyes:  Conjunctive clear with no hemorrhages or effusions  Oropharynx:  No ulcers, no lesions, pharynx benign, no tonsillitis  Neck:  Supple, no lymphadenopathy, no mass, nontender  Lungs:  Expansion symmetric, no rales, no wheezing, no accessory muscle use  Cardiac:  Regular rate and rhythm, normal S1, normal S2, no murmurs  Abdomen:  Soft, nondistended, non-tender, no HSM  Extremities:  No edema, no erythema, nontender  No ulcers  Skin:  No rashes, no ulcers  Neurological:  Moves all four extremities spontaneously, sensation grossly intact    LABS, IMAGING, & OTHER STUDIES:  Lab Results:  I have personally reviewed pertinent labs  Results from last 7 days   Lab Units 03/29/21  0616 03/28/21  0631 03/27/21  1619 03/24/21  1643   POTASSIUM mmol/L 4 8 4 2 4 7 4 5   CHLORIDE mmol/L 101 104 96* 95*   CO2 mmol/L 24 24 24 23   BUN mg/dL 27* 30* 46* 51*   CREATININE mg/dL 1 18 1 07 1 55* 1 57*   EGFR ml/min/1 73sq m 60 68 43 42   CALCIUM mg/dL 7 9* 7 9* 8 7 8 5   AST U/L  --   --  20 23   ALT U/L  --   --  24 29   ALK PHOS U/L  --   --  145* 116     Results from last 7 days   Lab Units 03/29/21  0616 03/28/21  0631 03/27/21  1619   WBC Thousand/uL 16 85* 14 52* 15 73*   HEMOGLOBIN g/dL 8 9* 9 5* 9 8*   PLATELETS Thousands/uL 203 198 196     Results from last 7 days   Lab Units 03/29/21  0624 03/29/21  0615 03/27/21  1815 03/27/21  1752   BLOOD CULTURE  Received in Microbiology Lab  Culture in Progress  Received in Microbiology Lab  Culture in Progress  --   --    GRAM STAIN RESULT   --   --   --  Gram positive rods*  Gram positive rods*   URINE CULTURE   --   --  <10,000 cfu/ml   --        Imaging Studies:   I have personally reviewed pertinent imaging study reports and images in PACS  CXR reviewed personally    Possible RLL infiltrate but no consolidation  Abdomen/pelvis CT reviewed personally  RLL ground-glass opacities  No consolidations  No intra-abdominal pathology  EKG, Pathology, and Other Studies:   I have personally reviewed pertinent reports

## 2021-03-29 NOTE — PROGRESS NOTES
Progress Note - Maria Luz Caro 76 y o  male MRN: [id number]    [facility] Encounter: [id number]      CC: diabetes f/u    Subjective:   Maria Luz Caro is a 76y o  year old male with type 1 diabetes admitted with dehydration, diarrhea and suspected UTI  No hypoglycemia  Objective:     Vitals: Blood pressure 137/77, pulse 99, temperature 98 5 °F (36 9 °C), temperature source Oral, resp  rate 22, height 5' 8" (1 727 m), weight 72 kg (158 lb 11 7 oz), SpO2 94 %  ,Body mass index is 24 14 kg/m²  Intake/Output Summary (Last 24 hours) at 3/29/2021 1155  Last data filed at 3/29/2021 0449  Gross per 24 hour   Intake 2615 ml   Output 250 ml   Net 2365 ml       Physical Exam:  General Appearance: awake, appears stated age and cooperative  Head: Normocephalic, without obvious abnormality, atraumatic  Extremities: moves all extremities  Skin: Skin color and temperature normal    Pulm: no labored breathing    Lab, Imaging and other studies: I have personally reviewed pertinent reports  POC Glucose (mg/dl)   Date Value   03/29/2021 128   03/29/2021 145 (H)   03/29/2021 180 (H)   03/28/2021 164 (H)   03/28/2021 48 (L)   03/28/2021 56 (L)   03/28/2021 85   03/28/2021 93   03/28/2021 93   03/28/2021 84       Assessment/Plan:    Type 1 diabetes:  Most recent O6Y 3 3%, complicated with retinopathy, nephropathy, neuropathy, gastroparesis in PAD with history of amputation  Patient is on insulin pump therapy at home  Currently he is on Lantus 12 units q h s  And Humalog 2 units with meals  Will continue current regimen and adjust as needed  Patient is asked to bring his pump parts  Tandem insulin pump settings:  Basal rate 0 5 unit/h  Insulin to carb ratio 25  Insulin sensitivity factor 80  Target blood glucose 140    Patient is using basal and control IQ program       Hypothyroidism:  Continue levothyroxine at current dose and repeat TFTs as an outpatient in 4-6 weeks      Rest of care per primary team     Portions of the record may have been created with voice recognition software

## 2021-03-30 PROBLEM — S32.019A L1 VERTEBRAL FRACTURE (HCC): Status: ACTIVE | Noted: 2021-01-01

## 2021-03-30 NOTE — CONSULTS
[address] 1945, 76 y o  male MRN: [id number]  Unit/Bed#: [facility] 920-01 Encounter: [id number]  Primary Care Provider: Lamount Cogan, MD   Date and time admitted to hospital: 3/27/2021  3:35 PM    Inpatient consult to Neurosurgery  Consult performed by: Merry Krueger PA-C  Consult ordered by: Kit Harden MD      consult completed on 3/30/2021 at 1515    L1 vertebral fracture [facility])  Assessment & Plan  Patient presented on 3/27 with generalized weakness, complaints of progressively worsening back pain, found to have worsening L1 compression fracture, TLICS 1  · Hx osteoporosis as evidenced on DEXA scan completed in 2018  · Known prior L1 compression fracture s/p fall in 2017, no intervention at that time    Imaging reviewed personally and with attending, results are as follows:   CT abdomen pelvis wo contrast 3/27/2021:  Significant interval loss of L1 vertebral body height consistent with progression of 1 stable chronic fracture  No significant fracture fragment retropulsion  Transitional S1 vertebra  Plan:   Will offer patient conservative management for fracture  His complains of on and off back pain for years but states it is getting worse over the last few weeks, no new falls but is feeling more off balance, wife endorses worsening back pain    Exam with full strength, peripheral neuropathy from DM, no red flag signs, subjective tingling in lumbar spine to palpation  o Can have TLSO brace when OOB and HOB greater than 45 degrees  o Will order upright lumbar spine XR to be completed in brace  o Do not anticipate that patient will need to wear the brace for an extended period of time - fracture does appear worse when compared to imaging in 2017 however it appears chronic    Medical management and pain control per primary team   DVT ppx:  SCDs, heparin   Mobilize as tolerated with assistance, PT / OT evaluation    Neurosurgery will follow as needed during hospitalization  Outpatient follow up in 2 weeks with repeat upright imaging  Please call with questions or concerns, signed off      Weakness  Assessment & Plan  · Thought to be due to recent COVID infection back in 2/2021    Diarrhea  Assessment & Plan  · Presented with loose stools with associated incontinence since COVID infection  · Improving currently    Complete heart block [facility])  Assessment & Plan  Cardiology following, appreciate input  · S/p PPM    Elevated troponin level not due myocardial infarction  Assessment & Plan  Cardiology following, appreciate input    Stage 3 chronic kidney disease  Assessment & Plan  Lab Results   Component Value Date    EGFR 69 03/30/2021    EGFR 60 03/29/2021    EGFR 68 03/28/2021    CREATININE 1 05 03/30/2021    CREATININE 1 18 03/29/2021    CREATININE 1 07 03/28/2021     Continue management per primary team    Essential hypertension  Assessment & Plan  Continue management per primary team    Type 2 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, left eye [facility])  Assessment & Plan  Lab Results   Component Value Date    HGBA1C 7 7 (H) 02/05/2021       Recent Labs     03/30/21  0623 03/30/21  0712 03/30/21  0753 03/30/21  1153   POCGLU 42* 123 105 52*       Blood Sugar Average: Last 72 hrs:  (P) 113 [id number]     Endocrine following, appreciate input  · Insulin pump broken, family will attempt to get another pump from home    * Gram-positive bacteremia  Assessment & Plan  ID following, appreciate input  · Thought to be contaminated culture samples  · Monitoring off abx for now      History of Present Illness   HPI: Lien Castro is a 76y o  year old male with PMH including complete heart block s/p PPM, diabetes mellitus on insulin pump, disease of the thyroid, osteoporosis, recent COVID infection who presented originally on 3/27 with generalized weakness, decreased oral intake, loose stools  Was complaining of worsening back pain with imaging significant for L1 compression fracture that had worsened  Patient states he had a fall back in 2017 and was noted to have a fracture in his back however at that time he was not offered any brace or intervention  He reports intermittent back pain over the years but states recently his back pain has worsened  Wife is present and endorses this  He has not had any recent traumas but states he feels off balance  No radicular symptoms  Has baseline peripheral neuropathy but denies any new or worsening symptoms  Had some loose stools with incontinence prior to arrival but states since being in the hospital he is feeling much better, no urinary incontinence or saddle anesthesia  On arrival to the hospital, patient was complaining of generalized weakness, loose stools, poor oral intake, poor blood sugar control with insulin pump, was found to have elevated troponins in the ED  He has been weak since the COVID infection, sleeping often  He was spiking fevers and blood cultures were obtained and found to be positive - ID is on board and feel this is contaminate, currently not on any abx therapy  Cardiology is following along for complete heart block and elevated troponins  Endocrinology following for insulin management  Review of Systems   Constitutional: Negative for chills and fever  HENT: Negative for hearing loss and trouble swallowing  Eyes: Negative for visual disturbance  Respiratory: Negative for chest tightness and shortness of breath  Cardiovascular: Negative for chest pain  Gastrointestinal: Positive for diarrhea (improving)  Negative for abdominal pain, constipation, nausea and vomiting  Genitourinary: Negative for difficulty urinating  Musculoskeletal: Positive for back pain and gait problem  Negative for neck pain  Skin: Negative for wound     Neurological: Positive for weakness and numbness (peripheral neuropathy)  Negative for dizziness, facial asymmetry, speech difficulty and headaches  Hematological: Does not bruise/bleed easily  Psychiatric/Behavioral: Negative for confusion  Historical Information   Past Medical History:   Diagnosis Date    Arthritis     Cardiac disease     Closed fracture of fibula, lateral malleolus, right     Complete heart block (HCC)     Diabetes mellitus ([address] )     Disease of thyroid gland     External incisional dehiscence     GERD (gastroesophageal reflux disease)     Heart disease     Muscular deconditioning     Pacemaker     Sleep apnea     cannot tolerate cpap    Thyroid disease      Past Surgical History:   Procedure Laterality Date    CARDIAC PACEMAKER PLACEMENT Left 2015    CARDIAC PACEMAKER PLACEMENT      CARDIAC SURGERY      CATARACT EXTRACTION      MUSCLE FLAP      right foot    NERVE BLOCK      Transforaminal Epidural Lumbar with Fluoroscopic Guidance     ME COLONOSCOPY FLX DX W/COLLJ SPEC WHEN PFRMD N/A 5/4/2018    Procedure: EGD AND COLONOSCOPY;  Surgeon: Simran Colmenares MD;  [facility];   Service: Gastroenterology    ME SPLIT [address] <100 SQCM Right 6/10/2019    Procedure: SKIN GRAFT SPLIT THICKNESS (STSG)  EXTREMITY right leg;  Surgeon: Leslie Oro DPM;  Location: [facility];  Service: Podiatry    TOE AMPUTATION      left 5th toe     Social History     Substance and Sexual Activity   Alcohol Use Yes    Alcohol/week: 0 0 standard drinks    Frequency: Never    Binge frequency: Never    Comment: socially     Social History     Substance and Sexual Activity   Drug Use No     Social History     Tobacco Use   Smoking Status Former Smoker    Types: Pipe   Smokeless Tobacco Never Used     Family History   Problem Relation Age of Onset    Diabetes Mother     Heart disease Mother     Thyroid disease Mother         Disorder     Cancer Brother     Gout Brother     Diabetes Maternal Grandmother Meds/Allergies   all current active meds have been reviewed, current meds:   Current Facility-Administered Medications   Medication Dose Route Frequency    acetaminophen (TYLENOL) tablet 650 mg  650 mg Oral Q6H PRN    aspirin chewable tablet 81 mg  81 mg Oral Daily    atorvastatin (LIPITOR) tablet 5 mg  5 mg Oral Daily    DULoxetine (CYMBALTA) delayed release capsule 30 mg  30 mg Oral Daily    heparin (porcine) subcutaneous injection 5,000 Units  5,000 Units Subcutaneous Q8H [address]    insulin glargine (LANTUS) subcutaneous injection 6 Units 0 06 mL  6 Units Subcutaneous HS    insulin lispro (HumaLOG) 100 units/mL subcutaneous injection 1-4 Units  1-4 Units Subcutaneous TID AC    Labetalol HCl (NORMODYNE) injection 10 mg  10 mg Intravenous Q6H PRN    levothyroxine tablet 125 mcg  125 mcg Oral Early Morning    [START ON 3/31/2021] lisinopril (ZESTRIL) tablet 5 mg  5 mg Oral Daily    ondansetron (ZOFRAN) injection 4 mg  4 mg Intravenous Q6H PRN    pantoprazole (PROTONIX) EC tablet 40 mg  40 mg Oral Early Morning    psyllium (METAMUCIL) 1 packet  1 packet Oral Daily    sodium chloride (PF) 0 9 % injection 3 mL  3 mL Intravenous Q1H PRN    tamsulosin (FLOMAX) capsule 0 4 mg  0 4 mg Oral Daily With Dinner    and PTA meds:   Prior to Admission Medications   Prescriptions Last Dose Informant Patient Reported? Taking?    Atorvastatin Calcium (LIPITOR PO)  Self Yes No   Sig: Take 5 mg by mouth daily    CALCIUM CITRATE PO  Self Yes No   Sig: Take 2,000 mg by mouth daily    Cholecalciferol (VITAMIN D3) 2000 units capsule  Self Yes No   Sig: Take 2,000 Units by mouth daily   Continuous Blood Gluc Sensor (DEXCOM G6 SENSOR) MISC  Self Yes No   Sig: As directed dx:250 03   DULoxetine (CYMBALTA) 30 mg delayed release capsule  Self Yes No   Sig: Take 30 mg by mouth daily    amLODIPine (NORVASC) 5 mg tablet   No No   Sig: Take 1 tablet (5 mg total) by mouth daily   ascorbic acid (VITAMIN C) 1000 MG tablet   No No Sig: Take 1 tablet (1,000 mg total) by mouth daily for 4 days   aspirin 81 mg chewable tablet  Self Yes No   Sig: Chew 81 mg daily   cyanocobalamin (VITAMIN B-12) 1,000 mcg tablet  Self Yes No   Sig: Take 2,000 mcg by mouth daily   ezetimibe (ZETIA) 10 mg tablet  Self Yes No   Sig: TAKE 5MG (ONE-HALF TABLET) BY MOUTH DAILY FOR CHOLESTEROL   glucose 40 %  Self Yes No   Sig: Take by mouth once   glucose 77 4 %   Yes No   Sig: TAKE 1/2 TUBE BY MOUTH  AS NEEDED FOR LOW BLOOD SUGAR   insulin aspart (NovoLOG) 100 units/mL injection  Self Yes No   Sig: Inject under the skin Via insulin pump   levothyroxine 125 mcg tablet  Self No No   Sig: Take 1 tablet (125 mcg total) by mouth daily in the early morning   loperamide (IMODIUM) 2 mg capsule   No No   Sig: Take 2 capsules (4 mg total) by mouth 4 (four) times a day as needed for diarrhea   pantoprazole (PROTONIX) 40 mg tablet  Self Yes No   Sig: Take 40 mg by mouth   tamsulosin (FLOMAX) 0 4 mg  Self Yes No   Sig: Take 0 4 mg by mouth daily with dinner     zinc sulfate (ZINCATE) 220 mg capsule   No No   Sig: Take 1 capsule (220 mg total) by mouth daily for 4 doses      Facility-Administered Medications: None     Allergies   Allergen Reactions    Contrast  [Iodinated Diagnostic Agents]     Lipitor  [Atorvastatin Calcium] Drowsiness    Ibuprofen      sensitive    Iodine - Food Allergy Rash    Simvastatin Anxiety     Other reaction(s): Malaise (finding)       Objective   I/O       03/28 0701 - 03/29 0700 03/29 0701 - 03/30 0700 03/30 0701 - 03/31 0700    P  O  1320 280 340    I V  (mL/kg) 1930 (26 8) 1563 3 (21 7) 1301 7 (18 1)    IV Piggyback  100     Total Intake(mL/kg) 3250 (45 1) 1943 3 (27) 1641 7 (22 8)    Urine (mL/kg/hr) 250 (0 1) 325 (0 2)     Total Output 250 325     Net +3000 +1618 3 +1641 7           Unmeasured Urine Occurrence 2 x            Physical Exam  Constitutional:       General: He is awake  Appearance: Normal appearance     HENT:      Head: Normocephalic and atraumatic  Eyes:      Extraocular Movements: EOM normal       Conjunctiva/sclera: Conjunctivae normal    Neck:      Musculoskeletal: No spinous process tenderness or muscular tenderness  Cardiovascular:      Rate and Rhythm: Regular rhythm  Pulmonary:      Effort: Pulmonary effort is normal  No respiratory distress  Musculoskeletal:      Cervical back: He exhibits no tenderness  Thoracic back: He exhibits no tenderness  Comments: Reports tingling to palpation in the lumbar spine   Skin:     General: Skin is warm and dry  Findings: Bruising (left arm) present  Neurological:      Mental Status: He is alert and oriented to person, place, and time  Coordination: Finger-Nose-Finger Test normal       Deep Tendon Reflexes: Strength normal    Psychiatric:         Attention and Perception: Attention and perception normal          Mood and Affect: Mood and affect normal          Speech: Speech normal          Behavior: Behavior normal  Behavior is cooperative  Thought Content: Thought content normal          Cognition and Memory: Cognition and memory normal          Judgment: Judgment normal        Neurologic Exam     Mental Status   Oriented to person, place, and time  Follows 1 step commands  Attention: normal  Concentration: normal    Speech: speech is normal   Level of consciousness: alert  Knowledge: good  Normal comprehension       Cranial Nerves     CN III, IV, VI   Extraocular motions are normal    CN III: no CN III palsy  CN VI: no CN VI palsy  Nystagmus: none   Diplopia: none  Ophthalmoparesis: none  Upgaze: normal  Downgaze: normal  Conjugate gaze: present    CN V   Right facial sensation deficit: none  Left facial sensation deficit: none    CN VII   Right facial weakness: none  Left facial weakness: none    CN VIII   Hearing: intact    CN IX, X   CN IX normal    CN X normal      CN XI   Right trapezius strength: normal  Left trapezius strength: normal    CN XII   CN XII normal      Motor Exam   Muscle bulk: normal  Overall muscle tone: normal  Right arm pronator drift: absent  Left arm pronator drift: absent    Strength   Strength 5/5 throughout  Sensory Exam   Light touch normal      Gait, Coordination, and Reflexes     Coordination   Finger to nose coordination: normal    Tremor   Resting tremor: absent  Intention tremor: absent  Action tremor: absent    Reflexes   Right : 2+  Left : 2+  Right Huerta: absent  Left Huerta: absent  Right ankle clonus: absent  Left ankle clonus: absent      Vitals:Blood pressure 126/69, pulse 89, temperature 98 2 °F (36 8 °C), resp  rate 20, height 5' 8" (1 727 m), weight 72 kg (158 lb 11 7 oz), SpO2 (!) 88 %  ,Body mass index is 24 14 kg/m²  Lab Results:   Results from last 7 days   Lab Units 03/30/21  0645 03/29/21  0616 03/28/21  0631 03/27/21  1619 03/24/21  1643   WBC Thousand/uL 14 15* 16 85* 14 52* 15 73* 8 78   HEMOGLOBIN g/dL 8 2* 8 9* 9 5* 9 8* 10 3*   HEMATOCRIT % 25 0* 27 3* 28 9* 29 4* 31 5*   PLATELETS Thousands/uL 209 203 198 196 96*   NEUTROS PCT %  --  83*  --  82* 78*   MONOS PCT %  --  12  --  10 15*     Results from last 7 days   Lab Units 03/30/21  0645 03/29/21  0616 03/28/21  0631 03/27/21  1619 03/24/21  1643   POTASSIUM mmol/L 3 9 4 8 4 2 4 7 4 5   CHLORIDE mmol/L 105 101 104 96* 95*   CO2 mmol/L 22 24 24 24 23   BUN mg/dL 22 27* 30* 46* 51*   CREATININE mg/dL 1 05 1 18 1 07 1 55* 1 57*   CALCIUM mg/dL 7 3* 7 9* 7 9* 8 7 8 5   ALK PHOS U/L  --   --   --  145* 116   ALT U/L  --   --   --  24 29   AST U/L  --   --   --  20 23       Imaging Studies: I have personally reviewed pertinent reports  and I have personally reviewed pertinent films in PACS    Ct Abdomen Pelvis Wo Contrast    Result Date: 3/27/2021  Impression: 1  Partially imaged groundglass opacification in the superior segment of the right lower lobe and adjacent scarring compatible with recent COVID-19 pneumonia   2   No evidence of acute intra-abdominal or pelvic process  Workstation performed: [id number]     X-ray Chest 1 View Portable    Result Date: 3/28/2021  Impression: No acute cardiopulmonary disease  Workstation performed: [id number]     Xr Chest Pa & Lateral    Result Date: 3/29/2021  Impression: Right lower lobe pneumonia corresponding with the abdomen CT with small effusions  Workstation performed: [id number]     EKG, Pathology, and Other Studies: I have personally reviewed pertinent reports  VTE Prophylaxis: Sequential compression device (Venodyne)  and Heparin    Code Status: Level 1 - Full Code  Advance Directive and Living Will:      Power of :    POLST:      Counseling / Coordination of Care  I spent 20 minutes with the patient

## 2021-03-30 NOTE — ASSESSMENT & PLAN NOTE
Most likely hypotonic hyponatremia in the setting of poor oral intake and concomitant diarrhea  Serum sodium improved to 133 this morning  Patient has been positive 4 L since admission, will discontinue IV fluids    Encourage PO intake

## 2021-03-30 NOTE — ASSESSMENT & PLAN NOTE
Admitted complaining of generalized weakness increased urinary frequency and diarrhea  Spiking episode of fever of 100 3 on March 27, 2021  Has remained afebrile during the last 48  hours  Blood culture from March 27, 2021 2/2 sets of blood culture growing Gram-positive rods ( Corynebacterium striatum groupAbnormal )  Urine culture 03/27/2021-negative  Follow-up Chest x-ray 3/29 showed right lower lobe consolidation likely residual from COVID-19 pneumonia on February  CT abdomen and pelvis showed no acute intra-abdominal abnormalities  Pulmonary finding compatible with recent COVID-19 pneumonia  (diagnosed on February 5, 2021)  Initially treated with ceftriaxone switched to cefepime  Stool enteric bacterial panel by PCR-negative  Will discontinue order for C difficile since patient diarrhea has improved  Infectious on board, recommendation to discontinue antibiotics blood cultures oral likely contaminant  Patient has been off antibiotics during the last 24 hours has remained hemodynamically stable, afebrile    Trend temperature curve and WBC count, monitor off antibiotics

## 2021-03-30 NOTE — ASSESSMENT & PLAN NOTE
Loose BM's since COVID infection  Admitted complaining of episode of diarrhea, currently improved  Metamucil order for bulking (outpatient regimen)  Stool bacteria PCR negative  Will discontinue order for C difficile testing  CT abd benign  Stop Imodium p r n  Until infectious etiology of diarrhea ruled out

## 2021-03-30 NOTE — NURSING NOTE
Pts room had some type of liquid covering his floor  When I had asked him what had happened he told me that the dog had done it  I asked him where we were and he was unable to tell me  I asked to see his IV and he said he didn't have one on him and if I needed it hed have to go upstairs  I then asked him the date and he was able to tell me that it was almost April and the next holiday was [place]

## 2021-03-30 NOTE — ASSESSMENT & PLAN NOTE
Likely non MI troponin elevation  Trop mildly elevated on admission; 0 32<--  0 38<-- 0 47  EKG w/o ST changes  History of complete heart block status post pacemaker implant  Transthoracic echocardiogram show LVEF 65% with no regional wall motion abnormalities  Unable to assess diastolic function  Cardiology on board, no further cardiac workup indicated  Follow with Cardiology as an outpatient regularly    Continue ASA and Lipitor

## 2021-03-30 NOTE — ASSESSMENT & PLAN NOTE
Lab Results   Component Value Date    HGBA1C 7 7 (H) 02/05/2021       Recent Labs     03/30/21  0623 03/30/21  0712 03/30/21  0753 03/30/21  1153   POCGLU 42* 123 105 52*       Blood Sugar Average: Last 72 hrs:  (P) 200 [id number]   On insulin pump as an outpatient  Blood glucose currently within acceptable parameters  Overnight patient had an episode of hypoglycemia with glucose level on the 40s  Hypoglycemia protocol was following the patient, no insulin receive since this morning and however patient had another episode of hypoglycemia at noon with glycemia levels of 52  I did discontinue Humalog t i d  A c  Endocrinology on board, follow recommendations, recommendations to decrease dose of Lantus continue sliding scale coverage  Will adjust antihyperglycemic regimen as indicated

## 2021-03-30 NOTE — ASSESSMENT & PLAN NOTE
Patient presented on 3/27 with generalized weakness, complaints of progressively worsening back pain, found to have worsening L1 compression fracture, TLICS 1  · Hx osteoporosis as evidenced on DEXA scan completed in 2018  · Known prior L1 compression fracture s/p fall in 2017, no intervention at that time    Imaging reviewed personally and with attending, results are as follows:   CT abdomen pelvis wo contrast 3/27/2021:  Significant interval loss of L1 vertebral body height consistent with progression of 1 stable chronic fracture  No significant fracture fragment retropulsion  Transitional S1 vertebra  Plan:   Will offer patient conservative management for fracture  His complains of on and off back pain for years but states it is getting worse over the last few weeks, no new falls but is feeling more off balance, wife endorses worsening back pain  Exam with full strength, peripheral neuropathy from DM, no red flag signs, subjective tingling in lumbar spine to palpation  o Can have TLSO brace when OOB and HOB greater than 45 degrees  o Will order upright lumbar spine XR to be completed in brace  o Do not anticipate that patient will need to wear the brace for an extended period of time - fracture does appear worse when compared to imaging in 2017 however it appears chronic    Medical management and pain control per primary team   DVT ppx:  SCDs, heparin   Mobilize as tolerated with assistance, PT / OT evaluation    Neurosurgery will follow as needed during hospitalization  Outpatient follow up in 2 weeks with repeat upright imaging  Please call with questions or concerns, signed off

## 2021-03-30 NOTE — PROGRESS NOTES
Progress Note - Infectious Disease   Willam Curling 76 y o  male MRN: [id number]  Unit/Bed#: [facility] 920-01 Encounter: [id number]      Impression/Recommendations:  1  Corynebacterium bacteremia  Although it looks like there was growth in 2 admission blood cultures, in reality, it appears that only 1 blood culture was drawn at the same time and same site, but inoculated 2 different sets  Patient is clinically and systemically well, without evidence of active infection clinically  I suspect this is contaminated blood draw  Observe off off antibiotic  Follow-up on final blood culture results  Follow-up on repeat blood culture results  Monitor temperature/WBC  Monitor hemodynamics      2  Loose stool, POA  This is most likely secondary to prolonged COVID symptoms  This has improved with conservative management  Abdominal exam is benign  Abdomen/pelvis CT is benign  Monitor for recurrent symptoms      3  Hypernatremia secondary to dehydration  This is improved with IV fluid hydration  IV fluid hydration per primary service      4  Recent COVID-19  Patient completed treatment course with remdesivir and systemic corticosteroid  CXR and CT with mild residual ground-glass opacities  Patient is minimal respiratory symptoms  No further specific COVID treatment necessary  No isolation necessary at this point      5  Mild leukocytosis  No obvious active infection  WBC remains stable in the mid teens  Monitor WBC      6  KAZ, POA, superimposed on CKD  This is likely secondary to prerenal state from dehydration  Creatinine is now at baseline  Monitor creatinine      7  DM, somewhat poorly controlled, with hyperglycemia and elevated hemoglobin A1c on admission  Management per primary service      Discussed with patient in detail regarding the above plan  Discussed with slim service  Antibiotics:  Off antibiotic    Subjective:  Patient is feeling better  Energy is improving slowly    No dyspnea/cough  Diarrhea much improved  Objective:  Vitals:  Temp:  [97 7 °F (36 5 °C)-98 2 °F (36 8 °C)] 98 2 °F (36 8 °C)  HR:  [89-98] 89  Resp:  [18-20] 20  BP: (110-127)/(59-69) 118/59  SpO2:  [88 %-90 %] 88 %  Temp (24hrs), Av 9 °F (36 6 °C), Min:97 7 °F (36 5 °C), Max:98 2 °F (36 8 °C)  Current: Temperature: 98 2 °F (36 8 °C)    Physical Exam:     General: Awake, alert, cooperative, no distress  Neck:  Supple  No mass  No lymphadenopathy  Lungs: Expansion symmetric, no rales, no wheezing, respirations unlabored  Heart:  Regular rate and rhythm, S1 and S2 normal, no murmur  Abdomen: Soft, nondistended, non-tender, bowel sounds active all four quadrants, no masses, no organomegaly  Extremities: No edema  No erythema/warmth  No ulcer  Nontender to palpation  Skin:  No rash  Neuro: Moves all extremities  Invasive Devices     Peripheral Intravenous Line            Peripheral IV 21 Right Forearm 1 day                Labs studies:   I have personally reviewed pertinent labs  Results from last 7 days   Lab Units 21  0645 21  0616 21  0631 21  1619 21  1643   POTASSIUM mmol/L 3 9 4 8 4 2 4 7 4 5   CHLORIDE mmol/L 105 101 104 96* 95*   CO2 mmol/L 22 24 24 24 23   BUN mg/dL 22 27* 30* 46* 51*   CREATININE mg/dL 1 05 1 18 1 07 1 55* 1 57*   EGFR ml/min/1 73sq m 69 60 68 43 42   CALCIUM mg/dL 7 3* 7 9* 7 9* 8 7 8 5   AST U/L  --   --   --  20 23   ALT U/L  --   --   --  24 29   ALK PHOS U/L  --   --   --  145* 116     Results from last 7 days   Lab Units 21  0645 21  0616 21  0631   WBC Thousand/uL 14 15* 16 85* 14 52*   HEMOGLOBIN g/dL 8 2* 8 9* 9 5*   PLATELETS Thousands/uL 209 203 198     Results from last 7 days   Lab Units 21  0624 21  0615 21  1815 21  1752   BLOOD CULTURE  No Growth at 24 hrs  No Growth at 24 hrs    --  Corynebacterium striatum group*  Corynebacterium striatum group*   GRAM STAIN RESULT   -- --   --  Gram positive rods*  Gram positive rods*   URINE CULTURE   --   --  <10,000 cfu/ml   --        Imaging Studies:   I have personally reviewed pertinent imaging study reports and images in PACS  EKG, Pathology, and Other Studies:   I have personally reviewed pertinent reports

## 2021-03-30 NOTE — ASSESSMENT & PLAN NOTE
Complaining of persistent lower back pain exacerbated by movement  Denied stool or urine incontinence, denies urinary retention  No weakness or numbness of bilateral lower extremities  CT abdomen and pelvis on 03/27/2021:  Significant interval loss of L1 vertebral body height consistent with progression of 1 stable chronic fracture  No significant fracture fragment retropulsion  Transitional S1 vertebra  Neurosurgery input appreciated

## 2021-03-30 NOTE — ASSESSMENT & PLAN NOTE
Normocytic anemia, likely anemia of chronic disease  Prior iron studies showed finding consistent with anemia of chronic disease  Hemoglobin 8 2 grams/deciliter with no evidence of active bleeding  Denies seen any blood in stools  Denies tarry dark stools  Trend hemoglobin

## 2021-03-30 NOTE — ASSESSMENT & PLAN NOTE
Lab Results   Component Value Date    EGFR 69 03/30/2021    EGFR 60 03/29/2021    EGFR 68 03/28/2021    CREATININE 1 05 03/30/2021    CREATININE 1 18 03/29/2021    CREATININE 1 07 03/28/2021     Continue management per primary team

## 2021-03-30 NOTE — PROGRESS NOTES
Cardiology Progress Note - Brittany Landry 76 y o  male MRN: [id number]    Unit/Bed#: [facility] 920-01 Encounter: [id number]        Subjective:    No significant events overnight  Drop in O2 sats today  Asymptomatic at rest      Review of Systems   Constitution: Positive for malaise/fatigue  Cardiovascular: Negative for chest pain  Respiratory: Negative for shortness of breath  Objective:   Vitals: Blood pressure 118/59, pulse 89, temperature 98 2 °F (36 8 °C), resp  rate 20, height 5' 8" (1 727 m), weight 72 kg (158 lb 11 7 oz), SpO2 (!) 88 % , Body mass index is 24 14 kg/m² ,   Orthostatic Blood Pressures      Most Recent Value   Blood Pressure  118/59 filed at 03/30/2021 0826   Patient Position - Orthostatic VS  Lying filed at 03/27/2021 3102         Systolic (26HBG), NWL:939 , Min:110 , BWW:765     Diastolic (14OMD), BRV:54, Min:59, Max:69      Intake/Output Summary (Last 24 hours) at 3/30/2021 0951  Last data filed at 3/29/2021 2158  Gross per 24 hour   Intake 1943 33 ml   Output 125 ml   Net 1818 33 ml     Weight (last 2 days)     None                Physical Exam  Vitals signs reviewed  Constitutional:       Appearance: Normal appearance  HENT:      Head: Normocephalic  Nose: Nose normal       Mouth/Throat:      Mouth: Mucous membranes are moist       Pharynx: Oropharynx is clear  Eyes:      General: No scleral icterus  Conjunctiva/sclera: Conjunctivae normal    Neck:      Musculoskeletal: Normal range of motion and neck supple  Cardiovascular:      Rate and Rhythm: Normal rate and regular rhythm  Heart sounds: No murmur  No friction rub  No gallop  Pulmonary:      Effort: Pulmonary effort is normal  No respiratory distress  Breath sounds: Normal breath sounds  No wheezing or rales  Abdominal:      General: Abdomen is flat  Bowel sounds are normal  There is no distension  Palpations: Abdomen is soft  Tenderness: There is no abdominal tenderness   There is no guarding  Musculoskeletal:      Right lower leg: No edema  Left lower leg: No edema  Skin:     General: Skin is warm and dry  Neurological:      General: No focal deficit present  Mental Status: He is alert and oriented to person, place, and time     Psychiatric:         Mood and Affect: Mood normal          Behavior: Behavior normal            Laboratory Results:  Results from last 7 days   Lab Units 03/27/21  2317 03/27/21 2024 03/27/21  1619 03/24/21  1909   CK TOTAL U/L  --   --   --  86   TROPONIN I ng/mL 0 32* 0 38* 0 47*  --        CBC with diff:   Results from last 7 days   Lab Units 03/30/21  0645 03/29/21  0616 03/28/21  0631 03/27/21  1619 03/24/21  1643   WBC Thousand/uL 14 15* 16 85* 14 52* 15 73* 8 78   HEMOGLOBIN g/dL 8 2* 8 9* 9 5* 9 8* 10 3*   HEMATOCRIT % 25 0* 27 3* 28 9* 29 4* 31 5*   MCV fL 87 87 86 86 88   PLATELETS Thousands/uL 209 203 198 196 96*   MCH pg 28 5 28 3 28 1 28 7 28 8   MCHC g/dL 32 8 32 6 32 9 33 3 32 7   RDW % 14 9 14 8 14 5 14 3 14 2   MPV fL 8 7* 9 2 9 3 9 2 9 1   NRBC AUTO /100 WBCs  --  0  --  0 0         CMP:  Results from last 7 days   Lab Units 03/30/21  0645 03/29/21  0616 03/28/21  0631 03/27/21  1619 03/24/21  1643   POTASSIUM mmol/L 3 9 4 8 4 2 4 7 4 5   CHLORIDE mmol/L 105 101 104 96* 95*   CO2 mmol/L 22 24 24 24 23   BUN mg/dL 22 27* 30* 46* 51*   CREATININE mg/dL 1 05 1 18 1 07 1 55* 1 57*   CALCIUM mg/dL 7 3* 7 9* 7 9* 8 7 8 5   AST U/L  --   --   --  20 23   ALT U/L  --   --   --  24 29   ALK PHOS U/L  --   --   --  145* 116   EGFR ml/min/1 73sq m 69 60 68 43 42         BMP:  Results from last 7 days   Lab Units 03/30/21  0645 03/29/21  0616 03/28/21  0631 03/27/21  1619 03/24/21  1643   POTASSIUM mmol/L 3 9 4 8 4 2 4 7 4 5   CHLORIDE mmol/L 105 101 104 96* 95*   CO2 mmol/L 22 24 24 24 23   BUN mg/dL 22 27* 30* 46* 51*   CREATININE mg/dL 1 05 1 18 1 07 1 55* 1 57*   CALCIUM mg/dL 7 3* 7 9* 7 9* 8 7 8 5       BNP: No results for input(s): BNP in the last 72 hours  Magnesium:       Coags:       TSH: No results found for: TSH    Hemoglobin A1C       Lipid Profile:       Cardiac testing:   Results for orders placed during the hospital encounter of 17   Echo complete with contrast if indicated    Narrative Jessica 12, [address] [facility]  [phone]    Transthoracic Echocardiogram  2D, M-mode, Doppler, and Color Doppler    Study date:  2017    Patient: Kaur Hernandez  MR number: [id number]  Account number: [de-identified]  : 1945  Age: 70 years  Gender: Male  Status: Outpatient  Location: [address] [place] [address]  Height: 68 in  Weight: 146 7 lb  BP: 140/ 76 mmHg    Indications: Palpitations  Diagnoses: I49 9 - Cardiac arrhythmia, unspecified    Sonographer:  ARACELI Andrews  Primary Physician:  Susan Youssef MD  Referring Physician:  Rice Gottron, PA-C  Group:  [facility]  Interpreting Physician:  Vane Smith MD    SUMMARY    LEFT VENTRICLE:  Systolic function was normal  Ejection fraction was estimated to be 55 %  There were no regional wall motion abnormalities  VENTRICULAR SEPTUM:  There was dyssynergic motion  These changes are consistent with a conduction abnormality or paced rhythm  HISTORY: PRIOR HISTORY: Heart block s/p PPM, Hypertension, Diabetes    PROCEDURE: The study was performed in the [address] [place] [address]  This was a routine study  The transthoracic approach was used  The study included complete 2D imaging, M-mode, complete spectral Doppler, and color Doppler  The  heart rate was 83 bpm, at the start of the study  Images were obtained from the parasternal, apical, subcostal, and suprasternal notch acoustic windows  Image quality was adequate  LEFT VENTRICLE: Size was normal  Systolic function was normal  Ejection fraction was estimated to be 55 %  There were no regional wall motion abnormalities   Paul Morin thickness was normal  DOPPLER: There was fusion of early and atrial  contributions to ventricular filling  VENTRICULAR SEPTUM: There was dyssynergic motion  These changes are consistent with a conduction abnormality or paced rhythm  RIGHT VENTRICLE: The size was normal  Systolic function was normal  A pacing wire was present  LEFT ATRIUM: Size was normal  Not well visualized  RIGHT ATRIUM: Size was normal  Not well visualized  MITRAL VALVE: There was mild to moderate annular calcification  Valve structure was normal  There was normal leaflet separation  DOPPLER: There was no evidence for stenosis  There was no significant regurgitation  AORTIC VALVE: The valve was trileaflet  Leaflets exhibited mildly increased thickness and normal cuspal separation  DOPPLER: There was no evidence for stenosis  There was no regurgitation  TRICUSPID VALVE: The valve structure was normal  There was normal leaflet separation  DOPPLER: There was no evidence for stenosis  There was mild to moderate regurgitation  Estimated peak PA pressure was 30 mmHg  PULMONIC VALVE: Leaflets exhibited normal thickness, no calcification, and normal cuspal separation  DOPPLER: The transpulmonic velocity was within the normal range  There was mild regurgitation  PERICARDIUM: There was no pericardial effusion  The pericardium was normal in appearance  AORTA: The root exhibited normal size      SYSTEM MEASUREMENT TABLES    2D  %FS: 33 83 %  AV Diam: 3 27 cm  EDV(Teich): 74 8 ml  EF(Cube): 71 03 %  EF(Teich): 63 15 %  ESV(Cube): 20 17 ml  ESV(Teich): 27 56 ml  IVSd: 1 cm  LA Area: 15 62 cm2  LA Diam: 3 04 cm  LVEDV MOD A4C: 60 51 ml  LVEF MOD A4C: 56 44 %  LVESV MOD A4C: 26 36 ml  LVIDd: 4 11 cm  LVIDs: 2 72 cm  LVLd A4C: 7 44 cm  LVLs A4C: 6 21 cm  LVPWd: 0 93 cm  RA Area: 14 07 cm2  RV Diam: 3 08 cm  SI(Cube): 27 62 ml/m2  SI(Teich): 26 39 ml/m2  SV MOD A4C: 34 15 ml  SV(Cube): 49 44 ml  SV(Teich): 47 24 ml    CW  TR MaxPG: 21 27 mmHg  TR Vmax: 2 3 m/s    MM  TAPSE: 1 79 cm    [facility]    Prepared and electronically signed by    Vane Smith MD  Signed 26-Apr-2017 12:42:43       No results found for this or any previous visit  No results found for this or any previous visit  No results found for this or any previous visit      Meds/Allergies   current meds:   Current Facility-Administered Medications   Medication Dose Route Frequency    acetaminophen (TYLENOL) tablet 650 mg  650 mg Oral Q6H PRN    amLODIPine (NORVASC) tablet 5 mg  5 mg Oral Daily    aspirin chewable tablet 81 mg  81 mg Oral Daily    atorvastatin (LIPITOR) tablet 5 mg  5 mg Oral Daily    DULoxetine (CYMBALTA) delayed release capsule 30 mg  30 mg Oral Daily    furosemide (LASIX) injection 20 mg  20 mg Intravenous Once    heparin (porcine) subcutaneous injection 5,000 Units  5,000 Units Subcutaneous Q8H [address]    insulin glargine (LANTUS) subcutaneous injection 6 Units 0 06 mL  6 Units Subcutaneous HS    insulin lispro (HumaLOG) 100 units/mL subcutaneous injection 1-4 Units  1-4 Units Subcutaneous TID AC    Labetalol HCl (NORMODYNE) injection 10 mg  10 mg Intravenous Q6H PRN    levothyroxine tablet 125 mcg  125 mcg Oral Early Morning    ondansetron (ZOFRAN) injection 4 mg  4 mg Intravenous Q6H PRN    pantoprazole (PROTONIX) EC tablet 40 mg  40 mg Oral Early Morning    psyllium (METAMUCIL) 1 packet  1 packet Oral Daily    sodium chloride (PF) 0 9 % injection 3 mL  3 mL Intravenous Q1H PRN    tamsulosin (FLOMAX) capsule 0 4 mg  0 4 mg Oral Daily With Dinner     Medications Prior to Admission   Medication    amLODIPine (NORVASC) 5 mg tablet    ascorbic acid (VITAMIN C) 1000 MG tablet    aspirin 81 mg chewable tablet    Atorvastatin Calcium (LIPITOR PO)    CALCIUM CITRATE PO    Cholecalciferol (VITAMIN D3) 2000 units capsule    Continuous Blood Gluc Sensor (DEXCOM G6 SENSOR) MISC    cyanocobalamin (VITAMIN B-12) 1,000 mcg tablet    DULoxetine (CYMBALTA) 30 mg delayed release capsule    ezetimibe (ZETIA) 10 mg tablet    glucose 40 %    glucose 77 4 %    insulin aspart (NovoLOG) 100 units/mL injection    levothyroxine 125 mcg tablet    loperamide (IMODIUM) 2 mg capsule    pantoprazole (PROTONIX) 40 mg tablet    tamsulosin (FLOMAX) 0 4 mg    zinc sulfate (ZINCATE) 220 mg capsule       sodium chloride, 100 mL/hr, Last Rate: 100 mL/hr (03/29/21 2200)      Assessment:  Principal Problem:    Gram-positive bacteremia  Active Problems:    Hypothyroidism    Anemia    Type 2 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, left eye (HCC)    Essential hypertension    Stage 3 chronic kidney disease    Elevated troponin level not due myocardial infarction    Complete heart block (HCC)    Diarrhea    Weakness    Dehydration with hyponatremia      Plan:     Non MI trop elevation       History of complete heart block s/p PPM implant       Echo reviewed that shows elevated filling pressures  Probable moderate TR but difficult to see  Will give him one dose of IV lasix this afternoon  Counseling / Coordination of Care  Total floor / unit time spent today 25  minutes  Greater than 50% of total time was spent with the patient and / or family counseling and / or coordination of care  A description of the counseling / coordination of care

## 2021-03-30 NOTE — PLAN OF CARE
Problem: PHYSICAL THERAPY ADULT  Goal: Performs mobility at highest level of function for planned discharge setting  See evaluation for individualized goals  Description: Treatment/Interventions: Functional transfer training, LE strengthening/ROM, Elevations, Therapeutic exercise, Endurance training, Patient/family training, Equipment eval/education, Bed mobility, Gait training, Spoke to nursing  Equipment Recommended: [place] Innocent       See flowsheet documentation for full assessment, interventions and recommendations  Outcome: Progressing  Note: Prognosis: Fair  Problem List: Decreased strength, Decreased endurance, Impaired balance, Decreased mobility, Decreased cognition, Impaired judgement, Decreased safety awareness, Pain  Assessment: Pt appeared to have improved activity tolerance when compared to last PT session, able to be woken up and enthusiastic about mobility  Pt oriented to self and situation but unable to correctly identify month, day, or location  Pt performed supine to sit at edge of bed with S and increased verbal cueing  Pt performed STS min A w/ increased VCs to use the walker  When attempting to put on mask w/ two hands and no RW, pt required mod Ax1 to remain upright and was advised to take a seat to don mask  Pt ambulated ~100ft + 100ft w/ RW,seated rest break required, and increased VC for safety and walker negotiation  Pt performed standing urination w/ min Ax1 to remain upright w/ grab bar assist  Pt seated OOB in chair w/ chair alarm activated and all needs within reach  Pt continues to demonstrate increased fatigue, decreased strength, endurance, and balance, limiting functional mobility and safety  Pt would benefit from continued skilled rehabilitation in order to improve the previously stated impairments and increase independence  Pt would benefit frrom D/C to rehab    Barriers to Discharge: Inaccessible home environment     PT Discharge Recommendation: Post-Acute Rehabilitation Services     PT - OK to Discharge: Yes    See flowsheet documentation for full assessment

## 2021-03-30 NOTE — ASSESSMENT & PLAN NOTE
Endocrinology on board, recommendations to continue levothyroxine 125 mcg, elevated TSH-6 43, free T4 within normal limits      Thyroid function panel results could be acute stress response with acute bacteremia  Endo recommendations to follow-up outpatient in 4 to 6 weeks for repeat testing prior to changing dose

## 2021-03-30 NOTE — ASSESSMENT & PLAN NOTE
Lab Results   Component Value Date    HGBA1C 7 7 (H) 02/05/2021       Recent Labs     03/30/21  0623 03/30/21  0712 03/30/21  0753 03/30/21  1153   POCGLU 42* 123 105 52*       Blood Sugar Average: Last 72 hrs:  (P) 866 [id number]     Endocrine following, appreciate input  · Insulin pump broken, family will attempt to get another pump from home

## 2021-03-30 NOTE — CASE MANAGEMENT
CM spoke with pt's wife Josh Wong ([phone]) to discuss inpt rehab recommendations  Pt's wife requesting referrals to [facility] and [place] [address] as she believes pt would want acute rehab  Pt's wife requesting CM to speak with pt to confirm  CM met with pt to further discuss  Pt agreeable with referrals to [facility] and [facility]  Referrals made via [facility]  OT and PM&R order request made to Dr Reinaldo Prince

## 2021-03-30 NOTE — ASSESSMENT & PLAN NOTE
· Presented with loose stools with associated incontinence since COVID infection  · Improving currently

## 2021-03-30 NOTE — PHYSICAL THERAPY NOTE
PHYSICAL THERAPY NOTE    Patient Name: Malena James  [id number] Date: 3/30/2021     03/30/21 1143   PT Last Visit   PT Visit Date 03/30/21   Note Type   Note Type Treatment   Pain Assessment   Pain Assessment Tool 0-10   Pain Score 4  (after taking a sitting break from walking)   Pain Location/Orientation Location: Back   Hospital Pain Intervention(s) Repositioned; Ambulation/increased activity; Emotional support   Multiple Pain Sites No   Restrictions/Precautions   Weight Bearing Precautions Per Order No   Other Precautions Cognitive;Pain; Fall Risk;Multiple lines; Bed Alarm; Chair Alarm; Impulsive   General   Chart Reviewed Yes   Response to Previous Treatment Patient with no complaints from previous session  Family/Caregiver Present No   Cognition   Overall Cognitive Status Impaired   Arousal/Participation Alert; Responsive;Arousable; Cooperative   Attention Attends with cues to redirect   Orientation Level Oriented to person;Disoriented to place; Disoriented to time;Oriented to situation   Memory Unable to assess   Following Commands Follows one step commands with increased time or repetition   Comments Pt demonstrating decreased cognition and response to safety instructions  Stating things like "looking in the door jam usually helps" when looking for pt mask and "he is sleeping over there  He likes to hide" pointing to the side of his bed  Pt displaying improved memory from last visit, recalling that he lives at home with wife and 3 dogs  Subjective   Subjective " I feel good  It's just when I move that the pain wakes up"   Bed Mobility   Supine to Sit 4  Minimal assistance   Additional items Assist x 1;HOB elevated; Increased time required;Verbal cues   Sit to Supine Unable to assess   Additional Comments Pt S/L in bed upon arrival   Pt returned to room w/ chair alarm engaged and all needs within reach    Transfers   Sit to Stand 4  Minimal assistance   Additional items Assist x 1; Increased time required;Armrests   Stand to Sit 4  Minimal assistance   Additional items Increased time required;Armrests;Assist x 1   Toilet transfer 4  Minimal assistance   Additional items Assist x 1; Increased time required;Standard toilet;Verbal cues  (grab bars)   Additional Comments transfers done with RW   Ambulation/Elevation   Gait pattern Wide YAMILA; Forward Flexion; Excessively slow; Short stride; Shuffling;Decreased foot clearance   Gait Assistance 4  Minimal assist   Additional items Assist x 1;Verbal cues  (VCs to attend to walker)   Assistive Device Rolling walker   Distance 100ftt + 100ft seated rest breaks required   Stair Management Assistance Not tested   Balance   Static Sitting Fair +   Dynamic Sitting Poor +   Static Standing Poor   Dynamic Standing Poor   Ambulatory Poor +  (w/ RW)   Endurance Deficit   Endurance Deficit Yes   Endurance Deficit Description weakness, back pain   Activity Tolerance   Activity Tolerance Patient limited by pain; Patient limited by fatigue   Medical Staff Made Aware PT Erik Childers   Nurse Made Aware yes   Assessment   Prognosis Fair   Problem List Decreased strength;Decreased endurance; Impaired balance;Decreased mobility; Decreased cognition; Impaired judgement;Decreased safety awareness;Pain   Assessment Pt appeared to have improved activity tolerance when compared to last PT session, able to be woken up and enthusiastic about mobility  Pt oriented to self and situation but unable to correctly identify month, day, or location  Pt performed supine to sit at edge of bed with S and increased verbal cueing  Pt performed STS min A w/ increased VCs to use the walker  When attempting to put on mask w/ two hands and no RW, pt required mod Ax1 to remain upright and was advised to take a seat to don mask  Pt ambulated ~100ft + 100ft w/ RW,seated rest break required, and increased VC for safety and walker negotiation    Pt performed standing urination w/ min Ax1 to remain upright w/ grab bar assist  Pt seated OOB in chair w/ chair alarm activated and all needs within reach  Pt continues to demonstrate increased fatigue, decreased strength, endurance, and balance, limiting functional mobility and safety  Pt would benefit from continued skilled rehabilitation in order to improve the previously stated impairments and increase independence  Pt would benefit frrom D/C to rehab  Barriers to Discharge Inaccessible home environment   Goals   Patient Goals to rest    STG Expiration Date 04/12/21   PT Treatment Day 1   Plan   Treatment/Interventions Functional transfer training;LE strengthening/ROM; Endurance training;Cognitive reorientation;Patient/family training;Bed mobility;Gait training;Spoke to nursing   Progress Progressing toward goals   PT Frequency   (3-5x/wk)   Recommendation   PT Discharge Recommendation Post-Acute Rehabilitation Services   Equipment Recommended Walker   PT - OK to Discharge Yes   Additional Comments when medically cleared for STR   AM-PAC Basic Mobility Inpatient   Turning in Bed Without Bedrails 3   Lying on Back to Sitting on Edge of Flat Bed 3   Moving Bed to Chair 3   Standing Up From Chair 3   Walk in Room 3   Climb 3-5 Stairs 2   Basic Mobility Inpatient Raw Score 17   Basic Mobility Standardized Score 39 67   The patient's AM-PAC Basic Mobility Inpatient Short Form Raw Score is 17, Standardized Score is 39 67  A standardized score less than 42 9 suggests the patient may benefit from discharge to post-acute rehabilitation services  Please also refer to the recommendation of the Physical Therapist for safe discharge planning      Lamonte Howard, KJ

## 2021-03-30 NOTE — PROGRESS NOTES
Endocrine made RN aware about patient needing continuous glucose monitoring via device brought from home  Device was never connected to patient and patient is physically unable to insert device himself  Charge nurse was notified and asked to attempt, along with another RN  SLIM provider and endorine notified and aware that patient is not being monitored  Slim provider noted that Q6 sugar checks will be implemented if glucose monitor will not be able to be connected

## 2021-03-30 NOTE — PROGRESS NOTES
[address] [facility]  Progress Note - Julien Flores 1945, 76 y o  male MRN: [id number]  Unit/Bed#: [facility] 920-01 Encounter: [id number]  Primary Care Provider: Nereyda Marin MD   Date and time admitted to hospital: 3/27/2021  3:35 PM    * Gram-positive bacteremia  Assessment & Plan  Admitted complaining of generalized weakness increased urinary frequency and diarrhea  Spiking episode of fever of 100 3 on March 27, 2021  Has remained afebrile during the last 48  hours  Blood culture from March 27, 2021 2/2 sets of blood culture growing Gram-positive rods ( Corynebacterium striatum groupAbnormal )  Urine culture 03/27/2021-negative  Follow-up Chest x-ray 3/29 showed right lower lobe consolidation likely residual from COVID-19 pneumonia on February  CT abdomen and pelvis showed no acute intra-abdominal abnormalities  Pulmonary finding compatible with recent COVID-19 pneumonia  (diagnosed on February 5, 2021)  Initially treated with ceftriaxone switched to cefepime  Stool enteric bacterial panel by PCR-negative  Will discontinue order for C difficile since patient diarrhea has improved  Infectious on board, recommendation to discontinue antibiotics blood cultures oral likely contaminant  Patient has been off antibiotics during the last 24 hours has remained hemodynamically stable, afebrile  Trend temperature curve and WBC count, monitor off antibiotics    L1 vertebral fracture (HCC)  Assessment & Plan  Complaining of persistent lower back pain exacerbated by movement  Denied stool or urine incontinence, denies urinary retention  No weakness or numbness of bilateral lower extremities  CT abdomen and pelvis on 03/27/2021:  Significant interval loss of L1 vertebral body height consistent with progression of 1 stable chronic fracture  No significant fracture fragment retropulsion  Transitional S1 vertebra  Neurosurgery input appreciated      Dehydration with hyponatremia  Assessment & Plan  Most likely hypotonic hyponatremia in the setting of poor oral intake and concomitant diarrhea  Serum sodium improved to 133 this morning  Patient has been positive 4 L since admission, will discontinue IV fluids  Encourage PO intake    Diarrhea  Assessment & Plan  Loose BM's since COVID infection  Admitted complaining of episode of diarrhea, currently improved  Metamucil order for bulking (outpatient regimen)  Stool bacteria PCR negative  Will discontinue order for C difficile testing  CT abd benign  Stop Imodium p r n  Until infectious etiology of diarrhea ruled out  Complete heart block [facility])  Assessment & Plan  S/p PPM   Seen at bedside, denies any chest pain or shortness of breath  Transthoracic echocardiogram showed ejection fraction 65% with no regional wall abnormalities  Cardiology on board, no further workup indicated  Follow as an outpatient with Cardiology    Elevated troponin level not due myocardial infarction  Assessment & Plan  Likely non MI troponin elevation  Trop mildly elevated on admission; 0 32<--  0 38<-- 0 47  EKG w/o ST changes  History of complete heart block status post pacemaker implant  Transthoracic echocardiogram show LVEF 65% with no regional wall motion abnormalities  Unable to assess diastolic function  Cardiology on board, no further cardiac workup indicated  Follow with Cardiology as an outpatient regularly    Continue ASA and Lipitor      Stage 3 chronic kidney disease  Assessment & Plan  Lab Results   Component Value Date    EGFR 69 03/30/2021    EGFR 60 03/29/2021    EGFR 68 03/28/2021    CREATININE 1 05 03/30/2021    CREATININE 1 18 03/29/2021    CREATININE 1 07 03/28/2021   Cr at baseline  Monitor BUN/Cr  Avoid nephrotoxic agents and hypotension    Essential hypertension  Assessment & Plan  Blood pressure well controlled  Continue Norvasc  Labetalol prn    Type 2 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, left eye [facility])  Assessment & Plan  Lab Results   Component Value Date    HGBA1C 7 7 (H) 02/05/2021       Recent Labs     03/30/21  0623 03/30/21  0712 03/30/21  0753 03/30/21  1153   POCGLU 42* 123 105 52*       Blood Sugar Average: Last 72 hrs:  (P) 113 [id number]   On insulin pump as an outpatient  Blood glucose currently within acceptable parameters  Overnight patient had an episode of hypoglycemia with glucose level on the 40s  Hypoglycemia protocol was following the patient, no insulin receive since this morning and however patient had another episode of hypoglycemia at noon with glycemia levels of 52  I did discontinue Humalog t i d  A c  Endocrinology on board, follow recommendations, recommendations to decrease dose of Lantus continue sliding scale coverage  Will adjust antihyperglycemic regimen as indicated  Anemia  Assessment & Plan  Normocytic anemia, likely anemia of chronic disease  Prior iron studies showed finding consistent with anemia of chronic disease  Hemoglobin 8 2 grams/deciliter with no evidence of active bleeding  Denies seen any blood in stools  Denies tarry dark stools  Trend hemoglobin  Hypothyroidism  Assessment & Plan  Endocrinology on board, recommendations to continue levothyroxine 125 mcg, elevated TSH-6 43, free T4 within normal limits  Thyroid function panel results could be acute stress response with acute bacteremia  Endo recommendations to follow-up outpatient in 4 to 6 weeks for repeat testing prior to changing dose        Subjective:   Patient has been seen at bedside, he was alert and cooperative  Was oriented x3  Denies any chest pain or shortness of breath  Denied abdominal pain  Denies further episodes of diarrhea states he is urinating well  Tolerating oral diet well  Denied upper respiratory symptoms, fever or chills  Denies focal neurological deficits    As per nursing staff patient remained stable overnight with no incidents reported  No additional complaints  Objective:     Vitals: Blood pressure 126/69, pulse 89, temperature 98 2 °F (36 8 °C), resp  rate 20, height 5' 8" (1 727 m), weight 72 kg (158 lb 11 7 oz), SpO2 (!) 88 %  ,Body mass index is 24 14 kg/m²  Intake/Output Summary (Last 24 hours) at 3/30/2021 1501  Last data filed at 3/30/2021 1407  Gross per 24 hour   Intake 2581 67 ml   Output 325 ml   Net 2256 67 ml         Physical Exam:     Physical Exam  Constitutional:       Appearance: Normal appearance  HENT:      Head: Normocephalic  Nose: Nose normal    Eyes:      General: No scleral icterus  Extraocular Movements: Extraocular movements intact  Comments: Mild conjunctival paleness   Neck:      Musculoskeletal: Normal range of motion and neck supple  Cardiovascular:      Rate and Rhythm: Normal rate and regular rhythm  Pulses: Normal pulses  Heart sounds: No murmur  No friction rub  No gallop  Pulmonary:      Effort: Pulmonary effort is normal  No respiratory distress  Breath sounds: No wheezing, rhonchi or rales  Abdominal:      General: Bowel sounds are normal  There is no distension  Palpations: Abdomen is soft  Tenderness: There is no abdominal tenderness  There is no guarding or rebound  Musculoskeletal:         General: No swelling, deformity or signs of injury  Right lower leg: No edema  Skin:     General: Skin is warm  Capillary Refill: Capillary refill takes less than 2 seconds  Coloration: Skin is not jaundiced  Findings: No bruising, erythema or lesion  Neurological:      General: No focal deficit present  Mental Status: He is alert and oriented to person, place, and time     Psychiatric:         Mood and Affect: Mood normal          Behavior: Behavior normal            Invasive Devices     Peripheral Intravenous Line            Peripheral IV 03/29/21 Right Forearm 1 day                        CBC:   Lab Results   Component Value Date    WBC 14 15 (H) 03/30/2021    HGB 8 2 (L) 03/30/2021    HCT 25 0 (L) 03/30/2021    MCV 87 03/30/2021     03/30/2021    MCH 28 5 03/30/2021    MCHC 32 8 03/30/2021    RDW 14 9 03/30/2021    MPV 8 7 (L) 03/30/2021   ,   CMP:   Lab Results   Component Value Date    K 3 9 03/30/2021     03/30/2021    CO2 22 03/30/2021    BUN 22 03/30/2021    CREATININE 1 05 03/30/2021    CALCIUM 7 3 (L) 03/30/2021    EGFR 69 03/30/2021   ,   PT/INR: No results found for: PT, INR,   Troponin: No results found for: TROPONINI,     Imaging Studies: I have personally reviewed pertinent reports  Counseling / Coordination of Care  Total time spent today  30 minutes  Greater than 50% of total time was spent with the patient and / or family counseling and / or coordination of care  Patient's current condition and plan has been discussed at bedside with patient's wife  All questions/concerns has been addressed  Will continue updating family members on regular basis

## 2021-03-30 NOTE — ASSESSMENT & PLAN NOTE
S/p PPM   Seen at bedside, denies any chest pain or shortness of breath  Transthoracic echocardiogram showed ejection fraction 65% with no regional wall abnormalities  Cardiology on board, no further workup indicated    Follow as an outpatient with Cardiology

## 2021-03-30 NOTE — PROGRESS NOTES
Progress Note - Anish Padilla 76 y o  male MRN: [id number]    Unit/Bed#: [facility] 920-01 Encounter: [id number]      CC: diabetes f/u    Subjective:   Anish Padilla is a 76y o  year old male with type 1 diabetes, hypothyroidism and osteoporosis, admitted with diarrhea and dehydration  He had an episode of fasting hypoglycemia to 42 mg/dL this morning  Objective:     Vitals: Blood pressure 118/59, pulse 89, temperature 98 2 °F (36 8 °C), resp  rate 20, height 5' 8" (1 727 m), weight 72 kg (158 lb 11 7 oz), SpO2 (!) 88 %  ,Body mass index is 24 14 kg/m²  Intake/Output Summary (Last 24 hours) at 3/30/2021 1147  Last data filed at 3/30/2021 1041  Gross per 24 hour   Intake 2655 ml   Output 325 ml   Net 2330 ml       Physical Exam:  General Appearance: awake, appears stated age and cooperative  Head: Normocephalic, without obvious abnormality, atraumatic  Extremities: moves all extremities  Skin: temperature normal    Pulm: no labored breathing    Lab, Imaging and other studies: I have personally reviewed pertinent reports  POC Glucose (mg/dl)   Date Value   03/30/2021 105   03/30/2021 123   03/30/2021 42 (L)   03/30/2021 44 (L)   03/29/2021 201 (H)   03/29/2021 157 (H)   03/29/2021 128   03/29/2021 145 (H)   03/29/2021 180 (H)   03/28/2021 164 (H)       Assessment/ Plan:    -Type 1 diabetes:  most recent A1c 7 7%  His on insulin pump therapy at home  Diabetes complicated with retinopathy, nephropathy, neuropathy, gastroparesis and PAD with amputation  Tandem insulin pump setting at home:    Basal rate  12 am:  0 4 unit/hour  6:00 a m :  0 5  8:00 p m  0 45    Insulin to carb ratio 25  Insulin sensitivity factor 80  Target blood glucose 110    Patient is using basal and control IQ program     Currently patient is on Lantus 12 units q h s  And Humalog 2 units with meals  He has had an episode of fasting hypoglycemia to 42 mg/dL this morning    He had also an episode of hypoglycemia in the 40s during the weekend; this could be related to gastroparesis  Patient is asked to restart his CGM sensor now as it has hypoglycemia alarms  Instructed the nurse to follow hypoglycemia protocol when hypoglycemia alarms go off  Will decrease Lantus to 6 units q h s  And monitor further adjustment  Will restart the pump tomorrow     -Severe osteoporosis:  DEXA scan showed T-score of -3 1 left femoral neck  Patient has also history of L1 vertebral fracture from minimal trauma  Per his wife, he has been managed by his outpatient endocrinologist and is taking calcium, vitamin-D and Reclast   Will check vitamin-D level  -Hypothyroidism: continue levothyroxine at current dose and repeat TFTs as an outpatient in 4-6 weeks    -Rest of care:  per primary team      Imaging:     DEXA scan 7/27/2018:       CLINICAL HISTORY:   67year old  male with history of diabetes mellitus, hypothyroidism and possible celiac disease  There is also history of L1 fracture after a fall  The patient does not exercise and does take calcium and vitamin D   supplements  There is a stated loss in height of 4 inches      TECHNIQUE: Bone densitometry was performed using a bone densitometer  Regions of interest appear properly placed  There is decreased height and increased density in the L1 vertebral body, consistent with the history of L1 fracture  L1 was excluded from   calculation of lumbar spine BMD      COMPARISON:  None      RESULTS:   LUMBAR SPINE:  L2-L4:  BMD 0 891 gm/cm2  T-score -2 0  Z-score -1 0     LEFT TOTAL HIP:  BMD 0 725 gm/cm2  T-score -2 0  Z-score -1 3     LEFT FEMORAL NECK:  BMD 0 503 gm/cm2  T-score -3 1  Z-score -1 9           IMPRESSION:  1  Based on the [facility] classification, the T-score of -3 1 in the left femoral neck is consistent with osteoporosis  If the patient's L1 vertebral fracture was secondary to minimal trauma, he can be considered as having severe osteoporosis    2   According to the [facility] Osteoporosis Foundation, prescription therapy is recommended with a T-score of -2 5 or less in the spine or hip after appropriate evaluation to exclude secondary causes  3   A daily intake of at least 1200 mg Calcium and 800 to 1000 IU of Vitamin D, as well as weight bearing and muscle strengthening exercise, fall prevention and avoidance of tobacco and excessive alcohol intake as  basic preventive measures are   suggested  4   Repeat DXA  in 18 - 24 months, on the same machine, as clinically indicated               WHO CLASSIFICATION:  Normal (a T-score of -1 0 or higher)  Low bone mineral density (a T-score of less than -1 0 but higher than -2 5)  Osteoporosis (a T-score of -2 5 or less)  Severe osteoporosis (a T-score of -2 5 or less with a fragility fracture)       Portions of the record may have been created with voice recognition software

## 2021-03-30 NOTE — DISCHARGE INSTRUCTIONS
Discharge Instructions - Neurosurgery    · TLSO brace to be worn when out of bed of head of bed greater than 45 degrees  May be removed for showering  · No heavy lifting  · No strenuous activities  · No Driving  **Please notify MD immediately if you have increased back or leg pain  New numbness, tingling and/or weakness in your legs  **    Please follow up in 2 weeks with the Neurosurgical group with repeat X-ray of lumbar spine 2-3 days prior to your appointment  You may go to any [facility] facility to get your imaging done  Please call [phone] to schedule your imaging appointment

## 2021-03-30 NOTE — PLAN OF CARE
Problem: Potential for Falls  Goal: Patient will remain free of falls  Description: INTERVENTIONS:  - Assess patient frequently for physical needs  -  Identify cognitive and physical deficits and behaviors that affect risk of falls    -  [place] fall precautions as indicated by assessment   - Educate patient/family on patient safety including physical limitations  - Instruct patient to call for assistance with activity based on assessment  - Modify environment to reduce risk of injury  - Consider OT/PT consult to assist with strengthening/mobility  Outcome: Progressing     Problem: Prexisting or High Potential for Compromised Skin Integrity  Goal: Skin integrity is maintained or improved  Description: INTERVENTIONS:  - Identify patients at risk for skin breakdown  - Assess and monitor skin integrity  - Assess and monitor nutrition and hydration status  - Monitor labs   - Assess for incontinence   - Turn and reposition patient  - Assist with mobility/ambulation  - Relieve pressure over bony prominences  - Avoid friction and shearing  - Provide appropriate hygiene as needed including keeping skin clean and dry  - Evaluate need for skin moisturizer/barrier cream  - Collaborate with interdisciplinary team   - Patient/family teaching  - Consider wound care consult   Outcome: Progressing     Problem: PAIN - ADULT  Goal: Verbalizes/displays adequate comfort level or baseline comfort level  Description: Interventions:  - Encourage patient to monitor pain and request assistance  - Assess pain using appropriate pain scale  - Administer analgesics based on type and severity of pain and evaluate response  - Implement non-pharmacological measures as appropriate and evaluate response  - Consider cultural and social influences on pain and pain management  - Notify physician/advanced practitioner if interventions unsuccessful or patient reports new pain  Outcome: Progressing     Problem: INFECTION - ADULT  Goal: Absence or prevention of progression during hospitalization  Description: INTERVENTIONS:  - Assess and monitor for signs and symptoms of infection  - Monitor lab/diagnostic results  - Monitor all insertion sites, i e  indwelling lines, tubes, and drains  - Monitor endotracheal if appropriate and nasal secretions for changes in amount and color  - [place] appropriate cooling/warming therapies per order  - Administer medications as ordered  - Instruct and encourage patient and family to use good hand hygiene technique  - Identify and instruct in appropriate isolation precautions for identified infection/condition  Outcome: Progressing  Goal: Absence of fever/infection during neutropenic period  Description: INTERVENTIONS:  - Monitor WBC    Outcome: Progressing     Problem: SAFETY ADULT  Goal: Patient will remain free of falls  Description: INTERVENTIONS:  - Assess patient frequently for physical needs  -  Identify cognitive and physical deficits and behaviors that affect risk of falls    -  [place] fall precautions as indicated by assessment   - Educate patient/family on patient safety including physical limitations  - Instruct patient to call for assistance with activity based on assessment  - Modify environment to reduce risk of injury  - Consider OT/PT consult to assist with strengthening/mobility  Outcome: Progressing  Goal: Maintain or return to baseline ADL function  Description: INTERVENTIONS:  -  Assess patient's ability to carry out ADLs; assess patient's baseline for ADL function and identify physical deficits which impact ability to perform ADLs (bathing, care of mouth/teeth, toileting, grooming, dressing, etc )  - Assess/evaluate cause of self-care deficits   - Assess range of motion  - Assess patient's mobility; develop plan if impaired  - Assess patient's need for assistive devices and provide as appropriate  - Encourage maximum independence but intervene and supervise when necessary  - Involve family in performance of ADLs  - Assess for home care needs following discharge   - Consider OT consult to assist with ADL evaluation and planning for discharge  - Provide patient education as appropriate  Outcome: Progressing  Goal: Maintain or return mobility status to optimal level  Description: INTERVENTIONS:  - Assess patient's baseline mobility status (ambulation, transfers, stairs, etc )    - Identify cognitive and physical deficits and behaviors that affect mobility  - Identify mobility aids required to assist with transfers and/or ambulation (gait belt, sit-to-stand, lift, walker, cane, etc )  - [place] fall precautions as indicated by assessment  - Record patient progress and toleration of activity level on Mobility SBAR; progress patient to next Phase/Stage  - Instruct patient to call for assistance with activity based on assessment  - Consider rehabilitation consult to assist with strengthening/weightbearing, etc   Outcome: Progressing     Problem: DISCHARGE PLANNING  Goal: Discharge to home or other facility with appropriate resources  Description: INTERVENTIONS:  - Identify barriers to discharge w/patient and caregiver  - Arrange for needed discharge resources and transportation as appropriate  - Identify discharge learning needs (meds, wound care, etc )  - Arrange for interpretive services to assist at discharge as needed  - Refer to Case Management Department for coordinating discharge planning if the patient needs post-hospital services based on physician/advanced practitioner order or complex needs related to functional status, cognitive ability, or social support system  Outcome: Progressing     Problem: Knowledge Deficit  Goal: Patient/family/caregiver demonstrates understanding of disease process, treatment plan, medications, and discharge instructions  Description: Complete learning assessment and assess knowledge base    Interventions:  - Provide teaching at level of understanding  - Provide teaching via preferred learning methods  Outcome: Progressing     Problem: Nutrition/Hydration-ADULT  Goal: Nutrient/Hydration intake appropriate for improving, restoring or maintaining nutritional needs  Description: Monitor and assess patient's nutrition/hydration status for malnutrition  Collaborate with interdisciplinary team and initiate plan and interventions as ordered  Monitor patient's weight and dietary intake as ordered or per policy  Utilize nutrition screening tool and intervene as necessary  Determine patient's food preferences and provide high-protein, high-caloric foods as appropriate       INTERVENTIONS:  - Monitor oral intake, urinary output, labs, and treatment plans  - Assess nutrition and hydration status and recommend course of action  - Evaluate amount of meals eaten  - Assist patient with eating if necessary   - Allow adequate time for meals  - Recommend/ encourage appropriate diets, oral nutritional supplements, and vitamin/mineral supplements  - Order, calculate, and assess calorie counts as needed  - Recommend, monitor, and adjust tube feedings and TPN/PPN based on assessed needs  - Assess need for intravenous fluids  - Provide specific nutrition/hydration education as appropriate  - Include patient/family/caregiver in decisions related to nutrition  Outcome: Progressing

## 2021-03-30 NOTE — ASSESSMENT & PLAN NOTE
ID following, appreciate input  · Thought to be contaminated culture samples  · Monitoring off abx for now

## 2021-03-30 NOTE — ASSESSMENT & PLAN NOTE
Lab Results   Component Value Date    EGFR 69 03/30/2021    EGFR 60 03/29/2021    EGFR 68 03/28/2021    CREATININE 1 05 03/30/2021    CREATININE 1 18 03/29/2021    CREATININE 1 07 03/28/2021   Cr at baseline  Monitor BUN/Cr  Avoid nephrotoxic agents and hypotension

## 2021-03-31 PROBLEM — R19.7 DIARRHEA: Status: RESOLVED | Noted: 2021-01-01 | Resolved: 2021-01-01

## 2021-03-31 NOTE — QUICK NOTE
QUICK NOTE - Deterioration Index  Navjot Bob 76 y o  male MRN: [id number]  Unit/Bed#: [facility] 920-01 Encounter: [id number]    Date Paged: [phone]  Time Paged: [phone]  Room #: 36  Arrival Time: 0485  Deterioration index score at time of page: 43 2  %  Need to escalate level of care: no     PROBLEMS resulting in high DI score:   27% Age is 75   25% Supplemental oxygen is Nasal cannula   9% Cardiac rhythm is Ventricular paced   8% WBC count is 14 57 Thousand/uL   7% Sodium is 131 mmol/L   5% Respiratory rate is 18   5% Hematocrit is 26 4 %   4% Pulse oximetry is 92 %   3% Potassium is 4 4 mmol/L   3% Systolic is 880   3% Pulse is 95   <1% Temperature is 98 7 °F (37 1 °C)     PLAN:     DI score 43 upon page  Age of 76 non modifiable risk factor accounts for highest percentage of DI page  Please call with questions  Please contact critical care via [email or web address] with any questions or concerns  Vitals:   Vitals:    21 1614 21 0021 21 0741   BP: 131/73  140/76 136/79   Pulse: 103  105 100   Resp: 20  18    Temp: 99 5 °F (37 5 °C)  98 7 °F (37 1 °C)    TempSrc:       SpO2:  (!) 89% 93% 94%   Weight:       Height:           Respiratory:  SpO2: SpO2: 94 %, SpO2 Activity: SpO2 Activity: At Rest, SpO2 Device: O2 Device: Nasal cannula  Nasal Cannula O2 Flow Rate (L/min): 2 L/min    Temperature: Temp (24hrs), Av 1 °F (37 3 °C), Min:98 7 °F (37 1 °C), Max:99 5 °F (37 5 °C)  Current: Temperature: 98 7 °F (37 1 °C)    Labs:   Results from last 7 days   Lab Units 21  0705 21  0645 21  0616  21  1619 21  1643   WBC Thousand/uL 14 57* 14 15* 16 85*   < > 15 73* 8 78   HEMOGLOBIN g/dL 8 6* 8 2* 8 9*   < > 9 8* 10 3*   HEMATOCRIT % 26 4* 25 0* 27 3*   < > 29 4* 31 5*   PLATELETS Thousands/uL 233 209 203   < > 196 96*   NEUTROS PCT %  --   --  83*  --  82* 78*   MONOS PCT %  --   --  12  --  10 15*    < > = values in this interval not displayed       Results from last 7 days   Lab Units 03/31/21  0705 03/30/21  0645 03/29/21  0616  03/27/21  1619 03/24/21  1643   SODIUM mmol/L 131* 133* 131*   < > 128* 129*   POTASSIUM mmol/L 4 4 3 9 4 8   < > 4 7 4 5   CHLORIDE mmol/L 103 105 101   < > 96* 95*   CO2 mmol/L 23 22 24   < > 24 23   BUN mg/dL 22 22 27*   < > 46* 51*   CREATININE mg/dL 1 23 1 05 1 18   < > 1 55* 1 57*   CALCIUM mg/dL 7 5* 7 3* 7 9*   < > 8 7 8 5   ALK PHOS U/L  --   --   --   --  145* 116   ALT U/L  --   --   --   --  24 29   AST U/L  --   --   --   --  20 23    < > = values in this interval not displayed           Results from last 7 days   Lab Units 03/27/21  1752   LACTIC ACID mmol/L 1 4     Results from last 7 days   Lab Units 03/27/21  2317 03/27/21  2024 03/27/21  1619 03/24/21  1643   TROPONIN I ng/mL 0 32* 0 38* 0 47* <0 02           Code Status: Level 1 - Full Code

## 2021-03-31 NOTE — ASSESSMENT & PLAN NOTE
Blood pressure well controlled  Discontinue amlodipine  Start patient on lisinopril 2 5 mg p o   Daily

## 2021-03-31 NOTE — ASSESSMENT & PLAN NOTE
Lab Results   Component Value Date    HGBA1C 7 7 (H) 02/05/2021       Recent Labs     03/30/21  1643 03/30/21  2051 03/31/21  0644 03/31/21  1108   POCGLU 148* 184* 158* 131       Blood Sugar Average: Last 72 hrs:  (P) 116 [id number]   On insulin pump as an outpatient  Blood glucose currently within acceptable parameters  Over the last 24 hours patient has on episode of hypoglycemia with blood glucose in the 40-50, no episodes of hypoglycemia since midnight  Endocrinology on board, follow recommendations regarding antihyperglycemic management

## 2021-03-31 NOTE — PHYSICAL THERAPY NOTE
PHYSICAL THERAPY NOTE    Patient Name: Anish Padilla  RQNAG'T Date: 3/31/2021     03/31/21 1006   PT Last Visit   PT Visit Date 03/31/21   Note Type   Note Type Treatment   Pain Assessment   Pain Assessment Tool 0-10   Pain Score No Pain   Restrictions/Precautions   Weight Bearing Precautions Per Order No   Braces or Orthoses TLSO   Other Precautions Spinal precautions; Fall Risk;Cognitive; Chair Alarm; Bed Alarm   General   Chart Reviewed Yes   Response to Previous Treatment Patient with no complaints from previous session  Family/Caregiver Present No   Cognition   Overall Cognitive Status Impaired   Arousal/Participation Responsive;Arousable; Cooperative  (Pt falling asleep if in seated position but easily arroused)   Attention Within functional limits   Orientation Level Oriented X4   Memory Unable to assess   Following Commands Follows one step commands with increased time or repetition   Comments Pt demonstarted improved cognition, able to name month, year, and within 10 days of date  Able to state person, place, and situation  Subjective   Subjective "I feel pretty good"   Bed Mobility   Supine to Sit 4  Minimal assistance   Additional items Assist x 1; Increased time required; Bedrails;HOB elevated;Verbal cues   Sit to Supine Unable to assess   Additional Comments Pt supine in bed attempting to get up but tangled in gown  Pt returned to room seated OOB in chair with all needs within reach and chair alarm activated    Transfers   Sit to Stand 4  Minimal assistance  (1 LOB and landing in bed after standing against PT's advice)   Additional items Assist x 1; Increased time required   Stand to Sit 4  Minimal assistance   Additional items Assist x 1; Increased time required   Additional Comments transfer w/ RW and TSLO donned   Ambulation/Elevation   Gait pattern Excessively slow; Forward Flexion; Wide YAMILA; Decreased foot clearance; Inconsistent rahul   Gait Assistance 4  Minimal assist  (CGA)   Additional items Assist x 1   Assistive Device Rolling walker   Distance 80ftx2  (1 seated rest break)   Stair Management Assistance Not tested   Balance   Static Sitting Poor   Dynamic Sitting Poor   Static Standing Poor   Dynamic Standing Poor   Ambulatory Poor +  (w/ RW)   Endurance Deficit   Endurance Deficit Yes   Endurance Deficit Description limited by fatigue, weakness, and lethargy   Activity Tolerance   Activity Tolerance Patient limited by fatigue   Medical Staff Made Aware PT Ruby Chaidez   Nurse Made Aware yes   Assessment   Prognosis Fair   Problem List Decreased strength;Decreased range of motion;Decreased endurance; Impaired balance;Decreased mobility; Decreased cognition; Impaired judgement;Decreased safety awareness;Orthopedic restrictions   Assessment Pt appeared to have improved cognition compared to last PT seesion, able to state location, situation, name, month, year and within 10 days of current date  Upon PT arrival, pt was attempting to get OOB but was tangled in robes  Pt performed supine to sit at [address]  Pt repeatedly standing against PT advisory without RW   1 LOB posterior onto bed  Pt instructed on donning TSLO which remained on for the rest of the PT session  Seated balance requiring mod A 2* to pt fatigue and multiple episodes of dozing off  Pt easily aroused  Pt ambulated ~80ft x2 w/ RW, 1 seated rest break,increased VC for safety and walker negotiation  Pt seated OOB in chair w/ chair alarm activated, TLSO donned and all needs within reach  Pt continues to demonstrate increased fatigue, decreased strength, cognition, endurance, and balance, limiting functional mobility and safety  Pt would benefit from continued skilled rehabilitation in order to improve the previously stated impairments and increase independence  Pt would benefit yumiko D/C to rehab     Barriers to Discharge Inaccessible home environment   Goals   Patient Goals to sleep   STG Expiration Date 04/12/21   PT Treatment Day 2   Plan   Treatment/Interventions Functional transfer training;LE strengthening/ROM; Endurance training;Cognitive reorientation;Patient/family training;Bed mobility;Gait training;Spoke to nursing   Progress Slow progress, cognitive deficits   PT Frequency   (3-5x/wk)   Recommendation   PT Discharge Recommendation Post-Acute Rehabilitation Services   Equipment Recommended Walker   PT - OK to Discharge Yes   Additional Comments when medically cleared   AM-PAC Basic Mobility Inpatient   Turning in Bed Without Bedrails 3   Lying on Back to Sitting on Edge of Flat Bed 3   Moving Bed to Chair 3   Standing Up From Chair 3   Walk in Room 3   Climb 3-5 Stairs 2   Basic Mobility Inpatient Raw Score 17   Basic Mobility Standardized Score 39 67   The patient's AM-PAC Basic Mobility Inpatient Short Form Raw Score is 17, Standardized Score is 39 67  A standardized score less than 42 9 suggests the patient may benefit from discharge to post-acute rehabilitation services  Please also refer to the recommendation of the Physical Therapist for safe discharge planning      Janey Goodman, SPT

## 2021-03-31 NOTE — QUICK NOTE
PHYSICAL MEDICINE AND REHABILITATION QUICK NOTE  Hank Rojas 76 y o  male MRN: [id number]  [facility] Encounter: [id number]     At time of consult, occupational therapy notes were pending   After review, he would be a good acute inpatient rehabilitation candidate pending improved arousal and stability of his agitation levels that warranted the initial doses of Mana Billings,   Physical Medicine and Cornelia Mccann

## 2021-03-31 NOTE — ASSESSMENT & PLAN NOTE
Lab Results   Component Value Date    EGFR 57 03/31/2021    EGFR 69 03/30/2021    EGFR 60 03/29/2021    CREATININE 1 23 03/31/2021    CREATININE 1 05 03/30/2021    CREATININE 1 18 03/29/2021   Cr at baseline  Monitor BUN/Cr  Avoid nephrotoxic agents and hypotension

## 2021-03-31 NOTE — OCCUPATIONAL THERAPY NOTE
Occupational Therapy Evaluation and Treatment      Patient Name: Carmen Hernandez  FYQGL'N Date: 3/31/2021  Problem List  Principal Problem:    Gram-positive bacteremia  Active Problems:    Hypothyroidism    Anemia    Type 2 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, left eye (HCC)    Essential hypertension    Stage 3 chronic kidney disease    Elevated troponin level not due myocardial infarction    Hyponatremia    Complete heart block (HCC)    Weakness    Dehydration with hyponatremia    L1 vertebral fracture (HCC)    Past Medical History  Past Medical History:   Diagnosis Date    Arthritis     Cardiac disease     Closed fracture of fibula, lateral malleolus, right     Complete heart block (HCC)     Diabetes mellitus ([address] )     Disease of thyroid gland     External incisional dehiscence     GERD (gastroesophageal reflux disease)     Heart disease     Muscular deconditioning     Pacemaker     Sleep apnea     cannot tolerate cpap    Thyroid disease      Past Surgical History  Past Surgical History:   Procedure Laterality Date    CARDIAC PACEMAKER PLACEMENT Left 2015    CARDIAC PACEMAKER PLACEMENT      CARDIAC SURGERY      CATARACT EXTRACTION      MUSCLE FLAP      right foot    NERVE BLOCK      Transforaminal Epidural Lumbar with Fluoroscopic Guidance     WI COLONOSCOPY FLX DX W/COLLJ SPEC WHEN PFRMD N/A 5/4/2018    Procedure: EGD AND COLONOSCOPY;  Surgeon: Alisson Casper MD;  Location: [facility];   Service: Gastroenterology    WI SPLIT [address] <100 SQCM Right 6/10/2019    Procedure: SKIN GRAFT SPLIT THICKNESS (STSG)  EXTREMITY right leg;  Surgeon: Laila Abreu DPM;  Location:  MAIN OR;  Service: Podiatry    TOE AMPUTATION      left 5th toe        03/31/21 1005   OT Last Visit   OT Visit Date 03/31/21   Note Type   Note type Evaluation   Restrictions/Precautions   Weight Bearing Precautions Per Order No   Braces or Orthoses TLSO Other Precautions Cognitive; Chair Alarm; Fall Risk;O2;Spinal precautions   Pain Assessment   Pain Assessment Tool 0-10   Pain Score No Pain   Home Living   Type of Home House   Home Layout Multi-level  (4STE/ramp )   [address] [place] [address] chair   Home Equipment Walker;Cane;Stair glide   Additional Comments Pt is ? historian  Information confirmed with EMR    Prior Function   Level of [place] Independent with ADLs and functional mobility   Lives With Spouse   Receives Help From Family   ADL Assistance Independent   IADLs Independent   Falls in the last 6 months 0   Lifestyle   Autonomy PTA pt reports being I with ADLs/IADLs   Reciprocal Relationships Wife   Intrinsic Gratification Spending time with his 3 dogs   Psychosocial   Psychosocial (WDL) WDL   Subjective   Subjective "I'm trying to pee but this gown is in the way"   ADL   Eating Assistance 5  Supervision/Setup   Grooming Assistance 4  Minimal Assistance   UB Bathing Assistance 3  Moderate Assistance   LB Bathing Assistance 3  Moderate Assistance   [address] 3  Moderate Assistance   LB Dressing Assistance 3  Moderate [address]  4  Minimal Assistance   Bed Mobility   Supine to Sit 4  Minimal assistance   Additional items Assist x 1;HOB elevated; Increased time required   Sit to Supine Unable to assess   Transfers   Sit to Stand 4  Minimal assistance   Additional items Assist x 1; Increased time required; Impulsive   Stand to Sit 4  Minimal assistance   Additional items Assist x 1; Increased time required   Additional Comments Transfers with RW, pt with 1 near LOB with sit <> stand tx   Functional Mobility   Functional Mobility 4  Minimal assistance   Additional Comments A for RW mgmt   Additional items Rolling walker   Balance   Static Sitting Poor +   Dynamic Sitting Poor   Static Standing Poor   Dynamic Standing Poor   Ambulatory Poor +   Activity Tolerance   Activity Tolerance Patient limited by fatigue   Medical Staff Made Aware SPT Nnamdiluz maria Rincon    Nurse Made Aware yes   RUE Assessment   RUE Assessment WFL   LUE Assessment   LUE Assessment WFL   Hand Function   Fine Motor Coordination Impaired   Sensation   Light Touch No apparent deficits   Vision-Basic Assessment   Current Vision Wears glasses all the time   Vision - Complex Assessment   Ocular Range of Motion Restricted looking down   Head Position WDL   Tracking Decreased smoothness of vertical tracking   Perception   Inattention/Neglect Appears intact   Cognition   Overall Cognitive Status Impaired   Arousal/Participation Responsive; Cooperative   Attention Attends with cues to redirect   Orientation Level Oriented X4   Memory Decreased recall of precautions   Following Commands Follows one step commands with increased time or repetition   Comments Pt pleasant and cooperative t/o session  Pt lethargic t/o, requires cues to stay awake and prevent pt from falling, as well as repeated directions at times    Assessment   Limitation Decreased ADL status; Decreased UE ROM; Decreased UE strength;Decreased Safe judgement during ADL;Decreased cognition;Decreased endurance;Decreased self-care trans;Decreased high-level ADLs   Prognosis Fair   Assessment Pt is a 76 y o  male admitted to [facility] on [facility]/2021 w/ Gram-positive bacteremia, dehydration, diarrhea, heart block  has a past medical history of Arthritis, Cardiac disease, Closed fracture of fibula, lateral malleolus, right, Complete heart block ([address] ), Diabetes mellitus ([address] ), Disease of thyroid gland, External incisional dehiscence, GERD (gastroesophageal reflux disease), Heart disease, Muscular deconditioning, Pacemaker, Sleep apnea, and Thyroid disease  Pt with active OT orders  As per pt report, pta, resides in a multi-level home, 4STE/ramp with his wife  Pt was I w/  ADLS and IADLS, (-) drove  Upon evaluation, pt currently requires MIN A with RW for transfers and mobility   Exhibits decreased strength to B/L UE's limiting performance in ADLs/transfers  Pt currently  requires MOD A UB ADLs, MOD A LB ADLs, and MIN A toileting  Pt with difficulty managing [address] tasks  Pt is limited at this time 2*: endurance, activity tolerance, functional mobility, balance, trunk control, functional standing tolerance, decreased I w/ ADLS/IADLS, strength, ROM, cognitive impairments, decreased safety awareness and coordination deficits  The following Occupational Performance Areas to address include: eating, grooming, bathing/shower, toilet hygiene, dressing, health maintenance, functional mobility, community mobility and clothing management  Pt to benefit from immediate acute skilled OT to address above deficits, improve overall functional independence, maximize fnxl mobility and reduce caregiver burden  From OT standpoint, recommendation at time of d/c would be STR  Pt was left in chair with alarm on after session with all current needs met  The patient's raw score on the -PAC Daily Activity inpatient short form is 14, standardized score is 33 39, less than 39 4  Patients at this level are likely to benefit from discharge to post-acute rehabilitation services  Please refer to the recommendation of the Occupational Therapist for safe discharge planning  Pt seen for additional OT tx session post evaluation  See assessment below  Plan   Treatment Interventions ADL retraining;Functional transfer training;UE strengthening/ROM; Cognitive reorientation; Endurance training;Patient/family training;Equipment evaluation/education; Fine motor coordination activities; Compensatory technique education;Continued evaluation; Energy conservation; Activityengagement   Goal Expiration Date 04/14/21   OT Treatment Day 1   OT Frequency 3-5x/wk   Additional Treatment Session   Start Time 0946   End Time 1005   Treatment Assessment Pt agreeable to additional OT tx session post evaluation  Pt engaged in ADL routine  Pt completed UB and LB dressing   Pt required MIN A to doff TLSO, MOD A to doff gown (pt attempts to pull gown over his head as if a tshirt but does not actually grab the gown itself), A required to initiate pulling off sleeve  Pt donned new gown with MIN A  Pt requires MOD A and multiple VCs/tactile cues to don TLSO  Pt requires MOD A for LB to doff socks and to don socks and underwear  Pt educated on safety awareness and pt tends to be impuslive during tasks  Pt has tendency to doff O2 - pt does desat to mid 80s during task - educated on deep breathing techniques w/ poor c/o  Pt to benefit from continue skilled OT to improve overall performance and independence      Additional Treatment Day 1   Recommendation   OT Discharge Recommendation Post-Acute Rehabilitation Services   OT - OK to Discharge Yes  (to rehab when medically stable )   AM-PAC Daily Activity Inpatient   Lower Body Dressing 2   Bathing 2   Toileting 2   Upper Body Dressing 2   Grooming 3   Eating 3   Daily Activity Raw Score 14   Daily Activity Standardized Score (Calc for Raw Score >=11) 33 39   AM-PAC Applied Cognition Inpatient   Following a Speech/Presentation 2   Understanding Ordinary Conversation 3   Taking Medications 2   Remembering Where Things Are Placed or Put Away 1   Remembering List of 4-5 Errands 1   Taking Care of Complicated Tasks 1   Applied Cognition Raw Score 10   Applied Cognition Standardized Score 24 98     GOALS    1) Pt will increase activity tolerance to G for 30 min txment sessions    2) Pt will complete UB/LB dressing/self care w/ mod I using adaptive device and DME as needed    3) Pt will complete bathing w/ Mod I w/ use of AE and DME as needed    4) Pt will complete toileting w/ mod I w/ G hygiene/thoroughness using DME as needed    5) Pt will improve functional transfers to Mod I on/off all surfaces using DME as needed w/ G balance/safety     6) Pt will improve functional mobility during ADL/IADL/leisure tasks to Mod I using DME as needed w/ G balance/safety     7) Pt will participate in simulated IADL management task with DME as needed to increase independence to  w/ G safety and endurance    8) Pt will demonstrate G carryover of pt/caregiver education and training as appropriate      9) Pt will demonstrate 100% carryover of energy conservation techniques t/o functional I/ADL/leisure tasks w/o cues s/p skilled education    10) Pt will engage in ongoing cognitive assessment w/ G participation to assist w/ safe d/c planning/recommendations      Clark Nixon MS, OTR/L

## 2021-03-31 NOTE — ASSESSMENT & PLAN NOTE
Complaining of persistent lower back pain exacerbated by movement  Denied stool or urine incontinence, denies urinary retention  No weakness or numbness of bilateral lower extremities  CT abdomen and pelvis on 03/27/2021:  Significant interval loss of L1 vertebral body height consistent with progression of 1 stable chronic fracture  No significant fracture fragment retropulsion  Transitional S1 vertebra  Neurosurgery on board, follow recommendations

## 2021-03-31 NOTE — PROGRESS NOTES
Progress Note - Infectious Disease   López Lloyd 76 y o  male MRN: [id number]  Unit/Bed#: [facility] 920-01 Encounter: [id number]      Impression/Recommendations:  1   Corynebacterium bacteremia  Although it looks like there was growth in 2 admission blood cultures, in reality, it appears that only 1 blood culture was drawn at the same time and same site, but inoculated 2 different sets  There was also growth of coagulase staph in the same blood cultures   Patient is clinically and systemically well, without evidence of active infection clinically   I suspect this is contaminated blood draw  Repeat blood cultures have no growth thus far  Observe off off antibiotic  Follow-up on final blood culture results  Follow-up on repeat blood culture results  Monitor temperature/WBC  Monitor hemodynamics      2  Loose stool, POA   This is most likely secondary to prolonged COVID symptoms   This has improved with conservative management   Abdominal exam is benign   Abdomen/pelvis CT is benign  Monitor for recurrent symptoms      3  Hypernatremia secondary to dehydration   This is improved with IV fluid hydration  IV fluid hydration per primary service      4  Recent COVID-19   Patient completed treatment course with remdesivir and systemic corticosteroid   CXR and CT with mild residual ground-glass opacities   Patient is minimal respiratory symptoms  No further specific COVID treatment necessary  No isolation necessary at this point      5  Mild leukocytosis   No obvious active infection   WBC remains stable in the mid teens  Monitor WBC      6  KAZ, POA, superimposed on CKD   This is likely secondary to prerenal state from dehydration   Creatinine is now at baseline  Monitor creatinine      7  DM, somewhat poorly controlled, with hyperglycemia and elevated hemoglobin A1c on admission  Management per primary service      Discussed with patient in detail regarding the above plan    Discussed with slim service earlier  Okay for discharge from ID viewpoint      Antibiotics:  Off antibiotic     Subjective:  Patient is feeling quite well  Energy is improving slowly  No dyspnea/cough  Diarrhea much improved      Objective:  Vitals:  Temp:  [98 7 °F (37 1 °C)-99 5 °F (37 5 °C)] 98 7 °F (37 1 °C)  HR:  [100-105] 100  Resp:  [18-20] 18  BP: (126-140)/(69-79) 136/79  SpO2:  [89 %-94 %] 94 %  Temp (24hrs), Av 1 °F (37 3 °C), Min:98 7 °F (37 1 °C), Max:99 5 °F (37 5 °C)  Current: Temperature: 98 7 °F (37 1 °C)    Physical Exam:     General: Awake, alert, cooperative, no distress  Neck:  Supple  No mass  No lymphadenopathy  Lungs: Expansion symmetric, no rales, no wheezing, respirations unlabored  Heart:  Regular rate and rhythm, S1 and S2 normal, no murmur  Abdomen: Soft, nondistended, non-tender, bowel sounds active all four quadrants, no masses, no organomegaly  Extremities: No edema  No erythema/warmth  No ulcer  Nontender to palpation  Skin:  No rash  Neuro: Moves all extremities  Invasive Devices     Peripheral Intravenous Line            Peripheral IV 21 Right Forearm 2 days                Labs studies:   I have personally reviewed pertinent labs  Results from last 7 days   Lab Units 21  0705 21  0645 21  0616  21  1619 21  1643   POTASSIUM mmol/L 4 4 3 9 4 8   < > 4 7 4 5   CHLORIDE mmol/L 103 105 101   < > 96* 95*   CO2 mmol/L 23 22 24   < > 24 23   BUN mg/dL 22 22 27*   < > 46* 51*   CREATININE mg/dL 1 23 1 05 1 18   < > 1 55* 1 57*   EGFR ml/min/1 73sq m 57 69 60   < > 43 42   CALCIUM mg/dL 7 5* 7 3* 7 9*   < > 8 7 8 5   AST U/L  --   --   --   --  20 23   ALT U/L  --   --   --   --  24 29   ALK PHOS U/L  --   --   --   --  145* 116    < > = values in this interval not displayed       Results from last 7 days   Lab Units 21  0705 21  0645 21  0616   WBC Thousand/uL 14 57* 14 15* 16 85*   HEMOGLOBIN g/dL 8 6* 8 2* 8 9*   PLATELETS Thousands/uL 233 209 203     Results from last 7 days   Lab Units 03/29/21  0624 03/29/21  0615 03/27/21  1815 03/27/21  1752   BLOOD CULTURE  No Growth at 48 hrs  No Growth at 48 hrs  --  Corynebacterium striatum group*  Corynebacterium striatum group*   GRAM STAIN RESULT   --   --   --  Gram positive rods*  Gram positive cocci in clusters*  Gram positive rods*   URINE CULTURE   --   --  <10,000 cfu/ml   --        Imaging Studies:   I have personally reviewed pertinent imaging study reports and images in PACS  EKG, Pathology, and Other Studies:   I have personally reviewed pertinent reports

## 2021-03-31 NOTE — PLAN OF CARE
Problem: Potential for Falls  Goal: Patient will remain free of falls  Description: INTERVENTIONS:  - Assess patient frequently for physical needs  -  Identify cognitive and physical deficits and behaviors that affect risk of falls    -  [place] fall precautions as indicated by assessment   - Educate patient/family on patient safety including physical limitations  - Instruct patient to call for assistance with activity based on assessment  - Modify environment to reduce risk of injury  - Consider OT/PT consult to assist with strengthening/mobility  Outcome: Progressing     Problem: Prexisting or High Potential for Compromised Skin Integrity  Goal: Skin integrity is maintained or improved  Description: INTERVENTIONS:  - Identify patients at risk for skin breakdown  - Assess and monitor skin integrity  - Assess and monitor nutrition and hydration status  - Monitor labs   - Assess for incontinence   - Turn and reposition patient  - Assist with mobility/ambulation  - Relieve pressure over bony prominences  - Avoid friction and shearing  - Provide appropriate hygiene as needed including keeping skin clean and dry  - Evaluate need for skin moisturizer/barrier cream  - Collaborate with interdisciplinary team   - Patient/family teaching  - Consider wound care consult   Outcome: Progressing     Problem: PAIN - ADULT  Goal: Verbalizes/displays adequate comfort level or baseline comfort level  Description: Interventions:  - Encourage patient to monitor pain and request assistance  - Assess pain using appropriate pain scale  - Administer analgesics based on type and severity of pain and evaluate response  - Implement non-pharmacological measures as appropriate and evaluate response  - Consider cultural and social influences on pain and pain management  - Notify physician/advanced practitioner if interventions unsuccessful or patient reports new pain  Outcome: Progressing     Problem: INFECTION - ADULT  Goal: Absence or prevention of progression during hospitalization  Description: INTERVENTIONS:  - Assess and monitor for signs and symptoms of infection  - Monitor lab/diagnostic results  - Monitor all insertion sites, i e  indwelling lines, tubes, and drains  - Monitor endotracheal if appropriate and nasal secretions for changes in amount and color  - [place] appropriate cooling/warming therapies per order  - Administer medications as ordered  - Instruct and encourage patient and family to use good hand hygiene technique  - Identify and instruct in appropriate isolation precautions for identified infection/condition  Outcome: Progressing  Goal: Absence of fever/infection during neutropenic period  Description: INTERVENTIONS:  - Monitor WBC    Outcome: Progressing     Problem: SAFETY ADULT  Goal: Patient will remain free of falls  Description: INTERVENTIONS:  - Assess patient frequently for physical needs  -  Identify cognitive and physical deficits and behaviors that affect risk of falls    -  [place] fall precautions as indicated by assessment   - Educate patient/family on patient safety including physical limitations  - Instruct patient to call for assistance with activity based on assessment  - Modify environment to reduce risk of injury  - Consider OT/PT consult to assist with strengthening/mobility  Outcome: Progressing  Goal: Maintain or return to baseline ADL function  Description: INTERVENTIONS:  -  Assess patient's ability to carry out ADLs; assess patient's baseline for ADL function and identify physical deficits which impact ability to perform ADLs (bathing, care of mouth/teeth, toileting, grooming, dressing, etc )  - Assess/evaluate cause of self-care deficits   - Assess range of motion  - Assess patient's mobility; develop plan if impaired  - Assess patient's need for assistive devices and provide as appropriate  - Encourage maximum independence but intervene and supervise when necessary  - Involve family in performance of ADLs  - Assess for home care needs following discharge   - Consider OT consult to assist with ADL evaluation and planning for discharge  - Provide patient education as appropriate  Outcome: Progressing  Goal: Maintain or return mobility status to optimal level  Description: INTERVENTIONS:  - Assess patient's baseline mobility status (ambulation, transfers, stairs, etc )    - Identify cognitive and physical deficits and behaviors that affect mobility  - Identify mobility aids required to assist with transfers and/or ambulation (gait belt, sit-to-stand, lift, walker, cane, etc )  - [place] fall precautions as indicated by assessment  - Record patient progress and toleration of activity level on Mobility SBAR; progress patient to next Phase/Stage  - Instruct patient to call for assistance with activity based on assessment  - Consider rehabilitation consult to assist with strengthening/weightbearing, etc   Outcome: Progressing     Problem: Knowledge Deficit  Goal: Patient/family/caregiver demonstrates understanding of disease process, treatment plan, medications, and discharge instructions  Description: Complete learning assessment and assess knowledge base  Interventions:  - Provide teaching at level of understanding  - Provide teaching via preferred learning methods  Outcome: Progressing     Problem: Nutrition/Hydration-ADULT  Goal: Nutrient/Hydration intake appropriate for improving, restoring or maintaining nutritional needs  Description: Monitor and assess patient's nutrition/hydration status for malnutrition  Collaborate with interdisciplinary team and initiate plan and interventions as ordered  Monitor patient's weight and dietary intake as ordered or per policy  Utilize nutrition screening tool and intervene as necessary  Determine patient's food preferences and provide high-protein, high-caloric foods as appropriate       INTERVENTIONS:  - Monitor oral intake, urinary output, labs, and treatment plans  - Assess nutrition and hydration status and recommend course of action  - Evaluate amount of meals eaten  - Assist patient with eating if necessary   - Allow adequate time for meals  - Recommend/ encourage appropriate diets, oral nutritional supplements, and vitamin/mineral supplements  - Order, calculate, and assess calorie counts as needed  - Recommend, monitor, and adjust tube feedings and TPN/PPN based on assessed needs  - Assess need for intravenous fluids  - Provide specific nutrition/hydration education as appropriate  - Include patient/family/caregiver in decisions related to nutrition  Outcome: Progressing     Problem: DISCHARGE PLANNING  Goal: Discharge to home or other facility with appropriate resources  Description: INTERVENTIONS:  - Identify barriers to discharge w/patient and caregiver  - Arrange for needed discharge resources and transportation as appropriate  - Identify discharge learning needs (meds, wound care, etc )  - Arrange for interpretive services to assist at discharge as needed  - Refer to Case Management Department for coordinating discharge planning if the patient needs post-hospital services based on physician/advanced practitioner order or complex needs related to functional status, cognitive ability, or social support system  Outcome: Progressing

## 2021-03-31 NOTE — ORTHOTIC NOTE
Orthotic Note            Date: 3/31/2021      Patient Name: Melecio Elijah            Reason for Consult:  Patient Active Problem List   Diagnosis    Acid reflux    Hypothyroidism    Anemia    Arthritis    Type 2 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, left eye ([facility] )    Diabetic peripheral neuropathy ([facility] )    DM type 2, not at goal [facility])    Essential hypertension    Hyperlipidemia    Insulin pump status    Medtronic dual chamber PM    Screening for colon cancer    Encounter for diabetic foot exam ([place] [address] )    Risk for falls    Stage 3 chronic kidney disease    Thrombocytopenia (HCC)    High anion gap metabolic acidosis    Elevated troponin level not due myocardial infarction    Hyponatremia    Low bicarbonate    Pacemaker    Type 2 diabetes mellitus with hyperglycemia (HCC)    Vitreous hemorrhage of left eye (HCC)    Transition of care performed with sharing of clinical summary    Microalbuminuria    Complete heart block (HCC)    Preoperative clearance    Status post split thickness skin graft    Acquired absence of other left toe(s) (HCC)    Type 1 diabetes mellitus ([facility] )    Right [facility] layer [facility]    Personal history of diabetic foot ulcer    COVID-19    Hyperglycemia due to type 1 diabetes mellitus ([facility] )    Diarrhea    Weakness    Dehydration with hyponatremia    Gram-positive bacteremia    L1 vertebral fracture (HCC)     [facility] [employer] pt while supine in bed for a [employer]  Pt was unable to stay awake for fitting or instructions, RN aware  Will follow up in the afternoon to finalize fit for pt  Brace and handout are in the room at pt bedside  Recommendations:  Please call Mobility Coordinator at ext  [phone] in regards to bracing instruction and/or adjustment    Nacho Salazar Restorative Technician, BS

## 2021-03-31 NOTE — PROGRESS NOTES
Cardiology Progress Note - Eden Alexis 76 y o  male MRN: [id number]    Unit/Bed#: [facility] 920-01 Encounter: [id number]        Subjective:    No significant events overnight  No complaints     Review of Systems   Constitution: Negative  Cardiovascular: Negative for chest pain  Respiratory: Negative for shortness of breath  Objective:   Vitals: Blood pressure 136/79, pulse 100, temperature 98 7 °F (37 1 °C), resp  rate 18, height 5' 8" (1 727 m), weight 72 kg (158 lb 11 7 oz), SpO2 94 %  , Body mass index is 24 14 kg/m² ,   Orthostatic Blood Pressures      Most Recent Value   Blood Pressure  136/79 filed at 03/31/2021 0741   Patient Position - Orthostatic VS  Lying filed at 03/27/2021 7943         Systolic (82TRB), BEB:373 , Min:126 , LBB:464     Diastolic (34YXQ), YGD:96, Min:69, Max:79      Intake/Output Summary (Last 24 hours) at 3/31/2021 1025  Last data filed at 3/31/2021 0900  Gross per 24 hour   Intake 1731 67 ml   Output 425 ml   Net 1306 67 ml     Weight (last 2 days)     None              Physical Exam  Vitals signs reviewed  Constitutional:       Appearance: Normal appearance  HENT:      Head: Normocephalic  Nose: Nose normal       Mouth/Throat:      Mouth: Mucous membranes are moist       Pharynx: Oropharynx is clear  Eyes:      General: No scleral icterus  Conjunctiva/sclera: Conjunctivae normal    Neck:      Musculoskeletal: Normal range of motion and neck supple  Cardiovascular:      Rate and Rhythm: Normal rate and regular rhythm  Heart sounds: No murmur  No friction rub  No gallop  Pulmonary:      Effort: Pulmonary effort is normal  No respiratory distress  Breath sounds: Normal breath sounds  No wheezing or rales  Abdominal:      General: Abdomen is flat  Bowel sounds are normal  There is no distension  Palpations: Abdomen is soft  Tenderness: There is no abdominal tenderness  There is no guarding     Musculoskeletal:      Right lower leg: No edema  Left lower leg: No edema  Skin:     General: Skin is warm and dry  Neurological:      General: No focal deficit present  Mental Status: He is alert and oriented to person, place, and time     Psychiatric:         Mood and Affect: Mood normal          Behavior: Behavior normal            Laboratory Results:  Results from last 7 days   Lab Units 03/27/21  2317 03/27/21 2024 03/27/21  1619 03/24/21  1909   CK TOTAL U/L  --   --   --  86   TROPONIN I ng/mL 0 32* 0 38* 0 47*  --        CBC with diff:   Results from last 7 days   Lab Units 03/31/21  0705 03/30/21  0645 03/29/21  0616 03/28/21  0631 03/27/21  1619 03/24/21  1643   WBC Thousand/uL 14 57* 14 15* 16 85* 14 52* 15 73* 8 78   HEMOGLOBIN g/dL 8 6* 8 2* 8 9* 9 5* 9 8* 10 3*   HEMATOCRIT % 26 4* 25 0* 27 3* 28 9* 29 4* 31 5*   MCV fL 87 87 87 86 86 88   PLATELETS Thousands/uL 233 209 203 198 196 96*   MCH pg 28 3 28 5 28 3 28 1 28 7 28 8   MCHC g/dL 32 6 32 8 32 6 32 9 33 3 32 7   RDW % 14 8 14 9 14 8 14 5 14 3 14 2   MPV fL 8 7* 8 7* 9 2 9 3 9 2 9 1   NRBC AUTO /100 WBCs  --   --  0  --  0 0         CMP:  Results from last 7 days   Lab Units 03/31/21  0705 03/30/21  0645 03/29/21  0616 03/28/21  0631 03/27/21  1619 03/24/21  1643   POTASSIUM mmol/L 4 4 3 9 4 8 4 2 4 7 4 5   CHLORIDE mmol/L 103 105 101 104 96* 95*   CO2 mmol/L 23 22 24 24 24 23   BUN mg/dL 22 22 27* 30* 46* 51*   CREATININE mg/dL 1 23 1 05 1 18 1 07 1 55* 1 57*   CALCIUM mg/dL 7 5* 7 3* 7 9* 7 9* 8 7 8 5   AST U/L  --   --   --   --  20 23   ALT U/L  --   --   --   --  24 29   ALK PHOS U/L  --   --   --   --  145* 116   EGFR ml/min/1 73sq m 57 69 60 68 43 42         BMP:  Results from last 7 days   Lab Units 03/31/21  0705 03/30/21  0645 03/29/21  0616 03/28/21  0631 03/27/21  1619 03/24/21  1643   POTASSIUM mmol/L 4 4 3 9 4 8 4 2 4 7 4 5   CHLORIDE mmol/L 103 105 101 104 96* 95*   CO2 mmol/L 23 22 24 24 24 23   BUN mg/dL 22 22 27* 30* 46* 51*   CREATININE mg/dL 1 23 1 05 1 18 1  07 1 55* 1 57*   CALCIUM mg/dL 7 5* 7 3* 7 9* 7 9* 8 7 8 5       BNP: No results for input(s): BNP in the last 72 hours  Magnesium:       Coags:       TSH: No results found for: TSH    Hemoglobin A1C       Lipid Profile:       Cardiac testing:   Results for orders placed during the hospital encounter of 21   Echo complete with contrast if indicated    Narrative [facility]  [facility] [place], [address] [facility]  [phone]    Transthoracic Echocardiogram  2D, M-mode, Doppler, and Color Doppler    Study date:  29-Mar-2021    Patient: Toya Hatch  MR number: [id number]  Account number: [de-identified]  : 1945  Age: 76 years  Gender: Male  Status: Inpatient  Location: Bedside  Height: 68 in  Weight: 158 lb  BP: 117/ 63 mmHg    Indications: Recent COVID-19 infection  Diagnoses: I50 9 - Heart failure, unspecified    Sonographer:  Sebastian Giron RDCS  Referring Physician:  Raj Gaviria DO  Group:  [facility]  Cardiology Fellow:  Veronica Gregg MD  Interpreting Physician:  Marsha Vicente MD    SUMMARY    LEFT VENTRICLE:  Systolic function was normal  Ejection fraction was estimated to be 65 %  Although no diagnostic regional wall motion abnormality was identified, this possibility cannot be completely excluded on the basis of this study  Wall thickness was mildly increased  There was mild concentric hypertrophy  RIGHT ATRIUM:  The atrium was mildly dilated  MITRAL VALVE:  There was mild annular calcification  There was mild regurgitation  TRICUSPID VALVE:  Transtricuspid velocity was increased due to increased transvalvular flow  There was at least mild regurgitation  The TR may have been underestimated based on color Doppler  The dense, triangular CW Doppler profile is concerning for more significant TR    Pulmonary artery systolic pressure was at the upper limits of normal   Estimated peak PA pressure was 37 mmHg     IVC, HEPATIC VEINS:  The inferior vena cava was dilated  Respirophasic changes were blunted (less than 50% variation)  PERICARDIUM:  There was a left pleural effusion  HISTORY: PRIOR HISTORY: Complete heart block  DM2  CKD3  Pacemaker  GERD  PROCEDURE: The procedure was performed at the bedside  This was a routine study  The transthoracic approach was used  The study included complete 2D imaging, M-mode, complete spectral Doppler, and color Doppler  The heart rate was 95 bpm,  at the start of the study  Image quality was adequate  LEFT VENTRICLE: Size was normal  Systolic function was normal  Ejection fraction was estimated to be 65 %  Although no diagnostic regional wall motion abnormality was identified, this possibility cannot be completely excluded on the basis  of this study  Wall thickness was mildly increased  There was mild concentric hypertrophy  DOPPLER: Due to tachycardia, there was fusion of early and atrial contributions to ventricular filling  The study was not technically sufficient to  allow evaluation of LV diastolic function  RIGHT VENTRICLE: The size was normal  Systolic function was normal  A pacing wire was present in the ventricular cavity  LEFT ATRIUM: Size was normal     RIGHT ATRIUM: The atrium was mildly dilated  A pacing wire was present in the atrial cavity  MITRAL VALVE: There was mild annular calcification  There was mild diffuse thickening  DOPPLER: The transmitral velocity was within the normal range  There was no evidence for stenosis  There was mild regurgitation  AORTIC VALVE: The valve was trileaflet  Leaflets exhibited mildly increased thickness, mild calcification, and sclerosis  DOPPLER: Transaortic velocity was within the normal range  There was no evidence for stenosis  There was no  regurgitation  TRICUSPID VALVE: There was normal leaflet separation  DOPPLER: Transtricuspid velocity was increased due to increased transvalvular flow   There was no evidence for stenosis  There was at least mild regurgitation  The TR may have been  underestimated based on color Doppler  The dense, triangular CW Doppler profile is concerning for more significant TR  Pulmonary artery systolic pressure was at the upper limits of normal  Estimated peak PA pressure was 37 mmHg  PULMONIC VALVE: Leaflets exhibited normal thickness, no calcification, and normal cuspal separation  DOPPLER: The transpulmonic velocity was within the normal range  There was trace regurgitation  PERICARDIUM: There was no pericardial effusion  There was a left pleural effusion  AORTA: The root exhibited normal size  SYSTEMIC VEINS: IVC: The inferior vena cava was dilated  Respirophasic changes were blunted (less than 50% variation)  SYSTEM MEASUREMENT TABLES    2D  %FS: 30 81 %  Ao Diam: 3 51 cm  EDV(Teich): 56 92 ml  EF(Teich): 59 29 %  ESV(Teich): 23 17 ml  IVSd: 1 29 cm  LA Area: 17 98 cm2  LA Diam: 3 62 cm  LVEDV MOD A4C: 68 58 ml  LVEF MOD A4C: 61 %  LVESV MOD A4C: 26 75 ml  LVIDd: 3 67 cm  LVIDs: 2 54 cm  LVLd A4C: 6 81 cm  LVLs A4C: 5 96 cm  LVPWd: 1 28 cm  RA Area: 17 29 cm2  RVIDd: 3 38 cm  SV MOD A4C: 41 84 ml  SV(Teich): 33 75 ml    CW  MR VTI: 140 cm  MR Vmax: 5 12 m/s  MR Vmean: 4 09 m/s  MR maxP 99 mmHg  MR meanP 73 mmHg  TR Vmax: 2 32 m/s  TR maxP 54 mmHg  TV VTI: 25 53 cm  TV Vmax: 1 65 m/s  TV Vmean: 0 89 m/s  TV maxPG: 10 87 mmHg  TV meanP 32 mmHg    MM  TAPSE: 2 59 cm    [employer] Accredited Echocardiography Laboratory    Prepared and electronically signed by    Ofe Davila MD  Signed 30-Mar-2021 10:10:14       No results found for this or any previous visit  No results found for this or any previous visit  No results found for this or any previous visit      Meds/Allergies   current meds:   Current Facility-Administered Medications   Medication Dose Route Frequency    acetaminophen (TYLENOL) tablet 650 mg  650 mg Oral Q6H PRN  aspirin chewable tablet 81 mg  81 mg Oral Daily    atorvastatin (LIPITOR) tablet 5 mg  5 mg Oral Daily    DULoxetine (CYMBALTA) delayed release capsule 30 mg  30 mg Oral Daily    heparin (porcine) subcutaneous injection 5,000 Units  5,000 Units Subcutaneous Q8H [address]    insulin aspart (NovoLOG) FOR PUMP REFILLS 100 Units  100 Units Subcutaneous Insulin Pump Daily PRN    insulin glargine (LANTUS) subcutaneous injection 6 Units 0 06 mL  6 Units Subcutaneous HS    insulin lispro (HumaLOG) 100 units/mL subcutaneous injection 1-4 Units  1-4 Units Subcutaneous TID AC    Labetalol HCl (NORMODYNE) injection 10 mg  10 mg Intravenous Q6H PRN    levothyroxine tablet 125 mcg  125 mcg Oral Early Morning    [START ON 4/1/2021] lisinopril (ZESTRIL) tablet 2 5 mg  2 5 mg Oral Daily    ondansetron (ZOFRAN) injection 4 mg  4 mg Intravenous Q6H PRN    pantoprazole (PROTONIX) EC tablet 40 mg  40 mg Oral Early Morning    PATIENT MAINTAINED INSULIN PUMP 1 each  1 each Subcutaneous Q8H    psyllium (METAMUCIL) 1 packet  1 packet Oral Daily    sodium chloride (PF) 0 9 % injection 3 mL  3 mL Intravenous Q1H PRN    tamsulosin (FLOMAX) capsule 0 4 mg  0 4 mg Oral Daily With Dinner    torsemide (DEMADEX) tablet 5 mg  5 mg Oral Daily     Medications Prior to Admission   Medication    amLODIPine (NORVASC) 5 mg tablet    ascorbic acid (VITAMIN C) 1000 MG tablet    aspirin 81 mg chewable tablet    Atorvastatin Calcium (LIPITOR PO)    CALCIUM CITRATE PO    Cholecalciferol (VITAMIN D3) 2000 units capsule    Continuous Blood Gluc Sensor (DEXCOM G6 SENSOR) MISC    cyanocobalamin (VITAMIN B-12) 1,000 mcg tablet    DULoxetine (CYMBALTA) 30 mg delayed release capsule    ezetimibe (ZETIA) 10 mg tablet    glucose 40 %    glucose 77 4 %    insulin aspart (NovoLOG) 100 units/mL injection    levothyroxine 125 mcg tablet    loperamide (IMODIUM) 2 mg capsule    pantoprazole (PROTONIX) 40 mg tablet    tamsulosin (FLOMAX) 0 4 mg  zinc sulfate (ZINCATE) 220 mg capsule          Assessment:  Principal Problem:    Gram-positive bacteremia  Active Problems:    Hypothyroidism    Anemia    Type 2 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, left eye (HCC)    Essential hypertension    Stage 3 chronic kidney disease    Elevated troponin level not due myocardial infarction    Complete heart block (HCC)    Diarrhea    Weakness    Dehydration with hyponatremia    L1 vertebral fracture (HCC)      Non MI trop elevation       History of complete heart block s/p PPM implant      Elevated filling pressures on echo  Given dose of IV lasix yesterday  No further CV recs  Will sign off  Counseling / Coordination of Care  Total floor / unit time spent today 25 minutes  Greater than 50% of total time was spent with the patient and / or family counseling and / or coordination of care  A description of the counseling / coordination of care

## 2021-03-31 NOTE — CONSULTS
PHYSICAL MEDICINE AND REHABILITATION CONSULT NOTE  Melecio Nava 76 y o  male MRN: [id number]  Unit/Bed#: [facility] 920-01 Encounter: [id number]    Requested by (Physician/Service): Yeni Huerta MD  Reason for Consultation:  Assessment of rehabilitation needs    Assessment:  Rehabilitation Diagnosis:    L1 Vertebral Fracture   Chronic Post-Covid Fatigue and weakness from infection in 2/2021   Impaired mobility and self care   Impaired cognition     Recommendations:  Rehabilitation Plan:   Continue PT/OT (SLP) while on acute care   Mr Alena Crowe is still pending OT therapy evaluations  If he remains at his current functional level, he would benefit from acute inpatient rehabilitation, but I anticipate he may progress to a modified independent/supervision level  If that is the case, he will be able to safely discharge home with home therapy   Limit use of benzos for him due to his AMS   Covid-19 Testing: [facility] rehabilitation units require testing within 48 hours of potential admission for all general admissions  Please re-test if needed  *Re-testing is NOT required for patients recovering from COVID-19 infection if isolation has been discontinued per CDC criteria  Medical Co-morbidities Plan:  Type 2 Diabetes  Diabetic retinopathy  Diarrhea  Gram positive bactermia  Complete Heart Block s/p PPM  Elevated troponin levels without MI  Chronic Kidney Disease stage 3  Osteoporosis per DEXA in 2018  Anemia  Leukocytosis  Bowel plan: Metamucil, Last BM 3/28  Bladder plan: incontinent  DVT ppx: heparin      Thank you for this consultation  Do not hesitate to contact service with further questions        Jacy Cristobal DO  PM&R    History of Present Illness:  Melecio Nava is a 76 y o  male with  has a past medical history of Arthritis, Cardiac disease, Closed fracture of fibula, lateral malleolus, right, Complete heart block ([address] ), Diabetes mellitus ([address] ), Disease of thyroid gland, External incisional dehiscence, GERD (gastroesophageal reflux disease), Heart disease, Muscular deconditioning, Pacemaker, Sleep apnea, and Thyroid disease     Patient presented to the [facility] on 3/27 for weakness  Patient had COVID infection in February  Since arriving home he is been sleeping a lot  Not really taking in much by mouth  He denies any fevers or chills or productive cough  He has noticed increased urination with inability to control blood sugars per insulin pump  He also complains loose stools he denies any watery bowel movements  He states they have been frequent  At times unable to hold in until he makes to the bathroom  He also had a Non MI troponin elevation in the setting of diarrhea, weight loss, recent COVID-19 pneumonia  The diarrhea has since resolved  He was also found to have worsening acute on chronic L1 Compression fracture and in a TLSO currently  PMR is consulted for recommendations  Review of Systems: 10 point ROS negative except for what is noted in HPI    Function:  Prior level of function and living situation:  Previously independent  Per wife, had significant fatigue post Covid infection in February that has not resolved  They have support from the wife as well as their son for discharge  Home has a ramp installed and stair glide provided by the [place]  Current level of function:  Physical Therapy: Min A for ambulation 200ft, transfers Min Ax1  Occupational Therapy: not assessed      Physical Exam:  /79   Pulse 100   Temp 98 7 °F (37 1 °C)   Resp 18   Ht 5' 8" (1 727 m)   Wt 72 kg (158 lb 11 7 oz)   SpO2 94%   BMI 24 14 kg/m²        Intake/Output Summary (Last 24 hours) at 3/31/2021 0952  Last data filed at 3/31/2021 0600  Gross per 24 hour   Intake 1611 67 ml   Output 425 ml   Net 1186 67 ml       Body mass index is 24 14 kg/m²  Physical Exam  Constitutional:       General: He is not in acute distress       Comments: Somnolent, sitting in chair in [address]:      Head: Normocephalic and atraumatic  Nose: No rhinorrhea  Mouth/Throat:      Mouth: Mucous membranes are moist    Eyes:      Extraocular Movements: Extraocular movements intact  Cardiovascular:      Rate and Rhythm: Normal rate  Pulses: Normal pulses  Pulmonary:      Effort: Pulmonary effort is normal    Abdominal:      General: There is no distension  Musculoskeletal:      Right lower leg: No edema  Left lower leg: No edema  Skin:     General: Skin is warm  Neurological:      Comments: Somnolent and pleasantly confused  5/5 strength in the left upper limb and 5/5 in the right upper limb with limited participation  Good dorsiflexion/plantarflexion  Psychiatric:         Mood and Affect: Mood normal          Behavior: Behavior normal           Social History:    Social History     Socioeconomic History    Marital status: /Civil Union     Spouse name: None    Number of children: 1    Years of education: Some college     Highest education level: None   Occupational History    Occupation: Disabled    Occupation: Retired      Comment: [employer]   Social Needs    Financial resource strain: Somewhat hard   John-Marie insecurity     Worry: None     Inability: None    Transportation needs     Medical: No     Non-medical: No   Tobacco Use    Smoking status: Former Smoker     Types: Pipe    Smokeless tobacco: Never Used   Substance and Sexual Activity    Alcohol use:  Yes     Alcohol/week: 0 0 standard drinks     Frequency: Never     Binge frequency: Never     Comment: socially    Drug use: No    Sexual activity: None   Lifestyle    Physical activity     Days per week: None     Minutes per session: None    Stress: None   Relationships    Social connections     Talks on phone: None     Gets together: None     Attends Religion service: None     Active member of club or organization: None     Attends meetings of clubs or organizations: None     Relationship status: None    Intimate partner violence     Fear of current or ex partner: None     Emotionally abused: None     Physically abused: None     Forced sexual activity: None   Other Topics Concern    None   Social History Narrative    Caffeine Use - 15-20 oz/day     Daily caffeinated coffee consumption     Dental care, regularly - Denied     Guns in the home: Stored in locked cabinet - Denied    Pets/Animals: Dog     Yazidi practices    Tea    Water intake, adequate (per day)        Family History:    Family History   Problem Relation Age of Onset    Diabetes Mother     Heart disease Mother     Thyroid disease Mother         Disorder     Cancer Brother     Gout Brother     Diabetes Maternal Grandmother          Medications:     Current Facility-Administered Medications:     acetaminophen (TYLENOL) tablet 650 mg, 650 mg, Oral, Q6H PRN, Pete Gurrola MD    aspirin chewable tablet 81 mg, 81 mg, Oral, Daily, Pete Gurrola MD, 81 mg at 03/30/21 0825    atorvastatin (LIPITOR) tablet 5 mg, 5 mg, Oral, Daily, Pete Gurrola MD, 5 mg at 03/30/21 0825    DULoxetine (CYMBALTA) delayed release capsule 30 mg, 30 mg, Oral, Daily, Pete Gurrola MD, 30 mg at 03/30/21 0825    heparin (porcine) subcutaneous injection 5,000 Units, 5,000 Units, Subcutaneous, Q8H [address], Pete Gurrola MD, 5,000 Units at 03/31/21 4720    insulin aspart (NovoLOG) FOR PUMP REFILLS 100 Units, 100 Units, Subcutaneous Insulin Pump, Daily PRN, Juno Rubi MD    insulin glargine (LANTUS) subcutaneous injection 6 Units 0 06 mL, 6 Units, Subcutaneous, HS, Juno Rubi MD, 6 Units at 03/30/21 2131    insulin lispro (HumaLOG) 100 units/mL subcutaneous injection 1-4 Units, 1-4 Units, Subcutaneous, TID AC, 1 Units at 03/30/21 1713 **AND** Fingerstick Glucose (POCT), , , TID AC, Mi Flores MD    Labetalol HCl (NORMODYNE) injection 10 mg, 10 mg, Intravenous, Q6H PRN, MD Madi Gonzalez levothyroxine tablet 125 mcg, 125 mcg, Oral, Early Morning, Raymond Clay MD, 125 mcg at 03/31/21 5302    lisinopril (ZESTRIL) tablet 5 mg, 5 mg, Oral, Daily, Wade Murillo MD    ondansetron [facility]) injection 4 mg, 4 mg, Intravenous, Q6H PRN, Raymond Clay MD    pantoprazole (PROTONIX) EC tablet 40 mg, 40 mg, Oral, Early Morning, Raymond Clay MD, 40 mg at 03/31/21 3104    PATIENT MAINTAINED INSULIN PUMP 1 each, 1 each, Subcutaneous, Q8H, Fuad Rice MD    psyllium (METAMUCIL) 1 packet, 1 packet, Oral, Daily, Raymond Clay MD, 1 packet at 03/30/21 0106    Insert peripheral IV, , , Once **AND** sodium chloride (PF) 0 9 % injection 3 mL, 3 mL, Intravenous, Q1H PRN, Raymond Clay MD    [facility]) capsule 0 4 mg, 0 4 mg, Oral, Daily With Faith Panda MD, 0 4 mg at 03/30/21 1712    Past Medical History:     Past Medical History:   Diagnosis Date    Arthritis     Cardiac disease     Closed fracture of fibula, lateral malleolus, right     Complete heart block ([address] )     Diabetes mellitus ([address] )     Disease of thyroid gland     External incisional dehiscence     GERD (gastroesophageal reflux disease)     Heart disease     Muscular deconditioning     Pacemaker     Sleep apnea     cannot tolerate cpap    Thyroid disease         Past Surgical History:     Past Surgical History:   Procedure Laterality Date    CARDIAC PACEMAKER PLACEMENT Left 2015    CARDIAC PACEMAKER PLACEMENT      CARDIAC SURGERY      CATARACT EXTRACTION      MUSCLE FLAP      right foot    NERVE BLOCK      Transforaminal Epidural Lumbar with Fluoroscopic Guidance     NV COLONOSCOPY FLX DX W/COLLJ SPEC WHEN PFRMD N/A 5/4/2018    Procedure: EGD AND COLONOSCOPY;  Surgeon: Juliette Salvador MD;  Location: [facility]  GI LAB;   Service: Gastroenterology    NV S[address] <100 SQCM Right 6/10/2019    Procedure: SKIN GRAFT SPLIT THICKNESS (STSG)  EXTREMITY right leg;  Surgeon: Jason Anand Anu Ojeda DPM;  Location:  MAIN OR;  Service: Podiatry    TOE AMPUTATION      left 5th toe         Allergies: Allergies   Allergen Reactions    Contrast  [Iodinated Diagnostic Agents]     Lipitor  [Atorvastatin Calcium] Drowsiness    Ibuprofen      sensitive    Iodine - Food Allergy Rash    Simvastatin Anxiety     Other reaction(s): Malaise (finding)           LABORATORY RESULTS:      Lab Results   Component Value Date    HGB 8 6 (L) 03/31/2021    HGB 12 4 06/24/2015    HCT 26 4 (L) 03/31/2021    HCT 34 9 (L) 06/24/2015    WBC 14 57 (H) 03/31/2021    WBC 7 35 06/24/2015     Lab Results   Component Value Date    BUN 22 03/31/2021    BUN 24 06/24/2015     (L) 06/24/2015    K 4 4 03/31/2021    K 4 9 06/24/2015     03/31/2021    CL 96 (L) 06/24/2015    GLUCOSE >600 (HH) 07/13/2019    GLUCOSE 280 (H) 06/24/2015    CREATININE 1 23 03/31/2021    CREATININE 0 96 06/24/2015     Lab Results   Component Value Date    PROTIME 13 3 06/23/2015    INR 1 03 06/23/2015        DIAGNOSTIC STUDIES: Reviewed  Ct Abdomen Pelvis Wo Contrast    Result Date: 3/27/2021  Impression: 1  Partially imaged groundglass opacification in the superior segment of the right lower lobe and adjacent scarring compatible with recent COVID-19 pneumonia  2   No evidence of acute intra-abdominal or pelvic process  Workstation performed: [id number]     X-ray Chest 1 View Portable    Result Date: 3/28/2021  Impression: No acute cardiopulmonary disease  Workstation performed: [id number]     Xr Chest Pa & Lateral    Result Date: 3/29/2021  Impression: Right lower lobe pneumonia corresponding with the abdomen CT with small effusions    Workstation performed: [id number]     Donya Gtz DO  Physical Medicine and [place]

## 2021-03-31 NOTE — PLAN OF CARE
Problem: PHYSICAL THERAPY ADULT  Goal: Performs mobility at highest level of function for planned discharge setting  See evaluation for individualized goals  Description: Treatment/Interventions: Functional transfer training, LE strengthening/ROM, Elevations, Therapeutic exercise, Endurance training, Patient/family training, Equipment eval/education, Bed mobility, Gait training, Spoke to nursing  Equipment Recommended: Flory Pisano       See flowsheet documentation for full assessment, interventions and recommendations  Outcome: Progressing  Note: Prognosis: Fair  Problem List: Decreased strength, Decreased range of motion, Decreased endurance, Impaired balance, Decreased mobility, Decreased cognition, Impaired judgement, Decreased safety awareness, Orthopedic restrictions  Assessment: Pt appeared to have improved cognition compared to last PT seesion, able to state location, situation, name, month, year and within 10 days of current date  Upon PT arrival, pt was attempting to get OOB but was tangled in robes  Pt performed supine to sit at [address] [place] [address]  Pt repeatedly standing against PT advisory without RW   1 LOB posterior onto bed  Pt instructed on donning TSLO which remained on for the rest of the PT session  Seated balance requiring mod A 2* to pt fatigue and multiple episodes of dozing off  Pt easily aroused  Pt ambulated ~80ft x2 w/ RW, 1 seated rest break,increased VC for safety and walker negotiation  Pt seated OOB in chair w/ chair alarm activated, TLSO donned and all needs within reach  Pt continues to demonstrate increased fatigue, decreased strength, cognition, endurance, and balance, limiting functional mobility and safety  Pt would benefit from continued skilled rehabilitation in order to improve the previously stated impairments and increase independence  Pt would benefit frrom D/C to rehab    Barriers to Discharge: Inaccessible home environment     PT Discharge Recommendation: Post-Acute Rehabilitation Services     PT - OK to Discharge: Yes    See flowsheet documentation for full assessment

## 2021-03-31 NOTE — ORTHOTIC NOTE
Orthotic Note            Date: 3/31/2021      Patient Name: Willam Curling            Reason for Consult:  Patient Active Problem List   Diagnosis    Acid reflux    Hypothyroidism    Anemia    Arthritis    Type 2 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, left eye ([address] )    Diabetic peripheral neuropathy ([facility] [address] )    DM type 2, not at goal [facility])    Essential hypertension    Hyperlipidemia    Insulin pump status    Medtronic dual chamber PM    Screening for colon cancer    Encounter for diabetic foot exam ([facility] )    Risk for falls    Stage 3 chronic kidney disease    Thrombocytopenia (HCC)    High anion gap metabolic acidosis    Elevated troponin level not due myocardial infarction    Hyponatremia    Low bicarbonate    Pacemaker    Type 2 diabetes mellitus with hyperglycemia (HCC)    Vitreous hemorrhage of left eye (HCC)    Transition of care performed with sharing of clinical summary    Microalbuminuria    Complete heart block (HCC)    Preoperative clearance    Status post split thickness skin graft    Acquired absence of other left toe(s) (HCC)    Type 1 diabetes mellitus ([facility] )    Right foot ulcer, with fat layer exposed ([address] )    Personal history of diabetic foot ulcer    COVID-19    Hyperglycemia due to type 1 diabetes mellitus ([facility] )    Diarrhea    Weakness    Dehydration with hyponatremia    Gram-positive bacteremia    L1 vertebral fracture ([facility] )     [facility] [employer] an don pt in a [facility] [place] [facility] [place] TLSO while seated EOB with assistance from Physical Therapy  Instructions and adjustments reviewed with pt and backpack TLSO handout left at pt bedside  RN aware, April Polly will continue to follow  Recommendations:  Please call Mobility Coordinator at ext  [phone] in regards to bracing instruction and/or adjustment    Magda Alexander Restorative Technician, BS

## 2021-03-31 NOTE — PROGRESS NOTES
Code team called to come up at 2200  Patient every angry trying to storm out of room  Patient slammed phone and personal items on floor multiple times and stated he was "going to the other side " Patient screaming and cursing and stormed out of the room to end of the hallway with walker  He was followed by nurses and wheelchair  Control team came up and helped assist him back to bed  He is calm now and in bed with alarms on

## 2021-03-31 NOTE — PLAN OF CARE
Problem: OCCUPATIONAL THERAPY ADULT  Goal: Performs self-care activities at highest level of function for planned discharge setting  See evaluation for individualized goals  Description: Treatment Interventions: ADL retraining, Functional transfer training, UE strengthening/ROM, Cognitive reorientation, Endurance training, Patient/family training, Equipment evaluation/education, Fine motor coordination activities, Compensatory technique education, Continued evaluation, Energy conservation, Activityengagement          See flowsheet documentation for full assessment, interventions and recommendations  Note: Limitation: Decreased ADL status, Decreased UE ROM, Decreased UE strength, Decreased Safe judgement during ADL, Decreased cognition, Decreased endurance, Decreased self-care trans, Decreased high-level ADLs  Prognosis: Fair  Assessment: Pt is a 76 y o  male [facility] w/ Gram-positive bacteremia, dehydration, diarrhea, heart block  has a past medical history of Arthritis, Cardiac disease, Closed fracture of fibula, lateral malleolus, right, Complete heart block ([facility] ), Diabetes mellitus ([facility] ), Disease of thyroid gland, External incisional dehiscence, GERD (gastroesophageal reflux disease), Heart disease, Muscular deconditioning, Pacemaker, Sleep apnea, and Thyroid disease  Pt with active OT orders  As per pt report, pta, resides in a multi-level home, 4STE/ramp with his wife  Pt was I w/  ADLS and IADLS, (-) drove  Upon evaluation, pt currently requires MIN A with RW for transfers and mobility  Exhibits decreased strength to B/L UE's limiting performance in ADLs/transfers  Pt currently  requires MOD A UB ADLs, MOD A LB ADLs, and MIN A toileting  Pt with difficulty managing [facility] tasks   Pt is limited at this time 2*: endurance, activity tolerance, functional mobility, balance, trunk control, functional standing tolerance, decreased I w/ ADLS/IADLS, strength, ROM, cognitive impairments, decreased safety awareness and coordination deficits  The following Occupational Performance Areas to address include: eating, grooming, bathing/shower, toilet hygiene, dressing, health maintenance, functional mobility, community mobility and clothing management  Pt to benefit from immediate acute skilled OT to address above deficits, improve overall functional independence, maximize fnxl mobility and reduce caregiver burden  From OT standpoint, recommendation at time of d/c would be STR  Pt was left in chair with alarm on after session with all current needs met  The patient's raw score on the AM-PAC Daily Activity inpatient short form is 14, standardized score is 33 39, less than 39 4  Patients at this level are likely to benefit from discharge to post-acute rehabilitation services  Please refer to the recommendation of the Occupational Therapist for safe discharge planning  Pt seen for additional OT tx session post evaluation  See assessment below        OT Discharge Recommendation: Post-Acute Rehabilitation Services  OT - OK to Discharge: Yes(to rehab when medically stable )

## 2021-03-31 NOTE — ASSESSMENT & PLAN NOTE
Hyponatremia, most likely SIADH  Will rule out pseudohyponatremia, will order lipid profile and SPEP, UPEP, a m  Cortisol  Start small dose of torsemide 5 mg daily  Monitor electrolytes

## 2021-03-31 NOTE — ASSESSMENT & PLAN NOTE
Admitted complaining of generalized weakness increased urinary frequency and diarrhea  Spiking episode of fever of 100 3 on March 27, 2021  Has remained afebrile during the last 48  hours  Blood culture from March 27, 2021 2/2 sets of blood culture growing Gram-positive rods ( Corynebacterium striatum groupAbnormal )  Urine culture 03/27/2021-negative  Follow-up Chest x-ray 3/29 showed right lower lobe consolidation likely residual from COVID-19 pneumonia on February  CT abdomen and pelvis showed no acute intra-abdominal abnormalities  Pulmonary finding compatible with recent COVID-19 pneumonia  (diagnosed on February 5, 2021)  Initially treated with ceftriaxone switched to cefepime  Stool enteric bacterial panel by PCR-negative, stool C difficile discontinued due to resolution of diarrhea  Infectious on board, recommendation to discontinue antibiotics; blood cultures oral likely contaminant  Patient has been off antibiotics during the last 48 hours has remained hemodynamically stable, afebrile    Trend temperature curve and WBC count, monitor off antibiotics

## 2021-03-31 NOTE — QUICK NOTE
Patient is a 76y o  year old male with type 1 diabetes on insulin pump therapy at home, admitted with diarrhea and dehydration  He had no hypoglycemia in the last 24 hours  Apparently, patient was agitated and was given Ativan  He is currently sleeping  We will continue basal bolus insulin as its not safe to restart the pump given fluctuations in mental status  Continue Lantus 6 units qhs and Lispro 2 units with meals  Vitamin-D level is 29 3 ng/mL  He is taking is calcium citrate 2000 mg per day and cholecalciferol 2000 units daily  We will increase vitamin D to 4000 units daily

## 2021-03-31 NOTE — PROGRESS NOTES
Patient continues to be sleepy and drowsy this afternoon  Patient received 2 doses of ativan at 0200 and 0630 earlier this morning for impulsiveness and agitation overnight  Xray of spine to be rescheduled due to continued drowsiness  Patient arousable and able to answer questions if woken up  [facility] resident, Liya oMy MD, made aware  All vital signs stable  Will continue to monitor

## 2021-03-31 NOTE — ASSESSMENT & PLAN NOTE
Normocytic anemia, likely anemia of chronic disease  Prior iron studies showed finding consistent with anemia of chronic disease  Hemoglobin 8 6 grams/deciliter with no evidence of active bleeding  Denies seen any blood in stools  Denies tarry dark stools  Trend hemoglobin

## 2021-04-01 NOTE — ASSESSMENT & PLAN NOTE
Lab Results   Component Value Date    EGFR 60 04/01/2021    EGFR 57 03/31/2021    EGFR 69 03/30/2021    CREATININE 1 18 04/01/2021    CREATININE 1 23 03/31/2021    CREATININE 1 05 03/30/2021   Cr at baseline  Monitor BUN/Cr  Avoid nephrotoxic agents and hypotension

## 2021-04-01 NOTE — PLAN OF CARE
Problem: Potential for Falls  Goal: Patient will remain free of falls  Description: INTERVENTIONS:  - Assess patient frequently for physical needs  -  Identify cognitive and physical deficits and behaviors that affect risk of falls    -  [place] fall precautions as indicated by assessment   - Educate patient/family on patient safety including physical limitations  - Instruct patient to call for assistance with activity based on assessment  - Modify environment to reduce risk of injury  - Consider OT/PT consult to assist with strengthening/mobility  Outcome: Progressing     Problem: Prexisting or High Potential for Compromised Skin Integrity  Goal: Skin integrity is maintained or improved  Description: INTERVENTIONS:  - Identify patients at risk for skin breakdown  - Assess and monitor skin integrity  - Assess and monitor nutrition and hydration status  - Monitor labs   - Assess for incontinence   - Turn and reposition patient  - Assist with mobility/ambulation  - Relieve pressure over bony prominences  - Avoid friction and shearing  - Provide appropriate hygiene as needed including keeping skin clean and dry  - Evaluate need for skin moisturizer/barrier cream  - Collaborate with interdisciplinary team   - Patient/family teaching  - Consider wound care consult   Outcome: Progressing     Problem: PAIN - ADULT  Goal: Verbalizes/displays adequate comfort level or baseline comfort level  Description: Interventions:  - Encourage patient to monitor pain and request assistance  - Assess pain using appropriate pain scale  - Administer analgesics based on type and severity of pain and evaluate response  - Implement non-pharmacological measures as appropriate and evaluate response  - Consider cultural and social influences on pain and pain management  - Notify physician/advanced practitioner if interventions unsuccessful or patient reports new pain  Outcome: Progressing     Problem: INFECTION - ADULT  Goal: Absence or prevention of progression during hospitalization  Description: INTERVENTIONS:  - Assess and monitor for signs and symptoms of infection  - Monitor lab/diagnostic results  - Monitor all insertion sites, i e  indwelling lines, tubes, and drains  - Monitor endotracheal if appropriate and nasal secretions for changes in amount and color  - [place] appropriate cooling/warming therapies per order  - Administer medications as ordered  - Instruct and encourage patient and family to use good hand hygiene technique  - Identify and instruct in appropriate isolation precautions for identified infection/condition  Outcome: Progressing  Goal: Absence of fever/infection during neutropenic period  Description: INTERVENTIONS:  - Monitor WBC    Outcome: Progressing     Problem: SAFETY ADULT  Goal: Patient will remain free of falls  Description: INTERVENTIONS:  - Assess patient frequently for physical needs  -  Identify cognitive and physical deficits and behaviors that affect risk of falls    -  [place] fall precautions as indicated by assessment   - Educate patient/family on patient safety including physical limitations  - Instruct patient to call for assistance with activity based on assessment  - Modify environment to reduce risk of injury  - Consider OT/PT consult to assist with strengthening/mobility  Outcome: Progressing  Goal: Maintain or return to baseline ADL function  Description: INTERVENTIONS:  -  Assess patient's ability to carry out ADLs; assess patient's baseline for ADL function and identify physical deficits which impact ability to perform ADLs (bathing, care of mouth/teeth, toileting, grooming, dressing, etc )  - Assess/evaluate cause of self-care deficits   - Assess range of motion  - Assess patient's mobility; develop plan if impaired  - Assess patient's need for assistive devices and provide as appropriate  - Encourage maximum independence but intervene and supervise when necessary  - Involve family in performance of ADLs  - Assess for home care needs following discharge   - Consider OT consult to assist with ADL evaluation and planning for discharge  - Provide patient education as appropriate  Outcome: Progressing  Goal: Maintain or return mobility status to optimal level  Description: INTERVENTIONS:  - Assess patient's baseline mobility status (ambulation, transfers, stairs, etc )    - Identify cognitive and physical deficits and behaviors that affect mobility  - Identify mobility aids required to assist with transfers and/or ambulation (gait belt, sit-to-stand, lift, walker, cane, etc )  - [place] fall precautions as indicated by assessment  - Record patient progress and toleration of activity level on Mobility SBAR; progress patient to next Phase/Stage  - Instruct patient to call for assistance with activity based on assessment  - Consider rehabilitation consult to assist with strengthening/weightbearing, etc   Outcome: Progressing     Problem: DISCHARGE PLANNING  Goal: Discharge to home or other facility with appropriate resources  Description: INTERVENTIONS:  - Identify barriers to discharge w/patient and caregiver  - Arrange for needed discharge resources and transportation as appropriate  - Identify discharge learning needs (meds, wound care, etc )  - Arrange for interpretive services to assist at discharge as needed  - Refer to Case Management Department for coordinating discharge planning if the patient needs post-hospital services based on physician/advanced practitioner order or complex needs related to functional status, cognitive ability, or social support system  Outcome: Progressing     Problem: Knowledge Deficit  Goal: Patient/family/caregiver demonstrates understanding of disease process, treatment plan, medications, and discharge instructions  Description: Complete learning assessment and assess knowledge base    Interventions:  - Provide teaching at level of understanding  - Provide teaching via preferred learning methods  Outcome: Progressing     Problem: Nutrition/Hydration-ADULT  Goal: Nutrient/Hydration intake appropriate for improving, restoring or maintaining nutritional needs  Description: Monitor and assess patient's nutrition/hydration status for malnutrition  Collaborate with interdisciplinary team and initiate plan and interventions as ordered  Monitor patient's weight and dietary intake as ordered or per policy  Utilize nutrition screening tool and intervene as necessary  Determine patient's food preferences and provide high-protein, high-caloric foods as appropriate       INTERVENTIONS:  - Monitor oral intake, urinary output, labs, and treatment plans  - Assess nutrition and hydration status and recommend course of action  - Evaluate amount of meals eaten  - Assist patient with eating if necessary   - Allow adequate time for meals  - Recommend/ encourage appropriate diets, oral nutritional supplements, and vitamin/mineral supplements  - Order, calculate, and assess calorie counts as needed  - Recommend, monitor, and adjust tube feedings and TPN/PPN based on assessed needs  - Assess need for intravenous fluids  - Provide specific nutrition/hydration education as appropriate  - Include patient/family/caregiver in decisions related to nutrition  Outcome: Progressing

## 2021-04-01 NOTE — ASSESSMENT & PLAN NOTE
Initially thought to be hypotonic hyponatremia in the setting of poor oral intake and concomitant diarrhea, hence, was initiated on IV fluids  Serum sodium decreased again to 130 today - continue fluid restriction as paradoxically worsening w/ IV fluids (discontinued yesterday) - continue low-dose Demadex - consider salt tablet supplementation? ?

## 2021-04-01 NOTE — PROGRESS NOTES
Progress Note - Infectious Disease   Sabino Palafox 76 y o  male MRN: [id number]  Unit/Bed#: [facility] 920-01 Encounter: [id number]      Impression/Recommendations:  1   Corynebacterium bacteremia   Although it looks like there was growth in 2 admission blood cultures, in reality, it appears that only 1 blood culture was drawn at the same time and same site, but inoculated 2 different sets  There was also growth of coagulase staph in the same blood cultures   Patient is clinically and systemically well, without evidence of active infection clinically   I suspect this is contaminated blood draw  Repeat blood cultures have no growth thus far  Continue to observe off off antibiotic  Follow-up on repeat blood culture results  Monitor temperature/WBC  Monitor hemodynamics      2  Loose stool, POA   This is most likely secondary to prolonged COVID symptoms   This has improved with conservative management   Abdominal exam is benign   Abdomen/pelvis CT is benign  Monitor for recurrent symptoms      3  Hypernatremia secondary to dehydration   This is improved with IV fluid hydration  IV fluid hydration per primary service      4  Recent COVID-19   Patient completed treatment course with remdesivir and systemic corticosteroid   CXR and CT with mild residual ground-glass opacities   Patient is minimal respiratory symptoms  No further specific COVID treatment necessary  No isolation necessary at this point      5  Mild leukocytosis   No obvious active infection   WBC remains stable in the mid teens  Monitor WBC      6  KAZ, POA, superimposed on CKD   This is likely secondary to prerenal state from dehydration   Creatinine is now at baseline  Monitor creatinine      7  DM, somewhat poorly controlled, with hyperglycemia and elevated hemoglobin A1c on admission  Management per primary service      Discussed with patient in detail regarding the above plan  Okay for discharge from ID viewpoint      With no active infection, I will sign off  Thank you for the consultation  Please do not hesitate to reconsult us for any questions or issues      Antibiotics:  Off antibiotic     Subjective:  Patient feels well  Energy is improving slowly  No dyspnea/cough  Diarrhea much improved      Objective:  Vitals:  Temp:  [97 4 °F (36 3 °C)-97 8 °F (36 6 °C)] 97 8 °F (36 6 °C)  HR:  [95-97] 96  Resp:  [18] 18  BP: (130-138)/(62-75) 130/75  SpO2:  [89 %-95 %] 89 %  Temp (24hrs), Av 6 °F (36 4 °C), Min:97 4 °F (36 3 °C), Max:97 8 °F (36 6 °C)  Current: Temperature: 97 8 °F (36 6 °C)    Physical Exam:     General: Awake, alert, cooperative, no distress  Neck:  Supple  No mass  No lymphadenopathy  Lungs: Expansion symmetric, no rales, no wheezing, respirations unlabored  Heart:  Regular rate and rhythm, S1 and S2 normal, no murmur  Abdomen: Soft, nondistended, non-tender, bowel sounds active all four quadrants, no masses, no organomegaly  Extremities: No edema  No erythema/warmth  No ulcer  Nontender to palpation  Skin:  No rash  Neuro: Moves all extremities  Invasive Devices     Peripheral Intravenous Line            Peripheral IV 21 Right Forearm 3 days                Labs studies:   I have personally reviewed pertinent labs  Results from last 7 days   Lab Units 21  0508 21  0721  0645  21  1619   POTASSIUM mmol/L 4 3 4 4 3 9   < > 4 7   CHLORIDE mmol/L 101 103 105   < > 96*   CO2 mmol/L 23 23 22   < > 24   BUN mg/dL 17 22 22   < > 46*   CREATININE mg/dL 1 18 1 23 1 05   < > 1 55*   EGFR ml/min/1 73sq m 60 57 69   < > 43   CALCIUM mg/dL 7 8* 7 5* 7 3*   < > 8 7   AST U/L  --   --   --   --  20   ALT U/L  --   --   --   --  24   ALK PHOS U/L  --   --   --   --  145*    < > = values in this interval not displayed       Results from last 7 days   Lab Units 21  0508 21  0705 21  0645   WBC Thousand/uL 12 49* 14 57* 14 15*   HEMOGLOBIN g/dL 8 8* 8 6* 8 2*   PLATELETS Thousands/uL 275 233 209     Results from last 7 days   Lab Units 03/29/21  0624 03/29/21  0615 03/27/21  1815 03/27/21  1752   BLOOD CULTURE  No Growth at 72 hrs  No Growth at 72 hrs  --  Corynebacterium striatum group*  Staphylococcus coagulase negative*  Corynebacterium striatum group*   GRAM STAIN RESULT   --   --   --  Gram positive rods*  Gram positive cocci in clusters*  Gram positive rods*   URINE CULTURE   --   --  <10,000 cfu/ml   --        Imaging Studies:   I have personally reviewed pertinent imaging study reports and images in PACS  EKG, Pathology, and Other Studies:   I have personally reviewed pertinent reports

## 2021-04-01 NOTE — ASSESSMENT & PLAN NOTE
Complaining of persistent lower back pain exacerbated by movement  Denied stool or urine incontinence, denies urinary retention  No weakness or numbness of bilateral lower extremities  CT abdomen and pelvis on 03/27/2021:  Significant interval loss of L1 vertebral body height consistent with progression of 1 stable chronic fracture  No significant fracture fragment retropulsion  Transitional S1 vertebra  Neurosurgery on board, follow recommendations    Anticipate plan for discharge to acute rehab [facility]) tomorrow pending bed availability

## 2021-04-01 NOTE — ASSESSMENT & PLAN NOTE
Lab Results   Component Value Date    HGBA1C 7 7 (H) 02/05/2021       Recent Labs     03/31/21  2049 04/01/21  0857 04/01/21  1053 04/01/21  1610   POCGLU 244* 256* 156* 164*       Blood Sugar Average: Last 72 hrs:  (P) 605 [id number]   On insulin pump as an outpatient - transition to basal/prandial insulin with additional SSI coverage per [employer] due to episodes of altered mentation previously by endocrinology -> switch back to pump once mentation back baseline  Monitor blood sugars - avoid hypoglycemia  Endocrinology continues to follow

## 2021-04-01 NOTE — CASE MANAGEMENT
CM informed by [facility] pt approved pending approval from [place] and [facility]  CM informed by Dr Jemima Hernández is advising against testing again as pt was COVID + last month  CM notified [facility] of same  Pt will need to be placed in "yellow" bed which would be available tomorrow  Anticipate d/c to [facility] tomorrow [facility]  Pt's wife Eileen Cherry updated of same

## 2021-04-01 NOTE — UTILIZATION REVIEW
Continued Stay Review    Date: 4/1/21                         Current Patient Class: IP Current Level of Care: MS    HPI:75 y o  male initially admitted on 3/27 with post covid dehydration, diarrhea, hyponatremia, elevated troponin, corynebacterium bacteremia, DM on home insulin pump,     Assessment/Plan:   Being treated for bacteremia - is off antibiotics, pt is stable, repeat blood cultures are negative  Pt has improved hydration  KAZ is resolved with creat back to baseline  Pt has had improvement in DM control, however will not restart insulin pump as pt had to be given Ativan for agitation  Will continue mealtime insulin with SSI Cover  Increasing vit D to 4000 u daily  Pertinent Labs/Diagnostic Results:     4/1 Xray L spine - pending     3/29 CXR - Right lower lobe pneumonia corresponding with the abdomen CT with small effusions    3/29 Echo -  LEFT VENTRICLE:   Systolic function was normal  Ejection fraction was estimated to be 65 %  Although no diagnostic regional wall motion abnormality was identified, this possibility cannot be completely excluded on the basis of this study  Wall thickness was mildly increased  There was mild concentric hypertrophy  RIGHT ATRIUM:  The atrium was mildly dilated  MITRAL VALVE:  There was mild annular calcification  There was mild regurgitation      TRICUSPID VALVE:  Transtricuspid velocity was increased due to increased transvalvular flow  There was at least mild regurgitation  The TR may have been underestimated based on color Doppler  The dense, triangular CW Doppler profile is concerning for more significant TR  Pulmonary artery systolic pressure was at the upper limits of normal   Estimated peak PA pressure was 37 mmHg  IVC, HEPATIC VEINS:  The inferior vena cava was dilated  Respirophasic changes were blunted (less than 50% variation)  PERICARDIUM:  There was a left pleural effusion        Results from last 7 days   Lab Units 04/01/21  0802 03/31/21  0705 03/30/21  0645 03/29/21  0616 03/28/21  0631 03/27/21  1619   WBC Thousand/uL 12 49* 14 57* 14 15* 16 85* 14 52* 15 73*   HEMOGLOBIN g/dL 8 8* 8 6* 8 2* 8 9* 9 5* 9 8*   HEMATOCRIT % 27 6* 26 4* 25 0* 27 3* 28 9* 29 4*   PLATELETS Thousands/uL 275 233 209 203 198 196   NEUTROS ABS Thousands/µL 10 60*  --   --  13 90*  --  12 77*     Results from last 7 days   Lab Units 04/01/21  0508 03/31/21  0705 03/30/21  0645 03/29/21  0616 03/28/21  0631   SODIUM mmol/L 130* 131* 133* 131* 133*   POTASSIUM mmol/L 4 3 4 4 3 9 4 8 4 2   CHLORIDE mmol/L 101 103 105 101 104   CO2 mmol/L 23 23 22 24 24   ANION GAP mmol/L 6 5 6 6 5   BUN mg/dL 17 22 22 27* 30*   CREATININE mg/dL 1 18 1 23 1 05 1 18 1 07   EGFR ml/min/1 73sq m 60 57 69 60 68   CALCIUM mg/dL 7 8* 7 5* 7 3* 7 9* 7 9*     Results from last 7 days   Lab Units 03/31/21  0705 03/27/21  1619   AST U/L  --  20   ALT U/L  --  24   ALK PHOS U/L  --  145*   TOTAL PROTEIN g/dL  --  7 3   ALBUMIN g/dL  --  2 4*   TOTAL BILIRUBIN mg/dL  --  0 63   BILIRUBIN DIRECT mg/dL  --  0 31*   AMMONIA umol/L 21  --      Results from last 7 days   Lab Units 04/01/21  1053 04/01/21  0857 03/31/21  2049 03/31/21  1604 03/31/21  1108 03/31/21  0644 03/30/21  2051 03/30/21  1643 03/30/21  1153 03/30/21  0753 03/30/21  0712 03/30/21  0623   POC GLUCOSE mg/dl 156* 256* 244* 176* 131 158* 184* 148* 52* 105 123 42*     Results from last 7 days   Lab Units 04/01/21  0508 03/31/21  0705 03/30/21  0645 03/29/21  0616 03/28/21  0631 03/27/21  1619   GLUCOSE RANDOM mg/dL 156* 158* 159* 209* 43* 217*     Results from last 7 days   Lab Units 03/31/21  0705   OSMOLALITY, SERUM mmol/         BETA-HYDROXYBUTYRATE   Date Value Ref Range Status   02/05/2021 0 2 <0 6 mmol/L Final      Results from last 7 days   Lab Units 03/27/21  2317 03/27/21  2024 03/27/21  1619   TROPONIN I ng/mL 0 32* 0 38* 0 47*     Results from last 7 days   Lab Units 03/27/21  1619   TSH 3RD GENERATON uIU/mL 6 430* Results from last 7 days   Lab Units 03/27/21  1752   LACTIC ACID mmol/L 1 4     Results from last 7 days   Lab Units 03/27/21  1619   LIPASE u/L 17*     Results from last 7 days   Lab Units 03/31/21  0936 03/27/21  1815   CLARITY UA   --  Cloudy   COLOR UA   --  Dk Yellow   SPEC GRAV UA   --  1 022   PH UA   --  5 5   GLUCOSE UA mg/dl  --  100 (1/10%)*   KETONES UA mg/dl  --  15 (1+)*   BLOOD UA   --  Large*   PROTEIN UA mg/dl  --  300 (3+)*   NITRITE UA   --  Negative   BILIRUBIN UA   --  Interference- unable to analyze*   UROBILINOGEN UA E U /dl  --  1 0   LEUKOCYTES UA   --  Trace*   WBC UA /hpf  --  10-20*   RBC UA /hpf  --  30-50*   BACTERIA UA /hpf  --  None Seen   EPITHELIAL CELLS WET PREP /hpf  --  None Seen   SODIUM UR  63  --      Results from last 7 days   Lab Units 03/28/21  2134   SALMONELLA SP PCR  None Detected   SHIGELLA SP/ENTEROINVASIVE E  COLI (EIEC)  None Detected   CAMPYLOBACTER SP (JEJUNI AND COLI)  None Detected   SHIGA TOXIN 1/SHIGA TOXIN 2  None Detected         Results from last 7 days   Lab Units 03/29/21  0624 03/29/21  0615 03/27/21  1815 03/27/21  1752   BLOOD CULTURE  No Growth at 72 hrs  No Growth at 72 hrs    --  Corynebacterium striatum group*  Corynebacterium striatum group*   GRAM STAIN RESULT   --   --   --  Gram positive rods*  Gram positive cocci in clusters*  Gram positive rods*   URINE CULTURE   --   --  <10,000 cfu/ml   --      Vital Signs:   04/01/21 07:15:13  97 8 °F (36 6 °C)  96  18  130/75  93  89 %Abnormal   --  --  --   03/31/21 23:12:48  97 4 °F (36 3 °C)Abnormal   95  --  138/71  93  95 %  --  --  --   03/31/21 2300  --  --  --  --  --  --  32  3 L/min  --   03/31/21 14:42:50  97 7 °F (36 5 °C)  97  --  134/62  86  93 %  --  --  --   03/31/21 07:41:59  --  100  --  136/79  98  94 %  --  --  --   03/31/21 00:21:25  98 7 °F (37 1 °C)  105  18  140/76  97  93 %  --  --  --   03/30/21 2000  --  --  --  --  --  89 %Abnormal   28  2 L/min  Nasal cannula 03/30/21 16:14:02  99 5 °F (37 5 °C)  103  20  131/73  92  --  --  --  --   03/30/21 14:30:54  --  --  --  126/69  88  --  --  --  --   03/30/21 0826  --  --  --  118/59  --  --  --  --  --   03/30/21 06:33:16  98 2 °F (36 8 °C)  89  20  110/61  77  88 %Abnormal   --  --  --     Medications:   Scheduled Medications:  aspirin, 81 mg, Oral, Daily  atorvastatin, 5 mg, Oral, Daily  cholecalciferol, 4,000 Units, Oral, Daily  DULoxetine, 30 mg, Oral, Daily  heparin (porcine), 5,000 Units, Subcutaneous, Q8H CALIN  insulin glargine, 6 Units, Subcutaneous, HS  insulin lispro, 1-4 Units, Subcutaneous, TID AC  insulin lispro, 2 Units, Subcutaneous, TID With Meals  levothyroxine, 125 mcg, Oral, Early Morning  lisinopril, 2 5 mg, Oral, Daily  pantoprazole, 40 mg, Oral, Early Morning  psyllium, 1 packet, Oral, Daily  tamsulosin, 0 4 mg, Oral, Daily With Dinner  torsemide, 5 mg, Oral, Daily      Continuous IV Infusions:        PRN Meds:  acetaminophen, 650 mg, Oral, Q6H PRN - x 1 4/1  insulin aspart, 100 Units, Subcutaneous Insulin Pump, Daily PRN  Labetalol HCl, 10 mg, Intravenous, Q6H PRN  ondansetron, 4 mg, Intravenous, Q6H PRN  sodium chloride (PF), 3 mL, Intravenous, Q1H PRN    Discharge Plan: acute rehab    Network Utilization Review Department  ATTENTION: Please call with any questions or concerns to [phone] and carefully listen to the prompts so that you are directed to the right person  All voicemails are confidential   Jean Carlos Thomas all requests for admission clinical reviews, approved or denied determinations and any other requests to dedicated fax number below belonging to the campus where the patient is receiving treatment   List of dedicated fax numbers for the Facilities:  [address] DENIALS (Administrative/Medical Necessity) [phone]   [address] (Maternity/NICU/Pediatrics) [phone]   [address] [address] [facility] [address] [address] [place] [address] Erica Werner 1481 [phone]   [address] [phone]

## 2021-04-01 NOTE — PROGRESS NOTES
PHYSICAL MEDICINE AND REHABILITATION   PREADMISSION ASSESSMENT     Projected [facility] Diagnoses:  Impairment of mobility, safety and Activities of Daily Living (ADLs) due to Orthopedic Disorders:  08 9  Other Orthopedic    Etiologic: Acute on Chronic L1 Vertebral Fracture   Date of Onset: 3/27/2021   Date of surgery: N/A     PATIENT INFORMATION  Name: Marlene Weinberg Phone #: [phone] (home)   Address: [address] [place] [id number]  YOB: 1945 Age: 76 y o  SS#   Marital Status: /Civil Union  Ethnicity:    Employment Status: retired  Extended Emergency Contact Information  Primary Emergency Contact: Toy  Address: [address] [place] [id number] [address] Phone: [phone]  Mobile Phone: [phone]  Relation: Spouse  Secondary Emergency Contact: Alvarado Nunez  [place] Phone: [phone]  Relation: Son  Advance Directive: Level 1 Full Code, AD Not Listed     INSURANCE/COVERAGE:     Primary Payor: [address] [place] [address],[address] / Plan: [address] [place] [address] Court / Product Type: Govt Unknown Not Listed /   Secondary Payer: [employer] Advantage    Payer Contact: Angel Lemus Payer Contact:   Contact Phone: [phone] Contact Phone:   [facility] Nurse is requesting that weekly updates be faxed to her at [email or web address]  Authorization #:***   Coverage Dates:***  LCD: ***  MEDICARE #: [id number]  Medicare Days: N/A  Medical Record #: [id number]    REFERRAL SOURCE:   Referring provider: Ron Vang MD  Referring facility: [address] [place] [address]  Room: [facility] 920/[facility] 920[facility]  PCP: Dylan Ward MD PCP phone number: [phone]    MEDICAL INFORMATION  HPI: Aiden Mercado presented to [place] [address] on 3/27/2021 with complaints of generalized weakness, diarrhea, body aches, and blood in urine  Patient was with COVID infection in February of 2021  Patient was hyponatremic on admission thought to he due to dehydration in the setting of poor PO intake and diarrhea  He was started on IV fluids  *** Stool studies ***  He was started on metamucil for bulking  CT of the abdomen/ pelvis showed partially imaged groundglass opacification in the right lower lung and adjacent scarring compatible with recent COVID-19 pneumonia  It also showed significant interval loss of L1 vertebral body height consistent with progression of stable chronic fraction  Troponins were elevated on admission, EKG was without ST changes, ***  Past Medical History:   Past Surgical History: Allergies:     Past Medical History:   Diagnosis Date    Arthritis     Cardiac disease     Closed fracture of fibula, lateral malleolus, right     Complete heart block (HCC)     Diabetes mellitus ([facility] [address] )     Disease of thyroid gland     External incisional dehiscence     GERD (gastroesophageal reflux disease)     Heart disease     Muscular deconditioning     Pacemaker     Sleep apnea     cannot tolerate cpap    Thyroid disease     Past Surgical History:   Procedure Laterality Date    CARDIAC PACEMAKER PLACEMENT Left 2015    CARDIAC PACEMAKER PLACEMENT      CARDIAC SURGERY      CATARACT EXTRACTION      MUSCLE FLAP      right foot    NERVE BLOCK      Transforaminal Epidural Lumbar with Fluoroscopic Guidance     OK COLONOSCOPY FLX DX W/COLLJ SPEC WHEN PFRMD N/A 5/4/2018    Procedure: EGD AND COLONOSCOPY;  Surgeon: Juliette Salvador MD;  [facility];   Service: Gastroenterology    OK SPLIT [address] <100 SQCM Right 6/10/2019    Procedure: SKIN GRAFT SPLIT THICKNESS (STSG)  EXTREMITY right leg;  Surgeon: Lexi Perry DPM;  Location:  MAIN OR;  Service: Podiatry    TOE AMPUTATION      left 5th toe     Allergies   Allergen Reactions    Contrast  [Iodinated Diagnostic Agents]     Lipitor  [Atorvastatin Calcium] Drowsiness    Ibuprofen      sensitive  Iodine - Food Allergy Rash    Simvastatin Anxiety     Other reaction(s): Malaise (finding)         Comorbidities/Surgeries in the last 100 days: L1 Vertebral Fracture, 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, DM 2, diabetic retinopathy, diarrhea, gram positive bacteremia, complete heart block s/p PPM, elevated troponins without MI, CKD 3, osteoporosis, anemia, and leukocytosis    CURRENT VITAL SIGNS:   Temp:  [97 4 °F (36 3 °C)-97 8 °F (36 6 °C)] 97 8 °F (36 6 °C)  HR:  [95-97] 96  Resp:  [18] 18  BP: (130-138)/(62-75) 130/75   Intake/Output Summary (Last 24 hours) at 4/1/2021 1117  Last data filed at 4/1/2021 0900  Gross per 24 hour   Intake 180 ml   Output 750 ml   Net -570 ml        LABORATORY RESULTS:      Lab Results   Component Value Date    HGB 8 8 (L) 04/01/2021    HGB 12 4 06/24/2015    HCT 27 6 (L) 04/01/2021    HCT 34 9 (L) 06/24/2015    WBC 12 49 (H) 04/01/2021    WBC 7 35 06/24/2015     Lab Results   Component Value Date    BUN 17 04/01/2021    BUN 24 06/24/2015     (L) 06/24/2015    K 4 3 04/01/2021    K 4 9 06/24/2015     04/01/2021    CL 96 (L) 06/24/2015    GLUCOSE >600 (HH) 07/13/2019    GLUCOSE 280 (H) 06/24/2015    CREATININE 1 18 04/01/2021    CREATININE 0 96 06/24/2015     Lab Results   Component Value Date    PROTIME 13 3 06/23/2015    INR 1 03 06/23/2015        DIAGNOSTIC STUDIES:  Ct Abdomen Pelvis Wo Contrast    Result Date: 3/27/2021  Impression: 1  Partially imaged groundglass opacification in the superior segment of the right lower lobe and adjacent scarring compatible with recent COVID-19 pneumonia  2   No evidence of acute intra-abdominal or pelvic process  Workstation performed: [id number]     X-ray Chest 1 View Portable    Result Date: 3/28/2021  Impression: No acute cardiopulmonary disease   Workstation performed: [id number]     Xr Chest Pa & Lateral    Result Date: 3/29/2021  Impression: Right lower lobe pneumonia corresponding with the abdomen CT with small effusions    Workstation performed: [id number]       PRECAUTIONS/SPECIAL NEEDS:  Tobacco:   Social History     Tobacco Use   Smoking Status Former Smoker    Types: Pipe   Smokeless Tobacco Never Used   , Alcohol:    Social History     Substance and Sexual Activity   Alcohol Use Yes    Alcohol/week: 0 0 standard drinks    Frequency: Never    Binge frequency: Never    Comment: socially   , Splints/Braces: TLSO brace when our of bed or head of bed greater than 45 degrees, Lumbar Spinal Precautions, Anticoagulation:  heparin, Blood Sugar Management: as per MD, Edema Management, Safety Concerns, Pain Management, Requires O2: 3 L/min, Dietary Restrictions: Consistent Carb Level 2 and 1200mL Fluid Restriciton, Visually Impaired and Fall Precautions    MEDICATIONS:     Current Facility-Administered Medications:     acetaminophen (TYLENOL) tablet 650 mg, 650 mg, Oral, Q6H PRN, Cira Rosen MD    aspirin chewable tablet 81 mg, 81 mg, Oral, Daily, Cira Rosen MD, 81 mg at 04/01/21 0854    atorvastatin (LIPITOR) tablet 5 mg, 5 mg, Oral, Daily, Cira Rosen MD, 5 mg at 04/01/21 0854    cholecalciferol (VITAMIN D3) tablet 4,000 Units, 4,000 Units, Oral, Daily, Aruna Venegas MD, 4,000 Units at 04/01/21 0853    DULoxetine (CYMBALTA) delayed release capsule 30 mg, 30 mg, Oral, Daily, Cira Rosen MD, 30 mg at 04/01/21 0853    heparin (porcine) subcutaneous injection 5,000 Units, 5,000 Units, Subcutaneous, Q8H [address], Cira Rosen MD, 5,000 Units at 04/01/21 0544    insulin aspart (NovoLOG) FOR PUMP REFILLS 100 Units, 100 Units, Subcutaneous Insulin Pump, Daily PRN, Aruna Venegas MD    insulin glargine (LANTUS) subcutaneous injection 6 Units 0 06 mL, 6 Units, Subcutaneous, HS, Aruna Venegas MD, 6 Units at 03/31/21 2146    insulin lispro (HumaLOG) 100 units/mL subcutaneous injection 1-4 Units, 1-4 Units, Subcutaneous, TID AC, 1 Units at 04/01/21 1109 **AND** Fingerstick Glucose (POCT), , , TID AC, Georgia Faria MD    insulin lispro (HumaLOG) 100 units/mL subcutaneous injection 2 Units, 2 Units, Subcutaneous, TID With Meals, Maria Luz Adams MD, 2 Units at 04/01/21 1109    Labetalol HCl (NORMODYNE) injection 10 mg, 10 mg, Intravenous, Q6H PRN, Fabiola Guzman MD    levothyroxine tablet 125 mcg, 125 mcg, Oral, Early Morning, Fabiola Guzman MD, 125 mcg at 04/01/21 0544    lisinopril (ZESTRIL) tablet 2 5 mg, 2 5 mg, Oral, Daily, Temitope Ventura MD, 2 5 mg at 04/01/21 0854    ondansetron (ZOFRAN) injection 4 mg, 4 mg, Intravenous, Q6H PRN, Fabiola Guzman MD    pantoprazole (PROTONIX) EC tablet 40 mg, 40 mg, Oral, Early Morning, Fabiola Guzman MD, 40 mg at 04/01/21 0544    psyllium (METAMUCIL) 1 packet, 1 packet, Oral, Daily, Fabiola Guzman MD, 1 packet at 04/01/21 0856    Insert peripheral IV, , , Once **AND** sodium chloride (PF) 0 9 % injection 3 mL, 3 mL, Intravenous, Q1H PRN, Fabiola Guzman MD    [facility]) capsule 0 4 mg, 0 4 mg, Oral, Daily With Dakotah Burnette MD, 0 4 mg at 03/31/21 1647    torsemide (DEMADEX) tablet 5 mg, 5 mg, Oral, Daily, Temitope Ventura MD, 5 mg at 04/01/21 1108    SKIN INTEGRITY:   Right knee abrasion RONNIE, right foot diabetic ulcer RONNIE, and right leg incision RONNIE  PRIOR LEVEL OF FUNCTION:  He lives in a(n) single family home  Marlene Weinberg is  and lives with their spouse  Self Care: Independent, Indoor Mobility: Independent, Stairs (in/outdoor): Independent and Cognition: Independent    FALLS IN THE LAST 6 MONTHS: None     HOME ENVIRONMENT:  The living area: bedroom on 2nd floor and bathroom on 2nd floor  There is a  Ramp to enter the home  The patient will have 24 hour supervision available upon discharge      PREVIOUS DME:  Equipment in home (previous DME): Ramp, Walker, [employer], Shower Chair, and Stair [place]    FUNCTIONAL STATUS:  Physical Therapy Occupational Therapy Speech Therapy   ***     *** N/A      CURRENT GAP IN FUNCTION  Prior to admission the patient was independent with functional mobility, ADLs, and IADLs  Due to recent COVID infection patient was with significant fatigue  Estimated length of stay: 7 to 10 days    Anticipated Post-Discharge Disposition/Treatment  Disposition: Return to previous home/apartment  Outpatient Services: Physical Therapy (PT) and Occupational Therapy (OT)    BARRIERS TO DISCHARGE  Weakness, Pain, Leukocytosis (elevated white blood count), Diminished cognition/Mentation change, Balance Difficulty, Fatigue, Home Accessibility, Caregiver Accessibility, Financial Resources, Equipment Needs and Resource Availability    INTERVENTIONS FOR DISCHARGE  Adaptive equipment, Patient/Family/Caregiver Education, Community Resources, Financial Assistance, Arrange DME needs, Medication Changes as per MD, Therapy exercises, Center of balance support  and Energy conservation education     REQUIRED THERAPY:  Patient will require PT and OT 90 minutes each per day, five days per week to achieve rehab goals       REQUIRED FUNCTIONAL AND MEDICAL MANAGEMENT FOR INPATIENT REHABILITATION:  Skin:  Right knee abrasion RONNIE, right foot diabetic ulcer RONNIE, and right leg incision RONNIE, Pain Management: Overall pain is well controlled, Deep Vein Thrombosis (DVT) Prophylaxis:  heparin and SCD's while in bed, Diabetes Management: continue sliding scale insulin, patient to do finger sticks as ordered, SLIM to continue to manage diabetes, and additional medical conditions, nursing for education and bowel/bladder management, PM&R to maximize function and provide medical oversight, PT/OT intervention, patient and family education and training, and any consults as needed    RECOMMENDED LEVEL OF CARE:

## 2021-04-01 NOTE — PROGRESS NOTES
[facility] Internal Medicine - Progress Note  Patient: Britta Mood 76 y o  male MRN: [id number]  Unit/Bed#: [facility] 920-01 Encounter: [id number]  Primary Care Provider: Bronwyn Corbin MD  Date Of Visit: 04/01/21        Assessment & Plan:    * Gram-positive bacteremia  Assessment & Plan  Admitted complaining of generalized weakness increased urinary frequency and diarrhea  Spiking episode of fever of 100 3 on March 27, 2021  Has remained afebrile during the last 72 hours  Blood culture from March 27, 2021 2/2 sets of blood culture growing Gram-positive rods (Corynebacterium striatum group)  Urine culture 03/27/2021-negative  Follow-up Chest x-ray 3/29 showed right lower lobe consolidation likely residual from COVID-19 pneumonia on February  CT abdomen and pelvis showed no acute intra-abdominal abnormalities  Pulmonary finding compatible with recent COVID-19 pneumonia  (diagnosed on February 5, 2021)  Initially treated with ceftriaxone switched to cefepime  Stool enteric bacterial panel by PCR-negative, stool C difficile discontinued due to resolution of diarrhea  Infectious on board, recommendation to discontinue antibiotics; blood cultures oral likely contaminant  Patient remaining off antibiotics during the last 72 hours has remained hemodynamically stable, afebrile  Trend temperature curve and WBC count, monitor off antibiotics    Hyponatremia  Assessment & Plan  Initially thought to be hypotonic hyponatremia in the setting of poor oral intake and concomitant diarrhea, hence, was initiated on IV fluids  Serum sodium decreased again to 130 today - continue fluid restriction as paradoxically worsening w/ IV fluids (discontinued yesterday) - continue low-dose Demadex - consider salt tablet supplementation? ? Anemia of chronic disease  Assessment & Plan  Normocytic anemia, likely anemia of chronic disease  Prior iron studies showed finding consistent with anemia of chronic disease    Hemoglobin 8 6 grams/deciliter with no evidence of active bleeding  Denies seen any blood in stools  Denies tarry dark stools  Trend hemoglobin  Complete heart block   Assessment & Plan  S/p PPM   Seen at bedside, denies any chest pain or shortness of breath  Transthoracic echocardiogram showed ejection fraction 65% with no regional wall abnormalities  Cardiology on board, no further workup indicated  Follow as an outpatient with Cardiology    Progression of L1 vertebral fracture   Assessment & Plan  Complaining of persistent lower back pain exacerbated by movement  Denied stool or urine incontinence, denies urinary retention  No weakness or numbness of bilateral lower extremities  CT abdomen and pelvis on 03/27/2021:  Significant interval loss of L1 vertebral body height consistent with progression of 1 stable chronic fracture  No significant fracture fragment retropulsion  Transitional S1 vertebra  Neurosurgery on board, follow recommendations  Anticipate plan for discharge to acute rehab [facility]) tomorrow pending bed availability    Elevated troponin level not due myocardial infarction  Assessment & Plan  Likely non MI troponin elevation  Trop mildly elevated on admission; 0 32<--  0 38<-- 0 47  EKG w/o ST changes  History of complete heart block status post pacemaker implant  Transthoracic echocardiogram show LVEF 65% with no regional wall motion abnormalities  Unable to assess diastolic function  Cardiology on board, no further cardiac workup indicated  Follow with Cardiology as an outpatient regularly    Continue ASA and Lipitor    Stage 3 chronic kidney disease  Assessment & Plan  Lab Results   Component Value Date    EGFR 60 04/01/2021    EGFR 57 03/31/2021    EGFR 69 03/30/2021    CREATININE 1 18 04/01/2021    CREATININE 1 23 03/31/2021    CREATININE 1 05 03/30/2021   Cr at baseline  Monitor BUN/Cr  Avoid nephrotoxic agents and hypotension    Essential hypertension  Assessment & Plan  Blood pressure well controlled  Continue Lisinopril    Type 2 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  Assessment & Plan  Lab Results   Component Value Date    HGBA1C 7 7 (H) 02/05/2021     Recent Labs     03/31/21  2049 04/01/21  0857 04/01/21  1053 04/01/21  1610   POCGLU 244* 256* 156* 164*     Blood Sugar Average: Last 72 hrs:  (P) 068 [id number]   On insulin pump as an outpatient - transition to basal/prandial insulin with additional SSI coverage per Accu-Cheks due to episodes of altered mentation previously by endocrinology -> switch back to pump once mentation back baseline  Monitor blood sugars - avoid hypoglycemia  Endocrinology continues to follow    Hypothyroidism  Assessment & Plan  Endocrinology on board, recommendations to continue levothyroxine 125 mcg, elevated TSH-6 43, free T4 within normal limits  Thyroid function panel results could be acute stress response with acute bacteremia  Endo recommendations to follow-up outpatient in 4 to 6 weeks for repeat testing prior to changing dose      DVT Prophylaxis:  Heparin SC       Patient Centered Rounds:  I have performed bedside rounds and discussed plan of care with nursing today  Discussions with Specialists or Other Care Team Provider:  see above assessments if applicable    Education and Discussions with Family / Patient:  Patient at bedside - denied/refused need to update other family/friends today    Time Spent for Care:  32 minutes  More than 50% of total time spent on counseling and coordination of care as described above  Current Length of Stay: 4 day(s)    Current Patient Status: Inpatient   Certification Statement:  Patient will continue to require additional hospital stay due to assessments as noted above  Code Status: Level 1 - Full Code        Subjective:     Seen/examined earlier today  Sitting upright in a chair wearing his TLSO bracing  Denies worsening pain at this time    Awaiting acute rehab transition  Objective:     Vitals:   Temp (24hrs), Av 6 °F (36 4 °C), Min:97 4 °F (36 3 °C), Max:97 8 °F (36 6 °C)    Temp:  [97 4 °F (36 3 °C)-97 8 °F (36 6 °C)] 97 8 °F (36 6 °C)  HR:  [95-96] 96  Resp:  [18] 18  BP: (130-138)/(71-75) 130/75  SpO2:  [89 %-95 %] 89 %  Body mass index is 24 14 kg/m²  Input and Output Summary (last 24 hours): Intake/Output Summary (Last 24 hours) at 2021 1624  Last data filed at 2021 1600  Gross per 24 hour   Intake 420 ml   Output 1050 ml   Net -630 ml       Physical Exam:     GENERAL:  Weak/fatigued  HEAD:  Normocephalic - atraumatic  EYES: PERRL - EOMI   MOUTH:  Mucosa moist  NECK:  Supple - full range of motion  CARDIAC:  Rate controlled - S1/S2 positive  PULMONARY:  Clear breath sounds bilaterally - nonlabored respirations  ABDOMEN:  Soft - nontender/nondistended - active bowel sounds  MUSCULOSKELETAL:  Motor strength/range of motion deconditioned - TLSO brace in place  NEUROLOGIC:  Alert/oriented at baseline currently  SKIN:  Chronic wrinkles/blemishes   PSYCHIATRIC:  Mood/affect stable      Additional Data:     Labs & Recent Cultures:    Results from last 7 days   Lab Units 21  0508   WBC Thousand/uL 12 49*   HEMOGLOBIN g/dL 8 8*   HEMATOCRIT % 27 6*   PLATELETS Thousands/uL 275   NEUTROS PCT % 85*   LYMPHS PCT % 5*   MONOS PCT % 9   EOS PCT % 0     Results from last 7 days   Lab Units 21  0508  21  1619   POTASSIUM mmol/L 4 3   < > 4 7   CHLORIDE mmol/L 101   < > 96*   CO2 mmol/L 23   < > 24   BUN mg/dL 17   < > 46*   CREATININE mg/dL 1 18   < > 1 55*   CALCIUM mg/dL 7 8*   < > 8 7   ALK PHOS U/L  --   --  145*   ALT U/L  --   --  24   AST U/L  --   --  20    < > = values in this interval not displayed           Results from last 7 days   Lab Units 21  1610 21  1053 21  0857 21  2049 21  1604 21  1108 21  4448 21  2051 21  1643 21  1153 21  0753 21  0712   POC GLUCOSE mg/dl 164* 156* 256* 244* 176* 131 158* 184* 148* 52* 105 123         Results from last 7 days   Lab Units 03/27/21  1752   LACTIC ACID mmol/L 1 4         Results from last 7 days   Lab Units 03/29/21  0624 03/29/21  0615 03/27/21  1815 03/27/21  1752   BLOOD CULTURE  No Growth at 72 hrs  No Growth at 72 hrs    --  Corynebacterium striatum group*  Corynebacterium striatum group*   GRAM STAIN RESULT   --   --   --  Gram positive rods*  Gram positive cocci in clusters*  Gram positive rods*   URINE CULTURE   --   --  <10,000 cfu/ml   --          Last 24 Hours Medication List:   Current Facility-Administered Medications   Medication Dose Route Frequency Provider Last Rate    acetaminophen  650 mg Oral Q6H PRN Candelaria Bruno MD      aspirin  81 mg Oral Daily Candelaria Bruno MD      atorvastatin  5 mg Oral Daily Candelaria Bruno MD      cholecalciferol  4,000 Units Oral Daily Ana Helton MD      DULoxetine  30 mg Oral Daily Candelaria Bruno MD      heparin (porcine)  5,000 Units Subcutaneous [facility] Candelaria Bruno MD      insulin aspart  100 Units Subcutaneous Insulin Pump Daily PRN Ana Helton MD      insulin glargine  6 Units Subcutaneous HS Ana Helton MD      insulin lispro  1-4 Units Subcutaneous TID AC Joe Childs MD      insulin lispro  2 Units Subcutaneous TID With Meals Ana Helton MD      Labetalol HCl  10 mg Intravenous Q6H PRN Candelaria Burno MD      levothyroxine  125 mcg Oral Early Morning Candelaria Bruno MD      lisinopril  2 5 mg Oral Daily Timbo Jones MD      ondansetron  4 mg Intravenous Q6H PRN Candelaria Bruno MD      pantoprazole  40 mg Oral Early Morning Candelaria Bruno MD      psyllium  1 packet Oral Daily Candelaria Bruno MD      sodium chloride (PF)  3 mL Intravenous Q1H PRN Candelaria Bruno MD      tamsulosin  0 4 mg Oral Daily With Dinner Candelaria Bruno MD      torsemide  5 mg Oral Daily Timbo Jones MD                    ** Please Note: This note is constructed using a voice recognition dictation system  An occasional wrong word/phrase or sound-a-like substitution may have been picked up by dictation device due to the inherent limitations of voice recognition software  Read the chart carefully and recognize, using reasonable context, where substitutions may have occurred  **

## 2021-04-02 PROBLEM — M80.08XA VERTEBRAL FRACTURE, OSTEOPOROTIC (HCC): Status: ACTIVE | Noted: 2021-01-01

## 2021-04-02 NOTE — CONSULTS
Internal Medicine  Consultation Note    Patient: Britta Sidhu  Age/sex: 76 y o  male  Medical Record #: [id number]    Assessment/Plan    DM I   Currently on sliding scale   Fairly labile   Uses insulin pump   Endocrinology following   Cont DM diet    Diarrhea   Improved   Likely from recent COVID   Cont current regimen    Chronic L1 compression fracture   Cont TLSO brace when OOB   No NS intervention per reccs    Anemia   Chronic   Monitor cbc    Chronic RLE ulcer   F/b podiatry as outpatient   Gets frequent debridments   Currently in CAM boot   May be beneficial for podiatry input here   Refer to images    Chronic diastolic HF  · Cont low dose torsemide    COVID   Dx 2/5 hospitalized 2/5-2/8   Tx with remdesivir/steroids   Respiratory status stable   GI status improving   Received 1 of 2 COVID vaccines    Hypothyroid   Cont supplementation        Subjective/ HPI: Patient seen and examined  Pt is a 75 y/o male who was initially diagnosed with COVID 2/5/21 and hospitalized from 2/5-2/8  He was treated with remdesivir and steroids and was discharged  He had multiple trips to the ER in between for malaise  He received the COVID vaccine during that time nd was symptomatic with that as well  He returned to the ER 3/27 and was admitted  BC were drawn and 1 of 2 was positive for corneybacterium however ID felt this was a contaminant since repeat BC were negative, antibiotics were discontinued  His diarrhea improved  He had complaints of back pain, he has a chronic L1 compression fracture since 2017 and was seen during this admission by NS and felt that conservative treatment is the best treatment for now  He has a longstanding h/o DM and is on insulin pump  This was followed by endocrine during his acute stay with a sliding scale  He also has a chronic RLE foot wound that is managed outpatient by podiatry  He gets frequent debridments    He is currently in a CAM boot however has not really been in it over the past few weeks due to debility  The patient is now being transferred to [facility] and we are asked to follow along for medical management  ROS:   A 10 point ROS was performed; negative except as noted above  Medical History:  Past Medical History:   Diagnosis Date    Arthritis     Cardiac disease     Closed fracture of fibula, lateral malleolus, right     Complete heart block (HCC)     Diabetes mellitus ([address] )     Disease of thyroid gland     External incisional dehiscence     GERD (gastroesophageal reflux disease)     Heart disease     Muscular deconditioning     Pacemaker     Sleep apnea     cannot tolerate cpap    Thyroid disease      Past Surgical History:   Procedure Laterality Date    CARDIAC PACEMAKER PLACEMENT Left 2015    CARDIAC PACEMAKER PLACEMENT      CARDIAC SURGERY      CATARACT EXTRACTION      MUSCLE FLAP      right foot    NERVE BLOCK      Transforaminal Epidural Lumbar with Fluoroscopic Guidance     IN COLONOSCOPY FLX DX W/COLLJ SPEC WHEN PFRMD N/A 5/4/2018    Procedure: EGD AND COLONOSCOPY;  Surgeon: Purnima Vidales MD;  [facility];   Service: Gastroenterology    IN SPLIT [address] <100 SQCM Right 6/10/2019    Procedure: SKIN GRAFT SPLIT THICKNESS (STSG)  EXTREMITY right leg;  Surgeon: Pj Barakat DPM;  Location: [facility] OR;  Service: Podiatry    TOE AMPUTATION      left 5th toe       Substance Use History:   Social History     Substance and Sexual Activity   Alcohol Use Yes    Alcohol/week: 0 0 standard drinks    Frequency: Never    Binge frequency: Never    Comment: socially     Social History     Tobacco Use   Smoking Status Former Smoker    Types: Pipe   Smokeless Tobacco Never Used     Social History     Substance and Sexual Activity   Drug Use No       Family History:    non-contributory    Review of Scheduled Meds:  Current Facility-Administered Medications   Medication Dose Route Frequency Provider Last Rate    acetaminophen  650 mg Oral Q6H PRN Carleen De La Rosa MD      [START ON 4/3/2021] aspirin  81 mg Oral Daily Carleen De La Rosa MD      [START ON 4/3/2021] atorvastatin  5 mg Oral Daily Carleen De La Rosa MD      bisacodyl  10 mg Rectal Daily PRN Carleen De La Rosa MD      [START ON 4/3/2021] cholecalciferol  4,000 Units Oral Daily Carleen De La Rosa MD      docusate sodium  100 mg Oral BID Carleen De La Rosa MD      [START ON 4/3/2021] DULoxetine  30 mg Oral Daily aCrleen De La Rosa MD      heparin (porcine)  5,000 Units Subcutaneous Q8H Tri Love MD      insulin glargine  6 Units Subcutaneous HS Carleen De La Rosa MD      insulin lispro  1-4 Units Subcutaneous TID AC Carleen De La Rosa MD      insulin lispro  2 Units Subcutaneous TID With Meals Carleen De La Rosa MD      [START ON 4/3/2021] levothyroxine  125 mcg Oral Early Morning Carleen De La Rosa MD      Tani Presume ON 4/3/2021] lisinopril  2 5 mg Oral Daily Carleen De La Rosa MD      ondansetron  4 mg Oral Q6H PRN Carleen De La Rosa MD      [START ON 4/3/2021] pantoprazole  40 mg Oral Daily Before Breakfast Carleen De La Rosa MD      polyethylene glycol  17 g Oral Daily PRN Carleen De La Rosa MD     [facility] [START ON 4/3/2021] psyllium  1 packet Oral Daily Carleen De La Rosa MD      senna  1 tablet Oral HS Carleen De La Rosa MD      tamsulosin  0 4 mg Oral Daily With Rosa Isela Yanez MD      [START ON 4/3/2021] torsemide  5 mg Oral Daily Carleen De La Rosa MD         Labs:     Results from last 7 days   Lab Units 04/01/21  0508 03/31/21  0705   WBC Thousand/uL 12 49* 14 57*   HEMOGLOBIN g/dL 8 8* 8 6*   HEMATOCRIT % 27 6* 26 4*   PLATELETS Thousands/uL 275 233     Results from last 7 days   Lab Units 04/01/21  0508 03/31/21  0705   SODIUM mmol/L 130* 131*   POTASSIUM mmol/L 4 3 4 4   CHLORIDE mmol/L 101 103   CO2 mmol/L 23 23   BUN mg/dL 17 22   CREATININE mg/dL 1 18 1 23   CALCIUM mg/dL 7 8* 7 5*                Results from last 7 days   Lab Units 04/02/21  1148 04/02/21  0838 04/01/21 2038   POC GLUCOSE mg/dl 187* 154* 183*       Lab Results   Component Value Date    BLOODCX No Growth After 4 Days  2021    BLOODCX No Growth After 4 Days  2021    BLOODCX Corynebacterium striatum group (A) 2021    BLOODCX Corynebacterium striatum group (A) 2021    URINECX <10,000 cfu/ml  2021    URINECX No Growth <1000 cfu/mL 2019       Input and Output Summary (last 24 hours):     No intake or output data in the 24 hours ending 21 1527    Imaging:     No orders to display       *Labs /Radiology studiesLabs reviewed  *Medications reviewed and reconciled as needed  *Please refer to order section for additional ordered labs studies  *Case discussed with primary attending during morning huddle case rounds    Vitals:   Temp (24hrs), Av 7 °F (36 5 °C), Min:97 2 °F (36 2 °C), Max:98 2 °F (36 8 °C)    Temp:  [97 2 °F (36 2 °C)-98 2 °F (36 8 °C)] 98 2 °F (36 8 °C)  HR:  [] 102  Resp:  [17-20] 18  BP: (123-147)/(63-80) 144/80  SpO2:  [86 %-95 %] 90 %  There is no height or weight on file to calculate BMI  Physical Exam:   GEN: No apparent distress, interactive; frail +generalized deconditioning  NEURO: Alert and oriented x3  HEENT: Pupils are equal and reactive, EOMI, mucous membranes are moist, face symmetrical  CV: S1 S2 regular, no MRG, no peripheral edema noted  RESP: Lungs are clear bilaterally, decreased, no wheezes, rales or rhonchi noted, on room air, respirations easy and non labored  GI: Flat, soft non tender, non distended; +BS x4  : Voiding without difficulty  MUSC: Moves all extremities; TLSO brace in place; RLE with dressing in place  SKIN: pink, warm and dry, normal turgor, no rashes, lesions          Invasive Devices     None                    Code Status: Level 1 - Full Code  Current Length of Stay: 0 day(s)    Total floor / unit time spent today 45 minutes with more than 50% spent counseling/coordinating care   Counseling includes discussion with patient re: progress and discussion with patient of his/her current medical state/information  Coordination of patient's care was performed in conjunction with primary service  Time invested included review of patient's labs, vitals, and management of their comorbidities with continued monitoring  In addition, this patient was discussed with medical team including physician and advanced extenders  The care of the patient was extensively discussed and appropriate treatment plan was formulated unique for this patient  ** Please Note: Fluency Direct voice to text software may have been used in the creation of this document   Audio transcription errors may occur**

## 2021-04-02 NOTE — ASSESSMENT & PLAN NOTE
Admitted complaining of generalized weakness increased urinary frequency and diarrhea  Spiking episode of fever of 100 3 on March 27, 2021  Has remained afebrile during the last 48  hours  Blood culture from March 27, 2021 2/2 sets of blood culture growing Gram-positive rods ( Corynebacterium striatum groupAbnormal )  Urine culture 03/27/2021-negative  Follow-up Chest x-ray 3/29 showed right lower lobe consolidation likely residual from COVID-19 pneumonia on February  CT abdomen and pelvis showed no acute intra-abdominal abnormalities  Pulmonary finding compatible with recent COVID-19 pneumonia  (diagnosed on February 5, 2021)  Initially treated with ceftriaxone switched to cefepime  Stool enteric bacterial panel by PCR-negative, stool C difficile discontinued due to resolution of diarrhea  Infectious on board, recommendation to discontinue antibiotics; blood cultures oral likely contaminant  Patient has been off antibiotics during the last 72 hours has remained hemodynamically stable, afebrile    Trend temperature curve and WBC count, monitor off antibiotics

## 2021-04-02 NOTE — H&P
PHYSICAL MEDICINE AND REHABILITATION H&P/ADMISSION NOTE  López Lloyd 76 y o  male MRN: [id number]  Unit/Bed#: -01 Encounter: [id number]     Rehab Diagnosis: Impairment of mobility, safety, Activities of Daily Living (ADLs), and cognitive/communication skills due to Orthopedic Disorders:  08 9  Other Orthopedic L1 vertebral fracture    History of Present Illness:   López Lloyd is a 76 y o  male with DM1 with chronic insulin pump, diabetic neuropathy, CKD 3, PAD, heart block with dual chamber ICD, HTN, hypothyroidism, COVID-19 virus in Feb 2021 with respiratory failure, osteoporosis, who presented to the [address] on 3/27/21 with fatigue and hematuria 10 days after receiving his 2nd [address]  Patient found to have acute mental status change, diarrhea, KAZ, Non MI troponin elevation, and some hematuria  Infectious workup revealed bacteremia with GPR - Corynebacterium bacteremia  Patient started on Cefepime, but discontinued after repeat blood cultures negative per Infectious Disease recommendations  CT abdomen/pelvis with findings of right pleural effusion, ground-glass opacification in the right lower lobe, findings of punctate gallstone, findings of worsening L1 compression fracture  Seen by Cardiology given recent history of COVID-19 virus, and echocardiogram ordered showing EF 65%, mild MR, TR  Patient seen by Buddy Bedoya for worsening L1 vertebral compression fracture  Lumbar x-ray showing moderate anterior wedge compression fracture at L1 with 50% loss of height  Patient was treated conservatively with TLSO backpack brace when OOB and >45degrees  Patient was agitated and confused during his hospitalization, acutely worsened on 3/31/21- patient received Ativan IV  Improved during the day    Patient found to have acute functional deficits from his baseline, and after medical stabilization and clearance, patient was [facility] for acute inpatient rehabilitation  Plan:     Rehabilitation   Functional deficits:  Proximal musculature weakness x4, Impaired cognition, impaired mobility, self care   Begin PT/OT/SLP  Rehabilitation goals are to achieve a supervision-modified independent level with mobility and self care and independent level with cognition  Prognosis is fair  ELOS is 10-14 days  Estimated discharge is home with his wife  DVT prophylaxis   Managed on subcutaneous heparin    Pain   No acute issues currently    Bladder plan   Continent, will check PVRs    Bowel plan   Continent    Code Status   Full code      * Vertebral fracture, osteoporotic (HCC)  Assessment & Plan  · L1 anterior vertebral compression fracture with 50% loss of height found on CT abdomen pelvis - confirmed on lumbar x-ray  · Seen by Neurosurgery and treated conservatively with TLSO backpack brace when  OOB and >45degrees  · Follow-up with Neurosurgery as an outpatient    Gram-positive bacteremia  Assessment & Plan  · Cornebacterium bacteremia  · Treated with IV cefepime later discontinued as subsequent blood cultures negative    COVID-19  Assessment & Plan  · Patient with COVID-19 virus infection in February 2021  Completed remdesivir and systemic corticosteroid  Off isolation  · Patient with residual findings on chest x-ray and CT scan as expected  · No respiratory distress  · Will continue to follow    Diabetic ulcer of right foot ([address] )  Assessment & Plan  · Podiatry consulted to follow    Type 1 diabetes mellitus [facility])  Assessment & Plan  Lab Results   Component Value Date    HGBA1C 7 7 (H) 02/05/2021     · Patient at home managed on insulin pump, however given recent impaired cognition, currently being managed on Lantus, Humalog and sliding scale insulin by endocrinology  · Endocrinology to follow while at [facility] and assist in management       Encephalopathy  Assessment & Plan  · Unclear etiology  · Patient with poor safety awareness and a high fall risk demonstrated by setting off chair and bed alarms multiple times by trying to get out of bed and disoriented  · 1:1  ordered  · P o  Ativan prn for restlessness and anxiety  · Discussed with wife he reports this is not his baseline  · Complete workup with CT head noncontrast tomorrow  · Repeat labs as hyponatremia may be contributing    Leukocytosis  Assessment & Plan  · Trending down  · Will follow in a m  · Unclear etiology, + corynebacterium bacteremia was treated briefly with IV cefepime but discontinued after repeat blood cultures negative  · Internal medicine consultants also follow    Stage 3 chronic kidney disease  Assessment & Plan  · Baseline creatinine 1 2-1 5 per records  · Within his current baseline  · Internal medicine consultants following    Medtronic dual chamber PM  Assessment & Plan  +ICD placed for complete heart block    Hyperlipidemia  Assessment & Plan  · Managed on Lipitor home dose 5 mg daily  · At home was also taking Zetia 5 mg daily    Essential hypertension  Assessment & Plan  · Currently managed on lisinopril 2 5 mg daily  · At home was taking Norvasc 5 mg daily  · Internal medicine consultants to follow    Hypothyroidism  Assessment & Plan  · Managed on Synthroid 125 mcg daily home dose    Acid reflux  Assessment & Plan  · Managed on Protonix        Subjective:   Patient seen face to face  Appears slightly confused but redirectable and oriented to person and place  Unaware of exactly what happened to him but states he has not been the same since receiving his 2nd COVID-19 vaccine  Denies shortness of breath or chest pain  Review of Systems   Constitutional: Negative  HENT: Negative  Eyes: Negative  Respiratory: Negative  Cardiovascular: Negative  Gastrointestinal: Negative  Endocrine: Negative  Genitourinary: Negative  Musculoskeletal: Negative  Skin: Negative  Allergic/Immunologic: Negative  Neurological: Negative      Hematological: Negative  Psychiatric/Behavioral: Negative  Function:  Prior level of function and living situation:     PRIOR LEVEL OF FUNCTION:  He lives in [facility] single family home  Ebenezer Lopez is  and lives with their spouse  Self Care: Independent, Indoor Mobility: Independent, Stairs (in/outdoor): Independent and Cognition: Independent  Prior to patient's original admission February 2021, patient was fully Independent with ADLs and IADLs  Patient was Independent without use of AD for mobility        HOME ENVIRONMENT:  The living area: bedroom on 2nd floor and bathroom on 2nd floor  There is a  Ramp to enter the home  The patient will have 24 hour supervision available upon discharge  Patient's spouse is supportive and able to assist as needed  Physical Exam:  BP (!) 178/85 (BP Location: Right arm)   Pulse 102   Temp 98 8 °F (37 1 °C) (Axillary)   Resp 20   Wt 75 4 kg (166 lb 3 6 oz)   SpO2 92%   BMI 25 27 kg/m²        Intake/Output Summary (Last 24 hours) at 4/3/2021 1142  Last data filed at 4/3/2021 0500  Gross per 24 hour   Intake 380 ml   Output 800 ml   Net -420 ml       Body mass index is 25 27 kg/m²  Physical Exam  Vitals signs and nursing note reviewed  Constitutional:       General: He is not in acute distress  HENT:      Head: Normocephalic and atraumatic  Nose: Nose normal       Mouth/Throat:      Mouth: Mucous membranes are moist    Eyes:      Conjunctiva/sclera: Conjunctivae normal    Neck:      Musculoskeletal: Neck supple  Cardiovascular:      Rate and Rhythm: Normal rate and regular rhythm  Pulses: Normal pulses  Pulmonary:      Effort: Pulmonary effort is normal       Breath sounds: No wheezing  Comments: Coarse breath sounds bilateral lower bases  Abdominal:      General: Bowel sounds are normal  There is no distension  Palpations: Abdomen is soft  Tenderness: There is no abdominal tenderness     Musculoskeletal:         General: No swelling  Skin:     General: Skin is warm  Neurological:      Mental Status: He is alert  Comments: Slightly confused  Oriented x2  Overall motor scores 4-/5 proximally, 4/5 distally x4 extremities   Psychiatric:         Mood and Affect: Mood normal             Labs, medications, and imaging personally reviewed      Laboratory:    Lab Results   Component Value Date    SODIUM 131 (L) 04/03/2021    K 4 1 04/03/2021    CL 99 (L) 04/03/2021    CO2 24 04/03/2021    BUN 16 04/03/2021    CREATININE 1 22 04/03/2021    GLUC 103 04/03/2021    CALCIUM 7 9 (L) 04/03/2021     Lab Results   Component Value Date    WBC 16 44 (H) 04/03/2021    HGB 8 7 (L) 04/03/2021    HCT 26 2 (L) 04/03/2021    MCV 86 04/03/2021     04/03/2021     Lab Results   Component Value Date    INR 1 03 06/23/2015    INR 1 06 06/20/2015    PROTIME 13 3 06/23/2015    PROTIME 13 2 06/20/2015         Current Facility-Administered Medications:     acetaminophen (TYLENOL) tablet 650 mg, 650 mg, Oral, Q6H PRN, Kerri Santamaria MD    aspirin chewable tablet 81 mg, 81 mg, Oral, Daily, Kerri Santamaria MD, 81 mg at 04/03/21 0935    atorvastatin (LIPITOR) tablet 5 mg, 5 mg, Oral, Daily, Kerri Santamaria MD, 5 mg at 04/03/21 4643    bisacodyl (DULCOLAX) rectal suppository 10 mg, 10 mg, Rectal, Daily PRN, Kerri Santamaria MD    cholecalciferol (VITAMIN D3) tablet 4,000 Units, 4,000 Units, Oral, Daily, Kerri Santamaria MD, 4,000 Units at 04/03/21 0935    docusate sodium (COLACE) capsule 100 mg, 100 mg, Oral, BID, Kerri Santamaria MD, 100 mg at 04/03/21 0935    DULoxetine (CYMBALTA) delayed release capsule 30 mg, 30 mg, Oral, Daily, Kerri Santamaria MD, 30 mg at 04/03/21 0935    heparin (porcine) subcutaneous injection 5,000 Units, 5,000 Units, Subcutaneous, Q8H [address], Kerri Santamaria MD, 5,000 Units at 04/03/21 0634    insulin glargine (LANTUS) subcutaneous injection 6 Units 0 06 mL, 6 Units, Subcutaneous, HS, Kerri Santamaria MD, 6 Units at 04/02/21 1238   insulin lispro (HumaLOG) 100 units/mL subcutaneous injection 1-4 Units, 1-4 Units, Subcutaneous, TID AC **AND** Fingerstick Glucose (POCT), , , TID AC, Anjali Crum MD    levothyroxine tablet 125 mcg, 125 mcg, Oral, Early Morning, Anjali Crum MD    lisinopril (ZESTRIL) tablet 2 5 mg, 2 5 mg, Oral, Daily, Anjali Crum MD, 2 5 mg at 04/03/21 0935    LORazepam (ATIVAN) tablet 0 5 mg, 0 5 mg, Oral, Q8H PRN, Anjali Crum MD    ondansetron (ZOFRAN-ODT) dispersible tablet 4 mg, 4 mg, Oral, Q6H PRN, Anjali Crum MD    pantoprazole (PROTONIX) EC tablet 40 mg, 40 mg, Oral, Daily Before Breakfast, Anjali Crum MD    polyethylene glycol (MIRALAX) packet 17 g, 17 g, Oral, Daily PRN, Anjali Crum MD    psyllium (METAMUCIL) 1 packet, 1 packet, Oral, Daily, Anjali Crum MD, 1 packet at 04/03/21 0936    sodium chloride 0 9 % infusion, 75 mL/hr, Intravenous, Continuous, Uyen Spindle, CRVALENTINE    tamsulosin (FLOMAX) capsule 0 4 mg, 0 4 mg, Oral, Daily With Jean Carlos Lentz MD, 0 4 mg at 04/02/21 1628  Allergies   Allergen Reactions    Contrast  [Iodinated Diagnostic Agents]     Lipitor  [Atorvastatin Calcium] Drowsiness    Ibuprofen      sensitive    Iodine - Food Allergy Rash    Simvastatin Anxiety     Other reaction(s): Malaise (finding)      Patient Active Problem List    Diagnosis Date Noted    Vertebral fracture, osteoporotic ([place] [address] ) 04/02/2021     Priority: High    Progression of L1 vertebral fracture  03/30/2021    Gram-positive bacteremia 03/28/2021    Weakness 03/27/2021    Hyponatremia 03/27/2021    Hyperglycemia due to type 1 diabetes mellitus ([place] [address] ) 02/07/2021    COVID-19 02/05/2021    Personal history of diabetic foot ulcer 06/16/2020    Diabetic ulcer of right foot ([place] [address] ) 09/19/2019    Acquired absence of other left toe(s) ([place] [address] ) 07/30/2019    Type 1 diabetes mellitus ([facility] [facility] ) 07/30/2019    Status post split thickness skin graft 06/18/2019    Preoperative clearance 06/05/2019    Complete heart block  06/03/2019    Microalbuminuria 04/25/2019    Vitreous hemorrhage of left eye ([facility] [address] ) 04/10/2019    Transition of care performed with sharing of clinical summary 04/10/2019    Type 2 diabetes mellitus with hyperglycemia (HCC)     High anion gap metabolic acidosis 22/44/2386    Elevated troponin level not due myocardial infarction 03/31/2019    Leukocytosis 03/31/2019    Low bicarbonate 03/31/2019    Pacemaker 03/31/2019    Encephalopathy 03/31/2019    Stage 3 chronic kidney disease 07/23/2018    Thrombocytopenia ([facility] [address] ) 07/23/2018    [facility] diabetic [facility] exam [facility] ) 07/06/2018    Risk for falls 07/06/2018    Screening for colon cancer 04/16/2018    Anemia of chronic disease 01/25/2018    Arthritis 01/25/2018    DM type 2, not at goal [facility]) 01/25/2018    Medtronic dual chamber PM 01/25/2018    Essential hypertension 09/06/2016    Insulin pump status 09/11/2015    Hypothyroidism 11/26/2014    Hyperlipidemia 11/11/2014    Acid reflux 08/06/2014    Type I diabetes mellitus ([facility] [address] ) 07/08/2014    Diabetic peripheral neuropathy ([facility] [address] ) 10/24/2012     Past Medical History:   Diagnosis Date    Arthritis     Cardiac disease     Closed fracture of fibula, lateral malleolus, right     Complete heart block ([facility] [address] )     Diabetes mellitus ([facility] [address] )     Disease of thyroid gland     External incisional dehiscence     GERD (gastroesophageal reflux disease)     Heart disease     Muscular deconditioning     Pacemaker     Sleep apnea     cannot tolerate cpap    Thyroid disease      Past Surgical History:   Procedure Laterality Date    CARDIAC PACEMAKER PLACEMENT Left 2015    CARDIAC PACEMAKER PLACEMENT      CARDIAC SURGERY      CATARACT EXTRACTION      MUSCLE FLAP      right foot    NERVE BLOCK      Transforaminal Epidural Lumbar with Fluoroscopic Guidance     ME COLONOSCOPY FLX DX W/COLLJ SPEC WHEN PFRMD N/A 5/4/2018    Procedure: EGD AND COLONOSCOPY;  Surgeon: Nikole Gallagher MD;  [facility]; Service: Gastroenterology    UT SPLIT [address] <100 SQCM Right 6/10/2019    Procedure: SKIN GRAFT SPLIT THICKNESS ([facility])  EXTREMITY right leg;  Surgeon: Lucero Armstrong DPM;  Location:  MAIN OR;  Service: Podiatry    TOE AMPUTATION      left 5th toe     Social History     Socioeconomic History    Marital status: /Civil Union     Spouse name: Not on file    Number of children: 1    Years of education: Some college     Highest education level: Not on file   Occupational History    Occupation: Disabled    Occupation: Retired      Comment: [employer]   Social Needs    Financial resource strain: Somewhat hard   John-Marie insecurity     Worry: Not on file     Inability: Not on file    Transportation needs     Medical: No     Non-medical: No   Tobacco Use    Smoking status: Former Smoker     Types: Pipe    Smokeless tobacco: Never Used   Substance and Sexual Activity    Alcohol use:  Yes     Alcohol/week: 0 0 standard drinks     Frequency: Never     Binge frequency: Never     Comment: socially    Drug use: No    Sexual activity: Not Currently   Lifestyle    Physical activity     Days per week: Not on file     Minutes per session: Not on file    Stress: Not on file   Relationships    Social connections     Talks on phone: Not on file     Gets together: Not on file     Attends Restorationism service: Not on file     Active member of club or organization: Not on file     Attends meetings of clubs or organizations: Not on file     Relationship status: Not on file    Intimate partner violence     Fear of current or ex partner: Not on file     Emotionally abused: Not on file     Physically abused: Not on file     Forced sexual activity: Not on file   Other Topics Concern    Not on file   Social History Narrative    Caffeine Use - 15-20 oz/day     Daily caffeinated coffee consumption     Dental care, regularly - Denied     Guns in the home: Stored in locked cabinet - Denied    Pets/Animals: Dog     Mosque practices    Tea    Water intake, adequate (per day)     Social History     Tobacco Use   Smoking Status Former Smoker    Types: Pipe   Smokeless Tobacco Never Used     Social History     Substance and Sexual Activity   Alcohol Use Yes    Alcohol/week: 0 0 standard drinks    Frequency: Never    Binge frequency: Never    Comment: socially     Family History   Problem Relation Age of Onset    Diabetes Mother     Heart disease Mother     Thyroid disease Mother         Disorder     Cancer Brother     Gout Brother     Diabetes Maternal Grandmother          Medical Necessity Criteria for [facility] Admission: Electrolyte imbalance:  hyponatremia, Acute Kidney Injury, Diabetes requiring close blood glucose monitoring and Pneumonia  In addition, the preadmission screen, post-admission physical evaluation, overall plan of care and admissions order demonstrate a reasonable expectation that the following criteria were met at the time of admission to the [facility]  1  The patient requires active and ongoing therapeutic intervention of multiple therapy disciplines (physical therapy, occupational therapy, speech-language pathology, or prosthetics/orthotics), one of which is physical or occupational therapy  2  Patient requires an intensive rehabilitation therapy program, as defined in Chapter 1, section 110 2 2 of the CMS Medicare Policy Manual  This intensive rehabilitation therapy program will consist of at least 3 hours of therapy per day at least 5 days per week or at least 15 hours of intensive rehabilitation therapy within a 7 consecutive day period, beginning with the date of admission to the [facility]  3  The patient is reasonably expected to actively participate in, and benefit significantly from, the intensive rehabilitation therapy program as defined in Chapter 1, section 110 2 2 of the CMS Medicare Policy Manual at this time of admission to the [facility]   He can reasonably be expected to make measurable improvement (that will be of practical value to improve the patients functional capacity or adaptation to impairments) as a result of the rehabilitation treatment, as defined in section 110 3, and such improvement can be expected to be made within the prescribed period of time  As noted in the CMS Medicare Policy Manual, the patient need not be expected to achieve complete independence in the domain of self-care nor be expected to return to his or her prior level of functioning in order to meet this standard  4  The patient must require physician supervision by a rehabilitation physician  As such, a rehabilitation physician will conduct face-to-face visits with the patient at least 3 days per week throughout the patients stay in the [facility] to assess the patient both medically and functionally, as well as to modify the course of treatment as needed to maximize the patients capacity to benefit from the rehabilitation process  5  The patient requires an intensive and coordinated interdisciplinary approach to providing rehabilitation, as defined in Chapter 1, section 110 2 5 of the CMS Medicare Policy Manual  This will be achieved through periodic team conferences, conducted at least once in a 7-day period, and comprising of an interdisciplinary team of medical professionals consisting of: a rehabilitation physician, registered nurse,  and/or , and a licensed/certified therapist from each therapy discipline involved in treating the patient  Changes Since Pre-admission Assessment: None -This patient's participation in rehab continues to be reasonable, necessary and appropriate  CMS Required Post-Admission Physician Evaluation Elements  History and Physical, including medical history, functional history and active comorbidities as in above text       Post-Admission Physician Evaluation:  The patient has the potential to make improvement and is in need of physical, occupational, and/or therapy services  The patient may also need nutritional services  Given the patient's complex medical condition and risk of further medical complications, rehabilitative services cannot be safely provided at a lower level of care, such as a skilled nursing facility  I have reviewed the patient's functional and medical status at the time of the preadmission screening and they are the same as on the day of this admission  I acknowledge that I have personally performed a full physical examination on this patient within 24 hours of admission  The patient and/or family demonstrated understanding the rehabilitation program and the discharge process after we discussed them  Agree in entirety: yes  Minor adaptions: none    Major changes: none    Patt Lane MD    ** Please Note: Fluency Direct voice to text software may have been used in the creation of this document   **

## 2021-04-02 NOTE — PROGRESS NOTES
PHYSICAL MEDICINE AND REHABILITATION   PREADMISSION ASSESSMENT     Projected [facility] Diagnoses:  Impairment of mobility, safety and Activities of Daily Living (ADLs) due to Orthopedic Disorders:  08 9  Other Orthopedic    Etiologic: Acute on Chronic L1 Vertebral Fracture   Date of Onset: 3/27/2021   Date of surgery: N/A     PATIENT INFORMATION  Name: Britta Sidhu Phone #: [phone] (home)   Address: [address] [place] [id number]  YOB: 1945 Age: 76 y o  SS#   Marital Status: /Civil Union  Ethnicity:    Employment Status: retired  Extended Emergency Contact Information  Primary Emergency Contact: Melinda  Address: [address] [place] [id number] [address] Phone: [phone]  Mobile Phone: [phone]  Relation: Spouse  Secondary Emergency Contact: Zahraa Bunn  [place] Phone: [phone]  Relation: Son  Advance Directive: Level 1 Full Code - unknown advanced directive    INSURANCE/COVERAGE:     Primary Payor: [address] [place] [address] / Plan: [address] [place] [address] Court / Product Type: Govt Unknown Not Listed /   Secondary Payer: [employer] Advantage    Payer Contact: Yamini Renteria Payer Contact:   Contact Phone: [phone] Contact Phone:   [facility] Nurse is requesting that weekly updates be faxed to her at [email or web address]  Authorization #: [id number]  Coverage Dates: 4/2 - 5/2 (30 days)  LCD: 5/2 with weekly updates (first on 4/9)  MEDICARE #: [id number]  Medicare Days: N/A  Medical Record #: [id number]     **Confirmed patient's rehab benefits with [facility], , and patient is covered at 100%, with $0 copay and no deductible/coinsurance      REFERRAL SOURCE:   Referring provider: Gerard Pradhan MD  Referring facility: [address] [place] [address]  Room: [facility] 92/[facility] 920-01  PCP: Bronwyn Corbin MD PCP phone number: [phone]    MEDICAL INFORMATION  HPI: Patient is a 76year old male that presented to [place] [address] on 3/27/2021 with complaints of generalized weakness, diarrhea, body aches, and blood in urine  Of note, patient was with COVID infection in February of 2021  CT of the abdomen/pelvis showed partially imaged groundglass opacification in superior segment of RLL and adjacent scarring compatible with recent COVID-10 pneumonia; no evidence of acute intra-abdominal or pelvic process; significant interval loss of L1 vertebral body height consistent with progression of stable chronic fracture  Lab workup revealed dehydration with hyponatremia, likely in setting of poor PO intake and diarrhea, and was initiated on IV fluids for hydration  For diarrhea, Metamucil was started for bulking, and stool studies were sent (all negative)  Patient was with noted elevated troponin levels, likely non-MI trop elevation, as EKG was without ST changes  Patient was continued on ASA and Lipitor, and Cardiology was consulted  ECHO was obtained, which showed EF of 65%, mild concentric hypertrophy; mildly dilated RA, mild MVR, left pleural effusion  Endocrinology was consulted for DM1 management, as well as insulin pump management, with adjustments made as needed  On 3/28, blood cultures were positive for gram-positive rods, likely GI or  source, with urine culture pending  Patient was continued on IV antibiotics  Overnight 3/29, patient was with noted hypoxia, requiring 2L O2 via NC, and CXR showed RLL infiltrate  ID was consulted, and antibiotics were discontinued on 3/29, as positive blood cultures suspected to be contamination  They continued to grow Corynebacterium and coagulase staph, however repeat blood cultures were negative  Patient complained of worsening back pain, and Neurosurgery was consulted regarding acute on chronic L1 compression fracture   Conservative management was recommended, with patient to wear TLSO brace when OOB or HOB greater than 45 degrees  Patient continued with hyponatremia, despite IVF, likely SIADH, and was initiated on low dose Torsemide daily  Overnight 3/31, patient was with noted agitation, requiring IV Ativan, which caused somnolence throughout the day  However, patient has improved cognitively, and is medically cleared for discharge to [facility]  PT/OT therapies and PM&R were consulted, and they are recommending patient for inpatient Acute Rehab  He has demonstrated that he can tolerate and participate in 3 hours of therapy per day  COVID positive on 2/5/2021 and 2/6/2021 - will require "yellow" bed on ARC, no longer requiring isolation precautions  Past Medical History:   Past Surgical History: Allergies:     Past Medical History:   Diagnosis Date    Arthritis     Cardiac disease     Closed fracture of fibula, lateral malleolus, right     Complete heart block (HCC)     Diabetes mellitus ([facility] )     Disease of thyroid gland     External incisional dehiscence     GERD (gastroesophageal reflux disease)     Heart disease     Muscular deconditioning     Pacemaker     Sleep apnea     cannot tolerate cpap    Thyroid disease     Past Surgical History:   Procedure Laterality Date    CARDIAC PACEMAKER PLACEMENT Left 2015    CARDIAC PACEMAKER PLACEMENT      CARDIAC SURGERY      CATARACT EXTRACTION      MUSCLE FLAP      right foot    NERVE BLOCK      Transforaminal Epidural Lumbar with Fluoroscopic Guidance     AR COLONOSCOPY FLX DX W/COLLJ SPEC WHEN PFRMD N/A 5/4/2018    Procedure: EGD AND COLONOSCOPY;  Surgeon: Chin Christine MD;  [facility];   Service: Gastroenterology    AR SPLIT [address] <100 SQCM Right 6/10/2019    Procedure: SKIN GRAFT SPLIT THICKNESS (STSG)  EXTREMITY right leg;  Surgeon: Isidra Fernandez DPM;  Location:  MAIN OR;  Service: Podiatry    TOE AMPUTATION      left 5th toe     Allergies   Allergen Reactions    Contrast  [Iodinated Diagnostic Agents]     Lipitor  [Atorvastatin Calcium] Drowsiness    Ibuprofen      sensitive    Iodine - Food Allergy Rash    Simvastatin Anxiety     Other reaction(s): Malaise (finding)         Comorbidities/Surgeries in the last 100 days: L1 Vertebral Fracture, 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, DM 2, diabetic retinopathy, diarrhea, gram positive bacteremia, complete heart block s/p PPM, elevated troponins without MI, CKD 3, osteoporosis, anemia, leukocytosis, dehydration with hyponatremia, HTN, hypothyroid, KAZ on CKD3    CURRENT VITAL SIGNS:   Temp:  [97 2 °F (36 2 °C)-97 6 °F (36 4 °C)] 97 6 °F (36 4 °C)  HR:  [] 100  Resp:  [17-20] 20  BP: (123-147)/(63-79) 147/79   Intake/Output Summary (Last 24 hours) at 4/2/2021 1127  Last data filed at 4/2/2021 1001  Gross per 24 hour   Intake 720 ml   Output 800 ml   Net -80 ml        LABORATORY RESULTS:      Lab Results   Component Value Date    HGB 8 8 (L) 04/01/2021    HGB 12 4 06/24/2015    HCT 27 6 (L) 04/01/2021    HCT 34 9 (L) 06/24/2015    WBC 12 49 (H) 04/01/2021    WBC 7 35 06/24/2015     Lab Results   Component Value Date    BUN 17 04/01/2021    BUN 24 06/24/2015     (L) 06/24/2015    K 4 3 04/01/2021    K 4 9 06/24/2015     04/01/2021    CL 96 (L) 06/24/2015    GLUCOSE >600 (HH) 07/13/2019    GLUCOSE 280 (H) 06/24/2015    CREATININE 1 18 04/01/2021    CREATININE 0 96 06/24/2015     Lab Results   Component Value Date    PROTIME 13 3 06/23/2015    INR 1 03 06/23/2015        DIAGNOSTIC STUDIES:  Ct Abdomen Pelvis Wo Contrast    Result Date: 3/27/2021  Impression: 1  Partially imaged groundglass opacification in the superior segment of the right lower lobe and adjacent scarring compatible with recent COVID-19 pneumonia  2   No evidence of acute intra-abdominal or pelvic process  Workstation performed: [id number]     X-ray Chest 1 View Portable    Result Date: 3/28/2021  Impression: No acute cardiopulmonary disease  Workstation performed: [id number]     Xr Chest Pa & Lateral    Result Date: 3/29/2021  Impression: Right lower lobe pneumonia corresponding with the abdomen CT with small effusions    Workstation performed: [id number]       PRECAUTIONS/SPECIAL NEEDS:  Tobacco:   Social History     Tobacco Use   Smoking Status Former Smoker    Types: Pipe   Smokeless Tobacco Never Used   , Alcohol:    Social History     Substance and Sexual Activity   Alcohol Use Yes    Alcohol/week: 0 0 standard drinks    Frequency: Never    Binge frequency: Never    Comment: socially   , Splints/Braces: TLSO brace when out of bed or HOB greater than 45 degrees, Lumbar Spinal Precautions, Anticoagulation:  heparin, Blood Sugar Management: as per MD recommendations, Edema Management, Safety Concerns, Pain Management, Requires O2: 3 L/min, Dietary Restrictions: Consistent Carb Level 2 and 1200mL Fluid Restriciton, Visually Impaired and Fall Precautions    MEDICATIONS:     Current Facility-Administered Medications:     acetaminophen (TYLENOL) tablet 650 mg, 650 mg, Oral, Q6H PRN, Cira Rosen MD, 650 mg at 04/01/21 1314    aspirin chewable tablet 81 mg, 81 mg, Oral, Daily, Cira Rosen MD, 81 mg at 04/02/21 1003    atorvastatin (LIPITOR) tablet 5 mg, 5 mg, Oral, Daily, Cira Rosen MD, 5 mg at 04/02/21 1009    cholecalciferol (VITAMIN D3) tablet 4,000 Units, 4,000 Units, Oral, Daily, Aruna Venegas MD, 4,000 Units at 04/02/21 1003    DULoxetine (CYMBALTA) delayed release capsule 30 mg, 30 mg, Oral, Daily, Cira Rosen MD, 30 mg at 04/02/21 1002    heparin (porcine) subcutaneous injection 5,000 Units, 5,000 Units, Subcutaneous, Q8H [address], Cira Rosen MD, 5,000 Units at 04/02/21 0637    insulin aspart (NovoLOG) FOR PUMP REFILLS 100 Units, 100 Units, Subcutaneous Insulin Pump, Daily PRN, Aruna Venegas MD    insulin glargine (LANTUS) subcutaneous injection 6 Units 0 06 mL, 6 Units, Subcutaneous, HS, Aruna Venegas MD, 6 Units at 04/01/21 2117    insulin lispro (HumaLOG) 100 units/mL subcutaneous injection 1-4 Units, 1-4 Units, Subcutaneous, TID AC, 1 Units at 04/02/21 1001 **AND** Fingerstick Glucose (POCT), , , TID AC, Si Olszewski, MD    insulin lispro (HumaLOG) 100 units/mL subcutaneous injection 2 Units, 2 Units, Subcutaneous, TID With Meals, Patrick Bearden MD, 2 Units at 04/02/21 1001    Labetalol HCl (NORMODYNE) injection 10 mg, 10 mg, Intravenous, Q6H PRN, Ken Scott MD    levothyroxine tablet 125 mcg, 125 mcg, Oral, Early Morning, Ken Scott MD, 125 mcg at 04/02/21 6429    lisinopril (ZESTRIL) tablet 2 5 mg, 2 5 mg, Oral, Daily, Yosi Gusman MD, 2 5 mg at 04/02/21 1002    ondansetron (ZOFRAN) injection 4 mg, 4 mg, Intravenous, Q6H PRN, Ken Scott MD    pantoprazole (PROTONIX) EC tablet 40 mg, 40 mg, Oral, Early Morning, Kne Scott MD, 40 mg at 04/02/21 2790    psyllium (METAMUCIL) 1 packet, 1 packet, Oral, Daily, Ken Scott MD, 1 packet at 04/02/21 1010    Insert peripheral IV, , , Once **AND** sodium chloride (PF) 0 9 % injection 3 mL, 3 mL, Intravenous, Q1H PRN, Ken Scott MD    [facility]) capsule 0 4 mg, 0 4 mg, Oral, Daily With Hailey Tena MD, 0 4 mg at 04/01/21 1608    torsemide (DEMADEX) tablet 5 mg, 5 mg, Oral, Daily, Yosi Gusman MD, 5 mg at 04/02/21 1010    SKIN INTEGRITY:   Right knee abrasion RONNIE, right foot diabetic ulcer RONNIE, and right leg incision RONNIE  PRIOR LEVEL OF FUNCTION:  He lives in a(n) single family home  Willam Curling is  and lives with their spouse  Self Care: Independent, Indoor Mobility: Independent, Stairs (in/outdoor): Independent and Cognition: Independent  Prior to patient's original admission February 2021, patient was fully Independent with ADLs and IADLs  Patient was Independent without use of AD for mobility      FALLS IN THE LAST 6 MONTHS: None     HOME ENVIRONMENT:  The living area: bedroom on 2nd floor and bathroom on 2nd floor  There is a  Ramp to enter the home  The patient will have 24 hour supervision available upon discharge  Patient's spouse is supportive and able to assist as needed  PREVIOUS DME:  Equipment in home (previous DME): Ramp, [place], [employer], and [employer]    FUNCTIONAL STATUS:  Physical Therapy Occupational Therapy Speech Therapy   3/31/2021, per PT    Subjective   Subjective "I feel pretty good"   Bed Mobility   Supine to Sit 4  Minimal assistance   Additional items Assist x 1; Increased time required; Bedrails;HOB elevated;Verbal cues   Sit to Supine Unable to assess   Additional Comments Pt supine in bed attempting to get up but tangled in gown  Pt returned to room seated OOB in chair with all needs within reach and chair alarm activated    Transfers   Sit to Stand 4  Minimal assistance  (1 LOB and landing in bed after standing against PT's advice)   Additional items Assist x 1; Increased time required   Stand to Sit 4  Minimal assistance   Additional items Assist x 1; Increased time required   Additional Comments transfer w/ RW and TSLO donned   Ambulation/Elevation   Gait pattern Excessively slow; Forward Flexion; Wide YAMILA; Decreased foot clearance; Inconsistent rahul   Gait Assistance 4  Minimal assist  (CGA)   Additional items Assist x 1   Assistive Device Rolling walker   Distance 80ftx2  (1 seated rest break)   Stair Management Assistance Not tested   Balance   Static Sitting Poor   Dynamic Sitting Poor   Static Standing Poor   Dynamic Standing Poor   Ambulatory Poor +  (w/ RW)   Endurance Deficit   Endurance Deficit Yes   Endurance Deficit Description limited by fatigue, weakness, and lethargy   Activity Tolerance   Activity Tolerance Patient limited by fatigue   Medical Staff Made Aware PT Sree Coffey   Nurse Made Aware yes   Assessment   Prognosis Fair   Problem List Decreased strength;Decreased range of motion;Decreased endurance; Impaired balance;Decreased mobility; Decreased cognition; Impaired judgement;Decreased safety awareness;Orthopedic restrictions   Assessment Pt appeared to have improved cognition compared to last PT seesion, able to state location, situation, name, month, year and within 10 days of current date  Upon PT arrival, pt was attempting to get OOB but was tangled in robes  Pt performed supine to sit at [address]  Pt repeatedly standing against PT advisory without RW   1 LOB posterior onto bed  Pt instructed on donning TSLO which remained on for the rest of the PT session  Seated balance requiring mod A 2* to pt fatigue and multiple episodes of dozing off  Pt easily aroused  Pt ambulated ~80ft x2 w/ RW, 1 seated rest break,increased VC for safety and walker negotiation  Pt seated OOB in chair w/ chair alarm activated, TLSO donned and all needs within reach  Pt continues to demonstrate increased fatigue, decreased strength, cognition, endurance, and balance, limiting functional mobility and safety  Pt would benefit from continued skilled rehabilitation in order to improve the previously stated impairments and increase independence  Pt would benefit frmagi D/C to rehab           3/31/2021, per OT    ADL   Eating Assistance 5  Supervision/Setup   Grooming Assistance 4  Minimal Assistance   UB Bathing Assistance 3  Moderate Assistance   LB Bathing Assistance 3  Moderate Assistance   [facility] 3  Moderate [address] 3  Moderate [address]  4  Minimal Assistance   Bed Mobility   Supine to Sit 4  Minimal assistance   Additional items Assist x 1;HOB elevated; Increased time required   Sit to Supine Unable to assess   Transfers   Sit to Stand 4  Minimal assistance   Additional items Assist x 1; Increased time required; Impulsive   Stand to Sit 4  Minimal assistance   Additional items Assist x 1; Increased time required   Additional Comments Transfers with RW, pt with 1 near LOB with sit <> stand tx   Functional Mobility   Functional Mobility 4  Minimal assistance   Additional Comments A for RW mgmt   Additional items Rolling walker   Balance   Static Sitting Poor +   Dynamic Sitting Poor   Static Standing Poor   Dynamic Standing Poor   Ambulatory Poor +   Activity Tolerance   Activity Tolerance Patient limited by fatigue   Medical Staff Made Aware SPT Ada Query    Nurse Made Aware yes   RUE Assessment   RUE Assessment WFL   LUE Assessment   LUE Assessment WFL   Hand Function   Fine Motor Coordination Impaired   Sensation   Light Touch No apparent deficits   Vision-Basic Assessment   Current Vision Wears glasses all the time   Vision - Complex Assessment   Ocular Range of Motion Restricted looking down   Head Position WDL   Tracking Decreased smoothness of vertical tracking   Perception   Inattention/Neglect Appears intact   Cognition   Overall Cognitive Status Impaired   Arousal/Participation Responsive; Cooperative   Attention Attends with cues to redirect   Orientation Level Oriented X4   Memory Decreased recall of precautions   Following Commands Follows one step commands with increased time or repetition   Comments Pt pleasant and cooperative t/o session  Pt lethargic t/o, requires cues to stay awake and prevent pt from falling, as well as repeated directions at times    Assessment   Limitation Decreased ADL status; Decreased UE ROM; Decreased UE strength;Decreased Safe judgement during ADL;Decreased cognition;Decreased endurance;Decreased self-care trans;Decreased high-level ADLs   Prognosis Fair   Assessment Pt is a 76 y o  male admitted [facility] on 3/27/2021 w/ Gram-positive bacteremia, dehydration, diarrhea, heart block    has a past medical history of Arthritis, Cardiac disease, Closed fracture of fibula, lateral malleolus, right, Complete heart block ([address] ), Diabetes mellitus ([address] ), Disease of thyroid gland, External incisional dehiscence, GERD (gastroesophageal reflux disease), Heart disease, Muscular deconditioning, Pacemaker, Sleep apnea, and Thyroid disease  Pt with active OT orders  As per pt report, pta, resides in a multi-level home, 4STE/ramp with his wife  Pt was I w/  ADLS and IADLS, (-) drove  Upon evaluation, pt currently requires MIN A with RW for transfers and mobility  Exhibits decreased strength to B/L UE's limiting performance in ADLs/transfers  Pt currently  requires MOD A UB ADLs, MOD A LB ADLs, and MIN A toileting  Pt with difficulty managing [facility] tasks  Pt is limited at this time 2*: endurance, activity tolerance, functional mobility, balance, trunk control, functional standing tolerance, decreased I w/ ADLS/IADLS, strength, ROM, cognitive impairments, decreased safety awareness and coordination deficits  The following Occupational Performance Areas to address include: eating, grooming, bathing/shower, toilet hygiene, dressing, health maintenance, functional mobility, community mobility and clothing management  Pt to benefit from immediate acute skilled OT to address above deficits, improve overall functional independence, maximize fnxl mobility and reduce caregiver burden  From OT standpoint, recommendation at time of d/c would be STR  Pt was left in chair with alarm on after session with all current needs met  The patient's raw score on the AM-PAC Daily Activity inpatient short form is 14, standardized score is 33 39, less than 39 4  Patients at this level are likely to benefit from discharge to post-acute rehabilitation services  Please refer to the recommendation of the Occupational Therapist for safe discharge planning  Pt seen for additional OT tx session post evaluation  See assessment below  N/A      CURRENT GAP IN FUNCTION  Prior to admission the patient was independent with functional mobility, ADLs, and IADLs  Due to recent COVID infection patient was with significant fatigue       Estimated length of stay: 7 to 10 days    Anticipated Post-Discharge Disposition/Treatment  Disposition: Return to previous home/apartment  Outpatient Services: Physical Therapy (PT) and Occupational Therapy (OT)    BARRIERS TO DISCHARGE  Weakness, Pain, Leukocytosis (elevated white blood count), Diminished cognition/Mentation change, Balance Difficulty, Fatigue, Home Accessibility, Caregiver Accessibility, Financial Resources, Equipment Needs and Resource Availability    INTERVENTIONS FOR DISCHARGE  Adaptive equipment, Patient/Family/Caregiver Education, [employer], Support Group, Financial Assistance, Arrange DME needs, Medication Changes as per MD recommendations, Therapy exercises, Center of balance support  and Energy conservation education     REQUIRED THERAPY:  Patient will require PT and OT 90 minutes each per day, five days per week to achieve rehab goals  REQUIRED FUNCTIONAL AND MEDICAL MANAGEMENT FOR INPATIENT REHABILITATION:  Skin:  There are no pressure sores currently, Right knee abrasion RONNIE, right foot diabetic ulcer RONNIE, and right leg incision RONNIE, Pain Management: Overall pain is well controlled, Deep Vein Thrombosis (DVT) Prophylaxis:  heparin and SCD's while in bed, Diabetes Management: continue sliding scale insulin, patient to do finger sticks as ordered, SLIM to continue to manage diabetes, and further IM management of additional medical conditions while on the ARC, he needs PT/OT intervention, patient/family education and training, possible Neuropsych and Neurosurgery consults with any other needed consults prn, nursing medication review and management of bowel/bladder function  RECOMMENDED LEVEL OF CARE:   Patient is a 76year old male that presented to [address] [place] [address] Road on 3/27/2021 with complaints of generalized weakness, diarrhea, body aches, and blood in urine  Of note, patient was with COVID infection in February of 2021   CT of the abdomen/pelvis showed partially imaged groundglass opacification in superior segment of RLL and adjacent scarring compatible with recent COVID-10 pneumonia; no evidence of acute intra-abdominal or pelvic process; significant interval loss of L1 vertebral body height consistent with progression of stable chronic fracture  Lab workup revealed dehydration with hyponatremia, likely in setting of poor PO intake and diarrhea, and was initiated on IV fluids for hydration  For diarrhea, Metamucil was started for bulking, and stool studies were sent (all negative)  Patient was with noted elevated troponin levels, likely non-MI trop elevation, as EKG was without ST changes  Patient was continued on ASA and Lipitor, and Cardiology was consulted  ECHO was obtained, which showed EF of 65%, mild concentric hypertrophy; mildly dilated RA, mild MVR, left pleural effusion  Endocrinology was consulted for DM1 management, as well as insulin pump management, with adjustments made as needed  On 3/28, blood cultures were positive for gram-positive rods, likely GI or  source, with urine culture pending  Patient was continued on IV antibiotics  Overnight 3/29, patient was with noted hypoxia, requiring 2L O2 via NC, and CXR showed RLL infiltrate  ID was consulted, and antibiotics were discontinued on 3/29, as positive blood cultures suspected to be contamination  They continued to grow Corynebacterium and coagulase staph, however repeat blood cultures were negative  Patient complained of worsening back pain, and Neurosurgery was consulted regarding acute on chronic L1 compression fracture  Conservative management was recommended, with patient to wear TLSO brace when OOB or HOB greater than 45 degrees  Patient continued with hyponatremia, despite IVF, likely SIADH, and was initiated on low dose Torsemide daily  Overnight 3/31, patient was with noted agitation, requiring IV Ativan, which caused somnolence throughout the day  However, patient has improved cognitively   Prior to patient's original admission February 2021, patient was fully Independent with ADLs and IADLs  Patient was Independent without use of AD for mobility  Currently, patient is Min assist with use of RW for gait and transfers, and Min/Mod assist for UB ADLs, Mod assist for LB ADLs  Close medical management and PM&R management is recommended at this time while patient is on the [facility]  Inpatient acute rehab is recommended for patient to maximize overall strength and mobility to Supervision/Modified Independent upon discharge to home with support of family

## 2021-04-02 NOTE — ASSESSMENT & PLAN NOTE
Lab Results   Component Value Date    HGBA1C 7 7 (H) 02/05/2021       Recent Labs     04/01/21  2038 04/02/21  0838 04/02/21  1148 04/02/21  1559   POCGLU 183* 154* 187* 131       Blood Sugar Average: Last 72 hrs:  (P) 146 [id number]   On insulin pump as an outpatient - transition to basal/prandial insulin with additional SSI coverage per Accu-Cheks due to episodes of altered mentation previously by endocrinology -> switch back to pump once mentation back baseline  Monitor blood sugars - avoid hypoglycemia  Endocrinology continues to follow

## 2021-04-02 NOTE — ASSESSMENT & PLAN NOTE
Complaining of persistent lower back pain exacerbated by movement  Denied stool or urine incontinence, denies urinary retention  No weakness or numbness of bilateral lower extremities  CT abdomen and pelvis on 03/27/2021:  Significant interval loss of L1 vertebral body height consistent with progression of 1 stable chronic fracture  No significant fracture fragment retropulsion  Transitional S1 vertebra  Neurosurgery on board, follow recommendations    Patient has been discharged to acute rehab on 04/02/2021

## 2021-04-02 NOTE — ASSESSMENT & PLAN NOTE
Serum and urine sodium studies likely consistent with SIADH  Serum sodium baseline around 130 - 133  Continue fluid restrictions  continue low-dose Demadex

## 2021-04-02 NOTE — DISCHARGE SUMMARY
[facility]  Discharge- Beverly Gould 1945, 76 y o  male MRN: [id number]  Unit/Bed#: [facility] 920-01 Encounter: [id number]  Primary Care Provider: Jesús Andino MD   Date and time admitted to hospital: 3/27/2021  3:35 PM    * Gram-positive bacteremia  Assessment & Plan  Admitted complaining of generalized weakness increased urinary frequency and diarrhea  Spiking episode of fever of 100 3 on March 27, 2021  Has remained afebrile during the last 48  hours  Blood culture from March 27, 2021 2/2 sets of blood culture growing Gram-positive rods ( Corynebacterium striatum groupAbnormal )  Urine culture 03/27/2021-negative  Follow-up Chest x-ray 3/29 showed right lower lobe consolidation likely residual from COVID-19 pneumonia on February  CT abdomen and pelvis showed no acute intra-abdominal abnormalities  Pulmonary finding compatible with recent COVID-19 pneumonia  (diagnosed on February 5, 2021)  Initially treated with ceftriaxone switched to cefepime  Stool enteric bacterial panel by PCR-negative, stool C difficile discontinued due to resolution of diarrhea  Infectious on board, recommendation to discontinue antibiotics; blood cultures oral likely contaminant  Patient has been off antibiotics during the last 72 hours has remained hemodynamically stable, afebrile  Trend temperature curve and WBC count, monitor off antibiotics    Progression of L1 vertebral fracture   Assessment & Plan  Complaining of persistent lower back pain exacerbated by movement  Denied stool or urine incontinence, denies urinary retention  No weakness or numbness of bilateral lower extremities  CT abdomen and pelvis on 03/27/2021:  Significant interval loss of L1 vertebral body height consistent with progression of 1 stable chronic fracture  No significant fracture fragment retropulsion  Transitional S1 vertebra  Neurosurgery on board, follow recommendations    Patient has been discharged to acute rehab on 04/02/2021    Hyponatremia  Assessment & Plan  Serum and urine sodium studies likely consistent with SIADH  Serum sodium baseline around 130 - 133  Continue fluid restrictions  continue low-dose Demadex    Complete heart block   Assessment & Plan  S/p PPM   Seen at bedside, denies any chest pain or shortness of breath  Transthoracic echocardiogram showed ejection fraction 65% with no regional wall abnormalities  Cardiology on board, no further workup indicated  Follow as an outpatient with Cardiology    Elevated troponin level not due myocardial infarction  Assessment & Plan  Likely non MI troponin elevation  Trop mildly elevated on admission; 0 32<--  0 38<-- 0 47  EKG w/o ST changes  History of complete heart block status post pacemaker implant  Transthoracic echocardiogram show LVEF 65% with no regional wall motion abnormalities  Unable to assess diastolic function  Cardiology on board, no further cardiac workup indicated  Follow with Cardiology as an outpatient regularly    Continue ASA and Lipitor    Stage 3 chronic kidney disease  Assessment & Plan  Lab Results   Component Value Date    EGFR 60 04/01/2021    EGFR 57 03/31/2021    EGFR 69 03/30/2021    CREATININE 1 18 04/01/2021    CREATININE 1 23 03/31/2021    CREATININE 1 05 03/30/2021   Cr at baseline  Monitor BUN/Cr  Avoid nephrotoxic agents and hypotension    Essential hypertension  Assessment & Plan  Blood pressure well controlled  Continue Lisinopril    Type 2 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  Assessment & Plan  Lab Results   Component Value Date    HGBA1C 7 7 (H) 02/05/2021       Recent Labs     04/01/21  2038 04/02/21  0838 04/02/21  1148 04/02/21  1559   POCGLU 183* 154* 187* 131       Blood Sugar Average: Last 72 hrs:  (P) 723 [id number]   On insulin pump as an outpatient - transition to basal/prandial insulin with additional SSI coverage per Accu-Cheks due to episodes of altered mentation previously by endocrinology -> switch back to pump once mentation back baseline  Monitor blood sugars - avoid hypoglycemia  Endocrinology continues to follow    Anemia of chronic disease  Assessment & Plan  Normocytic anemia, likely anemia of chronic disease  Prior iron studies showed finding consistent with anemia of chronic disease  Hemoglobin stable with no evidence of active bleeding  Denies seen any blood in stools  Denies tarry dark stools  Trend hemoglobin  Hypothyroidism  Assessment & Plan  Endocrinology on board, recommendations to continue levothyroxine 125 mcg, elevated TSH-6 43, free T4 within normal limits  Thyroid function panel results could be acute stress response with acute bacteremia  Endo recommendations to follow-up outpatient in 4 to 6 weeks for repeat testing prior to changing dose        Discharging Physician / Practitioner: aGbrielle Fowler MD  PCP: Tory Mohr MD  Admission Date:   Admission Orders (From admission, onward)     Ordered        03/28/21 1534  Inpatient Admission  Once         03/27/21 1920  Place in Observation  Once                   Discharge Date: 04/02/21    Resolved Problems  Date Reviewed: 4/1/2021          Resolved    Diarrhea 3/31/2021     Resolved by  Gabrielle Fowler MD          Consultations During Hospital Stay:  · Cardiology  · Neurosurgery  · Infectious disease  · Endocrinologist  · Physical medicine and rehabilitation    Procedures Performed:   · No procedures performed    Significant Findings / Test Results:   · CT abdomen and pelvis without contrast 03/27/2021  IMPRESSION:        1  Partially imaged groundglass opacification in the superior segment of the right lower lobe and adjacent scarring compatible with recent COVID-19 pneumonia  2   No evidence of acute intra-abdominal or pelvic process      Incidental Findings:   · No incidental findings    Test Results Pending at Discharge (will require follow up):   · No test pending at discharge     Outpatient Tests Requested:  · No patient tests requested    Complications:  No complications    Reason for Admission:  Generalized weakness and loose stools  Hospital Course:     Britta Sidhu is a 76 y o  male who presents with weakness  Patient had COVID infection in February  Since arriving home he is been sleeping a lot  Not really taking in much by mouth  He denies any fevers or chills or productive cough  He has noticed increased urination with inability to control blood sugars per insulin pump  also complaining of frequent loose bowel movement on admission  On admission blood culture were positive for Gram-positive rods, Infectious Disease was involved in patient care as per infectious disease blood culture results most likely represent contamination  Patient was discontinued from antibiotics and he was accepted of antibiotic for 72 hours with no fever and improvement in his overall condition  During the course of hospitalizationpatient's diarrhea improved  Stool workup for possible infectious etiology showed negative results  Diarrhea subsided  During the course of hospitalization also patient's blood sugar was suboptimal controled as well as he was found to have elevated TSH with normal reflex T4  Endocrinology was involved in patient care recommendation to repeat thyroid function panel as an outpatient  As per endo recommendations continue holding insulin pump for now  Urine and serum sodium studies showed hyponatremia most likely consistent with SIADH  Patient was started on small dose of diuretics and fluid restriction  CT of the abdomen and pelvis showed fracture of L1, physical medicine and Rehabilitation was consulted  Recommendations to discharge patient to acute rehab  Please see above list of diagnoses and related plan for additional information       Condition at Discharge: good     Discharge Day Visit / Exam: Subjective:  Patient was seen at bedside this morning, he was alert and cooperative  Denies any chest pain or shortness of breath  Was breathing ambient air  Denies changes in bowel or urine habits  Denies focal neurological deficits  Tolerating oral diet well  Additional complaints  Vitals: Blood Pressure: 147/79 (04/02/21 0730)  Pulse: 100 (04/02/21 0730)  Temperature: 97 6 °F (36 4 °C) (04/02/21 0730)  Temp Source: Oral (04/01/21 2154)  Respirations: 20 (04/02/21 0730)  Height: 5' 8" (172 7 cm) (03/27/21 2103)  Weight - Scale: 72 kg (158 lb 11 7 oz) (03/27/21 2103)  SpO2: (!) 86 % (04/02/21 0730)  Exam:   Physical Exam  Constitutional:       Appearance: Normal appearance  HENT:      Head: Normocephalic and atraumatic  Nose: Nose normal    Eyes:      Extraocular Movements: Extraocular movements intact  Neck:      Musculoskeletal: Normal range of motion and neck supple  Cardiovascular:      Rate and Rhythm: Normal rate and regular rhythm  Pulses: Normal pulses  Heart sounds: No murmur  No friction rub  No gallop  Pulmonary:      Effort: Pulmonary effort is normal  No respiratory distress  Breath sounds: No wheezing, rhonchi or rales  Abdominal:      General: There is no distension  Tenderness: There is no abdominal tenderness  There is no guarding or rebound  Musculoskeletal: Normal range of motion  General: No swelling, deformity or signs of injury  Right lower leg: No edema  Skin:     General: Skin is warm  Capillary Refill: Capillary refill takes less than 2 seconds  Coloration: Skin is not jaundiced  Findings: No bruising, erythema or lesion  Neurological:      General: No focal deficit present  Mental Status: He is alert and oriented to person, place, and time     Psychiatric:         Mood and Affect: Mood normal          Behavior: Behavior normal            Discharge instructions/Information to patient and family:   See after visit summary for information provided to patient and family  Provisions for Follow-Up Care:  See after visit summary for information related to follow-up care and any pertinent home health orders  Disposition:     Acute Rehab at [facility]:   · Not Applicable to this Patient - Not Applicable to this Patient         Discharge Statement:  I spent 45 minutes discharging the patient  This time was spent on the day of discharge  I had direct contact with the patient on the day of discharge  Greater than 50% of the total time was spent examining patient, answering all patient questions, arranging and discussing plan of care with patient as well as directly providing post-discharge instructions  Additional time then spent on discharge activities  Discharge Medications:  See after visit summary for reconciled discharge medications provided to patient and family        ** Please Note: This note has been constructed using a voice recognition system **

## 2021-04-02 NOTE — TREATMENT PLAN
X-ray lumbar spine 04/01/2021:  Official read pending however per my interpretation L1 compression fracture redemonstrated with stable spinal alignment and stable loss of height  At this time will continue with conservative management  Continue TLSO brace when out of bed and head of bed greater than 45°  He will follow-up in 2 weeks time with repeat upright lumbar spine x-rays  Again do not believe patient will need to wear brace for a prolonged period of time as although the fracture appears worse it overall appears chronic in nature  Please call with questions or concerns, signed off

## 2021-04-02 NOTE — CASE MANAGEMENT
CM informed by Kade JONES, pt approved for admission to [facility] today  Pt will be transferred to room # 453 at 2pm  Report to be called to x [phone]  Pt, pt's wife, and bedside RN notified of transfer time

## 2021-04-02 NOTE — TREATMENT PLAN
Individualized Plan of [facility] 76 y o  male MRN: [id number]  Unit/Bed#: -01 Encounter: [id number]     PATIENT INFORMATION  ADMISSION DATE: 4/2/2021  1:58 PM GEORGIE CATEGORY:L1 vertebral fracture   ADMISSION DIAGNOSIS: Vertebral fracture, osteoporotic ([facility] [address] ) [M80 [id number]]  EXPECTED LOS: 10-14 days     MEDICAL/FUNCTIONAL PROGNOSIS  Based on my assessment of the patient's medical conditions and current functional status, the prognosis for attaining medical and functional goals or the IRF stay is:  Good    Medical Goals: Patient will be medically stable for discharge to [facility] upon completion of rehab program and Patient will be able to manage medical conditions and comorbid conditions with medications and follow up upon completion of rehab program    [address]: Home - Assistance     ANTICIPATED FOLLOW-UP SERVICE:   Home Health Services: PT, OT, SLP and Nursing    DISCIPLINE SPECIFIC PLANS:  Required Disciplines & Services: Rehabillitation Nursing and Case Management    REQUIRED THERAPY:  Therapy Hours per Day Days per Week Total Days   Physical Therapy 1 5-6 7   Occupational Therapy 1 5-6 7   Speech/Language Therapy 1 5-6 7       ANTICIPATED FUNCTIONAL OUTCOMES:  ADL:  Supervision with LRAD   Bladder/Bowel:   Supervision with LRAD   Transfers:   Supervision with LRAD   Locomotion:   Supervision with LRAD   Cognitive:       DISCHARGE PLANNING NEEDS  Equipment needs: Discharge needs to be reviewed with team      REHAB ANTICIPATED PARTICIPATION RESTRICTIONS:  None

## 2021-04-02 NOTE — ASSESSMENT & PLAN NOTE
Normocytic anemia, likely anemia of chronic disease  Prior iron studies showed finding consistent with anemia of chronic disease  Hemoglobin stable with no evidence of active bleeding  Denies seen any blood in stools  Denies tarry dark stools  Trend hemoglobin

## 2021-04-03 PROBLEM — L97.519 DIABETIC ULCER OF RIGHT FOOT (HCC): Status: ACTIVE | Noted: 2019-09-19

## 2021-04-03 PROBLEM — E11.621 DIABETIC ULCER OF RIGHT FOOT (HCC): Status: ACTIVE | Noted: 2019-09-19

## 2021-04-03 NOTE — PROGRESS NOTES
SLP TAA       04/03/21 3430   Patient Data   Rehab Impairment Orthopedic Disorders: Other Orthopedic   Etiologic Diagnosis Acute on Chronic L1 Vertebral Fracture   Date of Onset 03/27/21   Support System   Name Juni Patient    Relationship wife   Prior Level of Function   Functional Cognition 3  Independent - Patient completed the activities by him/herself, with or without an assistive device, with no assistance from a helper  Restrictions/Precautions   Precautions 1:1;Bed/chair alarms;Cognitive; Fall Risk;Spinal precautions;Supervision on toilet/commode   Braces or Orthoses TLSO   Pain Assessment   Pain Assessment Tool Pain Assessment not indicated - pt denies pain   Eating Assessment   Swallow Precautions No   Bedside Swallow Results No   VBS Study Results No   Food To Mouth Yes   Able To Cut No   Positioning Upright   Meal Assessed Lunch   QI: Swallowing/Nutritional Status Regular food   Current Diet Regular; Thin   Intake Mode PO;Self   Dentures Other  (natural dentition)   Finishes Timely Yes   Opens Packages No   Findings Swallow function appears to be [facility]  Refer to SLP Rehab note for full details  Type of Assistance Needed Set-up / Rhonda Early; Incidental touching   Amount of Physical Assistance Provided Less than 25%   Eating CARE Score 3   Comprehension   Assist Devices Glasses   Auditory Basic   Visual Basic   Findings Refer to SLP Rehab note for details   QI: Comprehension 2  Sometimes Understands: Understands only basic conversations or simple, direct phrases  Frequently requires cues to understand   Comprehension (FIM) 3 - Needs parts of sentences repeated   Expression   Verbal Basic   Non-Verbal Basic   Intelligibility Sentence   Findings Refer to SLP Rehab note for details   QI: Expression 3   Exhibits some difficulty with expressing needs and ideas (e g , some words or finishing thoughts) or speech is not clear   Expression (FIM) 4 - Expresses basic info/needs 75-90% of time   Social Interaction   Cooperation with staff;with family   Participation Individual   Behaviors observed Restless;Confused; Appropriate   Findings Refer to SLP Rehab note for details   Social Interaction (FIM) 4 - Interacts 75-89% of time   Problem Solving   Findings Refer to SLP Rehab note for details   Problem solving (FIM) 2 - Needs direction more than ½ time to initiate, plan or complete simple tasks   Memory   Initiates Tasks Yes   Short-Term Impaired   Long Term Intact   Findings Refer to SLP Rehab note for details   Memory (FIM) 2 - Recalls 1 of 2 steps   Discharge Information   Patient's Discharge Plan home w/ family support   Patient's Rehab Expectations none verbalized at this time   Barriers to Discharge Home Decreased Cognitive Function;Decreased Strength;Decreased Endurance; Safety Considerations   Impressions Pt is a fair to good rehab candidate to achieve supervision to min A level cognitive linguistic goals while in the acute rehab center w/ anticipated discharge home w/ family support  Currently, pt exhibiting barriers of decreased EDI, decreased comprehension, decreased STM recall, decreased executive function skills, fatigue and generalized confusion which is impacting pt's safety and functional mobility  Pt to benefit from completion of formalized cognitive assessment when appropriate  Of note, engaged pt in dysphagia assessment which currently oropharyngeal stages appear to be [facility] tolerating regular diet w/ thin liquids at this time and no further services warranted for dysphagia  Pt will benefit from SLP services to maximize cognitive skills to decrease burden of care for family at time of discharge     SLP Therapy Minutes   SLP Time In 1330   SLP Time Out [id number]   SLP Total Time (minutes) 45   SLP Mode of treatment - Individual (minutes) 45   SLP Mode of treatment - Concurrent (minutes) 0   SLP Mode of treatment - Group (minutes) 0   SLP Mode of treatment - Co-treat (minutes) 0   SLP Mode of Treatment - Total time(minutes) 45 minutes   SLP Cumulative Minutes 45   Cumulative Minutes   Cumulative therapy minutes 45

## 2021-04-03 NOTE — PROGRESS NOTES
PT Initial Evaluation     04/03/21 1415   Patient Data   Rehab Impairment  Impairment of mobility, safety and Activities of Daily Living (ADLs) due to Orthopedic Disorders:  08 9  Other Orthopedic     Etiologic Diagnosis Acute on chronic L1 vertebral fx   Date of Onset 03/27/21   Support System   Name Felisa Zaragoza spouse    Phone Number [phone]   Home Setup   Type of Home Multi Level   Method of Entry Ramp   Number of Stairs   (does not managed steps, uses ramp)   Number of Stairs in Home   (FF but has a stairglide )   First Floor Bathroom Half   Second Floor Bathroom Full; Shower   First Floor Setup Available Yes  (but angulo snot have to be with stairglide in place)   Available Equipment Roller Walker;Rollator;Single Point [place]; Shower Chair  (stairglide)   Baseline Information   Transportation Family/friends drive   Prior Device(s) Used [employer]  (used RW when he got his off loading shoe and when got weaker)   Prior Level of Function   Self-Care 3  Independent - Patient completed the activities by him/herself, with or without an assistive device, with no assistance from a helper  Indoor-Mobility (Ambulation) 3  Independent - Patient completed the activities by him/herself, with or without an assistive device, with no assistance from a helper  Stairs 9  Not Applicable  (uses stairglide and ramp)   Functional Cognition 3  Independent - Patient completed the activities by him/herself, with or without an assistive device, with no assistance from a helper  Prior Device Used Z  None of the above  [facility])   Falls in the Last Year   Number of falls in the past 12 months 1   Type of Injury Associated with Fall No injury  (hypoglycemic episode as per wife )   Patient Preference   Nickname (Patient Preference) [place] Jock    Restrictions/Precautions   Precautions 1:1;Bed/chair alarms;Cognitive; Fall Risk;Spinal precautions  (skin integrity , reported to get agitated at night )   Braces or Orthoses TLSO  (>45 HOB and OOB , surgical shoe R foot )   Pain Assessment   Pain Score No Pain   Transfer Bed/Chair/Wheelchair   Adaptive Equipment Roller Walker   Type of Assistance Needed Physical assistance   Amount of Physical Assistance Provided 25%-49%   Chair/Bed-to-Chair Transfer CARE Score 3   Roll Left and Right   Type of Assistance Needed Physical assistance   Amount of Physical Assistance Provided 25%-49%   Roll Left and Right CARE Score 3   Sit to Lying   Type of Assistance Needed Physical assistance   Amount of Physical Assistance Provided 25%-49%   Sit to Lying CARE Score 3   Lying to Sitting on Side of Bed   Type of Assistance Needed Physical assistance   Amount of Physical Assistance Provided 25%-49%   Lying to Sitting on Side of Bed CARE Score 3   Sit to Stand   Type of Assistance Needed Physical assistance   Amount of Physical Assistance Provided 50%-74%   Comment min to mod A    Sit to Stand CARE Score 2   Picking Up Object   Comment please assess tomorrow   Reason if not Attempted Safety concerns   Picking Up Object CARE Score 88   Car Transfer   Reason if not Attempted Environmental limitations   Car Transfer CARE Score 10   Ambulation   Primary Mode of Locomotion Prior to Admission Walk   Distance Walked (feet) 25 ft   Assist Device Roller Walker   Gait Pattern Inconsistant Laya;Decreased foot clearance;R foot drag;Improper weight shift   Limitations Noted In Balance; Coordination;Device Management; Endurance; Safety;Strength   Provided Assistance with: Balance;Weight Shift   Walk Assist Level Moderate Assist;Minimum Assist;Chair Follow   Walk 10 Feet   Type of Assistance Needed Physical assistance   Amount of Physical Assistance Provided Total assistance   Walk 10 Feet CARE Score 1   Walk 50 Feet with Two Turns   Reason if not Attempted Safety concerns   Walk 50 Feet with Two Turns CARE Score 88   Walk 150 Feet   Reason if not Attempted Safety concerns   Walk 150 Feet CARE Score 88   Walking 10 Feet on Uneven Surfaces   Reason if not Attempted Safety concerns   Walking 10 Feet on Uneven Surfaces CARE Score 88   Wheel 50 Feet with Two Turns   Reason if not Attempted Activity not applicable   Wheel 50 Feet with Two Turns CARE Score 9   Wheel 150 Feet   Reason if not Attempted Activity not applicable   Wheel 391 Feet CARE Score 9   Curb or Single Stair   Reason if not Attempted Activity not applicable   1 Step (Curb) CARE Score 9   4 Steps   Reason if not Attempted Activity not applicable   4 Steps CARE Score 9   12 Steps   Reason if not Attempted Activity not applicable   12 Steps CARE Score 9   Stairs   Findings assess ramp if safe     Strength RLE   RLE Overall Strength 4-/5   Strength LLE   LLE Overall Strength 4-/5   Sensation   Light Touch Partial deficits in the RLE   Sharp/Dull Partial deficits in the LLE   Additional Comments Pt  also was not consistent during assessment but stated that he has neuropathy on B feet    Cognition   Arousal/Participation Alert; Cooperative   Attention Attends with cues to redirect   Memory Decreased short term memory;Decreased recall of recent events;Decreased recall of precautions   Following Commands Follows one step commands with increased time or repetition   Objective Measure   PT Measure(s) Pt  unable to recall spine precautions    PT Findings Pt  has R foot sore and is covered with dressing, Acoording to wife,podiatrist recommended for pt  to use R off loading shoe for patient and she will bring it in tomorrow  pt  also gave pt  a boot but pt  was not able to use it a lot prior to admission    Discharge Information   Patient's Discharge Plan return home with family support    Patient's Rehab Expectations to get stronger    Barriers to Discharge Home Decreased Cognitive Function;Decreased Strength;Decreased Endurance; Safety Considerations   Impressions Pt   Is a 76year old male that [facility] with complaints of generalized weakness, diarrhea, body aches, and blood in urine  Pt  Also has hx of COVID infection on feb 2021   CT of the abdomen/pelvis showed partially imaged groundglass opacification in superior segment of RLL and adjacent scarring compatible with recent COVID-10 pneumonia; no evidence of acute intra-abdominal or pelvic process; significant interval loss of L1 vertebral body height consistent with progression of stable chronic fracture  Pt  Co morbidities includes 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, DM 2, diabetic retinopathy, diarrhea, gram positive bacteremia, complete heart block s/p PPM, elevated troponins without MI, CKD 3, osteoporosis, anemia, leukocytosis, dehydration with hyponatremia, HTN, hypothyroid, KAZ on CKD3  Pt  Also had recent procedure on R foot sore and as per wife podiatry had ordered for pt  To wear off loading shoe or boot   As per nursing today pt  Had been fluctuating in MS, Reported to have confusion last night and this morning patient was lethargic  Pt  Now on 1:1  Pt  Was able to be evaluated for bed mobility transfers and short distance gait  Pt  Presents with dec strength, dec endurance due to debility, dec balance and dec safety awareness needing min A for functional mobility  Pt  Was fully indep PTA and lives with his spouse on 1 level home with stairglide in pace and ramp to enter  Pt  Will benefit from skilled PT to address functional decline and to achieve max level of function to dec burden of care  For now recommended to have S at d/c due to fluctuating cognition  ELOS 10-14 days      PT Therapy Minutes   PT Time In 4612   PT Time Out 1442   PT Total Time (minutes) 27   PT Mode of treatment - Individual (minutes) 27   PT Mode of treatment - Concurrent (minutes) 0   PT Mode of treatment - Group (minutes) 0   PT Mode of treatment - Co-treat (minutes) 0   PT Mode of Treatment - Total time(minutes) 27 minutes   PT Cumulative Minutes 27   Cumulative Minutes   Cumulative therapy minutes 27

## 2021-04-03 NOTE — PROGRESS NOTES
PM&R PROGRESS NOTE:  Yoselin Gurrola 76 y o  male MRN: [id number]  Unit/Bed#: -01 Encounter: [id number]        Rehabilitation Diagnosis: Impairment of mobility, safety, Activities of Daily Living (ADLs), and cognitive/communication skills due to Orthopedic Disorders:  08 9  Other Orthopedic L1 compression fracture    HPI: Yoselin Gurrola is a 76 y o  male with DM1 with chronic insulin pump, diabetic neuropathy, CKD 3, PAD, heart block with dual chamber ICD, HTN, hypothyroidism, COVID-19 virus in Feb 2021 with respiratory failure, osteoporosis, who presented to the [facility] on 3/27/21 with fatigue and hematuria 10 days after receiving his 2nd [employer] vaccine  Patient found to have acute mental status change, diarrhea, KAZ, Non MI troponin elevation, and some hematuria  Infectious workup revealed bacteremia with GPR - Corynebacterium bacteremia  Patient started on Cefepime, but discontinued after repeat blood cultures negative per Infectious Disease recommendations  CT abdomen/pelvis with findings of right pleural effusion, ground-glass opacification in the right lower lobe, findings of punctate gallstone, findings of worsening L1 compression fracture  Seen by Cardiology given recent history of COVID-19 virus, and echocardiogram ordered showing EF 65%, mild MR, TR  Patient seen by Elizabeth Davis for worsening L1 vertebral compression fracture  Lumbar x-ray showing moderate anterior wedge compression fracture at L1 with 50% loss of height  Patient was treated conservatively with TLSO backpack brace when OOB and >45degrees  Patient was agitated and confused during his hospitalization, acutely worsened on 3/31/21- patient received Ativan IV  Improved during the day  Patient found to have acute functional deficits from his baseline, and after medical stabilization and clearance, patient was [facility] to [facility] for acute inpatient rehabilitation        SUBJECTIVE:  Patient seen face to face personally  Appears confused and tired this morning  Unclear etiology  Patient does have a leukocytosis but remains afebrile  After discussion with his wife given the fact that he had COVID-19 virus and has new encephalopathy, will order CT head (some patients have been seen with CVA) and patient has had cognitive deficits for the last week  Infectious workup prior was negative overall except for right-sided consolidation versus pneumonia  ASSESSMENT: Stable, progressing      PLAN:    Rehabilitation  · Functional deficits:  Proximal musculature weakness x4, Impaired cognition, impaired mobility, self care  · Poor safety awareness, high fall risk - witness multiple times setting bed and chair alarms trying to get out of bed:  Continue 1:1    Continue current rehabilitation plan of care to maximize function   Functional update:   o Functional evaluations in progress   Estimated Discharge: To be determined    DVT prophylaxis  · Managed on subcutaneous heparin     Pain  · No acute issues currently     Bladder plan  · Continent, will check PVRs     Bowel plan  · Continent    * Vertebral fracture, osteoporotic (HCC)  Assessment & Plan  · L1 anterior vertebral compression fracture with 50% loss of height found on CT abdomen pelvis - confirmed on lumbar x-ray  · Seen by Neurosurgery and treated conservatively with TLSO backpack brace when  OOB and >45degrees  · Follow-up with Neurosurgery as an outpatient    Encephalopathy  Assessment & Plan  · Unclear etiology  · Patient with poor safety awareness and a high fall risk demonstrated by setting off chair and bed alarms multiple times by trying to get out of bed and disoriented  · 1:1  ordered  · P o   Ativan prn for restlessness and anxiety  · Discussed with wife he reports this is not his baseline  · Etiology unclear:  Differential diagnosis includes dehydration, hyponatremia, infection, ?  CVA (recent COVID-19 infection)  · CTH ordered for today  · Repeat labs for tomorrow as hyponatremia may be contributing  · ? PNA seen on chest x-ray - to be considered for antibiotics per internal medicine consultants after I have discussed today  · Demadex on hold and IV fluids to be started    Leukocytosis  Assessment & Plan  · Trending up 16 44 today  · Etiology unclear:   · Previous infectious workup on acute care overall negative  · ? PNA seen on chest x-ray - to be considered for antibiotics per internal medicine consultants after I have discussed today  · Demadex on hold and IV fluids to be started additionally  · Follow CBC    Gram-positive bacteremia  Assessment & Plan  · Cornebacterium bacteremia  · Treated with IV cefepime later discontinued as subsequent blood cultures negative    COVID-19  Assessment & Plan  · Patient with COVID-19 virus infection in February 2021  Completed remdesivir and systemic corticosteroid  Off isolation  · Patient with residual findings on chest x-ray and CT scan as expected  · No respiratory distress  · Will continue to follow    Diabetic ulcer of right foot ([facility] [address] )  Assessment & Plan  · Podiatry consulted to follow    Type 1 diabetes mellitus [facility])  Assessment & Plan  Lab Results   Component Value Date    HGBA1C 7 7 (H) 02/05/2021     · Patient at home managed on insulin pump, however given recent impaired cognition, currently being managed on Lantus, Humalog and sliding scale insulin by endocrinology  · Endocrinology to follow while at [facility] and assist in management       Stage 3 chronic kidney disease  Assessment & Plan  · Baseline creatinine 1 2-1 5 per records  · Within his current baseline  · Internal medicine consultants following    Medtronic dual chamber PM  Assessment & Plan  +ICD placed for complete heart block    Hyperlipidemia  Assessment & Plan  · Managed on Lipitor home dose 5 mg daily  · At home was also taking Zetia 5 mg daily    Essential hypertension  Assessment & Plan  · Currently managed on lisinopril 2 5 mg daily  · At home was taking Norvasc 5 mg daily  · Internal medicine consultants to follow    Hypothyroidism  Assessment & Plan  · Managed on Synthroid 125 mcg daily home dose    Acid reflux  Assessment & Plan  · Managed on Protonix        Appreciate IM consultants medical co-management  Labs, medications, and imaging personally reviewed  ROS:  A ten point review of systems was completed on 4/3/21 and pertinent positives are listed in subjective section  All other systems reviewed were negative  OBJECTIVE:   BP (!) 182/88 (BP Location: Right arm)   Pulse 102   Temp 98 8 °F (37 1 °C) (Axillary)   Resp 20   Wt 75 4 kg (166 lb 3 6 oz)   SpO2 92%   BMI 25 27 kg/m²     Physical Exam  Vitals signs and nursing note reviewed  Constitutional:       General: He is not in acute distress  Appearance: He is well-developed  HENT:      Head: Normocephalic  Nose: Nose normal    Eyes:      Conjunctiva/sclera: Conjunctivae normal    Neck:      Musculoskeletal: Neck supple  Cardiovascular:      Rate and Rhythm: Normal rate  Pulmonary:      Effort: Pulmonary effort is normal       Breath sounds: No wheezing  Abdominal:      General: There is no distension  Palpations: Abdomen is soft  Skin:     General: Skin is warm  Neurological:      Mental Status: He is alert  He is disoriented     Psychiatric:         Mood and Affect: Mood normal           Lab Results   Component Value Date    WBC 16 44 (H) 04/03/2021    HGB 8 7 (L) 04/03/2021    HCT 26 2 (L) 04/03/2021    MCV 86 04/03/2021     04/03/2021     Lab Results   Component Value Date    SODIUM 131 (L) 04/03/2021    K 4 1 04/03/2021    CL 99 (L) 04/03/2021    CO2 24 04/03/2021    BUN 16 04/03/2021    CREATININE 1 22 04/03/2021    GLUC 103 04/03/2021    CALCIUM 7 9 (L) 04/03/2021     Lab Results   Component Value Date    INR 1 03 06/23/2015    INR 1 06 06/20/2015    PROTIME 13 3 06/23/2015    PROTIME 13 2 06/20/2015 Current Facility-Administered Medications:     acetaminophen (TYLENOL) tablet 650 mg, 650 mg, Oral, Q6H PRN, Raphael Clifford MD    aspirin chewable tablet 81 mg, 81 mg, Oral, Daily, Raphael Clifford MD, 81 mg at 04/03/21 0935    atorvastatin (LIPITOR) tablet 5 mg, 5 mg, Oral, Daily, Raphael Clifford MD, 5 mg at 04/03/21 3820    bisacodyl (DULCOLAX) rectal suppository 10 mg, 10 mg, Rectal, Daily PRN, Raphael Clifford MD    cholecalciferol (VITAMIN D3) tablet 4,000 Units, 4,000 Units, Oral, Daily, Raphael Clifford MD, 4,000 Units at 04/03/21 0935    docusate sodium (COLACE) capsule 100 mg, 100 mg, Oral, BID, Raphael Clifford MD, 100 mg at 04/03/21 0935    DULoxetine (CYMBALTA) delayed release capsule 30 mg, 30 mg, Oral, Daily, Raphael Clifford MD, 30 mg at 04/03/21 0935    heparin (porcine) subcutaneous injection 5,000 Units, 5,000 Units, Subcutaneous, Q8H [address], Raphael Clifford MD, 5,000 Units at 04/03/21 1310    insulin glargine (LANTUS) subcutaneous injection 6 Units 0 06 mL, 6 Units, Subcutaneous, HS, Raphael Clifford MD, 6 Units at 04/02/21 2153    insulin lispro (HumaLOG) 100 units/mL subcutaneous injection 1-4 Units, 1-4 Units, Subcutaneous, TID AC **AND** Fingerstick Glucose (POCT), , , TID AC, Raphael Clifford MD    levothyroxine tablet 125 mcg, 125 mcg, Oral, Early Morning, Raphael Clifford MD    lisinopril (ZESTRIL) tablet 2 5 mg, 2 5 mg, Oral, Daily, Raphael Clifford MD, 2 5 mg at 04/03/21 0935    LORazepam (ATIVAN) tablet 0 5 mg, 0 5 mg, Oral, Q8H PRN, Raphael Clifford MD    ondansetron (ZOFRAN-ODT) dispersible tablet 4 mg, 4 mg, Oral, Q6H PRN, Raphael Clifford MD    pantoprazole (PROTONIX) EC tablet 40 mg, 40 mg, Oral, Daily Before Breakfast, Raphael Clifford MD    polyethylene glycol (MIRALAX) packet 17 g, 17 g, Oral, Daily PRN, Raphael Clifford MD    psyllium (METAMUCIL) 1 packet, 1 packet, Oral, Daily, Raphael Clifford MD, 1 packet at 04/03/21 0936    sodium chloride 0 9 % infusion, 75 mL/hr, Intravenous, Continuous, TERRY Truong, Last Rate: 75 mL/hr at 04/03/21 1305, 75 mL/hr at 04/03/21 1305    tamsulosin (FLOMAX) capsule 0 4 mg, 0 4 mg, Oral, Daily With Naomie Payne MD, 0 4 mg at 04/02/21 1628    Past Medical History:   Diagnosis Date    Arthritis     Cardiac disease     Closed fracture of fibula, lateral malleolus, right     [facility] ([email or web address] )     Diabetes mellitus ([email or web address] )     Disease of thyroid gland     External incisional dehiscence     GERD (gastroesophageal reflux disease)     Heart disease     Muscular deconditioning     Pacemaker     Sleep apnea     cannot tolerate cpap    Thyroid disease        Patient Active Problem List    Diagnosis Date Noted    Vertebral fracture, osteoporotic ([email or web address] ) 04/02/2021     Priority: High    Leukocytosis 03/31/2019     Priority: Medium    Encephalopathy 03/31/2019     Priority: Medium    Progression of L1 vertebral fracture  03/30/2021    Gram-positive bacteremia 03/28/2021    Weakness 03/27/2021    Hyponatremia 03/27/2021    Hyperglycemia due to type 1 diabetes mellitus ([email or web address] ) 02/07/2021    COVID-19 02/05/2021    Personal history of diabetic foot ulcer 06/16/2020    Diabetic ulcer of right foot ([email or web address] ) 09/19/2019    Acquired absence of other left toe(s) ([email or web address] ) 07/30/2019    Type 1 diabetes mellitus ([email or web address] ) 07/30/2019    Status post split thickness skin graft 06/18/2019    Preoperative clearance 06/05/2019    Complete heart block  06/03/2019    Microalbuminuria 04/25/2019    Vitreous hemorrhage of left eye ([email or web address] ) 04/10/2019    Transition of care performed with sharing of clinical summary 04/10/2019    Type 2 diabetes mellitus with hyperglycemia (HCC)     High anion gap metabolic acidosis 74/24/8609    Elevated troponin level not due myocardial infarction 03/31/2019    Low bicarbonate 03/31/2019    Pacemaker 03/31/2019    Stage 3 chronic kidney disease 07/23/2018    [facility]ocyt[facility] ) 07/23/2018    Encounter for diabetic foot exam ([facility] ) 07/06/2018    Risk for falls 07/06/2018    Screening for colon cancer 04/16/2018    Anemia of chronic disease 01/25/2018    Arthritis 01/25/2018    DM type 2, not at goal [facility]) 01/25/2018    Medtronic dual chamber PM 01/25/2018    Essential hypertension 09/06/2016    Insulin pump status 09/11/2015    Hypothyroidism 11/26/2014    Hyperlipidemia 11/11/2014    Acid reflux 08/06/2014    Type I diabetes mellitus ([facility] ) 07/08/2014    Diabetic peripheral neuropathy ([place] [address] ) 10/24/2012          Vanna Woods MD    Total time spent:  30 minutes with more than 50% spent counseling/coordinating care  Counseling includes discussion with patient re: progress and discussion with patient of his/her current medical/functional state/information  Coordination of patient's care was performed in conjunction with consulting services  Time invested included review of patient's labs, vitals, and management of their comorbidities with continued monitoring  The care of the patient was extensively discussed and appropriate treatment plan was formulated unique for this patient  ** Please Note:  voice to text software may have been used in the creation of this document   Although proof errors in transcription or interpretation are a potential of such software**

## 2021-04-03 NOTE — PROGRESS NOTES
Patient confused and every 10 minutes attempting to get out of bed  Patient states he has to get up and dressed and leave to an "intervention " Patient at times thinks he is driving  Patient non-compliant with TLSO brace or wearing his oxygen DR LAILA SAN [facility])  Vitals stable  Dr Bruce Montoya notified  Patient placed on continuous observation and patient's wife also made aware

## 2021-04-03 NOTE — CONSULTS
[address] [facility] - Consultation    Patient Information:   Hank Rojas 76 y o  male MRN: [id number]  Unit/Bed#: -01 Encounter: [id number]  PCP: Henok Reddy MD  Date of Admission:  4/2/2021  Date of Consultation: 04/03/21  Requesting Physician: Manuel Monk MD      ASSESSMENT:    Hank Rojas is a 76 y o  male with:    1  Right foot diabetic ulceration - [address]  2  Diabetes mellitus  3  Peripheral neuropathy  4  CKD stage 3    PLAN:    · Plantar right foot wound debrided at bedside today  Please see procedure details below  · Continue local wound care at this time  Nursing instructions placed in chart  · Elevate heels off bed while nonambulatory  · WBAT in cam walker on the right with assistance  · Rest of care per primary team   · Will discuss this plan with my attending and update as needed  Debridement Procedure:    After  Verbal Consent was obtained and time out performed  Wound located plantar right 5th metatarsal head was  excisionally Surgical- Subcutaneous  (CPT: 17324) debrided using scapel  Pre-debridement wound measures 0 6 cm x 0 5 cm x 0 1 cm  Pain was controlled by Lack of sensation due to Neuropathy   Post debridement measurement 0 8 cm x 0 7 cm x 0 2 cm for a total of <20 square centimeters, with wound appearing Fresh bleeding tissue, viable and granular  100%  of wound debrided  Tissue debrided includes depth of Subcutaneous with Hyperkeratosis and Fibrous and devitalized tissue being debrided  Mild amount of bleeding was noted during procedure  Hemostasis was achieved using pressure  Procedure was Well tolerated by patient  SUBJECTIVE    History of Present Illness:    Hank Rojas is a 76 y o  male with past medical history of diabetes mellitus, CKD, neuropathy, HF, hypothyroidism, prior COVID-19 infection who presents with right foot wound  Patient known to Dr Anu Ojeda in wound care    Patient states that wound located on the plantar aspect of this right foot has been present for approximately several months  Patient denies pain at this time  Patient last saw podiatrist in wound care on 03/17/2021 for debridement of his right foot wound and was instructed to remain in cam walker while ambulatory  Patient was also given referral for physical therapy to assist and drained patient in using cam walker  Review of Systems:    Constitutional: Negative  HENT: Negative  Eyes: Negative  Respiratory: Negative  Cardiovascular: Negative  Gastrointestinal: Negative  Musculoskeletal:  Negative   Skin:  Right foot ulcer   Neurological:  Neuropathy   Psych: Negative  Past Medical and Surgical History:     Past Medical History:   Diagnosis Date    Arthritis     Cardiac disease     Closed fracture of fibula, lateral malleolus, right     Complete heart block ([address] )     Diabetes mellitus [facility] [address] [facility]     Disease of thyroid gland     External incisional dehiscence     GERD (gastroesophageal reflux disease)     Heart disease     Muscular deconditioning     Pacemaker     Sleep apnea     cannot tolerate cpap    Thyroid disease        Past Surgical History:   Procedure Laterality Date    CARDIAC PACEMAKER PLACEMENT Left 2015    CARDIAC PACEMAKER PLACEMENT      CARDIAC SURGERY      CATARACT EXTRACTION      MUSCLE FLAP      right foot    NERVE BLOCK      Transforaminal Epidural Lumbar with Fluoroscopic Guidance     NE COLONOSCOPY FLX DX W/COLLJ SPEC WHEN PFRMD N/A 5/4/2018    Procedure: EGD AND COLONOSCOPY;  Surgeon: Kary Veras MD;  Location: [facility];   Service: Gastroenterology    NE SPLIT [address] <100 SQCM Right 6/10/2019    Procedure: SKIN GRAFT SPLIT THICKNESS (STSG)  EXTREMITY right leg;  Surgeon: Fredo Wooyd DPM;  Location:  MAIN OR;  Service: Podiatry    TOE AMPUTATION      left 5th toe       Meds/Allergies:    Medications Prior to Admission   Medication    amLODIPine (NORVASC) 5 mg tablet    ascorbic acid (VITAMIN C) 1000 MG tablet    aspirin 81 mg chewable tablet    Atorvastatin Calcium (LIPITOR PO)    CALCIUM CITRATE PO    Cholecalciferol (VITAMIN D3) 2000 units capsule    Continuous Blood Gluc Sensor (DEXCOM G6 SENSOR) MISC    cyanocobalamin (VITAMIN B-12) 1,000 mcg tablet    DULoxetine (CYMBALTA) 30 mg delayed release capsule    ezetimibe (ZETIA) 10 mg tablet    glucose 40 %    glucose 77 4 %    insulin aspart (NovoLOG) 100 units/mL injection    levothyroxine 125 mcg tablet    loperamide (IMODIUM) 2 mg capsule    pantoprazole (PROTONIX) 40 mg tablet    tamsulosin (FLOMAX) 0 4 mg    zinc sulfate (ZINCATE) 220 mg capsule       Allergies   Allergen Reactions    Contrast  [Iodinated Diagnostic Agents]     Lipitor  [Atorvastatin Calcium] Drowsiness    Ibuprofen      sensitive    Iodine - Food Allergy Rash    Simvastatin Anxiety     Other reaction(s): Malaise (finding)       Social History:     Marital Status: /Civil Union    Substance Use History:   Social History     Substance and Sexual Activity   Alcohol Use Yes    Alcohol/week: 0 0 standard drinks    Frequency: Never    Binge frequency: Never    Comment: socially     Social History     Tobacco Use   Smoking Status Former Smoker    Types: Pipe   Smokeless Tobacco Never Used     Social History     Substance and Sexual Activity   Drug Use No       Family History:    Family History   Problem Relation Age of Onset    Diabetes Mother     Heart disease Mother     Thyroid disease Mother         Disorder     Cancer Brother     Gout Brother     Diabetes Maternal Grandmother          OBJECTIVE:    Vitals:   Blood Pressure: 146/87 (04/02/21 2300)  Pulse: 92 (04/02/21 2300)  Temperature: 98 8 °F (37 1 °C) (04/02/21 2300)  Temp Source: Oral (04/02/21 2300)  Respirations: 18 (04/02/21 2300)  Weight - Scale: 75 4 kg (166 lb 3 6 oz) (04/03/21 0700)  SpO2: 92 % (04/02/21 2300)    Physical Exam:     General Appearance: Alert, cooperative, no distress  HEENT: Head normocephalic, atraumatic, without obvious abnormality  Heart: Normal rate and rhythm  Lungs: Non-labored breathing  No respiratory distress  Abdomen: Without distension  Psychiatric: AAOx3  Lower Extremity:  Vascular:   DP/PT pedal pulses on the left are weak  DP/PT pedal pulses on the right are weak  CRT brisk at the digits  Pedal hair is absent  +0/4 edema noted at bilateral lower extremities  Skin temperature is WNL bilaterally  Musculoskeletal:  MMT is 5/5 in all muscle compartments bilaterally  ROM at the 1st MPJ and ankle joint are decreased bilaterally with the leg extended  No Pain on palpation of bilateral feet  Dermatological:  Several eschars located on the anterior aspect of bilateral lower extremities  Healed wound located on the posterior aspect of the right leg  LE Wound Exam:   Wound (1) located plantar right foot, measures approximately 0 6 cm x 0 5 cm x 0 1 cm  without sinus tracking or undermining  Wound bed appears fibrotic with no drainage  The wound base is 0% red/granular, 100% yellow/fibrous, 0% black/necrotic  Deepest tissue noted in base is subq  Malodor is not noticed  Wound edge appears Attached  Estephania-wound skin appears calloused  Probe to bone is negative   Signs of infection are not present at this time  Neurological:  Gross sensation is diminished  Protective sensation is absent  Patient Reports numbness and/or paresthesias      Clinical Images 04/03/21:    Right foot, pre debridement          Additional Data:     Lab Results: I have personally reviewed pertinent labs including:    Results from last 7 days   Lab Units 04/03/21  0545   WBC Thousand/uL 16 44*   HEMOGLOBIN g/dL 8 7*   HEMATOCRIT % 26 2*   PLATELETS Thousands/uL 276   NEUTROS PCT % 86*   LYMPHS PCT % 5*   MONOS PCT % 8   EOS PCT % 0     Results from last 7 days   Lab Units 04/03/21  0546  03/27/21  1619   POTASSIUM mmol/L 4 1   < > 4 7   CHLORIDE mmol/L 99*   < > 96*   CO2 mmol/L 24   < > 24   BUN mg/dL 16   < > 46*   CREATININE mg/dL 1 22   < > 1 55*   CALCIUM mg/dL 7 9*   < > 8 7   ALK PHOS U/L  --   --  145*   ALT U/L  --   --  24   AST U/L  --   --  20    < > = values in this interval not displayed  Cultures: I have personally reviewed pertinent cultures including:    Results from last 7 days   Lab Units 03/29/21  0624 03/29/21  0615 03/27/21  1815 03/27/21  1752   BLOOD CULTURE  No Growth After 4 Days  No Growth After 4 Days  --  Corynebacterium striatum group*  Corynebacterium striatum group*   GRAM STAIN RESULT   --   --   --  Gram positive rods*  Gram positive cocci in clusters*  Gram positive rods*   URINE CULTURE   --   --  <10,000 cfu/ml   --            Imaging: I have personally reviewed pertinent films in PACS  EKG, Pathology, and Other Studies: I have personally reviewed pertinent reports  ** Please Note: Portions of the record may have been created with voice recognition software  Occasional wrong word or "sound a like" substitutions may have occurred due to the inherent limitations of voice recognition software  Read the chart carefully and recognize, using context, where substitutions have occurred   **

## 2021-04-03 NOTE — PROGRESS NOTES
Internal Medicine Progress Note  Patient: Carmen Hernandez  Age/sex: 76 y o  male  Medical Record #: [id number]      ASSESSMENT/PLAN: (Interval History)  Carmen Hernandez is seen and examined and management for following issues:    DM I  · Cont sliding scale  · Fairly labile  · Uses insulin pump  · Endocrinology following  · Cont DM diet     Diarrhea  · Improved  · Likely from recent COVID  · Cont current regimen     Chronic L1 compression fracture  · Cont TLSO brace when OOB  · No NS intervention per reccs     Anemia  · Chronic  · Monitor cbc     Chronic RLE ulcer  · F/b podiatry as outpatient  · Gets frequent debridments  · Currently in CAM boot  · Podiatry input pending  · Refer to images     Chronic diastolic HF  · Hold diuretics for now  · Bmp in a m  Hyponatremia  · Give one liter normal saline  · Repeat bmp in a m      Leukocytosis  · Asymptomatic  · Afebrile  · Repeat in a m  COVID  · Dx 2/5 hospitalized 2/5-2/8  · Tx with remdesivir/steroids  · Respiratory status stable  · GI status improving  · Received 1 of 2 COVID vaccines     Hypothyroid  · Cont supplementation          The above assessment and plan was reviewed and updated as determined by my evaluation of the patient on 4/3/2021      Labs:   Results from last 7 days   Lab Units 04/03/21  0545 04/01/21  0508   WBC Thousand/uL 16 44* 12 49*   HEMOGLOBIN g/dL 8 7* 8 8*   HEMATOCRIT % 26 2* 27 6*   PLATELETS Thousands/uL 276 275     Results from last 7 days   Lab Units 04/03/21  0546 04/01/21  0508   SODIUM mmol/L 131* 130*   POTASSIUM mmol/L 4 1 4 3   CHLORIDE mmol/L 99* 101   CO2 mmol/L 24 23   BUN mg/dL 16 17   CREATININE mg/dL 1 22 1 18   CALCIUM mg/dL 7 9* 7 8*             Results from last 7 days   Lab Units 04/03/21  0622 04/02/21 2024 04/02/21  1559   POC GLUCOSE mg/dl 104 95 131       Review of Scheduled Meds:  Current Facility-Administered Medications   Medication Dose Route Frequency Provider Last Rate    acetaminophen  650 mg Oral Q6H PRN Kerri Santamaria MD      aspirin  81 mg Oral Daily Kerri Santamaria MD      atorvastatin  5 mg Oral Daily Kerri Santamaria MD      bisacodyl  10 mg Rectal Daily PRN Kerri Santamaria MD      cholecalciferol  4,000 Units Oral Daily Kerri Santamaria MD      docusate sodium  100 mg Oral BID Kerri Santamaria MD      DULoxetine  30 mg Oral Daily Kerri Santamaria MD      heparin (porcine)  5,000 Units Subcutaneous Q8H [address] Kerri Santamaria MD      insulin glargine  6 Units Subcutaneous HS Kerri Santamaria MD      insulin lispro  1-4 Units Subcutaneous TID AC Kerri Santamaria MD      insulin lispro  2 Units Subcutaneous TID With Meals Kerri Santamaria MD      levothyroxine  125 mcg Oral Early Morning Kerri Santamaria MD      lisinopril  2 5 mg Oral Daily Kerri Santamaria MD      LORazepam  0 5 mg Oral Q8H PRN Kerri Santamaria MD      ondansetron  4 mg Oral Q6H PRN Kerri Santamaria MD      pantoprazole  40 mg Oral Daily Before Breakfast Kerri Santamaria MD      polyethylene glycol  17 g Oral Daily PRN Kerri Santamaria MD      psyllium  1 packet Oral Daily Kerri Santamaria MD      sodium chloride  75 mL/hr Intravenous Continuous TERRY Ho      tamsulosin  0 4 mg Oral Daily With Keshva Cr MD         Subjective/ HPI: Patient seen and examined  Patients overnight issues or events were reviewed with nursing or staff during rounds or morning huddle session  New or overnight issues include the following:     Pt with sundowning overnight; also having some leukocytosis however asymptomatic    ROS:   A 10 point ROS was performed; negative except as noted above         Imaging:     No orders to display       *Labs /Radiology studies Reviewed  *Medications  reviewed and reconciled as needed  *Please refer to order section for additional ordered labs studies  *Case discussed with primary attending during morning huddle case rounds    Physical Examination:  Vitals:   Vitals:    04/02/21 1410 04/02/21 1620 04/02/21 2300 04/03/21 0700   BP: 144/80 133/79 146/87 (!) 178/85   BP Location: Right arm Right arm Right arm Right arm   Pulse: 102 100 92 102   Resp: 18 16 18 20   Temp: 98 2 °F (36 8 °C) 97 5 °F (36 4 °C) 98 8 °F (37 1 °C) 98 8 °F (37 1 °C)   TempSrc: Oral Oral Oral Axillary   SpO2: 90% 92% 92% 92%   Weight:    75 4 kg (166 lb 3 6 oz)       GEN: No apparent distress, interactive  NEURO: Alert and oriented x3  HEENT: Pupils are equal and reactive, EOMI, mucous membranes are moist, face symmetrical  CV: S1 S2 regular, no MRG, no peripheral edema noted  RESP: Lungs are clear bilaterally, no wheezes, rales or rhonchi noted, on room air, respirations easy and non labored  GI: Flat, soft non tender, non distended; +BS x4  : Voiding without difficulty  MUSC: Moves all extremities; +generalized deconditioning  SKIN: pink, warm and dry, normal turgor, R LE wounds refer to media      The above physical exam was reviewed and updated as determined by my evaluation of the patient on 4/3/2021  Invasive Devices     None                    VTE Pharmacologic Prophylaxis: Heparin  Code Status: Level 1 - Full Code  Current Length of Stay: 1 day(s)      Total time spent:  30 minutes with more than 50% spent counseling/coordinating care  Counseling includes discussion with patient re: progress  and discussion with patient of his/her current medical state/information  Coordination of patient's care was performed in conjunction with primary service  Time invested included review of patient's labs, vitals, and management of their comorbidities with continued monitoring  In addition, this patient was discussed with medical team including physician and advanced extenders  The care of the patient was extensively discussed and appropriate treatment plan was formulated unique for this patient  ** Please Note:  voice to text software may have been used in the creation of this document   Although proof errors in transcription or interpretation are a potential of such software**

## 2021-04-03 NOTE — CONSULTS
Consultation - Julien Flores 76 y o  male MRN: [id number]    Unit/Bed#: -01 Encounter: [id number]      Assessment/Plan     Diabetic ulcer of right foot [facility])  Assessment & Plan  Lab Results   Component Value Date    HGBA1C 7 7 (H) 02/05/2021       Recent Labs     04/02/21  1559 04/02/21 2024 04/03/21  0622 04/03/21  1042   POCGLU 131 95 104 100     Podiatry on board, status post debridement    Type 1 diabetes mellitus [facility])  Assessment & Plan  Lab Results   Component Value Date    HGBA1C 7 7 (H) 02/05/2021       Recent Labs     04/02/21  1559 04/02/21 2024 04/03/21  0622 04/03/21  1042   POCGLU 131 95 104 100     On insulin pump at home,   with tandem T Slim, and Dexcom  The pump setting is as below:  12 am:  0 4 unit/hour  6:00 a m :  0 5  8:00 p m  0 45     Insulin to carb ratio 25  Insulin sensitivity factor 80  Target blood glucose 110    Currently on Lantus 6 units q h s  And Humalog 2 units t i d  A c  And correctional sliding scale  Patient's blood sugars are tightly controlled so we do recommend to continue Lantus 6 units q h s  And stop Humalog before each meals and continue sliding scale  Continue to monitor blood sugar over time and make adjustments to the regimen if necessary      Encephalopathy  Assessment & Plan  Management as per primary team    Hypothyroidism  Assessment & Plan  Continue levothyroxine current dose and repeat TFT in 4-6 weeks as outpatient    * Vertebral fracture, osteoporotic ([facility] [address] )  Assessment & Plan  Previous DEXA scan in 2018 showed osteoporosis  Patient needs outpatient follow-up with endocrinology for osteoporosis management, he may benefit from anabolic agents as bisphosphonate is not working  CC: Diabetes Consult    History of Present Illness     HPI: Julien Flores is a 76y o  year old male with type 1 diabetes who is admitted in [facility] for rehab for progression of L1 vertebral fracture  Patient was admitted for dehydration and diarrhea  Patient has type 1 diabetes for many years, the most recent A1c is 7 7%  Patient is on insulin pump at home with tandem T Slim, and Dexcom  The pump setting is as below:  12 am:  0 4 unit/hour  6:00 a m :  0 5  8:00 p m  0 45     Insulin to carb ratio 25  Insulin sensitivity factor 80  Target blood glucose 110     Patient is using basal and control IQ program     Due to patient's mental status, he is currently on basal and bolus, 6 units of Lantus q h s  And lispro 2 units t i d  A c  And correctional sliding scale              Inpatient consult to Endocrinology     Performed by  Nadine Barnett MD     Authorized by Yung Ríos MD              Inpatient consult to Endocrinology     Performed by  Nadine Barnett MD     Authorized by TERRY Mcintyre              Review of Systems    Unable to perform due to baseline mental status  Historical Information   Past Medical History:   Diagnosis Date    Arthritis     Cardiac disease     Closed fracture of fibula, lateral malleolus, right     Complete heart block (HCC)     Diabetes mellitus ([address] )     Disease of thyroid gland     External incisional dehiscence     GERD (gastroesophageal reflux disease)     Heart disease     Muscular deconditioning     Pacemaker     Sleep apnea     cannot tolerate cpap    Thyroid disease      Past Surgical History:   Procedure Laterality Date    CARDIAC PACEMAKER PLACEMENT Left 2015    CARDIAC PACEMAKER PLACEMENT      CARDIAC SURGERY      CATARACT EXTRACTION      MUSCLE FLAP      right foot    NERVE BLOCK      Transforaminal Epidural Lumbar with Fluoroscopic Guidance     VA COLONOSCOPY FLX DX W/COLLJ SPEC WHEN PFRMD N/A 5/4/2018    Procedure: EGD AND COLONOSCOPY;  Surgeon: Jessica Monson MD;  Location: [facility]  GI LAB;   Service: Gastroenterology    VA SPLIT GRFT TRUNK,ARM,LEG <100 SQCM Right 6/10/2019    Procedure: SKIN GRAFT SPLIT THICKNESS (STSG)  EXTREMITY right leg;  Surgeon: Kim Ryan DPM;  Location:  MAIN OR;  Service: Podiatry    TOE AMPUTATION      left 5th toe     Social History   Social History     Substance and Sexual Activity   Alcohol Use Yes    Alcohol/week: 0 0 standard drinks    Frequency: Never    Binge frequency: Never    Comment: socially     Social History     Substance and Sexual Activity   Drug Use No     Social History     Tobacco Use   Smoking Status Former Smoker    Types: Pipe   Smokeless Tobacco Never Used     Family History:   Family History   Problem Relation Age of Onset    Diabetes Mother     Heart disease Mother     Thyroid disease Mother         Disorder     Cancer Brother     Gout Brother     Diabetes Maternal Grandmother        Meds/Allergies   Current Facility-Administered Medications   Medication Dose Route Frequency Provider Last Rate Last Admin    acetaminophen (TYLENOL) tablet 650 mg  650 mg Oral Q6H PRN Onecaden Lane MD        aspirin chewable tablet 81 mg  81 mg Oral Daily Patt Lane MD        atorvastatin (LIPITOR) tablet 5 mg  5 mg Oral Daily Patt Lane MD        bisacodyl (DULCOLAX) rectal suppository 10 mg  10 mg Rectal Daily PRN Patt Lane MD        cholecalciferol (VITAMIN D3) tablet 4,000 Units  4,000 Units Oral Daily Patt Lane MD        docusate sodium (COLACE) capsule 100 mg  100 mg Oral BID Hiltona MD Domingo   100 mg at 04/02/21 1728    DULoxetine (CYMBALTA) delayed release capsule 30 mg  30 mg Oral Daily Patt Lane MD        heparin (porcine) subcutaneous injection 5,000 Units  5,000 Units Subcutaneous Q8H [address] Patt Lane MD   5,000 Units at 04/03/21 0634    insulin glargine (LANTUS) subcutaneous injection 6 Units 0 06 mL  6 Units Subcutaneous HS Patt Lane MD   6 Units at 04/02/21 2153    insulin lispro (HumaLOG) 100 units/mL subcutaneous injection 1-4 Units  1-4 Units Subcutaneous TID AC Patt Lane MD        insulin lispro (HumaLOG) 100 units/mL subcutaneous injection 2 Units  2 Units Subcutaneous TID With Meals Gerard Holden MD   2 Units at 04/02/21 1628    levothyroxine tablet 125 mcg  125 mcg Oral Early Morning Gerard Holden MD        lisinopril (ZESTRIL) tablet 2 5 mg  2 5 mg Oral Daily Gerard Holden MD        LORazepam (ATIVAN) tablet 0 5 mg  0 5 mg Oral Q8H PRN Gerard Holden MD        ondansetron (ZOFRAN-ODT) dispersible tablet 4 mg  4 mg Oral Q6H PRN Gerard Holden MD        pantoprazole (PROTONIX) EC tablet 40 mg  40 mg Oral Daily Before Breakfast Gerard Holden MD        polyethylene glycol (MIRALAX) packet 17 g  17 g Oral Daily PRN Gerard Holden MD        psyllium (METAMUCIL) 1 packet  1 packet Oral Daily Gerard Holden MD        tamsulosin [facility]) capsule 0 4 mg  0 4 mg Oral Daily With Dinner Gerard Holden MD   0 4 mg at 04/02/21 1628    torsemide (DEMADEX) tablet 5 mg  5 mg Oral Daily Gerard Holden MD         Allergies   Allergen Reactions    Contrast  [Iodinated Diagnostic Agents]     Lipitor  [Atorvastatin Calcium] Drowsiness    Ibuprofen      sensitive    Iodine - Food Allergy Rash    Simvastatin Anxiety     Other reaction(s): Malaise (finding)       Objective   Vitals: Blood pressure 146/87, pulse 92, temperature 98 8 °F (37 1 °C), temperature source Oral, resp  rate 18, weight 75 4 kg (166 lb 3 6 oz), SpO2 92 %  Intake/Output Summary (Last 24 hours) at 4/3/2021 0731  Last data filed at 4/3/2021 0500  Gross per 24 hour   Intake 380 ml   Output 800 ml   Net -420 ml     Invasive Devices     None                 Physical Exam  Vitals signs reviewed  Constitutional:       General: He is not in acute distress  Appearance: Normal appearance  He is normal weight  He is not ill-appearing, toxic-appearing or diaphoretic  HENT:      Head: Normocephalic and atraumatic  Nose: No congestion  Eyes:      General:         Right eye: No discharge  Left eye: No discharge  Extraocular Movements: Extraocular movements intact        Pupils: Pupils are equal, round, and reactive to light  Neck:      Musculoskeletal: Normal range of motion and neck supple  No neck rigidity or muscular tenderness  Vascular: No carotid bruit  Cardiovascular:      Rate and Rhythm: Normal rate and regular rhythm  Pulses: Normal pulses  Heart sounds: No murmur  Pulmonary:      Effort: No respiratory distress  Breath sounds: No wheezing, rhonchi or rales  Abdominal:      General: Abdomen is flat  There is no distension  Palpations: Abdomen is soft  There is no mass  Tenderness: There is no abdominal tenderness  Musculoskeletal:         General: No swelling or tenderness  Right lower leg: No edema  Left lower leg: No edema  Lymphadenopathy:      Cervical: No cervical adenopathy  Skin:     General: Skin is warm and dry  Capillary Refill: Capillary refill takes less than 2 seconds  Neurological:      General: No focal deficit present  Mental Status: He is alert           The history was obtained from the review of the chart,     Lab Results:       Lab Results   Component Value Date    WBC 16 44 (H) 04/03/2021    HGB 8 7 (L) 04/03/2021    HCT 26 2 (L) 04/03/2021    MCV 86 04/03/2021     04/03/2021     Lab Results   Component Value Date/Time    BUN 16 04/03/2021 05:46 AM    BUN 24 06/24/2015 06:00 AM     (L) 06/24/2015 06:00 AM    K 4 1 04/03/2021 05:46 AM    K 4 9 06/24/2015 06:00 AM    CL 99 (L) 04/03/2021 05:46 AM    CL 96 (L) 06/24/2015 06:00 AM    CO2 24 04/03/2021 05:46 AM    CO2 22 07/13/2019 04:32 PM    CREATININE 1 22 04/03/2021 05:46 AM    CREATININE 0 96 06/24/2015 06:00 AM    AST 20 03/27/2021 04:19 PM    AST 14 06/21/2015 04:21 AM    ALT 24 03/27/2021 04:19 PM    ALT 24 06/21/2015 04:21 AM    ALB 2 4 (L) 03/27/2021 04:19 PM    ALB 2 8 (L) 06/21/2015 04:21 AM     Recent Labs     04/01/21  0508   HDL 16*   TRIG 118     No results found for: MICROALBUR, [id number]  POC Glucose (mg/dl)   Date Value   04/03/2021 104 04/02/2021 95   04/02/2021 131   04/02/2021 187 (H)   04/02/2021 154 (H)   04/01/2021 183 (H)   04/01/2021 164 (H)   04/01/2021 156 (H)   04/01/2021 256 (H)   03/31/2021 244 (H)       Imaging Studies: I have personally reviewed pertinent reports  Portions of the record may have been created with voice recognition software

## 2021-04-03 NOTE — PROGRESS NOTES
Initiation of Cognitive Assessment and Completed Dysphagia Assessment       04/03/21 1330   Pain Assessment   Pain Assessment Tool Pain Assessment not indicated - pt denies pain   Restrictions/Precautions   Precautions 1:1;Bed/chair alarms;Cognitive; Fall Risk;Spinal precautions;Supervision on toilet/commode   Braces or Orthoses TLSO   Cognitive Linguisitic Assessments   Cognitive Linquistic Quick Test (CLQT) Refer to below for details  Comprehension   QI: Comprehension 2  Sometimes Understands: Understands only basic conversations or simple, direct phrases  Frequently requires cues to understand   Comprehension (FIM) 3 - Needs parts of sentences repeated   Expression   QI: Expression 3  Exhibits some difficulty with expressing needs and ideas (e g , some words or finishing thoughts) or speech is not clear   Expression (FIM) 4 - Expresses basic info/needs 75-90% of time   Social Interaction   Social Interaction (FIM) 4 - Interacts 75-89% of time   Problem Solving   Problem solving (FIM) 2 - Needs direction more than ½ time to initiate, plan or complete simple tasks   Memory   Memory (FIM) 2 - Recalls 1 of 2 steps   Speech/Language/Cognition Assessmetn   Treatment Assessment Attempting to initiate/completed cognitive assessment w/ pt, however overall EDI fluctuated during initial portion of assessment  Pt would be awake w/ eyes open but then also making snoring noises when attempting to answer questions or follow commands  Pt's wife, Patt Nelson was present at bedside, to which SLP introduced self and explained role of services to pt currently  Per wife's report, pt has moments of complete "clarity" but then goes into these confused states as well  Wife reporting this is since admission to the hospital but overall had noted a decline in functioning since his diagnosis of COVID in 2/2021  She did report that cognitive skills were not "this bad" prior to admission   Pt was able to answer basic biographical questions including own name, wife's name, address,  and age w/o prompting or cues  Ability to follow basic 1-step commands was 3/5 accurate, but the errors were due to the moments which pt was "lethargic" and not fully awake  Pt began to slowly maintain appropriate EDI and lunch tray was still present in room, which SLP asking pt if he'd like to eat something  Pt was agreeable and then was repositioned in bed and bed was placed in chair mode  Refer to below for dysphagia assessment information  Pt's EDI was maintained fairly well throughout meal where one instance of decreased alertness noted, but when given verbal prompt remained alert through meal  At this time, pt will continue to benefit from completing a formalized cognitive assessment to fully determine cognitive functioning currently and determine need for SLP services while in the acute rehab center  Swallow Information   Current Risks for Dysphagia & Aspiration Respiratory compromise;General debilitation;Mental status change;Reduced alertnes; Behavioral issues; Positionong Issues   Current Symptoms/Concerns Difficulty chewing;Decreased oral intake   Current Diet Regular; Thin liquid   Baseline Diet Regular; Thin liquids   Consistencies Assessed and Performance   Materials Admnistered Regular/Solid;Soft/Level 3;Puree/Level 1; Thin liquid   Oral Stage WFL   Phargngeal Stage WFL   Swallow Mechanics WFL;Swallow initation; Appears prompt   Esophageal Concerns No s/s reported   Recommendations   Diet Solid Recommendation Regular consistency   Diet Liquid Recommendation Thin liquid   Recommended Form of Meds As desired; As tolerated   General Precautions Feed only when alert;Remain upright for 45 mins after meals   Results Reviewed with RN;MD;PT/Family/Caregiver; Other (Comment)  (pt's wife)   Eating   Type of Assistance Needed Set-up / clean-up; Incidental touching   Amount of Physical Assistance Provided Less than 25%   Eating CARE Score 3   Swallow Assessment   Swallow Treatment Assessment Pt was seated fully upright w/ bed in chair position for lunchtime meal for bedside dysphagia assessment  Pt was setup w/ tray BUT noted to have difficulty in manipulating utensils to hold  SLP provided assistance w/ positioning utensils for pt, but never had to physically feed pt  Consumed ~50% of meal and 120cc of thin liquids by cup  Current diet is regular w/ thin liquids  Lip seal around tsp, fork, straw was [facility] [place] along w/ functional bolus retrieval across items consumed  No anterior loss noted  Mastication of soft/solids (chicken casserole, sliced peaches) was noted to be full/functional and w/o oral residual/pocketing  Manipulation/transfer of pudding was prompt and again w/o oral residual  Prompt bolus control/transfer was also noted when taking thin liquids by straw  Swallow initiation across consistencies during meal was prompt and hyolaryngeal excursion upon palpation was deemed to be functional  No overt signs/sxs of aspiration noted during meal  At this time, swallow function appears to be [facility] and will continue to recommend regular diet w/ thin liquids  Meal when pt is AWAKE/ALERT only  No further dysphagia tx services warranted due to pt's ability to tolerate least restrictive diet at this time  SLP notified OT in regards to difficulty in feeding self, as well updating MD, Dr Ezra Fraire in regards to sessions  Swallow Assessment Prognosis   Prognosis Good   Prognosis Considerations Co-morbidities; Medical diagnosis; Medical prognosis   SLP Therapy Minutes   SLP Time In 1330   SLP Time Out 1977   SLP Total Time (minutes) 45   SLP Mode of treatment - Individual (minutes) 45   SLP Mode of treatment - Concurrent (minutes) 0   SLP Mode of treatment - Group (minutes) 0   SLP Mode of treatment - Co-treat (minutes) 0   SLP Mode of Treatment - Total time(minutes) 45 minutes   SLP Cumulative Minutes 45   Therapy Time missed   Time missed?  No

## 2021-04-03 NOTE — PROGRESS NOTES
04/03/21 0800   Therapy Time missed   Time missed?  Yes   Amount of time missed 90   Reason for time missed Extreme fatigue   Time(s) multiple attempts made 0800,0900,1000,1230

## 2021-04-04 NOTE — PROGRESS NOTES
Internal Medicine Progress Note  Patient: Malena James  Age/sex: 76 y o  male  Medical Record #: [id number]      ASSESSMENT/PLAN: (Interval History)  Malena James is seen and examined and management for following issues:    DM I  · Cont sliding scale  · Fairly labile  · Uses insulin pump  · Endocrinology following  · Cont DM diet     Diarrhea  · Improved  · Likely from recent COVID  · Cont current regimen     Chronic L1 compression fracture  · Cont TLSO brace when OOB  · No NS intervention per reccs     Anemia  · Chronic  · Monitor cbc     Chronic RLE ulcer  · F/b podiatry as outpatient  · Debrided 4/3 at bedside  · Currently in CAM boot  · Refer to images     Chronic diastolic HF  · stable    Hyponatremia  · Bmp essentially unchanged  · Will dc IVF and encourage fluids today     Leukocytosis  · Better today  · Still awaiting Cxray ordered STAT 4/3  · Started Zithromax    COVID  · Dx 2/5 hospitalized 2/5-2/8  · Tx with remdesivir/steroids  · Respiratory status stable  · GI status improving  · Received 1 of 2 COVID vaccines     Hypothyroid  · Cont supplementation    Confusion  · Likely secondary to early sepsis  · improved          The above assessment and plan was reviewed and updated as determined by my evaluation of the patient on 4/4/2021      Labs:   Results from last 7 days   Lab Units 04/04/21  0630 04/03/21  0545   WBC Thousand/uL 14 90* 16 44*   HEMOGLOBIN g/dL 7 8* 8 7*   HEMATOCRIT % 24 5* 26 2*   PLATELETS Thousands/uL 224 276     Results from last 7 days   Lab Units 04/04/21  0630 04/03/21  0546   SODIUM mmol/L 130* 131*   POTASSIUM mmol/L 4 0 4 1   CHLORIDE mmol/L 97* 99*   CO2 mmol/L 25 24   BUN mg/dL 17 16   CREATININE mg/dL 1 34* 1 22   CALCIUM mg/dL 7 6* 7 9*             Results from last 7 days   Lab Units 04/04/21  0721 04/03/21  2114 04/03/21  1609   POC GLUCOSE mg/dl 108 289* 178*       Review of Scheduled Meds:  Current Facility-Administered Medications   Medication Dose Route Frequency Provider Last Rate    acetaminophen  650 mg Oral Q6H PRN Lyn Berry MD      ALPRAZolam  0 25 mg Oral TID PRN Lyn Berry MD      aspirin  81 mg Oral Daily Lyn Berry MD      atorvastatin  5 mg Oral Daily Lyn Berry MD      azithromycin  250 mg Oral Q24H TERRY Barfield      bisacodyl  10 mg Rectal Daily PRN Lyn Berry MD      cholecalciferol  4,000 Units Oral Daily Lyn Berry MD      docusate sodium  100 mg Oral BID Lyn Berry MD      DULoxetine  30 mg Oral Daily Lyn Berry MD      heparin (porcine)  5,000 Units Subcutaneous Q8H [facility] Lyn Berry MD      insulin glargine  6 Units Subcutaneous HS Lyn Berry MD      insulin lispro  1-4 Units Subcutaneous TID AC Lyn Berry MD      levothyroxine  125 mcg Oral Early Morning Lyn Berry MD      lisinopril  10 mg Oral Daily TERRY Jaramillo      ondansetron  4 mg Oral Q6H PRN Lyn Berry MD      pantoprazole  40 mg Oral Daily Before Breakfast Lyn Berry MD      polyethylene glycol  17 g Oral Daily PRN Lyn Berry MD      psyllium  1 packet Oral Daily Lyn Berry MD      sodium chloride  75 mL/hr Intravenous Continuous TERRY Barfield 75 mL/hr (04/04/21 7097)    tamsulosin  0 4 mg Oral Daily With Elaine Jason MD         Subjective/ HPI: Patient seen and examined  Patients overnight issues or events were reviewed with nursing or staff during rounds or morning huddle session  New or overnight issues include the following:     Pt more alert this a m ; oriented to place and date  States he does not feel as tired    ROS:   A 10 point ROS was performed; negative except as noted above  Imaging:     CT head wo contrast   Final Result by Dustin Mcclendon DO (04/03 1543)      No acute intracranial abnormality  Microangiopathic changes                    Workstation performed: [id number]         XR chest portable    (Results Pending)       *Labs /Radiology studies Reviewed  *Medications reviewed and reconciled as needed  *Please refer to order section for additional ordered labs studies  *Case discussed with primary attending during morning huddle case rounds    Physical Examination:  Vitals:   Vitals:    04/03/21 2008 04/03/21 2030 04/03/21 2228 04/04/21 0641   BP: 145/90   135/67   BP Location: Left arm   Right arm   Pulse: 105 100  91   Resp: 18   20   Temp: 99 °F (37 2 °C)   99 7 °F (37 6 °C)   TempSrc: Oral   Oral   SpO2: (!) 88% 94% 92% 94%   Weight:           GEN: No apparent distress, interactive  NEURO: Alert and oriented x3  HEENT: Pupils are equal and reactive, EOMI, mucous membranes are moist, face symmetrical  CV: S1 S2 regular, no MRG, no peripheral edema noted  RESP: Lungs are clear bilaterally, no wheezes, rales or rhonchi noted, on room air, respirations easy and non labored  GI: Flat, soft non tender, non distended; +BS x4  : Voiding without difficulty  MUSC: Moves all extremities; +generalized deconditioning  SKIN: pink, warm and dry, normal turgor, chronic lesion RLE dressing in place      The above physical exam was reviewed and updated as determined by my evaluation of the patient on 4/4/2021  Invasive Devices     Peripheral Intravenous Line            Peripheral IV 04/04/21 Right;Ventral (anterior) Forearm less than 1 day                   VTE Pharmacologic Prophylaxis: Heparin  Code Status: Level 1 - Full Code  Current Length of Stay: 2 day(s)      Total time spent:  30 minutes with more than 50% spent counseling/coordinating care  Counseling includes discussion with patient re: progress  and discussion with patient of his/her current medical state/information  Coordination of patient's care was performed in conjunction with primary service  Time invested included review of patient's labs, vitals, and management of their comorbidities with continued monitoring  In addition, this patient was discussed with medical team including physician and advanced extenders   The care of the patient was extensively discussed and appropriate treatment plan was formulated unique for this patient  ** Please Note:  voice to text software may have been used in the creation of this document   Although proof errors in transcription or interpretation are a potential of such software**

## 2021-04-04 NOTE — PROGRESS NOTES
Podiatry - Progress Note  Patient: Linn Victoria 76 y o  male   MRN: [id number]  PCP: Tory Mohr MD  Unit/Bed#: -01 Encounter: [id number]  Date Of Visit: 21    ASSESSMENT:    Linn Victoria is a 76 y o  male with:    1  Right foot diabetic ulceration - [place]  2  Diabetes mellitus  3  Peripheral neuropathy  4  CKD stage 3      PLAN:    · Continue with local wound care Derm[facility] dsd  Wound care instructions placed  · Elevation on green foam wedges or pillows when non-ambulatory  · Rest of care per primary team      Weightbearing status: as tolerated with darco wedge shoes (pt pt is able to tolerate shoe)  SUBJECTIVE:     The patient was seen, evaluated, and assessed at bedside today  The patient was awake, alert, and in no acute distress  No acute events overnight  The patient reports no pain  Patient denies N/V/F/chills/SOB/CP  OBJECTIVE:     Vitals:   /57 (BP Location: Right arm)   Pulse 101   Temp 98 7 °F (37 1 °C) (Oral)   Resp 18   Wt 75 4 kg (166 lb 3 6 oz)   SpO2 (!) 89%   BMI 25 27 kg/m²     Temp (24hrs), Av 1 °F (37 3 °C), Min:98 7 °F (37 1 °C), Max:99 7 °F (37 6 °C)      Physical Exam:     General: Alert, cooperative and no distress  Lungs: Non labored breathing  Abdomen: Soft, non-tender  Lower extremity exam:  Cardiovascular status at baseline  Neurological status at baseline  Musculoskeletal status at baseline  No calf tenderness noted  Lower extremity wound(s) as noted below:    Wound continue to remain stable       Wound #: 1  Location: plantar right foot  Length 0 6cm: Width 0 5cm: Depth 0 1cm:   Deepest Tissue Noted in Base: subq  Probe to Bone: No  Peripheral Skin Description: Attached  Granulation: 20% Fibrotic Tissue: 80% Necrotic Tissue: 0%   Drainage Amount: minimal  Signs of Infection: No      Additional Data:     Labs:    Results from last 7 days   Lab Units 21  0630   WBC Thousand/uL 14 90*   HEMOGLOBIN g/dL 7 8*   HEMATOCRIT % 24 5*   PLATELETS Thousands/uL 224   NEUTROS PCT % 87*   LYMPHS PCT % 4*   MONOS PCT % 8   EOS PCT % 0     Results from last 7 days   Lab Units 04/04/21  0630   POTASSIUM mmol/L 4 0   CHLORIDE mmol/L 97*   CO2 mmol/L 25   BUN mg/dL 17   CREATININE mg/dL 1 34*   CALCIUM mg/dL 7 6*           * I Have Reviewed All Lab Data Listed Above  Recent Cultures (last 7 days):     Results from last 7 days   Lab Units 03/29/21  0624 03/29/21  0615   BLOOD CULTURE  No Growth After 5 Days  No Growth After 5 Days  Imaging: I have personally reviewed pertinent films in PACS  EKG, Pathology, and Other Studies: I have personally reviewed pertinent reports  ** Please Note: Portions of the record may have been created with voice recognition software  Occasional wrong word or "sound a like" substitutions may have occurred due to the inherent limitations of voice recognition software  Read the chart carefully and recognize, using context, where substitutions have occurred   **

## 2021-04-04 NOTE — DISCHARGE INSTRUCTIONS
Discharge Instructions - Podiatry    Weight Bearing Status: Weight bearing as tolerated                  Pain: Continue analgesics as directed    Follow-up appointment instructions: Please make an appointment within one week of discharge with Dr Renny Chatterjee  Contact sooner if any increase in pain, or signs of infection occur    Wound Care: Leave dressings clean, dry, and intact between professional dressing changes    Nursing Instructions: Please apply Erma Piper  Then cover with Gauze and secure with Kerlix and tape  Please change dressing 3x a week

## 2021-04-04 NOTE — PROGRESS NOTES
Pt more alert today then yesterday pt awake and cooperative for therapy this morning and this afternoon   Pt sleeps in between easily awakens to name

## 2021-04-04 NOTE — PROGRESS NOTES
OCCUPATIONAL THERAPY   ACUTE REHAB EVALUATION TAA    Active Problem List:   Patient Active Problem List   Diagnosis    Acid reflux    Hypothyroidism    Anemia of chronic disease    Arthritis    Type I diabetes mellitus [facility]    Diabetic peripheral neuropathy ([place] [address] )    DM type 2, not at goal [facility])    Essential hypertension    Hyperlipidemia    Insulin pump status    Medtronic dual chamber PM    Screening for colon cancer    Encounter for diabetic foot exam ([place] [address] )    Risk for falls    Stage 3 chronic kidney disease    Thrombocytopenia (HCC)    High anion gap metabolic acidosis    Elevated troponin level not due myocardial infarction    Leukocytosis    Low bicarbonate    Pacemaker    Encephalopathy    Type 2 diabetes mellitus with hyperglycemia (HCC)    [facility]    Transition of care performed with sharing of clinical summary    Microalbuminuria    Complete heart block     Preoperative clearance    Status post split thickness skin graft    Acquired absence of other left toe(s) ([facility])    Type [facility]    Diabetic ulcer of right foot [facility]    Personal history of diabetic foot ulcer    COVID-19    Hyperglycemia due to type 1 diabetes mellitus ([facility] )    Weakness    Hyponatremia    Gram-positive bacteremia    Progression of L1 vertebral fracture     Vertebral fracture, osteoporotic ([facility])     Past Medical Hx:   Past Medical History:   Diagnosis Date    Arthritis     Cardiac disease     Closed fracture of fibula, lateral malleolus, right     Complete heart block (HCC)     Diabetes mellitus ([facility] )     Disease of thyroid gland     External incisional dehiscence     GERD (gastroesophageal reflux disease)     Heart disease     Muscular deconditioning     Pacemaker     Sleep apnea     cannot tolerate cpap    Thyroid disease      Past Surgical Hx:   Past Surgical History:   Procedure Laterality Date    CARDIAC PACEMAKER PLACEMENT Left 2015    CARDIAC PACEMAKER PLACEMENT      CARDIAC SURGERY      CATARACT EXTRACTION      MUSCLE FLAP      right foot    NERVE BLOCK      Transforaminal Epidural Lumbar with Fluoroscopic Guidance     MI COLONOSCOPY FLX DX W/COLLJ SPEC WHEN PFRMD N/A 5/4/2018    Procedure: EGD AND COLONOSCOPY;  Surgeon: Chin Christine MD;  Location: [facility]  GI LAB; Service: Gastroenterology    MI SPLIT [address] <100 SQCM Right 6/10/2019    Procedure: SKIN GRAFT SPLIT THICKNESS ([facility])  EXTREMITY right leg;  Surgeon: Isidra Fernandez DPM;  Location:  MAIN OR;  Service: Podiatry    TOE AMPUTATION      left 5th toe       04/04/21 0830   Patient Data   Rehab Impairment other orthopedic   Etiologic Diagnosis Acute on chronic L1 vertebral fx   Date of Onset 03/27/21   Support System   Name Pt resides with supportive wife Gabi Richard  she is able to provide light assist at time of D/C  Relationship Pt reports that he enjoys "puttering around " He has two dogs,    Home Setup   Type of Home Multi Level   Method of Entry Ramp   First Floor Bathroom Half   Second Floor Bathroom Full;Tub; Shower;Combo   Available Equipment [employer]; Shower Chair   Baseline Information   Transportation Family/friends drive   Prior Level of Function   Self-Care 3  Independent - Patient completed the activities by him/herself, with or without an assistive device, with no assistance from a helper  Indoor-Mobility (Ambulation) 3  Independent - Patient completed the activities by him/herself, with or without an assistive device, with no assistance from a helper  Functional Cognition 3  Independent - Patient completed the activities by him/herself, with or without an assistive device, with no assistance from a helper  Prior Device Used Z  None of the above   Falls in the Last Year   Number of falls in the past 12 months 1   Restrictions/Precautions   Precautions 1:1;Bed/chair alarms;Cognitive; Fall Risk;Fluid restriction;Spinal precautions;O2   Braces or Orthoses TLSO  (back pack TLSO when HOB >45  Surg shoe R LE)   Pain Assessment   Pain Assessment Tool 0-10   Pain Score 5   Pain Location/Orientation Orientation: Right  (ankle)   Hospital Pain Intervention(s) Repositioned  (alerted RN)   Eating Assessment   Comment Speech therapist    Oral Hygiene   Type of Assistance Needed Physical assistance   Amount of Physical Assistance Provided Less than 25%   Comment req [place] for fine grasp to open toothpaste  req [place] for toothbrush holding and manipulation  ocasianl cues for initiation of spitting  Oral Hygiene CARE Score 3   Shower/Bathe Self   Type of Assistance Needed Physical assistance   Amount of Physical Assistance Provided 50%-74%   Comment rewq freq cues for intiaition and progression of task  Shower/Bathe Self CARE Score 2   Dressing/Undressing Clothing   Type of Assistance Needed Physical assistance   Amount of Physical Assistance Provided 50%-74%   Comment assist for Threading  L UE, assist for donning TLSO      Upper Body Dressing CARE Score 2   Type of Assistance Needed Physical assistance   Amount of Physical Assistance Provided Total assistance   Lower Body Dressing CARE Score 1   Putting On/Taking Off Footwear   Type of Assistance Needed Physical assistance   Amount of Physical Assistance Provided Total assistance   Putting On/Taking Off Footwear CARE Score 1   Toileting Hygiene   Type of Assistance Needed Physical assistance   Amount of Physical Assistance Provided Total assistance   Toileting Hygiene CARE Score 1   Toilet Transfer   Surface Assessed Standard Commode   Type of Assistance Needed Physical assistance   Amount of Physical Assistance Provided 25%-49%   Toilet Transfer CARE Score 3   Transfer Bed/Chair/Wheelchair   Adaptive Equipment Roller Walker   Type of Assistance Needed Physical assistance   Amount of Physical Assistance Provided 25%-49%   Chair/Bed-to-Chair Transfer CARE Score 3   Sit to Lying   Type of Assistance Needed Physical assistance   Amount of Physical Assistance Provided 25%-49%   Sit to Lying CARE Score 3   Lying to Sitting on Side of Bed   Type of Assistance Needed Physical assistance   Amount of Physical Assistance Provided 25%-49%   Lying to Sitting on Side of Bed CARE Score 3   Sit to Stand   Type of Assistance Needed Physical assistance   Amount of Physical Assistance Provided 25%-49%   Sit to Stand CARE Score 3   AROM - RUE   R Shoulder Flexion WFL   R Shoulder Extension WFL    R Shoulder ABduction WFL   R Shoulder ADduction WFL   R Shoulder Internal Rotation [facility]   R Shoulder External Rotation WFL    R Elbow Flexion WFL   R Elbow Extension WFL   R Wrist Flexion WFL   R Wrist Extension WFL   R Wrist ABduction WFL   R Wrist ADduction WFL   R Digit Flexion Limited 3/4 range for finger flexion, slightly edemic   LUE Assessment   LUE Assessment WFL  (Limited 3/4 range for finger flexion, slightly edemic)   Cognition   Overall Cognitive Status Impaired   Arousal/Participation Alert; Cooperative   Attention Attends with cues to redirect   Orientation Level Oriented X4   Memory Decreased recall of precautions;Decreased recall of recent events;Decreased short term memory   Following Commands Follows one step commands with increased time or repetition   Comments improved alertness today  awake and motivated  pt is able to engage in appropriate conversation and is joking throughout  Pt is aware it is easter sunday when his wife called  Mild difficulty noted with management of his phone 2* decreased dexterity  Pt demo decreased attention to task req frequrnt cues for attention  Vision   Vision Comments wears glasses all the time  they have been misplaced in room changes  wife called to bring in alternate pair  Objective Measure   OT Measure(s) Pt on 2L o2 NC  Pt SPO2 maintains at 90-92 at rest HR in 100-103 throughout  small trials on RA with light activity like washing face was around 88-90%  Therapeutic Exercise   Therapeutic Exercise/Activity wife called to update on current level of function and Role of OT in acute rehab  Discharge Information   Impressions Pt is a 76 y o  male seen for OT evaluation following admission to [facility] for Orthopedic L1 compression fracture  Pt also presents with 3/27/21 with fatigue and hematuria 10 days after receiving his 2nd [employer] COVID vaccine   Patient found to have acute mental status change, diarrhea, KAZ, Non MI troponin elevation, and some hematuria  OT evaluation and assessment of strength, ADL function, and functional transfers completed  Prior to admission Pt was completing ADLs, IADLs at independent with use of rolling walker  Pt lives with wife and is able to provide physical assistance at time of discharge  Pt reports 1  recent falls in the last 6 months  Personal factors affecting the patient at this time include: co-morbidities, mutliple lines, use of AD, dec cognition, pain, h/o of falls, fall risk, assist w/ ADL's and O2  Pt is currently limited due to the following deficits impacting occupational performance, activity tolerance, endurance, standing balance/tolerance, sitting balance/tolerance, UE ROM, FMC, GMC, arousal, memory, insight, safety , judgement , attention , sequencing , sensation  and task initiation   The patient has shown a decline from prior level of function  The patient participates in identification of personal goals to address in Occupational Therapy  Recommend skilled OT services for I/ADL retraining to support the ability to safely participate in daily occupations safely and independently in the most least restrictive way  Pt demonstrates Good rehab potential to meet LTG's, Anticipate  2week(s) length of stay   Occupational Therapy plan of care  will address the following areas: ADL re-training, LHAE training, fxnl xfers, fxnl cognition, short term memory, fxnl attention, standing tolerance, standing balance, UE endurance, FMC/GMC, FMS/GMS, DME training/education, family training/education, EC techniques/education, leisure pursuits, formalized cognitive assessment, body awareness and sitting balance   OT Therapy Minutes   OT Time In 0830   OT Time Out 1000   OT Total Time (minutes) 90   OT Mode of treatment - Individual (minutes) 90   OT Mode of treatment - Concurrent (minutes) 0   OT Mode of treatment - Group (minutes) 0   OT Mode of treatment - Co-treat (minutes) 0   OT Mode of Treatment - Total time(minutes) 90 minutes   OT Cumulative Minutes 90   Cumulative Minutes   Cumulative therapy minutes 162

## 2021-04-04 NOTE — PROGRESS NOTES
04/04/21 1430   Pain Assessment   Pain Assessment Tool Pain Assessment not indicated - pt denies pain   Pain Score No Pain   Restrictions/Precautions   Precautions 1:1;Bed/chair alarms;O2;Cognitive; Fall Risk;Spinal precautions;Supervision on toilet/commode   Braces or Orthoses TLSO  (R surgical shoe)   Cognition   Arousal/Participation Lethargic;Persistent stimuli required   Subjective   Subjective Pt asleep start of session with 1:1 present; denies pain but initially difficult to arouse    Roll Left and Right   Type of Assistance Needed Physical assistance   Amount of Physical Assistance Provided 25%-49%   Roll Left and Right CARE Score 3   Sit to Lying   Type of Assistance Needed Physical assistance   Amount of Physical Assistance Provided 25%-49%   Sit to Lying CARE Score 3   Lying to Sitting on Side of Bed   Type of Assistance Needed Physical assistance   Amount of Physical Assistance Provided 25%-49%   Lying to Sitting on Side of Bed CARE Score 3   Sit to Stand   Type of Assistance Needed Physical assistance   Amount of Physical Assistance Provided 25%-49%   Sit to Stand CARE Score 3   Bed-Chair Transfer   Type of Assistance Needed Physical assistance; Adaptive equipment   Amount of Physical Assistance Provided 25%-49%   Chair/Bed-to-Chair Transfer CARE Score 3   Transfer Bed/Chair/Wheelchair   Limitations Noted In Balance; Coordination; Endurance;Problem Solving; Sequencing;UE Strength;LE Strength   Adaptive Equipment Roller Walker   All Transfer Minimal Assist   Findings Pt on 2 5L O2   Therapeutic Interventions   Strengthening seated LAQ, seated marching, supine SLR, supine hip abduction, supine heel slides (with heels offloaded by therapist)   Other sitting upright in recliner chair using incentive spirometer 10 reps   Other Comments   Comments SpO2 93% sitting upright in recliner chair, 92% after seated exercises, 91% after back to bed end of session; RN notified of sats   Assessment   Treatment Assessment Pt initially very difficult to arouse at start of session; 1:1 reports he has been asleep for about 1 hr  Pt with fluctuating levels of alertness/participation  Able to open eyes and answer therapist's questions, able to perform a few reps of exercise, and then will fall back asleep requiring arousal again  Pt on 2 5L O2 entire session with SpO2 remaining mainly in low 90's  He denies pain t/o session  Overall able to perform transfers with min A x1 using RW despite lethargy and decreased alertness  RN informed of pt participation and sats above  Unable to leave pt sitting upright in recliner for longer due to falling asleep and sliding in chair, so returned back to bed  Positioned pillow underneath calves in order to offload his heels while in bed  He requires continued skilled PT to maximize independence and safety with all functional mobility  Barriers to Discharge Inaccessible home environment;Decreased caregiver support   PT Barriers   Physical Impairment Decreased strength;Decreased range of motion;Decreased endurance; Impaired balance;Decreased mobility; Decreased coordination;Decreased cognition; Impaired judgement;Decreased safety awareness;Orthopedic restrictions;Decreased skin integrity   Functional Limitation Car transfers; Ramp negotiation;Stair negotiation;Standing;Transfers; Walking; Wheelchair management   Plan   Treatment/Interventions Functional transfer training;LE strengthening/ROM; Therapeutic exercise; Endurance training;Patient/family training;Equipment eval/education; Bed mobility   Progress Slow progress, decreased activity tolerance   Recommendation   PT Discharge Recommendation Home with skilled therapy   Equipment Recommended Walker   PT Therapy Minutes   PT Time In 1430   PT Time Out 1530   PT Total Time (minutes) 60   PT Mode of treatment - Individual (minutes) 60   PT Mode of treatment - Concurrent (minutes) 0   PT Mode of treatment - Group (minutes) 0   PT Mode of treatment - Co-treat (minutes) 0   PT Mode of Treatment - Total time(minutes) 60 minutes   PT Cumulative Minutes 87   Therapy Time missed   Time missed?  No

## 2021-04-04 NOTE — PROGRESS NOTES
PM&R PROGRESS NOTE:  Brittany Landry 76 y o  male MRN: [id number]  Unit/Bed#: -01 Encounter: [id number]        Rehabilitation Diagnosis: Impairment of mobility, safety, Activities of Daily Living (ADLs), and cognitive/communication skills due to Orthopedic Disorders:  08 9  Other Orthopedic L1 compression fracture    HPI: Brittany Landry is a 76 y o  male with DM1 with chronic insulin pump, diabetic neuropathy, CKD 3, PAD, heart block with dual chamber ICD, HTN, hypothyroidism, COVID-19 virus in Feb 2021 with respiratory failure, osteoporosis, who presented to the [facility] on 3/27/21 with fatigue and hematuria 10 days after receiving his 2nd [employer] vaccine  Patient found to have acute mental status change, diarrhea, KAZ, Non MI troponin elevation, and some hematuria  Infectious workup revealed bacteremia with GPR - Corynebacterium bacteremia  Patient started on Cefepime, but discontinued after repeat blood cultures negative per Infectious Disease recommendations  CT abdomen/pelvis with findings of right pleural effusion, ground-glass opacification in the right lower lobe, findings of punctate gallstone, findings of worsening L1 compression fracture  Seen by Cardiology given recent history of COVID-19 virus, and echocardiogram ordered showing EF 65%, mild MR, TR  Patient seen by Kg Burris for worsening L1 vertebral compression fracture  Lumbar x-ray showing moderate anterior wedge compression fracture at L1 with 50% loss of height  Patient was treated conservatively with TLSO backpack brace when OOB and >45degrees  Patient was agitated and confused during his hospitalization, acutely worsened on 3/31/21- patient received Ativan IV  Improved during the day  Patient found to have acute functional deficits from his baseline, and after medical stabilization and clearance, patient was admitted to [facility] for acute inpatient rehabilitation        SUBJECTIVE:  Patient seen face to face  No acute issues  Feeling tired  CTH results reviewed with patient and his wife Laurent Ashley) - no acute findings, but showing old CVAs likely from [facility] - recommended to wife to have him follow with neurology as an outpatient  Also discussed anemia work-up  Patient participated in therapies  Less restless and agitated but continues to be confused at night  ASSESSMENT: Stable, progressing      PLAN:    Rehabilitation  · Functional deficits:  Proximal musculature weakness x4, Impaired cognition, impaired mobility, self care  · Poor safety awareness, high fall risk - witness multiple times setting bed and chair alarms trying to get out of bed:  Continue 1:1  and 4 side rails   Continue current rehabilitation plan of care to maximize function   Functional update:   o Improved partcipation today in both PT/OT   Estimated Discharge: To be determined    DVT prophylaxis  · Managed on subcutaneous heparin     Pain  · No acute issues currently     Bladder plan  · Continent, will check PVRs     Bowel plan  · + constipation:  Lactulose today followed by suppository    * Vertebral fracture, osteoporotic (HCC)  Assessment & Plan  · L1 anterior vertebral compression fracture with 50% loss of height found on CT abdomen pelvis - confirmed on lumbar x-ray  · Seen by Neurosurgery and treated conservatively with TLSO backpack brace when  OOB and >45degrees  · Follow-up with Neurosurgery as an outpatient    Encephalopathy  Assessment & Plan  · Unclear etiology  · Patient with poor safety awareness and a high fall risk demonstrated by setting off chair and bed alarms multiple times by trying to get out of bed and disoriented  · 1:1  ordered and 4 side rails  · P O   Xanax prn for restlessness and anxiety  · Discussed with wife he reports this is not his baseline  · Etiology unclear:    · CTH negative for acute findings, but showing older CVAs - would benefit from outpatient Neurology  · Follow labs  · ? On Zithromax for possible PNA    Leukocytosis  Assessment & Plan  · Trending down 14K  · Was started on Zithromax for possible PNA by IM consultants seen on previous CXR  · CXR from yesterday showing moderate pleural effusions  · Clinically better than yesterday  · Follow CBC    Anemia of chronic disease  Assessment & Plan  · H/H steadily dropping - will repeat in AM  · Unclear etiology - likely anemia of chronic disease  · Checking iron studies checking Occult stool x 3  · Type and Screen for AM      Gram-positive bacteremia  Assessment & Plan  · Cornebacterium bacteremia  · Treated with IV cefepime later discontinued as subsequent blood cultures negative    COVID-19  Assessment & Plan  · Patient with COVID-19 virus infection in February 2021  Completed remdesivir and systemic corticosteroid  Off isolation  · Patient with residual findings on chest x-ray and CT scan as expected  · No respiratory distress  · Will continue to follow    Diabetic ulcer of right foot ([facility] [address] )  Assessment & Plan  · Podiatry consulted to follow    Type 1 diabetes mellitus [facility])  Assessment & Plan  Lab Results   Component Value Date    HGBA1C 7 7 (H) 02/05/2021     · Patient at home managed on insulin pump, however given recent impaired cognition, currently being managed on Lantus, Humalog and sliding scale insulin by endocrinology  · Endocrinology to follow while at [facility] and assist in management       Stage 3 chronic kidney disease  Assessment & Plan  · Baseline creatinine 1 2-1 5 per records  · Within his current baseline  · Internal medicine consultants following    Medtronic dual chamber PM  Assessment & Plan  +ICD placed for complete heart block    Hyperlipidemia  Assessment & Plan  · Managed on Lipitor home dose 5 mg daily  · At home was also taking Zetia 5 mg daily    Essential hypertension  Assessment & Plan  · Currently managed on lisinopril 10mg daily starting 4/4/21  · At home was taking Norvasc 5 mg daily  · Internal medicine consultants to follow    Hypothyroidism  Assessment & Plan  · Managed on Synthroid 125 mcg daily home dose    Acid reflux  Assessment & Plan  · Managed on Protonix        Appreciate IM consultants medical co-management  Labs, medications, and imaging personally reviewed  ROS:  A ten point review of systems was completed on 4/4/21 and pertinent positives are listed in subjective section  All other systems reviewed were negative  OBJECTIVE:   /60 (BP Location: Left arm)   Pulse (!) 106   Temp 99 7 °F (37 6 °C) (Oral)   Resp 20   Wt 75 4 kg (166 lb 3 6 oz)   SpO2 92%   BMI 25 27 kg/m²     Physical Exam  Vitals signs and nursing note reviewed  Constitutional:       General: He is not in acute distress  Appearance: He is well-developed  HENT:      Head: Normocephalic  Nose: Nose normal    Eyes:      Conjunctiva/sclera: Conjunctivae normal    Neck:      Musculoskeletal: Neck supple  Cardiovascular:      Rate and Rhythm: Normal rate  Pulmonary:      Effort: Pulmonary effort is normal       Breath sounds: No wheezing  Abdominal:      General: There is no distension  Palpations: Abdomen is soft  Skin:     General: Skin is warm  Coloration: Skin is pale  Neurological:      Mental Status: He is alert  Motor: Weakness (4-/5 proximally and 4/5 distally) present        Comments: Oriented to person and place  Unclear of situation   Psychiatric:         Mood and Affect: Mood normal           Lab Results   Component Value Date    WBC 14 90 (H) 04/04/2021    HGB 7 8 (L) 04/04/2021    HCT 24 5 (L) 04/04/2021    MCV 87 04/04/2021     04/04/2021     Lab Results   Component Value Date    SODIUM 130 (L) 04/04/2021    K 4 0 04/04/2021    CL 97 (L) 04/04/2021    CO2 25 04/04/2021    BUN 17 04/04/2021    CREATININE 1 34 (H) 04/04/2021    GLUC 115 04/04/2021    CALCIUM 7 6 (L) 04/04/2021     Lab Results   Component Value Date    INR 1 03 06/23/2015 INR 1 06 06/20/2015    PROTIME 13 3 06/23/2015    PROTIME 13 2 06/20/2015           Current Facility-Administered Medications:     acetaminophen (TYLENOL) tablet 650 mg, 650 mg, Oral, Q6H PRN, Manuel Monk MD    ALPRAZolam [place] [facility]) tablet 0 25 mg, 0 25 mg, Oral, TID PRN, Manuel Monk MD    aspirin chewable tablet 81 mg, 81 mg, Oral, Daily, Manuel Monk MD, 81 mg at 04/04/21 0916    atorvastatin (LIPITOR) tablet 5 mg, 5 mg, Oral, Daily, Manuel Monk MD, 5 mg at 04/04/21 0915    azithromycin (ZITHROMAX) tablet 250 mg, 250 mg, Oral, Q24H, TERRY Jaramillo    bisacodyl (DULCOLAX) rectal suppository 10 mg, 10 mg, Rectal, Daily PRN, Manuel Monk MD    cholecalciferol (VITAMIN D3) tablet 4,000 Units, 4,000 Units, Oral, Daily, Manuel Monk MD, 4,000 Units at 04/04/21 0916    docusate sodium (COLACE) capsule 100 mg, 100 mg, Oral, BID, Manuel Monk MD, 100 mg at 04/04/21 0917    DULoxetine (CYMBALTA) delayed release capsule 30 mg, 30 mg, Oral, Daily, Manuel Monk MD, 30 mg at 04/04/21 0916    heparin (porcine) subcutaneous injection 5,000 Units, 5,000 Units, Subcutaneous, Q8H [address], Manuel Monk MD, 5,000 Units at 04/04/21 0644    insulin glargine (LANTUS) subcutaneous injection 6 Units 0 06 mL, 6 Units, Subcutaneous, HS, Manuel Monk MD, 6 Units at 04/03/21 2121    insulin lispro (HumaLOG) 100 units/mL subcutaneous injection 1-4 Units, 1-4 Units, Subcutaneous, TID AC, 1 Units at 04/04/21 1159 **AND** Fingerstick Glucose (POCT), , , TID AC, Manuel Monk MD    levothyroxine tablet 125 mcg, 125 mcg, Oral, Early Morning, Manuel Monk MD, 125 mcg at 04/04/21 0644    lisinopril (ZESTRIL) tablet 10 mg, 10 mg, Oral, Daily, TERRY Jaramillo, 10 mg at 04/04/21 0916    ondansetron (ZOFRAN-ODT) dispersible tablet 4 mg, 4 mg, Oral, Q6H PRN, Manuel Monk MD    pantoprazole (PROTONIX) EC tablet 40 mg, 40 mg, Oral, Daily Before Breakfast, Manuel Monk MD, 40 mg at 04/04/21 0644   polyethylene glycol (MIRALAX) packet 17 g, 17 g, Oral, Daily PRN, Tahir Chase MD, 17 g at 04/03/21 1630    psyllium (METAMUCIL) 1 packet, 1 packet, Oral, Daily, Tahir Chase MD, 1 packet at 04/04/21 6587    tamsulosin (FLOMAX) capsule 0 4 mg, 0 4 mg, Oral, Daily With Lupillo Joyce MD, 0 4 mg at 04/03/21 1631    Past Medical History:   Diagnosis Date    Arthritis     Cardiac disease     Closed fracture of fibula, lateral malleolus, right     Complete heart block ([facility] )     Diabetes mellitus ([place] [address] )     Disease of thyroid gland     External incisional dehiscence     GERD (gastroesophageal reflux disease)     Heart disease     Muscular deconditioning     Pacemaker     Sleep apnea     cannot tolerate cpap    Thyroid disease        Patient Active Problem List    Diagnosis Date Noted    Vertebral fracture, osteoporotic ([place] [address] ) 04/02/2021     Priority: High    Leukocytosis 03/31/2019     Priority: Medium    Encephalopathy 03/31/2019     Priority: Medium    Anemia of chronic disease 01/25/2018     Priority: Medium    Progression of L1 vertebral fracture  03/30/2021    Gram-positive bacteremia 03/28/2021    Weakness 03/27/2021    Hyponatremia 03/27/2021    Hyperglycemia due to type 1 diabetes mellitus ([facility] ) 02/07/2021    COVID-19 02/05/2021    Personal history of diabetic foot ulcer 06/16/2020    Diabetic ulcer of right foot ([facility] ) 09/19/2019    Acquired absence of other left toe(s) ([place] [address] ) 07/30/2019    Type 1 diabetes mellitus ([facility] ) 07/30/2019    Status post split thickness skin graft 06/18/2019    Preoperative clearance 06/05/2019    Complete heart block  06/03/2019    Microalbuminuria 04/25/2019    Vitreous hemorrhage of left eye ([facility] [address] ) 04/10/2019    Transition of care performed with sharing of clinical summary 04/10/2019    Type 2 diabetes mellitus with hyperglycemia (HCC)     High anion gap metabolic acidosis 97/73/5917    Elevated troponin level not due myocardial infarction 03/31/2019    Low bicarbonate 03/31/2019    Pacemaker 03/31/2019    Stage 3 chronic kidney disease 07/23/2018    Thrombocytopenia ([facility] ) 07/23/2018    Encounter for diabetic foot exam ([place] [address] ) 07/06/2018    Risk for falls 07/06/2018    Screening for colon cancer 04/16/2018    Arthritis 01/25/2018    DM type 2, not at goal [facility]) 01/25/2018    Medtronic dual chamber PM 01/25/2018    Essential hypertension 09/06/2016    Insulin pump status 09/11/2015    Hypothyroidism 11/26/2014    Hyperlipidemia 11/11/2014    Acid reflux 08/06/2014    Type I diabetes mellitus ([facility] ) 07/08/2014    Diabetic peripheral neuropathy ([place] [address] ) 10/24/2012          Patt Lane MD    Total time spent:  30 minutes with more than 50% spent counseling/coordinating care  Counseling includes discussion with patient re: progress and discussion with patient of his/her current medical/functional state/information  Coordination of patient's care was performed in conjunction with consulting services  Time invested included review of patient's labs, vitals, and management of their comorbidities with continued monitoring  The care of the patient was extensively discussed and appropriate treatment plan was formulated unique for this patient  ** Please Note:  voice to text software may have been used in the creation of this document   Although proof errors in transcription or interpretation are a potential of such software**

## 2021-04-04 NOTE — PROGRESS NOTES
Occupational Therapy Long Term Goals      04/04/21 0830   Rehab Team Goals   ADL Team Goal Patient will require assist with ADLs with least restrictive device upon completion of rehab program   Rehab Team Interventions   OT Interventions Self Care   Eating Goal   Eating Goal 06  Independent - Patient completes the activity by him/herself with no assistance from a helper  Status Ongoing; Target goal - two weeks   Grooming Goal   Oral Hygiene Goal 04  Supervision or touching assistance- [place] provides VERBAL CUES or supervision throughout activity  Status Ongoing; Target goal - two weeks   Tub/Shower Transfer Goal   Method Tub Shower  (MIN A)   Assist Device Tub Bench   Status Ongoing; Target goal - two weeks   Bathing Goal   Shower/bathe self Goal 04  Supervision or touching assistance- [place] provides VERBAL CUES or supervision throughout activity  Status Ongoing; Target goal - two weeks   Upper Body Dressing Goal   Upper body dressing Goal 03  Partial/moderate assistance - [place] does less than half the effort  [place] lifts or holds trunk or limbs and provides more than half the effort  Status Ongoing; Target goal - two weeks   Lower Body Dressing Goal   Lower body dressing Goal 04  Supervision or touching assistance- [place] provides VERBAL CUES or supervision throughout activity  Status Ongoing; Target goal - two weeks   Toileting Transfer Goal   Toilet transfer Goal 04  Supervision or touching assistance- [place] provides VERBAL CUES or supervision throughout activity  Status Ongoing; Target goal - two weeks   Toileting Goal   Toileting hygiene Goal 04  Supervision or touching assistance- [place] provides VERBAL CUES or supervision throughout activity  Status Ongoing; Target goal - two weeks   PT Transfer Goal   Roll left and right Goal 04  Supervision or touching assistance- [place] provides VERBAL CUES or supervision throughout activity  Sit to lying Goal 05   Setup or clean-up assistance - [place] SETS UP or CLEANS UP, patient completes activity  [place] assists only prior to or following the activity  Lying to sitting on side of bed Goal 05  Setup or clean-up assistance - [place] SETS UP or CLEANS UP, patient completes activity  [place] assists only prior to or following the activity  Sit to stand Goal 05  Setup or clean-up assistance - [place] SETS UP or CLEANS UP, patient completes activity  [place] assists only prior to or following the activity  Chair/bed-to-chair transfer Goal 04  Supervision or touching assistance- [place] provides VERBAL CUES or supervision throughout activity

## 2021-04-05 PROBLEM — N18.32 STAGE 3B CHRONIC KIDNEY DISEASE (HCC): Status: ACTIVE | Noted: 2018-07-23

## 2021-04-05 NOTE — PCC PHYSICAL THERAPY
Pt  Is a 76year old male that presented to [facility]  on 3/27/2021 with complaints of generalized weakness, diarrhea, body aches, and blood in urine  Pt  Also has hx of COVID infection on feb 2021   Pt  Dx also to have L1 vertebral fx which is managed conservatively and is currently has orders for TLSO for >45 deg and for OOB  Pt  Admitted to [facility] on 4/2/2021 and PT eval completed 4/3/2021  Pt  Presents with dec strength, dec endurance due to debility, dec balance and dec safety awareness needing min A for functional mobility  Pt  Had been reported to have confusion at night and had not been sleeping well and is lethargic during the day not being able to participate in all of therapies  Pt  Is currently min A for bed mobility, transfers and ambulation for a limited distance using RW  Pt  Also currently using [employer] shoes for R foot wound as per podiatry order  Pt  Was fully indep PTA and lives with his spouse on 1 level home with stairglide in pace and ramp to enter  Pt  Will benefit from skilled PT to address functional decline and to achieve max level of function to dec burden of care  For now recommended to have S at d/c due to fluctuating cognition  ELOS 10-14 days  Barriers include: weakness and debility, dec caregiver support, currently using O2, and dec caregiver support

## 2021-04-05 NOTE — PROGRESS NOTES
04/05/21 0800   Pain Assessment   Pain Score No Pain   Restrictions/Precautions   Precautions 1:1;Bed/chair alarms;Cognitive; Fall Risk;Spinal precautions;Supervision on toilet/commode  (2-3 L of O2 during session )   Braces or Orthoses TLSO  (>45 HOB and OOB , surgical shoe R foot )   Cognition   Arousal/Participation Cooperative;Lethargic  (gets sleepy on and off )   Attention Attends with cues to redirect   Memory Decreased short term memory;Decreased recall of recent events;Decreased recall of precautions   Following Commands Follows one step commands with increased time or repetition   Subjective   Subjective Pt  finishing breakfast stating that he did not sleep well last night    Lying to Sitting on Side of Bed   Type of Assistance Needed Physical assistance   Amount of Physical Assistance Provided Less than 25%   Comment with HOB elevated    Lying to Sitting on Side of Bed CARE Score 3   Sit to Stand   Type of Assistance Needed Physical assistance   Amount of Physical Assistance Provided 25%-49%   Sit to Stand CARE Score 3   Bed-Chair Transfer   Type of Assistance Needed Physical assistance   Amount of Physical Assistance Provided 25%-49%   Chair/Bed-to-Chair Transfer CARE Score 3   Transfer Bed/Chair/Wheelchair   Limitations Noted In Balance; Coordination;Problem Solving;LE Strength   Adaptive Equipment Roller Walker   Car Transfer   Reason if not Attempted Environmental limitations   Car Transfer CARE Score 10   Walk 10 Feet   Type of Assistance Needed Physical assistance   Amount of Physical Assistance Provided Total assistance   Walk 10 Feet CARE Score 1   Walk 50 Feet with Two Turns   Reason if not Attempted Safety concerns   Walk 50 Feet with Two Turns CARE Score 88   Walk 150 Feet   Reason if not Attempted Safety concerns   Walk 150 Feet CARE Score 88   Walking 10 Feet on Uneven Surfaces   Reason if not Attempted Safety concerns   Walking 10 Feet on Uneven Surfaces CARE Score 88   Ambulation   Does the patient walk? 2  Yes   Primary Mode of Locomotion Prior to Admission Walk   Distance Walked (feet) 15 ft  (x 2 )   Assist Device Roller Walker   Gait Pattern Inconsistant Laya;Decreased foot clearance;R foot drag;Step to; WNL   Limitations Noted In Balance;Device Management; Endurance; Safety;Strength   Provided Assistance with: Balance;Weight Shift   Findings limuted due to pt  not fully awake and was not paying too much attention in walking    Curb or Single Stair   Reason if not Attempted Safety concerns   1 Step (Curb) CARE Score 88   4 Steps   Reason if not Attempted Safety concerns   4 Steps CARE Score 88   12 Steps   Reason if not Attempted Safety concerns   12 Steps CARE Score 88   Therapeutic Interventions   Flexibility B hamstring and gastroc stretching    Equipment Use   NuStep level 1 X 10 mins, has to be direct supervision to follow through with bike  and also needs assistance to inc speed    Other Comments   Comments Spo2 at 2L at start of session was 87-89%, took awhile to go up and was inc to 3L and improved to 92%, Kept pt  at 3L until he did nu step and was at 97%, dec again to 2L and was 92-93% after that until the end of session    Assessment   Treatment Assessment Pt  participated in 60 mins PT session which started with bed mobility , donning of TLSO and donning of pants  Pt  mostly awake at the beginning of session but got sleepy towards the end of nu step  Pt  was still able to walk from door to recliner though with no LOB noted  Pt  needs inc time to follow through and complete an activity  Pt  also demonstrates R foot dragging and needs constant cueing for clearance  Pt  assisted back to recliner at end of session with 1: 1 present  Cont with POC as tolerated  maximized mobility when awake  PT Barriers   Physical Impairment Decreased strength;Decreased range of motion;Decreased endurance; Impaired balance;Decreased mobility; Decreased coordination;Decreased cognition; Impaired judgement;Decreased safety awareness;Orthopedic restrictions   Functional Limitation Car transfers; Ramp negotiation;Stair negotiation;Standing;Transfers; Walking   Plan   Treatment/Interventions Functional transfer training;LE strengthening/ROM; Elevations; Therapeutic exercise; Endurance training;Patient/family training;Equipment eval/education; Bed mobility;Gait training   Recommendation   PT Discharge Recommendation   (TBD)   Equipment Recommended Walker   PT Therapy Minutes   PT Time In 0800   PT Time Out 0900   PT Total Time (minutes) 60   PT Mode of treatment - Individual (minutes) 60   PT Mode of treatment - Concurrent (minutes) 0   PT Mode of treatment - Group (minutes) 0   PT Mode of treatment - Co-treat (minutes) 0   PT Mode of Treatment - Total time(minutes) 60 minutes   PT Cumulative Minutes 147   Therapy Time missed   Time missed?  No

## 2021-04-05 NOTE — PROGRESS NOTES
PM&R PROGRESS NOTE:  Willam Curling 76 y o  male MRN: [id number]  Unit/Bed#: -01 Encounter: [id number]        Rehabilitation Diagnosis: Impairment of mobility, safety, Activities of Daily Living (ADLs), and cognitive/communication skills due to Orthopedic Disorders:  08 9  Other Orthopedic L1 compression fracture    HPI: Willam Curling is a 76 y o  male with DM1 with chronic insulin pump, diabetic neuropathy, CKD 3, PAD, heart block with dual chamber ICD, HTN, hypothyroidism, COVID-19 virus in Feb 2021 with respiratory failure, osteoporosis, who presented to the [facility] on 3/27/21 with fatigue and hematuria 10 days after receiving his 2nd Joome vaccine  Patient found to have acute mental status change, diarrhea, KAZ, Non MI troponin elevation, and some hematuria  Infectious workup revealed bacteremia with GPR - Corynebacterium bacteremia  Patient started on Cefepime, but discontinued after repeat blood cultures negative per Infectious Disease recommendations  CT abdomen/pelvis with findings of right pleural effusion, ground-glass opacification in the right lower lobe, findings of punctate gallstone, findings of worsening L1 compression fracture  Seen by Cardiology given recent history of COVID-19 virus, and echocardiogram ordered showing EF 65%, mild MR, TR  Patient seen by Kimberly Nuñez for worsening L1 vertebral compression fracture  Lumbar x-ray showing moderate anterior wedge compression fracture at L1 with 50% loss of height  Patient was treated conservatively with TLSO backpack brace when OOB and >45degrees  Patient was agitated and confused during his hospitalization, acutely worsened on 3/31/21- patient received Ativan IV  Improved during the day  Patient found to have acute functional deficits from his baseline, and after medical stabilization and clearance, patient was [facility] for acute inpatient rehabilitation        SUBJECTIVE:  Patient seen face to face  No acute issues  Feeling tired per therapy staff, asking about doses of alprazolam and timing  Patient participated in therapies  Less restless and agitated but continues to be confused at night  He denies fever, chills, nausea, emesis, dysuria, constipation, diarrhea, shortness of breath or cough  ASSESSMENT: Stable, progressing      PLAN:    Rehabilitation  · Functional deficits:  Proximal musculature weakness x4, Impaired cognition, impaired mobility, self care  · Poor safety awareness, high fall risk - witness multiple times setting bed and chair alarms trying to get out of bed:  Continue 1:1  and 4 side rails   Continue current rehabilitation plan of care to maximize function   Functional update:   o Improved partcipation today in both PT/OT   Estimated Discharge: To be determined    DVT prophylaxis  · Managed on subcutaneous heparin     Pain  · No acute issues currently     Bladder plan  · Continent, will check PVRs     Bowel plan  · + constipation:  Lactulose 4/4 followed by suppository- BM x3, loose    Gram-positive bacteremia  Assessment & Plan  · Cornebacterium bacteremia  · Treated with IV cefepime later discontinued as subsequent blood cultures negative    Hyponatremia  Assessment & Plan  · Sodium of 129, trending lower  · Discussed with IM and plans to have Nephrology weight in    COVID-19  Assessment & Plan  · Patient with COVID-19 virus infection in February 2021  Completed remdesivir and systemic corticosteroid     Off isolation  · Patient with residual findings on chest x-ray and CT scan as expected  · No respiratory distress  · Will continue to follow    Diabetic ulcer of right foot [facility])  Assessment & Plan  · Podiatry consulted to follow    Type 1 diabetes mellitus [facility])  Assessment & Plan  Lab Results   Component Value Date    HGBA1C 7 7 (H) 02/05/2021     · Patient at home managed on insulin pump, however given recent impaired cognition, currently being managed on Lantus, Humalog and sliding scale insulin by endocrinology  · Endocrinology to follow while at [facility] and assist in management  Encephalopathy  Assessment & Plan  · Unclear etiology  · Patient with poor safety awareness and a high fall risk demonstrated by setting off chair and bed alarms multiple times by trying to get out of bed and disoriented  · 1:1  ordered and 4 side rails  · P O  Xanax prn for restlessness and anxiety- Has not received in last couple days  · Discussed with wife he reports this is not his baseline  · Etiology unclear:    · CTH negative for acute findings, but showing older CVAs - would benefit from outpatient Neurology  · Follow labs    Leukocytosis  Assessment & Plan  · Trending down 14K  · Was started on Zithromax for possible PNA by IM consultants seen on previous CXR   Discussed with IM, plans to continue for 1 more day and DC  · CXR from yesterday showing moderate pleural effusions  · Clinically better than yesterday  · Follow CBC    Stage 3 chronic kidney disease  Assessment & Plan  · Baseline creatinine 1 2-1 5 per records  · Within his current baseline  · Internal medicine consultants following    Medtronic dual chamber PM  Assessment & Plan  +ICD placed for complete heart block    Hyperlipidemia  Assessment & Plan  · Managed on Lipitor home dose 5 mg daily  · At home was also taking Zetia 5 mg daily    Essential hypertension  Assessment & Plan  · Currently managed on lisinopril 10mg daily starting 4/4/21  · At home was taking Norvasc 5 mg daily  · Internal medicine consultants to follow    Anemia of chronic disease  Assessment & Plan  · H/H trending down - Hgb on 4/5 is 7 7 (7 8)  · Unclear etiology - likely anemia of chronic disease  · Checking iron studies- IM started iron supplementation on 4/5  · Occult stool negative  · Type and Screen completed on 4/4    Hypothyroidism  Assessment & Plan  · Managed on Synthroid 125 mcg daily home dose    Acid reflux  Assessment & Plan  · Managed on Protonix    * Vertebral fracture, osteoporotic (HCC)  Assessment & Plan  · L1 anterior vertebral compression fracture with 50% loss of height found on CT abdomen pelvis - confirmed on lumbar x-ray  · Seen by Neurosurgery and treated conservatively with TLSO backpack brace when  OOB and >45degrees  · Follow-up with Neurosurgery as an outpatient        Appreciate IM consultants medical co-management  Labs, medications, and imaging personally reviewed  ROS:  A ten point review of systems was completed on 4/4/21 and pertinent positives are listed in subjective section  All other systems reviewed were negative  OBJECTIVE:   /52 (BP Location: Right arm)   Pulse 95   Temp 99 1 °F (37 3 °C) (Oral)   Resp 20   Wt 75 4 kg (166 lb 3 6 oz)   SpO2 92%   BMI 25 27 kg/m²     Physical Exam  Vitals signs and nursing note reviewed  Constitutional:       General: He is not in acute distress  Appearance: He is well-developed  HENT:      Head: Normocephalic  Nose: Nose normal    Eyes:      Conjunctiva/sclera: Conjunctivae normal    Neck:      Musculoskeletal: Neck supple  Cardiovascular:      Rate and Rhythm: Normal rate  Pulmonary:      Effort: Pulmonary effort is normal       Breath sounds: No wheezing  Abdominal:      General: There is no distension  Palpations: Abdomen is soft  Skin:     General: Skin is warm  Coloration: Skin is pale  Neurological:      Mental Status: He is alert  Motor: Weakness (4-/5 proximally and 4/5 distally) present        Comments: Oriented to person and place  Unclear of situation   Psychiatric:         Mood and Affect: Mood normal           Lab Results   Component Value Date    WBC 14 35 (H) 04/05/2021    HGB 7 7 (L) 04/05/2021    HCT 23 8 (L) 04/05/2021    MCV 87 04/05/2021     04/05/2021     Lab Results   Component Value Date    SODIUM 129 (L) 04/05/2021    K 4 0 04/05/2021    CL 97 (L) 04/05/2021 CO2 23 04/05/2021    BUN 20 04/05/2021    CREATININE 1 53 (H) 04/05/2021    GLUC 155 (H) 04/05/2021    CALCIUM 7 6 (L) 04/05/2021     Lab Results   Component Value Date    INR 1 03 06/23/2015    INR 1 06 06/20/2015    PROTIME 13 3 06/23/2015    PROTIME 13 2 06/20/2015           Current Facility-Administered Medications:     acetaminophen (TYLENOL) tablet 650 mg, 650 mg, Oral, Q6H PRN, Esther Holcomb MD    ALPRAZolam Malena Yahir) tablet 0 25 mg, 0 25 mg, Oral, TID PRN, Esther Holcomb MD    aspirin chewable tablet 81 mg, 81 mg, Oral, Daily, Esther Holcomb MD, 81 mg at 04/05/21 0908    atorvastatin (LIPITOR) tablet 5 mg, 5 mg, Oral, Daily, Esther Holcomb MD, 5 mg at 04/05/21 0910    azithromycin (ZITHROMAX) tablet 250 mg, 250 mg, Oral, Q24H, TERRY Jaramillo, 250 mg at 04/04/21 1819    bisacodyl (DULCOLAX) rectal suppository 10 mg, 10 mg, Rectal, Daily PRN, Esther Holcomb MD, 10 mg at 04/04/21 1716    cholecalciferol (VITAMIN D3) tablet 4,000 Units, 4,000 Units, Oral, Daily, Esther Holcomb MD, 4,000 Units at 04/05/21 0909    docusate sodium (COLACE) capsule 100 mg, 100 mg, Oral, BID, Esther Holcomb MD, 100 mg at 04/04/21 1716    DULoxetine (CYMBALTA) delayed release capsule 30 mg, 30 mg, Oral, Daily, Esther Holcomb MD, 30 mg at 04/05/21 0910    ferrous sulfate tablet 325 mg, 325 mg, Oral, Daily With Breakfast, TERRY Jaramillo, 325 mg at 04/05/21 0909    heparin (porcine) subcutaneous injection 5,000 Units, 5,000 Units, Subcutaneous, Q8H [address], Esther Holcomb MD, 5,000 Units at 04/05/21 0624    insulin glargine (LANTUS) subcutaneous injection 6 Units 0 06 mL, 6 Units, Subcutaneous, HS, Esther Holcomb MD, 6 Units at 04/04/21 2230    insulin lispro (HumaLOG) 100 units/mL subcutaneous injection 1-4 Units, 1-4 Units, Subcutaneous, TID AC, 1 Units at 04/05/21 0912 **AND** Fingerstick Glucose (POCT), , , TID AC, Esther Holcomb MD    levothyroxine tablet 125 mcg, 125 mcg, Oral, Early Morning, Esther Holcomb MD, 125 mcg at 04/05/21 3786    ondansetron (ZOFRAN-ODT) dispersible tablet 4 mg, 4 mg, Oral, Q6H PRN, Shawn Wolfe MD    pantoprazole (PROTONIX) EC tablet 40 mg, 40 mg, Oral, Daily Before Breakfast, Shawn Wolfe MD, 40 mg at 04/05/21 3219    polyethylene glycol (MIRALAX) packet 17 g, 17 g, Oral, Daily PRN, Shawn Wolfe MD, 17 g at 04/03/21 1630    sodium chloride tablet 1 g, 1 g, Oral, BID With Meals, TERRY Wood    tamsulosin (FLOMAX) capsule 0 4 mg, 0 4 mg, Oral, Daily With Ti Whitney MD, 0 4 mg at 04/04/21 1539    Past Medical History:   Diagnosis Date    Arthritis     Cardiac disease     Closed fracture of fibula, lateral malleolus, right     Complete heart block ([facility] )     Diabetes mellitus ([place] [address] )     Disease of thyroid gland     External incisional dehiscence     GERD (gastroesophageal reflux disease)     Heart disease     Muscular deconditioning     Pacemaker     Sleep apnea     cannot tolerate cpap    Thyroid disease        Patient Active Problem List    Diagnosis Date Noted    Vertebral fracture, osteoporotic ([facility] ) 04/02/2021    Progression of L1 vertebral fracture  03/30/2021    Gram-positive bacteremia 03/28/2021    Weakness 03/27/2021    Hyponatremia 03/27/2021    Hyperglycemia due to type 1 diabetes mellitus ([facility] ) 02/07/2021    COVID-19 02/05/2021    Personal history of diabetic foot ulcer 06/16/2020    Diabetic ulcer of right foot ([facility] ) 09/19/2019    Acquired absence of other left toe(s) ([facility] ) 07/30/2019    Type 1 diabetes mellitus ([facility] ) 07/30/2019    Status post split thickness skin graft 06/18/2019    Preoperative clearance 06/05/2019    Complete heart block  06/03/2019    Microalbuminuria 04/25/2019    Vitreous hemorrhage of left eye ([facility] ) 04/10/2019    Transition of care performed with sharing of clinical summary 04/10/2019    Type 2 diabetes mellitus with hyperglycemia (HCC)     High anion gap metabolic acidosis 42/77/2656    Elevated troponin level not due myocardial infarction 03/31/2019    Leukocytosis 03/31/2019    Low bicarbonate 03/31/2019    Pacemaker 03/31/2019    Encephalopathy 03/31/2019    Stage 3 chronic kidney disease 07/23/2018    Thrombocytopenia ([facility] ) 07/23/2018    Encounter for diabetic foot exam ([place] [address] ) 07/06/2018    Risk for falls 07/06/2018    Screening for colon cancer 04/16/2018    Anemia of chronic disease 01/25/2018    Arthritis 01/25/2018    DM type 2, not at goal [facility]) 01/25/2018    Medtronic dual chamber PM 01/25/2018    Essential hypertension 09/06/2016    Insulin pump status 09/11/2015    Hypothyroidism 11/26/2014    Hyperlipidemia 11/11/2014    Acid reflux 08/06/2014    Type I diabetes mellitus ([place] [address] ) 07/08/2014    Diabetic peripheral neuropathy ([place] [address] ) 10/24/2012      Milagro Solomon, DO  Physical Medicine and Rehabilitation  [address] [place] [address]    Total time spent:  30 minutes with more than 50% spent counseling/coordinating care  Counseling includes discussion with patient re: progress and discussion with patient of his/her current medical/functional state/information  Coordination of patient's care was performed in conjunction with consulting services  Time invested included review of patient's labs, vitals, and management of their comorbidities with continued monitoring  The care of the patient was extensively discussed and appropriate treatment plan was formulated unique for this patient  ** Please Note:  voice to text software may have been used in the creation of this document   Although proof errors in transcription or interpretation are a potential of such software** none

## 2021-04-05 NOTE — PROGRESS NOTES
Pt asleep most of day after am therapy very lethargic would arouse to name then fall asleep again vss blood sugar 145 pt did not eat lunch too lethargic Fior Villasenor NP aware and did see pt Dr Dionte Moya saw pt and wife who is at bedside pt labs sent ammonia level normal ua sent to lab pt did participate with therapy for a short time this afternoon  Pt is a little more awake now   Resting in bed

## 2021-04-05 NOTE — TELEPHONE ENCOUNTER
2021-PT , APT CX    2021-PT STILL IN HOSPITAL    2021-PT STILL IN HOSPITAL    04/15/2021-PT STILL IN [place], [place]   4 week f/u with new thoracolumbar x-ray  AP visit     2021 APT Bea Hamman IN [facility] SPINE XRAY      2021-PT STILL IN HOSPITAL    2021-PT STILL IN HOSPITAL,  2021 APT CX-Hospitalized (patient still inpt, will be seen by hosp PA)    2021-PT [address]    2021-PT [address]    2021-PT [address]    2021-PT [address]    2021-PT [address]    2021-PT [address]  2021 APT YOLANDA/CHANNING IN [place] W/L-SPINE XRAY      2021-(KANG)FOLLOW UP ALREADY MADE IN 2 WEEKS WITH [facility], SIGNED OFF

## 2021-04-05 NOTE — UTILIZATION REVIEW
Notification of Discharge  This is a Notification of Discharge from our facility [address]  Please be advised that this patient has been discharge from our facility  Below you will find the admission and discharge date and time including the patients disposition  PRESENTATION DATE: 3/27/2021  3:35 PM  OBS ADMISSION DATE:   IP ADMISSION DATE: 3/28/21 1534   DISCHARGE DATE: 4/2/2021  1:45 PM  DISPOSITION: [address] [place] [address] Dr ARC   Admission Orders listed below:  Admission Orders (From admission, onward)     Ordered        03/28/21 1534  Inpatient Admission  Once         03/27/21 1920  Place in Observation  Once                   Please contact the UR Department if additional information is required to close this patient's authorization/case  [address] Utilization Review Department  Main: [phone] x carefully listen to the prompts  All voicemails are confidential   [email or web address]  Send all requests for admission clinical reviews, approved or denied determinations and any other requests to dedicated fax number below belonging to the campus where the patient is receiving treatment   List of dedicated fax numbers:  [address] DENIALS (Administrative/Medical Necessity) [phone]   [address] (Maternity/NICU/Pediatrics) [phone]   \[facility] [place] [facility]\"" [phone]   Baylee Wolff [phone]   Zion Mae [phone]   [facility] [address] [place] [address] [phone]   [facility]  [phone]   [address] [facility] [address]  [facility] [address] [facility] [phone]

## 2021-04-05 NOTE — PCC OCCUPATIONAL THERAPY
Occupational Therapy Weekly Team Note    Pt is demonstrating good progress with occupational therapy after a slow start with increased confusions upon admission  Pt is progressing toward long term goals for ADL, IADL, and functional transfers/mobility  Pts long term goals for ADLs are supervision with 5 [facility]  Pt continues to present with impairments in activity tolerance, endurance, standing balance/tolerance, sitting balance/tolerance, UE strength, arousal, memory, insight, safety , judgement , attention , sequencing  and task initiation   Occupational performance remains limited by fatigue, SOB, pain, spinal precautions, orthopedic restricitions  and risk for falls  Family training/education will be required prior to D/C  Pt will continue to benefit from skilled acute rehab OT services to address above mentioned barriers and maximize functional independence in baseline areas of occupation to meet established treatment goals with overall decreased burden of care  Plan of care to continue to focus on ADL Retraining , LB Dressing, UB dressing,  LHAE education/training, Functional Transfers, Functional Cognition, Functional Attention, Assessment of Cognitive function, Standing tolerance, Standing balance , Fine motor coordination, Fine motor strengthening , DME training/education, Family training/education and Energy conservation training/education  Anticipate Re-team at this time  Sid Martins

## 2021-04-05 NOTE — PROGRESS NOTES
[facility] Occupational Therapy Daily Note  Patient Active Problem List   Diagnosis    Acid reflux    Hypothyroidism    Anemia of chronic disease    Arthritis    Type I diabetes mellitus ([facility]    Diabetic peripheral neuropathy ([facility]    DM type 2, not at goal [facility])    Essential hypertension    Hyperlipidemia    Insulin pump status    Medtronic dual chamber PM    Screening for colon cancer    [facility]    Risk for falls    Stage 3b chronic kidney disease    Thrombocytopenia (HCC)    High anion gap metabolic acidosis    Elevated troponin level not due myocardial infarction    Leukocytosis    Low bicarbonate    Pacemaker    Encephalopathy    Type 2 diabetes mellitus with hyperglycemia (HCC)    [facility] left [facility]    Transition of care performed with sharing of clinical summary    Microalbuminuria    Complete heart block     Preoperative clearance    Status post split thickness skin graft    Acquired absence of other left toe(s) ([facility]    Type 1 diabetes mellitus ([facility]    Diabetic ulcer of right foot ([facility]    Personal history of diabetic foot ulcer    COVID-19    Hyperglycemia due to type 1 diabetes mellitus ([facility]    Weakness    Hyponatremia    Gram-positive bacteremia    Progression of L1 vertebral fracture     Vertebral fracture, osteoporotic (HCC)       Past Medical History:   Diagnosis Date    Arthritis     Cardiac disease     Closed fracture of fibula, lateral malleolus, right     Complete heart block (HCC)     Diabetes mellitus ([facility]     Disease of thyroid gland     External incisional dehiscence     GERD (gastroesophageal reflux disease)     Heart disease     Muscular deconditioning     Pacemaker     Sleep apnea     cannot tolerate cpap    Thyroid disease      Etiologic Diagnosis: acute on chronic L1 Vertebral fx  Restrictions/Precautions  Precautions: 1:1, Bed/chair alarms, Cognitive, Fall Risk, Fluid restriction, Spinal precautions, O2  Braces or Orthoses: TLSO(back pack TLSO when HOB >45  Surg shoe R LE)  ADL Team Goal: Patient will require assist with ADLs with least restrictive device upon completion of rehab program  Occupational Therapy LTG's  Eating Oral care Bathing UB dress LB dress   Eating Goal: 06  Independent - Patient completes the activity by him/herself with no assistance from a helper  Oral Hygiene Goal: 04  Supervision or touching assistance- [place] provides VERBAL CUES or supervision throughout activity  Shower/bathe self Goal: 04  Supervision or touching assistance- [place] provides VERBAL CUES or supervision throughout activity  Lower body dressing Goal: 04  Supervision or touching assistance- [place] provides VERBAL CUES or supervision throughout activity  Upper body dressing Goal: 03  Partial/moderate assistance - [place] does less than half the effort  [place] lifts or holds trunk or limbs and provides more than half the effort  Toileting Toilet txf Func txf IADL Med    Toileting hygiene Goal: 04  Supervision or touching assistance- [place] provides VERBAL CUES or supervision throughout activity  Toilet transfer Goal: 04  Supervision or touching assistance- [place] provides VERBAL CUES or supervision throughout activity  Chair/bed-to-chair transfer Goal: 04  Supervision or touching assistance- [place] provides VERBAL CUES or supervision throughout activity         OT interventions: Treatment/Interventions: (P) ADL retraining, LE strengthening/ROM, Functional transfer training, Therapeutic exercise, Endurance training, Cognitive reorientation, Patient/family training, Equipment eval/education, Bed mobility, Compensatory technique education  Discharge Plan:  OT Discharge Recommendation: (P) Return to previous environment with social support      04/05/21 1935   Pain Assessment   Pain Assessment Tool Pain Assessment not indicated - pt denies pain   Pain Score No Pain   Lifestyle   Autonomy "ill get there " Bed-Chair Transfer   Type of Assistance Needed Physical assistance   Amount of Physical Assistance Provided 25%-49%   Comment Pt engages in functional transfer training and short distance in room funcitonal mobilty  Pt is unable to manage O2 line, and posing fall risk as pt was distracted by tubing  Pt demo excessivly slow rahul, sliding feet on the floor, bracing him self on the RW at times  no outright LOB noted but swaying noted in static stance  Chair/Bed-to-Chair Transfer CARE Score 3   Cognition   Overall Cognitive Status Impaired   Arousal/Participation Lethargic   Comments pt again limited by overall alertness  pt is cooperative when he is awake  Pt does demo decreased safety with transfers at this time req direct supervision  Activity Tolerance   Activity Tolerance Patient limited by fatigue   Assessment   Treatment Assessment Pt participated in skilled OT treatment session with treatment focus on fxnl xfers and fxnl attention and activity tolerance    Pt tolerated session poor after arousal from sleep, pt sleeping since PT session this morning  Did not eat lunch  Pt is awake and somewhat restless needing to reposition  Pt requires total assist with TLSO adjustment for proper fitting  Wife present for session and educated on current level of function for ADLS following eval yesterday  Pt continues to require skilled acute rehab OT services to increase overall functional independence and safety w/ I/ADLs and functional transfers  Continue with plan for discharge as return home with increased family support,   Continued plan of care for OT sessions to focus on the following areas: ADL re-training, [facility] training, fxnl xfers, fxnl cognition, short term memory, fxnl attention, standing tolerance, standing balance, UE endurance, FMC/GMC, FMS/GMS, DME training/education, family training/education, EC techniques/education, leisure pursuits, formalized cognitive assessment, body awareness and sitting balance Prognosis Fair   Plan   Treatment/Interventions ADL retraining;LE strengthening/ROM; Functional transfer training; Therapeutic exercise; Endurance training;Cognitive reorientation;Patient/family training;Equipment eval/education; Bed mobility; Compensatory technique education   Progress Slow progress, decreased activity tolerance   Recommendation   OT Discharge Recommendation Return to previous environment with social support   OT Therapy Minutes   OT Time In 1445   OT Time Out 1515   OT Total Time (minutes) 30   OT Mode of treatment - Individual (minutes) 30   OT Mode of treatment - Concurrent (minutes) 0   OT Mode of treatment - Group (minutes) 0   OT Mode of treatment - Co-treat (minutes) 0   OT Mode of Treatment - Total time(minutes) 30 minutes   OT Cumulative Minutes 120   Therapy Time missed   Time missed?  Yes   Amount of time missed 60   Reason for time missed Extreme fatigue   Time(s) multiple attempts made 1230, 1300, 1400

## 2021-04-05 NOTE — PROGRESS NOTES
Internal Medicine Progress Note  Patient: Dov Willis  Age/sex: 76 y o  male  Medical Record #: [id number]      ASSESSMENT/PLAN: (Interval History)  Dvo Willis is seen and examined and management for following issues:    DM I  · Cont sliding scale  · Fairly labile  · Uses insulin pump  · Endocrinology following  · Cont DM diet     Diarrhea  · resolved  · Likely from recent COVID  · Cont current regimen     Chronic L1 compression fracture  · Cont TLSO brace when OOB  · No NS intervention per reccs     Anemia  · hgb 7 7 4/5  · Monitor cbc  · Start daily iron  · hemocult negative x2     Chronic RLE ulcer  · F/b podiatry as outpatient  · Debrided 4/3 at bedside  · Currently in CAM boot  · Refer to images     Chronic diastolic HF  · Cxray moderate bilateral effusions  · Given lasix 40mg po x1 dose 4/4  · Now with elevated creatinine    Acute on chronic CKD  · Hold lisinopril  · Consult renal  · Repeat bmp in a m  Hyponatremia  · Na 129 4/5  · Continue fluid restriction  · Start Nacl tabs 1 gram bid  · Repeat bmp in a m      Leukocytosis  · unchanged  · Xray did not show pneumonia; +b/l pleural effusions  · Will dc abx    COVID  · Dx 2/5 hospitalized 2/5-2/8  · Tx with remdesivir/steroids  · Respiratory status stable  · Received 1 of 2 COVID vaccines     Hypothyroid  · Cont supplementation    Confusion  · Improved  · CT of head over weekend negative    Lethargy  · Continues to be issue with therapy  · Has been ongoing since covid  · Receives ativan at night  · PMR to manage and possibly move to earlier in the evening so he is not so lethargic during the morning          The above assessment and plan was reviewed and updated as determined by my evaluation of the patient on 4/5/2021      Labs:   Results from last 7 days   Lab Units 04/05/21  0630 04/04/21  0630   WBC Thousand/uL 14 35* 14 90*   HEMOGLOBIN g/dL 7 7* 7 8*   HEMATOCRIT % 23 8* 24 5*   PLATELETS Thousands/uL 194 224     Results from last 7 days Lab Units 04/05/21  0630 04/04/21  0630   SODIUM mmol/L 129* 130*   POTASSIUM mmol/L 4 0 4 0   CHLORIDE mmol/L 97* 97*   CO2 mmol/L 23 25   BUN mg/dL 20 17   CREATININE mg/dL 1 53* 1 34*   CALCIUM mg/dL 7 6* 7 6*             Results from last 7 days   Lab Units 04/05/21  0629 04/04/21  2048 04/04/21  1601   POC GLUCOSE mg/dl 166* 252* 195*       Review of Scheduled Meds:  Current Facility-Administered Medications   Medication Dose Route Frequency Provider Last Rate    acetaminophen  650 mg Oral Q6H PRN Yasmany Bragg MD      ALPRAZolam  0 25 mg Oral TID PRN Yasmany Bragg MD      aspirin  81 mg Oral Daily Yasmany Bragg MD      atorvastatin  5 mg Oral Daily Yasmany Bragg MD      azithromycin  250 mg Oral Q24H Ellis Decant, CRNP      bisacodyl  10 mg Rectal Daily PRN Yasmany Bragg MD      cholecalciferol  4,000 Units Oral Daily Yasmany Bragg MD      docusate sodium  100 mg Oral BID Yasmany Bragg MD      DULoxetine  30 mg Oral Daily Yasmany Bragg MD      heparin (porcine)  5,000 Units Subcutaneous Q8H [facility] Yasmany Bragg MD      insulin glargine  6 Units Subcutaneous HS Yasmany Bragg MD      insulin lispro  1-4 Units Subcutaneous TID AC Yasmany Bragg MD      levothyroxine  125 mcg Oral Early Morning Yasmany Bragg MD      ondansetron  4 mg Oral Q6H PRN Yasmany Bragg MD      pantoprazole  40 mg Oral Daily Before Breakfast Yasmany Bragg MD      polyethylene glycol  17 g Oral Daily PRN Yasmany Bragg MD      tamsulosin  0 4 mg Oral Daily With Luc Betts MD         Subjective/ HPI: Patient seen and examined  Patients overnight issues or events were reviewed with nursing or staff during rounds or morning huddle session  New or overnight issues include the following:     Pt without overnight issues or concerns; alert and oriented participating in therapy but c/o lethargy    ROS:   A 10 point ROS was performed; negative except as noted above         Imaging:     XR chest portable   Final Result by Haylee Quinteros MD (04/04 1143)      Moderate effusions and bibasilar atelectasis  Workstation performed: [id number]         CT head wo contrast   Final Result by Ina Gonzalez DO (04/03 1543)      No acute intracranial abnormality  Microangiopathic changes  Workstation performed: [id number]             *Labs /Radiology studies Reviewed  *Medications  reviewed and reconciled as needed  *Please refer to order section for additional ordered labs studies  *Case discussed with primary attending during morning huddle case rounds    Physical Examination:  Vitals:   Vitals:    04/04/21 0641 04/04/21 0908 04/04/21 1311 04/04/21 2258   BP: 135/67 124/60 105/57 106/59   BP Location: Right arm Left arm Right arm Left arm   Pulse: 91 (!) 106 101 (!) 106   Resp: 20  18 18   Temp: 99 7 °F (37 6 °C)  98 7 °F (37 1 °C) 98 7 °F (37 1 °C)   TempSrc: Oral  Oral Oral   SpO2: 94% 92% (!) 89% 90%   Weight:           GEN: No apparent distress, interactive  NEURO: Alert and oriented x3; +lethargic  HEENT: Pupils are equal and reactive, EOMI, mucous membranes are moist, face symmetrical  CV: S1 S2 regular, no MRG, 1/4 RLE peripheral edema noted  RESP: Lungs are clear bilaterally, no wheezes, rales or rhonchi noted, on room air, respirations easy and non labored  GI: Flat, soft non tender, non distended; +BS x4  : Voiding without difficulty  MUSC: Moves all extremities; RLE surgical dressing in place  SKIN: pink, warm and dry, normal turgor, no rashes, lesions      The above physical exam was reviewed and updated as determined by my evaluation of the patient on 4/5/2021      Invasive Devices     Peripheral Intravenous Line            Peripheral IV 04/04/21 Right;Ventral (anterior) Forearm 1 day                   VTE Pharmacologic Prophylaxis: Heparin  Code Status: Level 1 - Full Code  Current Length of Stay: 3 day(s)      Total time spent:  30 minutes with more than 50% spent counseling/coordinating care  Counseling includes discussion with patient re: progress  and discussion with patient of his/her current medical state/information  Coordination of patient's care was performed in conjunction with primary service  Time invested included review of patient's labs, vitals, and management of their comorbidities with continued monitoring  In addition, this patient was discussed with medical team including physician and advanced extenders  The care of the patient was extensively discussed and appropriate treatment plan was formulated unique for this patient  ** Please Note:  voice to text software may have been used in the creation of this document   Although proof errors in transcription or interpretation are a potential of such software**

## 2021-04-05 NOTE — PCC CARE MANAGEMENT
Pt presents with lethargy and confusion at times, wife answered initial assessment questions  Spouse hopes to have pt return home and has been made aware of potential need for contd services on dc  Following to assist w/dc planning recommendations and update needed to the [facility]

## 2021-04-05 NOTE — PROGRESS NOTES
04/05/21 0930   Pain Assessment   Pain Assessment Tool Pain Assessment not indicated - pt denies pain   Pain Score No Pain   Restrictions/Precautions   Precautions 1:1;Bed/chair alarms;O2;Cognitive; Fall Risk;Spinal precautions;Supervision on toilet/commode   Comprehension   Comprehension (FIM) 2 - Understands basic info/conversation 25-49% of time   Expression   Expression (FIM) 2 - Uses only simple expressions or gestures (waves, hello)   Social Interaction   Social Interaction (FIM) 2 - Needs frequent redirection   Problem Solving   Problem solving (FIM) 2 - Needs direction more than ½ time to initiate, plan or complete simple tasks   Memory   Memory (FIM) 2 - Recognizes, recalls/performs 25-49%   Speech/Language/Cognition Assessmetn   Treatment Assessment Pt seen for skilled speech therapy session  Pt lethargic upon arrival- able to arouse variably for skilled tasks  Attempted orientation questions in which pt was not able to provided information regarding location and reason for being here at the hospital  Pt was able to state [place] for where he lives however noted address states [place]  When asked if he had a wife, pt did not respond provided multiple repeition and rephrasing of question  When louder tone used to wake pt and get his attention with eyes open, pt able to communication that "Juni Patient" was his wife and that he was an   However, pt cont to fall back into this sleep cycle and unable to answer further questions  Further tasks deemed not theraputic and not appropriate due to current cognitive and lethargic state  Therapy team aware of current state  At this time, pt will cont to benefit from skilled SLP services to maximize overall cognitive linguistic communication needs at this time      SLP Therapy Minutes   SLP Time In 0930   SLP Time Out 0945   SLP Total Time (minutes) 15   SLP Mode of treatment - Individual (minutes) 15   SLP Mode of treatment - Concurrent (minutes) 0   SLP Mode of treatment - Group (minutes) 0   SLP Mode of treatment - Co-treat (minutes) 0   SLP Mode of Treatment - Total time(minutes) 15 minutes   SLP Cumulative Minutes 90   Therapy Time missed   Time missed?  Yes   Amount of time missed 15   Reason for time missed Extreme fatigue

## 2021-04-05 NOTE — PCC SPEECH THERAPY
Pt completed bedside swallow evaluation- At this time, swallow function appears to be [facility] and will continue to recommend regular diet w/ thin liquids  Meals only when pt is AWAKE/ALERT only  No further dysphagia tx services warranted due to pt's ability to tolerate least restrictive diet at this time  In regards to cognition, standardized assessment yet to be completed due to pt's fluctuating EDI since admission to rehab unit  On 2021, Pt engaged again in informal cognitive assessment, but only able to elicit orientation and LTM biographical information due to fluctuating EDI  Pt remains to be oriented to self, place/city, full date (including month/date/year/ANIYA)  Pt is accurate in regards to diagnosis of COVID in 2021, but unable to determine reasoning for admission currently  Able to be accurate in eliciting , age, address, wife's name and son's name  Pt reports he did complete majority of the I ADL tasks, including cooking, cleaning, laundry  Both pt and wife complete finances and pt completes own medication management and driving prior to hospitalization  At this time, pt will continue to benefit from SLP services to maximize and establish cognitive skills to target once pt more awake/alert during sessions

## 2021-04-05 NOTE — CONSULTS
Consultation - Nephrology   López Lloyd 76 y o  male MRN: [id number]  Unit/Bed#: -01 Encounter: [id number]    ASSESSMENT/PLAN:   1  CKD III: baseline creatinine 1 1-1 5 and follows with Dr Mallory Knox in our office  Creatinine has been trending up the past few days to 1 5 today likely due to lasix, increased lisinopril, labile BPs and poor po intake   · Agree with holding lisinopril for now   · Hold further diuretics now for now (had lasix 40mg po x 1 last night)   · Check PVR with history of BPH  · Check am BMP   2  Hyponatremia: sodium trending down  Started on NaCl 1g bid today per primary team   · Work up from 3/31: serum osm 284, urine osm 524, urine sodium 68, cortisol normal, no monoclonal gammopathy   · Will check BMP tomorrow since primary team started NaCl today, if worsening will repeat urine osm and urine sodium   · Continue 1200cc/day oral fluid restriction  3  Moderate pleural effusions: s/p lasix last night   4  Hypertension: BP labile  Recently was high so lisinopril was increased and today is running low   · Currently off all BP meds   5  Lethargy/confuision   6  Microscopic hematuria: per Dr Wellington Moss last note he follows with urology for this     HISTORY OF PRESENT ILLNESS:  Requesting Physician: Danielito Kraus DO  Reason for Consult: hyponatremia and CKD     López Lloyd is a 76y o  year old male who was admitted to [facility] on 3/27 with weakness and was found to have corynebacterium bacteremia  Also found to have worsening L1 compression fracture for which she was seen by Neurosurgery and treated with TLSO brace  He had issues with agitation and confusion during the hospital   He was transferred to [facility] for rehab  While at rehab his creatinine has trended up and his sodium has trended down so Nephrology was consulted  Patient has a known history of kidney disease and follows in our office with Dr Trish Stewart  He currently is very lethargic and unable to give me any history    He does awaken when I talked to him but then quickly falls back to sleep  PAST MEDICAL HISTORY:  Past Medical History:   Diagnosis Date    Arthritis     Cardiac disease     Closed fracture of fibula, lateral malleolus, right     Complete heart block ([address]     Diabetes mellitus [address] [address] )     Disease of thyroid gland     External incisional dehiscence     GERD (gastroesophageal reflux disease)     Heart disease     Muscular deconditioning     Pacemaker     Sleep apnea     cannot tolerate cpap    Thyroid disease        PAST SURGICAL HISTORY:  Past Surgical History:   Procedure Laterality Date    CARDIAC PACEMAKER PLACEMENT Left 2015    CARDIAC PACEMAKER PLACEMENT      CARDIAC SURGERY      CATARACT EXTRACTION      MUSCLE FLAP      right foot    NERVE BLOCK      Transforaminal Epidural Lumbar with Fluoroscopic Guidance     NE COLONOSCOPY FLX DX W/COLLJ SPEC WHEN PFRMD N/A 5/4/2018    Procedure: EGD AND COLONOSCOPY;  Surgeon: Purnima Vidales MD;  Location: [facility]  GI LAB;   Service: Gastroenterology    NE SPLIT [address] <100 SQCM Right 6/10/2019    Procedure: SKIN GRAFT SPLIT THICKNESS (STSG)  EXTREMITY right leg;  Surgeon: Pj Barakat DPM;  Location: [facility];  Service: Podiatry    TOE AMPUTATION      left 5th toe       ALLERGIES:  Allergies   Allergen Reactions    Contrast  [Iodinated Diagnostic Agents]     Lipitor  [Atorvastatin Calcium] Drowsiness    Ibuprofen      sensitive    Iodine - Food Allergy Rash    Simvastatin Anxiety     Other reaction(s): Malaise (finding)       SOCIAL HISTORY:  Social History     Substance and Sexual Activity   Alcohol Use Yes    Alcohol/week: 0 0 standard drinks    Frequency: Never    Binge frequency: Never    Comment: socially     Social History     Substance and Sexual Activity   Drug Use No     Social History     Tobacco Use   Smoking Status Former Smoker    Types: Pipe   Smokeless Tobacco Never Used       FAMILY HISTORY:  Family History Problem Relation Age of Onset    Diabetes Mother     Heart disease Mother     Thyroid disease Mother         Disorder     Cancer Brother     Gout Brother     Diabetes Maternal Grandmother        MEDICATIONS:  Scheduled Meds:  Current Facility-Administered Medications   Medication Dose Route Frequency Provider Last Rate    acetaminophen  650 mg Oral Q6H PRN Venson Kocher, MD      ALPRAZolam  0 25 mg Oral TID PRN Venson Kocher, MD      aspirin  81 mg Oral Daily Venson Kocher, MD      atorvastatin  5 mg Oral Daily Venson Kocher, MD      azithromycin  250 mg Oral Q24H Jewelene Sample, TERRY      bisacodyl  10 mg Rectal Daily PRN Venson Kocher, MD      cholecalciferol  4,000 Units Oral Daily Venson Kocher, MD      docusate sodium  100 mg Oral BID Venson Kocher, MD      DULoxetine  30 mg Oral Daily Venson Kocher, MD      ferrous sulfate  325 mg Oral Daily With Breakfast TERRY Jaramillo      heparin (porcine)  5,000 Units Subcutaneous Q8H [facility] Venson Kocher, MD      insulin glargine  6 Units Subcutaneous HS Venson Kocher, MD      insulin lispro  1-4 Units Subcutaneous TID AC Venson Kocher, MD      levothyroxine  125 mcg Oral Early Morning Venson Kocher, MD      ondansetron  4 mg Oral Q6H PRN Venson Kocher, MD      pantoprazole  40 mg Oral Daily Before Breakfast Venson Kocher, MD      polyethylene glycol  17 g Oral Daily PRN Venson Kocher, MD      sodium chloride  1 g Oral BID With Meals Jewelene SampleTERRY      tamsulosin  0 4 mg Oral Daily With Brian Ortiz MD         PRN Meds:   acetaminophen    ALPRAZolam    bisacodyl    ondansetron    polyethylene glycol    Continuous Infusions:     REVIEW OF SYSTEMS:  A complete review of systems was done  Pertinent positives and negatives noted in the HPI but otherwise the review of systems is negative      PHYSICAL EXAM:  Current Weight: Weight - Scale: 75 4 kg (166 lb 3 6 oz)  First Weight: Weight - Scale: 75 4 kg (166 lb 3 6 oz)  Vitals: 04/05/21 0903   BP: 107/77   Pulse: 103   Resp: 20   Temp: 99 1 °F (37 3 °C)   SpO2: 92%       Intake/Output Summary (Last 24 hours) at 4/5/2021 1245  Last data filed at 4/5/2021 0901  Gross per 24 hour   Intake 240 ml   Output 1100 ml   Net -860 ml     General:  No acute distress, TSLO brace in place  Skin:  No rash  Eyes:  Sclerae anicteric  ENT:  Moist mucous membranes  Neck:  Trachea midline with no JVD  Chest:  Clear to auscultation bilaterally  CVS:  Regular rate and rhythm  Abdomen:  nondistended  Extremities:  No edema  Neuro:  Lethargic   Psych: cooperative       Lab Results:   Results from last 7 days   Lab Units 04/05/21  0630 04/04/21  0630 04/03/21  0546 04/03/21  0545 04/01/21  0508 03/31/21  0705 03/30/21  0645   WBC Thousand/uL 14 35* 14 90*  --  16 44* 12 49* 14 57* 14 15*   HEMOGLOBIN g/dL 7 7* 7 8*  --  8 7* 8 8* 8 6* 8 2*   HEMATOCRIT % 23 8* 24 5*  --  26 2* 27 6* 26 4* 25 0*   PLATELETS Thousands/uL 194 224  --  276 275 233 209   SODIUM mmol/L 129* 130* 131*  --  130* 131* 133*   POTASSIUM mmol/L 4 0 4 0 4 1  --  4 3 4 4 3 9   CHLORIDE mmol/L 97* 97* 99*  --  101 103 105   CO2 mmol/L 23 25 24  --  23 23 22   BUN mg/dL 20 17 16  --  17 22 22   CREATININE mg/dL 1 53* 1 34* 1 22  --  1 18 1 23 1 05   CALCIUM mg/dL 7 6* 7 6* 7 9*  --  7 8* 7 5* 7 3*       Radiology Results:   XR chest portable   Final Result by Woodward Dancer, MD (04/04 1143)      Moderate effusions and bibasilar atelectasis  Workstation performed: [id number]         CT head wo contrast   Final Result by Richard Pool DO (04/03 1543)      No acute intracranial abnormality  Microangiopathic changes                    Workstation performed: [id number]

## 2021-04-05 NOTE — CASE MANAGEMENT
Met w/pts wife as pt was sleepy and getting labs drawn  Reviewed rehab routine and cm role with wife Patt Nelson  They reside in a multi level home with a ramp entrance and a driveway that pulls very close to the front of the home  There is a stair glide inside to the second story  Wife is retired and drives, pt does not drive  Pt has not been in acute rehab prior and has had outpt physical therapy at [facility]  Patt Nelson states they have all necessary dme except for a wheelchair  Pt uses the [place] for rx needs except for one medication  Pt is currently on oxygen but per wife does not use at home  Cm reviewed team mtg process and potential los  Following to assist w/dc planning needs and updated faxed clinical needs to the [place]

## 2021-04-05 NOTE — PLAN OF CARE
Problem: PAIN - ADULT  Goal: Verbalizes/displays adequate comfort level or baseline comfort level  Description: Interventions:  - Encourage patient to monitor pain and request assistance  - Assess pain using appropriate pain scale  - Administer analgesics based on type and severity of pain and evaluate response  - Implement non-pharmacological measures as appropriate and evaluate response  - Consider cultural and social influences on pain and pain management  - Notify physician/advanced practitioner if interventions unsuccessful or patient reports new pain  Outcome: Progressing     Problem: INFECTION - ADULT  Goal: Absence or prevention of progression during hospitalization  Description: INTERVENTIONS:  - Assess and monitor for signs and symptoms of infection  - Monitor lab/diagnostic results  - Monitor all insertion sites, i e  indwelling lines, tubes, and drains  - Monitor endotracheal if appropriate and nasal secretions for changes in amount and color  - [place] appropriate cooling/warming therapies per order  - Administer medications as ordered  - Instruct and encourage patient and family to use good hand hygiene technique  - Identify and instruct in appropriate isolation precautions for identified infection/condition  Outcome: Progressing  Goal: Absence of fever/infection during neutropenic period  Description: INTERVENTIONS:  - Monitor WBC    Outcome: Progressing     Problem: SAFETY ADULT  Goal: Patient will remain free of falls  Description: INTERVENTIONS:  - Assess patient frequently for physical needs  -  Identify cognitive and physical deficits and behaviors that affect risk of falls    -  [place] fall precautions as indicated by assessment   - Educate patient/family on patient safety including physical limitations  - Instruct patient to call for assistance with activity based on assessment  - Modify environment to reduce risk of injury  - Consider OT/PT consult to assist with strengthening/mobility  Outcome: Progressing  Goal: Maintain or return to baseline ADL function  Description: INTERVENTIONS:  -  Assess patient's ability to carry out ADLs; assess patient's baseline for ADL function and identify physical deficits which impact ability to perform ADLs (bathing, care of mouth/teeth, toileting, grooming, dressing, etc )  - Assess/evaluate cause of self-care deficits   - Assess range of motion  - Assess patient's mobility; develop plan if impaired  - Assess patient's need for assistive devices and provide as appropriate  - Encourage maximum independence but intervene and supervise when necessary  - Involve family in performance of ADLs  - Assess for home care needs following discharge   - Consider OT consult to assist with ADL evaluation and planning for discharge  - Provide patient education as appropriate  Outcome: Progressing  Goal: Maintain or return mobility status to optimal level  Description: INTERVENTIONS:  - Assess patient's baseline mobility status (ambulation, transfers, stairs, etc )    - Identify cognitive and physical deficits and behaviors that affect mobility  - Identify mobility aids required to assist with transfers and/or ambulation (gait belt, sit-to-stand, lift, walker, cane, etc )  - [place] fall precautions as indicated by assessment  - Record patient progress and toleration of activity level on Mobility SBAR; progress patient to next Phase/Stage  - Instruct patient to call for assistance with activity based on assessment  - Consider rehabilitation consult to assist with strengthening/weightbearing, etc   Outcome: Progressing     Problem: DISCHARGE PLANNING  Goal: Discharge to home or other facility with appropriate resources  Description: INTERVENTIONS:  - Identify barriers to discharge w/patient and caregiver  - Arrange for needed discharge resources and transportation as appropriate  - Identify discharge learning needs (meds, wound care, etc )  - Arrange for interpretive services to assist at discharge as needed  - Refer to Case Management Department for coordinating discharge planning if the patient needs post-hospital services based on physician/advanced practitioner order or complex needs related to functional status, cognitive ability, or social support system  Outcome: Progressing     Problem: Knowledge Deficit  Goal: Patient/family/caregiver demonstrates understanding of disease process, treatment plan, medications, and discharge instructions  Description: Complete learning assessment and assess knowledge base  Interventions:  - Provide teaching at level of understanding  - Provide teaching via preferred learning methods  Outcome: Progressing     Problem: Prexisting or High Potential for Compromised Skin Integrity  Goal: Skin integrity is maintained or improved  Description: INTERVENTIONS:  - Identify patients at risk for skin breakdown  - Assess and monitor skin integrity  - Assess and monitor nutrition and hydration status  - Monitor labs   - Assess for incontinence   - Turn and reposition patient  - Assist with mobility/ambulation  - Relieve pressure over bony prominences  - Avoid friction and shearing  - Provide appropriate hygiene as needed including keeping skin clean and dry  - Evaluate need for skin moisturizer/barrier cream  - Collaborate with interdisciplinary team   - Patient/family teaching  - Consider wound care consult   Outcome: Progressing     Problem: Potential for Falls  Goal: Patient will remain free of falls  Description: INTERVENTIONS:  - Assess patient frequently for physical needs  -  Identify cognitive and physical deficits and behaviors that affect risk of falls    -  [place] fall precautions as indicated by assessment   - Educate patient/family on patient safety including physical limitations  - Instruct patient to call for assistance with activity based on assessment  - Modify environment to reduce risk of injury  - Consider OT/PT consult to assist with strengthening/mobility  Outcome: Progressing     Problem: Nutrition/Hydration-ADULT  Goal: Nutrient/Hydration intake appropriate for improving, restoring or maintaining nutritional needs  Description: Monitor and assess patient's nutrition/hydration status for malnutrition  Collaborate with interdisciplinary team and initiate plan and interventions as ordered  Monitor patient's weight and dietary intake as ordered or per policy  Utilize nutrition screening tool and intervene as necessary  Determine patient's food preferences and provide high-protein, high-caloric foods as appropriate       INTERVENTIONS:  - Monitor oral intake, urinary output, labs, and treatment plans  - Assess nutrition and hydration status and recommend course of action  - Evaluate amount of meals eaten  - Assist patient with eating if necessary   - Allow adequate time for meals  - Recommend/ encourage appropriate diets, oral nutritional supplements, and vitamin/mineral supplements  - Order, calculate, and assess calorie counts as needed  - Recommend, monitor, and adjust tube feedings and TPN/PPN based on assessed needs  - Assess need for intravenous fluids  - Provide specific nutrition/hydration education as appropriate  - Include patient/family/caregiver in decisions related to nutrition  Outcome: Progressing

## 2021-04-06 PROBLEM — N17.0 ATN (ACUTE TUBULAR NECROSIS) (HCC): Status: ACTIVE | Noted: 2021-01-01

## 2021-04-06 NOTE — PROGRESS NOTES
04/06/21 1230   Pain Assessment   Pain Assessment Tool Pain Assessment not indicated - pt denies pain   Restrictions/Precautions   Precautions 1:1;Bed/chair alarms;Cognitive; Fall Risk;Spinal precautions; Fluid restriction;Supervision on toilet/commode;O2  (2L via NC)   Braces or Orthoses TLSO;Other (Comment)  (R darco, L surgical shoe)   Cognition   Overall Cognitive Status Impaired   Arousal/Participation Arousable; Cooperative   Attention Difficulty attending to directions   Orientation Level Unable to assess   Memory Decreased recall of recent events;Decreased recall of precautions;Decreased short term memory   Following Commands Follows one step commands inconsistently   Sit to Stand   Type of Assistance Needed Verbal cues; Physical assistance; Adaptive equipment   Amount of Physical Assistance Provided Less than 25%   Comment MIN/CGA    Sit to Stand CARE Score 3   Bed-Chair Transfer   Type of Assistance Needed Physical assistance; Adaptive equipment   Amount of Physical Assistance Provided 25%-49%   Comment MIN/MODA with RW SPT   Chair/Bed-to-Chair Transfer CARE Score 3   Transfer Bed/Chair/Wheelchair   Adaptive Equipment Roller Walker   Car Transfer   Reason if not Attempted Environmental limitations   Car Transfer CARE Score 10   Walk 10 Feet   Type of Assistance Needed Physical assistance; Adaptive equipment;Verbal cues   Amount of Physical Assistance Provided Total assistance   Comment ROCKY x1 with WC follow, IV+O2 tank   Walk 10 Feet CARE Score 1   Walk 50 Feet with Two Turns   Reason if not Attempted Safety concerns   Walk 50 Feet with Two Turns CARE Score 88   Walk 150 Feet   Reason if not Attempted Safety concerns   Walk 150 Feet CARE Score 88   Walking 10 Feet on Uneven Surfaces   Reason if not Attempted Safety concerns   Walking 10 Feet on Uneven Surfaces CARE Score 88   Ambulation   Does the patient walk? 2   Yes   Primary Mode of Locomotion Prior to Admission Walk   Distance Walked (feet) 30 ft  (x45) Assist Device Roller Walker   Gait Pattern Inconsistant Laya; Slow Laya; Forward Flexion; Improper weight shift   Limitations Noted In Balance; Endurance;Posture; Safety;Speed;Strength;Swing   Provided Assistance with: Balance   Walk Assist Level Minimum Assist   Findings Pt limited by fatigue   Wheel 50 Feet with Two Turns   Reason if not Attempted Activity not applicable   Wheel 50 Feet with Two Turns CARE Score 9   Wheel 150 Feet   Reason if not Attempted Activity not applicable   Wheel 355 Feet CARE Score 9   Wheelchair mobility   Does the patient use a wheelchair? 0  No   Curb or Single Stair   Reason if not Attempted Safety concerns   1 Step (Curb) CARE Score 88   4 Steps   Reason if not Attempted Safety concerns   4 Steps CARE Score 88   12 Steps   Reason if not Attempted Safety concerns   12 Steps CARE Score 88   Picking Up Object   Reason if not Attempted Safety concerns   Picking Up Object CARE Score 88   Toilet Transfer   Type of Assistance Needed Physical assistance; Adaptive equipment   Amount of Physical Assistance Provided Less than 25%   Comment ROCKY with use of R grab bar   Toilet Transfer CARE Score 3   Therapeutic Interventions   Strengthening STS x5 reps to RW, verbal cues for hand placement   Other SPT to WC<>chair with RW x4 reps   Equipment Use   NuStep L1 x9 min, verbal cues to maintian speed   Assessment   Treatment Assessment Pt participated in skilled PT session with increased focus on functional transfers, increased act tolerance and increased safety  Pt initially lethargic requiring verbal cues to stay awake but was able to tolerate 60 min session  Pt with cont verbal cues for hand placement through out session with all transfers  Pt will cont to benefit from increased safety awareness, increased act tolerance, increased functional transfers and increased gait  Cont POC as tolerated  Problem List Decreased strength;Decreased endurance; Impaired balance;Decreased mobility; Decreased coordination;Decreased cognition; Impaired judgement;Decreased safety awareness;Orthopedic restrictions   Barriers to Discharge Inaccessible home environment;Decreased caregiver support   PT Barriers   Functional Limitation Car transfers;Stair negotiation;Standing;Transfers; Walking   Plan   Treatment/Interventions LE strengthening/ROM; Functional transfer training; Therapeutic exercise; Endurance training;Cognitive reorientation; Bed mobility;Gait training   Progress Slow progress, decreased activity tolerance   Recommendation   PT Discharge Recommendation Other (Comment)  (TBD)   PT Therapy Minutes   PT Time In 1230   PT Time Out 1330   PT Total Time (minutes) 60   PT Mode of treatment - Individual (minutes) 60   PT Mode of treatment - Concurrent (minutes) 0   PT Mode of treatment - Group (minutes) 0   PT Mode of treatment - Co-treat (minutes) 0   PT Mode of Treatment - Total time(minutes) 60 minutes   PT Cumulative Minutes 262   Therapy Time missed   Time missed?  No

## 2021-04-06 NOTE — PROGRESS NOTES
Internal Medicine Progress Note  Patient: López Lloyd  Age/sex: 76 y o  male  Medical Record #: [id number]      ASSESSMENT/PLAN: (Interval History)  López Lloyd is seen and examined and management for following issues:    DM I  · Cont sliding scale  · Fairly labile  · Uses insulin pump  · Endocrinology following  · Cont DM diet     Diarrhea  · resolved  · Likely from recent COVID  · Cont current regimen     Chronic L1 compression fracture  · Cont TLSO brace when OOB  · No NS intervention per reccs     Anemia  · hgb 7 7 4/5  · Monitor cbc  · Start daily iron  · hemocult negative x2     Chronic RLE ulcer  · F/b podiatry as outpatient  · Debrided 4/3 at bedside  · Currently in CAM boot  · Refer to images     Chronic diastolic HF  · Cxray moderate bilateral effusions  · Given lasix 40mg po x1 dose 4/4  · c/w elevated creatinine    Acute on chronic CKD  · Hold lisinopril  · Renal input appreciated  · Creatinine elevated again at 1 8  · Await further reccs    Hyponatremia  · Na 129 4/5  · Continue fluid restriction  · Start Nacl tabs 1 gram bid  · Repeat bmp in a m      Leukocytosis  · Unchanged  · Urine +bacteria awaiting culture  · Will start cephalexin 500mg bid for now  · I reviewed addended BC results from admission with Dr Rupa Monreal and he does not feel anything additional needs to be done    COVID  · Dx 2/5 hospitalized 2/5-2/8  · Tx with remdesivir/steroids  · Respiratory status stable  · Received 1 of 2 COVID vaccines     Hypothyroid  · Cont supplementation    Confusion  · Overnight  · Xanax given x1    Lethargy  · Continues to be issue during the day  · Up most of evening  · Long d/w wife 4/5 regarding same  · She states this has been the ongoing issue since COVID        The above assessment and plan was reviewed and updated as determined by my evaluation of the patient on 4/6/2021      Labs:   Results from last 7 days   Lab Units 04/05/21  0630 04/04/21  0630   WBC Thousand/uL 14 35* 14 90*   HEMOGLOBIN g/dL 7  7* 7 8*   HEMATOCRIT % 23 8* 24 5*   PLATELETS Thousands/uL 194 224     Results from last 7 days   Lab Units 04/06/21  0453 04/05/21  0630   SODIUM mmol/L 129* 129*   POTASSIUM mmol/L 4 0 4 0   CHLORIDE mmol/L 98* 97*   CO2 mmol/L 23 23   BUN mg/dL 25 20   CREATININE mg/dL 1 87* 1 53*   CALCIUM mg/dL 7 7* 7 6*             Results from last 7 days   Lab Units 04/06/21  0636 04/05/21  2105 04/05/21  1611   POC GLUCOSE mg/dl 166* 255* 159*       Review of Scheduled Meds:  Current Facility-Administered Medications   Medication Dose Route Frequency Provider Last Rate    acetaminophen  650 mg Oral Q6H PRRAO Argueta MD      ALPRAZolam  0 25 mg Oral TID PRRAO Argueta MD      aspirin  81 mg Oral Daily Kike Argueta MD      atorvastatin  5 mg Oral Daily Kike Argueta MD      bisacodyl  10 mg Rectal Daily PRRAO Argueta MD      cholecalciferol  4,000 Units Oral Daily Kike Argueta MD      docusate sodium  100 mg Oral BID Kike Argueta MD      DULoxetine  30 mg Oral Daily Kike Argueta MD      ferrous sulfate  325 mg Oral Daily With Breakfast TERRY Jaramillo      heparin (porcine)  5,000 Units Subcutaneous Q8H [address] Kike Argueta MD      insulin glargine  6 Units Subcutaneous HS Kike Argueta MD      insulin lispro  1-4 Units Subcutaneous TID AC Kike Argueta MD      levothyroxine  125 mcg Oral Early Morning Kike Argueta MD      ondansetron  4 mg Oral Q6H PRRAO Argueta MD      pantoprazole  40 mg Oral Daily Before Breakfast Kike Argueta MD      polyethylene glycol  17 g Oral Daily PRN Kike Argueta MD      sodium chloride  1 g Oral BID With Meals TERRY Choi      tamsulosin  0 4 mg Oral Daily With Toma Gonzalez MD         Subjective/ HPI: Patient seen and examined  Patients overnight issues or events were reviewed with nursing or staff during rounds or morning huddle session   New or overnight issues include the following:     Pt became confused and agitated overnight again, required xanax  This a m  participating in therapy however sleepy again    ROS:   A 10 point ROS was performed; negative except as noted above  Imaging:     XR chest portable   Final Result by Scott Caicedo MD (04/04 1143)      Moderate effusions and bibasilar atelectasis  Workstation performed: [id number]         CT head wo contrast   Final Result by Jing Silva DO (04/03 1543)      No acute intracranial abnormality  Microangiopathic changes  Workstation performed: [id number]             *Labs /Radiology studies Reviewed  *Medications  reviewed and reconciled as needed  *Please refer to order section for additional ordered labs studies  *Case discussed with primary attending during morning huddle case rounds    Physical Examination:  Vitals:   Vitals:    04/05/21 1936 04/05/21 2022 04/06/21 0025 04/06/21 0759   BP:  138/68 138/74 111/58   BP Location:  Right arm Right arm Right arm   Pulse:  100 101 (!) 118   Resp:  20 20 19   Temp:  97 5 °F (36 4 °C) 98 8 °F (37 1 °C) 98 7 °F (37 1 °C)   TempSrc:  Oral Oral Oral   SpO2:  92% 95% (!) 82%   Weight: 78 6 kg (173 lb 4 5 oz)      Height: 5' 8" (1 727 m)          GEN: No apparent distress, interactive  NEURO: Alert and oriented x3  HEENT: Pupils are equal and reactive, EOMI, mucous membranes are moist, face symmetrical  CV: S1 S2 regular, no MRG, no peripheral edema noted  RESP: Lungs are clear bilaterally, no wheezes, rales or rhonchi noted, on room air, respirations easy and non labored  GI: Flat, soft non tender, non distended; +BS x4  : Voiding without difficulty  MUSC: Moves all extremities; RLE with dressing in place  SKIN: pink, warm and dry, normal turgor, no rashes, lesions      The above physical exam was reviewed and updated as determined by my evaluation of the patient on 4/6/2021      Invasive Devices     Peripheral Intravenous Line            Peripheral IV 04/04/21 Right;Ventral (anterior) Forearm 2 days                   VTE Pharmacologic Prophylaxis: Heparin  Code Status: Level 1 - Full Code  Current Length of Stay: 4 day(s)      Total time spent:  30 minutes with more than 50% spent counseling/coordinating care  Counseling includes discussion with patient re: progress  and discussion with patient of his/her current medical state/information  Coordination of patient's care was performed in conjunction with primary service  Time invested included review of patient's labs, vitals, and management of their comorbidities with continued monitoring  In addition, this patient was discussed with medical team including physician and advanced extenders  The care of the patient was extensively discussed and appropriate treatment plan was formulated unique for this patient  ** Please Note:  voice to text software may have been used in the creation of this document   Although proof errors in transcription or interpretation are a potential of such software**

## 2021-04-06 NOTE — PROGRESS NOTES
NEPHROLOGY PROGRESS NOTE   Bonita Ruffin 76 y o  male MRN: [id number]  Unit/Bed#: -01 Encounter: [id number]      ASSESSMENT/PLAN:  1  CKD III: baseline creatinine 1 1-1 5 and follows with Dr Carlene Jiménez in our office  Creatinine has been trending up the past few days likely due to lasix, increased lisinopril, labile BPs and poor po intake  · Creatinine worsened to 1 8  · UA: +glucose, trace ketones, large blood, trace leukocytes, 3+ protein, 4-10 RBCs, 14 wbc's, moderate bacteria, 0-3 hyaline casts  · Was started on Keflex for UTI  · Currently getting isolyte 500 mL at 50 cc/hour  · Continue to hold lisinopril and diuretics  · Check PVR with history of BPH  · Check am BMP   2  Hyponatremia: sodium stable at 129  Started on NaCl 1g bid yesterday per primary team   · Work up from 3/31: serum osm 284, urine osm 524, urine sodium 68, cortisol normal, no monoclonal gammopathy   · Continue 1200cc/day oral fluid restriction  · Monitor with 500 cc fluid bolus  3  Moderate pleural effusions: s/p lasix 4/4  4  Hypertension: BP labile but overall okay today  · Currently off all BP meds   5  Lethargy/confuision   6  Microscopic hematuria: per Dr Bria Santana last note he follows with urology for this     Plan Summary:   Vivien Lundborg 500cc over 10 hours    Continue 1200cc/day oral fluid restriction and NaCl 1g bid    Check PVR   Check am BMP     SUBJECTIVE:  Overall appetite poor, ate about 25% of his breakfast   Very lethargic and currently sleeping      OBJECTIVE:  Current Weight: Weight - Scale: 78 6 kg (173 lb 4 5 oz)  Vitals:    04/06/21 0759   BP: 111/58   Pulse: (!) 118   Resp: 19   Temp: 98 7 °F (37 1 °C)   SpO2: (!) 82%       Intake/Output Summary (Last 24 hours) at 4/6/2021 1047  Last data filed at 4/5/2021 2346  Gross per 24 hour   Intake --   Output 500 ml   Net -500 ml       General:  No acute distress  Skin:  No rash  Eyes:  Sclerae anicteric  ENT:  Moist mucous membranes  Neck:  Trachea midline with no JVD  Chest:  Clear to auscultation bilaterally  CVS:  Regular rate and rhythm  Abdomen:  Soft, nontender, nondistended  Extremities:  trace edema  Neuro: lethargic  Psych:  Appropriate affect      Medications:  Scheduled Meds:  Current Facility-Administered Medications   Medication Dose Route Frequency Provider Last Rate    acetaminophen  650 mg Oral Q6H PRN Yasmany Bragg MD      ALPRAZolam  0 25 mg Oral TID PRN Yasmany Bragg MD      aspirin  81 mg Oral Daily Yasmany Bragg MD      atorvastatin  5 mg Oral Daily Yasmany Bragg MD      bisacodyl  10 mg Rectal Daily PRN Yasmany Bragg MD      cephalexin  500 mg Oral Q12H [facility] TERRY Ramos      cholecalciferol  4,000 Units Oral Daily Yasmany Bragg MD      docusate sodium  100 mg Oral BID Yasmany Bragg MD      DULoxetine  30 mg Oral Daily Yasmnay Bragg MD      ferrous sulfate  325 mg Oral Daily With Breakfast TERRY Jaramillo      heparin (porcine)  5,000 Units Subcutaneous Q8H [facility] Yasmany Bragg MD      insulin glargine  6 Units Subcutaneous HS Yasmany Bragg MD      insulin lispro  1-4 Units Subcutaneous TID AC Yasmany Bragg MD      levothyroxine  125 mcg Oral Early Morning Yasmany Bragg MD      multi-electrolyte  500 mL Intravenous Once Emilia K Kathryn, DO      ondansetron  4 mg Oral Q6H PRN Yasmany Bragg MD      pantoprazole  40 mg Oral Daily Before Breakfast Yasmany Bragg MD      polyethylene glycol  17 g Oral Daily PRN Yasmany Bragg MD      sodium chloride  1 g Oral BID With Meals TERRY Ramos      tamsulosin  0 4 mg Oral Daily With Luc Betts MD         PRN Meds:   acetaminophen    ALPRAZolam    bisacodyl    ondansetron    polyethylene glycol    Laboratory Results:  Results from last 7 days   Lab Units 04/06/21  0453 04/05/21  0630 04/04/21  0630 04/03/21  0546 04/03/21  0545 04/01/21  0508 03/31/21  0705   WBC Thousand/uL  --  14 35* 14 90*  --  16 44* 12 49* 14 57*   HEMOGLOBIN g/dL  --  7 7* 7 8*  -- 8  7* 8 8* 8 6*   HEMATOCRIT %  --  23 8* 24 5*  --  26 2* 27 6* 26 4*   PLATELETS Thousands/uL  --  194 224  --  276 275 233   SODIUM mmol/L 129* 129* 130* 131*  --  130* 131*   POTASSIUM mmol/L 4 0 4 0 4 0 4 1  --  4 3 4 4   CHLORIDE mmol/L 98* 97* 97* 99*  --  101 103   CO2 mmol/L 23 23 25 24  --  23 23   BUN mg/dL 25 20 17 16  --  17 22   CREATININE mg/dL 1 87* 1 53* 1 34* 1 22  --  1 18 1 23   CALCIUM mg/dL 7 7* 7 6* 7 6* 7 9*  --  7 8* 7 5*

## 2021-04-06 NOTE — NUTRITION
pt states he is eating <50% of usual/due to not being very active, agreeing to Glucerna BID  flavors,  brendon/lunch, strawberry/dinner meal

## 2021-04-06 NOTE — PROGRESS NOTES
04/06/21 1100   Pain Assessment   Pain Assessment Tool 0-10   Pain Score 3   Pain Location/Orientation Location: Back   Hospital Pain Intervention(s) Repositioned; Rest   Restrictions/Precautions   Precautions Fall Risk;Bed/chair alarms;Cognitive;1:1;Supervision on toilet/commode;Pain;Spinal precautions; Fluid restriction;O2  (2L )  Multiple lines   Braces or Orthoses TLSO;Other (Comment)  (HOB >45 degree surgical shoe L, darco shoe R)   Cognition   Overall Cognitive Status Impaired   Arousal/Participation Cooperative;Lethargic   Attention Difficulty attending to directions   Subjective   Subjective pt was agreeable to have PT but continues to be lethargic requiring frequent arousal , encouragement, education  Sit to Stand   Type of Assistance Needed Verbal cues; Physical assistance; Adaptive equipment   Amount of Physical Assistance Provided Less than 25%   Comment min A/CGA with VC for proper hand placement 100% of the time   Sit to Stand CARE Score 3   Bed-Chair Transfer   Type of Assistance Needed Physical assistance;Verbal cues; Adaptive equipment   Amount of Physical Assistance Provided 25%-49%   Chair/Bed-to-Chair Transfer CARE Score 3   Walk 10 Feet   Type of Assistance Needed Physical assistance;Verbal cues; Adaptive equipment   Amount of Physical Assistance Provided Total assistance   Comment min A x 1 with max cues to improve foot clearance, step/stride length, 2nd person for IV and O2 line management, 3rd person for chair follow x 30' x 1, 20' x 2    Walk 10 Feet CARE Score 1   Therapeutic Interventions   Strengthening A/A seated b/l hip flexion x 10 reps x 2 sets   Other static standing balance/tolerance x 2 mins using RW for support    Other Comments   Comments incentive spirometer x 5 reps   total assist for UB dressing including doffing/donning TLSO brace - pt did not initiate task    Assessment   Treatment Assessment Despite lethargy Pt was able to engaged in skilled PT intervention which focused on functional mobility training using a RW  Pt demos shuffling gait with forward flexion posture but able to inc gait distance 30' at best with much encouragement required  Due to truncal weakness and fatigue at times unable to hold himself even while sitting in chair requiring use of UB support  SpO2 on 2L is better after functional mobilities ranging 93-98% while 90-92% at rest  PT will cont to work on strengthening and OOB activities with emphasis on standing and gait training to improve overall functional indep to reduce caregiver burden at d/c  Problem List Decreased strength;Decreased endurance; Impaired balance;Decreased mobility; Decreased cognition; Impaired judgement;Decreased coordination;Decreased safety awareness; Impaired sensation;Orthopedic restrictions;Pain;Decreased skin integrity   Barriers to Discharge Inaccessible home environment;Decreased caregiver support   PT Barriers   Functional Limitation Car transfers; Ramp negotiation;Stair negotiation;Transfers;Standing;Walking; Wheelchair management   Plan   Treatment/Interventions Functional transfer training; Therapeutic exercise; Endurance training;LE strengthening/ROM; Gait training;Spoke to nursing;OT;Spoke to MD;Spoke to case management;Spoke to advanced practitioner;Equipment eval/education   Progress Slow progress, decreased activity tolerance   Recommendation   Equipment Recommended Wheelchair;Walker   PT - OK to Discharge No   PT Therapy Minutes   PT Time In 1100   PT Time Out 1155   PT Total Time (minutes) 55   PT Mode of treatment - Individual (minutes) 55   PT Mode of treatment - Concurrent (minutes) 0   PT Mode of treatment - Group (minutes) 0   PT Mode of treatment - Co-treat (minutes) 0   PT Mode of Treatment - Total time(minutes) 55 minutes   PT Cumulative Minutes 202

## 2021-04-06 NOTE — QUICK NOTE
Pt BC from 3/27 have now come back +Corynebacterium striatum in both specimens  During his acute care stay his initial BC grew 1/2 for the same organism however repeat cultures were negative so tx was stopped  We will have ID consultation for further input regarding treatment   D/W Dr Castillo Hint

## 2021-04-06 NOTE — PROGRESS NOTES
04/06/21 1330   Pain Assessment   Pain Assessment Tool Pain Assessment not indicated - pt denies pain   Pain Score No Pain   Restrictions/Precautions   Precautions 1:1;Bed/chair alarms;Cognitive; Fall Risk;Spinal precautions;Multiple lines;O2;Fluid restriction;Supervision on toilet/commode  (2L via NC)   Comprehension   Comprehension (FIM) 2 - Understands only simple expressions or gestures (waves, hello)   Expression   Expression (FIM) 2 - Understands basic info/conversation 25-49% of time   Social Interaction   Social Interaction (FIM) 2 - Interacts appropriately 25-49% of time   Problem Solving   Problem solving (FIM) 2 - Needs direction more than ½ time to initiate, plan or complete simple tasks   Memory   Memory (FIM) 2 - Recognizes, recalls/performs 25-49%   Speech/Language/Cognition Assessmetn   Treatment Assessment Pt attempted again to complete standardized assessment  Pt was alert and awake upon arrival and answering basic conversation questions  Throughout session though pt did need constant verbal stimulation and prompting to engage in task  Completed the SLUMS cognitive assessment  Pt total score was 5/30 which as compared to those with high school education correlates with severe neurocognitive disorder and is indicative for further assessment needed of dementia  Based on score, patient will benefit from further skilled SLP services to maximize overall cognitive lingusitic communication abilities for increased independence and decreased burden of care  SLP Therapy Minutes   SLP Time In 1330   SLP Time Out 1400   SLP Total Time (minutes) 30   SLP Mode of treatment - Individual (minutes) 30   SLP Mode of treatment - Concurrent (minutes) 0   SLP Mode of treatment - Group (minutes) 0   SLP Mode of treatment - Co-treat (minutes) 0   SLP Mode of Treatment - Total time(minutes) 30 minutes   SLP Cumulative Minutes 120   Therapy Time missed   Time missed?  No

## 2021-04-06 NOTE — PROGRESS NOTES
04/06/21 0830   Pain Assessment   Pain Assessment Tool Pain Assessment not indicated - pt denies pain   Pain Score No Pain   Restrictions/Precautions   Precautions 1:1;Bed/chair alarms;Cognitive; Fall Risk;Spinal precautions;Supervision on toilet/commode   Braces or Orthoses TLSO  (HOB >45 and/or OOB, surgical shoe R foot)   Lifestyle   Autonomy "I didn't sleep more than 3 hours all night "    Sit to Stand   Type of Assistance Needed Physical assistance   Amount of Physical Assistance Provided Less than 25%   Comment steadying A this session  Sit to Stand CARE Score 3   Functional Standing Tolerance   Time 1 minute intervals x 3 trials  Activity Pt engaged in card scan board activity which increased pt's alertness  Pt tolerates standing for 1 minute before requiring seated rest break  Pt overall Malvin while in stance to maintain balance  Pt able to accurately identify match on board  Requires ongoing cues to sustain task  Exercise Tools   Other Exercise Tool 1 UB strengthening completed while seated unsupported in recliner with use of 2# dowel bar moving through all planes compelting 3 x 10 each to increase UB strength and endurance for increased independence with ADL tasks and functional transfers      Cognition   Overall Cognitive Status Impaired   Arousal/Participation Lethargic   Attention Attends with cues to redirect   Orientation Level Oriented to person   Memory Decreased short term memory;Decreased recall of recent events;Decreased recall of precautions   Following Commands Follows one step commands inconsistently   Comments Pt very lethargic upon arrival requiring ongoing multimodal cues to remain awake  Pt noted to be hallucinating saying things like "What are those boys smiling at?" when no one else present in room and picking at items in the air that weren't there      Activity Tolerance   Activity Tolerance Patient limited by fatigue   Assessment   Treatment Assessment Pt participated in skilled OT services with focus on endurance, act lindsay, standing lindsay, and strengthening  Pt educated and encouraged to utilize incentive spirometer  Pt was able to achieve max of 1250 ml with encouragement, typically able to achieve average of 750ml  Pt did require Malvin for proper use and VC's to encourage optimal technique, educated PCA staff to complete hourly with pt as tolerated to promote lung health  Pt limited with standing tolerance to total of 1 minute intervals x 3  Pt reports BLE fatigue to be barrier to standing tolerance  Pt very lethargic limiting his performance this session  Terminated session when pt no longer able to remain alert  Notified PT to adjust schedule as appropriate to accommodate pt's need  Pt will continue to benefit from skilled OT services with focus on act lindsay, endurance, strengthening, functional cognition as related to I/ADL tasks  Prognosis Fair   Problem List Decreased strength;Decreased range of motion;Decreased endurance; Impaired balance;Decreased mobility; Decreased cognition; Impaired judgement;Decreased safety awareness;Orthopedic restrictions   Plan   Treatment/Interventions ADL retraining;Functional transfer training; Therapeutic exercise; Endurance training;Cognitive reorientation;Patient/family training;Equipment eval/education; Bed mobility; Compensatory technique education   Progress Slow progress, decreased activity tolerance   Recommendation   OT Discharge Recommendation Return to previous environment with social support   OT Therapy Minutes   OT Time In 0830   OT Time Out 0923   OT Total Time (minutes) 53   OT Mode of treatment - Individual (minutes) 53   OT Mode of treatment - Concurrent (minutes) 0   OT Mode of treatment - Group (minutes) 0   OT Mode of treatment - Co-treat (minutes) 0   OT Mode of Treatment - Total time(minutes) 53 minutes   OT Cumulative Minutes 173   Therapy Time missed   Time missed?  Yes   Amount of time missed 7   Reason for time missed Extreme fatigue

## 2021-04-06 NOTE — PLAN OF CARE
Problem: PAIN - ADULT  Goal: Verbalizes/displays adequate comfort level or baseline comfort level  Description: Interventions:  - Encourage patient to monitor pain and request assistance  - Assess pain using appropriate pain scale  - Administer analgesics based on type and severity of pain and evaluate response  - Implement non-pharmacological measures as appropriate and evaluate response  - Consider cultural and social influences on pain and pain management  - Notify physician/advanced practitioner if interventions unsuccessful or patient reports new pain  Outcome: Progressing     Problem: INFECTION - ADULT  Goal: Absence or prevention of progression during hospitalization  Description: INTERVENTIONS:  - Assess and monitor for signs and symptoms of infection  - Monitor lab/diagnostic results  - Monitor all insertion sites, i e  indwelling lines, tubes, and drains  - Monitor endotracheal if appropriate and nasal secretions for changes in amount and color  - [place] appropriate cooling/warming therapies per order  - Administer medications as ordered  - Instruct and encourage patient and family to use good hand hygiene technique  - Identify and instruct in appropriate isolation precautions for identified infection/condition  Outcome: Progressing  Goal: Absence of fever/infection during neutropenic period  Description: INTERVENTIONS:  - Monitor WBC    Outcome: Progressing     Problem: SAFETY ADULT  Goal: Patient will remain free of falls  Description: INTERVENTIONS:  - Assess patient frequently for physical needs  -  Identify cognitive and physical deficits and behaviors that affect risk of falls    -  [place] fall precautions as indicated by assessment   - Educate patient/family on patient safety including physical limitations  - Instruct patient to call for assistance with activity based on assessment  - Modify environment to reduce risk of injury  - Consider OT/PT consult to assist with strengthening/mobility  Outcome: Progressing  Goal: Maintain or return to baseline ADL function  Description: INTERVENTIONS:  -  Assess patient's ability to carry out ADLs; assess patient's baseline for ADL function and identify physical deficits which impact ability to perform ADLs (bathing, care of mouth/teeth, toileting, grooming, dressing, etc )  - Assess/evaluate cause of self-care deficits   - Assess range of motion  - Assess patient's mobility; develop plan if impaired  - Assess patient's need for assistive devices and provide as appropriate  - Encourage maximum independence but intervene and supervise when necessary  - Involve family in performance of ADLs  - Assess for home care needs following discharge   - Consider OT consult to assist with ADL evaluation and planning for discharge  - Provide patient education as appropriate  Outcome: Progressing  Goal: Maintain or return mobility status to optimal level  Description: INTERVENTIONS:  - Assess patient's baseline mobility status (ambulation, transfers, stairs, etc )    - Identify cognitive and physical deficits and behaviors that affect mobility  - Identify mobility aids required to assist with transfers and/or ambulation (gait belt, sit-to-stand, lift, walker, cane, etc )  - [place] fall precautions as indicated by assessment  - Record patient progress and toleration of activity level on Mobility SBAR; progress patient to next Phase/Stage  - Instruct patient to call for assistance with activity based on assessment  - Consider rehabilitation consult to assist with strengthening/weightbearing, etc   Outcome: Progressing     Problem: DISCHARGE PLANNING  Goal: Discharge to home or other facility with appropriate resources  Description: INTERVENTIONS:  - Identify barriers to discharge w/patient and caregiver  - Arrange for needed discharge resources and transportation as appropriate  - Identify discharge learning needs (meds, wound care, etc )  - Arrange for interpretive services to assist at discharge as needed  - Refer to Case Management Department for coordinating discharge planning if the patient needs post-hospital services based on physician/advanced practitioner order or complex needs related to functional status, cognitive ability, or social support system  Outcome: Progressing     Problem: Knowledge Deficit  Goal: Patient/family/caregiver demonstrates understanding of disease process, treatment plan, medications, and discharge instructions  Description: Complete learning assessment and assess knowledge base  Interventions:  - Provide teaching at level of understanding  - Provide teaching via preferred learning methods  Outcome: Progressing     Problem: Prexisting or High Potential for Compromised Skin Integrity  Goal: Skin integrity is maintained or improved  Description: INTERVENTIONS:  - Identify patients at risk for skin breakdown  - Assess and monitor skin integrity  - Assess and monitor nutrition and hydration status  - Monitor labs   - Assess for incontinence   - Turn and reposition patient  - Assist with mobility/ambulation  - Relieve pressure over bony prominences  - Avoid friction and shearing  - Provide appropriate hygiene as needed including keeping skin clean and dry  - Evaluate need for skin moisturizer/barrier cream  - Collaborate with interdisciplinary team   - Patient/family teaching  - Consider wound care consult   Outcome: Progressing     Problem: Potential for Falls  Goal: Patient will remain free of falls  Description: INTERVENTIONS:  - Assess patient frequently for physical needs  -  Identify cognitive and physical deficits and behaviors that affect risk of falls    -  [place] fall precautions as indicated by assessment   - Educate patient/family on patient safety including physical limitations  - Instruct patient to call for assistance with activity based on assessment  - Modify environment to reduce risk of injury  - Consider OT/PT consult to assist with strengthening/mobility  Outcome: Progressing     Problem: Nutrition/Hydration-ADULT  Goal: Nutrient/Hydration intake appropriate for improving, restoring or maintaining nutritional needs  Description: Monitor and assess patient's nutrition/hydration status for malnutrition  Collaborate with interdisciplinary team and initiate plan and interventions as ordered  Monitor patient's weight and dietary intake as ordered or per policy  Utilize nutrition screening tool and intervene as necessary  Determine patient's food preferences and provide high-protein, high-caloric foods as appropriate       INTERVENTIONS:  - Monitor oral intake, urinary output, labs, and treatment plans  - Assess nutrition and hydration status and recommend course of action  - Evaluate amount of meals eaten  - Assist patient with eating if necessary   - Allow adequate time for meals  - Recommend/ encourage appropriate diets, oral nutritional supplements, and vitamin/mineral supplements  - Order, calculate, and assess calorie counts as needed  - Recommend, monitor, and adjust tube feedings and TPN/PPN based on assessed needs  - Assess need for intravenous fluids  - Provide specific nutrition/hydration education as appropriate  - Include patient/family/caregiver in decisions related to nutrition  Outcome: Progressing

## 2021-04-06 NOTE — PROGRESS NOTES
Notification came up informing me of increased risk for sepsis  Dr Soheila Boogie contacted, no changes at time time

## 2021-04-06 NOTE — NURSING NOTE
Post Void Residual ordered by nephrology  Attempted to get patient to void x2 on shift  Patient has had no urine output noted thus far  Random bladder scan showed 223cc  Pt continues to have fluid bolus run as ordered  Minimal po intake so far on shift  Carlota Reddy PA-C made aware  No new orders at this time

## 2021-04-06 NOTE — PLAN OF CARE
IP Medical Nutrition Therapy  Suggest: Glucerna BID  flavors,  brendon/lunch, strawberry/dinner meal      Problem: Nutrition/Hydration-ADULT  Goal: Nutrient/Hydration intake appropriate for improving, restoring or maintaining nutritional needs  Description: Monitor and assess patient's nutrition/hydration status for malnutrition  Collaborate with interdisciplinary team and initiate plan and interventions as ordered  Monitor patient's weight and dietary intake as ordered or per policy  Utilize nutrition screening tool and intervene as necessary  Determine patient's food preferences and provide high-protein, high-caloric foods as appropriate       INTERVENTIONS:  - Monitor oral intake, urinary output, labs, and treatment plans  - Assess nutrition and hydration status and recommend course of action  - Evaluate amount of meals eaten  - Assist patient with eating if necessary   - Allow adequate time for meals  - Recommend/ encourage appropriate diets, oral nutritional supplements, and vitamin/mineral supplements  - Order, calculate, and assess calorie counts as needed  - Recommend, monitor, and adjust tube feedings and TPN/PPN based on assessed needs  - Assess need for intravenous fluids  - Provide specific nutrition/hydration education as appropriate  - Include patient/family/caregiver in decisions related to nutrition  Outcome: Progressing

## 2021-04-06 NOTE — PROGRESS NOTES
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 and sleep for a few hours before getting back up to go fix something  Pt also reports that therapy completely wipes him out because his muscles have gotten weaker over the "past 7 years" since he last "did this " Pt was very alert and agitated at this time  He had not been this awake all day  Pt was encouraged to sleep at night so that he could be more awake during the day  Pt said he often wakes at night to go to the bathroom and since he is such a "brittle diabetic" he usually has a snack and returns to bed  Xanax was given and a snack was brought to bedside per pt request     Pt stripped himself several other times throughout the night and was restless

## 2021-04-06 NOTE — TEAM CONFERENCE
Acute RehabilitationTeam Conference Note  Date: 4/6/2021   Time: 10:38 AM       Patient Name:  Nae Tilley       Medical Record Number: [id number]   YOB: 1945  Sex:  Male          Room/Bed:  [facility]-01  Payor Info:  Payor: [id number] Floor / Plan: [address] / Product Type: Govt Unknown Not Listed /      Admitting Diagnosis: Vertebral fracture, osteoporotic ([facility] ) [M80 [id number]]   Admit Date/Time:  4/2/2021  1:58 PM  Admission Comments: No comment available     Primary Diagnosis:  Vertebral fracture, osteoporotic ([facility])  Principal Problem: Vertebral fracture, osteoporotic ([facility] )    Patient Active Problem List    Diagnosis Date Noted    Vertebral fracture, osteoporotic ([facility] ) 04/02/2021    Progression of L1 vertebral fracture  03/30/2021    Gram-positive bacteremia 03/28/2021    Weakness 03/27/2021    Hyponatremia 03/27/2021    Hyperglycemia due to type 1 diabetes mellitus ([facility] ) 02/07/2021    COVID-19 02/05/2021    Personal history of diabetic foot ulcer 06/16/2020    Diabetic ulcer of right foot ([facility] ) 09/19/2019    Acquired absence of other left toe(s) ([facility] ) 07/30/2019    Type 1 diabetes mellitus ([facility] ) 07/30/2019    Status post split thickness skin graft 06/18/2019    Preoperative clearance 06/05/2019    Complete heart block  06/03/2019    Microalbuminuria 04/25/2019    Vitreous hemorrhage of left eye ([facility] ) 04/10/2019    Transition of care performed with sharing of clinical summary 04/10/2019    Type 2 diabetes mellitus with hyperglycemia (HCC)     High anion gap metabolic acidosis 57/16/9418    Elevated troponin level not due myocardial infarction 03/31/2019    Leukocytosis 03/31/2019    Low bicarbonate 03/31/2019    Pacemaker 03/31/2019    Encephalopathy 03/31/2019    Stage 3b chronic kidney disease 07/23/2018    Thrombocytopenia ([facility] ) 07/23/2018    Encounter for diabetic foot exam ([facility] ) 07/06/2018    Risk for falls 07/06/2018    Screening for colon cancer 04/16/2018    Anemia of chronic disease 01/25/2018    Arthritis 01/25/2018    DM type 2, not at goal [facility]) 01/25/2018    Medtronic dual chamber PM 01/25/2018    Essential hypertension 09/06/2016    Insulin pump status 09/11/2015    Hypothyroidism 11/26/2014    Hyperlipidemia 11/11/2014    Acid reflux 08/06/2014    Type I diabetes mellitus ([facility] ) 07/08/2014    Diabetic peripheral neuropathy ([facility] ) 10/24/2012       Physical Therapy:    Weight Bearing Status: Weight Bearing as Tolerated(using [employer] shoes R foot)  Transfers: Minimal Assistance  Bed Mobility: Minimal Assistance  Amulation Distance (ft): 15 feet  Ambulation: Assist of 2(min A with CF)  Assistive Device for Ambulation: Roller Walker  Assistive Device for Stairs: (N/A)  Ramp: (to be assessed if patient is more awake)  Discharge Recommendations: Home with:  [address] with[de-identified] Family Support, 24 Hour Assisteance, 24 Hour Supervision, Home Physical Therapy, First Floor Setup    Pt  Is a 76year old male that presented to [place] [address] on 3/27/2021 with complaints of generalized weakness, diarrhea, body aches, and blood in urine  Pt  Also has hx of COVID infection on feb 2021   Pt  Dx also to have L1 vertebral fx which is managed conservatively and is currently has orders for TLSO for >45 deg and for OOB  Pt  Admitted to [facility] on 4/2/2021 and PT eval completed 4/3/2021  Pt  Presents with dec strength, dec endurance due to debility, dec balance and dec safety awareness needing min A for functional mobility  Pt  Had been reported to have confusion at night and had not been sleeping well and is lethargic during the day not being able to participate in all of therapies  Pt  Is currently min A for bed mobility, transfers and ambulation for a limited distance using RW  Pt  Also currently using D[employer] shoes for R foot wound as per podiatry order  Pt   Was fully indep PTA and lives with his spouse on 1 level home with stairglide in pace and ramp to enter  Pt  Will benefit from skilled PT to address functional decline and to achieve max level of function to dec burden of care  For now recommended to have S at d/c due to fluctuating cognition  ELOS 10-14 days  Barriers include: weakness and debility, dec caregiver support, currently using O2, and dec caregiver support  Occupational Therapy:  Eating: Supervision  Grooming: Supervision  Bathing: Moderate Assistance  Bathing: Moderate Assistance  Upper Body Dressing: Maximum Assistance  Lower Body Dressing: Total Assistance  Toileting: Total Assistance  Toilet Transfer: Moderate Assistance  Cognition: Exceptions to WNL  Cognition: Decreased Memory, Decreased Safety, Behavioral Considerations, Decreased Executive Functions, Impulsive, Decreased Attention, Decreased Comprehension  Orientation: Person, Place, Time, Situation  Discharge Recommendations: Home with:  [address] with[de-identified] 24 Hour Supervision, 24 Hour Assistance, Family Support       Occupational Therapy Weekly Team Note    Pt is demonstrating good progress with occupational therapy after a slow start with increased confusions upon admission  Pt is progressing toward long term goals for ADL, IADL, and functional transfers/mobility  Pts long term goals for ADLs are supervision with [address] [place] [address]  Pt continues to present with impairments in activity tolerance, endurance, standing balance/tolerance, sitting balance/tolerance, UE strength, arousal, memory, insight, safety , judgement , attention , sequencing  and task initiation   Occupational performance remains limited by fatigue, SOB, pain, spinal precautions, orthopedic restricitions  and risk for falls  Family training/education will be required prior to D/C       Pt will continue to benefit from skilled acute rehab OT services to address above mentioned barriers and maximize functional independence in baseline areas of occupation to meet established treatment goals with overall decreased burden of care  Plan of care to continue to focus on ADL Retraining , LB Dressing, UB dressing,  LHAE education/training, Functional Transfers, Functional Cognition, Functional Attention, Assessment of Cognitive function, Standing tolerance, Standing balance , Fine motor coordination, Fine motor strengthening , DME training/education, Family training/education and Energy conservation training/education  Anticipate Re-team at this time  Sachin Garzon Speech Therapy:  Mode of Communication: Verbal  Cognition: Exceptions to WNL  Cognition: Decreased Memory, Decreased Executive Functions, Decreased Attention, Decreased Comprehension, Decreased Safety, Behavioral Considerations, Impulsive  Orientation: Person  Discharge Recommendations: (pending progress)  Pt completed bedside swallow evaluation- At this time, swallow function appears to be [facility] and will continue to recommend regular diet w/ thin liquids  Meals only when pt is AWAKE/ALERT only  No further dysphagia tx services warranted due to pt's ability to tolerate least restrictive diet at this time  In regards to cognition, standardized assessment yet to be completed due to pt's fluctuating EDI since admission to rehab unit  On 2021, Pt engaged again in informal cognitive assessment, but only able to elicit orientation and LTM biographical information due to fluctuating EDI  Pt remains to be oriented to self, place/city, full date (including month/date/year/ANIYA)  Pt is accurate in regards to diagnosis of COVID in 2021, but unable to determine reasoning for admission currently  Able to be accurate in eliciting , age, address, wife's name and son's name  Pt reports he did complete majority of the I ADL tasks, including cooking, cleaning, laundry  Both pt and wife complete finances and pt completes own medication management and driving prior to hospitalization   At this time, pt will continue to benefit from SLP services to maximize and establish cognitive skills to target once pt more awake/alert during sessions  Nursing Notes:  Appetite: Poor  Diet Type: Diabetic                                                                     Pain Location/Orientation: Location: Back  Pain Score: 3                       Hospital Pain Intervention(s): Repositioned(alerted RN)          No notes on file    Case Management:     Discharge Planning  Living Arrangements: Spouse/significant other  Support Systems: Spouse/significant other  Assistance Needed: pt/ot  Type of Current Residence: Private residence  Current Home Care Services: No  Pt presents with lethargy and confusion at times, wife answered initial assessment questions  Spouse hopes to have pt return home and has been made aware of potential need for contd services on dc  Following to assist w/dc planning recommendations and update needed to the [place]  Is the patient actively participating in therapies? no  List any modifications to the treatment plan:     Barriers Interventions   Cognition, participation Medical work up pending   Safety, endurance, lethargy Safety education techniques, energy conservation education   Elevated creatnin, poor po intake, fluid restriction Medical work up in progress   oxygen Titrate or assess for home need         Is the patient making expected progress toward goals?  yes  List any update or changes to goals:     Medical Goals: Patient will be medically stable for discharge to [facility] upon completion of rehab program and Patient will be able to manage medical conditions and comorbid conditions with medications and follow up upon completion of rehab program    Weekly Team Goals:   Rehab Team Goals  ADL Team Goal: Patient will require assist with ADLs with least restrictive device upon completion of rehab program  Transfer Team Goal: Patient will require supervision with transfers with least restrictive device upon completion of rehab program  Locomotion Team Goal: Patient will require supervision with locomotion with least restrictive device upon completion of rehab program    Discussion: pt presents with the above barriers and is able to ambulate 15 ft with walker at min a  Pt is max to total for adls  Pt has sleep/wake cycle backwards which is effecting ability to participate  Speech has been unable to complete a cog exam due to lethargy  Goals are for supervision and home set up is amenable to pts needs at time of dc  Anticipated Discharge Date:  reteam [facility] Team Members Present: The following team members are supervising care for this patient and were present during this Weekly Team Conference      Physician: Dr Jacoby Barbosa DO  : NENITA Boyce  Registered Nurse: Tiny Hdz RN  Physical Therapist: Judythe Litten, DPT  Occupational Therapist: Divya Khan MS, OTR/L  Speech Therapist: Kristen Medina MS, CCC-SLP  Other: Seamus Lechuga, RN  [address] [place] [address] and Hospice

## 2021-04-07 NOTE — PROGRESS NOTES
04/07/21 0900   Pain Assessment   Pain Assessment Tool Pain Assessment not indicated - pt denies pain   Pain Score No Pain   Restrictions/Precautions   Precautions 1:1;Bed/chair alarms;Cognitive; Fall Risk;Spinal precautions;Multiple lines;O2;Supervision on toilet/commode  (2L O2)   Comprehension   Comprehension (FIM) 3 - Understands basic info/conversation 50-74% of time   Expression   Expression (FIM) 3 - Expresses basic info/needs 50-74% of time   Social Interaction   Social Interaction (FIM) 3 - Interacts 50-74% of time   Problem Solving   Problem solving (FIM) 3 - Solves basic problmes 50-74% of time   Memory   Memory (FIM) 2 - Recognizes, recalls/performs 25-49%   Speech/Language/Cognition Assessmetn   Treatment Assessment Pt seen for skilled speech therapy session targeting cognitive linguistic communication skills  Pt noted to be more alert and interactive this session- best seen yet since his time her on acute rehab unit  Nursing present during session- provided meds whole with applesauce due to pt is on fluid restriction and has drank his provided amount already for at this time  Pt provided with instruction to swallow his pills with the applesauce- first attempt he spat out the pill and needed cues again to try to swallow it all together  Throughout the rest of his meds, pt noted to be chewing his medications prior to swallow even when provided max verbal cues to swallow whole  Pt next engaged in simple orientation- pt was able to recall the name of the hospital or recall events leading up to this hospitalization  Pt did recall his wife's name and his son's name as well as his son's occupation  Pt provided with problem solving picture cards- pt was provided a picture and instructed to ID the problem  Pt with difficulty on first card with orientation, attempting to turn the card over and assess it upside down needing cues to hold it the correct way and ID items he recognized in the scene   Across all the pictures assessed, pt was able to ID the problem in them with 3/8acc  Once cues provide as to what the problem was, pt then was able to explain how to fix it with 5/8acc  Pt's speech noted to be tangential as well as filled nonsensical jargon that was off topic and not related to task at hand  Pt cont's as a 1:1 for his safety  Educated the 1:1 on attempting to keep him awake and engaged in conversations or tasks so that he isn't sleeping all day and awake all night  Provided 1:1 aid and pt with some puzzles and a deck of cards to engage in stimulating activities  Pt overall is improving with skilled SLP services and will cont to benefit to maximize overall cognitive linguistic communication abilities at this time  SLP Therapy Minutes   SLP Time In 0900   SLP Time Out 0930   SLP Total Time (minutes) 30   SLP Mode of treatment - Individual (minutes) 30   SLP Mode of treatment - Concurrent (minutes) 0   SLP Mode of treatment - Group (minutes) 0   SLP Mode of treatment - Co-treat (minutes) 0   SLP Mode of Treatment - Total time(minutes) 30 minutes   SLP Cumulative Minutes 150   Therapy Time missed   Time missed?  No

## 2021-04-07 NOTE — PROGRESS NOTES
PM&R PROGRESS NOTE:  Nae Tilley 76 y o  male MRN: [id number]  Unit/Bed#: -01 Encounter: [id number]        Rehabilitation Diagnosis: Impairment of mobility, safety, Activities of Daily Living (ADLs), and cognitive/communication skills due to Orthopedic Disorders:  08 9  Other Orthopedic L1 compression fracture    HPI: Nae Tilley is a 76 y o  male with DM1 with chronic insulin pump, diabetic neuropathy, CKD 3, PAD, heart block with dual chamber ICD, HTN, hypothyroidism, COVID-19 virus in Feb 2021 with respiratory failure, osteoporosis, who presented to the [facility] on 3/27/21 with fatigue and hematuria 10 days after receiving his 2nd [address] vaccine  Patient found to have acute mental status change, diarrhea, KAZ, Non MI troponin elevation, and some hematuria  Infectious workup revealed bacteremia with GPR - Corynebacterium bacteremia  Patient started on Cefepime, but discontinued after repeat blood cultures negative per Infectious Disease recommendations  CT abdomen/pelvis with findings of right pleural effusion, ground-glass opacification in the right lower lobe, findings of punctate gallstone, findings of worsening L1 compression fracture  Seen by Cardiology given recent history of COVID-19 virus, and echocardiogram ordered showing EF 65%, mild MR, TR  Patient seen by Mary Anne Light for worsening L1 vertebral compression fracture  Lumbar x-ray showing moderate anterior wedge compression fracture at L1 with 50% loss of height  Patient was treated conservatively with TLSO backpack brace when OOB and >45degrees  Patient was agitated and confused during his hospitalization, acutely worsened on 3/31/21- patient received Ativan IV  Improved during the day  Patient found to have acute functional deficits from his baseline, and after medical stabilization and clearance, patient was [facility] for acute inpatient rehabilitation        SUBJECTIVE:  Patient seen face to face  Continues with confusion at night and impulsive behavior  Sleeping through much of the day, but overall improved when seen later in the day  Will stay awak with conversation or stimulation, but nods off otherwise  Received dose of Xanax around 0140  Despite this, patient states he feels fine  He denies fever, chills, nausea, emesis, dysuria, constipation, diarrhea, shortness of breath or cough  ASSESSMENT: Stable, progressing      PLAN:    Rehabilitation  · Functional deficits:  Proximal musculature weakness x4, Impaired cognition, impaired mobility, self care  · Poor safety awareness, high fall risk - witness multiple times setting bed and chair alarms trying to get out of bed:  Continue 1:1  and 4 side rails   Continue current rehabilitation plan of care to maximize function   Functional update:   o Improved partcipation today in both PT/OT   Estimated Discharge: To be determined    DVT prophylaxis  · Managed on subcutaneous heparin     Pain  · No acute issues currently     Bladder plan  · Continent, will check PVRs     Bowel plan  · + constipation:  Lactulose 4/4 followed by suppository- BM x3, loose    Gram-positive bacteremia  Assessment & Plan  · Cornebacterium bacteremia  · Treated with IV cefepime later discontinued as subsequent blood cultures negative    Hyponatremia  Assessment & Plan  · Sodium of 129, stable today  · Fluid restrictions and salt tabs  · Discussed with IM and plans to have Nephrology weight in    COVID-19  Assessment & Plan  · Patient with COVID-19 virus infection in February 2021  Completed remdesivir and systemic corticosteroid     Off isolation  · Patient with residual findings on chest x-ray and CT scan as expected  · No respiratory distress  · Will continue to follow    Diabetic ulcer of right foot ([facility] [address] )  Assessment & Plan  · Podiatry consulted to follow    Type 1 diabetes mellitus [facility])  Assessment & Plan  Lab Results   Component Value Date HGBA1C 7 7 (H) 02/05/2021     · Patient at home managed on insulin pump, however given recent impaired cognition, currently being managed on Lantus, Humalog and sliding scale insulin by endocrinology  · Endocrinology to follow while at [facility] and assist in management  Encephalopathy  Assessment & Plan  · Unclear etiology  · Patient with poor safety awareness and a high fall risk demonstrated by setting off chair and bed alarms multiple times by trying to get out of bed and disoriented  · 1:1  ordered and 4 side rails  · P O  Xanax prn stopped for daytime tiredness and fatigue  · Etiology unclear:    · CTH negative for acute findings, but showing older CVAs - would benefit from outpatient Neurology  · Follow labs, on Keflex for potential UTI  · Promotion of sleep/wake cycles, stimulation during day in order to sleep overnight  · Consider MRI of brain as next step if symptoms do not improve with more conservative measures    Leukocytosis  Assessment & Plan  · Trending down 14K  · Completed Zithromax for possible PNA by IM consultants seen on previous CXR     · CXR showing moderate pleural effusions  · Follow CBC    Stage 3b chronic kidney disease  Assessment & Plan  · Baseline creatinine 1 2-1 5 per records  · Trending upwards, on fluid bolus per Nephro recs  · Internal medicine consultants following    Medtronic dual chamber PM  Assessment & Plan  +ICD placed for complete heart block    Hyperlipidemia  Assessment & Plan  · Managed on Lipitor home dose 5 mg daily  · At home was also taking Zetia 5 mg daily    Essential hypertension  Assessment & Plan  · Currently managed on lisinopril 10mg daily starting 4/4/21  · At home was taking Norvasc 5 mg daily  · Internal medicine consultants to follow    Anemia of chronic disease  Assessment & Plan  · H/H trending down - Hgb on 4/5 is 7 7 (7 8)  · Unclear etiology - likely anemia of chronic disease  · Checking iron studies- IM started iron supplementation on 4/5  · Occult stool negative  · Type and Screen completed on 4/4    Hypothyroidism  Assessment & Plan  · Managed on Synthroid 125 mcg daily home dose    Acid reflux  Assessment & Plan  · Managed on Protonix    * Vertebral fracture, osteoporotic (HCC)  Assessment & Plan  · L1 anterior vertebral compression fracture with 50% loss of height found on CT abdomen pelvis - confirmed on lumbar x-ray  · Seen by Neurosurgery and treated conservatively with TLSO backpack brace when  OOB and >45degrees  · Follow-up with Neurosurgery as an outpatient        Appreciate IM consultants medical co-management  Labs, medications, and imaging personally reviewed  ROS:  A ten point review of systems was completed on 4/4/21 and pertinent positives are listed in subjective section  All other systems reviewed were negative  OBJECTIVE:   /78   Pulse 84   Temp 97 9 °F (36 6 °C) (Oral)   Resp 18   Ht 5' 8" (1 727 m)   Wt 78 6 kg (173 lb 4 5 oz)   SpO2 91%   BMI 26 35 kg/m²     Physical Exam  Vitals signs and nursing note reviewed  Constitutional:       General: He is not in acute distress  Appearance: He is well-developed  HENT:      Head: Normocephalic  Nose: Nose normal    Eyes:      Conjunctiva/sclera: Conjunctivae normal    Neck:      Musculoskeletal: Neck supple  Cardiovascular:      Rate and Rhythm: Normal rate  Pulmonary:      Effort: Pulmonary effort is normal       Breath sounds: No wheezing  Abdominal:      General: There is no distension  Palpations: Abdomen is soft  Skin:     General: Skin is warm  Coloration: Skin is pale  Neurological:      Mental Status: He is alert  Motor: Weakness (4-/5 proximally and 4/5 distally) present        Comments: Oriented to person and place  Unclear of situation   Psychiatric:         Mood and Affect: Mood normal           Lab Results   Component Value Date    WBC 14 35 (H) 04/05/2021    HGB 7 7 (L) 04/05/2021    HCT 23 8 (L) 04/05/2021 MCV 87 04/05/2021     04/05/2021     Lab Results   Component Value Date    SODIUM 129 (L) 04/06/2021    K 4 0 04/06/2021    CL 98 (L) 04/06/2021    CO2 23 04/06/2021    BUN 25 04/06/2021    CREATININE 1 87 (H) 04/06/2021    GLUC 163 (H) 04/06/2021    CALCIUM 7 7 (L) 04/06/2021     Lab Results   Component Value Date    INR 1 03 06/23/2015    INR 1 06 06/20/2015    PROTIME 13 3 06/23/2015    PROTIME 13 2 06/20/2015           Current Facility-Administered Medications:     acetaminophen (TYLENOL) tablet 650 mg, 650 mg, Oral, Q6H PRN, Elis Cruz MD, 650 mg at 04/06/21 0818    aspirin chewable tablet 81 mg, 81 mg, Oral, Daily, Elis Cruz MD, 81 mg at 04/06/21 0817    atorvastatin (LIPITOR) tablet 5 mg, 5 mg, Oral, Daily, Elis Cruz MD, 5 mg at 04/06/21 0817    bisacodyl (DULCOLAX) rectal suppository 10 mg, 10 mg, Rectal, Daily PRN, Elis Cruz MD, 10 mg at 04/04/21 1716    cephalexin (KEFLEX) capsule 500 mg, 500 mg, Oral, Q12H [address], Rosajc Paulson, CRNP, 500 mg at 04/06/21 0925    cholecalciferol (VITAMIN D3) tablet 4,000 Units, 4,000 Units, Oral, Daily, Elis Cruz MD, 4,000 Units at 04/06/21 0817    docusate sodium (COLACE) capsule 100 mg, 100 mg, Oral, BID, Elis Cruz MD, 100 mg at 04/06/21 1733    DULoxetine (CYMBALTA) delayed release capsule 30 mg, 30 mg, Oral, Daily, Elis Cruz MD, 30 mg at 04/06/21 1595    ferrous sulfate tablet 325 mg, 325 mg, Oral, Daily With Breakfast, JOHN JaramilloNP, 325 mg at 04/06/21 0818    heparin (porcine) subcutaneous injection 5,000 Units, 5,000 Units, Subcutaneous, Q8H [address], Elis Cruz MD, 5,000 Units at 04/06/21 1422    insulin glargine (LANTUS) subcutaneous injection 6 Units 0 06 mL, 6 Units, Subcutaneous, HS, Elis Cruz MD, 6 Units at 04/05/21 2209    insulin lispro (HumaLOG) 100 units/mL subcutaneous injection 1-4 Units, 1-4 Units, Subcutaneous, TID AC, 1 Units at 04/06/21 1734 **AND** Fingerstick Glucose (POCT), , , TID AC, Anjali Crum MD    levothyroxine tablet 125 mcg, 125 mcg, Oral, Early Morning, Anjali Crum MD, 125 mcg at 04/06/21 0612    ondansetron (ZOFRAN-ODT) dispersible tablet 4 mg, 4 mg, Oral, Q6H PRN, Anjali Crum MD    pantoprazole (PROTONIX) EC tablet 40 mg, 40 mg, Oral, Daily Before Breakfast, Anjali Crum MD, 40 mg at 04/06/21 0612    polyethylene glycol (MIRALAX) packet 17 g, 17 g, Oral, Daily PRN, Anjali Crum MD, 17 g at 04/03/21 1630    sodium chloride tablet 1 g, 1 g, Oral, BID With Meals, TERRY Aleman, 1 g at 04/06/21 1734    tamsulosin (FLOMAX) capsule 0 4 mg, 0 4 mg, Oral, Daily With Jean Carlos Lentz MD, 0 4 mg at 04/06/21 1733    Past Medical History:   Diagnosis Date    Arthritis     Cardiac disease     Closed fracture of fibula, lateral malleolus, right     Complete heart block ([facility] )     Diabetes mellitus ([place] [address] )     Disease of thyroid gland     External incisional dehiscence     GERD (gastroesophageal reflux disease)     Heart disease     Muscular deconditioning     Pacemaker     Sleep apnea     cannot tolerate cpap    Thyroid disease        Patient Active Problem List    Diagnosis Date Noted    ATN (acute tubular necrosis) ([facility] ) 04/06/2021    Vertebral fracture, osteoporotic ([place] [address] ) 04/02/2021    Progression of L1 vertebral fracture  03/30/2021    Gram-positive bacteremia 03/28/2021    Weakness 03/27/2021    Hyponatremia 03/27/2021    Hyperglycemia due to type 1 diabetes mellitus ([facility] ) 02/07/2021    COVID-19 02/05/2021    Personal history of diabetic foot ulcer 06/16/2020    Diabetic ulcer of right foot ([facility] ) 09/19/2019    Acquired absence of other left toe(s) ([facility] ) 07/30/2019    Type 1 diabetes mellitus ([facility] ) 07/30/2019    Status post split thickness skin graft 06/18/2019    Preoperative clearance 06/05/2019    Complete heart block  06/03/2019    Microalbuminuria 04/25/2019    Vitreous hemorrhage of left eye ([facility] ) 04/10/2019    Transition of care performed with sharing of clinical summary 04/10/2019    Type 2 diabetes mellitus with hyperglycemia (HCC)     High anion gap metabolic acidosis 94/09/2171    Elevated troponin level not due myocardial infarction 03/31/2019    Leukocytosis 03/31/2019    Low bicarbonate 03/31/2019    Pacemaker 03/31/2019    Encephalopathy 03/31/2019    Stage 3b chronic kidney disease 07/23/2018    Thrombocytopenia ([facility] ) 07/23/2018    Encounter for diabetic foot exam ([place] [address] ) 07/06/2018    Risk for falls 07/06/2018    Screening for colon cancer 04/16/2018    Anemia of chronic disease 01/25/2018    Arthritis 01/25/2018    DM type 2, not at goal [facility]) 01/25/2018    Medtronic dual chamber PM 01/25/2018    Essential hypertension 09/06/2016    Insulin pump status 09/11/2015    Hypothyroidism 11/26/2014    Hyperlipidemia 11/11/2014    Acid reflux 08/06/2014    Type I diabetes mellitus ([facility] ) 07/08/2014    Diabetic peripheral neuropathy ([place] [address] ) 10/24/2012      Donya Gtz DO  Physical Medicine and [facility]    Total time spent:  35 minutes, with more than 50% spent counseling/coordinating care  Counseling includes discussion with patient re: progress in therapies, functional issues observed by therapy staff, and discussion with patient his/her current medical state/wellbeing  Coordination of patient's care was performed in conjunction with Internal Medicine service to monitor patient's labs, vitals, and management of their comorbidities  In addition, this patient was discussed by the interdisciplinary team in weekly case conference today  The care of the patient was extensively discussed with all care providers and an appropriate rehabilitation plan was formulated unique for this patient  Barriers were identified preventing progression of therapy and appropriate interventions were discussed with each discipline   Please see the team note for input from all disciplines regarding barriers, intervention, and discharge planning  ** Please Note:  voice to text software may have been used in the creation of this document   Although proof errors in transcription or interpretation are a potential of such software**

## 2021-04-07 NOTE — PROGRESS NOTES
04/07/21 1400   Pain Assessment   Pain Assessment Tool 0-10   Pain Score 5   Pain Location/Orientation Orientation: Left; Location: Back   Pain Radiating Towards to [facility] Pain Intervention(s) Repositioned;Rest;Relaxation technique  (given tylenol by JOHANNA Ramirez during tx)   Restrictions/Precautions   Precautions Fall Risk;Bed/chair alarms;Cognitive;1:1;Pain;Supervision on toilet/commode;Multiple lines;O2  (2L , IV fluids)   Weight Bearing Restrictions No   Braces or Orthoses TLSO  (>45 degree, darco wedge shoes R, surgical shoe L)   Cognition   Overall Cognitive Status Impaired   Arousal/Participation Alert; Cooperative   Attention Difficulty attending to directions   Memory Decreased recall of precautions;Decreased recall of recent events;Decreased short term memory   Following Commands Follows one step commands with increased time or repetition   Subjective   Subjective pt was awake in bed working on his word search and was agreeable to have PT  Roll Left and Right   Type of Assistance Needed Physical assistance;Verbal cues; Adaptive equipment   Amount of Physical Assistance Provided 25%-49%   Comment step by step VC for log roll technique   Roll Left and Right CARE Score 3   Lying to Sitting on Side of Bed   Type of Assistance Needed Physical assistance;Verbal cues; Adaptive equipment   Amount of Physical Assistance Provided 75% or more   Comment log roll with rail and HOB slightly elevated    Lying to Sitting on Side of Bed CARE Score 2   Sit to Stand   Type of Assistance Needed Physical assistance;Verbal cues; Adaptive equipment   Amount of Physical Assistance Provided 50%-74%   Comment min-mod of 1 to sit , min/CG to stand with max VC for positioning in front of the chair and hand placement    Sit to Stand CARE Score 2   Bed-Chair Transfer   Type of Assistance Needed Physical assistance;Verbal cues; Adaptive equipment   Amount of Physical Assistance Provided 50%-74%   Comment min-mod A x 1 with assist at times for walker placement/management as at times unable to follow through with [facility]    Chair/Bed-to-Chair Transfer CARE Score 2   Walk 10 Feet   Type of Assistance Needed Physical assistance;Verbal cues; Adaptive equipment   Amount of Physical Assistance Provided Total assistance   Comment 10' x 1, 20' x 1 with RW, VC to keep left    Walk 10 Feet CARE Score 1   Walk 50 Feet with Two Turns   Type of Assistance Needed Physical assistance;Verbal cues; Adaptive equipment   Amount of Physical Assistance Provided Total assistance   Comment min-mod A x 1 with CF of 2nd person, 3rd person for O2 and IV line management x 50 x 1    Walk 50 Feet with Two Turns CARE Score 1   Therapeutic Interventions   Strengthening long sitting A/A b/l SLR x 10 reps x 3 sets, seated TE include A/A b/l hip flexion  x 10 reps x 3 sets, LAQ x 5 SH x 15 reps, ball squeezes x 10 x 3, hamstring curls with orange tband x 10 reps x 3 sets, toe raises and heel raises x 10 x 3    Flexibility b/l hamstring stretch x 1 mins, bilat gastroc x 30 SH x 3 reps   Other Comments   Comments IS x 10 reps reaching 1250 ml at best   cont to require total assist to don TLSO brace   Assessment   Treatment Assessment Pt was pleasant and  able to engaged actively in skilled PT intervention this session, staying awake throughout although standing and gait tolerance limited by ongoing L LBP that radiates down to L hip/knee  But was able to inc ambulation distance up to 50' at best this session but requires max cues for safe walker management/placement with facilitation provided on R hip at swing phase and on pelvis for weight shifting assist  SpO2 on 2L after activities stayed > 93%  Pt will benefit from cont skilled PT to work on improving overall safety and indep with functional mobilities, strength, standing balance to dec caregiver burden at d/c and reduce risk for falls  Pt was assisted back to recliner at end of tx with 1:1 present   PT also reviewed POC with 1:1 in keeping pt awake and engaged in room  Problem List Decreased strength;Decreased range of motion;Decreased endurance; Impaired balance;Decreased mobility; Decreased cognition; Impaired judgement;Decreased safety awareness;Decreased coordination;Pain;Orthopedic restrictions   Barriers to Discharge Inaccessible home environment;Decreased caregiver support   PT Barriers   Functional Limitation Car transfers; Ramp negotiation;Stair negotiation;Standing;Transfers; Walking; Wheelchair management   Plan   Treatment/Interventions Endurance training; Therapeutic exercise;LE strengthening/ROM; Functional transfer training;Bed mobility;Gait training;Spoke to nursing;OT;Spoke to MD;Spoke to case management   Progress Slow progress, decreased activity tolerance   Recommendation   PT Discharge Recommendation   (TBD pending progress)   Equipment Recommended Wheelchair;Walker   PT - OK to Discharge No   PT Therapy Minutes   PT Time In 1400   PT Time Out 1530   PT Total Time (minutes) 90   PT Mode of treatment - Individual (minutes) 90   PT Mode of treatment - Concurrent (minutes) 0   PT Mode of treatment - Group (minutes) 0   PT Mode of treatment - Co-treat (minutes) 0   PT Mode of Treatment - Total time(minutes) 90 minutes   PT Cumulative Minutes 352   Therapy Time missed   Time missed?  No

## 2021-04-07 NOTE — PROGRESS NOTES
Occupational Therapy Treatment Note         04/07/21 1000   Pain Assessment   Pain Assessment Tool [facility] Pain Intervention(s) Relaxation technique   Pain Rating: FLACC (Rest) - Face 0   Pain Rating: FLACC (Rest) - Legs 0   Pain Rating: FLACC (Rest) - Activity 0   Pain Rating: FLACC (Rest) - Cry 0   Pain Rating: FLACC (Rest) - Consolability 0   Score: FLACC (Rest) 0   Pain Rating: FLACC (Activity) - Face 1   Pain Rating: FLACC (Activity) - Legs 1   Pain Rating: FLACC (Activity) - Activity 0   Pain Rating: FLACC (Activity) - Cry 1   Pain Rating: FLACC (Activity) - Consolability 1   Score: FLACC (Activity) 4   Restrictions/Precautions   Precautions 1:1;Bed/chair alarms;Cognitive; Fall Risk;Spinal precautions;Multiple lines;O2;Supervision on toilet/commode  (2l 02)   Braces or Orthoses TLSO  (backpack TLSO; R darco wedge; L sx shoe )   Lifestyle   Autonomy "I don't know the name of this place"    Shower/Bathe Self   Type of Assistance Needed Physical assistance   Amount of Physical Assistance Provided 75% or more   Comment pt requires assist for UB bathing thoroughness at EOB, and assist for B/L LEs  Pt stands with min assist x1 and is able to bathe groin, he requires assist for buttock    Shower/Bathe Self CARE Score 2   Tub/Shower Transfer   Reason Not Assessed Sponge Bath;Medical;Safety; Endurance;Balance   Upper Body Dressing   Type of Assistance Needed Physical assistance   Amount of Physical Assistance Provided 75% or more   Comment assist for TLSO, assist to thread B/L UE    Upper Body Dressing CARE Score 2   Lower Body Dressing   Type of Assistance Needed Physical assistance   Amount of Physical Assistance Provided Total assistance   Comment pt requires assist to thread LE clothing into pants while seated EOB, requires min assist x1 in stance and assist to manage clothing over hips    Lower Body Dressing CARE Score 1   Putting On/Taking Off Footwear   Type of Assistance Needed Physical assistance Amount of Physical Assistance Provided Total assistance   Putting On/Taking Off Footwear CARE Score 1   Sit to Lying   Type of Assistance Needed Physical assistance   Amount of Physical Assistance Provided 25%-49%   Sit to Lying CARE Score 3   Lying to Sitting on Side of Bed   Type of Assistance Needed Physical assistance; Adaptive equipment   Amount of Physical Assistance Provided 50%-74%   Comment log rolling technique with bed rail  pt requires max instruction    Lying to Sitting on Side of Bed CARE Score 2   Sit to Stand   Type of Assistance Needed Physical assistance; Adaptive equipment   Amount of Physical Assistance Provided 25%-49%   Comment min assist with RW    Sit to Stand CARE Score 3   Cognition   Overall Cognitive Status Impaired   Attention Difficulty attending to directions   Orientation Level Oriented to person;Oriented to situation   Memory Decreased short term memory;Decreased recall of recent events;Decreased recall of precautions   Following Commands Follows one step commands with increased time or repetition   Activity Tolerance   Activity Tolerance Treatment limited secondary to medical complications (Comment)   Medical Staff Made Aware notified JOHANNA Deras and Dr Elaine Yuan of pt's orthostatic hypotension during session    Assessment   Treatment Assessment Pt participated in skilled OT tx session focused on ADL routine  See above for further details on functional performance  OT spoke with PT Fely and Dr Elaine Yuan regarding plan for 900 min/week 2* pt's current medical barriers  Of note pt with poor tolerance to session  Sitting at EOB pt's /57 nut after 1 minute of standing dropped to 87/55  Pt not reporting symptoms, however pt is an inconsistent historian  Pt requires overall max assist for ADL, min assist with RW for sit <> stand transfer   Pt will continue to benefit from skilled OT intervention to address B/L UE strengthening, increasing pt's activity tolerance, don/doffing of backpack TLSO brace in order to maximize functional independence in ADLS, functional mobility/transfers, while decreasing burden of care  Prognosis Fair   Problem List Decreased strength;Decreased range of motion;Decreased endurance; Impaired balance;Decreased mobility; Decreased coordination;Decreased cognition; Impaired judgement;Decreased safety awareness   Barriers to Discharge Decreased caregiver support; Inaccessible home environment   Plan   Treatment/Interventions ADL retraining;Functional transfer training; Therapeutic exercise; Endurance training;Equipment eval/education;Cognitive reorientation;Patient/family training; Compensatory technique education   Progress Slow progress, medical status limitations   Recommendation   OT Discharge Recommendation   (pending pt progress )   OT Therapy Minutes   OT Time In 1000   OT Time Out 1100   OT Total Time (minutes) 60   OT Mode of treatment - Individual (minutes) 60   OT Mode of treatment - Concurrent (minutes) 0   OT Mode of treatment - Group (minutes) 0   OT Mode of treatment - Co-treat (minutes) 0   OT Mode of Treatment - Total time(minutes) 60 minutes   OT Cumulative Minutes 233   Therapy Time missed   Time missed?  Yes   Amount of time missed 30   Reason for time missed Extreme fatigue  (orthostatic hypotension )

## 2021-04-07 NOTE — PROGRESS NOTES
Internal Medicine Progress Note  Patient: Marlene Weinberg  Age/sex: 76 y o  male  Medical Record #: [id number]      ASSESSMENT/PLAN: (Interval History)  Marlene Weinberg is seen and examined and management for following issues:    DM I  · Cont sliding scale  · Labile; elevated this am  · Uses insulin pump  · Endocrinology following  · Cont DM diet     Diarrhea  · resolved  · Likely from recent COVID     Chronic L1 compression fracture  · Cont TLSO brace when OOB  · No NS intervention per reccs     Anemia  · hgb 7 7 4/5  · Monitor cbc  · Cont daily iron  · hemocult negative x2     Chronic RLE ulcer  · F/b podiatry as outpatient  · Debrided 4/3 at bedside  · Cont dressing changes per their discretion  · Refer to images     Chronic diastolic HF  · Cxray moderate bilateral effusions 4/5  · Off diuretics for now  · stable    Acute on chronic CKD  · Hold lisinopril  · Renal input appreciated  · Creatinine elevated again at 2 1  · Given IVF 4/6  · Urine seem c/w prerenal azotemia    Hyponatremia  · Na 127 4/7  · liberate fluid restriction  · Cont Nacl tabs 1 gram tid  · Start nss @ 100 with evidence of concentrated urine and Fractional Na excretion low  · Checked cortisol level and was greater the 18     Leukocytosis  · Unchanged  · Urine +bacteria   · Cont cephalexin 500mg bid x7 days  · I reviewed addended [facility] results with Dr Laura Cohen and he does not feel anything additional needs to be done  · Cbc in a m  COVID  · Dx 2/5 hospitalized 2/5-2/8  · Tx with remdesivir/steroids  · Respiratory status stable  · Received covid vaccines     Hypothyroid  · Cont supplementation  · Recent TSH mildly elevated    Confusion  · Mainly overnight    Lethargy  · Continues to be issue during the day  · Up most of night  · Better this a m        The above assessment and plan was reviewed and updated as determined by my evaluation of the patient on 4/7/2021      Labs:   Results from last 7 days   Lab Units 04/05/21  0630 04/04/21  0630   WBC Thousand/uL 14 35* 14 90*   HEMOGLOBIN g/dL 7 7* 7 8*   HEMATOCRIT % 23 8* 24 5*   PLATELETS Thousands/uL 194 224     Results from last 7 days   Lab Units 04/07/21  0618 04/06/21  0453   SODIUM mmol/L 127* 129*   POTASSIUM mmol/L 4 5 4 0   CHLORIDE mmol/L 94* 98*   CO2 mmol/L 17* 23   BUN mg/dL 36* 25   CREATININE mg/dL 2 12* 1 87*   CALCIUM mg/dL 7 9* 7 7*             Results from last 7 days   Lab Units 04/07/21  1100 04/07/21  0628 04/06/21  2105   POC GLUCOSE mg/dl 385* 377* 288*       Review of Scheduled Meds:  Current Facility-Administered Medications   Medication Dose Route Frequency Provider Last Rate    acetaminophen  650 mg Oral Q6H PRN Elva Sullivan MD      aspirin  81 mg Oral Daily Elva Sullivan MD      atorvastatin  5 mg Oral Daily Elva Sullivan MD      bisacodyl  10 mg Rectal Daily PRN Elva Sullivan MD      cephalexin  500 mg Oral Q12H [address] TERRY Wheeler      cholecalciferol  4,000 Units Oral Daily Elva Sullivan MD      docusate sodium  100 mg Oral BID Elva Sullivan MD      DULoxetine  30 mg Oral Daily Elva Sullivan MD      ferrous sulfate  325 mg Oral Daily With Breakfast TERRY Jaramillo      heparin (porcine)  5,000 Units Subcutaneous Q8H [address] Elva Sullivan MD      insulin glargine  6 Units Subcutaneous HS Elva Sullivan MD      insulin lispro  1-4 Units Subcutaneous TID AC Elva Sullivan MD      levothyroxine  125 mcg Oral Early Morning Elva Sullivan MD      ondansetron  4 mg Oral Q6H PRN Elva Sullivan MD      pantoprazole  40 mg Oral Daily Before Breakfast Elva Sullivan MD      polyethylene glycol  17 g Oral Daily PRN Elva Sullivan MD      sodium bicarbonate  650 mg Oral BID after meals Peola Prime, PA-C      sodium chloride  1 g Oral TID With Meals TERRY Wheeler      tamsulosin  0 4 mg Oral Daily With Lilo Castro MD         Subjective/ HPI: Patient seen and examined   Patients overnight issues or events were reviewed with nursing or staff during rounds or morning huddle session  New or overnight issues include the following:     Pt without any issues this a m , is on room air, conversing with some confusion however overall more awake and interactive this a m ; ate all of his breakfast      ROS:   A 10 point ROS was performed; negative except as noted above  Imaging:     XR chest portable   Final Result by Tatyana Julian MD (04/04 1143)      Moderate effusions and bibasilar atelectasis  Workstation performed: [id number]         CT head wo contrast   Final Result by Jamison Bobby DO (04/03 1543)      No acute intracranial abnormality  Microangiopathic changes                    Workstation performed: [id number]             *Labs /Radiology studies Reviewed  *Medications  reviewed and reconciled as needed  *Please refer to order section for additional ordered labs studies  *Case discussed with primary attending during morning huddle case rounds    Physical Examination:  Vitals:   Vitals:    04/07/21 0000 04/07/21 0813 04/07/21 1040 04/07/21 1100   BP: 118/59 96/53 105/57 (!) 87/55   BP Location: Right arm Right arm Right arm Right arm   Pulse: 89 84 94 93   Resp: 18 18     Temp: 97 7 °F (36 5 °C) 97 7 °F (36 5 °C)     TempSrc: Oral Oral     SpO2: 93% 91% 93%    Weight:       Height:           GEN: No apparent distress, interactive  NEURO: Alert and oriented x3; confusion noted with place and date  HEENT: Pupils are equal and reactive, EOMI, mucous membranes are moist, face symmetrical  CV: S1 S2 regular, no MRG, no peripheral edema noted  RESP: Lungs are clear bilaterally, no wheezes, rales or rhonchi noted, on room air, respirations easy and non labored  GI: Flat, soft non tender, non distended; +BS x4  : Voiding without difficulty  MUSC: Moves all extremities; +RLE dressing in place  SKIN: pink, warm and dry, normal turgor, no rashes, lesions      The above physical exam was reviewed and updated as determined by my evaluation of the patient on 4/7/2021  Invasive Devices     None                    VTE Pharmacologic Prophylaxis: Heparin  Code Status: Level 1 - Full Code  Current Length of Stay: 5 day(s)      Total time spent:  30 minutes with more than 50% spent counseling/coordinating care  Counseling includes discussion with patient re: progress  and discussion with patient of his/her current medical state/information  Coordination of patient's care was performed in conjunction with primary service  Time invested included review of patient's labs, vitals, and management of their comorbidities with continued monitoring  In addition, this patient was discussed with medical team including physician and advanced extenders  The care of the patient was extensively discussed and appropriate treatment plan was formulated unique for this patient  ** Please Note:  voice to text software may have been used in the creation of this document   Although proof errors in transcription or interpretation are a potential of such software**

## 2021-04-07 NOTE — PROGRESS NOTES
PM&R PROGRESS NOTE:  Melecio Nava 76 y o  male MRN: [id number]  Unit/Bed#: -01 Encounter: [id number]        Rehabilitation Diagnosis: Impairment of mobility, safety, Activities of Daily Living (ADLs), and cognitive/communication skills due to Orthopedic Disorders:  08 9  Other Orthopedic L1 compression fracture    HPI: Melecio Nava is a 76 y o  male with DM1 with chronic insulin pump, diabetic neuropathy, CKD 3, PAD, heart block with dual chamber ICD, HTN, hypothyroidism, COVID-19 virus in Feb 2021 with respiratory failure, osteoporosis, who presented to the [facility] on 3/27/21 with fatigue and hematuria 10 days after receiving his 2nd [employer] vaccine  Patient found to have acute mental status change, diarrhea, KAZ, Non MI troponin elevation, and some hematuria  Infectious workup revealed bacteremia with GPR - Corynebacterium bacteremia  Patient started on Cefepime, but discontinued after repeat blood cultures negative per Infectious Disease recommendations  CT abdomen/pelvis with findings of right pleural effusion, ground-glass opacification in the right lower lobe, findings of punctate gallstone, findings of worsening L1 compression fracture  Seen by Cardiology given recent history of COVID-19 virus, and echocardiogram ordered showing EF 65%, mild MR, TR  Patient seen by Arnoldo Javier for worsening L1 vertebral compression fracture  Lumbar x-ray showing moderate anterior wedge compression fracture at L1 with 50% loss of height  Patient was treated conservatively with TLSO backpack brace when OOB and >45degrees  Patient was agitated and confused during his hospitalization, acutely worsened on 3/31/21- patient received Ativan IV  Improved during the day  Patient found to have acute functional deficits from his baseline, and after medical stabilization and clearance, patient was admitted to [facility] for acute inpatient rehabilitation        SUBJECTIVE:  Patient seen face to face  Continues with confusion at night and impulsive behavior  Sleeping through much of the day, but overall improved when seen later in the day  Still with difficulty sleeping overnight but improved mentation this morning and afternoon  Continues treatment for suspected UTI  Received IV fluids yesterday  He denies fever, chills, nausea, emesis, dysuria, constipation, diarrhea, shortness of breath or cough  ASSESSMENT: Stable, progressing      PLAN:    Rehabilitation  · Functional deficits:  Proximal musculature weakness x4, Impaired cognition, impaired mobility, self care  · Poor safety awareness, high fall risk - witness multiple times setting bed and chair alarms trying to get out of bed:  Continue 1:1  and 4 side rails   Continue current rehabilitation plan of care to maximize function   Functional update:   o Shower with mod A, Upper body dressing Mod A, lower body ADLs Total A, footwear Total A, bed mobility min-mod A, transfers min A with RW   Estimated Discharge: To be determined    DVT prophylaxis  · Managed on subcutaneous heparin     Pain  · No acute issues currently     Bladder plan  · Continent, Last PVR 51cc on 4/7     Bowel plan  · + constipation: Last BM 4/5    Gram-positive bacteremia  Assessment & Plan  · Cornebacterium bacteremia  · Treated with IV cefepime later discontinued as subsequent blood cultures negative    Hyponatremia  Assessment & Plan  · Sodium of 129, stable today  · Fluid restrictions and salt tabs  · Discussed with IM and plans to have Nephrology weight in    COVID-19  Assessment & Plan  · Patient with COVID-19 virus infection in February 2021  Completed remdesivir and systemic corticosteroid     Off isolation  · Patient with residual findings on chest x-ray and CT scan as expected  · No respiratory distress  · Will continue to follow    Diabetic ulcer of right foot [facility])  Assessment & Plan  · Podiatry consulted to follow    Type 1 diabetes mellitus [facility])  Assessment & Plan  Lab Results   Component Value Date    HGBA1C 7 7 (H) 02/05/2021     · Patient at home managed on insulin pump, however given recent impaired cognition, currently being managed on Lantus, Humalog and sliding scale insulin by endocrinology  · Endocrinology to follow while at [facility] and assist in management  Encephalopathy  Assessment & Plan  · Unclear etiology  · Patient with poor safety awareness and a high fall risk demonstrated by setting off chair and bed alarms multiple times by trying to get out of bed and disoriented  · 1:1  ordered and 4 side rails  · P O  Xanax prn stopped for daytime tiredness and fatigue  · Etiology unclear:    · CTH negative for acute findings, but showing older CVAs - would benefit from outpatient Neurology  · Follow labs, on Keflex for potential UTI  · Ammonia levels WNL, oxygenating on supplemental O2  · Promotion of sleep/wake cycles, stimulation during day in order to sleep overnight  · Consider MRI of brain as next step if symptoms do not improve with more conservative measures    Leukocytosis  Assessment & Plan  · Trending down 14K  · Completed Zithromax for possible PNA by IM consultants seen on previous CXR     · CXR showing moderate pleural effusions  · Follow CBC    Stage 3b chronic kidney disease  Assessment & Plan  · Baseline creatinine 1 2-1 5 per records  · Nephrology following, labs obtained including urine studies, cortisol, evidence of volume contraction, will continue IVF for now minding his cardiac status  · Internal medicine consultants following, appreciate recommendations and work up    Medtronic dual chamber PM  Assessment & Plan  +ICD placed for complete heart block    Hyperlipidemia  Assessment & Plan  · Managed on Lipitor home dose 5 mg daily  · At home was also taking Zetia 5 mg daily    Essential hypertension  Assessment & Plan  · Currently managed on lisinopril 10mg daily starting 4/4/21  · At home was taking Norvasc 5 mg daily  · Internal medicine consultants to follow    Anemia of chronic disease  Assessment & Plan  · H/H trending down - Hgb on 4/5 is 7 7 (7 8)  · Unclear etiology - likely anemia of chronic disease  · Checking iron studies- IM started iron supplementation on 4/5  · Occult stool negative  · Type and Screen completed on 4/4    Hypothyroidism  Assessment & Plan  · Managed on Synthroid 125 mcg daily home dose    Acid reflux  Assessment & Plan  · Managed on Protonix    * Vertebral fracture, osteoporotic (HCC)  Assessment & Plan  · L1 anterior vertebral compression fracture with 50% loss of height found on CT abdomen pelvis - confirmed on lumbar x-ray  · Seen by Neurosurgery and treated conservatively with TLSO backpack brace when  OOB and >45degrees  · Follow-up with Neurosurgery as an outpatient        Appreciate IM consultants medical co-management  Labs, medications, and imaging personally reviewed  ROS:  A ten point review of systems was completed on 4/4/21 and pertinent positives are listed in subjective section  All other systems reviewed were negative  OBJECTIVE:   BP (!) 87/55 (BP Location: Right arm)   Pulse 93   Temp 97 7 °F (36 5 °C) (Oral)   Resp 18   Ht 5' 8" (1 727 m)   Wt 78 6 kg (173 lb 4 5 oz)   SpO2 93%   BMI 26 35 kg/m²     Physical Exam  Vitals signs and nursing note reviewed  Constitutional:       General: He is not in acute distress  Appearance: He is well-developed  HENT:      Head: Normocephalic  Nose: Nose normal    Eyes:      Conjunctiva/sclera: Conjunctivae normal    Neck:      Musculoskeletal: Neck supple  Cardiovascular:      Rate and Rhythm: Normal rate  Pulmonary:      Effort: Pulmonary effort is normal       Breath sounds: No wheezing  Abdominal:      General: There is no distension  Palpations: Abdomen is soft  Skin:     General: Skin is warm  Coloration: Skin is pale  Neurological:      Mental Status: He is alert        Motor: Weakness (4-/5 proximally and 4/5 distally) present        Comments: Oriented to person and place  Unclear of situation   Psychiatric:         Mood and Affect: Mood normal           Lab Results   Component Value Date    WBC 14 35 (H) 04/05/2021    HGB 7 7 (L) 04/05/2021    HCT 23 8 (L) 04/05/2021    MCV 87 04/05/2021     04/05/2021     Lab Results   Component Value Date    SODIUM 127 (L) 04/07/2021    K 4 5 04/07/2021    CL 94 (L) 04/07/2021    CO2 17 (L) 04/07/2021    BUN 36 (H) 04/07/2021    CREATININE 2 12 (H) 04/07/2021    GLUC 390 (H) 04/07/2021    CALCIUM 7 9 (L) 04/07/2021     Lab Results   Component Value Date    INR 1 03 06/23/2015    INR 1 06 06/20/2015    PROTIME 13 3 06/23/2015    PROTIME 13 2 06/20/2015           Current Facility-Administered Medications:     acetaminophen (TYLENOL) tablet 650 mg, 650 mg, Oral, Q6H PRN, John Felix MD, 650 mg at 04/06/21 2316    aspirin chewable tablet 81 mg, 81 mg, Oral, Daily, John Felix MD, 81 mg at 04/07/21 0901    atorvastatin (LIPITOR) tablet 5 mg, 5 mg, Oral, Daily, John Felix MD, 5 mg at 04/07/21 0902    bisacodyl (DULCOLAX) rectal suppository 10 mg, 10 mg, Rectal, Daily PRN, John Felix MD, 10 mg at 04/04/21 1716    cephalexin (KEFLEX) capsule 500 mg, 500 mg, Oral, Q12H CALIN, TERRY Jaramillo, 500 mg at 04/07/21 0902    cholecalciferol (VITAMIN D3) tablet 4,000 Units, 4,000 Units, Oral, Daily, John Felix MD, 4,000 Units at 04/07/21 0901    docusate sodium (COLACE) capsule 100 mg, 100 mg, Oral, BID, John Felix MD, 100 mg at 04/07/21 0901    DULoxetine (CYMBALTA) delayed release capsule 30 mg, 30 mg, Oral, Daily, John Felix MD, 30 mg at 04/07/21 0901    ferrous sulfate tablet 325 mg, 325 mg, Oral, Daily With Breakfast, TERRY Jaramillo, 325 mg at 04/07/21 0901    heparin (porcine) subcutaneous injection 5,000 Units, 5,000 Units, Subcutaneous, Q8H [address], John Felix MD, 5,000 Units at 04/07/21 1347    insulin glargine (LANTUS) subcutaneous injection 6 Units 0 06 mL, 6 Units, Subcutaneous, HS, Ming Juarez MD, 6 Units at 04/06/21 2110    insulin lispro (HumaLOG) 100 units/mL subcutaneous injection 1-5 Units, 1-5 Units, Subcutaneous, TID AC **AND** Fingerstick Glucose (POCT), , , TID AC, Ebony Alexandra MD    insulin lispro (HumaLOG) 100 units/mL subcutaneous injection 1-5 Units, 1-5 Units, Subcutaneous, HS, Ebony Alexandra MD    levothyroxine tablet 125 mcg, 125 mcg, Oral, Early Morning, Ming Juarez MD, 125 mcg at 04/07/21 0511    ondansetron (ZOFRAN-ODT) dispersible tablet 4 mg, 4 mg, Oral, Q6H PRN, Ming Juarez MD    pantoprazole (PROTONIX) EC tablet 40 mg, 40 mg, Oral, Daily Before Breakfast, Ming Juarez MD, 40 mg at 04/07/21 0602    polyethylene glycol (MIRALAX) packet 17 g, 17 g, Oral, Daily PRN, Ming Juarez MD, 17 g at 04/03/21 1630    sodium bicarbonate tablet 650 mg, 650 mg, Oral, BID after meals, Alma Wheeler PA-C    sodium chloride 0 9 % bolus 250 mL, 250 mL, Intravenous, Once, Dairl Neri, DO, Last Rate: 125 mL/hr at 04/07/21 1351, 250 mL at 04/07/21 1351    sodium chloride 0 9 % infusion, 100 mL/hr, Intravenous, Continuous, Dairl Neri, DO    sodium chloride tablet 1 g, 1 g, Oral, TID With Meals, TERRY Shannon, 1 g at 04/07/21 1113    tamsulosin (FLOMAX) capsule 0 4 mg, 0 4 mg, Oral, Daily With Niharika Burris MD, 0 4 mg at 04/06/21 1733    Past Medical History:   Diagnosis Date    Arthritis     Cardiac disease     Closed fracture of fibula, lateral malleolus, right     Complete heart block (HCC)     Diabetes mellitus ([address] )     Disease of thyroid gland     External incisional dehiscence     GERD (gastroesophageal reflux disease)     Heart disease     Muscular deconditioning     Pacemaker     Sleep apnea     cannot tolerate cpap    Thyroid disease        Patient Active Problem List    Diagnosis Date Noted    [facility] 04/06/2021    [facility], [facility] 04/02/2021    Progression of L1 vertebral fracture  03/30/2021    Gram-positive bacteremia 03/28/2021    Weakness 03/27/2021    Hyponatremia 03/27/2021    Hyperglycemia due to type 1 diabetes mellitus ([facility] ) 02/07/2021    COVID-19 02/05/2021    Personal history of diabetic foot ulcer 06/16/2020    Diabetic ulcer of right foot ([facility] ) 09/19/2019    Acquired absence of other left toe(s) ([facility] ) 07/30/2019    Type 1 diabetes mellitus ([facility] ) 07/30/2019    Status post split thickness skin graft 06/18/2019    Preoperative clearance 06/05/2019    Complete heart block  06/03/2019    Microalbuminuria 04/25/2019    Vitreous hemorrhage of left eye ([facility] ) 04/10/2019    Transition of care performed with sharing of clinical summary 04/10/2019    Type 2 diabetes mellitus with hyperglycemia (HCC)     High anion gap metabolic acidosis 14/81/2900    Elevated troponin level not due myocardial infarction 03/31/2019    Leukocytosis 03/31/2019    Low bicarbonate 03/31/2019    Pacemaker 03/31/2019    Encephalopathy 03/31/2019    Stage 3b chronic kidney disease 07/23/2018    Thrombocytopenia ([facility] ) 07/23/2018    Encounter for diabetic foot exam ([place] [address] ) 07/06/2018    Risk for falls 07/06/2018    Screening for colon cancer 04/16/2018    Anemia of chronic disease 01/25/2018    Arthritis 01/25/2018    DM type 2, not at goal [facility]) 01/25/2018    Medtronic dual chamber PM 01/25/2018    Essential hypertension 09/06/2016    Insulin pump status 09/11/2015    Hypothyroidism 11/26/2014    Hyperlipidemia 11/11/2014    Acid reflux 08/06/2014    Type I diabetes mellitus ([facility] ) 07/08/2014    Diabetic peripheral neuropathy ([facility] ) 10/24/2012      Cari Gonzalez DO  Physical Medicine and Rehabilitation  [address]    Total time spent:  35 minutes, with more than 50% spent counseling/coordinating care   Counseling includes discussion with patient re: progress in therapies, functional issues observed by therapy staff, and discussion with patient his/her current medical state/wellbeing  Coordination of patient's care was performed in conjunction with Internal Medicine service to monitor patient's labs, vitals, and management of their comorbidities  ** Please Note:  voice to text software may have been used in the creation of this document   Although proof errors in transcription or interpretation are a potential of such software**

## 2021-04-07 NOTE — PROGRESS NOTES
NEPHROLOGY PROGRESS NOTE   Bonita Ruffin 76 y o  male MRN: [id number]  Unit/Bed#: -01 Encounter: [id number]      ASSESSMENT/PLAN:  1  CKD III: baseline creatinine 1 1-1 5 and follows with Dr Carlene Jiménez in our office  Creatinine has been trending up the past few days likely due to lasix, increased lisinopril, labile BPs and poor po intake--suspect progression to ATN  · Creatinine worsened to 2 1 today despite receiving fluid challenge yesterday  · UA: +glucose, trace ketones, large blood, trace leukocytes, 3+ protein, 4-10 RBCs, 14 wbc's, moderate bacteria, 0-3 hyaline casts  · Was started on Keflex for UTI on 04/06  · Continue to hold lisinopril and diuretics  · Bladder scan 223 mL yesterday  · Check am BMP   2  Hyponatremia: sodium improved to 131 when correct for hyperglycemia   · Work up from 3/31: serum osm 284, urine osm 524, urine sodium 68, cortisol normal, no monoclonal gammopathy, TSH elevated 5 6   · Continue 1200cc/day oral fluid restriction  · NaCl increased to 1g tid by primary team   3  Moderate pleural effusions: s/p lasix 4/4  No current respiratory issues   4  Hypertension: BP low again this am   · Currently off all BP meds   5  Anion gap metabolic acidosis:  Likely related to renal failure and possible starvation ketosis as recent UA showed ketones  · Will start sodium bicarb 650mg bid   6  Lethargy/confuision:  Significantly improved  Possibly related to UTI as he was started on Keflex yesterday and his mental status is much better today  7  Microscopic hematuria: per Dr Bria Santana last note he follows with urology for this     Plan Summary:    Start sodium bicarb 650mg bid    Continue 1200cc/day oral fluid restriction and NaCl 1g tid    Check urine electrolytes    Check am BMP     SUBJECTIVE:  Patient is much more awake and alert today  He ate all of his breakfast and drink well this morning  He has not urinated however since about 2:00 a m      OBJECTIVE:  Current Weight: Weight - Scale: 78 6 kg (173 lb 4 5 oz)  Vitals:    04/07/21 0813   BP: 96/53   Pulse: 84   Resp: 18   Temp: 97 7 °F (36 5 °C)   SpO2: 91%       Intake/Output Summary (Last 24 hours) at 4/7/2021 0948  Last data filed at 4/7/2021 0501  Gross per 24 hour   Intake 120 ml   Output 450 ml   Net -330 ml       General:  No acute distress  Skin:  No rash  Eyes:  Sclerae anicteric  ENT:  Moist mucous membranes  Neck:  Trachea midline with no JVD  Chest:  Clear to auscultation bilaterally  CVS:  Regular rate and rhythm  Abdomen:  Soft, nontender, nondistended  Extremities:  No edema, R leg wrapped   Neuro:  Awake and alert  Psych:  Appropriate affect      Medications:  Scheduled Meds:  Current Facility-Administered Medications   Medication Dose Route Frequency Provider Last Rate    acetaminophen  650 mg Oral Q6H PRN Gerard Holden MD      aspirin  81 mg Oral Daily Gerard Holden MD      atorvastatin  5 mg Oral Daily Gerard Holden MD      bisacodyl  10 mg Rectal Daily PRN Gerard Holden MD      cephalexin  500 mg Oral Q12H [address] Alanis Delay, CRNP      cholecalciferol  4,000 Units Oral Daily Gerard Holden MD      docusate sodium  100 mg Oral BID Gerard Holden MD      DULoxetine  30 mg Oral Daily Gerard Holden MD      ferrous sulfate  325 mg Oral Daily With Breakfast TERRY Jaramillo      heparin (porcine)  5,000 Units Subcutaneous Q8H [address] Gerard Holden MD      insulin glargine  6 Units Subcutaneous HS Gerard Holden MD      insulin lispro  1-4 Units Subcutaneous TID AC Gerard Holden MD      levothyroxine  125 mcg Oral Early Morning Gerard Holden MD      ondansetron  4 mg Oral Q6H PRN Gerard Holden MD      pantoprazole  40 mg Oral Daily Before Breakfast Gerard Holden MD      polyethylene glycol  17 g Oral Daily PRN Gerard Holden MD      sodium chloride  1 g Oral TID With Meals Alanis Delay, CRNP      tamsulosin  0 4 mg Oral Daily With Raul Farmer MD         PRN Meds:   acetaminophen  [facility] bisacodyl    ondansetron    polyethylene glycol    Laboratory Results:  Results from last 7 days   Lab Units 04/07/21  0618 04/06/21  0453 04/05/21  0630 04/04/21  0630 04/03/21  0546 04/03/21  0545 04/01/21  0508   WBC Thousand/uL  --   --  14 35* 14 90*  --  16 44* 12 49*   HEMOGLOBIN g/dL  --   --  7 7* 7 8*  --  8 7* 8 8*   HEMATOCRIT %  --   --  23 8* 24 5*  --  26 2* 27 6*   PLATELETS Thousands/uL  --   --  194 224  --  276 275   SODIUM mmol/L 127* 129* 129* 130* 131*  --  130*   POTASSIUM mmol/L 4 5 4 0 4 0 4 0 4 1  --  4 3   CHLORIDE mmol/L 94* 98* 97* 97* 99*  --  101   CO2 mmol/L 17* 23 23 25 24  --  23   BUN mg/dL 36* 25 20 17 16  --  17   CREATININE mg/dL 2 12* 1 87* 1 53* 1 34* 1 22  --  1 18   CALCIUM mg/dL 7 9* 7 7* 7 6* 7 6* 7 9*  --  7 8*

## 2021-04-07 NOTE — PLAN OF CARE
Problem: PAIN - ADULT  Goal: Verbalizes/displays adequate comfort level or baseline comfort level  Description: Interventions:  - Encourage patient to monitor pain and request assistance  - Assess pain using appropriate pain scale  - Administer analgesics based on type and severity of pain and evaluate response  - Implement non-pharmacological measures as appropriate and evaluate response  - Consider cultural and social influences on pain and pain management  - Notify physician/advanced practitioner if interventions unsuccessful or patient reports new pain  Outcome: Progressing     Problem: INFECTION - ADULT  Goal: Absence or prevention of progression during hospitalization  Description: INTERVENTIONS:  - Assess and monitor for signs and symptoms of infection  - Monitor lab/diagnostic results  - Monitor all insertion sites, i e  indwelling lines, tubes, and drains  - Monitor endotracheal if appropriate and nasal secretions for changes in amount and color  - [place] appropriate cooling/warming therapies per order  - Administer medications as ordered  - Instruct and encourage patient and family to use good hand hygiene technique  - Identify and instruct in appropriate isolation precautions for identified infection/condition  Outcome: Progressing  Goal: Absence of fever/infection during neutropenic period  Description: INTERVENTIONS:  - Monitor WBC    Outcome: Progressing     Problem: SAFETY ADULT  Goal: Patient will remain free of falls  Description: INTERVENTIONS:  - Assess patient frequently for physical needs  -  Identify cognitive and physical deficits and behaviors that affect risk of falls    -  [place] fall precautions as indicated by assessment   - Educate patient/family on patient safety including physical limitations  - Instruct patient to call for assistance with activity based on assessment  - Modify environment to reduce risk of injury  - Consider OT/PT consult to assist with strengthening/mobility  Outcome: Progressing  Goal: Maintain or return to baseline ADL function  Description: INTERVENTIONS:  -  Assess patient's ability to carry out ADLs; assess patient's baseline for ADL function and identify physical deficits which impact ability to perform ADLs (bathing, care of mouth/teeth, toileting, grooming, dressing, etc )  - Assess/evaluate cause of self-care deficits   - Assess range of motion  - Assess patient's mobility; develop plan if impaired  - Assess patient's need for assistive devices and provide as appropriate  - Encourage maximum independence but intervene and supervise when necessary  - Involve family in performance of ADLs  - Assess for home care needs following discharge   - Consider OT consult to assist with ADL evaluation and planning for discharge  - Provide patient education as appropriate  Outcome: Progressing  Goal: Maintain or return mobility status to optimal level  Description: INTERVENTIONS:  - Assess patient's baseline mobility status (ambulation, transfers, stairs, etc )    - Identify cognitive and physical deficits and behaviors that affect mobility  - Identify mobility aids required to assist with transfers and/or ambulation (gait belt, sit-to-stand, lift, walker, cane, etc )  - [place] fall precautions as indicated by assessment  - Record patient progress and toleration of activity level on Mobility SBAR; progress patient to next Phase/Stage  - Instruct patient to call for assistance with activity based on assessment  - Consider rehabilitation consult to assist with strengthening/weightbearing, etc   Outcome: Progressing     Problem: DISCHARGE PLANNING  Goal: Discharge to home or other facility with appropriate resources  Description: INTERVENTIONS:  - Identify barriers to discharge w/patient and caregiver  - Arrange for needed discharge resources and transportation as appropriate  - Identify discharge learning needs (meds, wound care, etc )  - Arrange for interpretive services to assist at discharge as needed  - Refer to Case Management Department for coordinating discharge planning if the patient needs post-hospital services based on physician/advanced practitioner order or complex needs related to functional status, cognitive ability, or social support system  Outcome: Progressing     Problem: Knowledge Deficit  Goal: Patient/family/caregiver demonstrates understanding of disease process, treatment plan, medications, and discharge instructions  Description: Complete learning assessment and assess knowledge base  Interventions:  - Provide teaching at level of understanding  - Provide teaching via preferred learning methods  Outcome: Progressing     Problem: Prexisting or High Potential for Compromised Skin Integrity  Goal: Skin integrity is maintained or improved  Description: INTERVENTIONS:  - Identify patients at risk for skin breakdown  - Assess and monitor skin integrity  - Assess and monitor nutrition and hydration status  - Monitor labs   - Assess for incontinence   - Turn and reposition patient  - Assist with mobility/ambulation  - Relieve pressure over bony prominences  - Avoid friction and shearing  - Provide appropriate hygiene as needed including keeping skin clean and dry  - Evaluate need for skin moisturizer/barrier cream  - Collaborate with interdisciplinary team   - Patient/family teaching  - Consider wound care consult   Outcome: Progressing     Problem: Potential for Falls  Goal: Patient will remain free of falls  Description: INTERVENTIONS:  - Assess patient frequently for physical needs  -  Identify cognitive and physical deficits and behaviors that affect risk of falls    -  [place] fall precautions as indicated by assessment   - Educate patient/family on patient safety including physical limitations  - Instruct patient to call for assistance with activity based on assessment  - Modify environment to reduce risk of injury  - Consider OT/PT consult to assist with strengthening/mobility  Outcome: Progressing     Problem: Nutrition/Hydration-ADULT  Goal: Nutrient/Hydration intake appropriate for improving, restoring or maintaining nutritional needs  Description: Monitor and assess patient's nutrition/hydration status for malnutrition  Collaborate with interdisciplinary team and initiate plan and interventions as ordered  Monitor patient's weight and dietary intake as ordered or per policy  Utilize nutrition screening tool and intervene as necessary  Determine patient's food preferences and provide high-protein, high-caloric foods as appropriate       INTERVENTIONS:  - Monitor oral intake, urinary output, labs, and treatment plans  - Assess nutrition and hydration status and recommend course of action  - Evaluate amount of meals eaten  - Assist patient with eating if necessary   - Allow adequate time for meals  - Recommend/ encourage appropriate diets, oral nutritional supplements, and vitamin/mineral supplements  - Order, calculate, and assess calorie counts as needed  - Recommend, monitor, and adjust tube feedings and TPN/PPN based on assessed needs  - Assess need for intravenous fluids  - Provide specific nutrition/hydration education as appropriate  - Include patient/family/caregiver in decisions related to nutrition  Outcome: Progressing

## 2021-04-07 NOTE — PROGRESS NOTES
Progress Note - Hank Rojas 76 y o  male MRN: [id number]    Unit/Bed#: -01 Encounter: [id number]      CC: diabetes f/u    Subjective:   Hank Rojas is a 76y o  year old male with type 1 diabetes for many years who is admitted to the acute rehab Center for rehabilitation after L1 vertebral fracture  He has had type 1 diabetes for many years managed on insulin pump at home with tandem t slim pump and dex com continues glucose monitor   he is currently on insulin Lantus 6 units q h s  And correctional sliding scale  Patient's blood sugars have been within goal however patient had hyperglycemia this morning,     Objective:     Vitals: Blood pressure (!) 87/55, pulse 93, temperature 97 7 °F (36 5 °C), temperature source Oral, resp  rate 18, height 5' 8" (1 727 m), weight 78 6 kg (173 lb 4 5 oz), SpO2 93 %  ,Body mass index is 26 35 kg/m²  Intake/Output Summary (Last 24 hours) at 4/7/2021 1300  Last data filed at 4/7/2021 1032  Gross per 24 hour   Intake 480 ml   Output 550 ml   Net -70 ml       Physical Exam:  General Appearance: awake, appears stated age  Head: Normocephalic, without obvious abnormality, atraumatic  Extremities: moves all extremities  Skin: Skin color and temperature normal    Pulm: no labored breathing    Lab, Imaging and other studies: I have personally reviewed pertinent reports        POC Glucose (mg/dl)   Date Value   04/07/2021 385 (H)   04/07/2021 377 (H)   04/06/2021 288 (H)   04/06/2021 197 (H)   04/06/2021 156 (H)   04/06/2021 166 (H)   04/05/2021 255 (H)   04/05/2021 159 (H)   04/05/2021 145 (H)   04/05/2021 166 (H)       Assessment and plan:    ATN (acute tubular necrosis) ([facility] [address] )  Assessment & Plan  Management per primary team      Diabetic ulcer of right foot [facility])  Assessment & Plan  Lab Results   Component Value Date    HGBA1C 7 7 (H) 02/05/2021       Recent Labs     04/02/21  1559 04/02/21 2024 04/03/21  0622 04/03/21  1042   POCGLU 131 95 104 100     Podiatry on board, status post debridement    Type 1 diabetes mellitus [facility])  Assessment & Plan  Lab Results   Component Value Date    HGBA1C 7 7 (H) 02/05/2021       Recent Labs     04/02/21  1559 04/02/21 2024 04/03/21  0622 04/03/21  1042   POCGLU 131 95 104 100     On insulin pump at home,   with tandem T Slim, and Dexcom  The pump setting is as below:  12 am:  0 4 unit/hour  6:00 a m :  0 5  8:00 p m  0 45     Insulin to carb ratio 25  Insulin sensitivity factor 80  Target blood glucose 110    Currently on Lantus 6 units q h s  And correctional sliding scale  As patient's appetite is improving and dietary supplement( Glucerna) added to his regimen his blood sugars are rising  We changed his carbohydrate consistent diet to level 1  We added nighttime coverage  Continue Lantus 6 units q h s  Add Humalog 2 units t i d  A c  Continue to monitor blood sugar over time and make adjustments to the regimen if necessary      Encephalopathy  Assessment & Plan  Management as per primary team    Hypothyroidism  Assessment & Plan  Continue levothyroxine current dose and repeat TFT in 4-6 weeks as outpatient    * Vertebral fracture, osteoporotic ([facility] [address] )  Assessment & Plan  Previous DEXA scan in 2018 showed osteoporosis  Patient needs outpatient follow-up with endocrinology for osteoporosis management, he may benefit from anabolic agents as bisphosphonate is not working  Portions of the record may have been created with voice recognition software

## 2021-04-08 NOTE — PROGRESS NOTES
04/08/21 0830   Pain Assessment   Pain Assessment Tool 0-10   Pain Score Worst Possible Pain  (5-10 L LBP pain )   Pain Location/Orientation Orientation: Left; Location: Back   Pain Radiating Towards knee   Hospital Pain Intervention(s) Repositioned;Rest;Relaxation technique  (given tylenol by RN Edmund Sexton)   Restrictions/Precautions   Precautions Fall Risk;Bed/chair alarms;Supervision on toilet/commode;Pain;O2;Multiple lines;Cognitive;1:1;Fluid restriction  (IV fluids, 2L , yellow phase post covid )   Braces or Orthoses TLSO   General   Change In Medical/Functional Status fluid restriction is now at 2000ml    Cognition   Overall Cognitive Status Impaired   Arousal/Participation Cooperative, Responsive   Attention Attends with cues to redirect   Following Commands Follows one step commands with increased time or repetition   Subjective   Subjective pt was in bed not yet fully awake but was responsive and was agreeable to have PT   Roll Left and Right   Type of Assistance Needed Physical assistance;Verbal cues; Adaptive equipment   Amount of Physical Assistance Provided 25%-49%   Comment VC to reach across with L UE to hold rail and assist on trunk    Roll Left and Right CARE Score 3   Lying to Sitting on Side of Bed   Type of Assistance Needed Physical assistance;Verbal cues; Adaptive equipment   Amount of Physical Assistance Provided 75% or more   Comment assist on trunk and LE    Lying to Sitting on Side of Bed CARE Score 2   Sit to Stand   Type of Assistance Needed Physical assistance;Verbal cues; Adaptive equipment   Amount of Physical Assistance Provided 75% or more   Comment max A to stand up from bed, min A to sit on recliner with VC for hand placement about 100% of the time   Sit to Stand CARE Score 2   Bed-Chair Transfer   Type of Assistance Needed Physical assistance;Verbal cues; Adaptive equipment   Amount of Physical Assistance Provided 25%-49%   Comment min SPT bed to recliner with RW, VC to complete turn and walker placement prior to reaching for recliner arm rest   Chair/Bed-to-Chair Transfer CARE Score 3   Therapeutic Interventions   Balance required min/CG assist to maintain EOB sitting , see vitals for supine and sitting BP   Other IS x 10 reps reaching 750-1250 ml at best , VC for technique and rest in between huffs   Other Comments   Comments total assist to don TLSO back style brace   Assessment   Treatment Assessment Pt able to engaged in skilled PT intervention focusing on in room mobilities and care  Pt initially trialed off O2 per CRNP Marysol request with SpO2 at rest ranging 91-92% but dropped to 85% after SPT with RW  So pt was left on RA at end of tx but 1:1 PCA Yuli instructed to monitor using pulse oximeter and notified PT that pt dropped to 88% so he was put back on 1L with O2 sat at 92%  PT will continue to monitor O2 sat this PM following O2 sat new order parameter of >90%  Will cont to work on functional mobility training with RW, strengthening and balance training as tolerated  Barriers to Discharge Inaccessible home environment;Decreased caregiver support   PT Barriers   Physical Impairment Decreased strength;Decreased endurance; Impaired balance;Decreased mobility; Decreased cognition; Impaired judgement;Decreased safety awareness;Decreased coordination;Orthopedic restrictions;Pain   Functional Limitation Car transfers; Ramp negotiation;Stair negotiation;Standing;Transfers; Walking; Wheelchair management   Plan   Treatment/Interventions Functional transfer training; Therapeutic exercise; Bed mobility;Spoke to MD;Spoke to nursing;OT;Endurance training   Progress Slow progress, decreased activity tolerance   Recommendation   Equipment Recommended Wheelchair;Walker   PT - OK to Discharge No   PT Therapy Minutes   PT Time In 0830   PT Time Out 0900   PT Total Time (minutes) 30   PT Mode of treatment - Individual (minutes) 30   PT Mode of treatment - Concurrent (minutes) 0   PT Mode of treatment - Group (minutes) 0   PT Mode of treatment - Co-treat (minutes) 0   PT Mode of Treatment - Total time(minutes) 30 minutes   PT Cumulative Minutes 382

## 2021-04-08 NOTE — PROGRESS NOTES
04/08/21 1320   Pain Assessment   Pain Location/Orientation Orientation: Bilateral;Orientation: Lower;Orientation: Mid;Location: Back   Pain Rating: FLACC (Rest) - Face 0   Pain Rating: FLACC (Rest) - Legs 0   Pain Rating: FLACC (Rest) - Activity 0   Pain Rating: FLACC (Rest) - Cry 0   Pain Rating: FLACC (Rest) - Consolability 0   Score: FLACC (Rest) 0   Pain Rating: FLACC (Activity) - Face 1   Pain Rating: FLACC (Activity) - Legs 1   Pain Rating: FLACC (Activity) - Activity 1   Pain Rating: FLACC (Activity) - Cry 1   Pain Rating: FLACC (Activity) - Consolability 1   Score: FLACC (Activity) 5   Restrictions/Precautions   Precautions Bed/chair alarms;Cognitive; Fall Risk;1:1;Multiple lines;O2;Supervision on toilet/commode  (2L o2)   Cognition   Arousal/Participation Lethargic;Cooperative   Subjective   Subjective pt was asleep upon arrival, he reported it was nice to go outside    Sit to Stand   Type of Assistance Needed Physical assistance; Adaptive equipment   Amount of Physical Assistance Provided 50%-74%   Sit to Stand CARE Score 2   Bed-Chair Transfer   Type of Assistance Needed Physical assistance; Adaptive equipment   Amount of Physical Assistance Provided 50%-74%   Chair/Bed-to-Chair Transfer CARE Score 2   Transfer Bed/Chair/Wheelchair   Limitations Noted In Balance; Coordination; Endurance;Problem Solving; Sequencing;UE Strength;LE Strength   Adaptive Equipment Roller Walker   Stand Pivot Moderate Assist   Sit to Stand Minimal Assist;Moderate Assist   Stand to Sit Minimal Assist;Moderate Assist   Findings pt is unsafe with turns and despite VC reached for WC and started to sit before he was directly over the [facility], needed inc A to lower to chair as attempts to get him to stand and finish the turn were unsuccessful  Toilet Transfer   Type of Assistance Needed Physical assistance; Adaptive equipment   Amount of Physical Assistance Provided Total assistance   Comment Ax2   Toilet Transfer CARE Score 1   Toilet Transfer   Surface Assessed Raised Toilet   Limitations Noted In Balance; Endurance;UE Strength;LE Strength; Sequencing   Adaptive Equipment Grab Bar   Findings Min-ModAx2 with A for O2 line    Therapeutic Interventions   Strengthening 2 sit< stands outside with HHA x2 for balance support,   Other Comments   Comments pt's wife present; she continues to be supportive of patient and of therapy to help patient make improvements  Cont to recommend that pt needs increased physical A and is unsafe to dc home at htis time due to mental status, altered sleep-wake cycle and need for physical A for mobility  Pt also has very low processing and needs VC for safety awareness  Assessment   Treatment Assessment After talking with Dr Dionte Moya, plan for this session was to take patient outside tos ee if it will help with his sleep-wake cycle  PCA [place] Amina and I assisted pt with transfers; saline IV was able to be d/c for this session  Pt was much more alert, awake, and talkative outside and was able to follow directions, but cont to need increased time  Dr Dionte Moya was outside as well to see how pt was functioning  Plan is to keep taking patient outside for therapy sessions as he had a much better response in this session  Barriers to Discharge Inaccessible home environment;Decreased caregiver support   PT Barriers   Physical Impairment Decreased strength;Decreased range of motion;Decreased endurance; Impaired balance;Decreased mobility; Decreased cognition;Decreased safety awareness;Pain   Functional Limitation Car transfers;Stair negotiation;Ramp negotiation;Standing;Transfers; Walking; Wheelchair management   Plan   Treatment/Interventions Functional transfer training;LE strengthening/ROM; Therapeutic exercise; Endurance training;Cognitive reorientation;Patient/family training;Equipment eval/education; Bed mobility;Gait training   Progress Slow progress, cognitive deficits   Recommendation   PT Discharge Recommendation   (needs 247A) Equipment Recommended Wheelchair;Walker   PT Therapy Minutes   PT Time In 1320   PT Time Out 1420   PT Total Time (minutes) 60   PT Mode of treatment - Individual (minutes) 60   PT Mode of treatment - Concurrent (minutes) 0   PT Mode of treatment - Group (minutes) 0   PT Mode of treatment - Co-treat (minutes) 0   PT Mode of Treatment - Total time(minutes) 60 minutes   PT Cumulative Minutes 442   Therapy Time missed   Time missed?  No

## 2021-04-08 NOTE — PLAN OF CARE
Problem: PAIN - ADULT  Goal: Verbalizes/displays adequate comfort level or baseline comfort level  Description: Interventions:  - Encourage patient to monitor pain and request assistance  - Assess pain using appropriate pain scale  - Administer analgesics based on type and severity of pain and evaluate response  - Implement non-pharmacological measures as appropriate and evaluate response  - Consider cultural and social influences on pain and pain management  - Notify physician/advanced practitioner if interventions unsuccessful or patient reports new pain  Outcome: Progressing     Problem: INFECTION - ADULT  Goal: Absence or prevention of progression during hospitalization  Description: INTERVENTIONS:  - Assess and monitor for signs and symptoms of infection  - Monitor lab/diagnostic results  - Monitor all insertion sites, i e  indwelling lines, tubes, and drains  - Monitor endotracheal if appropriate and nasal secretions for changes in amount and color  - [place] appropriate cooling/warming therapies per order  - Administer medications as ordered  - Instruct and encourage patient and family to use good hand hygiene technique  - Identify and instruct in appropriate isolation precautions for identified infection/condition  Outcome: Progressing  Goal: Absence of fever/infection during neutropenic period  Description: INTERVENTIONS:  - Monitor WBC    Outcome: Progressing     Problem: SAFETY ADULT  Goal: Patient will remain free of falls  Description: INTERVENTIONS:  - Assess patient frequently for physical needs  -  Identify cognitive and physical deficits and behaviors that affect risk of falls    -  [place] fall precautions as indicated by assessment   - Educate patient/family on patient safety including physical limitations  - Instruct patient to call for assistance with activity based on assessment  - Modify environment to reduce risk of injury  - Consider OT/PT consult to assist with strengthening/mobility  Outcome: Progressing  Goal: Maintain or return to baseline ADL function  Description: INTERVENTIONS:  -  Assess patient's ability to carry out ADLs; assess patient's baseline for ADL function and identify physical deficits which impact ability to perform ADLs (bathing, care of mouth/teeth, toileting, grooming, dressing, etc )  - Assess/evaluate cause of self-care deficits   - Assess range of motion  - Assess patient's mobility; develop plan if impaired  - Assess patient's need for assistive devices and provide as appropriate  - Encourage maximum independence but intervene and supervise when necessary  - Involve family in performance of ADLs  - Assess for home care needs following discharge   - Consider OT consult to assist with ADL evaluation and planning for discharge  - Provide patient education as appropriate  Outcome: Progressing  Goal: Maintain or return mobility status to optimal level  Description: INTERVENTIONS:  - Assess patient's baseline mobility status (ambulation, transfers, stairs, etc )    - Identify cognitive and physical deficits and behaviors that affect mobility  - Identify mobility aids required to assist with transfers and/or ambulation (gait belt, sit-to-stand, lift, walker, cane, etc )  - [place] fall precautions as indicated by assessment  - Record patient progress and toleration of activity level on Mobility SBAR; progress patient to next Phase/Stage  - Instruct patient to call for assistance with activity based on assessment  - Consider rehabilitation consult to assist with strengthening/weightbearing, etc   Outcome: Progressing     Problem: DISCHARGE PLANNING  Goal: Discharge to home or other facility with appropriate resources  Description: INTERVENTIONS:  - Identify barriers to discharge w/patient and caregiver  - Arrange for needed discharge resources and transportation as appropriate  - Identify discharge learning needs (meds, wound care, etc )  - Arrange for interpretive services to assist at discharge as needed  - Refer to Case Management Department for coordinating discharge planning if the patient needs post-hospital services based on physician/advanced practitioner order or complex needs related to functional status, cognitive ability, or social support system  Outcome: Progressing     Problem: Prexisting or High Potential for Compromised Skin Integrity  Goal: Skin integrity is maintained or improved  Description: INTERVENTIONS:  - Identify patients at risk for skin breakdown  - Assess and monitor skin integrity  - Assess and monitor nutrition and hydration status  - Monitor labs   - Assess for incontinence   - Turn and reposition patient  - Assist with mobility/ambulation  - Relieve pressure over bony prominences  - Avoid friction and shearing  - Provide appropriate hygiene as needed including keeping skin clean and dry  - Evaluate need for skin moisturizer/barrier cream  - Collaborate with interdisciplinary team   - Patient/family teaching  - Consider wound care consult   Outcome: Progressing     Problem: Knowledge Deficit  Goal: Patient/family/caregiver demonstrates understanding of disease process, treatment plan, medications, and discharge instructions  Description: Complete learning assessment and assess knowledge base  Interventions:  - Provide teaching at level of understanding  - Provide teaching via preferred learning methods  Outcome: Progressing     Problem: Potential for Falls  Goal: Patient will remain free of falls  Description: INTERVENTIONS:  - Assess patient frequently for physical needs  -  Identify cognitive and physical deficits and behaviors that affect risk of falls    -  [place] fall precautions as indicated by assessment   - Educate patient/family on patient safety including physical limitations  - Instruct patient to call for assistance with activity based on assessment  - Modify environment to reduce risk of injury  - Consider OT/PT consult to assist with strengthening/mobility  Outcome: Progressing     Problem: Nutrition/Hydration-ADULT  Goal: Nutrient/Hydration intake appropriate for improving, restoring or maintaining nutritional needs  Description: Monitor and assess patient's nutrition/hydration status for malnutrition  Collaborate with interdisciplinary team and initiate plan and interventions as ordered  Monitor patient's weight and dietary intake as ordered or per policy  Utilize nutrition screening tool and intervene as necessary  Determine patient's food preferences and provide high-protein, high-caloric foods as appropriate       INTERVENTIONS:  - Monitor oral intake, urinary output, labs, and treatment plans  - Assess nutrition and hydration status and recommend course of action  - Evaluate amount of meals eaten  - Assist patient with eating if necessary   - Allow adequate time for meals  - Recommend/ encourage appropriate diets, oral nutritional supplements, and vitamin/mineral supplements  - Order, calculate, and assess calorie counts as needed  - Recommend, monitor, and adjust tube feedings and TPN/PPN based on assessed needs  - Assess need for intravenous fluids  - Provide specific nutrition/hydration education as appropriate  - Include patient/family/caregiver in decisions related to nutrition  Outcome: Progressing

## 2021-04-08 NOTE — PROGRESS NOTES
04/08/21 1135   Pain Assessment   Pain Assessment Tool 0-10   Pain Score 4   Pain Location/Orientation Orientation: Left; Location: Leg   Restrictions/Precautions   Precautions Bed/chair alarms;Cognitive; Fall Risk;Fluid restriction;Multiple lines;O2;Pain;Supervision on toilet/commode   Lifestyle   Autonomy "Hi, how are you?"   Eating   Type of Assistance Needed Set-up / clean-up   Amount of Physical Assistance Provided No physical assistance   Comment Pt seen during lunch  If pt's tray is placed directly in front of him, pt able to use reg utensils to scoop food and bring to mouth w/o difficulty  If tray not positioned correctly, pt noted with difficulty accurately scooping food with utensils and inability to problem solve how to self correct  Eating CARE Score 5   Cognition   Overall Cognitive Status Impaired   Arousal/Participation Responsive; Cooperative   Attention Attends with cues to redirect   Orientation Level Disoriented to place   Memory Decreased recall of precautions;Decreased recall of recent events;Decreased short term memory   Following Commands Follows one step commands with increased time or repetition   Activity Tolerance   Activity Tolerance Treatment limited secondary to medical complications (Comment)   Assessment   Treatment Assessment Pt engages in 25 minute skilled OT session focusing on self feeding using reg utensils sitting upright in recliner chair  Pt appears more alert and awake during lunch hour demonstrating ability to self feed using reg utensils with set up of lunch tray items  Pt tolerates 25 minutes of lunch before noted onset of fatigue and difficulty remaining awake  Activity tolerance limited due to medical status  Pt benefits from brief bouts of therapy vs extended duration due to cog and inc fatigue  Lunch tray was distanced from pt in order to decrease risk of choking  Pt left in recliner chair with 1:1 in place  Continue POC to decrease burden of care at d/c  Prognosis Fair   Problem List Decreased strength;Decreased range of motion;Decreased endurance; Impaired balance;Decreased mobility; Decreased cognition; Impaired judgement;Decreased safety awareness;Decreased coordination;Pain;Orthopedic restrictions   Barriers to Discharge Inaccessible home environment;Decreased caregiver support   Plan   Treatment/Interventions ADL retraining;Functional transfer training; Therapeutic exercise; Endurance training;Cognitive reorientation;Patient/family training;Equipment eval/education; Compensatory technique education   OT Therapy Minutes   OT Time In 1135   OT Time Out 1200   OT Total Time (minutes) 25   OT Mode of treatment - Individual (minutes) 25   OT Mode of treatment - Concurrent (minutes) 0   OT Mode of treatment - Group (minutes) 0   OT Mode of treatment - Co-treat (minutes) 0   OT Mode of Treatment - Total time(minutes) 25 minutes   OT Cumulative Minutes 333   Therapy Time missed   Time missed?  No

## 2021-04-08 NOTE — PROGRESS NOTES
04/08/21 1430   Pain Assessment   Pain Assessment Tool 0-10   Pain Score 4   Pain Location/Orientation Orientation: Left; Location: Back   Pain Radiating Towards knee   Effect of Pain on Daily Activities pain did not limit mobilities or participation this session   Hospital Pain Intervention(s) Rest;Repositioned   Restrictions/Precautions   Precautions Fall Risk;Bed/chair alarms;1:1;Fluid restriction;O2;Pain;Spinal precautions;Supervision on toilet/commode;Multiple lines  (2L, 2000ml, IV fluids)   Braces or Orthoses TLSO  (darco shoe R, surgical shoe L )   Cognition   Overall Cognitive Status Impaired   Arousal/Participation Alert; Cooperative   Attention Difficulty attending to directions   Orientation Level Oriented to person;Disoriented to place;Oriented to time;Disoriented to situation   Memory Decreased short term memory;Decreased recall of recent events;Decreased recall of precautions   Following Commands Follows one step commands with increased time or repetition   Subjective   Subjective pt was awake sitting on w/c and was agreeable to have PT  He reported ongoing L LBP but did not endorsed exacerbation of pain with mobilities   Sit to Stand   Type of Assistance Needed Physical assistance;Verbal cues; Adaptive equipment   Amount of Physical Assistance Provided 50%-74%   Comment min-mod with max VC for hand placement   Sit to Stand CARE Score 2   Bed-Chair Transfer   Type of Assistance Needed Physical assistance;Verbal cues; Adaptive equipment   Amount of Physical Assistance Provided 25%-49%   Comment min A with RW   Chair/Bed-to-Chair Transfer CARE Score 3   Walk 10 Feet   Type of Assistance Needed Physical assistance;Verbal cues; Adaptive equipment   Amount of Physical Assistance Provided Total assistance   Comment 10' x 2 with RW on pavers outside requiring mod A x 1 with CF and assist with lines of 2 additional person    Walk 10 Feet CARE Score 1   Therapeutic Interventions   Strengthening seated TE in recliner include bilat hip flexion with A/A x 10 x 3, bilat Active LAQ x 10 x 3 , pillow squeezes x 10 x 3, ankle DF x 10 x 3    Equipment Use   NuStep L1 x 10 mins with active assist to keep SPM> 40 using bilat UE/LE    Other Comments   Comments Pt on 2L supplemental O2 throughout tx with O2 sat staying 93-94% at rest and after mobilities  Assessment   Treatment Assessment Pt was awake and able to actively engaged in skilled PT intervention focusing on transfers, gait training and strengthening exercise  Although pt able to amb on pavers outside using a RW but demo inc difficulty in foot clearance requiring assist for lateral weight shifting and facilitation at hip flexors  Also noted difficulty attending to one step command which PT anticipate due to distraction as pt's wife was on the phone on speaker while observing pt during gait training  As noted improve command follow ability upon returning to the unit  Pt will benefit from cont skilled PT intervention focusing on functional mobility training with RW, standing balance training and strengthening exercises as tolerated  Family/Caregiver Present wife Janeen Nguyen observed first 30 mins of tx session and saw pt performed chair transfers and walking on pavement using RW    Barriers to Discharge Decreased caregiver support; Inaccessible home environment   PT Barriers   Physical Impairment Decreased strength;Decreased endurance; Impaired balance;Decreased mobility;Pain;Orthopedic restrictions;Decreased safety awareness; Impaired judgement;Decreased cognition;Decreased coordination   Functional Limitation Car transfers; Ramp negotiation;Stair negotiation;Standing;Transfers; Walking; Wheelchair management   Plan   Treatment/Interventions Therapeutic exercise;LE strengthening/ROM; Functional transfer training; Endurance training;Gait training;Spoke to nursing;OT;Patient/family training; Compensatory technique education;Spoke to advanced practitioner   Progress Slow progress, decreased activity tolerance   Recommendation   PT - OK to Discharge No   PT Therapy Minutes   PT Time In 1430   PT Time Out 1538   PT Total Time (minutes) 68   PT Mode of treatment - Individual (minutes) 68   PT Mode of treatment - Concurrent (minutes) 0   PT Mode of treatment - Group (minutes) 0   PT Mode of treatment - Co-treat (minutes) 0   PT Mode of Treatment - Total time(minutes) 68 minutes   PT Cumulative Minutes 450

## 2021-04-08 NOTE — PROGRESS NOTES
PM&R PROGRESS NOTE:  Kendal Leach 76 y o  male MRN: [id number]  Unit/Bed#: -01 Encounter: [id number]        Rehabilitation Diagnosis: Impairment of mobility, safety, Activities of Daily Living (ADLs), and cognitive/communication skills due to Orthopedic Disorders:  08 9  Other Orthopedic L1 compression fracture    HPI: Kendal Leach is a 76 y o  male with DM1 with chronic insulin pump, diabetic neuropathy, CKD 3, PAD, heart block with dual chamber ICD, HTN, hypothyroidism, COVID-19 virus in Feb 2021 with respiratory failure, osteoporosis, who presented to the [facility] on 3/27/21 with fatigue and hematuria 10 days after receiving his 2nd Tap[employer] vaccine  Patient found to have acute mental status change, diarrhea, KAZ, Non MI troponin elevation, and some hematuria  Infectious workup revealed bacteremia with GPR - Corynebacterium bacteremia  Patient started on Cefepime, but discontinued after repeat blood cultures negative per Infectious Disease recommendations  CT abdomen/pelvis with findings of right pleural effusion, ground-glass opacification in the right lower lobe, findings of punctate gallstone, findings of worsening L1 compression fracture  Seen by Cardiology given recent history of COVID-19 virus, and echocardiogram ordered showing EF 65%, mild MR, TR  Patient seen by Miguel Alcocer for worsening L1 vertebral compression fracture  Lumbar x-ray showing moderate anterior wedge compression fracture at L1 with 50% loss of height  Patient was treated conservatively with TLSO backpack brace when OOB and >45degrees  Patient was agitated and confused during his hospitalization, acutely worsened on 3/31/21- patient received Ativan IV  Improved during the day  Patient found to have acute functional deficits from his baseline, and after medical stabilization and clearance, patient was admitted to [facility] for acute inpatient rehabilitation        SUBJECTIVE:  Patient seen face to face  Continues with confusion at night and impulsive behavior  Creatinine worse today, but sodium improved  Endocrine and nephrology following, on gentle IVF  Worked with patient outside, mood and demeanor improved drastically  We continue to work on sleep/wake cycle and keeping him awake during the day  He denies fever, chills, nausea, emesis, dysuria, constipation, diarrhea, shortness of breath or cough  ASSESSMENT: Stable, progressing      PLAN:    Rehabilitation  · Functional deficits:  Proximal musculature weakness x4, Impaired cognition, impaired mobility, self care  · Poor safety awareness, high fall risk - witness multiple times setting bed and chair alarms trying to get out of bed:  Continue 1:1  and 4 side rails   Continue current rehabilitation plan of care to maximize function   Functional update:   o Shower with mod A, Upper body dressing Mod A, lower body ADLs Total A, footwear Total A, bed mobility min-mod A, transfers min A with RW   Estimated Discharge: To be determined    DVT prophylaxis  · Managed on subcutaneous heparin     Pain  · No acute issues currently     Bladder plan  · Continent, PVRs checked and low     Bowel plan  · + constipation: Last BM 4/8    Gram-positive bacteremia  Assessment & Plan  · Cornebacterium bacteremia  · Treated with IV cefepime later discontinued as subsequent blood cultures negative    Hyponatremia  Assessment & Plan  · Sodium of 132, stable today  · Fluid restrictions and salt tabs  · Nephrology and IM following    COVID-19  Assessment & Plan  · Patient with COVID-19 virus infection in February 2021  Completed remdesivir and systemic corticosteroid     Off isolation  · Patient with residual findings on chest x-ray and CT scan as expected  · No respiratory distress  · Will continue to follow    Diabetic ulcer of right foot ([facility] [address] )  Assessment & Plan  · Podiatry consulted to follow    Type 1 diabetes mellitus [facility])  Assessment & Plan  Lab Results Component Value Date    HGBA1C 7 7 (H) 02/05/2021     · Patient at home managed on insulin pump, however given recent impaired cognition, currently being managed on Lantus, Humalog and sliding scale insulin by endocrinology  · Endocrinology to follow while at [facility] and assist in management  Encephalopathy  Assessment & Plan  · Unclear etiology  · Patient with poor safety awareness and a high fall risk demonstrated by setting off chair and bed alarms multiple times by trying to get out of bed and disoriented  · 1:1  ordered and 4 side rails  · P O  Xanax prn stopped for daytime tiredness and fatigue  · Etiology unclear:    · CTH negative for acute findings, but showing older CVAs - would benefit from outpatient Neurology  · Follow labs, on Keflex for potential UTI  · Ammonia levels WNL, oxygenating on supplemental O2  · Promotion of sleep/wake cycles, stimulation during day in order to sleep overnight  · Consider MRI of brain as next step if symptoms do not improve with more conservative measures    Leukocytosis  Assessment & Plan  · Trending down 14K  · Completed Zithromax for possible PNA by IM consultants seen on previous CXR     · CXR showing moderate pleural effusions  · Follow CBC    Stage 3b chronic kidney disease  Assessment & Plan  · Baseline creatinine 1 2-1 5 per records  · Nephrology following, labs obtained including urine studies, cortisol, evidence of volume contraction, will continue IVF for now minding his cardiac status  · Internal medicine consultants following, appreciate recommendations and work up  · Creatinine 2 38 today    Medtronic dual chamber PM  Assessment & Plan  +ICD placed for complete heart block    Hyperlipidemia  Assessment & Plan  · Managed on Lipitor home dose 5 mg daily  · At home was also taking Zetia 5 mg daily    Essential hypertension  Assessment & Plan  · Currently managed on lisinopril 10mg daily starting 4/4/21  · At home was taking Norvasc 5 mg daily  · Internal medicine consultants to follow    Anemia of chronic disease  Assessment & Plan  · H/H trending down - Hgb on 4/5 is 7 7 (7 8)  · Unclear etiology - likely anemia of chronic disease  · Checking iron studies- IM started iron supplementation on 4/5  · Occult stool negative  · Type and Screen completed on 4/4    Hypothyroidism  Assessment & Plan  · Managed on Synthroid 125 mcg daily home dose    Acid reflux  Assessment & Plan  · Managed on Protonix    * Vertebral fracture, osteoporotic (HCC)  Assessment & Plan  · L1 anterior vertebral compression fracture with 50% loss of height found on CT abdomen pelvis - confirmed on lumbar x-ray  · Seen by Neurosurgery and treated conservatively with TLSO backpack brace when  OOB and >45degrees  · Follow-up with Neurosurgery as an outpatient        Appreciate IM consultants medical co-management  Labs, medications, and imaging personally reviewed  ROS:  A ten point review of systems was completed on 4/4/21 and pertinent positives are listed in subjective section  All other systems reviewed were negative  OBJECTIVE:   /68 (BP Location: Right arm)   Pulse 94   Temp 97 5 °F (36 4 °C) (Oral)   Resp 18   Ht 5' 8" (1 727 m)   Wt 78 6 kg (173 lb 4 5 oz)   SpO2 90%   BMI 26 35 kg/m²     Physical Exam  Vitals signs and nursing note reviewed  Constitutional:       General: He is not in acute distress  Appearance: He is well-developed  HENT:      Head: Normocephalic  Nose: Nose normal    Eyes:      Conjunctiva/sclera: Conjunctivae normal    Neck:      Musculoskeletal: Neck supple  Cardiovascular:      Rate and Rhythm: Normal rate  Pulmonary:      Effort: Pulmonary effort is normal       Breath sounds: No wheezing  Abdominal:      General: There is no distension  Palpations: Abdomen is soft  Skin:     General: Skin is warm  Coloration: Skin is pale  Neurological:      Mental Status: He is alert        Motor: Weakness (4-/5 proximally and 4/5 distally) present        Comments: Oriented to person and place  Unclear of situation   Psychiatric:         Mood and Affect: Mood normal           Lab Results   Component Value Date    WBC 14 35 (H) 04/05/2021    HGB 7 7 (L) 04/05/2021    HCT 23 8 (L) 04/05/2021    MCV 87 04/05/2021     04/05/2021     Lab Results   Component Value Date    SODIUM 132 (L) 04/08/2021    K 3 8 04/08/2021     04/08/2021    CO2 23 04/08/2021    BUN 45 (H) 04/08/2021    CREATININE 2 38 (H) 04/08/2021    GLUC 159 (H) 04/08/2021    CALCIUM 7 6 (L) 04/08/2021     Lab Results   Component Value Date    INR 1 03 06/23/2015    INR 1 06 06/20/2015    PROTIME 13 3 06/23/2015    PROTIME 13 2 06/20/2015           Current Facility-Administered Medications:     acetaminophen (TYLENOL) tablet 650 mg, 650 mg, Oral, Q6H PRN, Sadaf Howard MD, 650 mg at 04/08/21 2565    aspirin chewable tablet 81 mg, 81 mg, Oral, Daily, Sadaf Howard MD, 81 mg at 04/08/21 0831    atorvastatin (LIPITOR) tablet 5 mg, 5 mg, Oral, Daily, Sadaf Howard MD, 5 mg at 04/08/21 0831    bisacodyl (DULCOLAX) rectal suppository 10 mg, 10 mg, Rectal, Daily PRN, Sadaf Howard MD, 10 mg at 04/04/21 1716    cephalexin (KEFLEX) capsule 500 mg, 500 mg, Oral, Q12H [address], TERRY Jaramillo, 500 mg at 04/08/21 0831    cholecalciferol (VITAMIN D3) tablet 4,000 Units, 4,000 Units, Oral, Daily, Sadaf Howard MD, 4,000 Units at 04/08/21 0831    docusate sodium (COLACE) capsule 100 mg, 100 mg, Oral, BID, Sadaf Howard MD, 100 mg at 04/08/21 0830    ferrous sulfate tablet 325 mg, 325 mg, Oral, Daily With Breakfast, TERRY Jaramillo, 325 mg at 04/08/21 0831    heparin (porcine) subcutaneous injection 5,000 Units, 5,000 Units, Subcutaneous, Q8H [address], Sadaf Howard MD, 5,000 Units at 04/08/21 1414    insulin glargine (LANTUS) subcutaneous injection 6 Units 0 06 mL, 6 Units, Subcutaneous, HS, Sadaf Howard MD, 6 Units at 04/07/21 2145    insulin lispro (HumaLOG) 100 units/mL subcutaneous injection 1-5 Units, 1-5 Units, Subcutaneous, TID AC, 1 Units at 04/08/21 1202 **AND** Fingerstick Glucose (POCT), , , TID AC, Swathi Duong MD    insulin lispro (HumaLOG) 100 units/mL subcutaneous injection 1-5 Units, 1-5 Units, Subcutaneous, HS, Swathi Duong MD, 3 Units at 04/07/21 2145    insulin lispro (HumaLOG) 100 units/mL subcutaneous injection 2 Units, 2 Units, Subcutaneous, TID With Meals, Swathi Duong MD, 2 Units at 04/08/21 1202    levothyroxine tablet 125 mcg, 125 mcg, Oral, Early Morning, Lyn Berry MD, 125 mcg at 04/08/21 0644    ondansetron (ZOFRAN-ODT) dispersible tablet 4 mg, 4 mg, Oral, Q6H PRN, Lyn Berry MD    pantoprazole (PROTONIX) EC tablet 40 mg, 40 mg, Oral, Daily Before Breakfast, Lyn Berry MD, 40 mg at 04/08/21 0644    polyethylene glycol (MIRALAX) packet 17 g, 17 g, Oral, Daily PRN, Lyn Berry MD, 17 g at 04/07/21 1845    sodium bicarbonate tablet 650 mg, 650 mg, Oral, BID after meals, Carlota Reddy PA-C, 650 mg at 04/08/21 0831    sodium chloride 0 9 % infusion, 100 mL/hr, Intravenous, Continuous, Lizzie Medeiros DO, Stopped at 04/08/21 1440    sodium chloride tablet 1 g, 1 g, Oral, TID With Meals, TERRY Barfield, 1 g at 04/08/21 1202    tamsulosin (FLOMAX) capsule 0 4 mg, 0 4 mg, Oral, Daily With Alberto Hein MD, 0 4 mg at 04/07/21 1600    Past Medical History:   Diagnosis Date    Arthritis     Cardiac disease     Closed fracture of fibula, lateral malleolus, right     Complete heart block ([address] )     Diabetes mellitus ([address] )     Disease of thyroid gland     External incisional dehiscence     GERD (gastroesophageal reflux disease)     Heart disease     Muscular deconditioning     Pacemaker     Sleep apnea     cannot tolerate cpap    Thyroid disease        Patient Active Problem List    Diagnosis Date Noted    ATN (acute tubular necrosis) ([facility] ) 04/06/2021    Vertebral fracture, osteoporotic ([facility] ) 04/02/2021    Progression of L1 vertebral fracture  03/30/2021    Gram-positive bacteremia 03/28/2021    Weakness 03/27/2021    Hyponatremia 03/27/2021    [facility]glycemia due to type [facility] diabetes mellitus [facility] ) 02/07/2021    COVID-19 02/05/2021    Personal history of diabetic foot ulcer 06/16/2020    Diabetic ulcer of right foot ([facility] [address] ) 09/19/2019    Acquired absence of other left toe(s) ([facility] [address] ) 07/30/2019    Type 1 diabetes mellitus ([facility] [address] ) 07/30/2019    Status post split thickness skin graft 06/18/2019    Preoperative clearance 06/05/2019    Complete heart block  06/03/2019    Microalbuminuria 04/25/2019    Vitreous hemorrhage of left eye ([facility] ) 04/10/2019    Transition of care performed with sharing of clinical summary 04/10/2019    Type 2 diabetes mellitus with hyperglycemia (HCC)     High anion gap metabolic acidosis 46/97/1520    Elevated troponin level not due myocardial infarction 03/31/2019    Leukocytosis 03/31/2019    Low bicarbonate 03/31/2019    Pacemaker 03/31/2019    Encephalopathy 03/31/2019    Stage 3b chronic kidney disease 07/23/2018    Thrombocytopenia ([facility] ) 07/23/2018    Encounter for diabetic foot exam ([place] [id number] ) 07/06/2018    Risk for falls 07/06/2018    Screening for colon cancer 04/16/2018    Anemia of chronic disease 01/25/2018    Arthritis 01/25/2018    DM type 2, not at goal [facility]) 01/25/2018    Medtronic dual chamber PM 01/25/2018    Essential hypertension 09/06/2016    Insulin pump status 09/11/2015    Hypothyroidism 11/26/2014    Hyperlipidemia 11/11/2014    Acid reflux 08/06/2014    Type I diabetes mellitus ([facility] [address] ) 07/08/2014    Diabetic peripheral neuropathy ([facility] ) 10/24/2012      Cherie Shaikh DO  Physical Medicine and [address]    Total time spent:  35 minutes, with more than 50% spent counseling/coordinating care   Counseling includes discussion with patient re: progress in therapies, functional issues observed by therapy staff, and discussion with patient his/her current medical state/wellbeing  Coordination of patient's care was performed in conjunction with Internal Medicine service to monitor patient's labs, vitals, and management of their comorbidities  ** Please Note:  voice to text software may have been used in the creation of this document   Although proof errors in transcription or interpretation are a potential of such software**

## 2021-04-08 NOTE — PROGRESS NOTES
Internal Medicine Progress Note  Patient: Anish Padilla  Age/sex: 76 y o  male  Medical Record #: [id number]      ASSESSMENT/PLAN: (Interval History)  Anish Padilla is seen and examined and management for following issues:    DM I  · Cont sliding scale  · Labile; elevated yesterday 166 this a m  · Uses insulin pump at home  · Endocrinology following here  · Cont DM diet     Diarrhea  · resolved  · Likely from recent COVID     Chronic L1 compression fracture  · Cont TLSO brace when OOB  · No NS intervention per reccs  · DC cymbalta as not indicated with gfr <30     Anemia  · Monitor cbc  · Cont daily iron  · hemocult negative x2     Chronic RLE ulcer  · F/b podiatry as outpatient  · Debrided 4/3 at bedside  · Cont dressing changes per their discretion  · Refer to images     Chronic diastolic HF  · Cxray moderate bilateral effusions 4/5  · Off diuretics for now  · stable    Acute on chronic CKD  · Hold lisinopril  · Renal input appreciated  · Creatinine elevated again at 2 3  · Urine seem c/w prerenal azotemia    Hyponatremia  · Na 132 4/8  · liberalized fluid restriction to 2 L  · Cont Nacl tabs 1 gram tid  · Cont nss @ 100 with evidence of concentrated urine and Fractional Na excretion low  · No adrenal insufficiency noted     Leukocytosis  · Cbc pending  · Urine +bacteria   · Cont cephalexin 500mg bid x7 days (4/12)    COVID  · Dx 2/5 hospitalized 2/5-2/8  · Tx with remdesivir/steroids  · Respiratory status stable  · Received covid vaccines     Hypothyroid  · Cont supplementation  · Recent TSH mildly elevated    Insomnia  · Does not sleep at night  · benzos made him very lethargic    Lethargy  · Continues to be issue during the day  · unchanged       The above assessment and plan was reviewed and updated as determined by my evaluation of the patient on 4/8/2021      Labs:   Results from last 7 days   Lab Units 04/05/21  0630 04/04/21  0630   WBC Thousand/uL 14 35* 14 90*   HEMOGLOBIN g/dL 7 7* 7 8* HEMATOCRIT % 23 8* 24 5*   PLATELETS Thousands/uL 194 224     Results from last 7 days   Lab Units 04/08/21  0622 04/07/21  0618   SODIUM mmol/L 132* 127*   POTASSIUM mmol/L 3 8 4 5   CHLORIDE mmol/L 101 94*   CO2 mmol/L 23 17*   BUN mg/dL 45* 36*   CREATININE mg/dL 2 38* 2 12*   CALCIUM mg/dL 7 6* 7 9*             Results from last 7 days   Lab Units 04/08/21  0627 04/07/21  2134 04/07/21  1557   POC GLUCOSE mg/dl 162* 351* 316*       Review of Scheduled Meds:  Current Facility-Administered Medications   Medication Dose Route Frequency Provider Last Rate    acetaminophen  650 mg Oral Q6H PRN Carleen De La Rosa MD      aspirin  81 mg Oral Daily Carleen De La Rosa MD      atorvastatin  5 mg Oral Daily Carleen De La Rosa MD      bisacodyl  10 mg Rectal Daily PRN Carleen De La Rosa MD      cephalexin  500 mg Oral Q12H [facility] TERRY Montoya      cholecalciferol  4,000 Units Oral Daily Carleen De La Rosa MD      docusate sodium  100 mg Oral BID Carleen De La Rosa MD      ferrous sulfate  325 mg Oral Daily With Breakfast TERRY Jaramillo      heparin (porcine)  5,000 Units Subcutaneous [facility] Carleen De La Rosa MD      insulin glargine  6 Units Subcutaneous HS Carleen De La Rosa MD      insulin lispro  1-5 Units Subcutaneous TID [facility] Debbie Ferreira MD      insulin lispro  1-5 Units Subcutaneous HS Debbie Ferreira MD      insulin lispro  2 Units Subcutaneous TID With Meals Debibe Ferreira MD      levothyroxine  125 mcg Oral Early Morning Carleen De La Rosa MD      ondansetron  4 mg Oral Q6H PRN Carleen De La Rosa MD      pantoprazole  40 mg Oral Daily Before Breakfast Carleen De La Rosa MD      polyethylene glycol  17 g Oral Daily PRN Carleen De La Rosa MD      sodium bicarbonate  650 mg Oral BID after meals Mellisa Williamson PA-C      sodium chloride  100 mL/hr Intravenous Continuous [place],  mL/hr (04/08/21 0301)    sodium chloride  1 g Oral TID With Meals TERRY Montoya      tamsulosin  0 4 mg Oral Daily With Ammon Chávez MD Subjective/ HPI: Patient seen and examined  Patients overnight issues or events were reviewed with nursing or staff during rounds or morning huddle session  New or overnight issues include the following:     Pt sitting at bedside taking meds; following commands; up again all night despite being up all day    ROS:   A 10 point ROS was performed; negative except as noted above  Imaging:     XR chest portable   Final Result by Suzann Babinski, MD (04/04 1143)      Moderate effusions and bibasilar atelectasis  Workstation performed: [id number]         CT head wo contrast   Final Result by Linda Eli DO (04/03 1543)      No acute intracranial abnormality  Microangiopathic changes                    Workstation performed: [id number]             *Labs /Radiology studies Reviewed  *Medications  reviewed and reconciled as needed  *Please refer to order section for additional ordered labs studies  *Case discussed with primary attending during morning huddle case rounds    Physical Examination:  Vitals:   Vitals:    04/07/21 1100 04/07/21 1543 04/07/21 2118 04/08/21 0601   BP: (!) 87/55 103/57 123/66 114/65   BP Location: Right arm Right arm Right arm Right arm   Pulse: 93 89 92 87   Resp:  16 18 18   Temp:  97 5 °F (36 4 °C) 97 8 °F (36 6 °C) 97 8 °F (36 6 °C)   TempSrc:  Oral Oral Oral   SpO2:  93% 94% 91%   Weight:       Height:           GEN: No apparent distress, interactive  NEURO: Alert and oriented x3; confusion noted with place and date  HEENT: Pupils are equal and reactive, EOMI, mucous membranes are moist, face symmetrical  CV: S1 S2 regular, no MRG, no peripheral edema noted  RESP: Lungs are clear bilaterally, no wheezes, rales or rhonchi noted, on room air, respirations easy and non labored  GI: Flat, soft non tender, non distended; +BS x4  : Voiding without difficulty  MUSC: Moves all extremities; +RLE dressing in place  SKIN: pink, warm and dry, normal turgor, no rashes, lesions      The above physical exam was reviewed and updated as determined by my evaluation of the patient on 4/8/2021  Invasive Devices     Peripheral Intravenous Line            Peripheral IV 04/07/21 Left;Upper Arm less than 1 day                   VTE Pharmacologic Prophylaxis: Heparin  Code Status: Level 1 - Full Code  Current Length of Stay: 6 day(s)      Total time spent:  30 minutes with more than 50% spent counseling/coordinating care  Counseling includes discussion with patient re: progress  and discussion with patient of his/her current medical state/information  Coordination of patient's care was performed in conjunction with primary service  Time invested included review of patient's labs, vitals, and management of their comorbidities with continued monitoring  In addition, this patient was discussed with medical team including physician and advanced extenders  The care of the patient was extensively discussed and appropriate treatment plan was formulated unique for this patient  ** Please Note:  voice to text software may have been used in the creation of this document   Although proof errors in transcription or interpretation are a potential of such software**

## 2021-04-08 NOTE — PROGRESS NOTES
04/08/21 0930   Pain Assessment   Pain Assessment Tool 0-10   Pain Score 4   Pain Location/Orientation Orientation: Left; Location: Leg   Restrictions/Precautions   Precautions Bed/chair alarms;Cognitive; Fall Risk;Fluid restriction;Multiple lines;O2;Pain;Spinal precautions;Supervision on toilet/commode   Lifestyle   Autonomy "I can't sit down! There's a dead body right there! Don't you see it?!"   Oral Hygiene   Type of Assistance Needed Set-up / clean-up   Amount of Physical Assistance Provided No physical assistance   Comment set up of toothpaste on tooth brush   Oral Hygiene CARE Score 5   Grooming   Able To Brush/Clean Teeth;Wash/Dry Face;Wash/Dry Hands   Limitation Noted In Problem Solving; Sequencing;Timeliness   Findings Seated in recliner chair, pt requires max VCs and occ [place] assist to perform basic daily tasks such as washing face, hands and brushing teeth due to cog deficits      Putting On/Taking Off Footwear   Type of Assistance Needed Physical assistance   Amount of Physical Assistance Provided Total assistance   Comment pt attempts to doff shoes upon request by performing cross leg tech however unable to fully complete w/o total assist   Putting On/Taking Off Footwear CARE Score 1   Sit to Stand   Type of Assistance Needed Physical assistance   Amount of Physical Assistance Provided 50%-74%   Comment STS from recliner chair to RW; inc assist as pt refused to sit due to "not wanting to sit on the dead body"   Sit to Stand CARE Score 2   Bed-Chair Transfer   Type of Assistance Needed Physical assistance   Amount of Physical Assistance Provided 50%-74%   Comment with RW and O2 tube and IV tube management   Chair/Bed-to-Chair Transfer CARE Score 2   Functional Standing Tolerance   Time 2 min    Activity static standing while adjusting TLSO   Comments CG in stance while this KELLER adjusted TLSO   Exercise Tools   Other Exercise Tool 1 Pt engages in UE strengthening using 2# dowel bar, 3x10 for each of the following: chest press, shoulder press and elbow flex/ext  B/L hand strengthening completed using hand gripper, 3x10 each hand  Strengthening completed in order to inc strength and endurance for ADLs/transfers  Fair tolerance to exercises  Rest breaks as needed to manage fatigue  Coordination   Gross Motor Pt engages in balloon pass x4 trials alternating UEs in order to inc overall endurance and activity tolerance for ADLs/transfers  Decreased hand eye coordination noted with decreased visual scanning superiorly and R periphery  Cognition   Overall Cognitive Status Impaired   Arousal/Participation Responsive; Cooperative   Attention Attends with cues to redirect   Orientation Level Disoriented to place   Memory Decreased recall of precautions;Decreased recall of recent events;Decreased short term memory   Following Commands Follows one step commands with increased time or repetition   Additional Activities   Additional Activities Comments Pt engages in sitting unsupported on recliner chair while sorting deck of cards in order to inc activity tolerance and core strength  Pt requires frequent and prolonged supported rest breaks to manage fatigue  Activity Tolerance   Activity Tolerance Treatment limited secondary to medical complications (Comment)   Assessment   Treatment Assessment Pt engages in 75 minute skilled OT session focusing on basic self care tasks, sitting lindsay/bal, activity tolerance, strengthening/endurance  Pt noted with frequent hallucinations throughout entire session requiring max VCs for redirection to task at hand  During stance, pt refusing to sit down as he "didn't want to sit on the dead body"; pt "petting" his legs while sitting unsupported edge of recliner as "there were 4 dogs at his feet"  Pt continuously "playing with keys" which were in his hands  Despite max edu on hallucinations not there/real, pt with difficulty being redirected to task at hand   Pt with difficulty remaining awake towards end of session warranting session to be abandoned due to inability to further complete therapeutic tasks  Pt on 1L O2 during entire session remaining above 91% throughout entire session  NSG notified of pt's behavior  Pt left in recliner chair with 1:1 in place  Recommend continued skilled care in order to inc independence with self care, functional transfers, standing lindsay/bal, activity tolerance, strength and endurance to decrease burden of care at d/c  Prognosis Fair   Problem List Decreased strength;Decreased range of motion;Decreased endurance; Impaired balance;Decreased mobility; Decreased cognition; Impaired judgement;Decreased safety awareness;Decreased coordination;Pain;Orthopedic restrictions   Barriers to Discharge Inaccessible home environment;Decreased caregiver support   Plan   Treatment/Interventions ADL retraining;Functional transfer training; Therapeutic exercise; Endurance training;Cognitive reorientation;Patient/family training;Equipment eval/education; Compensatory technique education   OT Therapy Minutes   OT Time In 0930   OT Time Out 1045   OT Total Time (minutes) 75   OT Mode of treatment - Individual (minutes) 75   OT Mode of treatment - Concurrent (minutes) 0   OT Mode of treatment - Group (minutes) 0   OT Mode of treatment - Co-treat (minutes) 0   OT Mode of Treatment - Total time(minutes) 75 minutes   OT Cumulative Minutes 308   Therapy Time missed   Time missed?  No

## 2021-04-08 NOTE — PROGRESS NOTES
NEPHROLOGY PROGRESS NOTE   López Lloyd 76 y o  male MRN: [id number]  Unit/Bed#: -01 Encounter: [id number]      ASSESSMENT/PLAN:  1  KAZ on CKD IIIA: baseline creatinine 1 1-1 5 and follows with Dr Mallory Knox in our office  KAZ likely due to lasix, increased lisinopril, intermittent hypotension and poor po intake--suspect progression to ATN  · Creatinine continued to worsen up to 2 38 today  · Repeat UA:  Glucose, +1 ketones, large blood, trace leukocytes, 3+ protein, 20-30 rbc's, 10-20 wbc's and 10-25 granular casts  · Urine sodium 10, urine chloride less than 10  · Started on normal saline at 100 cc/hour yesterday per primary team--monitor respiratory status closely on fluid  · Continue to hold lisinopril and diuretics  · Bladder scan 223 mL   · Check am BMP   2  Hyponatremia: sodium trending up to 132  · Work up from 3/31: serum osm 284, urine osm 524, urine sodium 68, cortisol normal, no monoclonal gammopathy, TSH elevated 5 6   · Continue 1200cc/day oral fluid restriction and sodium chloride 1 g t i d   3  Moderate pleural effusions: s/p lasix 4/4  No current respiratory issues  Is requiring 1 L nasal cannula this morning to maintain saturation greater than 90%  Discussed with nurse to let us know if oxygen saturation worsening   4  Hypertension: BP a little better today, had been running low   · Currently off all BP meds   · Cortisol 30 1  5  Anion gap metabolic acidosis:  Bicarb improved to 23 today  Likely related to renal failure and possible starvation ketosis as recent UA showed ketones  · Currently on sodium bicarb 650mg bid   6  Lethargy/confuision:  Significantly improved  Possibly related to UTI as he was started on Keflex and his mental status improved after that   7   Microscopic hematuria: per Dr Wellington Moss last note he follows with urology for this     Plan Summary:    Continue IV fluids for now   Check am BMP     SUBJECTIVE:  Appetite poor today and only 25% of his meal   Also requiring some intermittent oxygen      OBJECTIVE:  Current Weight: Weight - Scale: 78 6 kg (173 lb 4 5 oz)  Vitals:    04/08/21 0940   BP: 132/67   Pulse: 87   Resp:    Temp:    SpO2:        Intake/Output Summary (Last 24 hours) at 4/8/2021 1145  Last data filed at 4/8/2021 0847  Gross per 24 hour   Intake 757 ml   Output 525 ml   Net 232 ml       General:  No acute distress, TLSO brace in place  Skin:  No rash  Eyes:  Sclerae anicteric  ENT:  Moist mucous membranes  Neck:  Trachea midline with no JVD  Chest:  Clear to auscultation bilaterally  CVS:  Regular rate and rhythm  Abdomen:  Soft, nontender, nondistended  Extremities:  No edema  Neuro:  Awake and alert  Psych:  Appropriate affect      Medications:  Scheduled Meds:  Current Facility-Administered Medications   Medication Dose Route Frequency Provider Last Rate    acetaminophen  650 mg Oral Q6H PRN Carleen De La Rosa MD      aspirin  81 mg Oral Daily Carleen De La Rosa MD      atorvastatin  5 mg Oral Daily Carleen De La Rosa MD      bisacodyl  10 mg Rectal Daily PRN Carleen De La Rosa MD      cephalexin  500 mg Oral Q12H [address] TERRY Montoya      cholecalciferol  4,000 Units Oral Daily Carleen De La Rosa MD      docusate sodium  100 mg Oral BID Carleen De La Rosa MD      ferrous sulfate  325 mg Oral Daily With Breakfast TERRY Jaramillo      heparin (porcine)  5,000 Units Subcutaneous [facility] Carleen De La Rosa MD      insulin glargine  6 Units Subcutaneous HS Carleen De La Rosa MD      insulin lispro  1-5 Units Subcutaneous TID [facility] Debbie Ferreira MD      insulin lispro  1-5 Units Subcutaneous HS MD Kendrick Franco insulin lispro  2 Units Subcutaneous TID With Meals MD Kendrick Franco levothyroxine  125 mcg Oral Early Morning Carleen De La Rosa MD      ondansetron  4 mg Oral Q6H PRN Carleen De La Rosa MD      pantoprazole  40 mg Oral Daily Before Breakfast Carleen De La Rosa MD      polyethylene glycol  17 g Oral Daily PRN Carleen De La Rosa MD      sodium bicarbonate  650 mg Oral BID after meals Nikhil Lockett PA-C      sodium chloride  100 mL/hr Intravenous Continuous Fabio Wiggins  mL/hr (04/08/21 0301)    sodium chloride  1 g Oral TID With Meals TERRY Eagle      tamsulosin  0 4 mg Oral Daily With Rahel Desai MD         PRN Meds:   acetaminophen    bisacodyl    ondansetron    polyethylene glycol    Laboratory Results:  Results from last 7 days   Lab Units 04/08/21  0622 04/07/21  0618 04/06/21  0453 04/05/21  0630 04/04/21  0630 04/03/21  0546 04/03/21  0545   WBC Thousand/uL  --   --   --  14 35* 14 90*  --  16 44*   HEMOGLOBIN g/dL  --   --   --  7 7* 7 8*  --  8 7*   HEMATOCRIT %  --   --   --  23 8* 24 5*  --  26 2*   PLATELETS Thousands/uL  --   --   --  194 224  --  276   SODIUM mmol/L 132* 127* 129* 129* 130* 131*  --    POTASSIUM mmol/L 3 8 4 5 4 0 4 0 4 0 4 1  --    CHLORIDE mmol/L 101 94* 98* 97* 97* 99*  --    CO2 mmol/L 23 17* 23 23 25 24  --    BUN mg/dL 45* 36* 25 20 17 16  --    CREATININE mg/dL 2 38* 2 12* 1 87* 1 53* 1 34* 1 22  --    CALCIUM mg/dL 7 6* 7 9* 7 7* 7 6* 7 6* 7 9*  --

## 2021-04-09 PROBLEM — R09.02 HYPOXEMIA: Status: ACTIVE | Noted: 2021-01-01

## 2021-04-09 PROBLEM — I50.33 ACUTE ON CHRONIC DIASTOLIC HEART FAILURE (HCC): Status: ACTIVE | Noted: 2021-01-01

## 2021-04-09 NOTE — QUICK NOTE
Althea Grant is a 76y o  year old male with type 1 diabetes for many years who is admitted to the acute rehab Center for rehabilitation after L1 vertebral fracture  He has had type 1 diabetes for many years managed on insulin pump at home with tandem t slim pump and dex com continues glucose monitor   he is currently on insulin Lantus 6 units q h s and humalog 2 units TID  AC, And correctional sliding scale  As he is still has postprandial hyperglycemia will increase Humalog to 3 units t i d  A c  And continue Lantus 6 units q h s  And correctional sliding scale      Continue to monitor blood sugar over time and make adjustments to the regimen if necessary

## 2021-04-09 NOTE — PROGRESS NOTES
Internal Medicine Progress Note  Patient: Melecio Nava  Age/sex: 76 y o  male  Medical Record #: [id number]      ASSESSMENT/PLAN: (Interval History)  Melecio Nava is seen and examined and management for following issues:    DM I  · Cont sliding scale  · Labile  · Uses insulin pump at home  · Endocrinology following here  · Cont DM diet     Chronic L1 compression fracture  · Cont TLSO brace when OOB  · No NS intervention per reccs  · DC cymbalta (4/8) as not indicated with gfr <30     Anemia  · Monitor cbc  · Cont daily iron  · hemocult negative x2     Chronic RLE ulcer  · F/b podiatry as outpatient  · Debrided 4/3 at bedside  · Cont dressing changes per their discretion  · Refer to images     Chronic diastolic HF  · Cxray moderate bilateral effusions 4/5  · Off diuretics for now  · stable    Acute on chronic CKD  · Hold lisinopril  · Renal input appreciated  · Bmp pending  · Urine seem c/w prerenal azotemia    Hyponatremia  · Bmp pending  · liberalized fluid restriction to 2 L  · Cont Nacl tabs 1 gram tid  · Cont nss @ 100 with evidence of concentrated urine and Fractional Na excretion low  · No adrenal insufficiency noted     Leukocytosis  · Cbc pending  · Urine +bacteria   · Cont cephalexin 500mg bid x7 days (4/12)    COVID  · Dx 2/5 hospitalized 2/5-2/8  · Tx with remdesivir/steroids  · Respiratory status stable  · Received covid vaccines     Hypothyroid  · Cont supplementation  · Recent TSH mildly elevated    Insomnia  · Does not sleep at night  · benzos made him very lethargic  · Still has not slept overnight    Lethargy  · Continues to be issue during the day  · unchanged       The above assessment and plan was reviewed and updated as determined by my evaluation of the patient on 4/9/2021      Labs:   Results from last 7 days   Lab Units 04/05/21  0630 04/04/21  0630   WBC Thousand/uL 14 35* 14 90*   HEMOGLOBIN g/dL 7 7* 7 8*   HEMATOCRIT % 23 8* 24 5*   PLATELETS Thousands/uL 194 224     Results from last 7 days   Lab Units 04/08/21  0622 04/07/21  0618   SODIUM mmol/L 132* 127*   POTASSIUM mmol/L 3 8 4 5   CHLORIDE mmol/L 101 94*   CO2 mmol/L 23 17*   BUN mg/dL 45* 36*   CREATININE mg/dL 2 38* 2 12*   CALCIUM mg/dL 7 6* 7 9*             Results from last 7 days   Lab Units 04/09/21  0617 04/08/21  2055 04/08/21  1558   POC GLUCOSE mg/dl 171* 300* 265*       Review of Scheduled Meds:  Current Facility-Administered Medications   Medication Dose Route Frequency Provider Last Rate    acetaminophen  650 mg Oral Q6H PRN Yung Ríos MD      aspirin  81 mg Oral Daily Yung Ríos MD      atorvastatin  5 mg Oral Daily Yung Ríos MD      bisacodyl  10 mg Rectal Daily PRN Yung Ríos MD      cephalexin  500 mg Oral Q12H [address] TERRY Mcintyre      cholecalciferol  4,000 Units Oral Daily Yung Ríos MD      docusate sodium  100 mg Oral BID Yung Ríos MD      ferrous sulfate  325 mg Oral Daily With Breakfast TERRY Jaramillo      heparin (porcine)  5,000 Units Subcutaneous [facility] Yung Ríos MD      insulin glargine  6 Units Subcutaneous HS Yung Ríos MD      insulin lispro  1-5 Units Subcutaneous TID [facility] Nadine Barnett MD      insulin lispro  1-5 Units Subcutaneous HS Nadine Barnett MD      insulin lispro  2 Units Subcutaneous TID With Meals Nadine Barnett MD      levothyroxine  125 mcg Oral Early Morning Yung Ríos MD      ondansetron  4 mg Oral Q6H PRN Yung Ríos MD      pantoprazole  40 mg Oral Daily Before Breakfast Yung Ríos MD      polyethylene glycol  17 g Oral Daily PRN Yung Ríos MD      sodium bicarbonate  650 mg Oral BID after meals Shelly Landis PA-C      sodium chloride  100 mL/hr Intravenous Continuous Zahira Genera,  mL/hr (04/09/21 0103)    sodium chloride  1 g Oral TID With Meals TERRY Mcintyre      tamsulosin  0 4 mg Oral Daily With Quang Corado MD         Subjective/ HPI: Patient seen and examined   Patients overnight issues or events were reviewed with nursing or staff during rounds or morning huddle session  New or overnight issues include the following:     Pt awake and oriented to self and place, went outside for therapy yesterday and participated well as reported by PMR      ROS:   A 10 point ROS was performed; negative except as noted above  Imaging:     XR chest portable   Final Result by Sy Roche MD (04/04 1143)      Moderate effusions and bibasilar atelectasis  Workstation performed: [id number]         CT head wo contrast   Final Result by Jonathan Briscoe DO (04/03 1543)      No acute intracranial abnormality  Microangiopathic changes  Workstation performed: [id number]             *Labs /Radiology studies Reviewed  *Medications  reviewed and reconciled as needed  *Please refer to order section for additional ordered labs studies  *Case discussed with primary attending during morning huddle case rounds    Physical Examination:  Vitals:   Vitals:    04/08/21 0940 04/08/21 1300 04/08/21 2100 04/09/21 0740   BP: 132/67 121/68 127/68    BP Location: Right arm Right arm Right arm    Pulse: 87 94 84 85   Resp:  18 16 18   Temp:  97 5 °F (36 4 °C) 97 9 °F (36 6 °C) 97 5 °F (36 4 °C)   TempSrc:  Oral Oral Oral   SpO2:  90% 93% 93%   Weight:       Height:           GEN: No apparent distress, interactive  NEURO: Alert and oriented x3  HEENT: Pupils are equal and reactive, EOMI, mucous membranes are moist, face symmetrical  CV: S1 S2 regular, no MRG, no peripheral edema noted  RESP: Lungs are clear bilaterally, no wheezes, rales or rhonchi noted, on room air, respirations easy and non labored  GI: Flat, soft non tender, non distended; +BS x4  : Voiding without difficulty  MUSC: Moves all extremities  SKIN: pink, warm and dry, normal turgor, no rashes, lesions      The above physical exam was reviewed and updated as determined by my evaluation of the patient on 4/9/2021      Invasive Devices Peripheral Intravenous Line            Peripheral IV 04/07/21 Left;Upper Arm 1 day                   VTE Pharmacologic Prophylaxis: Heparin  Code Status: Level 1 - Full Code  Current Length of Stay: 7 day(s)      Total time spent:  30 minutes with more than 50% spent counseling/coordinating care  Counseling includes discussion with patient re: progress  and discussion with patient of his/her current medical state/information  Coordination of patient's care was performed in conjunction with primary service  Time invested included review of patient's labs, vitals, and management of their comorbidities with continued monitoring  In addition, this patient was discussed with medical team including physician and advanced extenders  The care of the patient was extensively discussed and appropriate treatment plan was formulated unique for this patient  ** Please Note:  voice to text software may have been used in the creation of this document   Although proof errors in transcription or interpretation are a potential of such software**

## 2021-04-09 NOTE — PROGRESS NOTES
Podiatry - Progress Note  Patient: Carmen Hernandez 76 y o  male   MRN: [id number]  PCP: Frances Floyd MD  Unit/Bed#: -01 Encounter: [id number]  Date Of Visit: 21    ASSESSMENT:    Carmen Hernandez is a 76 y o  male with:    1  Right foot diabetic ulceration, submet 5 to subcutaneous tissue - [place] 2  2  Right leg anterior and posterior superficial ulcerations  3  Diabetes mellitus  4  Peripheral neuropathy  5  CKD stage 3    PLAN:    · Local wound care consisting of Dermagran, DSD to right plantar foot, as well as right leg ulcerations  Wounds remain stable and free from local signs of infection  · Right plantar foot ulceration excisionally debrided as described below:  · Elevation on green foam wedges or pillows when non-ambulatory  · Rest of care per primary team      Wound debridement note: After verbal consent was obtained, wound located at right plantar foot was excisionally debrided with #15 blade of all nonviable, devitalized, hyperkeratotic tissue w/ excision of skin and subcutaneous tissue to depth of  Subcutaneous tissue  Post debridement wound measurements 0 7x0 7x0 3cm, with appearance of wound fresh bleeding and granular with brisk pinpoint bleeding with viable 100% vs nonviable 0%, <20sq cm debrided  Weightbearing status: Weight-bearing as toelrated    SUBJECTIVE:     The patient was seen, evaluated, and assessed at bedside today  The patient was awake, alert, and in no acute distress  No acute events overnight  The patient reports no pain to either lower extremity  Patient denies N/V/F/chills/SOB/CP        OBJECTIVE:     Vitals:   /68 (BP Location: Right arm)   Pulse 85   Temp 97 5 °F (36 4 °C) (Oral)   Resp 18   Ht 5' 8" (1 727 m)   Wt 78 6 kg (173 lb 4 5 oz)   SpO2 90%   BMI 26 35 kg/m²     Temp (24hrs), Av 7 °F (36 5 °C), Min:97 5 °F (36 4 °C), Max:97 9 °F (36 6 °C)      Physical Exam:     General: Alert, cooperative and no distress  Lungs: Non labored breathing  Abdomen: Soft, non-tender  Lower extremity exam:  Cardiovascular status at baseline  Neurological status at baseline  Musculoskeletal status at baseline  No calf tenderness noted  Lower extremity wound(s) as noted below:  Wound #: 1  Location: right submet 5  Length 0 7cm: Width 0 7cm: Depth 0 3cm:   Deepest Tissue Noted in Base: subcutaneous tissue  Probe to Bone: No  Peripheral Skin Description: Attached  Granulation: 100% Fibrotic Tissue: 0% Necrotic Tissue: 0%   Drainage Amount: minimal, bloody  Signs of Infection: No       Wound #: 2  Location: right anterior leg  Length 4 5cm: Width 2 8cm: Depth 0 2cm:   Deepest Tissue Noted in Base: subcutaneous tissue  Probe to Bone: No  Peripheral Skin Description: Attached  Granulation: 0% Fibrotic Tissue: 100% Necrotic Tissue: 0%   Drainage Amount: minimal, serous  Signs of Infection: No    Wound #: 3  Location: right posterior leg  Length 1cm: Width 0 7cm: Depth 0 2cm:   Deepest Tissue Noted in Base: dermis  Probe to Bone: No  Peripheral Skin Description: Attached  Granulation: 0% Fibrotic Tissue: 100% Necrotic Tissue: 0%   Drainage Amount: minimal, serous  Signs of Infection: No      Additional Data:     Labs:    Results from last 7 days   Lab Units 04/09/21  0855   WBC Thousand/uL 18 45*   HEMOGLOBIN g/dL 8 1*   HEMATOCRIT % 25 0*   PLATELETS Thousands/uL 156   NEUTROS PCT % 89*   LYMPHS PCT % 3*   MONOS PCT % 7   EOS PCT % 0     Results from last 7 days   Lab Units 04/09/21  0855   POTASSIUM mmol/L 4 4   CHLORIDE mmol/L 101   CO2 mmol/L 22   BUN mg/dL 43*   CREATININE mg/dL 2 28*   CALCIUM mg/dL 7 6*           * I Have Reviewed All Lab Data Listed Above  Recent Cultures (last 7 days):     Results from last 7 days   Lab Units 04/07/21  0857   LEGIONELLA URINARY ANTIGEN  Negative           Imaging: I have personally reviewed pertinent films in PACS  EKG, Pathology, and Other Studies: I have personally reviewed pertinent reports      ** Please Note: Portions of the record may have been created with voice recognition software  Occasional wrong word or "sound a like" substitutions may have occurred due to the inherent limitations of voice recognition software  Read the chart carefully and recognize, using context, where substitutions have occurred   **

## 2021-04-09 NOTE — PROGRESS NOTES
04/09/21 0830   Pain Assessment   Pain Assessment Tool Pain Assessment not indicated - pt denies pain   Pain Score No Pain   Restrictions/Precautions   Precautions Bed/chair alarms;1:1;Cognitive; Fall Risk;Fluid restriction;O2;Multiple lines;Supervision on toilet/commode;Spinal precautions   Lifestyle   Autonomy "I feel fine   I get out of breath when I walk upstairs carrying a basket"   Oral Hygiene   Type of Assistance Needed Set-up / clean-up   Amount of Physical Assistance Provided No physical assistance   Comment set up of oral care items; max VCs for executing task correctly   Oral Hygiene CARE Score 5   Shower/Bathe Self   Type of Assistance Needed Physical assistance   Amount of Physical Assistance Provided 25%-49%   Comment requires assist to bathe lower portions of feet and buttocks   Shower/Bathe Self CARE Score 3   Upper Body Dressing   Type of Assistance Needed Physical assistance   Amount of Physical Assistance Provided 75% or more   Comment pt attempts to participate in threading arms into hospital gown; requries total assist to don/doff TLSO   Upper Body Dressing CARE Score 2   Lower Body Dressing   Type of Assistance Needed Physical assistance   Amount of Physical Assistance Provided Total assistance   Comment to don pants and pull up over hips   Lower Body Dressing CARE Score 1   Putting On/Taking Off Footwear   Type of Assistance Needed Physical assistance   Amount of Physical Assistance Provided Total assistance   Comment pt attempts to assist with donning footwear by performing cross leg tech, however unable to fully complete w/o total assist   Putting On/Taking Off Footwear CARE Score 1   Sit to Stand   Type of Assistance Needed Physical assistance   Amount of Physical Assistance Provided 50%-74%   Comment from edge of recliner to RW   Sit to Stand CARE Score 2   Bed-Chair Transfer   Type of Assistance Needed Physical assistance   Amount of Physical Assistance Provided Total assistance Comment for safety   Chair/Bed-to-Chair Transfer CARE Score 1   Toileting Hygiene   Type of Assistance Needed Physical assistance   Amount of Physical Assistance Provided Total assistance   Comment assist of 2 present due to medical issues  Assist of 1 to steady pt, assist of another to perform hygiene/CM   Toileting Hygiene CARE Score 1   Functional Standing Tolerance   Time 3 min x2   Activity static standing during hygiene post small bowel accident sitting in recliner   Comments Pt stands with assist of 2 for safety during self care tasks  Assist of 1 to steady pt and assist of another to perform hygiene/CM post small bowel accident while sitting in recliner  Cognition   Overall Cognitive Status Impaired   Arousal/Participation Alert; Cooperative   Attention Difficulty attending to directions   Orientation Level Oriented to person;Disoriented to place;Oriented to time;Disoriented to situation   Memory Decreased short term memory;Decreased recall of recent events;Decreased recall of precautions   Following Commands Follows one step commands with increased time or repetition   Additional Activities   Additional Activities Comments Pt engages in use of IS with targeted goal of 750  Multiple trials completed in order to inc lung health and decrease reliance on O2  Pt demonstrates greater success with visual marker from "arrow" vs not  Pt consistent with obtaining 750 with marker vs only obtaining 400-500 without marker  Activity Tolerance   Activity Tolerance Patient limited by fatigue   Assessment   Treatment Assessment Upon arrival to pt's room, pt on 4L O2 with NSG and PCA present  Pt's SpO2 at 85% on 4L; inc to 4 5L with SpO2 increasing to 90% at rest  SHALOM Gregg,  present to examine pt; verbally instructs this KELLER to continue with self care tasks and to monitor vitals throughout session  Of note, pt does not appear in any acute distress and denies SOB   Pt continues on 4 5L O2 during entire OT session remaining at 90% at rest and 85% during activity (bathing/dressing/grooming)  Pt provided with frequent and prolonged rest breaks during session to manage low O2 levels  Incentive spirometer completed frequently throughout session demonstrating greater success with visual marker using arrow  Pt demonstrates decreased hallucinations/delerium this date/session vs OT session yesterday  Pt able to follow through more appropriately with OT tasks  Of note, assist of 2 present for safety during standing/hygiene this session due to medical status  Recommend continued skilled care in order to inc independence with self care, functional transfers, standing lindsay/bal, activity tolerance, func cog in order to decrease burden of care at d/c  Prognosis Fair   Problem List Decreased strength;Decreased range of motion;Decreased endurance; Impaired balance;Decreased mobility; Decreased cognition; Impaired judgement;Decreased safety awareness;Decreased coordination;Pain;Orthopedic restrictions   Barriers to Discharge Decreased caregiver support; Inaccessible home environment   Plan   Treatment/Interventions ADL retraining;Functional transfer training; Therapeutic exercise; Endurance training;Cognitive reorientation;Patient/family training;Equipment eval/education; Compensatory technique education   OT Therapy Minutes   OT Time In 0830   OT Time Out 1000   OT Total Time (minutes) 90   OT Mode of treatment - Individual (minutes) 90   OT Mode of treatment - Concurrent (minutes) 0   OT Mode of treatment - Group (minutes) 0   OT Mode of treatment - Co-treat (minutes) 0   OT Mode of Treatment - Total time(minutes) 90 minutes   OT Cumulative Minutes 423   Therapy Time missed   Time missed?  No

## 2021-04-09 NOTE — PROGRESS NOTES
04/09/21 1230   Pain Assessment   Pain Assessment Tool Pain Assessment not indicated - pt denies pain   Restrictions/Precautions   Precautions Bed/chair alarms;1:1;Cognitive; Fall Risk;Hard of hearing;O2;Multiple lines;Spinal precautions;Supervision on toilet/commode   Weight Bearing Restrictions No   ROM Restrictions Yes   Braces or Orthoses TLSO   General   Change In Medical/Functional Status fluid restriction is now at 2000ml, on 4li o2   Cognition   Overall Cognitive Status Impaired   Arousal/Participation Arousable; Cooperative   Attention Difficulty dividing attention   Orientation Level Unable to assess   Memory Decreased recall of recent events;Decreased short term memory;Decreased recall of precautions   Following Commands Follows one step commands with increased time or repetition   Sit to Lying   Type of Assistance Needed Physical assistance   Amount of Physical Assistance Provided 25%-49%   Comment ROCKY with log roll technique   Sit to Lying CARE Score 3   Sit to Stand   Type of Assistance Needed Physical assistance   Amount of Physical Assistance Provided 75% or more   Comment varied from [facility] to [address] [place] [address] through out session due to increased fatigue   Sit to Stand CARE Score 2   Bed-Chair Transfer   Type of Assistance Needed Physical assistance   Amount of Physical Assistance Provided Total assistance   Comment MAXA x1 with SBA to [address] [place] [address] of 1 due to fatigue leves   Chair/Bed-to-Chair Transfer CARE Score 1   Transfer Bed/Chair/Wheelchair   Limitations Noted In Balance; Coordination; Endurance;Problem Solving; Sequencing;LE Strength   Adaptive Equipment Roller Walker   Car Transfer   Reason if not Attempted Environmental limitations   Car Transfer CARE Score 10   Walk 10 Feet   Type of Assistance Needed Physical assistance; Adaptive equipment;Verbal cues   Amount of Physical Assistance Provided Total assistance   Comment 30' with RW and WC follow, verbal cues to increase posture and open eyes     Walk 10 Feet CARE Score 1   Walk 50 Feet with Two Turns   Type of Assistance Needed Physical assistance; Adaptive equipment;Verbal cues   Amount of Physical Assistance Provided Total assistance   Comment MIN/MODA x1 with WC follow up to 50 feet with verbal cues   Walk 50 Feet with Two Turns CARE Score 1   Walk 150 Feet   Reason if not Attempted Safety concerns   Walk 150 Feet CARE Score 88   Walking 10 Feet on Uneven Surfaces   Reason if not Attempted Safety concerns   Walking 10 Feet on Uneven Surfaces CARE Score 88   Ambulation   Does the patient walk? 2  Yes   Primary Mode of Locomotion Prior to Admission Walk   Distance Walked (feet) 50 ft  (30 x1)   Assist Device Roller Walker   Gait Pattern Inconsistant Laya;R foot drag; Forward Flexion; Shuffle;Step to; Improper weight shift   Limitations Noted In Balance;Device Management; Endurance;Posture; Safety;Speed;Strength;Swing   Provided Assistance with: Balance;Weight Shift   Walk Assist Level Moderate Assist;Chair Follow   Wheel 50 Feet with Two Turns   Type of Assistance Needed Supervision;Verbal cues   Amount of Physical Assistance Provided No physical assistance   Comment S with verbal cues for sequencing   Wheel 50 Feet with Two Turns CARE Score 4   Wheel 150 Feet   Type of Assistance Needed Supervision;Verbal cues   Amount of Physical Assistance Provided No physical assistance   Comment S with verbal cues for sequencing   Wheel 150 Feet CARE Score 4   Wheelchair mobility   Does the patient use a wheelchair? 1  Yes   Type of Wheelchair Used 1  Manual   Method Right upper extremity; Left upper extremity   Assistance Provided For Locking Brakes;Replace Leg Rest;Remove Leg Rest;Remove armrests;Replace armrests   Findings Pt with verbal cues for safety and hand placement   Curb or Single Stair   Reason if not Attempted Safety concerns   1 Step (Curb) CARE Score 88   4 Steps   Reason if not Attempted Safety concerns   4 Steps CARE Score 88   12 Steps   Reason if not Attempted Safety concerns   12 Steps CARE Score 88   Picking Up Object   Reason if not Attempted Safety concerns   Picking Up Object CARE Score 88   Equipment Use   NuStep L1 x10 min with 1:1   Other Comments   Comments Pt on 4li o2 via NC maintained 93-96% sat with act   Assessment   Treatment Assessment Pt participated in skilled PT session with increased focus on gait, functional transfers and act tolerance  Pt varied from [facility] to [facility] through out session due to fatigue levels and lethargic state  Pt's wife was present in room when pt required MAXA x1 and SBA of second person for SPT due to fatigue  Wife Chintan Kemp was educated on pt's current level of function and how pt is inconsistent due to fatigue  Wife showed understanding and agreed that she would not be able to handle pt at home right now  Pt required cont verbal cues through out session to keep eyes open and to maintain focus on task  Pt will cont to benefit from increased act tolerance, increased safety awareness and increased consistent gait/functional transfers  Family/Caregiver Present wife Chintan Kemp present with intial transfer   Problem List Decreased strength;Decreased endurance; Impaired balance;Decreased mobility; Decreased coordination;Decreased cognition; Impaired judgement;Decreased safety awareness;Orthopedic restrictions; Impaired hearing   Barriers to Discharge Inaccessible home environment;Decreased caregiver support   PT Barriers   Functional Limitation Car transfers;Stair negotiation;Standing;Transfers; Walking; Wheelchair management   Plan   Treatment/Interventions Functional transfer training;LE strengthening/ROM; Therapeutic exercise; Endurance training;Cognitive reorientation; Bed mobility;Gait training   Progress Slow progress, decreased activity tolerance   Recommendation   PT Discharge Recommendation Other (Comment)  (TBD)   PT Therapy Minutes   PT Time In 1230   PT Time Out 1400   PT Total Time (minutes) 90   PT Mode of treatment - Individual (minutes) 90   PT Mode of treatment - Concurrent (minutes) 0   PT Mode of treatment - Group (minutes) 0   PT Mode of treatment - Co-treat (minutes) 0   PT Mode of Treatment - Total time(minutes) 90 minutes   PT Cumulative Minutes 600   Therapy Time missed   Time missed?  No

## 2021-04-09 NOTE — PROGRESS NOTES
21 1400   Pain Assessment   Pain Assessment Tool Pain Assessment not indicated - pt denies pain   Pain Score No Pain   Restrictions/Precautions   Precautions 1:1;Bed/chair alarms;Cognitive; Fall Risk;Hard of hearing;O2;Multiple lines;Spinal precautions;Supervision on toilet/commode   Weight Bearing Restrictions No   ROM Restrictions Yes   Braces or Orthoses TLSO   Comprehension   QI: Comprehension 2  Sometimes Understands: Understands only basic conversations or simple, direct phrases  Frequently requires cues to understand   Comprehension (FIM) 3 - Needs parts of sentences repeated   Expression   QI: Expression 3  Exhibits some difficulty with expressing needs and ideas (e g , some words or finishing thoughts) or speech is not clear   Expression (FIM) 3 - Expresses basic info/needs 50-74% of time   Social Interaction   Social Interaction (FIM) 3 - Interacts 50-74% of time   Problem Solving   Problem solving (FIM) 2 - Solves basic problems 25-49% of time   Memory   Memory (FIM) 2 - Recognizes, recalls/performs 25-49%   Speech/Language/Cognition Assessmetn   Treatment Assessment Pt was seen for skilled ST session targeting cognitive linguistic skills  He was alert, sitting upright in bed  His wife was present during first half of session  Pt demonstrated recall for name, , age, address, place, city, month, and TOD  He was unable to recall situation, date, ANIYA, and year  Given verbal prompts, pt was able to use external aide (phone) to ID date, ANIYA, and year  He was able to independently ID atypical/wrong items in pictures x1  Given mod-max visual and contextual cues, he was able to ID errors in 7/8 trials  After ~20 minutes into session, pt began falling asleep and was unable to re-engage in tx tasks  He began using more jargon and unintelligible speech; therefore, SLP ended session  Recommend continued skilled ST services to maximize cognitive linguistic skills at this time     SLP Therapy Minutes   SLP Time In 1400   SLP Time Out 1430   SLP Total Time (minutes) 30   SLP Mode of treatment - Individual (minutes) 30   SLP Mode of treatment - Concurrent (minutes) 0   SLP Mode of treatment - Group (minutes) 0   SLP Mode of treatment - Co-treat (minutes) 0   SLP Mode of Treatment - Total time(minutes) 30 minutes   SLP Cumulative Minutes 180   Therapy Time missed   Time missed?  No

## 2021-04-09 NOTE — PROGRESS NOTES
PM&R PROGRESS NOTE:  Soheila Cortes 76 y o  male MRN: [id number]  Unit/Bed#: -01 Encounter: [id number]        Rehabilitation Diagnosis: Impairment of mobility, safety, Activities of Daily Living (ADLs), and cognitive/communication skills due to Orthopedic Disorders:  08 9  Other Orthopedic L1 compression fracture    HPI: Soheila Cortes is a 76 y o  male with DM1 with chronic insulin pump, diabetic neuropathy, CKD 3, PAD, heart block with dual chamber ICD, HTN, hypothyroidism, COVID-19 virus in Feb 2021 with respiratory failure, osteoporosis, who presented to [facility] on [facility]21 with fatigue and hematuria 10 days after receiving his 2nd [employer] vaccine  Patient found to have acute mental status change, diarrhea, KAZ, Non MI troponin elevation, and some hematuria  Infectious workup revealed bacteremia with GPR - Corynebacterium bacteremia  Patient started on Cefepime, but discontinued after repeat blood cultures negative per Infectious Disease recommendations  CT abdomen/pelvis with findings of right pleural effusion, ground-glass opacification in the right lower lobe, findings of punctate gallstone, findings of worsening L1 compression fracture  Seen by Cardiology given recent history of COVID-19 virus, and echocardiogram ordered showing EF 65%, mild MR, TR  Patient seen by Carlota Meneses for worsening L1 vertebral compression fracture  Lumbar x-ray showing moderate anterior wedge compression fracture at L1 with 50% loss of height  Patient was treated conservatively with TLSO backpack brace when OOB and >45degrees  Patient was agitated and confused during his hospitalization, acutely worsened on 3/31/21- patient received Ativan IV  Improved during the day  Patient found to have acute functional deficits from his baseline, and after medical stabilization and clearance, patient was admitted to [facility] for acute inpatient rehabilitation        SUBJECTIVE:  Patient seen face to face  Continues with confusion at night and impulsive behavior  Creatinine slightly improved today, sodium continues to stay improved from previously, felt to be pre renal cause of ATN, but did require increased oxygen today and chest xray obtained showing pulmonary edema and single dose IV lasix ordered  Per IM, other labs obtained including procalcitonin, d-dimer  Hemoglobin improved to 8 1 from previous of 7 7 despite IVF, this had been discontinued after xray  Endocrine and nephrology following, on gentle IVF  VQ scan pending  Although patient worked well with therapy outside yesterday, unable to do so today due to weather  Participating, but frequently falls asleep  Continual work on proper sleep-wake cycle promoted with patient and staff daily  He denies fever, chills, nausea, emesis, dysuria, constipation, diarrhea, shortness of breath or cough  ASSESSMENT: Stable, progressing      PLAN:    Rehabilitation  · Functional deficits:  Proximal musculature weakness x4, Impaired cognition, impaired mobility, self care  · Poor safety awareness, high fall risk - witness multiple times setting bed and chair alarms trying to get out of bed:  Continue 1:1  and 4 side rails   Continue current rehabilitation plan of care to maximize function   Functional update:   o Shower with mod A, Upper body dressing Mod A, lower body ADLs Total A, footwear Total A, bed mobility min-mod A, transfers min A with RW   Estimated Discharge: To be determined    DVT prophylaxis  · Managed on subcutaneous heparin     Pain  · No acute issues currently     Bladder plan  · Continent, PVRs checked and low     Bowel plan  · + constipation: Last BM 4/8      Acute on chronic diastolic heart failure [facility])  Assessment & Plan  Wt Readings from Last 3 Encounters:   04/05/21 78 6 kg (173 lb 4 5 oz)   03/27/21 72 kg (158 lb 11 7 oz)   03/24/21 66 1 kg (145 lb 11 6 oz)     · Pulmonary edema on CXR on 4/9  · Needs fluids, but balanced with heart failure, single dose IV lasix 40mg given today        Hypoxemia  Assessment & Plan  · On Oxygen at baseline post COVID, but increased today  · Likely a result of pulmonary edema  · Discussed with IM and with elevated d-dimer, VQ scan ordered to rule out Pulmonary embolism  · Unable to get CT with contrast due to kidney function, however discussed with nephrology and can pre-treat if necessary  · Received single dose of IV lasix 40mg today, monitor oxygen needs and clinical status    ATN (acute tubular necrosis) ([facility] )  Assessment & Plan  · Likely pre-renal cause per nephrology  · Had responded to gentle IVF, but stopped due to worsening of pulm edema    Gram-positive bacteremia  Assessment & Plan  · Cornebacterium bacteremia  · Treated with IV cefepime later discontinued as subsequent blood cultures negative    Hyponatremia  Assessment & Plan  · Sodium of 135, Had previously been in upper 127-129 range  · Fluid restrictions and salt tabs  · Nephrology and IM following    COVID-19  Assessment & Plan  · Patient with COVID-19 virus infection in February 2021  Completed remdesivir and systemic corticosteroid  Off isolation  · Patient with residual findings on chest x-ray and CT scan as expected with persistent pleural effusions  · Has been on 1-2L of Nasal Cannula oxygen  · Consideration of COVID Fog as he has been with altered cognition and arousal since his infection    Diabetic ulcer of right foot [facility])  Assessment & Plan  · Podiatry consulted to follow  · Local debridement completed on 4/9    Type 1 diabetes mellitus ([facility] )  Assessment & Plan  Lab Results   Component Value Date    HGBA1C 7 7 (H) 02/05/2021     · Patient at home managed on insulin pump, however given recent impaired cognition, currently being managed on Lantus, Humalog and sliding scale insulin by endocrinology  · Endocrinology to follow while at [facility] and assist in management       Encephalopathy  Assessment & Plan  · Unclear etiology  · Patient with poor safety awareness and a high fall risk demonstrated by setting off chair and bed alarms multiple times by trying to get out of bed and disoriented  · 1:1  ordered and 4 side rails  · P O  Xanax prn stopped for daytime tiredness and fatigue  · Etiology unclear:    · CTH negative for acute findings, but showing older CVAs - would benefit from outpatient Neurology  Issue is from acute care, but they were unable to find an etiology  · Follow labs, was on Keflex, but stopped on 4/9  · Ammonia levels WNL, oxygenating on supplemental O2  · Promotion of sleep/wake cycles, stimulation during day in order to sleep overnight  · Consider MRI of brain as next step if symptoms do not improve with more conservative measures    Leukocytosis  Assessment & Plan  · Increased to 18 45k from 14k, discussed with IM and to trend in absence of new symptoms  · Completed Zithromax for possible PNA by IM consultants seen on previous CXR     · CXR showing moderate pleural effusions chronically  · Follow CBC    Stage 3b chronic kidney disease  Assessment & Plan  · Baseline creatinine 1 2-1 5 per records  · Nephrology following, labs obtained including urine studies, cortisol, evidence of volume contraction, will continue IVF for now minding his cardiac status  · Internal medicine consultants following, appreciate recommendations and work up  · Creatinine 2 28 on 4/9 (Previous 2 38)    [employer]tr[employer] dual chamber PM  Assessment & Plan  +ICD placed for complete heart block    Hyperlipidemia  Assessment & Plan  · Managed on Lipitor home dose 5 mg daily  · At home was also taking Zetia 5 mg daily    Essential hypertension  Assessment & Plan  · Currently managed on lisinopril 10mg daily starting 4/4/21  · At home was taking Norvasc 5 mg daily  · Internal medicine consultants to follow    Anemia of chronic disease  Assessment & Plan  · H/H improved on 4/9 - Hgb on 4/5 is 7 7 and on 4/9 is 8 1  · Unclear etiology - likely anemia of chronic disease  · Checking iron studies- IM started iron supplementation on 4/5  · Occult stool negative  · Type and Screen completed on 4/4    Hypothyroidism  Assessment & Plan  · Managed on Synthroid 125 mcg daily home dose    Acid reflux  Assessment & Plan  · Managed on Protonix    * Vertebral fracture, osteoporotic (HCC)  Assessment & Plan  · L1 anterior vertebral compression fracture with 50% loss of height found on CT abdomen pelvis - confirmed on lumbar x-ray  · Seen by Neurosurgery and treated conservatively with TLSO backpack brace when  OOB and >45degrees  · Follow-up with Neurosurgery as an outpatient        Appreciate IM consultants medical co-management  Labs, medications, and imaging personally reviewed  ROS:  A ten point review of systems was completed on 04/09/21 and pertinent positives are listed in subjective section  All other systems reviewed were negative  OBJECTIVE:   /70   Pulse 94   Temp 97 8 °F (36 6 °C) (Oral)   Resp 20   Ht 5' 8" (1 727 m)   Wt 78 6 kg (173 lb 4 5 oz)   SpO2 91%   BMI 26 35 kg/m²     Physical Exam  Vitals signs and nursing note reviewed  Constitutional:       General: He is not in acute distress  Appearance: He is well-developed  HENT:      Head: Normocephalic  Nose: Nose normal    Eyes:      Conjunctiva/sclera: Conjunctivae normal    Neck:      Musculoskeletal: Neck supple  Cardiovascular:      Rate and Rhythm: Normal rate  Pulses: Normal pulses  Pulmonary:      Effort: Pulmonary effort is normal  No respiratory distress  Breath sounds: No wheezing  Abdominal:      General: There is no distension  Palpations: Abdomen is soft  Tenderness: There is no abdominal tenderness  Musculoskeletal:      Right lower leg: No edema  Left lower leg: No edema  Skin:     General: Skin is warm  Coloration: Skin is pale        Comments: Right foot diabetic ulcer  Right leg anterior and posterior superficial ulcerations   Neurological:      Mental Status: He is alert  Motor: Weakness (4-/5 proximally and 4/5 distally) present        Comments: Oriented to person and place  Unclear of situation  Limited effort on examination due to attention, arousal   Psychiatric:         Mood and Affect: Mood normal           Lab Results   Component Value Date    WBC 18 45 (H) 04/09/2021    HGB 8 1 (L) 04/09/2021    HCT 25 0 (L) 04/09/2021    MCV 86 04/09/2021     04/09/2021     Lab Results   Component Value Date    SODIUM 135 (L) 04/09/2021    K 4 4 04/09/2021     04/09/2021    CO2 22 04/09/2021    BUN 43 (H) 04/09/2021    CREATININE 2 28 (H) 04/09/2021    GLUC 199 (H) 04/09/2021    CALCIUM 7 6 (L) 04/09/2021     Lab Results   Component Value Date    INR 1 03 06/23/2015    INR 1 06 06/20/2015    PROTIME 13 3 06/23/2015    PROTIME 13 2 06/20/2015           Current Facility-Administered Medications:     acetaminophen (TYLENOL) tablet 650 mg, 650 mg, Oral, Q6H PRN, Kelsey Rodriguez MD, 650 mg at 04/08/21 9486    aspirin chewable tablet 81 mg, 81 mg, Oral, Daily, Kelsey Rodriguez MD, 81 mg at 04/09/21 0826    atorvastatin (LIPITOR) tablet 5 mg, 5 mg, Oral, Daily, Kelsey Rodriguez MD, 5 mg at 04/09/21 3729    bisacodyl (DULCOLAX) rectal suppository 10 mg, 10 mg, Rectal, Daily PRN, Kelsey Rodriguez MD, 10 mg at 04/04/21 1716    cholecalciferol (VITAMIN D3) tablet 4,000 Units, 4,000 Units, Oral, Daily, Kelsey Rodriguez MD, 4,000 Units at 04/09/21 0826    docusate sodium (COLACE) capsule 100 mg, 100 mg, Oral, BID, Kelsey Rodriguez MD, 100 mg at 04/09/21 0827    ferrous sulfate tablet 325 mg, 325 mg, Oral, Daily With Breakfast, TERRY Jaramillo, 325 mg at 04/09/21 0826    heparin (porcine) subcutaneous injection 5,000 Units, 5,000 Units, Subcutaneous, Q8H [address], Kelsey Rodriguez MD, 5,000 Units at 04/09/21 1400    insulin glargine (LANTUS) subcutaneous injection 6 Units 0 06 mL, 6 Units, Subcutaneous, HS, Vena Hussar, MD, 6 Units at 04/08/21 2137    insulin lispro (HumaLOG) 100 units/mL subcutaneous injection 1-5 Units, 1-5 Units, Subcutaneous, TID AC, 1 Units at 04/09/21 1645 **AND** Fingerstick Glucose (POCT), , , TID AC, Swathi Duong MD    insulin lispro (HumaLOG) 100 units/mL subcutaneous injection 1-5 Units, 1-5 Units, Subcutaneous, HS, Swathi Duong MD, 3 Units at 04/08/21 2136    [START ON 4/10/2021] insulin lispro (HumaLOG) 100 units/mL subcutaneous injection 3 Units, 3 Units, Subcutaneous, TID With Meals, Swathi Duong MD    levothyroxine tablet 125 mcg, 125 mcg, Oral, Early Morning, Lyn Berry MD, 125 mcg at 04/09/21 0603    ondansetron (ZOFRAN-ODT) dispersible tablet 4 mg, 4 mg, Oral, Q6H PRN, Lyn Berry MD    pantoprazole (PROTONIX) EC tablet 40 mg, 40 mg, Oral, Daily Before Breakfast, Lyn Berry MD, 40 mg at 04/09/21 0602    polyethylene glycol (MIRALAX) packet 17 g, 17 g, Oral, Daily PRN, Lyn Berry MD, 17 g at 04/07/21 1845    sodium bicarbonate tablet 650 mg, 650 mg, Oral, BID after meals, Carlota Reddy PA-C, 650 mg at 04/09/21 1647    sodium chloride tablet 1 g, 1 g, Oral, TID With Meals, TERRY Barfield, 1 g at 04/09/21 1648    tamsulosin (FLOMAX) capsule 0 4 mg, 0 4 mg, Oral, Daily With Alberto Hein MD, 0 4 mg at 04/09/21 1647    Past Medical History:   Diagnosis Date    Arthritis     Cardiac disease     Closed fracture of fibula, lateral malleolus, right     Complete heart block ([facility] )     Diabetes mellitus [facility] )     Disease of thyroid gland     External incisional dehiscence     GERD (gastroesophageal reflux disease)     Heart disease     Muscular deconditioning     Pacemaker     Sleep apnea     cannot tolerate cpap    Thyroid disease        Patient Active Problem List    Diagnosis Date Noted    Hypoxemia 04/09/2021    Acute on chronic diastolic heart failure ([facility] ) 04/09/2021    ATN (acute tubular necrosis) ([place] [address] ) 04/06/2021    Vertebral fracture, osteoporotic ([facility] ) 04/02/2021    Progression of L1 vertebral fracture  03/30/2021    Gram-positive bacteremia 03/28/2021    Weakness 03/27/2021    Hyponatremia 03/27/2021    Hyperglycemia due to type 1 diabetes mellitus ([facility] ) 02/07/2021    COVID-19 02/05/2021    Personal history of diabetic foot ulcer 06/16/2020    Diabetic ulcer of right foot ([facility] ) 09/19/2019    Acquired absence of other left toe(s) ([facility] ) 07/30/2019    Type 1 diabetes mellitus ([facility] ) 07/30/2019    Status post split thickness skin graft 06/18/2019    Preoperative clearance 06/05/2019    Complete heart block  06/03/2019    Microalbuminuria 04/25/2019    Vitreous hemorrhage of left eye ([facility] ) 04/10/2019    Transition of care performed with sharing of clinical summary 04/10/2019    Type 2 diabetes mellitus with hyperglycemia (HCC)     High anion gap metabolic acidosis 16/00/8113    Elevated troponin level not due myocardial infarction 03/31/2019    Leukocytosis 03/31/2019    Low bicarbonate 03/31/2019    Pacemaker 03/31/2019    Encephalopathy 03/31/2019    Stage 3b chronic kidney disease 07/23/2018    Thrombocytopenia ([facility] ) 07/23/2018    Encounter for diabetic foot exam ([place] [address] ) 07/06/2018    Risk for falls 07/06/2018    Screening for colon cancer 04/16/2018    Anemia of chronic disease 01/25/2018    Arthritis 01/25/2018    DM type 2, not at goal [facility]) 01/25/2018    Medtronic dual chamber PM 01/25/2018    Essential hypertension 09/06/2016    Insulin pump status 09/11/2015    Hypothyroidism 11/26/2014    Hyperlipidemia 11/11/2014    Acid reflux 08/06/2014    Type I diabetes mellitus ([facility] ) 07/08/2014    Diabetic peripheral neuropathy ([facility] ) 10/24/2012      Jeni Nuñez DO  Physical Medicine and Rehabilitation  [address]    Total time spent:  50 minutes, with more than 50% spent counseling/coordinating care   Counseling includes discussion with patient re: progress in therapies, functional issues observed by therapy staff, and discussion with patient his/her current medical state/wellbeing  Coordination of patient's care was performed in conjunction with Internal Medicine service to monitor patient's labs, vitals, and management of their comorbidities  ** Please Note:  voice to text software may have been used in the creation of this document   Although proof errors in transcription or interpretation are a potential of such software**

## 2021-04-09 NOTE — PROGRESS NOTES
NEPHROLOGY PROGRESS NOTE   Jacob Vo 76 y o  male MRN: [id number]  Unit/Bed#: -01 Encounter: [id number]      ASSESSMENT/PLAN:  1  KAZ on CKD IIIA: baseline creatinine 1 1-1 5 and follows with Dr Kinjal Veliz in our office  KAZ likely due to lasix, increased lisinopril, intermittent hypotension and poor po intake--suspect progression to ATN  · Creatinine trending down slightly to 2 28 today   · Repeat UA:  Glucose, +1 ketones, large blood, trace leukocytes, 3+ protein, 20-30 rbc's, 10-20 wbc's and 10-25 granular casts  · Bladder scan 223ml  · Urine sodium 10, urine chloride less than 10  · S/p trial of IVF but now short of breath and IVF were stopped today   · Continue to hold lisinopril  · Awaiting CXR and will give trial of IV lasix if CXR shows volume overload   · Check am BMP   2  Hyponatremia: sodium trending up to 135 today   · Work up from 3/31: serum osm 284, urine osm 524, urine sodium 68, cortisol normal, no monoclonal gammopathy, TSH elevated 5 6   · Continue 2000cc/day oral fluid restriction and sodium chloride 1 g t i d   3  Moderate pleural effusions: s/p lasix 4/4  Respiratory status worsening today and now on 4 L nasal cannula  Repeating chest x-ray per primary team and depending on results would give IV Lasix verses possible thoracentesis  4  Hypertension: BP acceptable  · Currently off all BP meds   · Cortisol 30 1  5  Anion gap metabolic acidosis:  Bicarb stable at 22 today  Likely related to renal failure and possible starvation ketosis as recent UA showed ketones  · Currently on sodium bicarb 650mg bid   6  Anemia:  Hemoglobin 8 1 today  7  Microscopic hematuria: per   [address] last note he follows with urology for this     Plan Summary:    Agree with stopping IVF    Awaiting CXR to decide on possible IV lasix    Check am BMP     SUBJECTIVE:  Desatted this am and now on 4L NC so IVF was stopped  Still with poor appetite and per nurse his urine output is lower than it should be  OBJECTIVE:  Current Weight: Weight - Scale: 78 6 kg (173 lb 4 5 oz)  Vitals:    04/09/21 0851   BP:    Pulse:    Resp:    Temp:    SpO2: 90%       Intake/Output Summary (Last 24 hours) at 4/9/2021 1144  Last data filed at 4/9/2021 0854  Gross per 24 hour   Intake 1718 33 ml   Output 250 ml   Net 1468 33 ml       General:  No acute distress  Skin:  No rash  Eyes:  Sclerae anicteric  ENT:  Moist mucous membranes  Neck:  Trachea midline with no JVD  Chest:  Decreased breath sounds bilaterally  CVS:  Regular rate and rhythm  Abdomen:  Soft, nontender, nondistended  Extremities:  Mild R leg edema and wrapped   Neuro:  Awake and alert  Psych:  Appropriate affect      Medications:  Scheduled Meds:  Current Facility-Administered Medications   Medication Dose Route Frequency Provider Last Rate    acetaminophen  650 mg Oral Q6H PRN Raphael Clifford MD      aspirin  81 mg Oral Daily Raphael Clifford MD      atorvastatin  5 mg Oral Daily Raphael Clifford MD      bisacodyl  10 mg Rectal Daily PRN Raphael Clifford MD      cholecalciferol  4,000 Units Oral Daily Raphael Clifford MD      docusate sodium  100 mg Oral BID Raphael Clifford MD      ferrous sulfate  325 mg Oral Daily With Breakfast TERRY Jaramillo      heparin (porcine)  5,000 Units Subcutaneous [facility] Raphael Clifford MD      insulin glargine  6 Units Subcutaneous HS Raphael Clifford MD      insulin lispro  1-5 Units Subcutaneous TID [facility] Aaron Way MD      insulin lispro  1-5 Units Subcutaneous HS MD Katlin Vasquez insulin lispro  2 Units Subcutaneous TID With Meals Aaron Way MD      levothyroxine  125 mcg Oral Early Morning Raphael Clifford MD      ondansetron  4 mg Oral Q6H PRN Raphael Clifford MD      pantoprazole  40 mg Oral Daily Before Breakfast Raphael Clifford MD      polyethylene glycol  17 g Oral Daily PRN Raphael Clifford MD      sodium bicarbonate  650 mg Oral BID after meals Teresa Tsai PA-C      sodium chloride  1 g Oral TID With Meals Quinton Lion TERRY Paulson      tamsulosin  0 4 mg Oral Daily With Keshav Cr MD         PRN Meds:   acetaminophen    bisacodyl    ondansetron    polyethylene glycol    Laboratory Results:  Results from last 7 days   Lab Units 04/09/21  0855 04/08/21  0622 04/07/21  0618 04/06/21  0453 04/05/21  0630 04/04/21  0630 04/03/21  0546 04/03/21  0545   WBC Thousand/uL 18 45*  --   --   --  14 35* 14 90*  --  16 44*   HEMOGLOBIN g/dL 8 1*  --   --   --  7 7* 7 8*  --  8 7*   HEMATOCRIT % 25 0*  --   --   --  23 8* 24 5*  --  26 2*   PLATELETS Thousands/uL 156  --   --   --  194 224  --  276   SODIUM mmol/L 135* 132* 127* 129* 129* 130* 131*  --    POTASSIUM mmol/L 4 4 3 8 4 5 4 0 4 0 4 0 4 1  --    CHLORIDE mmol/L 101 101 94* 98* 97* 97* 99*  --    CO2 mmol/L 22 23 17* 23 23 25 24  --    BUN mg/dL 43* 45* 36* 25 20 17 16  --    CREATININE mg/dL 2 28* 2 38* 2 12* 1 87* 1 53* 1 34* 1 22  --    CALCIUM mg/dL 7 6* 7 6* 7 9* 7 7* 7 6* 7 6* 7 9*  --

## 2021-04-10 PROBLEM — J96.00 ACUTE RESPIRATORY FAILURE (HCC): Status: ACTIVE | Noted: 2021-01-01

## 2021-04-10 PROBLEM — J96.01 ACUTE RESPIRATORY FAILURE WITH HYPOXIA (HCC): Status: ACTIVE | Noted: 2021-01-01

## 2021-04-10 NOTE — QUICK NOTE
Notified by CCM/RRT that RR called and patient needed transfer out for acute respiratory failure to step down with [facility] accepting provider Dr Yang Hanley  I spoke to wife by phone and notified her of the situation

## 2021-04-10 NOTE — ASSESSMENT & PLAN NOTE
POA  RRT at [facility] needing 10L O2  Likely 2/2 CHF  CXR c/w CHF

## 2021-04-10 NOTE — ASSESSMENT & PLAN NOTE
Wt Readings from Last 3 Encounters:   04/10/21 78 6 kg (173 lb 4 5 oz)   04/05/21 78 6 kg (173 lb 4 5 oz)   03/27/21 72 kg (158 lb 11 7 oz)     Pleural effusions related to IVF for KAZ  IV Lasix 80 mg IV given at [facility]  Will ask cardio to eval  Echo in March showed at least mild TR

## 2021-04-10 NOTE — ASSESSMENT & PLAN NOTE
+ICD placed for complete heart block
- Notified by medicine team that they were called by nursing regarding worsening hypoxemia and patient still desaturating at times on 5L NC prompting RR initiation; apparently he was starting to have some breathing issues the day before; V/Q scan and was low probability of PE   - Mgmt per RRT/CCM - see their documentation for specific details   - They ordered EKG, CXR, labs - interpretation and mgmt per RRT and receiving medicine team provider    - They recommend urgent transfer - [facility] accepting - Step-down   - I spoke to patient's wife regarding what I knew and plan for transfer
Continue levothyroxine current dose and repeat TFT in 4-6 weeks as outpatient
Lab Results   Component Value Date    HGBA1C 7 7 (H) 02/05/2021       Recent Labs     04/02/21  1559 04/02/21 2024 04/03/21  0622 04/03/21  1042   POCGLU 131 95 104 100     On insulin pump at home,   with tandem T Slim, and Dexcom  The pump setting is as below:  12 am:  0 4 unit/hour  6:00 a m :  0 5  8:00 p m  0 45     Insulin to carb ratio 25  Insulin sensitivity factor 80  Target blood glucose 110    Currently on Lantus 6 units q h s  And correctional sliding scale  As patient's appetite is improving and dietary supplement( Glucerna) added to his regimen his blood sugars are rising  We changed his carbohydrate consistent diet to level 1  We added nighttime coverage  Continue Lantus 6 units q h s  Add Humalog 2 units t i d  A c    Continue to monitor blood sugar over time and make adjustments to the regimen if necessary
Lab Results   Component Value Date    HGBA1C 7 7 (H) 02/05/2021       Recent Labs     04/02/21  1559 04/02/21 2024 04/03/21  0622 04/03/21  1042   POCGLU 131 95 104 100     Podiatry on board, status post debridement
Lab Results   Component Value Date    HGBA1C 7 7 (H) 02/05/2021     · Patient at home managed on insulin pump, however given recent impaired cognition, currently being managed on Lantus, Humalog and sliding scale insulin by endocrinology  · Endocrinology to follow while at [facility] and assist in management 
Management as per primary team
Management per primary team
Previous DEXA scan in 2018 showed osteoporosis  Patient needs outpatient follow-up with endocrinology for osteoporosis management, he may benefit from anabolic agents as bisphosphonate is not working 
Wt Readings from Last 3 Encounters:   04/05/21 78 6 kg (173 lb 4 5 oz)   03/27/21 72 kg (158 lb 11 7 oz)   03/24/21 66 1 kg (145 lb 11 6 oz)     · Pulmonary edema on CXR on 4/9  · Needs fluids, but balanced with heart failure, single dose IV lasix 40mg given today
· Baseline creatinine 1 2-1 5 per records  · Nephrology following, labs obtained including urine studies, cortisol, evidence of volume contraction, will continue IVF for now minding his cardiac status  · Internal medicine consultants following, appreciate recommendations and work up  · Creatinine 2 28 on 4/9 (Previous 2 38)
· Cornebacterium bacteremia  · Treated with IV cefepime later discontinued as subsequent blood cultures negative
· Currently managed on lisinopril 10mg daily starting 4/4/21  · At home was taking Norvasc 5 mg daily  · Internal medicine consultants to follow
· H/H improved on 4/9 - Hgb on 4/5 is 7 7 and on 4/9 is 8 1  · Unclear etiology - likely anemia of chronic disease  · Checking iron studies- IM started iron supplementation on 4/5  · Occult stool negative  · Type and Screen completed on 4/4
· Increased to 18 45k from 14k, discussed with IM and to trend in absence of new symptoms  · Completed Zithromax for possible PNA by IM consultants seen on previous CXR     · CXR showing moderate pleural effusions chronically  · Follow CBC
· L1 anterior vertebral compression fracture with 50% loss of height found on CT abdomen pelvis - confirmed on lumbar x-ray  · Seen by Neurosurgery and treated conservatively with TLSO backpack brace when  OOB and >45degrees  · Follow-up with Neurosurgery as an outpatient
· Likely pre-renal cause per nephrology  · Had responded to gentle IVF, but stopped due to worsening of pulm edema
· Managed on Lipitor home dose 5 mg daily  · At home was also taking Zetia 5 mg daily
· Managed on Protonix
· Managed on Synthroid 125 mcg daily home dose
· On Oxygen at baseline post COVID, but increased today  · Likely a result of pulmonary edema  · Discussed with IM and with elevated d-dimer, VQ scan ordered to rule out Pulmonary embolism  · Unable to get CT with contrast due to kidney function, however discussed with nephrology and can pre-treat if necessary  · Received single dose of IV lasix 40mg today, monitor oxygen needs and clinical status
· Patient with COVID-19 virus infection in February 2021  Completed remdesivir and systemic corticosteroid     Off isolation  · Patient with residual findings on chest x-ray and CT scan as expected with persistent pleural effusions  · Has been on 1-2L of Nasal Cannula oxygen  · Consideration of COVID Fog as he has been with altered cognition and arousal since his infection
· Podiatry consulted to follow  · Local debridement completed on 4/9
· Sodium of 135, Had previously been in upper 127-129 range  · Fluid restrictions and salt tabs  · Nephrology and IM following
· Unclear etiology  · Patient with poor safety awareness and a high fall risk demonstrated by setting off chair and bed alarms multiple times by trying to get out of bed and disoriented  · 1:1  ordered and 4 side rails  · P O  Xanax prn stopped for daytime tiredness and fatigue  · Etiology unclear:    · CTH negative for acute findings, but showing older CVAs - would benefit from outpatient Neurology  Issue is from acute care, but they were unable to find an etiology    · Follow labs, was on Keflex, but stopped on 4/9  · Ammonia levels WNL, oxygenating on supplemental O2  · Promotion of sleep/wake cycles, stimulation during day in order to sleep overnight  · Consider MRI of brain as next step if symptoms do not improve with more conservative measures
12-Nov-2017 20:00

## 2021-04-10 NOTE — H&P
98 Ashlee Vazquez 1945, 76 y o  male MRN: [id number]  Unit/Bed#: [facility] 809-01 Encounter: [id number]  Primary Care Provider: Kanchan Floyd MD   Date and time admitted to hospital: 4/10/2021 10:27 AM    * Acute respiratory failure with hypoxia ([facility] [address] )  Assessment & Plan  POA  RRT at [facility] needing 10L O2  Likely 2/2 CHF  CXR c/w CHF    Acute on chronic diastolic heart failure (HCC)  Assessment & Plan  Wt Readings from Last 3 Encounters:   04/10/21 78 6 kg (173 lb 4 5 oz)   04/05/21 78 6 kg (173 lb 4 5 oz)   03/27/21 72 kg (158 lb 11 7 oz)     Pleural effusions related to IVF for KAZ  IV Lasix 80 mg IV given at [facility]  Will ask cardio to eval  Echo in March showed at least mild TR        KAZ (acute kidney injury) ([facility] [address] )  Assessment & Plan  Estimated Creatinine Clearance: 28 1 mL/min (A) (by C-G formula based on SCr of 2 2 mg/dL (H))  POA  Renal following  Will need to diurese in setting of pulm edema    Hyponatremia  Assessment & Plan  C/w Salt tabs  Renal following    Encephalopathy  Assessment & Plan  Pt currently AAOX2  Possibly related to recent infection and now has acute hypoxia  Monitor    Type I diabetes mellitus [facility])  Assessment & Plan  Lab Results   Component Value Date    HGBA1C 7 7 (H) 02/05/2021       Recent Labs     04/09/21  1556 04/09/21  2102 04/10/21  0612 04/10/21  0803   POCGLU 202* 213* 166* 183*       Blood Sugar Average: Last 72 hrs:     Endo consulted  On insulin pump OP  On SQ insulin IP      VTE Prophylaxis: Heparin  / sequential compression device   Code Status: Full  POLST: POLST form is not discussed and not completed at this time  Discussion with family: yes    Anticipated Length of Stay:  Patient will be admitted on an Inpatient basis with an anticipated length of stay of  At least 2 midnights  Justification for Hospital Stay: acute hypoxia    Total Time for Visit, including Counseling / Coordination of Care: 45 minutes    Greater than 50% of this total time spent on direct patient counseling and coordination of care  Chief Complaint:   "Lloyd's law"    History of Present Illness:    Aren Gallagher is a 76 y o  male w/ recent hosp for bacteremia and CHF who presents with SOB and worsening hypoxia  Pt at this time says he is feeling ok  Feels better than this AM       Review of Systems:    Review of Systems   Constitutional: Negative  HENT: Negative  Eyes: Negative  Respiratory: Positive for shortness of breath  Cardiovascular: Negative  Gastrointestinal: Negative  Endocrine: Negative  Genitourinary: Negative  Musculoskeletal: Negative  Skin: Negative  Allergic/Immunologic: Negative  Neurological: Negative  Hematological: Negative  Psychiatric/Behavioral: Negative  Past Medical and Surgical History:     Past Medical History:   Diagnosis Date    Arthritis     Cardiac disease     Closed fracture of fibula, lateral malleolus, right     Complete heart block ([address] )     Diabetes mellitus ([facility] [address] )     Disease of thyroid gland     External incisional dehiscence     GERD (gastroesophageal reflux disease)     Heart disease     Muscular deconditioning     Pacemaker     Sleep apnea     cannot tolerate cpap    Thyroid disease        Past Surgical History:   Procedure Laterality Date    CARDIAC PACEMAKER PLACEMENT Left 2015    CARDIAC PACEMAKER PLACEMENT      CARDIAC SURGERY      CATARACT EXTRACTION      MUSCLE FLAP      right foot    NERVE BLOCK      Transforaminal Epidural Lumbar with Fluoroscopic Guidance     NE COLONOSCOPY FLX DX W/COLLJ SPEC WHEN PFRMD N/A 5/4/2018    Procedure: EGD AND COLONOSCOPY;  Surgeon: Purnima Vidales MD;  Location: [facility];   Service: Gastroenterology    NE SPLIT GRFT TRUNK,ARM,LEG <100 SQCM Right 6/10/2019    Procedure: SKIN GRAFT SPLIT THICKNESS (STSG)  EXTREMITY right leg;  Surgeon: Pj Barakat DPM;  Location:  MAIN OR;  Service: Podiatry  TOE AMPUTATION      left 5th toe       Meds/Allergies:    Prior to Admission medications    Medication Sig Start Date End Date Taking?  Authorizing Provider   amLODIPine (NORVASC) 5 mg tablet Take 1 tablet (5 mg total) by mouth daily 3/2/21   Nayeli Joe MD   ascorbic acid (VITAMIN C) 1000 MG tablet Take 1 tablet (1,000 mg total) by mouth daily for 4 days 2/9/21 2/13/21  Yumiko Regalado MD   aspirin 81 mg chewable tablet Chew 81 mg daily    Historical Provider, MD   Atorvastatin Calcium (LIPITOR PO) Take 5 mg by mouth daily     Historical Provider, MD   CALCIUM CITRATE PO Take 2,000 mg by mouth daily  11/3/17   Historical Provider, MD   Cholecalciferol (VITAMIN D3) 2000 units capsule Take 2,000 Units by mouth daily    Historical Provider, MD   Continuous Blood Gluc Sensor (DEXCOM G6 SENSOR) MISC As directed dx:250 03 11/26/14   Historical Provider, MD   cyanocobalamin (VITAMIN B-12) 1,000 mcg tablet Take 2,000 mcg by mouth daily    Historical Provider, MD   DULoxetine (CYMBALTA) 30 mg delayed release capsule Take 30 mg by mouth daily     Historical Provider, MD   ezetimibe (ZETIA) 10 mg tablet TAKE 5MG (ONE-HALF TABLET) BY MOUTH DAILY FOR CHOLESTEROL    Historical Provider, MD   glucose 40 % Take by mouth once    Historical Provider, MD   glucose 77 4 % TAKE 1/2 TUBE BY MOUTH  AS NEEDED FOR LOW BLOOD SUGAR 6/8/20   Historical Provider, MD   insulin aspart (NovoLOG) 100 units/mL injection Inject under the skin Via insulin pump    Historical Provider, MD   levothyroxine 125 mcg tablet Take 1 tablet (125 mcg total) by mouth daily in the early morning 11/26/19   Nayeli Joe MD   loperamide (IMODIUM) 2 mg capsule Take 2 capsules (4 mg total) by mouth 4 (four) times a day as needed for diarrhea 2/8/21   Yumiko Regalado MD   pantoprazole (PROTONIX) 40 mg tablet Take 40 mg by mouth    Historical Provider, MD   tamsulosin (FLOMAX) 0 4 mg Take 0 4 mg by mouth daily with dinner      Historical Provider, MD zinc sulfate (ZINCATE) 220 mg capsule Take 1 capsule (220 mg total) by mouth daily for 4 doses 2/9/21 2/13/21  Ileana Monique MD     I have reviewed home medications with patient personally  Allergies: Allergies   Allergen Reactions    Contrast  [Iodinated Diagnostic Agents]     Lipitor  [Atorvastatin Calcium] Drowsiness    Ibuprofen      sensitive    Iodine - Food Allergy Rash    Simvastatin Anxiety     Other reaction(s): Malaise (finding)       Social History:     Marital Status: /Civil Union     Substance Use History:   Social History     Substance and Sexual Activity   Alcohol Use Yes    Alcohol/week: 0 0 standard drinks    Frequency: Never    Binge frequency: Never    Comment: socially     Social History     Tobacco Use   Smoking Status Former Smoker    Types: Pipe   Smokeless Tobacco Never Used     Social History     Substance and Sexual Activity   Drug Use No       Family History:    Family History   Problem Relation Age of Onset    Diabetes Mother     Heart disease Mother     Thyroid disease Mother         Disorder     Cancer Brother     Gout Brother     Diabetes Maternal Grandmother        Physical Exam:     Vitals:   Blood Pressure: 143/82 (04/10/21 1042)  Pulse: 94 (04/10/21 1042)  Temperature: 98 4 °F (36 9 °C) (04/10/21 1042)  Temp Source: Oral (04/10/21 1042)  Respirations: 20 (04/10/21 1042)  Height: 5' 8" (172 7 cm) (04/10/21 1033)  Weight - Scale: 78 6 kg (173 lb 4 5 oz) (04/10/21 1033)  SpO2: 92 % (04/10/21 1042)    Physical Exam  HENT:      Head: Normocephalic and atraumatic  Nose: Nose normal       Mouth/Throat:      Mouth: Mucous membranes are moist    Eyes:      Extraocular Movements: Extraocular movements intact  Conjunctiva/sclera: Conjunctivae normal    Neck:      Musculoskeletal: Normal range of motion and neck supple  Cardiovascular:      Rate and Rhythm: Normal rate and regular rhythm     Pulmonary:      Effort: Pulmonary effort is normal  Breath sounds: Rales present  Abdominal:      General: Bowel sounds are normal  There is no distension  Palpations: Abdomen is soft  Tenderness: There is no abdominal tenderness  Musculoskeletal: Normal range of motion  Right lower leg: Edema present  Left lower leg: Edema present  Skin:     General: Skin is warm and dry  Neurological:      Mental Status: He is alert  He is disoriented  Comments: Ox2             Additional Data:     Lab Results: I have personally reviewed pertinent reports  Results from last 7 days   Lab Units 04/10/21  0829 04/10/21  0821   WBC Thousand/uL  --  15 67*   HEMOGLOBIN g/dL  --  7 9*   I STAT HEMOGLOBIN g/dl 8 2*  --    HEMATOCRIT %  --  24 1*   HEMATOCRIT, ISTAT % 24*  --    PLATELETS Thousands/uL  --  168   NEUTROS PCT %  --  86*   LYMPHS PCT %  --  4*   MONOS PCT %  --  9   EOS PCT %  --  0     Results from last 7 days   Lab Units 04/10/21  0829 04/10/21  0508   SODIUM mmol/L  --  133*   POTASSIUM mmol/L  --  4 4   CHLORIDE mmol/L  --  105   CO2 mmol/L  --  23   CO2, I-STAT mmol/L 22  --    BUN mg/dL  --  44*   CREATININE mg/dL  --  2 20*   ANION GAP mmol/L  --  5   CALCIUM mg/dL  --  7 9*   GLUCOSE RANDOM mg/dL  --  166*         Results from last 7 days   Lab Units 04/10/21  0803 04/10/21  0612 04/09/21  2102 04/09/21  1556 04/09/21  1052 04/09/21  0617 04/08/21  2055 04/08/21  1558 04/08/21  1049 04/08/21  0627 04/07/21  2134 04/07/21  1557   POC GLUCOSE mg/dl 183* 166* 213* 202* 227* 171* 300* 265* 195* 162* 351* 316*         Results from last 7 days   Lab Units 04/10/21  0813 04/10/21  0508 04/09/21  1217   LACTIC ACID mmol/L 1 7  --  1 3   PROCALCITONIN ng/ml  --  0 93* 1 08*       Imaging: I have personally reviewed pertinent reports        No orders to display       EKG, Pathology, and Other Studies Reviewed on Admission:   · EKG: n/a    Allscripts / Epic Records Reviewed: Yes     ** Please Note: This note has been constructed using a voice recognition system   **

## 2021-04-10 NOTE — ASSESSMENT & PLAN NOTE
Estimated Creatinine Clearance: 28 1 mL/min (A) (by C-G formula based on SCr of 2 2 mg/dL (H))    POA  Renal following  Will need to diurese in setting of pulm edema

## 2021-04-10 NOTE — PROGRESS NOTES
Progress Note - Nephrology   Willam Curling 76 y o  male MRN: [id number]  Unit/Bed#: [facility] 809-01 Encounter: [id number]      Assessment / Plan:  1  KAZ on top of CKD stage 3b likely d/t hypertensive nephrosclerosis plus or minus physiologic aging of the kidney-baseline serum creatinine 1 2-1 5, follows with Dr Roland Santacruz outpatient  -continue holding ACEi  -sCr stable at about 2 2, suspect component of CRS  -CXR supports volume overload  With hypoxia this am  On NC   -agree with IV lasix-80mg given this am  -off IVF  -f/u am BMP  -Suspect ATN from prolonged prerenal state  +granular casts on UA  Monitor volume status closely  2  Hypertension- pressure acceptable on current regimen, cortisone  3  Hyponatremia - sNa 133, s/p lasix today, monitor BMP off IVF and on salt tabs  On 2L fluid restriction   4  Anemia - Hgb 7 9, monitor CBC, transfuse prn  5  Moderate pleural effusions in the setting worsening respiratory status-on diuretic as above, consider cardiology consult  6  Microscopic hematuria-patient follows with urology for this  7  Metabolic acidosis-this is likely due to renal failure as well as possible starvation ketosis, continue sodium bicarbonate 650 mg p o  B i d , bicarbonate near goal        Subjective:   Rapid response called for hypoxia  Patient denies chest pain or shortness of breath  He does have leg edema  Objective:     Vitals: Blood pressure 143/82, pulse 94, temperature 98 4 °F (36 9 °C), temperature source Oral, resp  rate 20, height 5' 8" (1 727 m), weight 78 6 kg (173 lb 4 5 oz), SpO2 92 %  ,Body mass index is 26 35 kg/m²  Temp (24hrs), Av 2 °F (36 8 °C), Min:97 8 °F (36 6 °C), Max:98 4 °F (36 9 °C)      Weight (last 2 days)     Date/Time   Weight    04/10/21 1033   78 6 (173 28)              No intake or output data in the 24 hours ending 04/10/21 1155  No intake/output data recorded  Physical Exam:   Physical Exam  Vitals signs reviewed     Constitutional:       General: He is not in acute distress  Appearance: Normal appearance  He is well-developed  He is not diaphoretic  Comments: Wearing TLSO brace   HENT:      Head: Normocephalic and atraumatic  Mouth/Throat:      Pharynx: No oropharyngeal exudate  Eyes:      General: No scleral icterus  Right eye: No discharge  Left eye: No discharge  Neck:      Musculoskeletal: Neck supple  Thyroid: No thyromegaly  Cardiovascular:      Rate and Rhythm: Normal rate and regular rhythm  Heart sounds: Normal heart sounds  Pulmonary:      Effort: Pulmonary effort is normal       Breath sounds: Normal breath sounds  No wheezing or rales  Abdominal:      General: Bowel sounds are normal  There is no distension  Palpations: Abdomen is soft  Tenderness: There is no abdominal tenderness  Musculoskeletal: Normal range of motion  General: Swelling (b/l LE) present  Lymphadenopathy:      Cervical: No cervical adenopathy  Skin:     General: Skin is warm and dry  Coloration: Skin is pale  Findings: No rash  Neurological:      General: No focal deficit present  Mental Status: He is alert  Comments: awake   Psychiatric:      Comments: Odd affect         Invasive Devices     Peripheral Intravenous Line            Peripheral IV 04/07/21 Left;Upper Arm 2 days                Medications:    Scheduled Meds:    PRN Meds:     Continuous Infusions:No current facility-administered medications for this encounter             LAB RESULTS:      Results from last 7 days   Lab Units 04/10/21  0829 04/10/21  0821 04/10/21  0508 04/09/21  0855 04/08/21  0622 04/07/21  0618 04/06/21  0453 04/05/21  0630 04/04/21  0630   WBC Thousand/uL  --  15 67* 15 81* 18 45*  --   --   --  14 35* 14 90*   HEMOGLOBIN g/dL  --  7 9* 8 0* 8 1*  --   --   --  7 7* 7 8*   I STAT HEMOGLOBIN g/dl 8 2*  --   --   --   --   --   --   --   --    HEMATOCRIT %  --  24 1* 25 1* 25 0*  --   --   --  23 8* 24 5* HEMATOCRIT, ISTAT % 24*  --   --   --   --   --   --   --   --    PLATELETS Thousands/uL  --  168 168 156  --   --   --  194 224   NEUTROS PCT %  --  86* 87* 89*  --   --   --  89* 87*   LYMPHS PCT %  --  4* 4* 3*  --   --   --  3* 4*   MONOS PCT %  --  9 8 7  --   --   --  7 8   EOS PCT %  --  0 0 0  --   --   --  0 0   POTASSIUM mmol/L  --   --  4 4 4 4 3 8 4 5 4 0 4 0 4 0   CHLORIDE mmol/L  --   --  105 101 101 94* 98* 97* 97*   CO2 mmol/L  --   --  23 22 23 17* 23 23 25   CO2, I-STAT mmol/L 22  --   --   --   --   --   --   --   --    BUN mg/dL  --   --  44* 43* 45* 36* 25 20 17   CREATININE mg/dL  --   --  2 20* 2 28* 2 38* 2 12* 1 87* 1 53* 1 34*   CALCIUM mg/dL  --   --  7 9* 7 6* 7 6* 7 9* 7 7* 7 6* 7 6*   GLUCOSE, ISTAT mg/dl 176*  --   --   --   --   --   --   --   --        CUTURES:  Lab Results   Component Value Date    BLOODCX No Growth After 5 Days  03/29/2021    BLOODCX No Growth After 5 Days  03/29/2021    BLOODCX Corynebacterium striatum group (A) 03/27/2021    BLOODCX Corynebacterium striatum group (A) 03/27/2021    BLOODCX No Growth After 5 Days  03/31/2019    BLOODCX No Growth After 5 Days  03/31/2019    URINECX <10,000 cfu/ml  03/27/2021    URINECX No Growth <1000 cfu/mL 03/31/2019                 Portions of the record may have been created with voice recognition software  Occasional wrong word or "sound a like" substitutions may have occurred due to the inherent limitations of voice recognition software  Read the chart carefully and recognize, using context, where substitutions have occurred  If you have any questions, please contact the dictating provider

## 2021-04-10 NOTE — DISCHARGE SUMMARY
-- DO NOT REPLY / DO NOT REPLY ALL --  -- Message is from the [facility]--    COVID-19 Universal Screening: Negative    General Patient Message      Reason for Call: Patient states he needs a prescription or referral for the new glucose meter to be billed to his insurance. Patient would like this prescription for the machine sent to the [facility] at [address] [facility] which is on file. Please advise    Caller Information       Type Contact Phone    04/10/2020 09:59 AM Phone (Incoming) Feliciano Hansen (Self) [phone] (H)          Alternative phone number: n/a    Turnaround time given to caller:   \"This message will be sent to [state Provider's name]. The clinical team will fulfill your request as soon as they review your message.\"     This is a non-billable encounter    Discharge Summary - Peewee Rice 76 y o  male MRN: [id number]  Unit/Bed#: -01 Encounter: [id number]    Admission Date: 4/2/2021     Discharge Date: 4/10/2021    Rehabilitation/Etiologic Diagnosis:   Orthopedic Disorders:  08 9  Other Orthopedic L1 vertebral fracture    Discharge Diagnoses:      Patient Active Problem List   Diagnosis    Acid reflux    Hypothyroidism    Anemia of chronic disease    Type I diabetes mellitus ([facility] [address] )    Diabetic peripheral neuropathy ([facility] )    Essential hypertension    Hyperlipidemia    Medtronic dual chamber PM    Stage 3b chronic kidney disease    Thrombocytopenia (HCC)    High anion gap metabolic acidosis    Low bicarbonate    Pacemaker    Encephalopathy    Complete heart block     COVID-19 history     Weakness    Hyponatremia    Gram-positive bacteremia history     Progression of L1 vertebral fracture     Vertebral fracture, osteoporotic (HCC)    ATN [facility]acute tubular necrosis) ([facility]    Hypoxemia    Acute on chronic diastolic heart failure (HCC)    Acute respiratory failure with hypoxia [facility])       Emergency transfer summary from acute rehab :      Weekend cross-coverage note   (I did not evaluate this patient in person; see prior documentation and RRT documentation regarding additional specific events)     Acute respiratory failure with hypoxia ([facility] [address] )  Assessment & Plan  - Notified by medicine team that they were called by nursing regarding worsening hypoxemia and patient still desaturating at times on 5L NC prompting RR initiation; apparently he was starting to have some breathing issues the day before; V/Q scan and was low probability of PE   - Mgmt per RRT/CCM - see their documentation for specific details   - They ordered EKG, CXR, labs - interpretation and mgmt per RRT and receiving medicine team provider    - Ordered IV lasix as well    - They recommend urgent transfer - [facility] accepting - Step-down   - I spoke to patient's wife regarding what I knew and plan for transfer    Acute on chronic diastolic heart failure (HCC)  Assessment & Plan  Wt Readings from Last 3 Encounters:   04/05/21 78 6 kg (173 lb 4 5 oz)   03/27/21 72 kg (158 lb 11 7 oz)   03/24/21 66 1 kg (145 lb 11 6 oz)     · Pulmonary edema on CXR on 4/9, repeat CXR pending on d/c   · Received IV lasix yesterday and now with RR     ATN (acute tubular necrosis) ([facility] [address] )  Assessment & Plan  · Likely pre-renal cause per nephrology  · Mgmt per IM     Gram-positive bacteremia hx  Assessment & Plan  · Cornebacterium bacteremia  · Treated with IV cefepime later discontinued as subsequent blood cultures negative    Hyponatremia  Assessment & Plan  · Sodium of 135 4/9, Had previously been in upper 127-129 range  · Fluid restrictions and salt tabs - was on   · Nephrology and IM following    COVID-19 hx  Assessment & Plan  · Patient with COVID-19 virus infection in February 2021  Completed remdesivir and systemic corticosteroid  Off isolation  · Patient with residual findings on chest x-ray and CT scan as expected with persistent pleural effusions  · Had been on 1-2L of Nasal Cannula oxygen > now significantly escalated   · Consideration of COVID Fog as he has been with altered cognition and arousal since his infection    Diabetic ulcer of right foot ([facility] [address] )  Assessment & Plan  · Podiatry consulted and following in [facility] > recommend podiatry c/s back on acute   · Local debridement completed on 4/9   "Right foot diabetic ulceration, submet 5 to subcutaneous tissue - [place] 2  1  Right leg anterior and posterior superficial ulcerations  2  Diabetes mellitus  3  Peripheral neuropathy  4  CKD stage 3     PLAN:     · Local wound care consisting of Dermagran, DSD to right plantar foot, as well as right leg ulcerations  Wounds remain stable and free from local signs of infection    · Right plantar foot ulceration excisionally debrided as described below:  · Elevation on green foam wedges or pillows when non-ambulatory  · Rest of care per primary team       Weightbearing status: Weight-bearing as toelrated"    Type 1 diabetes mellitus ([facility] [address] )  Assessment & Plan  Lab Results   Component Value Date    HGBA1C 7 7 (H) 02/05/2021     · Patient at home managed on insulin pump, however given recent impaired cognition, currently being managed on Lantus, Humalog and sliding scale insulin by endocrinology  · Endocrinology following [facility] at [facility]    Encephalopathy  Assessment & Plan  · Unclear etiology  · Patient with poor safety awareness and a high fall risk demonstrated by setting off chair and bed alarms multiple times by trying to get out of bed and disoriented  · 1:1  and 4 side rails during course   · P O  Xanax prn stopped for daytime tiredness and fatigue  · Etiology unclear:    · CTH negative for acute findings, but showing older CVAs - would benefit from outpatient Neurology  Issue is from acute care, but they were unable to find an etiology  · Follow labs, was on Keflex, but stopped on 4/9  · Ammonia levels WNL, oxygenating on supplemental O2  · Promotion of sleep/wake cycles, stimulation during day in order to sleep overnight  · Consider MRI of brain as next step if symptoms do not improve with more conservative measures    Leukocytosis  Assessment & Plan  · Increased to 18 45k from 14k, pt afebrile but with worsening hypoxia  · Mgmt per IM   · Completed Zithromax for possible PNA by IM consultants seen on previous CXR     · CXR showing moderate pleural effusions chronically  · Repeat CXR 4/10 pending  · Monitor for s/s infection     Stage 3b chronic kidney disease  Assessment & Plan  · Acute on chronic Cr 2 28 recently   · Baseline creatinine 1 2-1 5 per records  · Apparently was receiving IVF recently but now diuretics     Medtronic dual chamber PM  Assessment & Plan  +ICD placed for complete heart block    Hyperlipidemia  Assessment & Plan  · Managed on Lipitor home dose 5 mg daily  · At home was also taking Zetia 5 mg daily    Essential hypertension  Assessment & Plan  · At home was taking Norvasc 5 mg daily  · Mgmt per nephro/IM - off lisinopril recently    Anemia of chronic disease  Assessment & Plan  · H/H improved on 4/9 - Hgb on 4/5 is 7 7 and on 4/9 is 8 1  · Unclear etiology - likely anemia of chronic disease  · Occult stool negative    Hypothyroidism  Assessment & Plan  · Managed on Synthroid 125 mcg daily home dose    Acid reflux  Assessment & Plan  · Managed on Protonix    * Vertebral fracture, osteoporotic (HCC)  Assessment & Plan  · L1 anterior vertebral compression fracture with 50% loss of height found on CT abdomen pelvis - confirmed on lumbar x-ray  · Seen by Neurosurgery and treated conservatively with TLSO backpack brace when  OOB and >45degrees  · Follow-up with Neurosurgery as an outpatient      Disposition   SLB-Acute Step-down - SLIM     - See AVS (After visit summary) with discharge instructions, discharge medications, and other details  Imaging (See above as well):  Xr Chest Portable    Result Date: 4/10/2021  Pending    Pertinent Recent Labs (See Course above, EPIC EMR, and if needed request additional records):  Results for Renny White (MRN [id number]) as of 4/10/2021 19:29   Ref   Range 4/10/2021 08:21 4/10/2021 08:29   Glucose, i-STAT Latest Ref Range: 65 - 140 mg/dl  176 (H)   ph, Yusuf ISTAT Latest Ref Range: 7 300 - 7 400   7 483 (HH)   pCO2, Yusuf i-STAT Latest Ref Range: 42 0 - 50 0 mm HG  28 3 (LL)   pO2, Yusuf i-STAT Latest Ref Range: 35 0 - 45 0 mm HG  57 0 (H)   HCO3, Yusuf i-STAT Latest Ref Range: 24 0 - 30 0 mmol/L  21 2 (L)   Base Exc Latest Ref Range: -2 - 3 mmol/L  -2   O2 Sat, Istat Latest Ref Range: 60 - 85 %  92 (H)   SODIUM, I-STAT Latest Ref Range: 136 - 145 mmol/l  132 (L)   POTASSIUM,I-STAT Latest Ref Range: 3 5 - 5 3 mmol/L  4 4   CO2, i-STAT Latest Ref Range: 21 - 32 mmol/L  22   Hemoglobin, Istat Latest Ref Range: 12 0 - 17 0 g/dl  8 2 (L)   SPECIMEN TYPE Unknown  VENOUS   WBC Latest Ref Range: 4 31 - 10 16 Thousand/uL 15 67 (H)    Red Blood Cell Count Latest Ref Range: 3 88 - 5 62 Million/uL 2 84 (L)    Hemoglobin Latest Ref Range: 12 0 - 17 0 g/dL 7 9 (L)    HCT Latest Ref Range: 36 5 - 49 3 % 24 1 (L)    MCV Latest Ref Range: 82 - 98 fL 85    MCH Latest Ref Range: 26 8 - 34 3 pg 27 8    MCHC Latest Ref Range: 31 4 - 37 4 g/dL 32 8    RDW Latest Ref Range: 11 6 - 15 1 % 16 1 (H)    Platelet Count Latest Ref Range: 149 - 390 Thousands/uL 168    MPV Latest Ref Range: 8 9 - 12 7 fL 9 7    nRBC Latest Units: /100 WBCs 0    Neutrophils % Latest Ref Range: 43 - 75 % 86 (H)    Immat GRANS % Latest Ref Range: 0 - 2 % 1    Lymphocytes Relative Latest Ref Range: 14 - 44 % 4 (L)    Monocytes Relative Latest Ref Range: 4 - 12 % 9    Eosinophils Latest Ref Range: 0 - 6 % 0    Basophils Relative Latest Ref Range: 0 - 1 % 0    Immature Grans Absolute Latest Ref Range: 0 00 - 0 20 Thousand/uL 0 18    Absolute Neutrophils Latest Ref Range: 1 85 - 7 62 Thousands/µL 13 51 (H)    Lymphocytes Absolute Latest Ref Range: 0 60 - 4 47 Thousands/µL 0 55 (L)    Absolute Monocytes Latest Ref Range: 0 17 - 1 22 Thousand/µL 1 40 (H)      Procedures Performed During [facility] Admission: None    HPI:   Per Dr Gabriella Raines  "Melecio Nava is a 76 y o  male with DM1 with chronic insulin pump, diabetic neuropathy, CKD 3, PAD, heart block with dual chamber ICD, HTN, hypothyroidism, COVID-19 virus in Feb 2021 with respiratory failure, osteoporosis, who presented to the [facility] on 3/27/21 with fatigue and hematuria 10 days after receiving his 2nd TreeRing vaccine  Patient found to have acute mental status change, diarrhea, KAZ, Non MI troponin elevation, and some hematuria  Infectious workup revealed bacteremia with GPR - Corynebacterium bacteremia    Patient started on Cefepime, but discontinued after repeat blood cultures negative per Infectious Disease recommendations  CT abdomen/pelvis with findings of right pleural effusion, ground-glass opacification in the right lower lobe, findings of punctate gallstone, findings of worsening L1 compression fracture  Seen by Cardiology given recent history of COVID-19 virus, and echocardiogram ordered showing EF 65%, mild MR, TR  Patient seen by Lorena Hopkins for worsening L1 vertebral compression fracture  Lumbar x-ray showing moderate anterior wedge compression fracture at L1 with 50% loss of height  Patient was treated conservatively with TLSO backpack brace when OOB and >45degrees  Patient was agitated and confused during his hospitalization, acutely worsened on 3/31/21- patient received Ativan IV  Improved during the day    Patient found to have acute functional deficits from his baseline, and after medical stabilization and clearance, patient was admitted to [facility] for acute inpatient rehabilitation   "    Condition at Discharge: serious     Disposition: [facility]B - Acute Care     Planned Readmission:  Possibly

## 2021-04-10 NOTE — PLAN OF CARE
Problem: Potential for Falls  Goal: Patient will remain free of falls  Description: INTERVENTIONS:  - Assess patient frequently for physical needs  -  Identify cognitive and physical deficits and behaviors that affect risk of falls  -  [place] fall precautions as indicated by assessment   - Educate patient/family on patient safety including physical limitations  - Instruct patient to call for assistance with activity based on assessment  - Modify environment to reduce risk of injury  - Consider OT/PT consult to assist with strengthening/mobility  Outcome: Progressing     Problem: PAIN - ADULT  Goal: Verbalizes/displays adequate comfort level or baseline comfort level  Description: Interventions:  - Encourage patient to monitor pain and request assistance  - Assess pain using appropriate pain scale  - Administer analgesics based on type and severity of pain and evaluate response  - Implement non-pharmacological measures as appropriate and evaluate response  - Consider cultural and social influences on pain and pain management  - Notify physician/advanced practitioner if interventions unsuccessful or patient reports new pain  Outcome: Progressing     Problem: SAFETY ADULT  Goal: Patient will remain free of falls  Description: INTERVENTIONS:  - Assess patient frequently for physical needs  -  Identify cognitive and physical deficits and behaviors that affect risk of falls    -  [place] fall precautions as indicated by assessment   - Educate patient/family on patient safety including physical limitations  - Instruct patient to call for assistance with activity based on assessment  - Modify environment to reduce risk of injury  - Consider OT/PT consult to assist with strengthening/mobility  Outcome: Progressing  Goal: Maintain or return to baseline ADL function  Description: INTERVENTIONS:  -  Assess patient's ability to carry out ADLs; assess patient's baseline for ADL function and identify physical deficits which impact ability to perform ADLs (bathing, care of mouth/teeth, toileting, grooming, dressing, etc )  - Assess/evaluate cause of self-care deficits   - Assess range of motion  - Assess patient's mobility; develop plan if impaired  - Assess patient's need for assistive devices and provide as appropriate  - Encourage maximum independence but intervene and supervise when necessary  - Involve family in performance of ADLs  - Assess for home care needs following discharge   - Consider OT consult to assist with ADL evaluation and planning for discharge  - Provide patient education as appropriate  Outcome: Progressing  Goal: Maintain or return mobility status to optimal level  Description: INTERVENTIONS:  - Assess patient's baseline mobility status (ambulation, transfers, stairs, etc )    - Identify cognitive and physical deficits and behaviors that affect mobility  - Identify mobility aids required to assist with transfers and/or ambulation (gait belt, sit-to-stand, lift, walker, cane, etc )  - [place] fall precautions as indicated by assessment  - Record patient progress and toleration of activity level on Mobility SBAR; progress patient to next Phase/Stage  - Instruct patient to call for assistance with activity based on assessment  - Consider rehabilitation consult to assist with strengthening/weightbearing, etc   Outcome: Progressing     Problem: DISCHARGE PLANNING  Goal: Discharge to home or other facility with appropriate resources  Description: INTERVENTIONS:  - Identify barriers to discharge w/patient and caregiver  - Arrange for needed discharge resources and transportation as appropriate  - Identify discharge learning needs (meds, wound care, etc )  - Arrange for interpretive services to assist at discharge as needed  - Refer to Case Management Department for coordinating discharge planning if the patient needs post-hospital services based on physician/advanced practitioner order or complex needs related to functional status, cognitive ability, or social support system  Outcome: Progressing     Problem: Knowledge Deficit  Goal: Patient/family/caregiver demonstrates understanding of disease process, treatment plan, medications, and discharge instructions  Description: Complete learning assessment and assess knowledge base    Interventions:  - Provide teaching at level of understanding  - Provide teaching via preferred learning methods  Outcome: Progressing

## 2021-04-10 NOTE — RAPID RESPONSE
Rapid Response Note  Reza Judd 76 y o  male MRN: [id number]  Unit/Bed#: -01 Encounter: [id number]    Rapid Response Notification(s):   Response called date/time:  4/10/2021 8:02 AM  Response team arrival date/time:  4/10/2021 8:03 AM  Response end date/time:  4/10/2021 8:30 AM  Rapid response location:  Acute rehab center  Primary reason for rapid response call:  Acute change in O2 sat    Rapid Response Intervention(s):   Airway:  None  Breathing:  Oxygen  Circulation:  None  Fluids administered:  None  Medications administered:  Furosemide       Background/Situation:   Reza Judd is a 76 y o  male who is currently admitted to [facility] for impairment of mobility secondary to L1 compression fracture  Patient has past medical history of diabetes, CKD 3, P 80, heart block with dual-chamber ICD, hypertension and hypothyroidism who initially was admitted to the medical floor at [facility] on 03/27 for generalized weakness, difficult to control blood glucose and loose stools  During hospitalization his diarrhea subsided, workup for possible infectious etiology was negative  Blood glucose was managed by Endocrinology  He was found to have fracture of L1 on CT scan and was discharged to acute rehab  Since yesterday 4 9 patient was noted to have hypoxia with oxygen up to 4 L via nasal cannula  His IV fluids were discontinued and he received 1 dose of Lasix 40 mg IV per Nephrology  He received a V/Q scan which showed low probability of PE  This morning rapid response was called due to increasing hypoxia  Review of Systems   Constitutional: Negative for chills  Respiratory: Positive for shortness of breath  Cardiovascular: Negative for chest pain  Gastrointestinal: Negative for nausea  Neurological: Negative for dizziness         Objective:   Vitals:    04/10/21 0759 04/10/21 0810 04/10/21 0813 04/10/21 0826   BP:  (!) 189/75 166/83 152/83   BP Location:       Pulse:  94 92 95   Resp:  (!) 25 22 20   Temp:       TempSrc:       SpO2: (!) 83% (!) 86% 90% (!) 89%   Weight:       Height:         Physical Exam  Vitals signs reviewed  Constitutional:       General: He is in acute distress  HENT:      Head: Normocephalic and atraumatic  Eyes:      Comments: pallor   Neck:      Musculoskeletal: Normal range of motion and neck supple  Cardiovascular:      Rate and Rhythm: Normal rate  Heart sounds: Normal heart sounds  No murmur  Pulmonary:      Effort: Respiratory distress present  Breath sounds: Rales present  No wheezing  Abdominal:      General: There is no distension  Palpations: Abdomen is soft  Tenderness: There is no abdominal tenderness  Musculoskeletal:      Right lower leg: Edema present  Left lower leg: Edema present  Skin:     General: Skin is warm  Neurological:      Mental Status: He is alert  Assessment:   · Acute hypoxic respiratory failure secondary to acute on chronic diastolic heart failure exacerbation  Plan:   · Stat CBC, BMP, NT proBNP, troponin, lactic acid  · I-STAT  · Stat chest x-ray-evidence of fluid overload  · Mid flow nasal cannula, goal SpO2 greater than 90%  · Lasix 80 mg IV once now  · Can trial BiPAP  Rapid Response Outcome:   Condition:  In serious condition  Transfer:  Transfer to step-down  Primary service notified of transfer: Yes    Handoff report: by phone    Code Status: Level 1 (Full Code)      Family notified of transfer: no  Family member contacted: per primary team      Portions of the record may have been created with voice recognition software  Occasional wrong word or "sound a like" substitutions may have occurred due to the inherent limitations of voice recognition software  Read the chart carefully and recognize, using context, where substitutions have occurred      Day Perez MD

## 2021-04-10 NOTE — PHYSICAL THERAPY NOTE
Pt demonstrated a changed in medical status this morning and was later transferred back to acute inpatient for further monitoring and care  Recommend skilled PT intervention once medically appropriate

## 2021-04-10 NOTE — ASSESSMENT & PLAN NOTE
Lab Results   Component Value Date    HGBA1C 7 7 (H) 02/05/2021       Recent Labs     04/09/21  1556 04/09/21  2102 04/10/21  0612 04/10/21  0803   POCGLU 202* 213* 166* 183*       Blood Sugar Average: Last 72 hrs:     Charan consulted  On insulin pump OP  On SQ insulin IP

## 2021-04-11 NOTE — CONSULTS
Consultation - Cardiology   Kendal Leach 76 y o  male MRN: [id number]  Unit/Bed#: [facility] 809-01 Encounter: [id number]      Assessment and Plan:  Principal Problem:    Acute respiratory failure with hypoxia ([facility] )  Active Problems:    Anemia of chronic disease    Type I diabetes mellitus ([facility] )    Essential hypertension    Stage 3b chronic kidney disease    KAZ (acute kidney injury) ([place] [address] )    Encephalopathy    Hyponatremia    Acute on chronic diastolic heart failure (HCC)    # diabetes type 1 on insulin pump with micro and macrovascular complications    # complete heart block status post dual-chamber ICD placement  - 100% V paced     # HFpEF  TTE in March with EF 60%  - and NT proBNP 9997 likely secondary to KAZ  - weight 173 lb now which is similar to the time of initial admission  - 1 2 L of urine output yesterday with no charting of intake  - not on Lasix at home  - continue with IV Lasix for improvement in respiratory status however suspect that it is primarily driven by kidney failure rather than heart failure  Given that patient has preserved EF, he was not on Lasix at home or at the time of discharge to rehab, lack of JVD on exam and progressively worsening renal function due to acute tubular necrosis suspect that his recent decompensation is secondary to acute kidney failure rather than primarily driven by heart failure    # KAZ on CKD with baseline creatinine around 1 2-1 5  - current creatinine 2 3 from ATN  - bicarb as per nephrology  - monitor kidney function daily with strict ins and outs monitoring    # anemia with hemoglobin of 7 8  - management as per Nephrology    # hypertension  # dyslipidemia  # COVID-19 infection in February 5365 complicated by respiratory failure        History of Present Illness   Physician Requesting Consult: Levon Watkins MD  Reason for Consult / Principal Problem:  Hypoxic respiratory failure  HPI: Kendal Leach is a 76y o  year old male with above-mentioned history initially admitted to the hospital with altered mental status, diarrhea, Gram-positive gonzalo bacteremia and KAZ for which he was treated with antibiotics and was transitioned to rehabilitation care  During his stay in the rehab he was noted to have progressive worsening of his creatinine and shortness of breath  He was investigated for PE study of which was negative  Yesterday morning he had a rapid response for hypoxic respiratory failure and altered mental status for which she required mid flow and was transitioned to medical floor for further care  He has a preserved ejection fraction and was not on Lasix at home or at the time of discharge to rehabilitation  He was unable to give any history due to confusion and disorientation  From Nephrology point of view he has acute tubular necrosis and his worsening volume status, mental status is likely in the setting of worsening renal function rather than primarily being driven by cardiac etiology            Inpatient consult to Cardiology     Performed by  Amina Geronimo MD     Authorized by Lincoln Norman DO              Review of Systems:  Review of Systems  All negative except as mentioned above    Historical Information   Past Medical History:   Diagnosis Date    Arthritis     Cardiac disease     Closed fracture of fibula, lateral malleolus, right     Complete heart block ([address] )     Diabetes mellitus ([facility] )     Disease of thyroid gland     External incisional dehiscence     GERD (gastroesophageal reflux disease)     Heart disease     Muscular deconditioning     Pacemaker     Sleep apnea     cannot tolerate cpap    Thyroid disease      Past Surgical History:   Procedure Laterality Date    CARDIAC PACEMAKER PLACEMENT Left 2015    CARDIAC PACEMAKER PLACEMENT      CARDIAC SURGERY      CATARACT EXTRACTION      MUSCLE FLAP      right foot    NERVE BLOCK      Transforaminal Epidural Lumbar with Fluoroscopic Guidance     DC COLONOSCOPY FLX DX W/COLLJ SPEC WHEN PFRMD N/A 5/4/2018    Procedure: EGD AND COLONOSCOPY;  Surgeon: Adina Ruth MD;  Location: [facility]  GI LAB; Service: Gastroenterology    [place] SPLIT [address] <100 SQCM Right 6/10/2019    Procedure: SKIN GRAFT SPLIT THICKNESS (STSG)  EXTREMITY right leg;  Surgeon: Dana Ruano DPM;  Location:  MAIN OR;  Service: Podiatry    TOE AMPUTATION      left 5th toe     Social History     Substance and Sexual Activity   Alcohol Use Yes    Alcohol/week: 0 0 standard drinks    Frequency: Never    Binge frequency: Never    Comment: socially     Social History     Substance and Sexual Activity   Drug Use No     Social History     Tobacco Use   Smoking Status Former Smoker    Types: Pipe   Smokeless Tobacco Never Used     Family History: non-contributory    Meds/Allergies   all current active meds have been reviewed  Allergies   Allergen Reactions    Contrast  [Iodinated Diagnostic Agents]     Lipitor  [Atorvastatin Calcium] Drowsiness    Ibuprofen      sensitive    Iodine - Food Allergy Rash    Simvastatin Anxiety     Other reaction(s): Malaise (finding)       Objective   Vitals: Blood pressure 120/70, pulse 87, temperature 97 9 °F (36 6 °C), resp  rate (!) 24, height 5' 8" (1 727 m), weight 78 6 kg (173 lb 4 5 oz), SpO2 93 %  , Body mass index is 26 35 kg/m² ,   Orthostatic Blood Pressures      Most Recent Value   Blood Pressure  120/70 filed at 04/11/2021 0809            Intake/Output Summary (Last 24 hours) at 4/11/2021 0945  Last data filed at 4/11/2021 0531  Gross per 24 hour   Intake --   Output 1200 ml   Net -1200 ml       Invasive Devices     Peripheral Intravenous Line            Peripheral IV 04/11/21 Left;Ventral (anterior); Upper Arm less than 1 day                    Physical Exam:  Physical Exam  General: alert, oriented X 0, pale  Neck:  Small JVD  Cardiac: normal S1, S2, systolic murmur present  Respiratory:  Decreased breath sounds with coarse crackles  Abdomen: soft and non-tender  Extremities: warm, no cyanosis, 1 to 2+ lower extremity edema      Lab Results:     Lab Results   Component Value Date    CKTOTAL 86 03/24/2021    CKTOTAL 293 02/05/2021    CKTOTAL 375 (H) 03/31/2019    CKMB 3 4 02/05/2021    CKMB 13 6 (H) 03/31/2019    CKMBINDEX 1 2 02/05/2021    CKMBINDEX 3 6 (H) 03/31/2019    TROPONINI 0 07 (H) 04/10/2021    TROPONINI 0 32 (H) 03/27/2021    TROPONINI 0 38 (H) 03/27/2021       Lab Results   Component Value Date    GLUCOSE 176 (H) 04/10/2021    CALCIUM 8 2 (L) 04/11/2021     (L) 06/24/2015    K 4 8 04/11/2021    CO2 23 04/11/2021     04/11/2021    BUN 51 (H) 04/11/2021    CREATININE 2 37 (H) 04/11/2021       Lab Results   Component Value Date    WBC 15 19 (H) 04/11/2021    HGB 7 8 (L) 04/11/2021    HCT 24 6 (L) 04/11/2021    MCV 87 04/11/2021     04/11/2021       Lab Results   Component Value Date    CHOL 255 11/10/2014    CHOL 139 04/17/2014     Lab Results   Component Value Date    HDL 16 (L) 04/01/2021    HDL 85 11/06/2020    HDL 72 (H) 07/14/2019     Lab Results   Component Value Date    LDLCALC 47 04/01/2021    LDLCALC 66 11/06/2020    LDLCALC 35 07/14/2019     Lab Results   Component Value Date    TRIG 118 04/01/2021    TRIG 64 11/06/2020    TRIG 19 07/14/2019       Lab Results   Component Value Date    ALT 24 03/27/2021    AST 20 03/27/2021               Imaging: I have personally reviewed pertinent reports        EKG: NSR, no acute ST changes

## 2021-04-11 NOTE — QUICK NOTE
Notified by RN this AM patient pale and dyspneic  97 7F, HR 95, 128/75, 88-92% on 15L midflow  Evaluated at bedside  Per chart review, appears at baseline mental status  Ox2-3 and GCS 14  Restless but offers no acute complaints  Denies SOB  Mildly tachypneic mid 20s, poor effort  Crackles bilaterally  Skin pale  450cc UO + incontinent episode overnight  Plan: 40mg IV Lasix, monitor response  Replace condom cath for accurate I/O  Consider HFNC or bipap  AM labs now  Continue to monitor and notify of change

## 2021-04-11 NOTE — PROGRESS NOTES
Progress Note - Nephrology   Vanda Rothman 76 y o  male MRN: [id number]  Unit/Bed#: [facility] 803-01 Encounter: [id number]      Assessment / Plan:  1  KAZ on top of CKD stage 3b likely d/t hypertensive nephrosclerosis plus or minus physiologic aging of the kidney-baseline serum creatinine 1 2-1 5, follows with Dr Lakeisha Schultz outpatient  -continue holding ACEi  -sCr stable at about 2 2-2 4, suspect component of cardiorenal syndrome  -CXR supports volume overload  With hypoxia this am  On midflow   -agree with IV lasix-80mg q8hr per pulmonary  -monitor off IVF  -f/u am BMP  -Suspect ATN from prolonged prerenal state  +granular casts on UA  Monitor volume status closely  2  Hypertension- pressure acceptable on current regimen  3  Hyponatremia - sNa 134, expect improvement on diuretics  Holding salt tabs  On 1 5L fluid restriction   4  Anemia - Hgb 7 8, monitor CBC, transfuse prn  5  Moderate pleural effusions in the setting worsening respiratory status-on diuretic as above, pulm follows  6  Microscopic hematuria-patient follows with urology for this  7  Metabolic acidosis-this is likely due to renal failure as well as possible starvation ketosis, continue sodium bicarbonate 650 mg p o  B i d , bicarbonate near goal        Subjective:   He is on mid flow 15 L  This is status post rapid response yesterday  He received an IV dose of Lasix this morning  He denies chest pain  Does not answer if he is short of breath or not falls back asleep  Objective:     Vitals: Blood pressure 143/83, pulse 90, temperature 97 7 °F (36 5 °C), resp  rate 20, height 5' 8" (1 727 m), weight 78 6 kg (173 lb 4 5 oz), SpO2 90 %  ,Body mass index is 26 35 kg/m²  Temp (24hrs), Av °F (36 7 °C), Min:97 7 °F (36 5 °C), Max:98 9 °F (37 2 °C)      Weight (last 2 days)     Date/Time   Weight    04/10/21 1033   78 6 (173 28)                Intake/Output Summary (Last 24 hours) at 2021 1739  Last data filed at 2021 0900  Gross per 24 hour   Intake --   Output 800 ml   Net -800 ml     I/O last 24 hours: In: -   Out: 1400 [Urine:1400]        Physical Exam:   Physical Exam  Vitals signs reviewed  Constitutional:       General: He is not in acute distress  Appearance: He is well-developed  He is not diaphoretic  Comments: lethargic   HENT:      Head: Normocephalic and atraumatic  Mouth/Throat:      Pharynx: No oropharyngeal exudate  Eyes:      General: No scleral icterus  Right eye: No discharge  Left eye: No discharge  Neck:      Musculoskeletal: Neck supple  Thyroid: No thyromegaly  Cardiovascular:      Rate and Rhythm: Normal rate and regular rhythm  Heart sounds: Normal heart sounds  Pulmonary:      Effort: Pulmonary effort is normal       Breath sounds: No wheezing or rales  Comments: Decrease BS b/l  Abdominal:      General: Bowel sounds are normal  There is no distension  Palpations: Abdomen is soft  Tenderness: There is no abdominal tenderness  Genitourinary:     Comments: +Ortez  Musculoskeletal: Normal range of motion  General: Swelling (b/l up to thighs) present  Lymphadenopathy:      Cervical: No cervical adenopathy  Skin:     General: Skin is warm and dry  Findings: No rash  Neurological:      Mental Status: He is alert  Comments: Wakes to voice briefly but does not answer all questions   Psychiatric:      Comments: Odd affect         Invasive Devices     Peripheral Intravenous Line            Peripheral IV 04/11/21 Left;Ventral (anterior); Upper Arm less than 1 day          Drain            Urethral Catheter 16 Fr  less than 1 day                Medications:    Scheduled Meds:  Current Facility-Administered Medications   Medication Dose Route Frequency Provider Last Rate    acetaminophen  650 mg Oral Q6H PRN Bard Massed, DO      aspirin  81 mg Oral Daily Bard Massed, DO      atorvastatin  5 mg Oral Daily Bard Massed, DO      bisacodyl 10 mg Rectal Daily PRN Brandi De Paz, DO      cholecalciferol  4,000 Units Oral Daily Brandi De Paz, DO      docusate sodium  100 mg Oral BID Brandi De Paz, DO      ferrous sulfate  325 mg Oral Daily With Breakfast Brandi De Paz, DO      furosemide  80 mg Intravenous Q8H Berthagi Sands, DO      heparin (porcine)  5,000 Units Subcutaneous [facility] De Paz, [facility]      insulin glargine  6 Units Subcutaneous HS Brandi De Paz, DO      insulin lispro  1-5 Units Subcutaneous TID AC Brandi De Paz, DO      insulin lispro  1-5 Units Subcutaneous HS Brandi De Paz, DO      insulin lispro  3 Units Subcutaneous TID With Meals Brandi De Paz, DO      levothyroxine  125 mcg Oral Early Morning Brandi De Paz, DO      melatonin  3 mg Oral HS PRN Johnathon Wilson PA-C      ondansetron  4 mg Intravenous Q6H PRN Brandi De Paz, DO      pantoprazole  40 mg Oral Daily Before Breakfast Brandi De Paz, DO      polyethylene glycol  17 g Oral Daily PRN Brandi De Paz, DO      sodium bicarbonate  650 mg Oral BID after meals Brandi De Paz, DO      tamsulosin  0 4 mg Oral Daily With Lucrezia Marts, DO         PRN Meds:   acetaminophen    bisacodyl    melatonin    ondansetron    polyethylene glycol    Continuous Infusions:         LAB RESULTS:      Results from last 7 days   Lab Units 04/11/21  0601 04/11/21  0356 04/10/21  0829 04/10/21  0821 04/10/21  0508 04/09/21  0855 04/08/21  0622 04/07/21  0618 04/06/21  0453 04/05/21  0630   WBC Thousand/uL  --  15 19*  --  15 67* 15 81* 18 45*  --   --   --  14 35*   HEMOGLOBIN g/dL  --  7 8*  --  7 9* 8 0* 8 1*  --   --   --  7 7*   I STAT HEMOGLOBIN g/dl  --   --  8 2*  --   --   --   --   --   --   --    HEMATOCRIT %  --  24 6*  --  24 1* 25 1* 25 0*  --   --   --  23 8*   HEMATOCRIT, ISTAT %  --   --  24*  --   --   --   --   --   --   --    PLATELETS Thousands/uL  --  151  --  168 168 156  --   --   --  194   NEUTROS PCT %  --   --   --  86* 87* 89*  --   --   --  89* LYMPHS PCT %  --   --   --  4* 4* 3*  --   --   --  3*   MONOS PCT %  --   --   --  9 8 7  --   --   --  7   EOS PCT %  --   --   --  0 0 0  --   --   --  0   POTASSIUM mmol/L 4 8 5 5*  --   --  4 4 4 4 3 8 4 5 4 0 4 0   CHLORIDE mmol/L 103 104  --   --  105 101 101 94* 98* 97*   CO2 mmol/L 23 25  --   --  23 22 23 17* 23 23   CO2, I-STAT mmol/L  --   --  22  --   --   --   --   --   --   --    BUN mg/dL 51* 48*  --   --  44* 43* 45* 36* 25 20   CREATININE mg/dL 2 37* 2 35*  --   --  2 20* 2 28* 2 38* 2 12* 1 87* 1 53*   CALCIUM mg/dL 8 2* 7 9*  --   --  7 9* 7 6* 7 6* 7 9* 7 7* 7 6*   GLUCOSE, ISTAT mg/dl  --   --  176*  --   --   --   --   --   --   --    MAGNESIUM mg/dL  --  2 1  --   --   --   --   --   --   --   --    PHOSPHORUS mg/dL  --  3 6  --   --   --   --   --   --   --   --        CUTURES:  Lab Results   Component Value Date    BLOODCX No Growth After 5 Days  03/29/2021    BLOODCX No Growth After 5 Days  03/29/2021    BLOODCX Corynebacterium striatum group (A) 03/27/2021    BLOODCX Corynebacterium striatum group (A) 03/27/2021    BLOODCX No Growth After 5 Days  03/31/2019    BLOODCX No Growth After 5 Days  03/31/2019    URINECX <10,000 cfu/ml  03/27/2021    URINECX No Growth <1000 cfu/mL 03/31/2019                 Portions of the record may have been created with voice recognition software  Occasional wrong word or "sound a like" substitutions may have occurred due to the inherent limitations of voice recognition software  Read the chart carefully and recognize, using context, where substitutions have occurred  If you have any questions, please contact the dictating provider

## 2021-04-11 NOTE — PLAN OF CARE
Summary   Pt presented with s/s suggestive of mild oral and suspected at least mild pharyngeal dysphagia  Symptoms or concerns included prolonged mastication and oral manipulation  Transfers somewhat weak resulting in mild lingual residue folllowing the swallow  Swallow initiation is suspected fairly prompt, hyo-laryngeal movement is palpated and judged to be wnl  Pt w/ throat clear x2 w/ thin liquids  Pt appears w/ overall difficulty coordinating breathing/swallowing  Pt and wife in agreement for more conservative diet at this time  to optimize swallow safety        Risk/s for Aspiration: Mild/mod     Recommended Diet: soft/level 3 diet and nectar thick liquids   Recommended Form of Meds: whole with puree   Aspiration precautions and swallowing strategies: upright posture, only feed when fully alert and slow rate of feeding

## 2021-04-11 NOTE — ASSESSMENT & PLAN NOTE
POA  RRT at [facility] needing 10L O2  Likely 2/2 CHF  CXR large pleural effusions, more on the right than left now on new xray  Diuresis : lasix 80 mg q8 hours   Monitor in and output  Daily weights

## 2021-04-11 NOTE — ASSESSMENT & PLAN NOTE
Lab Results   Component Value Date    EGFR 26 04/11/2021    EGFR 26 04/11/2021    EGFR 28 04/10/2021    CREATININE 2 37 (H) 04/11/2021    CREATININE 2 35 (H) 04/11/2021    CREATININE 2 20 (H) 04/10/2021   conitnue with lasix   Monitor creatinine closely   Input and output  Daily weights  Nephrology on board  Avoid nephrotoxic medications  Keep SBP>130

## 2021-04-11 NOTE — ASSESSMENT & PLAN NOTE
Wt Readings from Last 3 Encounters:   04/10/21 78 6 kg (173 lb 4 5 oz)   04/05/21 78 6 kg (173 lb 4 5 oz)   03/27/21 72 kg (158 lb 11 7 oz)     Pleural effusions related to IVF for KAZ  [place] [facility] will continue with 40 mg iv bid started today  Will ask cardio to eval  Echo in March showed at least mild TR  Ortez catheter in place

## 2021-04-11 NOTE — CONSULTS
Pulmonary Consultation   Virginia Damon 76 y o  male MRN: [id number]  Unit/Bed#: [facility] 803-01 Encounter: [id number]      Reason for consultation: Pleural  effusion    Requesting physician: Dr Yuan Gill    Impressions/Plans:    1   acute hypoxic respiratory failure -multifactorial including pulmonary edema, left pleural effusion,  hypoventilation  ·  I weaned him to 10 L oxygen while at bedside  His oxygen saturation remained greater than 90%  2    Acute pulmonary edema/left pleural effusion-likely related to hypoalbuminemia / poor oncotic pressure as well as acute kidney injury  ·   I discussed with Dr Antoni Webb Nephrology  Will increase Lasix to 80 mg IV q 8 hours  ·  as he has clinically improved, and oxygenation requirements decreased with diuresis, will continue diuretics in light of thoracentesis  I spoke with his wife about risks and benefits of conservative treatment with diuretics verses thoracentesis  She agreed to monitor him on medications, and if indicated perform thoracentesis within the next 24-48 hours  3    COVID infection February 2021-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, confusion  ·  continue to monitor clinically  ·  aggressive PT/OT      History of Present Illness   HPI:  Virginia Damon is a 76 y o  male who  Had COVID infection in February of 2021  His wife is at the bedside and is able to provide additional history as the patient seems to have a hard time answering questions with some confusion  She states that he was admitted to the hospital  For COVID, and then got better  And was discharged home  He then received monoclonal antibody infusion  She states that he was recovering until approximately 3 weeks ago when he got a COVID vaccine  She states after that time he has had profound fatigue and weakness  She had noticed confusion/altered mental status  She brought him into the hospital for further evaluation    She feels that since that time he has continued to go downhill  He has had poor appetite and nutritional intake  He denies any nausea or vomiting  He has had progressive lower extremity edema  He has had progressive hypoxemic respiratory failure  Yesterday he was a rapid response because of progressive hypoxia  His wife feels that he looks better today  The patient states that he feels well  He continues to feel fatigued  He continues to have some word-finding difficulties  He denies any baseline lung disease  He quit smoking a pipe approximately 30 years ago, and maybe smoked for about 10-15 years  Review of systems:  12 point review of systems was completed and was otherwise negative except as listed in HPI  Historical Information   Past Medical History:   Diagnosis Date    Arthritis     Cardiac disease     Closed fracture of fibula, lateral malleolus, right     Complete heart block (HCC)     Diabetes mellitus ([address] )     Disease of thyroid gland     External incisional dehiscence     GERD (gastroesophageal reflux disease)     Heart disease     Muscular deconditioning     Pacemaker     Sleep apnea     cannot tolerate cpap    Thyroid disease      Past Surgical History:   Procedure Laterality Date    CARDIAC PACEMAKER PLACEMENT Left 2015    CARDIAC PACEMAKER PLACEMENT      CARDIAC SURGERY      CATARACT EXTRACTION      MUSCLE FLAP      right foot    NERVE BLOCK      Transforaminal Epidural Lumbar with Fluoroscopic Guidance     LA COLONOSCOPY FLX DX W/COLLJ SPEC WHEN PFRMD N/A 5/4/2018    Procedure: EGD AND COLONOSCOPY;  Surgeon: Darby Alejandre MD;  Location[facility];   Service: Gastroenterology    LA SPLIT [address] <100 SQCM Right 6/10/2019    Procedure: SKIN GRAFT SPLIT THICKNESS (STSG)  EXTREMITY right leg;  Surgeon: Charity Chavez DPM;  Location: [facility];  Service: Podiatry    TOE AMPUTATION      left 5th toe     Family History   Problem Relation Age of Onset    Diabetes Mother     Heart disease Mother     Thyroid disease Mother         Disorder     Cancer Brother     Gout Brother     Diabetes Maternal Grandmother        Occupational History:   Retired     Social History:  Social History     Tobacco Use   Smoking Status Former Smoker    Years: 15 00    Types: Pipe    Quit date: 200    Years since quittin 2   Smokeless Tobacco Never Used     Social History     Substance and Sexual Activity   Alcohol Use Yes    Alcohol/week: 0 0 standard drinks    Frequency: Never    Binge frequency: Never    Comment: socially     Social History     Substance and Sexual Activity   Drug Use No     Marital Status: /Civil Union      Meds/Allergies   Current Facility-Administered Medications   Medication Dose Route Frequency    acetaminophen (TYLENOL) tablet 650 mg  650 mg Oral Q6H PRN    aspirin chewable tablet 81 mg  81 mg Oral Daily    atorvastatin (LIPITOR) tablet 5 mg  5 mg Oral Daily    bisacodyl (DULCOLAX) rectal suppository 10 mg  10 mg Rectal Daily PRN    cholecalciferol (VITAMIN D3) tablet 4,000 Units  4,000 Units Oral Daily    docusate sodium (COLACE) capsule 100 mg  100 mg Oral BID    ferrous sulfate tablet 325 mg  325 mg Oral Daily With Breakfast    furosemide (LASIX) injection 80 mg  80 mg Intravenous Q8H    heparin (porcine) subcutaneous injection 5,000 Units  5,000 Units Subcutaneous Q8H [address]    insulin glargine (LANTUS) subcutaneous injection 6 Units 0 06 mL  6 Units Subcutaneous HS    insulin lispro (HumaLOG) 100 units/mL subcutaneous injection 1-5 Units  1-5 Units Subcutaneous TID AC    insulin lispro (HumaLOG) 100 units/mL subcutaneous injection 1-5 Units  1-5 Units Subcutaneous HS    insulin lispro (HumaLOG) 100 units/mL subcutaneous injection 3 Units  3 Units Subcutaneous TID With Meals    levothyroxine tablet 125 mcg  125 mcg Oral Early Morning    melatonin tablet 3 mg  3 mg Oral HS PRN    ondansetron (ZOFRAN) injection 4 mg  4 mg Intravenous Q6H PRN    pantoprazole (PROTONIX) EC tablet 40 mg  40 mg Oral Daily Before Breakfast    polyethylene glycol (MIRALAX) packet 17 g  17 g Oral Daily PRN    sodium bicarbonate tablet 650 mg  650 mg Oral BID after meals    tamsulosin (FLOMAX) capsule 0 4 mg  0 4 mg Oral Daily With Dinner     Medications Prior to Admission   Medication    amLODIPine (NORVASC) 5 mg tablet    ascorbic acid (VITAMIN C) 1000 MG tablet    aspirin 81 mg chewable tablet    Atorvastatin Calcium (LIPITOR PO)    CALCIUM CITRATE PO    Cholecalciferol (VITAMIN D3) 2000 units capsule    Continuous Blood Gluc Sensor (DEXCOM G6 SENSOR) MISC    cyanocobalamin (VITAMIN B-12) 1,000 mcg tablet    DULoxetine (CYMBALTA) 30 mg delayed release capsule    ezetimibe (ZETIA) 10 mg tablet    glucose 40 %    glucose 77 4 %    insulin aspart (NovoLOG) 100 units/mL injection    levothyroxine 125 mcg tablet    loperamide (IMODIUM) 2 mg capsule    pantoprazole (PROTONIX) 40 mg tablet    tamsulosin (FLOMAX) 0 4 mg    zinc sulfate (ZINCATE) 220 mg capsule     Allergies   Allergen Reactions    Contrast  [Iodinated Diagnostic Agents]     Lipitor  [Atorvastatin Calcium] Drowsiness    Ibuprofen      sensitive    Iodine - Food Allergy Rash    Simvastatin Anxiety     Other reaction(s): Malaise (finding)       Vitals: Blood pressure 143/83, pulse 90, temperature 97 7 °F (36 5 °C), resp  rate 20, height 5' 8" (1 727 m), weight 78 6 kg (173 lb 4 5 oz), SpO2 90 %  ,  15 L, Body mass index is 26 35 kg/m²        Intake/Output Summary (Last 24 hours) at 4/11/2021 1610  Last data filed at 4/11/2021 0900  Gross per 24 hour   Intake --   Output 800 ml   Net -800 ml       Physical exam:    General Appearance:    Awake, interactive, slow to respond to questions, no significant respiratory distress or conversational dyspnea      Head/eyes:    Normocephalic, without obvious abnormality, atraumatic,         PERRL, sclera non-injected, nonicteric    Nose: Nares normal, mucosa normal, no drainage or sinus tenderness   Mouth:    dry mucous membranes, no thrush, normal dentition   Neck:   Supple, trachea midline, no adenopathy; JVD not elevated   Lungs:      Diminished breath sounds bilateral bases worse on left  No significant wheeze, rales or rhonchi   Chest Wall:    No tenderness or deformity    Heart:    Regular rate and rhythm, S1 and S2 normal, no murmur, rub   or gallop   Abdomen:     Soft, non-tender, bowel sounds active all four quadrants,     no masses, no organomegaly   Extremities:    +2-3 pitting edema   Skin:   Warm, dry, turgor normal, no rashes or lesions   Lymph nodes:   No Cervical or supraclavicular lymphadenopathy   Neurologic:    Able to move all 4 extremities without focal deficit  He has generalized weakness  Able to answer questions, but in a slow manner  Labs:   CBC:   Lab Results   Component Value Date    WBC 15 19 (H) 04/11/2021    HGB 7 8 (L) 04/11/2021    HCT 24 6 (L) 04/11/2021    MCV 87 04/11/2021     04/11/2021    MCH 27 7 04/11/2021    MCHC 31 7 04/11/2021    RDW 16 6 (H) 04/11/2021    MPV 10 0 04/11/2021   , CMP:   Lab Results   Component Value Date    SODIUM 134 (L) 04/11/2021    K 4 8 04/11/2021     04/11/2021    CO2 23 04/11/2021    BUN 51 (H) 04/11/2021    CREATININE 2 37 (H) 04/11/2021    CALCIUM 8 2 (L) 04/11/2021    EGFR 26 04/11/2021       Imaging and other studies: I have personally reviewed pertinent films in PACS  CXR 4/11 - persistent pulmonary edema with left pleural effusion    Pulmonary function testing: None available  Code Status: Level 1 - Full Code    Hunter Berg DO      Portions of the record may have been created with voice recognition software  Occasional wrong word or "sound a like" substitutions may have occurred due to the inherent limitations of voice recognition software  Read the chart carefully and recognize, using context, where substitutions have occurred

## 2021-04-11 NOTE — PLAN OF CARE
Problem: Potential for Falls  Goal: Patient will remain free of falls  Description: INTERVENTIONS:  - Assess patient frequently for physical needs  -  Identify cognitive and physical deficits and behaviors that affect risk of falls  -  Early fall precautions as indicated by assessment   - Educate patient/family on patient safety including physical limitations  - Instruct patient to call for assistance with activity based on assessment  - Modify environment to reduce risk of injury  - Consider OT/PT consult to assist with strengthening/mobility  Outcome: Progressing     Problem: PAIN - ADULT  Goal: Verbalizes/displays adequate comfort level or baseline comfort level  Description: Interventions:  - Encourage patient to monitor pain and request assistance  - Assess pain using appropriate pain scale  - Administer analgesics based on type and severity of pain and evaluate response  - Implement non-pharmacological measures as appropriate and evaluate response  - Consider cultural and social influences on pain and pain management  - Notify physician/advanced practitioner if interventions unsuccessful or patient reports new pain  Outcome: Progressing     Problem: SAFETY ADULT  Goal: Patient will remain free of falls  Description: INTERVENTIONS:  - Assess patient frequently for physical needs  -  Identify cognitive and physical deficits and behaviors that affect risk of falls    -  Early fall precautions as indicated by assessment   - Educate patient/family on patient safety including physical limitations  - Instruct patient to call for assistance with activity based on assessment  - Modify environment to reduce risk of injury  - Consider OT/PT consult to assist with strengthening/mobility  Outcome: Progressing  Goal: Maintain or return to baseline ADL function  Description: INTERVENTIONS:  -  Assess patient's ability to carry out ADLs; assess patient's baseline for ADL function and identify physical deficits which impact ability to perform ADLs (bathing, care of mouth/teeth, toileting, grooming, dressing, etc )  - Assess/evaluate cause of self-care deficits   - Assess range of motion  - Assess patient's mobility; develop plan if impaired  - Assess patient's need for assistive devices and provide as appropriate  - Encourage maximum independence but intervene and supervise when necessary  - Involve family in performance of ADLs  - Assess for home care needs following discharge   - Consider OT consult to assist with ADL evaluation and planning for discharge  - Provide patient education as appropriate  Outcome: Progressing  Goal: Maintain or return mobility status to optimal level  Description: INTERVENTIONS:  - Assess patient's baseline mobility status (ambulation, transfers, stairs, etc )    - Identify cognitive and physical deficits and behaviors that affect mobility  - Identify mobility aids required to assist with transfers and/or ambulation (gait belt, sit-to-stand, lift, walker, cane, etc )  - [place] fall precautions as indicated by assessment  - Record patient progress and toleration of activity level on Mobility SBAR; progress patient to next Phase/Stage  - Instruct patient to call for assistance with activity based on assessment  - Consider rehabilitation consult to assist with strengthening/weightbearing, etc   Outcome: Progressing     Problem: DISCHARGE PLANNING  Goal: Discharge to home or other facility with appropriate resources  Description: INTERVENTIONS:  - Identify barriers to discharge w/patient and caregiver  - Arrange for needed discharge resources and transportation as appropriate  - Identify discharge learning needs (meds, wound care, etc )  - Arrange for interpretive services to assist at discharge as needed  - Refer to Case Management Department for coordinating discharge planning if the patient needs post-hospital services based on physician/advanced practitioner order or complex needs related to functional status, cognitive ability, or social support system  Outcome: Progressing     Problem: Knowledge Deficit  Goal: Patient/family/caregiver demonstrates understanding of disease process, treatment plan, medications, and discharge instructions  Description: Complete learning assessment and assess knowledge base    Interventions:  - Provide teaching at level of understanding  - Provide teaching via preferred learning methods  Outcome: Progressing

## 2021-04-11 NOTE — ASSESSMENT & PLAN NOTE
Pt currently AAOX2  Possibly related to recent infection and now has acute hypoxia secondary to pleural effusions  Has improved today

## 2021-04-11 NOTE — ASSESSMENT & PLAN NOTE
Lab Results   Component Value Date    HGBA1C 7 7 (H) 02/05/2021       Recent Labs     04/10/21  0803 04/10/21  1505 04/10/21  2111 04/11/21  0736   POCGLU 183* 244* 294* 168*       Blood Sugar Average: Last 72 hrs:  (P) 720 [id number]   Endo recommendations appreciated  On insulin pump OP  On SQ insulin IP

## 2021-04-11 NOTE — ASSESSMENT & PLAN NOTE
Estimated Creatinine Clearance: 26 1 mL/min (A) (by C-G formula based on SCr of 2 37 mg/dL (H))    POA  Renal following  Will need to diurese in setting of pulm edema

## 2021-04-11 NOTE — PROGRESS NOTES
[address] [facility]  Progress Note - López Lloyd 1945, 76 y o  male MRN: [id number]  [facility] Encounter: [id number]  Primary Care Provider: Nico Singh MD   Date and time admitted to hospital: 4/10/2021 10:27 AM    Acute on chronic diastolic heart failure (HCC)  Assessment & Plan  Wt Readings from Last 3 Encounters:   04/10/21 78 6 kg (173 lb 4 5 oz)   04/05/21 78 6 kg (173 lb 4 5 oz)   03/27/21 72 kg (158 lb 11 7 oz)     Pleural effusions related to IVF for KAZ  IV Lasix 80 mg IV given at [facility], will continue with 40 mg iv bid started today  Will ask cardio to eval  Echo in March showed at least mild TR  Ortez catheter in place        Hyponatremia  Assessment & Plan  C/w Salt tabs  Renal following    Encephalopathy  Assessment & Plan  Pt currently AAOX2  Possibly related to recent infection and now has acute hypoxia secondary to pleural effusions  Has improved today    KAZ (acute kidney injury) ([facility] [address] )  Assessment & Plan  Estimated Creatinine Clearance: 26 1 mL/min (A) (by C-G formula based on SCr of 2 37 mg/dL (H))    POA  Renal following  Will need to diurese in setting of pulm edema    Stage 3b chronic kidney disease  Assessment & Plan  Lab Results   Component Value Date    EGFR 26 04/11/2021    EGFR 26 04/11/2021    EGFR 28 04/10/2021    CREATININE 2 37 (H) 04/11/2021    CREATININE 2 35 (H) 04/11/2021    CREATININE 2 20 (H) 04/10/2021   conitnue with lasix   Monitor creatinine closely   Input and output  Daily weights  Nephrology on board  Avoid nephrotoxic medications  Keep SBP>130    Essential hypertension  Assessment & Plan  Continue with lasix 80 mg q 8hrs and amlodipine 5 mg daily     Type I diabetes mellitus [facility])  Assessment & Plan  Lab Results   Component Value Date    HGBA1C 7 7 (H) 02/05/2021       Recent Labs     04/10/21  0803 04/10/21  1505 04/10/21  2111 04/11/21  0736   POCGLU 183* 244* 294* 168*       Blood Sugar Average: Last 72 hrs:  (P) 182 [id number]   Endo recommendations appreciated  On insulin pump OP  On SQ insulin IP    * Acute respiratory failure with hypoxia (HCC)  Assessment & Plan  POA  RRT at [facility] needing 10L O2  Likely 2/2 CHF  CXR large pleural effusions, more on the right than left now on new xray  Diuresis : lasix 80 mg q8 hours   Monitor in and output  Daily weights    VTE Pharmacologic Prophylaxis:   Pharmacologic: Heparin  Mechanical VTE Prophylaxis in Place: Yes    Patient Centered Rounds: I have performed bedside rounds with nursing staff today  Discussions with Specialists or Other Care Team Provider: pulmonary, endocrinology     Education and Discussions with Family / Patient: patient and wife at bedside, explained about the plan for today  Time Spent for Care: 45 minutes  More than 50% of total time spent on counseling and coordination of care as described above  Current Length of Stay: 1 day(s)    Current Patient Status: Inpatient   Certification Statement: The patient will continue to require additional inpatient hospital stay due to hypoxia, fluid overload    Discharge Plan: pending improvement of his status  Code Status: Level 1 - Full Code      Subjective:   Patient is doing well  He having issues with medical management  Objective:     Vitals:   Temp (24hrs), Av °F (36 7 °C), Min:97 7 °F (36 5 °C), Max:98 9 °F (37 2 °C)    Temp:  [97 7 °F (36 5 °C)-98 9 °F (37 2 °C)] 97 7 °F (36 5 °C)  HR:  [] 90  Resp:  [20-24] 20  BP: (120-143)/(70-83) 143/83  SpO2:  [89 %-93 %] 90 %  Body mass index is 26 35 kg/m²  Input and Output Summary (last 24 hours): Intake/Output Summary (Last 24 hours) at 2021 1737  Last data filed at 2021 0900  Gross per 24 hour   Intake --   Output 800 ml   Net -800 ml       Physical Exam:     Physical Exam  Vitals signs and nursing note reviewed  Constitutional:       Appearance: Normal appearance  HENT:      Head: Normocephalic        Nose: Nose normal  No congestion or rhinorrhea  Eyes:      General: No scleral icterus  Right eye: No discharge  Left eye: No discharge  Extraocular Movements: Extraocular movements intact  Conjunctiva/sclera: Conjunctivae normal       Pupils: Pupils are equal, round, and reactive to light  Neck:      Musculoskeletal: Normal range of motion and neck supple  Cardiovascular:      Rate and Rhythm: Normal rate  Heart sounds: Murmur present  No friction rub  No gallop  Pulmonary:      Effort: Respiratory distress present  Breath sounds: Wheezing, rhonchi and rales present  Abdominal:      General: Abdomen is flat  Bowel sounds are normal  There is distension  Palpations: Abdomen is soft  There is no mass  Tenderness: There is abdominal tenderness  There is no right CVA tenderness, left CVA tenderness, guarding or rebound  Hernia: No hernia is present  Musculoskeletal: Normal range of motion  General: No swelling, tenderness, deformity or signs of injury  Right lower leg: Edema present  Left lower leg: Edema present  Skin:     General: Skin is warm and dry  Coloration: Skin is not jaundiced or pale  Findings: No bruising or erythema  Neurological:      General: No focal deficit present  Mental Status: He is alert and oriented to person, place, and time  Mental status is at baseline  Cranial Nerves: No cranial nerve deficit  Sensory: No sensory deficit  Motor: No weakness  Coordination: Coordination normal    Psychiatric:         Mood and Affect: Mood normal          Behavior: Behavior normal          Thought Content:  Thought content normal          Judgment: Judgment normal          Additional Data:     Labs:    Results from last 7 days   Lab Units 04/11/21  0356  04/10/21  0821   WBC Thousand/uL 15 19*  --  15 67*   HEMOGLOBIN g/dL 7 8*  --  7 9*   I STAT HEMOGLOBIN   --    < >  --    HEMATOCRIT % 24 6*  --  24 1* HEMATOCRIT, ISTAT   --    < >  --    PLATELETS Thousands/uL 151  --  168   NEUTROS PCT %  --   --  86*   LYMPHS PCT %  --   --  4*   MONOS PCT %  --   --  9   EOS PCT %  --   --  0    < > = values in this interval not displayed  Results from last 7 days   Lab Units 04/11/21  0601  04/10/21  0829   POTASSIUM mmol/L 4 8   < >  --    CHLORIDE mmol/L 103   < >  --    CO2 mmol/L 23   < >  --    CO2, I-STAT mmol/L  --   --  22   BUN mg/dL 51*   < >  --    CREATININE mg/dL 2 37*   < >  --    CALCIUM mg/dL 8 2*   < >  --    GLUCOSE, ISTAT mg/dl  --   --  176*    < > = values in this interval not displayed  * I Have Reviewed All Lab Data Listed Above  * Additional Pertinent Lab Tests Reviewed:  [address] Admission Reviewed    Imaging:    Imaging Reports Reviewed Today Include:    Imaging Personally Reviewed by Myself Includes:       Recent Cultures (last 7 days):     Results from last 7 days   Lab Units 04/07/21  0857   LEGIONELLA URINARY ANTIGEN  Negative       Last 24 Hours Medication List:   Current Facility-Administered Medications   Medication Dose Route Frequency Provider Last Rate    acetaminophen  650 mg Oral Q6H PRN Levolouise Chappell, DO      aspirin  81 mg Oral Daily Cleveland Chappell, DO      atorvastatin  5 mg Oral Daily Levocharmainee Misti, DO      bisacodyl  10 mg Rectal Daily PRN Levocharmainee Misti, DO      cholecalciferol  4,000 Units Oral Daily Levolouise Chappell, DO      docusate sodium  100 mg Oral BID Cleveland Chappell, DO      ferrous sulfate  325 mg Oral Daily With Breakfast Cleveland Chappell, DO      furosemide  80 mg Intravenous Q8H Geovanna Blanchard, DO      heparin (porcine)  5,000 Units Subcutaneous Q8H [facility] Cleveland Chappell, DO      insulin glargine  6 Units Subcutaneous HS Cleveland Chappell, DO      insulin lispro  1-5 Units Subcutaneous TID JESSIE Santillan, DO      insulin lispro  1-5 Units Subcutaneous HS Cleveland Chappell, DO      insulin lispro  3 Units Subcutaneous TID With Meals Keenan Zhou Carleton Closs, DO      levothyroxine  125 mcg Oral Early Morning Liz Ndiaye, DO      melatonin  3 mg Oral HS PRN JOSE Molina      ondansetron  4 mg Intravenous Q6H PRN Liz Ndiaye, DO      pantoprazole  40 mg Oral Daily Before Breakfast Liz Ndiaye, DO      polyethylene glycol  17 g Oral Daily PRN Liz Ndiaye,       sodium bicarbonate  650 mg Oral BID after meals Liz Ndiaye, DO      tamsulosin  0 4 mg Oral Daily With Dinner Liz Ndiaye, DO          Today, Patient Was Seen By: Mehrdad Martin MD    ** Please Note: Dictation voice to text software may have been used in the creation of this document   **

## 2021-04-11 NOTE — SPEECH THERAPY NOTE
Speech Language/Pathology  Speech-Language Pathology Bedside Swallow Evaluation      Patient Name: Marlene Weinberg    JGINNA'S Date: 4/11/2021     Problem List  Principal Problem:    Acute respiratory failure with hypoxia (HCC)  Active Problems:    Anemia of chronic disease    Type I diabetes mellitus ([facility] )    Essential hypertension    Stage 3b chronic kidney disease    KAZ (acute kidney injury) ([facility] )    Encephalopathy    Hyponatremia    Acute on chronic diastolic heart failure ([facility] )      Past Medical History  Past Medical History:   Diagnosis Date    Arthritis     Cardiac disease     Closed fracture of fibula, lateral malleolus, right     Complete heart block ([facility] )     Diabetes mellitus ([facility] )     Disease of thyroid gland     External incisional dehiscence     GERD (gastroesophageal reflux disease)     Heart disease     Muscular deconditioning     Pacemaker     Sleep apnea     cannot tolerate cpap    Thyroid disease        Past Surgical History  Past Surgical History:   Procedure Laterality Date    CARDIAC PACEMAKER PLACEMENT Left 2015    CARDIAC PACEMAKER PLACEMENT      CARDIAC SURGERY      CATARACT EXTRACTION      MUSCLE FLAP      right foot    NERVE BLOCK      Transforaminal Epidural Lumbar with Fluoroscopic Guidance     MA COLONOSCOPY FLX DX W/COLLJ SPEC WHEN PFRMD N/A 5/4/2018    Procedure: EGD AND COLONOSCOPY;  Surgeon: Laisha Salciod MD;  Location: [facility]  GI LAB; Service: Gastroenterology    MA SPLIT [address] <100 SQCM Right 6/10/2019    Procedure: SKIN GRAFT SPLIT THICKNESS ([facility])  EXTREMITY right leg;  Surgeon: Lucero Armstrong DPM;  Location: [facility] OR;  Service: Podiatry    TOE AMPUTATION      left 5th toe       Summary   Pt presented with s/s suggestive of mild oral and suspected at least mild pharyngeal dysphagia  Symptoms or concerns included prolonged mastication and oral manipulation   Transfers somewhat weak resulting in mild lingual residue folllowing the swallow  Swallow initiation is suspected fairly prompt, hyo-laryngeal movement is palpated and judged to be wnl  Pt w/ throat clear x2 w/ thin liquids  Pt appears w/ overall difficulty coordinating breathing/swallowing  Pt and wife in agreement for more conservative diet at this time  to optimize swallow safety  Risk/s for Aspiration: Mild/mod     Recommended Diet: soft/level 3 diet and nectar thick liquids   Recommended Form of Meds: whole with puree   Aspiration precautions and swallowing strategies: upright posture, only feed when fully alert and slow rate of feeding  Other Recommendations/Considerations: -        Current Medical Status  Pt is a 76 y o  male who presented to [facility] with type 1 diabetes admitted initially for diarrhea and bacteremia  Patient has had a rapid response yesterday for hypoxia and was transferred back from rehab  Today he appears confused but denies any complaint       Current Precautions:  Fall      Allergies:  No known food allergies    Past medical history:  Please see H&P for details    Special Studies:  CXR 4/10/21: Left greater than right effusions are again noted  Atelectasis versus infiltrate at the left lung base is probably evident  Social/Education/Vocational Hx:  Pt lives home w/ wife    Swallow Information   Current Risks for Dysphagia & Aspiration: AMS and respiratory compromise  Current Symptoms/Concerns: change in respiratory status  Current Diet: NPO   Baseline Diet: regular diet and thin liquids      Baseline Assessment   Behavior/Cognition: waxing and waning arousal level  Speech/Language Status: able to participate in conversation and able to follow commands  Patient Positioning: upright in bed  Pain Status/Interventions/Response to Interventions:   No report of or nonverbal indications of pain         Swallow Mechanism Exam  Facial: symmetrical  Labial: WFL  Lingual: WFL  Velum: symmetrical  Mandible: adequate ROM  Dentition: adequate  Vocal quality:clear/adequate   Volitional Cough: strong/productive   Respiratory Status: [address]    Consistencies Assessed and Performance   Consistencies Administered: thin liquids, nectar thick, puree, soft solids and hard solids  Materials administered included applesauce, jessie cracker, toast    Oral Stage: mild  Mastication was prolonged/reduced w/ advanced textures  Bolus formation also prolonged  Transfers w/ mild weak propulsion, mild lingual residue noted following the swallow which pt cleared w/ liquids wash  No overt s/s reduced oral control  Pharyngeal Stage: suspect at least mild  Swallow Mechanics:  Swallowing initiation appeared fairly prompt  Laryngeal rise was palpated and judged to be within functional limits  Breathing/swallowing coordination w/ suspected difficulty  Pt w/ throat clear and slight drop in SPO2 w/ thin liquids, suspect reduced airway protection during the swallow  Pt w/ visible increased WOB when taking consecutive sips  Esophageal Concerns: none reported    Strategies and Efficacy: -    Summary and Recommendations (see above)    Results Reviewed with: patient, RN, MD, family and RT     Treatment Recommended: Yes     Frequency of treatment: As able     Patient Stated Goal: none stated     Dysphagia LTG  -Patient will demonstrate safe and effective oral intake (without overt s/s significant oral/pharyngeal dysphagia including s/s penetration or aspiration) for the highest appropriate diet level       Short Term Goals:  -Pt will tolerate Dysphagia 3/advanced (dental soft) diet nectar thick with no significant s/s oral or pharyngeal dysphagia across 1-3 diagnostic session/s     -Patient will tolerate trials of upgraded food and/or liquid texture with no significant s/s of oral or pharyngeal dysphagia including aspiration across 1-3 diagnostic sessions         Speech Therapy Prognosis   Prognosis: good    Prognosis Considerations: age, medical status, cognitive status and respiratory status

## 2021-04-11 NOTE — PROGRESS NOTES
Progress Note - Maria Luz Caro 76 y o  male MRN: [id number]    Unit/Bed#: [facility] 809-01 Encounter: [id number]      CC: diabetes f/u    Subjective:   Maria uLz Caro is a 76y o  year old male with type type 1 diabetes admitted initially for diarrhea and bacteremia  Patient has had a rapid response yesterday for hypoxia and was transferred back from rehab  Today he appears confused but denies any complaint  Objective:     Vitals: Blood pressure 120/70, pulse 87, temperature 97 9 °F (36 6 °C), resp  rate (!) 24, height 5' 8" (1 727 m), weight 78 6 kg (173 lb 4 5 oz), SpO2 93 %  ,Body mass index is 26 35 kg/m²  Intake/Output Summary (Last 24 hours) at 4/11/2021 0856  Last data filed at 4/11/2021 0531  Gross per 24 hour   Intake --   Output 1200 ml   Net -1200 ml       Physical Exam:  General Appearance: awake, appears stated age, confused  Head: Normocephalic, without obvious abnormality, atraumatic  Extremities: moves all extremities  Skin: Skin color and temperature normal    Pulm: no labored breathing    Lab, Imaging and other studies: I have personally reviewed pertinent reports  POC Glucose (mg/dl)   Date Value   04/10/2021 294 (H)   04/10/2021 244 (H)   04/10/2021 183 (H)   04/10/2021 166 (H)   04/09/2021 213 (H)   04/09/2021 202 (H)   04/09/2021 227 (H)   04/09/2021 171 (H)   04/08/2021 300 (H)   04/08/2021 265 (H)       Assessment/ Plan:    -Type 1 diabetes with hyper and hypoglycemia:  On insulin pump therapy at home  Currently patient is on Lantus 6 units q h s  And lispro 3 units with meals  After the acute event yesterday, patient did not receive lispro for lunch and he has had high blood glucose later on  His fasting blood glucose was 168 this morning  We recommend continuing same regimen for now and monitor blood glucose for further adjustment     -Rest of care per primary team       Portions of the record may have been created with voice recognition software

## 2021-04-12 PROBLEM — E87.2 RESPIRATORY ACIDOSIS: Status: ACTIVE | Noted: 2021-01-01

## 2021-04-12 PROBLEM — J18.9 PNEUMONIA: Status: ACTIVE | Noted: 2021-01-01

## 2021-04-12 NOTE — PROGRESS NOTES
Progress Note - Pulmonary   Reza Dutch 76 y o  male MRN: [id number]  Unit/Bed#: [facility] 803-01 Encounter: [id number]      Assessment:  1  Acute hypoxic respiratory failure, multifactorial secondary to pulmonary edema, acute on chronic diastolic CHF, kaz on CKD, left pleural effusion, hypoventilation, possible HCAP  2  Abnormal chest x-ray with pulmonary edema and bilateral pleural effusions, possible right lower lobe consolidation  3  KAZ on CKD 3B  4  Hypertension  5  Hyponatremia  6  Anemia  7  COVID infection February 2021    Plan:  - Currently receiving Lasix 80 mg q 8 hours and net -2 6 L since admission, would continue diuresis, nephro and cardiology following   - currently saturating low 90s on 12 L via mid flow, wean as tolerated, aggressive ics, out of bed to chair,  Goal SpO2 greater than 88%  -  Given minimal response to diuresis and possible right lower lobe consolidation with encephalopathy, will start broad-spectrum antibiotics vancomycin/cefepime  Check sputum culture, consult pharmacy for vancomycin dosing  -  Mental status remains poor, may benefit from further workup per primary team - consider neuro imaging,TSH, ammonia, and repeat blood cultures ordered - possible effect of COVID infection given weakness, confusion, unclear,  Versus ongoing infection      Subjective:    patient seen examined at bedside  He is somewhat confused, but oriented to person, place, time  Complains of mild shortness of breath, denies fever, chills, nausea, vomiting, chest pain  Review of Systems   Constitutional: Positive for fatigue  Negative for chills, fever and unexpected weight change  HENT: Negative for congestion, rhinorrhea, sneezing and sore throat  Respiratory: Positive for shortness of breath  Negative for cough and wheezing  Cardiovascular: Positive for leg swelling  Negative for chest pain and palpitations     Gastrointestinal: Negative for abdominal pain, constipation, diarrhea, nausea and vomiting  Genitourinary: Negative for dysuria  Musculoskeletal: Negative for arthralgias  Neurological: Negative for dizziness and numbness  Psychiatric/Behavioral: Positive for confusion  Objective:     Vitals:    04/12/21 0302 04/12/21 0743 04/12/21 0800 04/12/21 0821   BP: 108/65  122/83 124/82   Pulse: 93 91 98 100   Resp: 16  16 20   Temp: 97 7 °F (36 5 °C)  97 9 °F (36 6 °C) 98 2 °F (36 8 °C)   TempSrc:       SpO2: (!) 88% 92% 93% 94%   Weight:       Height:               Intake/Output Summary (Last 24 hours) at 4/12/2021 0853  Last data filed at 4/12/2021 1643  Gross per 24 hour   Intake 120 ml   Output 1450 ml   Net -1330 ml         Physical Exam  Constitutional:       General: He is not in acute distress  Appearance: He is well-developed  He is not diaphoretic  HENT:      Head: Normocephalic and atraumatic  Mouth/Throat:      Mouth: Mucous membranes are moist       Pharynx: Oropharynx is clear  No oropharyngeal exudate  Eyes:      General: No scleral icterus  Cardiovascular:      Rate and Rhythm: Normal rate and regular rhythm  Heart sounds: Normal heart sounds  No murmur  Comments: Left pacemaker  Pulmonary:      Effort: Pulmonary effort is normal  No respiratory distress  Breath sounds: No wheezing  Comments: Decreased breath sounds at the bases with left basilar crackles  Abdominal:      General: Bowel sounds are normal  There is no distension  Palpations: Abdomen is soft  Tenderness: There is no abdominal tenderness  Musculoskeletal:      Right lower leg: No edema  Left lower leg: No edema  Neurological:      Mental Status: He is alert and oriented to person, place, and time  Comments: Slow to answer questions and respond to commands, but oriented to  Person, place, time         Labs: I have personally reviewed pertinent lab results    Results from last 7 days   Lab Units 04/12/21  0455 04/11/21  0356 04/10/21  500 04/10/21  0821 04/10/21  0508   WBC Thousand/uL 16 69* 15 19*  --  15 67* 15 81*   HEMOGLOBIN g/dL 7 4* 7 8*  --  7 9* 8 0*   I STAT HEMOGLOBIN g/dl  --   --  8 2*  --   --    HEMATOCRIT % 23 1* 24 6*  --  24 1* 25 1*   HEMATOCRIT, ISTAT %  --   --  24*  --   --    PLATELETS Thousands/uL 157 151  --  168 168   NEUTROS PCT % 85*  --   --  86* 87*   MONOS PCT % 10  --   --  9 8      Results from last 7 days   Lab Units 04/12/21  0455 04/11/21  0601 04/11/21  0356 04/10/21  0829   POTASSIUM mmol/L 3 8 4 8 5 5*  --    CHLORIDE mmol/L 103 103 104  --    CO2 mmol/L 25 23 25  --    CO2, I-STAT mmol/L  --   --   --  22   BUN mg/dL 53* 51* 48*  --    CREATININE mg/dL 2 62* 2 37* 2 35*  --    CALCIUM mg/dL 8 4 8 2* 7 9*  --    GLUCOSE, ISTAT mg/dl  --   --   --  176*     Results from last 7 days   Lab Units 04/11/21  0356   MAGNESIUM mg/dL 2 1     Results from last 7 days   Lab Units 04/11/21  0356   PHOSPHORUS mg/dL 3 6          Results from last 7 days   Lab Units 04/10/21  0813   LACTIC ACID mmol/L 1 7     0   Lab Value Date/Time    TROPONINI 0 07 (H) 04/10/2021 0813    TROPONINI 0 32 (H) 03/27/2021 2317    TROPONINI 0 38 (H) 03/27/2021 2024    TROPONINI 0 47 (H) 03/27/2021 1619    TROPONINI <0 02 03/24/2021 1643    TROPONINI 0 08 (H) 02/06/2021 0255    TROPONINI 0 09 (H) 02/05/2021 2350    TROPONINI 0 08 (H) 02/05/2021 2028    TROPONINI 0 06 (H) 02/05/2021 1554    TROPONINI 0 03 07/13/2019 1629       Microbiology:    None this admission    Imaging and other studies: I have personally reviewed pertinent reports     and I have personally reviewed pertinent films in PACS   chest x-ray 04/11/2021   pulmonary edema with small left pleural effusion        Shalonda Ramos MD  Pulmonary & Critical Care Fellow, [facility]

## 2021-04-12 NOTE — ASSESSMENT & PLAN NOTE
POA  RRT at [facility] needing 10L O2  Likely 2/2 CHF  CXR large pleural effusions, more on the right than left now on new xray  Diuresis : lasix 80 mg q8 hours   Monitor in and output  Daily weights

## 2021-04-12 NOTE — CASE MANAGEMENT
CM met with the Pt, explained CM program/CM role  LOS- 1 day  Bundle- No    Unplanned readmission color- (32, yellow moderate)  30 Day readmit- Yes, Pt was admitted a week ago  Pt lives with his wife in a 2 story home  Pt has LUÍS to get to the second level, but uses a chair lift to get up and down the steps  Pt has a ramp to get into the home as well  Pt's primary caregiver is his wife with 1 min assist  Pt needs help with ambulating in the home and ADL's  Pt does not drive  Pt's PCP provider is listed as Marcelina Acuna [phone]  Pt's pharmacy is [facility], Phone: [phone] Fax: [phone]  Pt is able to afford all medications  Pt has hx of [facility]- PT/OT and visiting RN  Pt uses a RW and cane to ambulate  Pt has past IP stay for PT/OT at [facility]  Pt has no MH issues or D&A issues  Pt is retired  Pt's primary EC is his wife, Stephen Adorno [phone] (M)  CM may discuss DC planning with her or son  Pt has no POA listed or LW completed  Family declined at this time  Pt's wife will transport Pt home at time of DC  DC plan TBD  CM will continue following the Pt until DC  Pt is not medically stable for DC today

## 2021-04-12 NOTE — PLAN OF CARE
Problem: Potential for Falls  Goal: Patient will remain free of falls  Description: INTERVENTIONS:  - Assess patient frequently for physical needs  -  Identify cognitive and physical deficits and behaviors that affect risk of falls  -  [place] fall precautions as indicated by assessment   - Educate patient/family on patient safety including physical limitations  - Instruct patient to call for assistance with activity based on assessment  - Modify environment to reduce risk of injury  - Consider OT/PT consult to assist with strengthening/mobility  Outcome: Progressing     Problem: PAIN - ADULT  Goal: Verbalizes/displays adequate comfort level or baseline comfort level  Description: Interventions:  - Encourage patient to monitor pain and request assistance  - Assess pain using appropriate pain scale  - Administer analgesics based on type and severity of pain and evaluate response  - Implement non-pharmacological measures as appropriate and evaluate response  - Consider cultural and social influences on pain and pain management  - Notify physician/advanced practitioner if interventions unsuccessful or patient reports new pain  Outcome: Progressing     Problem: SAFETY ADULT  Goal: Patient will remain free of falls  Description: INTERVENTIONS:  - Assess patient frequently for physical needs  -  Identify cognitive and physical deficits and behaviors that affect risk of falls    -  [place] fall precautions as indicated by assessment   - Educate patient/family on patient safety including physical limitations  - Instruct patient to call for assistance with activity based on assessment  - Modify environment to reduce risk of injury  - Consider OT/PT consult to assist with strengthening/mobility  Outcome: Progressing  Goal: Maintain or return to baseline ADL function  Description: INTERVENTIONS:  -  Assess patient's ability to carry out ADLs; assess patient's baseline for ADL function and identify physical deficits which impact ability to perform ADLs (bathing, care of mouth/teeth, toileting, grooming, dressing, etc )  - Assess/evaluate cause of self-care deficits   - Assess range of motion  - Assess patient's mobility; develop plan if impaired  - Assess patient's need for assistive devices and provide as appropriate  - Encourage maximum independence but intervene and supervise when necessary  - Involve family in performance of ADLs  - Assess for home care needs following discharge   - Consider OT consult to assist with ADL evaluation and planning for discharge  - Provide patient education as appropriate  Outcome: Progressing  Goal: Maintain or return mobility status to optimal level  Description: INTERVENTIONS:  - Assess patient's baseline mobility status (ambulation, transfers, stairs, etc )    - Identify cognitive and physical deficits and behaviors that affect mobility  - Identify mobility aids required to assist with transfers and/or ambulation (gait belt, sit-to-stand, lift, walker, cane, etc )  - [place] fall precautions as indicated by assessment  - Record patient progress and toleration of activity level on Mobility SBAR; progress patient to next Phase/Stage  - Instruct patient to call for assistance with activity based on assessment  - Consider rehabilitation consult to assist with strengthening/weightbearing, etc   Outcome: Progressing     Problem: DISCHARGE PLANNING  Goal: Discharge to home or other facility with appropriate resources  Description: INTERVENTIONS:  - Identify barriers to discharge w/patient and caregiver  - Arrange for needed discharge resources and transportation as appropriate  - Identify discharge learning needs (meds, wound care, etc )  - Arrange for interpretive services to assist at discharge as needed  - Refer to Case Management Department for coordinating discharge planning if the patient needs post-hospital services based on physician/advanced practitioner order or complex needs related to functional status, cognitive ability, or social support system  Outcome: Progressing     Problem: Knowledge Deficit  Goal: Patient/family/caregiver demonstrates understanding of disease process, treatment plan, medications, and discharge instructions  Description: Complete learning assessment and assess knowledge base    Interventions:  - Provide teaching at level of understanding  - Provide teaching via preferred learning methods  Outcome: Progressing     Problem: Prexisting or High Potential for Compromised Skin Integrity  Goal: Skin integrity is maintained or improved  Description: INTERVENTIONS:  - Identify patients at risk for skin breakdown  - Assess and monitor skin integrity  - Assess and monitor nutrition and hydration status  - Monitor labs   - Assess for incontinence   - Turn and reposition patient  - Assist with mobility/ambulation  - Relieve pressure over bony prominences  - Avoid friction and shearing  - Provide appropriate hygiene as needed including keeping skin clean and dry  - Evaluate need for skin moisturizer/barrier cream  - Collaborate with interdisciplinary team   - Patient/family teaching  - Consider wound care consult   Outcome: Progressing

## 2021-04-12 NOTE — PROGRESS NOTES
Team 2 Cardiology - Progress Note  Dariela De La Garza 76 y o  male MRN: [id number]  Unit/Bed#: [facility] 803-01 Encounter: [id number]    Assessment:  Principal Problem:    Acute respiratory failure with hypoxia ([facility] 75 )  Active Problems:    Anemia of chronic disease    Type I diabetes mellitus ([facility] )    Essential hypertension    Stage 3b chronic kidney disease    KAZ (acute kidney injury) ([facility] 75 )    Encephalopathy    Hyponatremia    Acute on chronic diastolic heart failure (HCC)    #Acute on chronic diastolic heart failure  # acute on chronic kidney disease   # anemia     Plan:  1  Acute on chronic diastolic heart failure   -Continue Lasix 80mg IV BID  -Monitor BMP      2  Acute on chronic kidney disease:   -hold ACE   -continue sodium bicarb for metabolic acidosis   -recs from Nephro appreciated     3  Normocytic Anemia   -Continue Fe sulfate  -Hgb of 7 4  baseline ~8 0   -Iron studies revealed decreased FE and TIBC, nml ferritin and Fe%  Subjective/Objective     Subjective: Pt was seen and examined at bedside  He was agitated, restless, and was attempting to pull of condom catheter  He was only alert and oriented to self  His last TTE was in March 2020 revealed EF= 65 % , mild TR, mild MR  ProBNP ~10,000  Trop neg X1  CXR showed mild pulmonary edema with small left pleural effusion  EKG: atrial sensed, v paced with AV prolongation  His initial presentation of fluid overload with subsequent decompensation was  most likely due to decreased renal function       Objective:  Vitals: BP (!) 133/105   Pulse 93   Temp 99 °F (37 2 °C)   Resp 20   Ht 5' 8" (1 727 m)   Wt 78 6 kg (173 lb 4 5 oz)   SpO2 92%   BMI 26 35 kg/m²   Vitals:    04/10/21 1033   Weight: 78 6 kg (173 lb 4 5 oz)     Orthostatic Blood Pressures      Most Recent Value   Blood Pressure  (!) 133/105 filed at 04/12/2021 1058            Intake/Output Summary (Last 24 hours) at 4/12/2021 1320  Last data filed at 4/12/2021 1036  Gross per 24 hour   Intake 120 ml   Output 1500 ml   Net -1380 ml     Review of Systems   Constitutional: Negative for chills and fever  HENT: Negative for ear pain and sore throat  Eyes: Negative for pain and visual disturbance  Respiratory: Negative for cough and shortness of breath  Cardiovascular: Negative for chest pain and palpitations  Gastrointestinal: Negative for abdominal pain and vomiting  Genitourinary: Negative for dysuria and hematuria  Musculoskeletal: Negative for arthralgias and back pain  Skin: Negative for color change and rash  Neurological: Negative for seizures and syncope  Psychiatric/Behavioral: Positive for agitation and confusion  All other systems reviewed and are negative  Physical Exam  Vitals signs and nursing note reviewed  Constitutional:       Appearance: He is well-developed  Comments: Pale in appearance    HENT:      Head: Normocephalic and atraumatic  Eyes:      Conjunctiva/sclera: Conjunctivae normal    Neck:      Musculoskeletal: Neck supple  Cardiovascular:      Rate and Rhythm: Normal rate and regular rhythm  Pulses: Normal pulses  Heart sounds: Normal heart sounds  No murmur  No gallop  Pulmonary:      Effort: Pulmonary effort is normal  No respiratory distress  Breath sounds: Normal breath sounds  Abdominal:      Palpations: Abdomen is soft  Tenderness: There is no abdominal tenderness  Musculoskeletal: Normal range of motion  Comments: R foot ulcer, wrapped in ace bandage      Skin:     General: Skin is warm and dry  Neurological:      Mental Status: He is alert           Medications:    Current Facility-Administered Medications:     acetaminophen (TYLENOL) tablet 650 mg, 650 mg, Oral, Q6H PRN, Chelle Ramirez DO    aspirin chewable tablet 81 mg, 81 mg, Oral, Daily, Chelle Ramirez DO, 81 mg at 04/12/21 0829    atorvastatin (LIPITOR) tablet 5 mg, 5 mg, Oral, Daily, Chelle Ramirez DO, 5 mg at 04/12/21 0829    bisacodyl (DULCOLAX) rectal suppository 10 mg, 10 mg, Rectal, Daily PRN, Naina Hurtado DO    cefepime (MAXIPIME) 1,000 mg in dextrose 5 % 50 mL IVPB, 1,000 mg, Intravenous, Q12H, Mary Krishna MD, Last Rate: 100 mL/hr at 04/12/21 1024, 1,000 mg at 04/12/21 1024    cholecalciferol (VITAMIN D3) tablet 4,000 Units, 4,000 Units, Oral, Daily, Naina Hurtado DO, 4,000 Units at 04/12/21 1895    docusate sodium (COLACE) capsule 100 mg, 100 mg, Oral, BID, Naina Hurtado DO, 100 mg at 04/10/21 1820    ferrous sulfate tablet 325 mg, 325 mg, Oral, Daily With Breakfast, Naina Hurtado DO, 325 mg at 04/12/21 0830    furosemide (LASIX) injection 80 mg, 80 mg, Intravenous, Q8H, Javon Souza DO, 80 mg at 04/12/21 0831    heparin (porcine) subcutaneous injection 5,000 Units, 5,000 Units, Subcutaneous, Q8H [address], Naina Hurtado DO, 5,000 Units at 04/12/21 0524    insulin glargine (LANTUS) subcutaneous injection 6 Units 0 06 mL, 6 Units, Subcutaneous, HS, Naina Hurtado DO, 6 Units at 04/11/21 2138    insulin lispro (HumaLOG) 100 units/mL subcutaneous injection 1-5 Units, 1-5 Units, Subcutaneous, TID AC, 1 Units at 04/11/21 1705 **AND** Fingerstick Glucose (POCT), , , TID AC, Naina Hurtado DO    insulin lispro (HumaLOG) 100 units/mL subcutaneous injection 1-5 Units, 1-5 Units, Subcutaneous, HS, Naina Hurtado DO, 2 Units at 04/11/21 2139    insulin lispro (HumaLOG) 100 units/mL subcutaneous injection 3 Units, 3 Units, Subcutaneous, TID With Meals, Naina Hurtado DO, 3 Units at 04/12/21 1122    levothyroxine tablet 125 mcg, 125 mcg, Oral, Early Morning, Naina Hurtado DO, 125 mcg at 04/12/21 0523    melatonin tablet 3 mg, 3 mg, Oral, HS PRN, Johnathon Wilson PA-C    ondansetron (ZOFRAN) injection 4 mg, 4 mg, Intravenous, Q6H PRN, Naina Hurtado DO    pantoprazole (PROTONIX) EC tablet 40 mg, 40 mg, Oral, Daily Before Breakfast, Naina Hurtado DO, 40 mg at 04/12/21 0523    polyethylene glycol (MIRALAX) packet 17 g, 17 g, Oral, Daily PRN, Sharon Seekonk, DO    sodium bicarbonate tablet 650 mg, 650 mg, Oral, BID after meals, Sharon Seekonk, DO, 650 mg at 04/12/21 0829    tamsulosin (FLOMAX) capsule 0 4 mg, 0 4 mg, Oral, Daily With Dinner, Sharon Seekonk, DO, 0 4 mg at 04/11/21 1705    vancomycin (VANCOCIN) IVPB (premix in dextrose) 750 mg 150 mL, 750 mg, Intravenous, Q24H, Wood Alcaraz MD, Last Rate: 150 mL/hr at 04/12/21 1153, 750 mg at 04/12/21 1153    Lab Results:  Results from last 7 days   Lab Units 04/10/21  0813   TROPONIN I ng/mL 0 07*     Results from last 7 days   Lab Units 04/12/21  0455 04/11/21  0356 04/10/21  0829 04/10/21  0821   WBC Thousand/uL 16 69* 15 19*  --  15 67*   HEMOGLOBIN g/dL 7 4* 7 8*  --  7 9*   I STAT HEMOGLOBIN g/dl  --   --  8 2*  --    HEMATOCRIT % 23 1* 24 6*  --  24 1*   HEMATOCRIT, ISTAT %  --   --  24*  --    PLATELETS Thousands/uL 157 151  --  168         Results from last 7 days   Lab Units 04/12/21  0455 04/11/21  0601 04/11/21  0356 04/10/21  0829   POTASSIUM mmol/L 3 8 4 8 5 5*  --    CHLORIDE mmol/L 103 103 104  --    CO2 mmol/L 25 23 25  --    CO2, I-STAT mmol/L  --   --   --  22   BUN mg/dL 53* 51* 48*  --    CREATININE mg/dL 2 62* 2 37* 2 35*  --    CALCIUM mg/dL 8 4 8 2* 7 9*  --    GLUCOSE, ISTAT mg/dl  --   --   --  176*         Results from last 7 days   Lab Units 04/11/21  0356   MAGNESIUM mg/dL 2 1       Uzma Romero   [facility]

## 2021-04-12 NOTE — PROGRESS NOTES
[facility] PROGRESS NOTE   Ramesh Needs 76 y o  male MRN: [id number]  Unit/Bed#: [facility] 803-01 Encounter: [id number]  Reason for Consult: KAZ    ASSESSMENT and PLAN:  1  Acute kidney injury:  · Transferred [facility] on 4/10/21 with an SCr of 2 20  · Creatinine was 2 12 to 2 38 from 4/7 to 4/11  · Creatinine up to 2 62 in the setting of aggressive diuresis  · He appears to remain fluid overloaded and would continue with Furosemide 80 mg IV TID  · Etiology appears to be cardiorenal syndrome  There may be an element of ATN given granular casts on UA  2  Chronic kidney disease, stage IIIB:  · Baseline creatinine around 1 2-1 5  · Follows with Dr Avery Blair  3  Hypertension:  · BP controlled  · Not on BP meds  Only on diuretics  4  Respiratory failure:  · Felt to be due to pneumonia and fluid overload by pulm  · Continue Furosemide 80 mg IV TID      5  Hyponatremia:  · Resolved  · Continue fluid restriction  · Off salt tabs  6  Metabolic acidosis:  · Continue sodium bicarbonate 650 mg BID  · CO2 stable at 25      7  Leukocytosis:  · Being treated for pneumonia  · Currently on cefepime and vancomycin  8  Pleural effusions  9  Anemia: per SLIM  DISPOSITION:  · Renal function is worsening but would continue with Furosemide 80 mg IV TID given on going hypoxia and fluid overload  · Trend creatinine with on going diuresis  · No urgent need for HD but may need it in the near future  SUBJECTIVE / 24H INTERVAL HISTORY:  Awake and alert but appeared confused  UO 1450 cc yesterday  Remains on mid flow oxygen at 10 L/min       OBJECTIVE:  Current Weight: Weight - Scale: 78 6 kg (173 lb 4 5 oz)  Vitals:    04/12/21 0821 04/12/21 1058 04/12/21 1348 04/12/21 1508   BP: 124/82 (!) 133/105  124/75   BP Location:    Right arm   Pulse: 100 93 92 89   Resp: 20   18   Temp: 98 2 °F (36 8 °C) 99 °F (37 2 °C)  (!) 97 3 °F (36 3 °C)   TempSrc:    Axillary   SpO2: 94% 92% 90% 91% Weight:       Height:           Intake/Output Summary (Last 24 hours) at 4/12/2021 1620  Last data filed at 4/12/2021 1404  Gross per 24 hour   Intake 240 ml   Output 1425 ml   Net -1185 ml     General: conscious, on mid flow oxygen  Skin: dry  Eyes: pale conjunctivae  ENT: moist mucous membranes  Chest/Lungs: equal chest expansion, (+) crackles on the R>L lower lung fields  CVS: distinct heart sounds, normal rate, regular rhythm, no rub  Abdomen: soft, non tender, non distended, normal bowel sounds  Extremities: (+) LE edema  : (+) alegria catheter  Neuro: awake, alert  Psych: confused       Medications:    Current Facility-Administered Medications:     acetaminophen (TYLENOL) tablet 650 mg, 650 mg, Oral, Q6H PRN, Levocharmainee Misti, DO    aspirin chewable tablet 81 mg, 81 mg, Oral, Daily, Levocharmainee Misti, DO, 81 mg at 04/12/21 8885    atorvastatin (LIPITOR) tablet 5 mg, 5 mg, Oral, Daily, Levocharmainee Misti, DO, 5 mg at 04/12/21 3106    bisacodyl (DULCOLAX) rectal suppository 10 mg, 10 mg, Rectal, Daily PRN, Levocharmainee Misti, DO    cefepime (MAXIPIME) 1,000 mg in dextrose 5 % 50 mL IVPB, 1,000 mg, Intravenous, Q12H, Judi Russ MD, Last Rate: 100 mL/hr at 04/12/21 1024, 1,000 mg at 04/12/21 1024    cholecalciferol (VITAMIN D3) tablet 4,000 Units, 4,000 Units, Oral, Daily, Levocharmainee Misti, DO, 4,000 Units at 04/12/21 5362    docusate sodium (COLACE) capsule 100 mg, 100 mg, Oral, BID, Levocharmainee Misti DO, 100 mg at 04/10/21 1820    ferrous sulfate tablet 325 mg, 325 mg, Oral, Daily With Breakfast, Levocharmainee Misti, DO, 325 mg at 04/12/21 0830    furosemide (LASIX) injection 80 mg, 80 mg, Intravenous, Q8H, Geovanna Blanchard DO, 80 mg at 04/12/21 0831    heparin (porcine) subcutaneous injection 5,000 Units, 5,000 Units, Subcutaneous, Q8H [facility], Cleveland Chappell DO, 5,000 Units at 04/12/21 1324    insulin glargine (LANTUS) subcutaneous injection 6 Units 0 06 mL, 6 Units, Subcutaneous, HS, Cleveland Chappell DO, 6 Units at 04/11/21 2138    insulin lispro (HumaLOG) 100 units/mL subcutaneous injection 1-5 Units, 1-5 Units, Subcutaneous, TID AC, 1 Units at 04/11/21 1705 **AND** Fingerstick Glucose (POCT), , , TID AC, Bard Massed, DO    insulin lispro (HumaLOG) 100 units/mL subcutaneous injection 1-5 Units, 1-5 Units, Subcutaneous, HS, Bard Massed, DO, 2 Units at 04/11/21 2139    insulin lispro (HumaLOG) 100 units/mL subcutaneous injection 3 Units, 3 Units, Subcutaneous, TID With Meals, Bard Massed, DO, 3 Units at 04/12/21 1122    levothyroxine tablet 125 mcg, 125 mcg, Oral, Early Morning, Bard Massed, DO, 125 mcg at 04/12/21 0523    melatonin tablet 3 mg, 3 mg, Oral, HS PRN, Johnathon Wilson PA-C    ondansetron (ZOFRAN) injection 4 mg, 4 mg, Intravenous, Q6H PRN, Bard Massed, DO    pantoprazole (PROTONIX) EC tablet 40 mg, 40 mg, Oral, Daily Before Breakfast, Bard Massed, DO, 40 mg at 04/12/21 0523    polyethylene glycol (MIRALAX) packet 17 g, 17 g, Oral, Daily PRN, Bard Massed, DO    sodium bicarbonate tablet 650 mg, 650 mg, Oral, BID after meals, Bard Massed, DO, 650 mg at 04/12/21 0829    tamsulosin (FLOMAX) capsule 0 4 mg, 0 4 mg, Oral, Daily With Dinner, Bard Massed, DO, 0 4 mg at 04/11/21 1705    vancomycin (VANCOCIN) IVPB (premix in dextrose) 750 mg 150 mL, 750 mg, Intravenous, Q24H, Katharine Cushing, MD, Last Rate: 150 mL/hr at 04/12/21 1153, 750 mg at 04/12/21 1153    Laboratory Results:  Results from last 7 days   Lab Units 04/12/21  0455 04/11/21  0601 04/11/21  0356 04/10/21  0829 04/10/21  0821 04/10/21  0508 04/09/21  0855 04/08/21  0622 04/07/21  0618   WBC Thousand/uL 16 69*  --  15 19*  --  15 67* 15 81* 18 45*  --   --    HEMOGLOBIN g/dL 7 4*  --  7 8*  --  7 9* 8 0* 8 1*  --   --    I STAT HEMOGLOBIN g/dl  --   --   --  8 2*  --   --   --   --   --    HEMATOCRIT % 23 1*  --  24 6*  --  24 1* 25 1* 25 0*  --   --    HEMATOCRIT, ISTAT %  --   --   --  24*  --   --   --   --   --    PLATELETS Thousands/uL 157  --  151  --  168 168 156  --   --    POTASSIUM mmol/L 3 8 4 8 5 5*  --   --  4 4 4 4 3 8 4 5   CHLORIDE mmol/L 103 103 104  --   --  105 101 101 94*   CO2 mmol/L 25 23 25  --   --  23 22 23 17*   CO2, I-STAT mmol/L  --   --   --  22  --   --   --   --   --    BUN mg/dL 53* 51* 48*  --   --  44* 43* 45* 36*   CREATININE mg/dL 2 62* 2 37* 2 35*  --   --  2 20* 2 28* 2 38* 2 12*   CALCIUM mg/dL 8 4 8 2* 7 9*  --   --  7 9* 7 6* 7 6* 7 9*   MAGNESIUM mg/dL  --   --  2 1  --   --   --   --   --   --    PHOSPHORUS mg/dL  --   --  3 6  --   --   --   --   --   --    GLUCOSE, ISTAT mg/dl  --   --   --  176*  --   --   --   --   --

## 2021-04-12 NOTE — ASSESSMENT & PLAN NOTE
Wt Readings from Last 3 Encounters:   04/10/21 78 6 kg (173 lb 4 5 oz)   04/05/21 78 6 kg (173 lb 4 5 oz)   03/27/21 72 kg (158 lb 11 7 oz)     Pleural effusions related to IVF for KAZ  IV Lasix 80 mg IV given at [facility], will continue with 40 mg iv bid started today  Will ask cardio to eval  Echo in March showed at least mild TR  Ortez catheter in place

## 2021-04-12 NOTE — PROGRESS NOTES
Progress Note - López Lloyd 76 y o  male MRN: [id number]    Unit/Bed#: [address] 803-01 Encounter: [id number]      CC: diabetes f/u    Subjective:   López Lloyd is a 76y o  year old male with type 1 diabetes on insulin pump at home was admitted in [facility] for L1 fracture and transferred to the floor for acute hypoxic respiratory failure  Patient was seen and examined at bedside  Patient is drowsy and as per wife at bedside is not eating well  Patient blood sugars are fluctuating with no specific pattern however when patient eats has hyperglycemia when he does not eat has hypoglycemic  Objective:     Vitals: Blood pressure 124/75, pulse 89, temperature (!) 97 3 °F (36 3 °C), temperature source Axillary, resp  rate 18, height 5' 8" (1 727 m), weight 78 6 kg (173 lb 4 5 oz), SpO2 91 %  ,Body mass index is 26 35 kg/m²  Intake/Output Summary (Last 24 hours) at 4/12/2021 1650  Last data filed at 4/12/2021 1404  Gross per 24 hour   Intake 240 ml   Output 1425 ml   Net -1185 ml       Physical Exam:  General Appearance:  Drowsy  Head: Normocephalic, without obvious abnormality, atraumatic  Extremities: moves all extremities  Skin: Skin color and temperature normal    Pulm: no labored breathing    Lab, Imaging and other studies: I have personally reviewed pertinent reports  POC Glucose (mg/dl)   Date Value   04/12/2021 193 (H)   04/12/2021 133   04/12/2021 74   04/11/2021 246 (H)   04/11/2021 206 (H)   04/11/2021 89   04/11/2021 168 (H)   04/10/2021 294 (H)   04/10/2021 244 (H)   04/10/2021 183 (H)       Assessment and plan:    Type 1 diabetes with hyper and hypoglycemia:  On insulin pump therapy at home  Currently patient is on Lantus 6 units q h s  And lispro 3 units with meals  Patient mental status worsening and is not eating well  We decrease Lantus to 5 units q h s  And Humalog to 2 units t i d  A c  And continue correctional sliding scale    Continue to monitor blood sugar over time and make adjustments to the regimen if necessary    Rest of care per primary team   Portions of the record may have been created with voice recognition software

## 2021-04-12 NOTE — PLAN OF CARE
Problem: OCCUPATIONAL THERAPY ADULT  Goal: Performs self-care activities at highest level of function for planned discharge setting  See evaluation for individualized goals  Description: Treatment Interventions: ADL retraining, Functional transfer training, Endurance training, Cognitive reorientation, Patient/family training, Equipment evaluation/education, Compensatory technique education, Energy conservation, Activityengagement          See flowsheet documentation for full assessment, interventions and recommendations  Note: Limitation: Decreased ADL status, Decreased endurance, Decreased self-care trans, Decreased high-level ADLs  Prognosis: Fair  Assessment: Pt is a 76 y o  male who was admitted to [facility] on 4/10/2021 with Acute respiratory failure with hypoxia ([facility] )   Pt's problem list also includes PMH of   At baseline pt was completing assistance with ADL's/IADL's and mobility  Pt lives with wife in a [facility] with LUÍS and a stair glide to 2nd floor bedrom  Currently pt requires min a feeding, mod a UB bathing/dressing otherwise max a  for overall ADLS and max a x 2 with B/L HHA for functional mobility/transfers  Pt currently presents with impairments in the following categories -steps to enter environment, difficulty performing ADLS, difficulty performing IADLS  and flat affect activity tolerance, endurance, standing balance/tolerance, sitting balance/tolerance, memory, insight, safety , judgement , attention  and sequencing   These impairments, as well as pt's fatigue, pain and risk for falls  limit pt's ability to safely engage in all baseline areas of occupation, includingeating, grooming, bathing, dressing, toileting, functional mobility/transfers, community mobility, social participation  and leisure activities  From OT standpoint, recommend STR upon D/C  The patient's raw score on the AM-PAC Daily Activity inpatient short form is 11, standardized score is 29 04, less than 39 4   Patients at this level are likely to benefit from discharge to post-acute rehabilitation services  Please refer to the recommendation of the Occupational Therapist for safe discharge planning  OT will continue to follow to address the below stated goals        OT Discharge Recommendation: Post-Acute Rehabilitation Services  OT - OK to Discharge: (yes to STR, no to home)

## 2021-04-12 NOTE — SPEECH THERAPY NOTE
SLP Discharge Summary    Pt was discharged back to acute care due to change in mental status  Pt continued to demonstrate overall fluctuations in cognitive linguistic skills while on the acute rehab, making limited progress at this time  Once pt is more awake/alert and appropriate, would recommend SLP services in the future to maximize cognitive linguistic skills

## 2021-04-12 NOTE — CASE MANAGEMENT
CM spoke with Pt's wife, Haylee Lee  CM stated the recommendation for Pt's DC plan would be SNF for PT/OT  Pt's wife is refusing SNF placement due to COVID-19  CM stated he could attempt for placement with [facility], but is doubtful they will accept for rehab  CM will also send a referral to [facility]  Pt will most likely need SNF level rehab  CM will continue following to see who is willing to accept the Pt for admission  A post acute care recommendation was made by your care team for Acute Rehab  Discussed [facility] [facility] with both patient and caregiver  List of facilities given to both patient and caregiver via in person  both patient and caregiver aware the list is custom filtered for them by zip code location and that [facility] post acute providers are designated  Pt's wife is refusing SNF for PT/OT rehab

## 2021-04-12 NOTE — PHYSICAL THERAPY NOTE
Physical Therapy Evaluation    Patient's Name: Cammie Chicas    Admitting Diagnosis  Respiratory distress [R06 03]    Problem List  Patient Active Problem List   Diagnosis    Acid reflux    Hypothyroidism    Anemia of chronic disease    Arthritis    Type I diabetes mellitus ([place] [address] )    Diabetic peripheral neuropathy ([place] [address] )    DM type 2, not at goal [facility])    Essential hypertension    Hyperlipidemia    Insulin pump status    Medtronic dual chamber PM    Screening for colon cancer    Encounter for diabetic foot exam ([place] [address] )    Risk for falls    Stage 3b chronic kidney disease    Thrombocytopenia (HCC)    High anion gap metabolic acidosis    Elevated troponin level not due myocardial infarction    [facility]) [facility]  )    Leukocytosis    Low bicarbonate    Pacemaker    Encephalopathy    Type 2 diabetes mellitus with hyperglycemia (HCC)    Vitreous hemorrhage of left eye ([facility])    Transition of care performed with sharing of clinical summary    Microalbuminuria    Complete heart block     Preoperative clearance    Status post split thickness skin graft    Acquired absence of other left toe(s) (HCC)    Type 1 diabetes mellitus ([facility] )    Diabetic ulcer of right foot ([place] [address] )    Personal history of diabetic foot ulcer    COVID-19    Hyperglycemia due to type 1 diabetes mellitus ([facility] )    Weakness    Hyponatremia    Gram-positive bacteremia    Progression of L1 vertebral fracture     Vertebral fracture, osteoporotic (HCC)    ATN (acute tubular necrosis) (HCC)    Hypoxemia    Acute on chronic diastolic heart failure (HCC)    Acute respiratory failure with hypoxia (HCC)    HCAP (healthcare-associated pneumonia)    Acidosis       Past Medical History  Past Medical History:   Diagnosis Date    Arthritis     Cardiac disease     Closed fracture of fibula, lateral malleolus, right     Complete heart block (HCC)     Diabetes mellitus ([facility] )     Disease of thyroid gland     External incisional dehiscence     GERD (gastroesophageal reflux disease)     Heart disease     Muscular deconditioning     Pacemaker     Sleep apnea     cannot tolerate cpap    Thyroid disease        Past Surgical History  Past Surgical History:   Procedure Laterality Date    CARDIAC PACEMAKER PLACEMENT Left 2015    CARDIAC PACEMAKER PLACEMENT      CARDIAC SURGERY      CATARACT EXTRACTION      MUSCLE FLAP      right foot    NERVE BLOCK      Transforaminal Epidural Lumbar with Fluoroscopic Guidance     OH COLONOSCOPY FLX DX W/COLLJ SPEC WHEN PFRMD N/A 5/4/2018    Procedure: EGD AND COLONOSCOPY;  Surgeon: Chin Christine MD;  Location: [facility]  GI LAB; Service: Gastroenterology    OH SPLIT [address] <100 SQCM Right 6/10/2019    Procedure: SKIN GRAFT SPLIT THICKNESS (STSG)  EXTREMITY right leg;  Surgeon: Isidra Fernandez DPM;  Location:  MAIN OR;  Service: Podiatry    TOE AMPUTATION      left 5th toe        04/12/21 0920   PT Last Visit   PT Visit Date 04/12/21   Note Type   Note type Evaluation   Pain Assessment   Pain Assessment Tool Pain Assessment not indicated - pt denies pain   Pain Score No Pain   Home Living   Type of [address] Multi-level;Bed/bath upstairs   Home Equipment Walker;Cane   Additional Comments Stairglide to 2nd floor   Prior Function   Level of [place] Independent with ADLs and functional mobility   Lives With Spouse   ADL Assistance Needs assistance   IADLs Needs assistance   Comments Pt is a questionable historian, reports ambulation with RW prior to [facility] admission  Pt admitted to [facility] from 4/3-4/10  Restrictions/Precautions   Weight Bearing Precautions Per Order No   Braces or Orthoses TLSO  ([place] wedge shoe)   Other Precautions Telemetry;O2;Fall Risk;Cognitive; Chair Alarm; Bed Alarm  (14L O2)   General   Family/Caregiver Present No   Cognition   Overall Cognitive Status Impaired   Arousal/Participation Alert   Attention Attends with cues to redirect   Orientation Level Oriented to person;Oriented to time;Disoriented to place; Disoriented to situation   Following Commands Follows one step commands with increased time or repetition   Comments Pt required initial cues to attend to task, encouragement to participate, poor safety awareness, poor insight to impairments   RLE Assessment   RLE Assessment X   Strength RLE   RLE Overall Strength 4-/5   LLE Assessment   LLE Assessment X   Strength LLE   LLE Overall Strength 4-/5   Light Touch   RLE Light Touch Grossly intact   LLE Light Touch Grossly intact   Bed Mobility   Supine to Sit 2  Maximal assistance   Additional items Assist x 1; Increased time required;Verbal cues; Bedrails   Additional Comments Good initiation of log rolling technique   Transfers   Sit to Stand 2  Maximal assistance   Additional items Assist x 2; Increased time required;Verbal cues   Stand to Sit 2  Maximal assistance   Additional items Assist x 2; Increased time required;Verbal cues   Additional Comments with HHA for safety, required maxA for donning TLSO   Ambulation/Elevation   Gait pattern Excessively slow; Short stride;Decreased foot clearance; Wide YAMILA; Improper Weight shift   Gait Assistance 2  Maximal assist   Additional items Assist x 2   Assistive Device Other (Comment)  (B/L HHA)   Distance 3 ft to chair, limited by pain/fatigue, poor standing balance, posterior lean   Balance   Static Sitting Fair -   Dynamic Sitting Poor +   Static Standing Poor   Dynamic Standing Poor -   Ambulatory Poor -   Activity Tolerance   Activity Tolerance Patient limited by fatigue   Medical Staff Made Aware OT, Virgen   Nurse Made Aware RN updated  Chair alarm engaged   Assessment   Prognosis Fair   Problem List Decreased strength;Decreased endurance; Impaired balance;Decreased mobility; Decreased cognition;Decreased safety awareness;Pain;Orthopedic restrictions   Assessment Pt is a 76 y o  male seen for PT evaluation s/p admit to [facility] on 4/10/2021  Pt was admitted with a primary dx of: acute respiratory failure with hypoxia  PT now consulted for assessment of mobility and d/c needs  Pts current comorbidities effecting treatment include: L1 compression fx, DM, CKD, ICD, HTN, CHF  Pts current clinical presentation is Unstable/ Unpredictable (high complexity) due to Ongoing medical management for primary dx, Increased reliance on more restrictive AD compared to baseline, Decreased activity tolerance compared to baseline, Fall risk, Increased assistance needed from caregiver at current time, Cog status, Trending lab values  Prior to admission, pt was at [facility] for rehab where he fluctuated from min-maxA for transfers  Upon evaluation, pt currently is requiring maxA for bed mobility; maxA x2 for transfers and maxA x2 for ambulation 3 ft w/ B/L HHA  Pt presents at PT eval functioning below baseline and currently w/ overall mobility deficits 2* to: BLE weakness, impaired balance, decreased endurance, gait deviations, pain, decreased activity tolerance compared to baseline, decreased functional mobility tolerance compared to baseline, decreased safety awareness, fall risk, orthopedic restrictions, decreased cognition, spinal precautions  Pt currently at a fall risk 2* to impairments listed above  Pt will continue to benefit from skilled acute PT interventions to address stated impairments; to maximize functional mobility; for ongoing pt/ family training; and DME needs  At conclusion of PT session pt returned back in chair and chair alarm engaged with phone and call bell within reach  Pt denies any further questions at this time  Recommend IP rehab upon hospital D/C  Goals   Patient Goals family: to go to rehab   STG Expiration Date 04/26/21   Short Term Goal #1 In 14 days pt will be able to: 1  Demonstrate ability to perform all aspects of bed mobility with supervision to increase functional independence    2  Perform functional transfers with LRAD and [place] to facilitate safe return to previous living environment  3   Ambulate 150 ft with LRAD and [place] with stable vitals to improve safety with household distances and reduce fall risk  4  Improve LE strength grades by 1 to increase ease of functional mobility with transfers and gait  5  Pt will demonstrate improved balance by one grade in order to decrease risk of falls  PT Treatment Day 0   Plan   Treatment/Interventions Functional transfer training;LE strengthening/ROM; Therapeutic exercise; Endurance training;Cognitive reorientation;Patient/family training;Equipment eval/education; Bed mobility;Gait training   PT Frequency Other (Comment)  (3-5x/week)   Recommendation   PT Discharge Recommendation Post-Acute Rehabilitation Services   PT - OK to Discharge Yes  (to IP rehab)   Mil[place] 8 in Bed Without Bedrails 2   Lying on Back to Sitting on Edge of Flat Bed 2   Moving Bed to Chair 1   Standing Up From Chair 1   Walk in Room 1   Climb 3-5 Stairs 1   Basic Mobility Inpatient Raw Score 8   Turning Head Towards Sound 3   Follow Simple Instructions 2   Low Function Basic Mobility Raw Score 13   Low Function Basic Mobility Standardized Score 20 14     Treatment:   09:10-09:20  TA: Performed 2 additional standing trials from bedside chair  Initial attempt with HHA, pt with posterior lean, tactile and verbal cues for anterior weight shift  Second attempt with RW, able to tolerate approx 15-20 seconds of static standing, initially modA x2, progressed to maxA x2 seconds to posterior lean and fatigue  Returned to sitting in bedside chair with cushion and chair alarm engaged      Noemi Hebert, PT, DPT

## 2021-04-12 NOTE — PLAN OF CARE
Problem: Potential for Falls  Goal: Patient will remain free of falls  Description: INTERVENTIONS:  - Assess patient frequently for physical needs  -  Identify cognitive and physical deficits and behaviors that affect risk of falls  -  [place] fall precautions as indicated by assessment   - Educate patient/family on patient safety including physical limitations  - Instruct patient to call for assistance with activity based on assessment  - Modify environment to reduce risk of injury  - Consider OT/PT consult to assist with strengthening/mobility  Outcome: Progressing     Problem: PAIN - ADULT  Goal: Verbalizes/displays adequate comfort level or baseline comfort level  Description: Interventions:  - Encourage patient to monitor pain and request assistance  - Assess pain using appropriate pain scale  - Administer analgesics based on type and severity of pain and evaluate response  - Implement non-pharmacological measures as appropriate and evaluate response  - Consider cultural and social influences on pain and pain management  - Notify physician/advanced practitioner if interventions unsuccessful or patient reports new pain  Outcome: Progressing     Problem: SAFETY ADULT  Goal: Patient will remain free of falls  Description: INTERVENTIONS:  - Assess patient frequently for physical needs  -  Identify cognitive and physical deficits and behaviors that affect risk of falls    -  [place] fall precautions as indicated by assessment   - Educate patient/family on patient safety including physical limitations  - Instruct patient to call for assistance with activity based on assessment  - Modify environment to reduce risk of injury  - Consider OT/PT consult to assist with strengthening/mobility  Outcome: Progressing  Goal: Maintain or return to baseline ADL function  Description: INTERVENTIONS:  -  Assess patient's ability to carry out ADLs; assess patient's baseline for ADL function and identify physical deficits which impact ability to perform ADLs (bathing, care of mouth/teeth, toileting, grooming, dressing, etc )  - Assess/evaluate cause of self-care deficits   - Assess range of motion  - Assess patient's mobility; develop plan if impaired  - Assess patient's need for assistive devices and provide as appropriate  - Encourage maximum independence but intervene and supervise when necessary  - Involve family in performance of ADLs  - Assess for home care needs following discharge   - Consider OT consult to assist with ADL evaluation and planning for discharge  - Provide patient education as appropriate  Outcome: Progressing  Goal: Maintain or return mobility status to optimal level  Description: INTERVENTIONS:  - Assess patient's baseline mobility status (ambulation, transfers, stairs, etc )    - Identify cognitive and physical deficits and behaviors that affect mobility  - Identify mobility aids required to assist with transfers and/or ambulation (gait belt, sit-to-stand, lift, walker, cane, etc )  - [place] fall precautions as indicated by assessment  - Record patient progress and toleration of activity level on Mobility SBAR; progress patient to next Phase/Stage  - Instruct patient to call for assistance with activity based on assessment  - Consider rehabilitation consult to assist with strengthening/weightbearing, etc   Outcome: Progressing     Problem: DISCHARGE PLANNING  Goal: Discharge to home or other facility with appropriate resources  Description: INTERVENTIONS:  - Identify barriers to discharge w/patient and caregiver  - Arrange for needed discharge resources and transportation as appropriate  - Identify discharge learning needs (meds, wound care, etc )  - Arrange for interpretive services to assist at discharge as needed  - Refer to Case Management Department for coordinating discharge planning if the patient needs post-hospital services based on physician/advanced practitioner order or complex needs related to functional status, cognitive ability, or social support system  Outcome: Progressing     Problem: Knowledge Deficit  Goal: Patient/family/caregiver demonstrates understanding of disease process, treatment plan, medications, and discharge instructions  Description: Complete learning assessment and assess knowledge base    Interventions:  - Provide teaching at level of understanding  - Provide teaching via preferred learning methods  Outcome: Progressing     Problem: Prexisting or High Potential for Compromised Skin Integrity  Goal: Skin integrity is maintained or improved  Description: INTERVENTIONS:  - Identify patients at risk for skin breakdown  - Assess and monitor skin integrity  - Assess and monitor nutrition and hydration status  - Monitor labs   - Assess for incontinence   - Turn and reposition patient  - Assist with mobility/ambulation  - Relieve pressure over bony prominences  - Avoid friction and shearing  - Provide appropriate hygiene as needed including keeping skin clean and dry  - Evaluate need for skin moisturizer/barrier cream  - Collaborate with interdisciplinary team   - Patient/family teaching  - Consider wound care consult   Outcome: Progressing

## 2021-04-12 NOTE — PLAN OF CARE
Problem: PHYSICAL THERAPY ADULT  Goal: Performs mobility at highest level of function for planned discharge setting  See evaluation for individualized goals  Description: Treatment/Interventions: Functional transfer training, LE strengthening/ROM, Therapeutic exercise, Endurance training, Cognitive reorientation, Patient/family training, Equipment eval/education, Bed mobility, Gait training          See flowsheet documentation for full assessment, interventions and recommendations  Note: Prognosis: Fair  Problem List: Decreased strength, Decreased endurance, Impaired balance, Decreased mobility, Decreased cognition, Decreased safety awareness, Pain, Orthopedic restrictions  Assessment: Pt is a 76 y o  male seen for PT evaluation s/p admit to [facility] on 4/10/2021  Pt was admitted with a primary dx of: acute respiratory failure with hypoxia  PT now consulted for assessment of mobility and d/c needs  Pts current comorbidities effecting treatment include: L1 compression fx, DM, CKD, ICD, HTN, CHF  Pts current clinical presentation is Unstable/ Unpredictable (high complexity) due to Ongoing medical management for primary dx, Increased reliance on more restrictive AD compared to baseline, Decreased activity tolerance compared to baseline, Fall risk, Increased assistance needed from caregiver at current time, Cog status, Trending lab values  Prior to admission, pt was at [facility] for rehab where he fluctuated from min-maxA for transfers  Upon evaluation, pt currently is requiring maxA for bed mobility; maxA x2 for transfers and maxA x2 for ambulation 3 ft w/ B/L HHA   Pt presents at PT eval functioning below baseline and currently w/ overall mobility deficits 2* to: BLE weakness, impaired balance, decreased endurance, gait deviations, pain, decreased activity tolerance compared to baseline, decreased functional mobility tolerance compared to baseline, decreased safety awareness, fall risk, orthopedic restrictions, decreased cognition, spinal precautions  Pt currently at a fall risk 2* to impairments listed above  Pt will continue to benefit from skilled acute PT interventions to address stated impairments; to maximize functional mobility; for ongoing pt/ family training; and DME needs  At conclusion of PT session pt returned back in chair and chair alarm engaged with phone and call bell within reach  Pt denies any further questions at this time  Recommend IP rehab upon hospital D/C  PT Discharge Recommendation: Post-Acute Rehabilitation Services     PT - OK to Discharge: Yes(to IP rehab)    See flowsheet documentation for full assessment

## 2021-04-12 NOTE — OCCUPATIONAL THERAPY NOTE
Occupational Therapy Evaluation     Patient Name: Linn Victoria  WDJDS'V Date: 4/12/2021  Problem List  Principal Problem:    Acute respiratory failure with hypoxia (HCC)  Active Problems:    Anemia of chronic disease    Type I diabetes mellitus ([facility] )    Essential hypertension    Stage 3b chronic kidney disease    KAZ (acute [facility]) ([facility] )    Encephalopathy    Hyponatremia    Acute on chronic diastolic heart failure ([facility] )    Past Medical History  Past Medical History:   Diagnosis Date    Arthritis     Cardiac disease     Closed fracture of fibula, lateral malleolus, right     Complete heart block ([facility] )     Diabetes mellitus ([facility] )     Disease of thyroid gland     External incisional dehiscence     GERD (gastroesophageal reflux disease)     Heart disease     Muscular deconditioning     Pacemaker     Sleep apnea     cannot tolerate cpap    Thyroid disease      Past Surgical History  Past Surgical History:   Procedure Laterality Date    CARDIAC PACEMAKER PLACEMENT Left 2015    CARDIAC PACEMAKER PLACEMENT      CARDIAC SURGERY      CATARACT EXTRACTION      MUSCLE FLAP      right foot    NERVE BLOCK      Transforaminal Epidural Lumbar with Fluoroscopic Guidance     AR COLONOSCOPY FLX DX W/COLLJ SPEC WHEN PFRMD N/A 5/4/2018    Procedure: EGD AND COLONOSCOPY;  Surgeon: Kee Colbert MD;  Location: [facility]  GI LAB;   Service: Gastroenterology    AR SPLIT [address] <100 SQCM Right 6/10/2019    Procedure: SKIN GRAFT SPLIT THICKNESS (STSG)  EXTREMITY right leg;  Surgeon: Iman Islas DPM;  Location: [facility] OR;  Service: Podiatry    TOE AMPUTATION      left 5th toe             04/12/21 0851   OT Last Visit   OT Visit Date 04/12/21   Note Type   Note type Evaluation   Restrictions/Precautions   Weight Bearing Precautions Per Order No   Braces or Orthoses TLSO history of L1 vertebral fracture  (+ right surgical shoe )   Other Precautions Telemetry;O2;Fall Risk  (+14L 02)   Pain Assessment   Pain Assessment Tool Pain Assessment not indicated - pt denies pain   Pain Score No Pain   Home Living   Type of [address] Multi-level;Ramped entrance;Stairs to enter with rails  (ramped entrance or 4STE)   Bathroom Shower/Tub Tub/shower unit   Bathroom Toilet Standard   Bathroom Equipment Shower chair   Home Equipment Walker;Cane   Additional Comments pt's bedroom is on the second floor and has a stairglide to access     Prior Function   Level of [place] Independent with ADLs and functional mobility   Lives With Spouse   Receives Help From Family   ADL Assistance Needs assistance   IADLs Needs assistance   Falls in the last 6 months 0   Vocational Retired   Lifestyle   Autonomy Min a with ADL's, assistance with IADL's  And assist with ambulation from spouse   Reciprocal Relationships supportive spouse -primary caregiver   Service to Others retired   Psychosocial   Psychosocial (WDL) [address]  4  Minimal Assistance   Grooming Assistance 4  Minimal Assistance   [address] 3  Moderate Assistance   [address] 3  Moderate Assistance   [address] 3  Moderate Assistance    [facility] 3  Moderate [address]  3  Moderate Assistance   Bed Mobility   Supine to Sit 2  Maximal assistance   Additional items Assist x 2   Sit to Supine Unable to assess   Transfers   Sit to Stand 2  Maximal assistance   Additional items Assist x 2-   Stand to Sit 2  Maximal assistance   Additional items Assist x 2   Stand pivot 2  Maximal assistance   Additional items Assist x 2  (with B/L HHA from bed to chair with cues for safe/correct hand placement tendency to use right hand push on therapist leg)  Pt attempted two trials of sit to stand transfers first unable to complete full stand (appeared fearful and demonstrated posterior lean, 2nd attempted improved and was able transferred to chair)   Functional Mobility   Functional Mobility 2  Maximal assistance   Additional Comments max a x 2 1-2 small step to chair +B/L HHA   Balance   Static Sitting Fair   Dynamic Sitting Fair -   Static Standing Poor   Dynamic Standing Poor -   Ambulatory Poor -   Activity Tolerance   Activity Tolerance Patient limited by fatigue   Medical Staff Made Aware PT Taylor Ruffin   Nurse Made Aware RN cleared pt for thearpy   Hand Function   Gross Motor Coordination Impaired- with grasping cup for beverage management -repositioned for correct graspl   Fine Motor Coordination Impaired- difficulty with finger to thumb sequencing   Cognition   Overall Cognitive Status Impaired   Arousal/Participation Arousable; Cooperative;Lethargic   Attention Attends with cues to redirect   Orientation Level Oriented to person;Disoriented to place; Disoriented to time;Disoriented to situation   Memory Decreased recall of precautions;Decreased recall of recent events;Decreased short term memory   Following Commands Follows one step commands with increased time or repetition   Comments pt lethargic, verbal cues to maintain eyes open initally, confused-poor historian, and repetition with one step simple commands -encephalopathy may contribute, continue to assess  Assessment   Limitation Decreased ADL status; Decreased endurance;Decreased self-care trans;Decreased high-level ADLs   Prognosis Fair   Assessment Pt is a 76 y o  male who was admitted to [facility] on 4/10/2021 with Acute respiratory failure with hypoxia ([facility] )   Pt's problem list also includes PMH of   At baseline pt was completing assistance with ADL's/IADL's and mobility  Pt lives with wife in a [facility] with LUÍS and a stair glide to 2nd floor bedrom  Currently pt requires min a feeding, mod a UB bathing/dressing otherwise max a  for overall ADLS and max a x 2 with B/L HHA for functional mobility/transfers   Pt currently presents with impairments in the following categories -steps to enter environment, difficulty performing ADLS, difficulty performing IADLS  and flat affect activity tolerance, endurance, standing balance/tolerance, sitting balance/tolerance, memory, insight, safety , judgement , attention  and sequencing   These impairments, as well as pt's fatigue, pain and risk for falls  limit pt's ability to safely engage in all baseline areas of occupation, includingeating, grooming, bathing, dressing, toileting, functional mobility/transfers, community mobility, social participation  and leisure activities  From OT standpoint, recommend STR upon D/C  The patient's raw score on the AM-PAC Daily Activity inpatient short form is 11, standardized score is 29 04, less than 39 4  Patients at this level are likely to benefit from discharge to post-acute rehabilitation services  Please refer to the recommendation of the Occupational Therapist for safe discharge planning  OT will continue to follow to address the below stated goals  Goals   Patient Goals did not state   LTG Time Frame 10-14   Long Term Goal #1 see goals below   Plan   Treatment Interventions ADL retraining;Functional transfer training; Endurance training;Cognitive reorientation;Patient/family training;Equipment evaluation/education; Compensatory technique education; Energy conservation; Activityengagement   Goal Expiration Date 04/14/21   OT Frequency 3-5x/wk   Recommendation   OT Discharge Recommendation Post-Acute Rehabilitation Services   OT - OK to Discharge   (yes to STR, no to home)   AM-PAC Daily Activity Inpatient   Lower Body Dressing 1   Bathing 2   Toileting 1   Upper Body Dressing 2   Grooming 2   Eating 3   Daily Activity Raw Score 11   Daily Activity Standardized Score (Calc for Raw Score >=11) 29 04   AM-PAC Applied Cognition Inpatient   Following a Speech/Presentation 1   Understanding Ordinary Conversation 2   Taking Medications 2   Remembering Where Things Are Placed or Put Away 2   Remembering List of 4-5 Errands 1   Taking Care of Complicated Tasks 1 Applied Cognition Raw Score 9   Applied Cognition Standardized Score 22 48      Occupational Therapy Goals:    *S with bed mobility to engage in functional tasks  *Min a  Adl's after setup with use of AE PRN  *Min a toileting and clothing management   *S functional mobility and transfers to/from all surfaces with Fair + dynamic balance and safety for participation in dynamic adls and iadl tasks   *Demonstrate good carryover with safe use of RW during functional tasks   *Assess DME needs   *Increase activity tolerance to 25-30 minutes for participation in adls and enjoyable activities  *Pt to participate in further cognitive testing with good attention and participation to assist with safe d/c recommendations  *Mod I with Simulated IADL management task  *Pt will increase alertness to maintain alertness/attention without cues and  follow 100% simple one step verbal commands  *Demonstrate good carryover of pt/family education and training with good tolerance for increased safety and independence with ADL's/ADl's  *Pt will participate in fine/gross motor coordination/strenthening/deterity exercises to G in order to increase participation in functional activities  *Pt will improve B/L UE ROM 1/2 MMT via AROM/AAROM/PROM in all planes as tolerated in order to participate in functional activities    [place] MOT, OTR/L

## 2021-04-12 NOTE — ASSESSMENT & PLAN NOTE
Lab Results   Component Value Date    HGBA1C 7 7 (H) 02/05/2021       Recent Labs     04/11/21  1704 04/11/21  2104 04/12/21  0758 04/12/21  1056   POCGLU 206* 246* 74 133       Blood Sugar Average: Last 72 hrs:  (P) 181 75   Endo recommendations appreciated  On insulin pump OP  On SQ insulin IP

## 2021-04-12 NOTE — ASSESSMENT & PLAN NOTE
Patient developed sudden elevation in white count, RLL infiltration and elevated procalcitonin to 1 01,Started on vancomycin with pharmacy consult, cefepime for HCAP  Patient has  and being treated     Follow blood cultures, sputum cultures     trend procalcitonin  Appreciate pulmonary recommendations  Incentive spirometry  Oxygen support

## 2021-04-12 NOTE — ASSESSMENT & PLAN NOTE
Estimated Creatinine Clearance: 23 6 mL/min (A) (by C-G formula based on SCr of 2 62 mg/dL (H))    POA  Renal following  Will need to diurese in setting of pulm edema

## 2021-04-12 NOTE — PROGRESS NOTES
[facility]  Progress Note - Julien Flores 1945, 76 y o  male MRN: [id number]  Unit/Bed#: [facility] 803-01 Encounter: [id number]  Primary Care Provider: Nereyda Marin MD   Date and time admitted to hospital: 4/10/2021 10:27 AM    Acidosis  Assessment & Plan  Patient is being treated with bicarb by nephrology    HCAP (healthcare-associated pneumonia)  Assessment & Plan  Patient developed sudden elevation in white count, RLL infiltration and elevated procalcitonin to 1 01,Started on vancomycin with pharmacy consult, cefepime for HCAP  Patient has  and being treated  Follow blood cultures, sputum cultures     trend procalcitonin  Appreciate pulmonary recommendations  Incentive spirometry  Oxygen support         Acute on chronic diastolic heart failure (HCC)  Assessment & Plan  Wt Readings from Last 3 Encounters:   04/10/21 78 6 kg (173 lb 4 5 oz)   04/05/21 78 6 kg (173 lb 4 5 oz)   03/27/21 72 kg (158 lb 11 7 oz)     Pleural effusions related to IVF for KAZ  IV Lasix 80 mg IV given at [facility], will continue with 40 mg iv bid started today  Will ask cardio to eval  Echo in March showed at least mild TR  Ortez catheter in place        Hyponatremia  Assessment & Plan  C/w Salt tabs  Renal following    Encephalopathy  Assessment & Plan  Pt currently AAOX2  Possibly related to recent infection and now has acute hypoxia secondary to pleural effusions  Has improved today    KAZ (acute kidney injury) ([facility] )  Assessment & Plan  Estimated Creatinine Clearance: 23 6 mL/min (A) (by C-G formula based on SCr of 2 62 mg/dL (H))    POA  Renal following  Will need to diurese in setting of pulm edema    Stage 3b chronic kidney disease  Assessment & Plan  Lab Results   Component Value Date    EGFR 23 04/12/2021    EGFR 26 04/11/2021    EGFR 26 04/11/2021    CREATININE 2 62 (H) 04/12/2021    CREATININE 2 37 (H) 04/11/2021    CREATININE 2 35 (H) 04/11/2021   conitnue with lasix   Monitor creatinine closely   Input and output  Daily weights  Nephrology on board  Avoid nephrotoxic medications  Keep SBP>130    Essential hypertension  Assessment & Plan  Continue with lasix 80 mg q 8hrs and amlodipine 5 mg daily     Type I diabetes mellitus [facility])  Assessment & Plan  Lab Results   Component Value Date    HGBA1C 7 7 (H) 2021       Recent Labs     21  1704 21  2104 21  0758 21  1056   POCGLU 206* 246* 74 133       Blood Sugar Average: Last 72 hrs:  (P) 181 75   Endo recommendations appreciated  On insulin pump OP  On SQ insulin IP    * Acute respiratory failure with hypoxia (HCC)  Assessment & Plan  POA  RRT at [facility] needing 10L O2  Likely 2/2 CHF  CXR large pleural effusions, more on the right than left now on new xray  Diuresis : lasix 80 mg q8 hours   Monitor in and output  Daily weights         VTE Pharmacologic Prophylaxis:   Pharmacologic: Heparin  Mechanical VTE Prophylaxis in Place: Yes    Patient Centered Rounds: I have performed bedside rounds with nursing staff today  Discussions with Specialists or Other Care Team Provider: pulmonary, cardiology     Education and Discussions with Family / Patient: patient     Time Spent for Care: 45 minutes  More than 50% of total time spent on counseling and coordination of care as described above  Current Length of Stay: 2 day(s)    Current Patient Status: Inpatient   Certification Statement: The patient will continue to require additional inpatient hospital stay due to acute respiratory failure, acidosis, diastolic CHF, HCAP    Discharge Plan: pending improvement, would at least 4 more days to treated by iv antibiotics and diuresis to improve  Code Status: Level 1 - Full Code      Subjective:   Patient is awake and alert today  He has light cough, anemic looking, mm: pink and moist and able to communicate       Objective:     Vitals:   Temp (24hrs), Av 1 °F (36 7 °C), Min:96 8 °F (36 °C), Max:99 °F (37 2 °C)    Temp: [96 8 °F (36 °C)-99 °F (37 2 °C)] 97 3 °F (36 3 °C)  HR:  [] 89  Resp:  [16-20] 18  BP: (105-133)/() 124/75  SpO2:  [88 %-96 %] 91 %  Body mass index is 26 35 kg/m²  Input and Output Summary (last 24 hours): Intake/Output Summary (Last 24 hours) at 4/12/2021 1512  Last data filed at 4/12/2021 1404  Gross per 24 hour   Intake 240 ml   Output 1425 ml   Net -1185 ml       Physical Exam:     Physical Exam  Vitals signs and nursing note reviewed  Constitutional:       Appearance: Normal appearance  HENT:      Head: Normocephalic and atraumatic  Nose: Nose normal  No congestion or rhinorrhea  Eyes:      General: No scleral icterus  Right eye: No discharge  Left eye: No discharge  Conjunctiva/sclera: Conjunctivae normal       Pupils: Pupils are equal, round, and reactive to light  Neck:      Musculoskeletal: Normal range of motion and neck supple  Cardiovascular:      Rate and Rhythm: Normal rate  Pulses: Normal pulses  Heart sounds: Normal heart sounds  No murmur  No friction rub  No gallop  Pulmonary:      Effort: Pulmonary effort is normal       Breath sounds: Normal breath sounds  Abdominal:      General: Abdomen is flat  Bowel sounds are normal       Palpations: Abdomen is soft  Musculoskeletal: Normal range of motion  General: No swelling, tenderness, deformity or signs of injury  Right lower leg: No edema  Left lower leg: No edema  Skin:     General: Skin is warm and dry  Coloration: Skin is not jaundiced or pale  Findings: No bruising, erythema, lesion or rash  Neurological:      General: No focal deficit present  Mental Status: He is alert and oriented to person, place, and time  Mental status is at baseline  Cranial Nerves: No cranial nerve deficit  Sensory: No sensory deficit  Motor: No weakness        Coordination: Coordination normal       Gait: Gait normal       Deep Tendon Reflexes: Reflexes normal    Psychiatric:         Mood and Affect: Mood normal          Behavior: Behavior normal          Thought Content: Thought content normal          Judgment: Judgment normal          Additional Data:     Labs:    Results from last 7 days   Lab Units 04/12/21  0455   WBC Thousand/uL 16 69*   HEMOGLOBIN g/dL 7 4*   HEMATOCRIT % 23 1*   PLATELETS Thousands/uL 157   NEUTROS PCT % 85*   LYMPHS PCT % 4*   MONOS PCT % 10   EOS PCT % 0     Results from last 7 days   Lab Units 04/12/21  0455  04/10/21  0829   POTASSIUM mmol/L 3 8   < >  --    CHLORIDE mmol/L 103   < >  --    CO2 mmol/L 25   < >  --    CO2, I-STAT mmol/L  --   --  22   BUN mg/dL 53*   < >  --    CREATININE mg/dL 2 62*   < >  --    CALCIUM mg/dL 8 4   < >  --    GLUCOSE, ISTAT mg/dl  --   --  176*    < > = values in this interval not displayed  * I Have Reviewed All Lab Data Listed Above  * Additional Pertinent Lab Tests Reviewed: Tonya 66 Admission Reviewed    Imaging:    Imaging Reports Reviewed Today Include:    Imaging Personally Reviewed by Myself Includes:      Recent Cultures (last 7 days):     Results from last 7 days   Lab Units 04/12/21  0951 04/12/21  0935 04/07/21  0857   BLOOD CULTURE  Received in Microbiology Lab  Culture in Progress  Received in Microbiology Lab  Culture in Progress    --    LEGIONELLA URINARY ANTIGEN   --   --  Negative       Last 24 Hours Medication List:   Current Facility-Administered Medications   Medication Dose Route Frequency Provider Last Rate    acetaminophen  650 mg Oral Q6H PRN Colbert Mixer, DO      aspirin  81 mg Oral Daily Tiny Mixer, DO      atorvastatin  5 mg Oral Daily Tiny Mixer, DO      bisacodyl  10 mg Rectal Daily PRN Tiny Mixer, DO      cefepime  1,000 mg Intravenous Q12H Jaime Ling MD 1,000 mg (04/12/21 1024)    cholecalciferol  4,000 Units Oral Daily Tiny Mixer, DO      docusate sodium  100 mg Oral BID Colbert Mixer, DO      ferrous sulfate  325 mg Oral Daily With Breakfast Liz Ndiaye, DO      furosemide  80 mg Intravenous Q8H America Adria, DO      heparin (porcine)  5,000 Units Subcutaneous [facility]      insulin glargine  6 Units Subcutaneous HS Liz Ndiaye, DO      insulin lispro  1-5 Units Subcutaneous TID AC Liz Ndiaye, DO      insulin lispro  1-5 Units Subcutaneous HS Liz Ndiaye, DO      insulin lispro  3 Units Subcutaneous TID With Meals Liz Ndiaye, DO      levothyroxine  125 mcg Oral Early Morning Liz Ndiaye, DO      melatonin  3 mg Oral HS PRN Johnathon Wilson PA-C      ondansetron  4 mg Intravenous Q6H PRN Liz Ndiaye, DO      pantoprazole  40 mg Oral Daily Before Breakfast Liz Ndiaye, DO      polyethylene glycol  17 g Oral Daily PRN Liz Ndiaye, DO      sodium bicarbonate  650 mg Oral BID after meals iLz Ndiaye, DO      tamsulosin  0 4 mg Oral Daily With [facility], DO      vancomycin  750 mg Intravenous Q24H Belle Malagon  mg (04/12/21 1153)        Today, Patient Was Seen By: Mehrdad Martin MD    ** Please Note: Dictation voice to text software may have been used in the creation of this document   **

## 2021-04-12 NOTE — ASSESSMENT & PLAN NOTE
Lab Results   Component Value Date    EGFR 23 04/12/2021    EGFR 26 04/11/2021    EGFR 26 04/11/2021    CREATININE 2 62 (H) 04/12/2021    CREATININE 2 37 (H) 04/11/2021    CREATININE 2 35 (H) 04/11/2021   conitnue with lasix   Monitor creatinine closely   Input and output  Daily weights  Nephrology on board  Avoid nephrotoxic medications  Keep SBP>130

## 2021-04-13 NOTE — CONSULTS
Consultation - Infectious Disease   Nae Lu 76 y o  male MRN: [id number]  Unit/Bed#: [facility] 803-01 Encounter: [id number]      IMPRESSION & RECOMMENDATIONS:   Impression/Recommendations: This is a 76 y o  male, with multiple medical problems, including recent COVID in February this year, was recently admitted last month with failure to thrive, improved supportive care  He had Corynebacterium bacteremia during that admission, felt to be contaminant  Patient is readmitted on 04/10 with acute hypoxic respiratory failure  He has GPR bacteremia again  1  Sepsis, POA, presented with leukocytosis and tachypnea  Source of sepsis is most likely bacteremia  Fortunately, patient has remained hemodynamically stable, without hypotension  He is clinically improved  Antibiotic plan as in below  Monitor temperature/WBC  Monitor hemodynamics  2  GPR bacteremia  During recent admission, patient had Corynebacterium in admission blood cultures but not in repeat blood cultures  Therefore, this was felt to be contaminant  However, this time around, if GPR turns out to be Corynebacterium striatum group again, this would likely be true bacteremia  If this is true bacteremia, source is most likely the leg and foot ulcer  In addition, given presence of ppm, we need to consider the possibility of ppm infection, especially since Corynebacterium is very poorly pathogenic and is only seen in hardware infection  For now, will continue IV vancomycin  2D echo is being done today  Patient will likely need CHARLES  Continue IV vancomycin  Follow-up on final blood culture results  Repeat blood cultures in a m     Follow-up 2D echo  Recommend CHARLES to assess for ppm lead infection  At this point, duration of IV antibiotic is unclear  3  Acute hypoxic respiratory failure  CXR is equivocal for pneumonia  Given bacteremia above, it would be unlikely the patient would have pneumonia concurrently    All parameters of sepsis can be also explained by bacteremia above  Consider chest CT to assess for underlying pneumonia  If patient does not have pneumonia, cefepime can be discontinue, to avoid potential antibiotic toxicities  Respiratory failure may be all secondary to pulmonary edema and sepsis  Can continue IV cefepime for now  Recommend chest CT to assess for pneumonia  O2 support per primary and Pulmonary service  Monitor respiratory symptoms  4  KAZ, superimposed on CKD, most likely secondary to sepsis  Creatinine is stable  Antibiotic dosages adjusted accordingly  Monitor creatinine  5  Right lower leg ulcer, with possible mild cellulitis on exam   This may be source of corynebacterium bacteremia above  Antibiotic plan as in above  Serial exams  6  Encephalopathy, most likely multifactorial, with sepsis contributing  Mental status appears to be improved today  No clinical signs CNS infection  Monitor mental status  7  DM, somewhat poorly controlled, with elevated hemoglobin A1c  Management per primary service  8  Recent COVID, with chronic symptoms  No evidence of active infection  No COVID specific treatment needed  No isolation needed  Previous hospitalization records reviewed in detail  Discussed with patient in detail regarding the above plan  Discussed with Dr Gilmar Vale from [facility] service  Thank you for this consultation  We will follow along with you  HISTORY OF PRESENT ILLNESS:  Reason for Consult:  Bacteremia  HPI: Lien Castro is a 76 y o  male, with multiple medical problems, was hospitalized here in February with COVID, requiring treatment with remdesivir and systemic corticosteroid, was readmitted on 03/27 with diarrhea and generalized weakness  Patient improved with supportive care and IV fluid hydration and was discharged to [facility] for rehabilitation on 04/02  He has generalized weakness and diarrhea was felt to be secondary to prolonged COVID    Of note, on admission, patient had 2 sets of blood culture done at the same site growing Corynebacterium  Repeat blood cultures were negative  His admission positive blood cultures was felt to be contaminant  On 04/10, at rehabilitation, patient developed acute hypoxia  He was transferred back to inpatient  He was started on vancomycin/cefepime for presumptive pneumonia  Blood cultures were not drawn on admission but were drawn yesterday, on 04/12  Today, both sets are growing GPR  For these reasons, we are asked to evaluate the patient  At present, patient is very sleepy/lethargic  He is arousable but falls back to sleep quickly  He is comfortable  He states that dyspnea is mild  No cough  No pain or discomfort at leg ulcer  No abdominal or flank pain  REVIEW OF SYSTEMS:  A complete system-based review was done  Except for what is noted in HPI above, ROS of systems is otherwise negative  PAST MEDICAL HISTORY:  Past Medical History:   Diagnosis Date    Arthritis     Cardiac disease     Closed fracture of fibula, lateral malleolus, right     Complete heart block ([address] )     Diabetes mellitus ([address] )     Disease of thyroid gland     External incisional dehiscence     GERD (gastroesophageal reflux disease)     Heart disease     Muscular deconditioning     Pacemaker     Sleep apnea     cannot tolerate cpap    Thyroid disease      Past Surgical History:   Procedure Laterality Date    CARDIAC PACEMAKER PLACEMENT Left 2015    CARDIAC PACEMAKER PLACEMENT      CARDIAC SURGERY      CATARACT EXTRACTION      MUSCLE FLAP      right foot    NERVE BLOCK      Transforaminal Epidural Lumbar with Fluoroscopic Guidance     NV COLONOSCOPY FLX DX W/COLLJ SPEC WHEN PFRMD N/A 5/4/2018    Procedure: EGD AND COLONOSCOPY;  Surgeon: Purnima Vidales MD;  Location: [facility];   Service: Gastroenterology    NV SPLIT [address] <100 SQCM Right 6/10/2019    Procedure: SKIN GRAFT SPLIT THICKNESS (STSG) EXTREMITY right leg;  Surgeon: Kim Ryan DPM;  Location: [facility];  Service: Podiatry    TOE AMPUTATION      left 5th toe     Problem list reviewed  FAMILY HISTORY:  Non-contributory    SOCIAL HISTORY:  Social History     Substance and Sexual Activity   Alcohol Use Yes    Alcohol/week: 0 0 standard drinks    Frequency: Never    Binge frequency: Never    Comment: socially     Social History     Substance and Sexual Activity   Drug Use No     Social History     Tobacco Use   Smoking Status Former Smoker    Years: 15 00    Types: Pipe    Quit date: 200    Years since quittin 3   Smokeless Tobacco Never Used       ALLERGIES:  Allergies   Allergen Reactions    Contrast  [Iodinated Diagnostic Agents]     Lipitor  [Atorvastatin Calcium] Drowsiness    Ibuprofen      sensitive    Iodine - Food Allergy Rash    Simvastatin Anxiety     Other reaction(s): Malaise (finding)       MEDICATIONS:  All current active medications have been reviewed  Patient is currently on vancomycin/cefepime  PHYSICAL EXAM:  Vitals:  Temp:  [96 3 °F (35 7 °C)-99 °F (37 2 °C)] 96 9 °F (36 1 °C)  HR:  [82-93] 89  Resp:  [16-20] 20  BP: (109-133)/() 131/71  SpO2:  [90 %-96 %] 93 %  Temp (24hrs), Av 3 °F (36 3 °C), Min:96 3 °F (35 7 °C), Max:99 °F (37 2 °C)  Current: Temperature: (!) 96 9 °F (36 1 °C)     Physical Exam:  General:  Well-nourished, well-developed, acute and chronically ill appearing, nontoxic, in no acute distress  Lethargic but arousable  Answers questions appropriately when aroused  Eyes:  Conjunctive clear with no hemorrhages or effusions  Oropharynx:  No ulcers, no lesions, pharynx benign, no tonsillitis  Neck:  Supple, no lymphadenopathy, no mass, nontender  Lungs:  Expansion symmetric, no rales, no wheezing, no accessory muscle use  Cardiac:  Regular rate and rhythm, normal S1, normal S2, no murmurs  Abdomen:  Soft, nondistended, non-tender, no HSM  Extremities:  Trace leg edema    Right anterior lower leg ulcer superficial, with no purulence  However, there is mild surrounding erythema/warmth  Minimal tenderness  Right foot ulcer superficial and clean, without drainage or erythema  Skin:  No rashes, no ulcers  Neurological:  Moves all four extremities spontaneously, sensation grossly intact    LABS, IMAGING, & OTHER STUDIES:  Lab Results:  I have personally reviewed pertinent labs  Results from last 7 days   Lab Units 04/13/21  0648 04/12/21  0455 04/11/21  0601  04/10/21  0829   POTASSIUM mmol/L 3 7 3 8 4 8   < >  --    CHLORIDE mmol/L 103 103 103   < >  --    CO2 mmol/L 19* 25 23   < >  --    CO2, I-STAT mmol/L  --   --   --   --  22   BUN mg/dL 55* 53* 51*   < >  --    CREATININE mg/dL 2 68* 2 62* 2 37*   < >  --    EGFR ml/min/1 73sq m 22 23 26   < >  --    GLUCOSE, ISTAT mg/dl  --   --   --   --  176*   CALCIUM mg/dL 7 9* 8 4 8 2*   < >  --     < > = values in this interval not displayed  Results from last 7 days   Lab Units 04/13/21  0648 04/12/21  0455 04/11/21  0356   WBC Thousand/uL 19 24* 16 69* 15 19*   HEMOGLOBIN g/dL 7 9* 7 4* 7 8*   PLATELETS Thousands/uL 147* 157 151     Results from last 7 days   Lab Units 04/12/21  0951 04/12/21  0935 04/07/21  0857   GRAM STAIN RESULT  Gram positive rods* Gram positive rods*  --    LEGIONELLA URINARY ANTIGEN   --   --  Negative       Imaging Studies:   I have personally reviewed pertinent imaging study reports and images in PACS  CXR reviewed personally  Mild pulmonary edema with small left pleural effusion  No consolidation  V/Q scan reviewed  Low probability for PE     EKG, Pathology, and Other Studies:   I have personally reviewed pertinent reports

## 2021-04-13 NOTE — ASSESSMENT & PLAN NOTE
Lab Results   Component Value Date    HGBA1C 7 7 (H) 02/05/2021       Recent Labs     04/12/21  1056 04/12/21  1523 04/12/21 2058 04/13/21  0719   POCGLU 133 193* 185* 136       Blood Sugar Average: Last 72 hrs:  (P) 178 [id number]   Endo recommendations appreciated  On insulin pump OP  On SQ insulin IP  Currently on Lantus 5 units at bedtime and Humalog 2 units t i d   With meal

## 2021-04-13 NOTE — ASSESSMENT & PLAN NOTE
Patient has a history of complete heart block, status post dual-chamber ppm  Echo showed possible mitral valve vegetation   CHARLES pending

## 2021-04-13 NOTE — ASSESSMENT & PLAN NOTE
POA suspect secondary to acute on chronic diastolic heart failure, possible bacterial pneumonia, Srini on CKD stage 3, left pleural effusion, Hcap, recent COVID infection in February  RRT at [facility] needing 10L O2  CXR large pleural effusions, more on the right than left now on new xray  Currently on 10 L of oxygen  Negative balance 3 5 L since admission  Diuresis : lasix 80 mg q8 hours   Goal oxygen saturation 89% and above  Monitor in and output  Daily weights

## 2021-04-13 NOTE — ASSESSMENT & PLAN NOTE
POA suspect secondary to acute on chronic diastolic heart failure, Srini on CKD stage 3, b/l pleural effusion, Hcap, recent COVID infection in February  RRT at [facility] needing 10L O2    CT 4/13 showed dense airspace consolidation throughout much of the left upper lobe with lesser airspace infiltrates, subsegmental in the right upper lobe, does not have a typical pattern of CHF, possible infectious etiology    Large dependent bilateral pleural effusions  Weaned down from 10 L to 4 L  Negative balance 4 6 L since admission  Diuresis : lasix 80 mg q8 hours, transitioned to Bumex 2 mg IV tid 4/13  Continue cefepime, 7 days of antibiotics  Status post left sided thoracocentesis, 1 2 L removed  Goal oxygen saturation 89% and above  Monitor in and output  Daily weights

## 2021-04-13 NOTE — PROGRESS NOTES
Team 2 Cardiolo  Team 2 Cardiology - Progress Note  Ramesh Heredia 76 y o  male MRN: [id number]  Unit/Bed#: [facility] 803-01 Encounter: [id number]    Assessment:  Principal Problem:    Acute respiratory failure with hypoxia ([facility] [address] )  Active Problems:    Anemia of chronic disease    Type I diabetes mellitus ([facility] [address] )    Essential hypertension    Stage 3b chronic kidney disease    KAZ on CKD stage 3    Encephalopathy    Complete heart block     Hyponatremia    Bacteremia    Acute on chronic diastolic heart failure (HCC)    HCAP (healthcare-associated pneumonia)    Acidosis    #Acute on chronic diastolic heart failure  # acute on chronic kidney disease   # Normocytic anemia     Plan:  1  Acute on chronic diastolic heart failure   -Pt clinically still appears to be fluid overloaded  Switch from IV Lasix 80 to Bumex 2 mg TID    -Monitor BMP  Cre today was 2 68, yesterday 2 6 (baseline ~1 2-1 5)      2  Acute on chronic kidney disease:   -hold ACEi   -continue sodium bicarb for metabolic acidosis   -recs from Nephro appreciated     3  Normocytic Anemia   -Continue Fe sulfate  -Hgb of 7 4  baseline ~8 0   -Iron studies revealed decreased FE and TIBC, nml ferritin and Fe%  Subjective/Objective     Subjective: Pt was seen and examined at bedside  Pt was disoriented and drowsy  Currently sat at 93% on 10L 02  He has output ~1L in the last 24 hours       Objective:  Vitals: /59   Pulse 84   Temp (!) 96 5 °F (35 8 °C)   Resp 16   Ht 5' 8" (1 727 m)   Wt 74 1 kg (163 lb 6 4 oz)   SpO2 100%   BMI 24 84 kg/m²   Vitals:    04/10/21 1033 04/13/21 0600   Weight: 78 6 kg (173 lb 4 5 oz) 74 1 kg (163 lb 6 4 oz)     Orthostatic Blood Pressures      Most Recent Value   Blood Pressure  101/59 filed at 04/13/2021 1233   Patient Position - Orthostatic VS  Lying filed at 04/12/2021 1508            Intake/Output Summary (Last 24 hours) at 4/13/2021 1258  Last data filed at 4/13/2021 0900  Gross per 24 hour   Intake 690 ml   Output 1300 ml   Net -610 ml     Review of Systems   Constitutional: Negative for chills and fever  HENT: Negative for ear pain and sore throat  Eyes: Negative for pain and visual disturbance  Respiratory: Negative for cough and shortness of breath  Cardiovascular: Negative for chest pain and palpitations  Gastrointestinal: Negative for abdominal pain and vomiting  Genitourinary: Negative for dysuria and hematuria  Musculoskeletal: Negative for arthralgias and back pain  Skin: Negative for color change and rash  Neurological: Negative for seizures and syncope  Psychiatric/Behavioral: Positive for confusion  All other systems reviewed and are negative  Physical Exam  Vitals signs and nursing note reviewed  Constitutional:       Appearance: He is well-developed  Comments: Pale in appearance    HENT:      Head: Normocephalic and atraumatic  Eyes:      Conjunctiva/sclera: Conjunctivae normal    Neck:      Musculoskeletal: Neck supple  Cardiovascular:      Rate and Rhythm: Normal rate and regular rhythm  Pulses: Normal pulses  Heart sounds: Normal heart sounds  No murmur  No gallop  Pulmonary:      Effort: Pulmonary effort is normal  No respiratory distress  Breath sounds: Normal breath sounds  Abdominal:      Palpations: Abdomen is soft  Tenderness: There is no abdominal tenderness  Musculoskeletal: Normal range of motion  Comments: R foot ulcer, wrapped in ace bandage   2+ pitting edema b/l      Skin:     General: Skin is warm and dry  Neurological:      Mental Status: He is alert           Medications:    Current Facility-Administered Medications:     acetaminophen (TYLENOL) tablet 650 mg, 650 mg, Oral, Q6H PRN, Ulises Nipper, DO    aspirin chewable tablet 81 mg, 81 mg, Oral, Daily, Ulises Nipper, DO, 81 mg at 04/13/21 0826    atorvastatin (LIPITOR) tablet 5 mg, 5 mg, Oral, Daily, Ulises Nipper, DO, 5 mg at 04/13/21 0826    bisacodyl (DULCOLAX) rectal suppository 10 mg, 10 mg, Rectal, Daily PRN, Zander Valderrama DO    bumetanide (BUMEX) injection 2 mg, 2 mg, Intravenous, Q8H, Kamala De La Cruz DO, 2 mg at 04/13/21 1233    cefepime (MAXIPIME) 1,000 mg in dextrose 5 % 50 mL IVPB, 1,000 mg, Intravenous, Q12H, Stephanie Beasley MD, Last Rate: 100 mL/hr at 04/13/21 1042, 1,000 mg at 04/13/21 1042    cholecalciferol (VITAMIN D3) tablet 4,000 Units, 4,000 Units, Oral, Daily, Zander Valderrama DO, 4,000 Units at 04/13/21 3770    docusate sodium (COLACE) capsule 100 mg, 100 mg, Oral, BID, Zander Valderrama DO, 100 mg at 04/10/21 1820    ferrous sulfate tablet 325 mg, 325 mg, Oral, Daily With Breakfast, Zander Valderrama DO, 325 mg at 04/13/21 0826    heparin (porcine) subcutaneous injection 5,000 Units, 5,000 Units, Subcutaneous, Q8H [address], Zander Valderrama DO, 5,000 Units at 04/13/21 0544    insulin glargine (LANTUS) subcutaneous injection 5 Units 0 05 mL, 5 Units, Subcutaneous, HS, Jorge Crane MD, 5 Units at 04/12/21 2101    insulin lispro (HumaLOG) 100 units/mL subcutaneous injection 1-5 Units, 1-5 Units, Subcutaneous, TID AC, 2 Units at 04/13/21 1232 **AND** Fingerstick Glucose (POCT), , , TID AC, Zander Valderrama DO    insulin lispro (HumaLOG) 100 units/mL subcutaneous injection 1-5 Units, 1-5 Units, Subcutaneous, HS, Zander Valderrama DO, 1 Units at 04/12/21 2106    insulin lispro (HumaLOG) 100 units/mL subcutaneous injection 2 Units, 2 Units, Subcutaneous, TID With Meals, Jorge Crane MD, 2 Units at 04/13/21 1232    levothyroxine tablet 125 mcg, 125 mcg, Oral, Early Morning, Zander Valderrama DO, 125 mcg at 04/13/21 0544    melatonin tablet 3 mg, 3 mg, Oral, HS PRN, Johnathon Wilson PA-C, 3 mg at 04/12/21 2101    ondansetron (ZOFRAN) injection 4 mg, 4 mg, Intravenous, Q6H PRN, Zander Valderrama DO    pantoprazole (PROTONIX) EC tablet 40 mg, 40 mg, Oral, Daily Before Breakfast, Zander Valderrama DO, 40 mg at 04/13/21 0612    polyethylene glycol (MIRALAX) packet 17 g, 17 g, Oral, Daily PRN, Chelle Ramirez DO    tamsulosin [facility]) capsule 0 4 mg, 0 4 mg, Oral, Daily With Dinner, Chelle Ramirez DO, 0 4 mg at 04/12/21 1644    vancomycin (VANCOCIN) IVPB (premix in dextrose) 750 mg 150 mL, 750 mg, Intravenous, Q24H, Arely An MD, Last Rate: 150 mL/hr at 04/13/21 1131, 750 mg at 04/13/21 1131    Lab Results:  Results from last 7 days   Lab Units 04/10/21  0813   TROPONIN I ng/mL 0 07*     Results from last 7 days   Lab Units 04/13/21  0648 04/12/21  0455 04/11/21  0356   WBC Thousand/uL 19 24* 16 69* 15 19*   HEMOGLOBIN g/dL 7 9* 7 4* 7 8*   HEMATOCRIT % 25 3* 23 1* 24 6*   PLATELETS Thousands/uL 147* 157 151         Results from last 7 days   Lab Units 04/13/21  0648 04/12/21  0455 04/11/21  0601  04/10/21  0829   POTASSIUM mmol/L 3 7 3 8 4 8   < >  --    CHLORIDE mmol/L 103 103 103   < >  --    CO2 mmol/L 19* 25 23   < >  --    CO2, I-STAT mmol/L  --   --   --   --  22   BUN mg/dL 55* 53* 51*   < >  --    CREATININE mg/dL 2 68* 2 62* 2 37*   < >  --    CALCIUM mg/dL 7 9* 8 4 8 2*   < >  --    GLUCOSE, ISTAT mg/dl  --   --   --   --  176*    < > = values in this interval not displayed           Results from last 7 days   Lab Units 04/11/21  0356   MAGNESIUM mg/dL 2 1       Maricarmen Padilla   [facility]

## 2021-04-13 NOTE — PROGRESS NOTES
Linnea 50 PROGRESS NOTE   Althea Grant 76 y o  male MRN: [id number]  Unit/Bed#: [facility] 803-01 Encounter: [id number]  Reason for Consult: KAZ    ASSESSMENT and PLAN:  1  Acute kidney injury:  · Transferred from [facility] on 4/10/21 with an SCr of 2 20  · Creatinine was 2 12 to 2 38 from 4/7 to 4/11  · Creatinine up to 2 62 on 4/12 and up to 2 68 today  · Etiology appears to be cardiorenal syndrome  There may be an element of ATN given granular casts on UA  · Still in respiratory failure felt to be due to fluid overload  · Continue Furosemide 80 mg IV TID  2  Chronic kidney disease, stage IIIB:  · Baseline creatinine around 1 2-1 5  · Follows with Dr Gwendolyn Denny  3  Hypertension:  · BP controlled  · Not on BP meds  Only on diuretics  4  Respiratory failure:  · Felt to be due to pneumonia and fluid overload by pulm  · Continue Furosemide 80 mg IV TID      5  Gm (+) rods bacteremia:  · ID consulted  6  Hyponatremia:  · Mild  · Continue fluid restriction  · Off salt tabs  7  Low bicarbonate:  · Latest VBG indicative of respiratory alkalosis  · Stop sodium bicarbonate  8  Leukocytosis:  · Being treated for pneumonia  · On abx      9  Encephalopathy  10  Pleural effusions  11  Anemia: per SLIM  DISPOSITION:  · Renal function slightly worse - this is not unexpected given infection and diuresis for fluid overload  · Continue Furosemide 80 mg IV TID  · Keep O>I    · No need for HD  · Stop sodium bicarbonate  SUBJECTIVE / 24H INTERVAL HISTORY:  Pérez had clots last night and had to be irrigated  Somnolent but arousable  Still on mid flow at 10L - better than 2 days ago       OBJECTIVE:  Current Weight: Weight - Scale: 74 1 kg (163 lb 6 4 oz)  Vitals:    04/13/21 0311 04/13/21 0600 04/13/21 0722 04/13/21 0755   BP: 132/70  131/71    BP Location:       Pulse: 84  89    Resp: 20  20    Temp: (!) 97 1 °F (36 2 °C)  (!) 96 9 °F (36 1 °C)    TempSrc:       SpO2: 92% 94% 93%   Weight:  74 1 kg (163 lb 6 4 oz)     Height:           Intake/Output Summary (Last 24 hours) at 4/13/2021 1029  Last data filed at 4/13/2021 0900  Gross per 24 hour   Intake 690 ml   Output 1550 ml   Net -860 ml     General: conscious, somnolent  Skin: dry  Eyes: pale conjunctivae  ENT: dry mucous membranes  Chest/Lungs: equal chest expansion, (+) crackles R>L lower LF, (+) ronchi  CVS: distinct heart sounds, normal rate, regular rhythm, no rub  Abdomen: soft, non tender, non distended, normal bowel sounds  Extremities: (+) LE edema  : (+) alegria catheter  Neuro: awake, alert but somnolent  Psych: confused       Medications:    Current Facility-Administered Medications:     acetaminophen (TYLENOL) tablet 650 mg, 650 mg, Oral, Q6H PRN, Constantin Rodríguez DO    aspirin chewable tablet 81 mg, 81 mg, Oral, Daily, Constantin Rodríguez DO, 81 mg at 04/13/21 2120    atorvastatin (LIPITOR) tablet 5 mg, 5 mg, Oral, Daily, Constantin Rodríguez DO, 5 mg at 04/13/21 3950    bisacodyl (DULCOLAX) rectal suppository 10 mg, 10 mg, Rectal, Daily PRN, Constantin Rodríguez DO    cefepime (MAXIPIME) 1,000 mg in dextrose 5 % 50 mL IVPB, 1,000 mg, Intravenous, Q12H, Modesto Garza MD, Stopped at 04/12/21 2130    cholecalciferol (VITAMIN D3) tablet 4,000 Units, 4,000 Units, Oral, Daily, Constantin Rodríguez DO, 4,000 Units at 04/13/21 7408    docusate sodium (COLACE) capsule 100 mg, 100 mg, Oral, BID, Constantin Rodríguez DO, 100 mg at 04/10/21 1820    ferrous sulfate tablet 325 mg, 325 mg, Oral, Daily With Breakfast, Constantin Rodríguez DO, 325 mg at 04/13/21 0826    furosemide (LASIX) injection 80 mg, 80 mg, Intravenous, Q8H, Sheri Blake, DO, 80 mg at 04/13/21 0826    heparin (porcine) subcutaneous injection 5,000 Units, 5,000 Units, Subcutaneous, Q8H [address], Constantin Rodríguez DO, 5,000 Units at 04/13/21 0544    insulin glargine (LANTUS) subcutaneous injection 5 Units 0 05 mL, 5 Units, Subcutaneous, HS, Omi Claros MD, 5 Units at 04/12/21 2296   insulin lispro (HumaLOG) 100 units/mL subcutaneous injection 1-5 Units, 1-5 Units, Subcutaneous, TID AC, 1 Units at 04/12/21 1645 **AND** Fingerstick Glucose (POCT), , , TID AC, José Rosales DO    insulin lispro (HumaLOG) 100 units/mL subcutaneous injection 1-5 Units, 1-5 Units, Subcutaneous, HS, José Rosales DO, 1 Units at 04/12/21 2106    insulin lispro (HumaLOG) 100 units/mL subcutaneous injection 2 Units, 2 Units, Subcutaneous, TID With Meals, Nadine Barnett MD, 2 Units at 04/13/21 0827    levothyroxine tablet 125 mcg, 125 mcg, Oral, Early Morning, José Rosales DO, 125 mcg at 04/13/21 0544    melatonin tablet 3 mg, 3 mg, Oral, HS PRN, Peewee Wilson PA-C, 3 mg at 04/12/21 2101    ondansetron (ZOFRAN) injection 4 mg, 4 mg, Intravenous, Q6H PRN, José Rosales DO    pantoprazole (PROTONIX) EC tablet 40 mg, 40 mg, Oral, Daily Before Breakfast, José Rosales DO, 40 mg at 04/13/21 0612    polyethylene glycol (MIRALAX) packet 17 g, 17 g, Oral, Daily PRN, José Rosales DO    sodium bicarbonate tablet 650 mg, 650 mg, Oral, BID after meals, José Rosales DO, 650 mg at 04/13/21 0826    tamsulosin (FLOMAX) capsule 0 4 mg, 0 4 mg, Oral, Daily With Dinner, José Rosales DO, 0 4 mg at 04/12/21 1644    vancomycin (VANCOCIN) IVPB (premix in dextrose) 750 mg 150 mL, 750 mg, Intravenous, Q24H, Mak Gleason MD, Last Rate: 150 mL/hr at 04/12/21 1153, 750 mg at 04/12/21 1153    Laboratory Results:  Results from last 7 days   Lab Units 04/13/21  0648 04/12/21  0455 04/11/21  0601 04/11/21  0356 04/10/21  0829 04/10/21  0821 04/10/21  0508 04/09/21  0855 04/08/21  0622   WBC Thousand/uL 19 24* 16 69*  --  15 19*  --  15 67* 15 81* 18 45*  --    HEMOGLOBIN g/dL 7 9* 7 4*  --  7 8*  --  7 9* 8 0* 8 1*  --    I STAT HEMOGLOBIN g/dl  --   --   --   --  8 2*  --   --   --   --    HEMATOCRIT % 25 3* 23 1*  --  24 6*  --  24 1* 25 1* 25 0*  --    HEMATOCRIT, ISTAT %  --   --   --   --  24*  --   --   --   --    PLATELETS Thousands/uL 147* 157  --  151  --  168 168 156  --    POTASSIUM mmol/L 3 7 3 8 4 8 5 5*  --   --  4 4 4 4 3 8   CHLORIDE mmol/L 103 103 103 104  --   --  105 101 101   CO2 mmol/L 19* 25 23 25  --   --  23 22 23   CO2, I-STAT mmol/L  --   --   --   --  22  --   --   --   --    BUN mg/dL 55* 53* 51* 48*  --   --  44* 43* 45*   CREATININE mg/dL 2 68* 2 62* 2 37* 2 35*  --   --  2 20* 2 28* 2 38*   CALCIUM mg/dL 7 9* 8 4 8 2* 7 9*  --   --  7 9* 7 6* 7 6*   MAGNESIUM mg/dL  --   --   --  2 1  --   --   --   --   --    PHOSPHORUS mg/dL  --   --   --  3 6  --   --   --   --   --    GLUCOSE, ISTAT mg/dl  --   --   --   --  176*  --   --   --   --

## 2021-04-13 NOTE — PHYSICAL THERAPY NOTE
Physical Therapy Treatment Note    Patient's Name: Aren Gallagher  : 1945       04/13/21 1459   PT Last Visit   PT Visit Date 21   Note Type   Note Type Treatment   Pain Assessment   Pain Assessment Tool Pain Assessment not indicated - pt denies pain   Pain Score No Pain   Restrictions/Precautions   Weight Bearing Precautions Per Order Yes   RLE Weight Bearing Per Order WBAT  (in Darco Wedge shoe)   Braces or Orthoses TLSO;Other (Comment)  (Darco wedge shoe)   Other Precautions Cognitive; Chair Alarm;Multiple lines; Fall Risk;Pain;Telemetry;O2  (4L O2)   General   Chart Reviewed Yes   Family/Caregiver Present Yes   Cognition   Overall Cognitive Status Impaired   Arousal/Participation Arousable   Attention Attends with cues to redirect   Following Commands Follows one step commands with increased time or repetition   Comments Pt lethargic upon PT arrival, improved alertness with mobility  Bed Mobility   Supine to Sit 2  Maximal assistance   Additional items Assist x 1; Increased time required;Verbal cues   Additional Comments Good initiation of log rolling technique, maxA for trunk control from S/L to sit with HOB elevated   Transfers   Sit to Stand 3  Moderate assistance   Additional items Assist x 2; Increased time required;Verbal cues   Stand to Sit 2  Maximal assistance   Additional items Assist x 2; Increased time required;Verbal cues   Stand pivot 2  Maximal assistance   Additional items Assist x 2; Increased time required;Verbal cues   Additional Comments Performed with HHA, initial STS with improved anterior weight shift, once in standing, pt pushing into posterior lean  Required return to sitting for seated rest   Second trial, cues for YAMILA, able to initiate ambulate to bedside chair with continued posterior lean, maxA x2  Ambulation/Elevation   Gait pattern Excessively slow; Short stride;Decreased foot clearance;Retropulsion; Improper Weight shift   Gait Assistance 2  Maximal assist   Additional items Assist x 2   Assistive Device Other (Comment)  (B/L HHA)   Distance 3 ft to chair, limited by poor balance, posterior lean and level of arousal   Balance   Static Sitting Fair -   Dynamic Sitting Poor +   Static Standing Poor   Dynamic Standing Poor -   Ambulatory Poor -   Activity Tolerance   Activity Tolerance Patient limited by fatigue   Medical Staff Made Aware Restorative, [address]   Nurse Made Aware RN updated  Chair alarm engaged   Exercises   Hip Abduction Supine;10 reps;AAROM; Bilateral  (reclined in chair)   Knee AROM Long Arc Quad Sitting;10 reps;AROM; Bilateral  (constant tactile/verbal cues to maintain arousal)   Marching Sitting;10 reps;Bilateral;AROM   Assessment   Prognosis Fair   Problem List Decreased strength;Decreased endurance; Impaired balance;Decreased mobility; Decreased cognition;Decreased safety awareness;Orthopedic restrictions   Assessment Pt demonstrating medical progress with decreased O2 needs, O2 sats stable on 4L with activity today  Pt continues to require mod-maxA x2 for OOB mobility, significant posterior lean  Spouse present during session today and states this is far from progress made at acute rehab  Pt with limited arousal, required constant verbal and tactile cues to maintain attention to LE exercises  Pt will benefit from continued skilled PT intervention during course of hospital stay to improve strength, endurance and balance to improve safety with functional mobility  Recommend IP rehab upon hospital D/C  Goals   Patient Goals to go to rehab   STG Expiration Date 04/26/21   PT Treatment Day 1   Plan   Treatment/Interventions Functional transfer training;LE strengthening/ROM; Therapeutic exercise; Endurance training;Cognitive reorientation;Patient/family training;Equipment eval/education; Bed mobility;Gait training   Progress Slow progress, decreased activity tolerance   PT Frequency Other (Comment)  (3-5x/week)   Recommendation   PT Discharge Recommendation Post acute rehabilitation services   PT - OK to Discharge Yes  (to IP rehab)   Erica Jacobo 435   Turning in Bed Without Bedrails 2   Lying on Back to Sitting on Edge of Flat Bed 2   Moving Bed to Chair 1   Standing Up From Chair 1   Walk in Room 1   Climb 3-5 Stairs 1   Basic Mobility Inpatient Raw Score 8   Turning Head Towards Sound 3   Follow Simple Instructions 2   Low Function Basic Mobility Raw Score 13   Low Function Basic Mobility Standardized Score 20 14       Naila Henriquez, PT, DPT

## 2021-04-13 NOTE — PROGRESS NOTES
Vancomycin Assessment    Carmen Hernandez is a 76 y o  male who is currently receiving vancomycin 750 mg q24h for Pneumonia     Relevant clinical data and objective history reviewed:  Creatinine   Date Value Ref Range Status   04/13/2021 2 68 (H) 0 60 - 1 30 mg/dL Final     Comment:     Standardized to IDMS reference method   04/12/2021 2 62 (H) 0 60 - 1 30 mg/dL Final     Comment:     Standardized to IDMS reference method   04/11/2021 2 37 (H) 0 60 - 1 30 mg/dL Final     Comment:     Standardized to IDMS reference method   06/24/2015 0 96 0 60 - 1 30 mg/dL Final     Comment:     Standardized to IDMS reference method   06/23/2015 1 03 0 60 - 1 30 mg/dL Final     Comment:     Standardized to IDMS reference method   06/22/2015 0 86 0 60 - 1 30 mg/dL Final     Comment:     Standardized to IDMS reference method     /71   Pulse 89   Temp (!) 96 9 °F (36 1 °C)   Resp 20   Ht 5' 8" (1 727 m)   Wt 74 1 kg (163 lb 6 4 oz)   SpO2 93%   BMI 24 84 kg/m²   I/O last 3 completed shifts: In: 690 [P O :640; IV Piggyback:50]  Out: 2650 [Urine:2650]  Lab Results   Component Value Date/Time    BUN 55 (H) 04/13/2021 06:48 AM    BUN 24 06/24/2015 06:00 AM    WBC 19 24 (H) 04/13/2021 06:48 AM    WBC 7 35 06/24/2015 06:00 AM    HGB 7 9 (L) 04/13/2021 06:48 AM    HGB 12 4 06/24/2015 06:00 AM    HCT 25 3 (L) 04/13/2021 06:48 AM    HCT 34 9 (L) 06/24/2015 06:00 AM    MCV 91 04/13/2021 06:48 AM    MCV 83 06/24/2015 06:00 AM     (L) 04/13/2021 06:48 AM     06/24/2015 06:00 AM     Temp Readings from Last 3 Encounters:   04/13/21 (!) 96 9 °F (36 1 °C)   04/09/21 98 2 °F (36 8 °C) (Oral)   04/02/21 97 6 °F (36 4 °C)     Vancomycin Days of Therapy: 2    Assessment/Plan  The patient is currently on vancomycin utilizing scheduled dosing  Baseline risks associated with therapy include: pre-existing renal impairment and advanced age    The patient is receiving 750 mg q24h based on a goal of 15-20 (appropriate for most indications) ; therefore, is clinically appropriate and dose will be continued   Pharmacy will continue to follow closely for s/sx of nephrotoxicity, infusion reactions, and appropriateness of therapy  BMP and CBC will be ordered per protocol  Plan for trough as patient approaches steady state, prior to the 4th  dose at approximately 10:30 on 04/15/2021  Pharmacy will continue to follow the patients culture results and clinical progress daily      Carmita Centeno, Pharmacist

## 2021-04-13 NOTE — PROGRESS NOTES
[address] [facility]  Progress Note - Marlene Weinberg 1945, 76 y o  male MRN: [id number]  Unit/Bed#: [facility] 803-01 Encounter: [id number]  Primary Care Provider: Dylan Ward MD   Date and time admitted to hospital: 4/10/2021 10:27 AM    * Acute respiratory failure with hypoxia [facility])  Assessment & Plan  POA suspect secondary to acute on chronic diastolic heart failure, possible bacterial pneumonia, Srini on CKD stage 3, left pleural effusion, Hcap, recent COVID infection in February  RRT at [facility] needing 10L O2  CXR large pleural effusions, more on the right than left now on new xray  Currently on 10 L of oxygen  Negative balance 3 5 L since admission  Diuresis : lasix 80 mg q8 hours, transition to Bumex 2 mg IV tid   Goal oxygen saturation 89% and above  Monitor in and output  Daily weights    Acute on chronic diastolic heart failure (HCC)  Assessment & Plan  Wt Readings from Last 3 Encounters:   04/13/21 74 1 kg (163 lb 6 4 oz)   04/05/21 78 6 kg (173 lb 4 5 oz)   03/27/21 72 kg (158 lb 11 7 oz)     Echo in 03/29/2021 showed ejection fraction 65%, mild MR, mild TR  Lasix 80 mg IV t i d  was switched to Bumex 2 mg IV t i d   3 5 L negative balance since admission  Ortez catheter in place, blocked overnight clots    Manual irrigation  Monitor kidney function  Input and output, daily weight        HCAP (healthcare-associated pneumonia)  Assessment & Plan  Started on vanc and cefepime for possible Hcap  Blood cultures growing Gram-positive rods, blood cultures on 3/27 growing Corynebacterium striatum   ID consult      Bacteremia  Assessment & Plan  Blood cultures from 04/12 growing Gram-positive rods  Blood cultures from 03/27/21 Corynebacterium striatum  Currently on cefepime and Vanco  ID on board, appreciate recommendation  Echo showed anterior mitral leaflet vegetation, CHARLES ordered      Encephalopathy  Assessment & Plan  Multifactorial suspect in the setting of heart failure, kidney failure, bacteremia, Hcap  AAO x 2  Reorient, sleep-wake cycle      KAZ on CKD stage 3  Assessment & Plan  Estimated Creatinine Clearance: 23 mL/min (A) (by C-G formula based on SCr of 2 68 mg/dL (H))  POA  Patient has Clarke Calvo on CKD stage 3  Baseline creatinine from 1 2-1 5  Suspect cardiorenal, component of ATN  Creatinine today 2 68  Nephrology following    Acidosis  Assessment & Plan  Patient is being treated with bicarb by nephrology    Hyponatremia  Assessment & Plan  Sodium 134  Continue fluid restriction  Off salt tabs    Complete heart block   Assessment & Plan  Patient has a history of complete heart block, status post dual-chamber ppm  Echo showed possible mitral valve vegetation   CHARLES pending    Essential hypertension  Assessment & Plan  Continue with lasix 80 mg q 8hrs     Type I diabetes mellitus ([facility] [address] )  Assessment & Plan  Lab Results   Component Value Date    HGBA1C 7 7 (H) 02/05/2021       Recent Labs     04/12/21  2058 04/13/21  0719 04/13/21  1121 04/13/21  1613   POCGLU 185* 136 226* 272*       Blood Sugar Average: Last 72 hrs:  (P) 326 [id number]   Endo recommendations appreciated  On insulin pump OP  On SQ insulin IP  Currently on Lantus 5 units at bedtime and Humalog 2 units t i d  With meal    Anemia of chronic disease  Assessment & Plan  In the past 2 weeks has been range of 7 and 8  Hemoglobin today 7 9  Continue ferrous sulfate        VTE Pharmacologic Prophylaxis:   Pharmacologic: Heparin  Mechanical VTE Prophylaxis in Place: No    Patient Centered Rounds: I have performed bedside rounds with nursing staff today  Discussions with Specialists or Other Care Team Provider:  Pulmonology    Education and Discussions with Family / Patient: wife    Time Spent for Care: 30 minutes  More than 50% of total time spent on counseling and coordination of care as described above      Current Length of Stay: 3 day(s)    Current Patient Status: Inpatient   Certification Statement: The patient will continue to require additional inpatient hospital stay due to Heart failure, kidney failure, bacteremia    Discharge Plan: To be determined    Code Status: Level 1 - Full Code      Subjective:   Patient was seen and evaluated bedside, he says" I am feeling good"    Objective:     Vitals:   Temp (24hrs), Av 8 °F (36 °C), Min:96 3 °F (35 7 °C), Max:97 3 °F (36 3 °C)    Temp:  [96 3 °F (35 7 °C)-97 3 °F (36 3 °C)] 97 3 °F (36 3 °C)  HR:  [82-89] 89  Resp:  [16-20] 20  BP: ()/(57-73) 102/59  SpO2:  [90 %-100 %] 93 %  Body mass index is 24 84 kg/m²  Input and Output Summary (last 24 hours): Intake/Output Summary (Last 24 hours) at 2021 1718  Last data filed at 2021 1415  Gross per 24 hour   Intake 350 ml   Output 2125 ml   Net -1775 ml       Physical Exam:     Physical Exam  Constitutional:       General: He is not in acute distress  Appearance: He is ill-appearing  HENT:      Head: Atraumatic  Neck:      Musculoskeletal: Neck supple  Cardiovascular:      Rate and Rhythm: Normal rate and regular rhythm  Heart sounds: No murmur  No friction rub  No gallop  Pulmonary:      Effort: Pulmonary effort is normal       Comments: Breath sounds bilaterally, no respiratory distress, no wheezing, 10 L mid flow  Abdominal:      General: Bowel sounds are normal  There is no distension  Palpations: Abdomen is soft  Musculoskeletal:         General: Swelling present  Skin:     General: Skin is dry  Neurological:      General: No focal deficit present  Mental Status: He is alert        Comments:  AAO x2   Psychiatric:         Mood and Affect: Mood normal          Additional Data:     Labs:    Results from last 7 days   Lab Units 21  0648   WBC Thousand/uL 19 24*   HEMOGLOBIN g/dL 7 9*   HEMATOCRIT % 25 3*   PLATELETS Thousands/uL 147*   NEUTROS PCT % 83*   LYMPHS PCT % 4*   MONOS PCT % 11   EOS PCT % 0     Results from last 7 days   Lab Units 21  0648   SODIUM mmol/L 134*   POTASSIUM mmol/L 3 7   CHLORIDE mmol/L 103   CO2 mmol/L 19*   BUN mg/dL 55*   CREATININE mg/dL 2 68*   ANION GAP mmol/L 12   CALCIUM mg/dL 7 9*   GLUCOSE RANDOM mg/dL 114         Results from last 7 days   Lab Units 04/13/21  1613 04/13/21  1121 04/13/21  0719 04/12/21  2058 04/12/21  1523 04/12/21  1056 04/12/21  0758 04/11/21  2104 04/11/21  1704 04/11/21  1301 04/11/21  0736 04/10/21  2111   POC GLUCOSE mg/dl 272* 226* 136 185* 193* 133 74 246* 206* 89 168* 294*         Results from last 7 days   Lab Units 04/12/21  0455 04/11/21  0356 04/10/21  0813 04/10/21  0508 04/09/21  1217   LACTIC ACID mmol/L  --   --  1 7  --  1 3   PROCALCITONIN ng/ml 1 01* 0 73*  --  0 93* 1 08*           * I Have Reviewed All Lab Data Listed Above  * Additional Pertinent Lab Tests Reviewed:  Tonya Bynum Admission Reviewed    Imaging:    Imaging Reports Reviewed Today Include: all  Imaging Personally Reviewed by Myself Includes:      Recent Cultures (last 7 days):     Results from last 7 days   Lab Units 04/12/21  0951 04/12/21  0935 04/07/21  0857   GRAM STAIN RESULT  Gram positive rods* Gram positive rods*  --    LEGIONELLA URINARY ANTIGEN   --   --  Negative       Last 24 Hours Medication List:   Current Facility-Administered Medications   Medication Dose Route Frequency Provider Last Rate    acetaminophen  650 mg Oral Q6H PRN Bimble Lavern, DO      aspirin  81 mg Oral Daily Bimble Lavern, DO      atorvastatin  5 mg Oral Daily Bimble Lavern, DO      bisacodyl  10 mg Rectal Daily PRN Bimble Lavern, DO      bumetanide  2 mg Intravenous Q8H Kamala De La Cruz, DO      cefepime  1,000 mg Intravenous Q12H Nelli Henriquez MD 1,000 mg (04/13/21 1042)    cholecalciferol  4,000 Units Oral Daily Bimble Lavern, DO      docusate sodium  100 mg Oral BID Bimble Lavern, DO      ferrous sulfate  325 mg Oral Daily With Breakfast Bimble Lavern, DO      heparin (porcine)  5,000 Units Subcutaneous [facility] [address] Jan Leslie Montoya, DO      insulin glargine  5 Units Subcutaneous HS Nadia Davis MD      insulin lispro  1-5 Units Subcutaneous TID [facility] Stefani Luis, DO      insulin lispro  1-5 Units Subcutaneous HS Stefani Noel, DO      insulin lispro  2 Units Subcutaneous TID With Meals Nadia Davis MD      levothyroxine  125 mcg Oral Early Morning Stefani Toby, DO      melatonin  3 mg Oral HS PRN Johnathon Wilson PA-C      ondansetron  4 mg Intravenous Q6H PRN Stefani Toby, DO      pantoprazole  40 mg Oral Daily Before Breakfast Stefani Toby, DO      polyethylene glycol  17 g Oral Daily PRN Stefani Toby, DO      tamsulosin  0 4 mg Oral Daily With [facility], DO      vancomycin  750 mg Intravenous Q24H Moshe Vargas  mg (04/13/21 8201)        Today, Patient Was Seen By: Jj Barboza MD    ** Please Note: Dictation voice to text software may have been used in the creation of this document   **

## 2021-04-13 NOTE — ASSESSMENT & PLAN NOTE
Estimated Creatinine Clearance: 23 mL/min (A) (by C-G formula based on SCr of 2 68 mg/dL (H))    POA  Patient has Srini on CKD stage 3  Baseline creatinine from 1 2-1 5  Suspect cardiorenal, component of ATN  Creatinine today 2 68  Nephrology following

## 2021-04-13 NOTE — PROGRESS NOTES
Progress Note - Pulmonary   Dov Alba 76 y o  male MRN: [id number]  Unit/Bed#: [facility] 803-01 Encounter: [id number]      Assessment:  1  Acute hypoxic respiratory failure, multifactorial secondary to pulmonary edema, acute on chronic diastolic CHF, kaz on CKD, HCAP  2  Abnormal chest x-ray with pulmonary edema and bilateral pleural effusions, right lower lobe consolidation  3  RLL pneumonia, as risk for resistant organisms   4  Gram positive gonzalo bacteremia   5  KAZ on CKD 3B  6  Hypertension  7  Hyponatremia  8  Anemia  9  COVID infection February 2021     Plan:  - Currently receiving Lasix 80 mg q 8 hours and net -3 5 L since admission, would continue diuresis, nephro and cardiology following   - check VBG given drop in CO2 to 19   - currently saturating low 90s on 10 L via mid flow, wean as tolerated, aggressive ics, out of bed to chair,  Goal SpO2 greater than 88%  -  continue broad-spectrum antibiotics vancomycin/cefepime for HCAP,  Follow-up MRSA nares and sputum culture  - blood cultures positive for Gram-positive rods, id has been consulted  -  Mental status somewhat improved today, likely secondary to ongoing bacteremia    Subjective:     Patient seen and examined at bedside  He appears more awake today  Mild shortness of breath and intermittent dry cough  Denies fever, chills, nausea, vomiting, chest pain  Weaned from 15 to 10 L NC     Review of Systems   Constitutional: Positive for fatigue  Negative for chills, fever and unexpected weight change  HENT: Negative for congestion, rhinorrhea, sneezing and sore throat  Respiratory: Positive for shortness of breath  Negative for cough and wheezing  Cardiovascular: Positive for leg swelling  Negative for chest pain and palpitations  Gastrointestinal: Negative for abdominal pain, constipation, diarrhea, nausea and vomiting  Genitourinary: Negative for dysuria  Musculoskeletal: Negative for arthralgias     Neurological: Negative for dizziness and numbness  Psychiatric/Behavioral: Positive for confusion  Objective:     Vitals:    04/13/21 0311 04/13/21 0600 04/13/21 0722 04/13/21 0755   BP: 132/70  131/71    BP Location:       Pulse: 84  89    Resp: 20  20    Temp: (!) 97 1 °F (36 2 °C)  (!) 96 9 °F (36 1 °C)    TempSrc:       SpO2: 92%  94% 93%   Weight:  74 1 kg (163 lb 6 4 oz)     Height:               Intake/Output Summary (Last 24 hours) at 4/13/2021 0840  Last data filed at 4/13/2021 4899  Gross per 24 hour   Intake 570 ml   Output 1550 ml   Net -980 ml         Physical Exam  Constitutional:       General: He is not in acute distress  Appearance: He is well-developed  He is not diaphoretic  Comments: thin   HENT:      Head: Normocephalic and atraumatic  Mouth/Throat:      Mouth: Mucous membranes are moist       Pharynx: Oropharynx is clear  No oropharyngeal exudate  Eyes:      General: No scleral icterus  Cardiovascular:      Rate and Rhythm: Normal rate and regular rhythm  Heart sounds: Normal heart sounds  No murmur  Comments: Left chest pacemaker   Pulmonary:      Effort: Pulmonary effort is normal  No respiratory distress  Breath sounds: Normal breath sounds  No wheezing  Comments: Scattered rhonchi, diminished breath sounds at bases   Abdominal:      General: Bowel sounds are normal  There is no distension  Palpations: Abdomen is soft  Tenderness: There is no abdominal tenderness  Musculoskeletal:      Right lower leg: Edema present  Left lower leg: Edema present  Neurological:      Mental Status: He is alert and oriented to person, place, and time  Labs: I have personally reviewed pertinent lab results    Results from last 7 days   Lab Units 04/13/21  0648 04/12/21  0455 04/11/21  0356  04/10/21  0821   WBC Thousand/uL 19 24* 16 69* 15 19*  --  15 67*   HEMOGLOBIN g/dL 7 9* 7 4* 7 8*  --  7 9*   I STAT HEMOGLOBIN   --   --   --    < >  --    HEMATOCRIT % 25 3* 23 1* 24 6* --  24 1*   HEMATOCRIT, ISTAT   --   --   --    < >  --    PLATELETS Thousands/uL 147* 157 151  --  168   NEUTROS PCT % 83* 85*  --   --  86*   MONOS PCT % 11 10  --   --  9    < > = values in this interval not displayed  Results from last 7 days   Lab Units 04/13/21  0648 04/12/21  0455 04/11/21  0601  04/10/21  0829   POTASSIUM mmol/L 3 7 3 8 4 8   < >  --    CHLORIDE mmol/L 103 103 103   < >  --    CO2 mmol/L 19* 25 23   < >  --    CO2, I-STAT mmol/L  --   --   --   --  22   BUN mg/dL 55* 53* 51*   < >  --    CREATININE mg/dL 2 68* 2 62* 2 37*   < >  --    CALCIUM mg/dL 7 9* 8 4 8 2*   < >  --    GLUCOSE, ISTAT mg/dl  --   --   --   --  176*    < > = values in this interval not displayed  Results from last 7 days   Lab Units 04/11/21  0356   MAGNESIUM mg/dL 2 1     Results from last 7 days   Lab Units 04/11/21  0356   PHOSPHORUS mg/dL 3 6          Results from last 7 days   Lab Units 04/10/21  0813   LACTIC ACID mmol/L 1 7     0   Lab Value Date/Time    TROPONINI 0 07 (H) 04/10/2021 0813    TROPONINI 0 32 (H) 03/27/2021 2317    TROPONINI 0 38 (H) 03/27/2021 2024    TROPONINI 0 47 (H) 03/27/2021 1619    TROPONINI <0 02 03/24/2021 1643    TROPONINI 0 08 (H) 02/06/2021 0255    TROPONINI 0 09 (H) 02/05/2021 2350    TROPONINI 0 08 (H) 02/05/2021 2028    TROPONINI 0 06 (H) 02/05/2021 1554    TROPONINI 0 03 07/13/2019 1629       Microbiology:  Blood cultures + gram positive rods      Imaging and other studies: I have personally reviewed pertinent reports     and I have personally reviewed pertinent films in PACS   chest x-ray 04/11/2021   pulmonary edema with small left pleural effusion R LL consolidation       Vania Crawford MD  Pulmonary & Critical Care Fellow, [facility]

## 2021-04-13 NOTE — SPEECH THERAPY NOTE
Speech Language/Pathology    Speech/Language Pathology Progress Note    Patient Name: Dov Willis  [id number]'[id number] Date: 4/13/2021     Problem List  Principal Problem:    Acute respiratory failure with hypoxia ([facility] )  Active Problems:    Anemia of chronic disease    Type I diabetes mellitus ([facility] )    Essential hypertension    Stage 3b chronic kidney disease    KAZ on CKD stage 3    Encephalopathy    Complete heart block     Hyponatremia    Bacteremia    Acute on chronic diastolic heart failure ([facility] )    HCAP (healthcare-associated pneumonia)    Acidosis       Past Medical History  Past Medical History:   Diagnosis Date    Arthritis     Cardiac disease     Closed fracture of fibula, lateral malleolus, right     Complete heart block ([facility] )     Diabetes mellitus ([facility] )     Disease of thyroid gland     External incisional dehiscence     GERD (gastroesophageal reflux disease)     Heart disease     Muscular deconditioning     Pacemaker     Sleep apnea     cannot tolerate cpap    Thyroid disease         Past Surgical History  Past Surgical History:   Procedure Laterality Date    CARDIAC PACEMAKER PLACEMENT Left 2015    CARDIAC PACEMAKER PLACEMENT      CARDIAC SURGERY      CATARACT EXTRACTION      MUSCLE FLAP      right foot    NERVE BLOCK      Transforaminal Epidural Lumbar with Fluoroscopic Guidance     [place] COLONOSCOPY FLX DX W/COLLJ SPEC WHEN PFRMD N/A 5/4/2018    Procedure: EGD AND COLONOSCOPY;  Surgeon: Rich Liu MD;  Location: [facility]; Service: Gastroenterology    [place] SPLIT [address] <100 SQCM Right 6/10/2019    Procedure: SKIN GRAFT SPLIT THICKNESS (STSG)  EXTREMITY right leg;  Surgeon: Nathalie Cardenas DPM;  Location: [facility];  Service: Podiatry    TOE AMPUTATION      left 5th toe         Subjective:  Pt upright in bed, lethargic  Current Diet:   Dysphagia 3 w/ NTL     Objective:  Pt seen for dx dysphagia tx   Pt seen for trials of hard solids, thin liquids, and nectar thick coffee leftover from breakfast  Pt takes cup and straw sips of thin liquids w/ assistance  Swallows are audible, swallow initiation is suspected delayed and reduced to palpation w/ thin  Pt continues w/ intermittent throat clear and wet vocal quality w/ thin liquids, not noted w/ trials of NTL  Bite strength is adequate for hard solids  Mastication is min prolonged  Transfers are adequate, no lingual residue  Pt continues w/ decreased alertness and difficulty staying awake  Pt fell asleep mid-trials x2  Given pt's lethargy and continued s/s aspiration w/ thin liquids, pt not appropriate for diet upgrade at this time  Aspiration risk explained to pt, pt agreeable to continuing w/ current diet  Spoke w/ nursing regarding tx session and plan to continue current diet when pt is fully awake and alert  Assessment:  Pt w/ continued s/s aspiration w/ thin liquids and overall lethargy/poor alertness increasing aspiration risk       Plan/Recommendations:  Continue current diet   Frequent and thorough oral care  ST f/u as able and appropriate

## 2021-04-13 NOTE — ASSESSMENT & PLAN NOTE
Wt Readings from Last 3 Encounters:   04/13/21 74 1 kg (163 lb 6 4 oz)   04/05/21 78 6 kg (173 lb 4 5 oz)   03/27/21 72 kg (158 lb 11 7 oz)     Echo in 03/29/2021 showed ejection fraction 65%, mild MR, mild TR  Currently on Lasix 80 mg IV t i d   3 5 L negative balance since admission  Ortez catheter in place, blocked overnight clots    Manual irrigation  Monitor kidney function  Input and output, daily weight

## 2021-04-13 NOTE — ASSESSMENT & PLAN NOTE
Multifactorial suspect in the setting of heart failure, kidney failure, bacteremia, Hcap  AAO x 2  Reorient, sleep-wake cycle

## 2021-04-13 NOTE — ASSESSMENT & PLAN NOTE
POA  Baseline creatinine from 1 2-1 5  Suspect cardiorenal, component of ATN  Creatinine today 2 64  Nephrology following

## 2021-04-13 NOTE — ASSESSMENT & PLAN NOTE
Multifactorial suspect in the setting of heart failure, kidney failure, bacteremia, Hcap  Improved today  Reorient, sleep-wake cycle

## 2021-04-13 NOTE — ASSESSMENT & PLAN NOTE
Blood cultures from 04/12 growing Gram-positive rods  Blood cultures from 03/27/21 Corynebacterium striatum  Continue IV Vanco  Source could be right foot ulcer  ID on board, appreciate recommendation  Echo showed anterior mitral leaflet vegetation  CHARLES scheduled for today

## 2021-04-13 NOTE — ASSESSMENT & PLAN NOTE
Started on vanc and cefepime for possible Hcap  Blood cultures growing Gram-positive rods, blood cultures on 3/27 growing Corynebacterium striatum   Continue antibiotics for total of 7 days for pneumonia

## 2021-04-13 NOTE — CASE MANAGEMENT
CM sent referral to [address] [place] [address]  for PT/OT as family requested- as well as acute rehab facilities within Pt's zipcode  CM will see if [facility] is willing to accept for rehab or any other facilities  CM will continue to attempt to place the Pt in acute rehab

## 2021-04-13 NOTE — ASSESSMENT & PLAN NOTE
Started on vanc and cefepime for possible Hcap  Blood cultures growing Gram-positive rods, blood cultures on 3/27 growing Corynebacterium striatum   ID consult

## 2021-04-13 NOTE — PLAN OF CARE
Problem: Potential for Falls  Goal: Patient will remain free of falls  Description: INTERVENTIONS:  - Assess patient frequently for physical needs  -  Identify cognitive and physical deficits and behaviors that affect risk of falls  -  [place] fall precautions as indicated by assessment   - Educate patient/family on patient safety including physical limitations  - Instruct patient to call for assistance with activity based on assessment  - Modify environment to reduce risk of injury  - Consider OT/PT consult to assist with strengthening/mobility  Outcome: Progressing     Problem: PAIN - ADULT  Goal: Verbalizes/displays adequate comfort level or baseline comfort level  Description: Interventions:  - Encourage patient to monitor pain and request assistance  - Assess pain using appropriate pain scale  - Administer analgesics based on type and severity of pain and evaluate response  - Implement non-pharmacological measures as appropriate and evaluate response  - Consider cultural and social influences on pain and pain management  - Notify physician/advanced practitioner if interventions unsuccessful or patient reports new pain  Outcome: Progressing     Problem: SAFETY ADULT  Goal: Patient will remain free of falls  Description: INTERVENTIONS:  - Assess patient frequently for physical needs  -  Identify cognitive and physical deficits and behaviors that affect risk of falls    -  [place] fall precautions as indicated by assessment   - Educate patient/family on patient safety including physical limitations  - Instruct patient to call for assistance with activity based on assessment  - Modify environment to reduce risk of injury  - Consider OT/PT consult to assist with strengthening/mobility  Outcome: Progressing  Goal: Maintain or return to baseline ADL function  Description: INTERVENTIONS:  -  Assess patient's ability to carry out ADLs; assess patient's baseline for ADL function and identify physical deficits which impact ability to perform ADLs (bathing, care of mouth/teeth, toileting, grooming, dressing, etc )  - Assess/evaluate cause of self-care deficits   - Assess range of motion  - Assess patient's mobility; develop plan if impaired  - Assess patient's need for assistive devices and provide as appropriate  - Encourage maximum independence but intervene and supervise when necessary  - Involve family in performance of ADLs  - Assess for home care needs following discharge   - Consider OT consult to assist with ADL evaluation and planning for discharge  - Provide patient education as appropriate  Outcome: Progressing  Goal: Maintain or return mobility status to optimal level  Description: INTERVENTIONS:  - Assess patient's baseline mobility status (ambulation, transfers, stairs, etc )    - Identify cognitive and physical deficits and behaviors that affect mobility  - Identify mobility aids required to assist with transfers and/or ambulation (gait belt, sit-to-stand, lift, walker, cane, etc )  - [place] fall precautions as indicated by assessment  - Record patient progress and toleration of activity level on Mobility SBAR; progress patient to next Phase/Stage  - Instruct patient to call for assistance with activity based on assessment  - Consider rehabilitation consult to assist with strengthening/weightbearing, etc   Outcome: Progressing     Problem: DISCHARGE PLANNING  Goal: Discharge to home or other facility with appropriate resources  Description: INTERVENTIONS:  - Identify barriers to discharge w/patient and caregiver  - Arrange for needed discharge resources and transportation as appropriate  - Identify discharge learning needs (meds, wound care, etc )  - Arrange for interpretive services to assist at discharge as needed  - Refer to Case Management Department for coordinating discharge planning if the patient needs post-hospital services based on physician/advanced practitioner order or complex needs related to functional status, cognitive ability, or social support system  Outcome: Progressing     Problem: Knowledge Deficit  Goal: Patient/family/caregiver demonstrates understanding of disease process, treatment plan, medications, and discharge instructions  Description: Complete learning assessment and assess knowledge base  Interventions:  - Provide teaching at level of understanding  - Provide teaching via preferred learning methods  Outcome: Progressing     Problem: Prexisting or High Potential for Compromised Skin Integrity  Goal: Skin integrity is maintained or improved  Description: INTERVENTIONS:  - Identify patients at risk for skin breakdown  - Assess and monitor skin integrity  - Assess and monitor nutrition and hydration status  - Monitor labs   - Assess for incontinence   - Turn and reposition patient  - Assist with mobility/ambulation  - Relieve pressure over bony prominences  - Avoid friction and shearing  - Provide appropriate hygiene as needed including keeping skin clean and dry  - Evaluate need for skin moisturizer/barrier cream  - Collaborate with interdisciplinary team   - Patient/family teaching  - Consider wound care consult   Outcome: Progressing     Problem: Nutrition/Hydration-ADULT  Goal: Nutrient/Hydration intake appropriate for improving, restoring or maintaining nutritional needs  Description: Monitor and assess patient's nutrition/hydration status for malnutrition  Collaborate with interdisciplinary team and initiate plan and interventions as ordered  Monitor patient's weight and dietary intake as ordered or per policy  Utilize nutrition screening tool and intervene as necessary  Determine patient's food preferences and provide high-protein, high-caloric foods as appropriate       INTERVENTIONS:  - Monitor oral intake, urinary output, labs, and treatment plans  - Assess nutrition and hydration status and recommend course of action  - Evaluate amount of meals eaten  - Assist patient with eating if necessary   - Allow adequate time for meals  - Recommend/ encourage appropriate diets, oral nutritional supplements, and vitamin/mineral supplements  - Order, calculate, and assess calorie counts as needed  - Recommend, monitor, and adjust tube feedings and TPN/PPN based on assessed needs  - Assess need for intravenous fluids  - Provide specific nutrition/hydration education as appropriate  - Include patient/family/caregiver in decisions related to nutrition  Outcome: Progressing

## 2021-04-13 NOTE — ASSESSMENT & PLAN NOTE
Blood cultures from 04/12 growing Gram-positive rods  Blood cultures from 03/27/21 Corynebacterium striatum  Currently on cefepime and Vanco  ID on board, appreciate recommendation  Echo showed anterior mitral leaflet vegetation, CHARLES ordered

## 2021-04-13 NOTE — PROGRESS NOTES
[facility] referral received today for consideration of patient for inpatient acute rehab  Patient known to [facility], however made minimal progression while on [facility]   Reviewed patient's case with our physician this morning, and they are recommending referral to [facility] for slower paced therapy needs, as patient unable to tolerate aggressive rehab program

## 2021-04-13 NOTE — ASSESSMENT & PLAN NOTE
Wt Readings from Last 3 Encounters:   04/13/21 74 1 kg (163 lb 6 4 oz)   04/05/21 78 6 kg (173 lb 4 5 oz)   03/27/21 72 kg (158 lb 11 7 oz)     Echo in 03/29/2021 showed ejection fraction 65%, mild MR, mild TR  Lasix 80 mg IV t i d  was switched to Bumex 2 mg IV t i d  4/13  4 6 L negative balance since admission  Ortez catheter in place, prn irrigation  Monitor kidney function  Input and output, daily weight

## 2021-04-13 NOTE — PLAN OF CARE
Problem: PHYSICAL THERAPY ADULT  Goal: Performs mobility at highest level of function for planned discharge setting  See evaluation for individualized goals  Description: Treatment/Interventions: Functional transfer training, LE strengthening/ROM, Therapeutic exercise, Endurance training, Cognitive reorientation, Patient/family training, Equipment eval/education, Bed mobility, Gait training          See flowsheet documentation for full assessment, interventions and recommendations  Outcome: Progressing  Note: Prognosis: Fair  Problem List: Decreased strength, Decreased endurance, Impaired balance, Decreased mobility, Decreased cognition, Decreased safety awareness, Orthopedic restrictions  Assessment: Pt demonstrating medical progress with decreased O2 needs, O2 sats stable on 4L with activity today  Pt continues to require mod-maxA x2 for OOB mobility, significant posterior lean  Spouse present during session today and states this is far from progress made at acute rehab  Pt with limited arousal, required constant verbal and tactile cues to maintain attention to LE exercises  Pt will benefit from continued skilled PT intervention during course of hospital stay to improve strength, endurance and balance to improve safety with functional mobility  Recommend IP rehab upon hospital D/C  PT Discharge Recommendation: Post-Acute Rehabilitation Services     PT - OK to Discharge: Yes(to IP rehab)    See flowsheet documentation for full assessment

## 2021-04-13 NOTE — ASSESSMENT & PLAN NOTE
Lab Results   Component Value Date    HGBA1C 7 7 (H) 02/05/2021       Recent Labs     04/12/21 2058 04/13/21  0719 04/13/21  1121 04/13/21  1613   POCGLU 185* 136 226* 272*       Blood Sugar Average: Last 72 hrs:  (P) 911 [id number]   Endo recommendations appreciated  On insulin pump OP  On SQ insulin IP  Currently on Lantus 5 units at bedtime and Humalog 2 units t i d   With meal

## 2021-04-14 PROBLEM — J90 BILATERAL PLEURAL EFFUSION: Status: ACTIVE | Noted: 2021-01-01

## 2021-04-14 NOTE — CASE MANAGEMENT
CM confirmed that [facility] is following the Pt for possible placement at facility  Pt's wife Xiang Melton is agreeable to [facility] placement  CM spoke with both her and the Pt on 4/13/21 to confirm  Pt will wait till the Pt is medically stable to DC and is following closely

## 2021-04-14 NOTE — PLAN OF CARE
Problem: Potential for Falls  Goal: Patient will remain free of falls  Description: INTERVENTIONS:  - Assess patient frequently for physical needs  -  Identify cognitive and physical deficits and behaviors that affect risk of falls  -  [place] fall precautions as indicated by assessment   - Educate patient/family on patient safety including physical limitations  - Instruct patient to call for assistance with activity based on assessment  - Modify environment to reduce risk of injury  - Consider OT/PT consult to assist with strengthening/mobility  Outcome: Progressing     Problem: PAIN - ADULT  Goal: Verbalizes/displays adequate comfort level or baseline comfort level  Description: Interventions:  - Encourage patient to monitor pain and request assistance  - Assess pain using appropriate pain scale  - Administer analgesics based on type and severity of pain and evaluate response  - Implement non-pharmacological measures as appropriate and evaluate response  - Consider cultural and social influences on pain and pain management  - Notify physician/advanced practitioner if interventions unsuccessful or patient reports new pain  Outcome: Progressing     Problem: SAFETY ADULT  Goal: Patient will remain free of falls  Description: INTERVENTIONS:  - Assess patient frequently for physical needs  -  Identify cognitive and physical deficits and behaviors that affect risk of falls    -  [place] fall precautions as indicated by assessment   - Educate patient/family on patient safety including physical limitations  - Instruct patient to call for assistance with activity based on assessment  - Modify environment to reduce risk of injury  - Consider OT/PT consult to assist with strengthening/mobility  Outcome: Progressing  Goal: Maintain or return to baseline ADL function  Description: INTERVENTIONS:  -  Assess patient's ability to carry out ADLs; assess patient's baseline for ADL function and identify physical deficits which impact ability to perform ADLs (bathing, care of mouth/teeth, toileting, grooming, dressing, etc )  - Assess/evaluate cause of self-care deficits   - Assess range of motion  - Assess patient's mobility; develop plan if impaired  - Assess patient's need for assistive devices and provide as appropriate  - Encourage maximum independence but intervene and supervise when necessary  - Involve family in performance of ADLs  - Assess for home care needs following discharge   - Consider OT consult to assist with ADL evaluation and planning for discharge  - Provide patient education as appropriate  Outcome: Progressing  Goal: Maintain or return mobility status to optimal level  Description: INTERVENTIONS:  - Assess patient's baseline mobility status (ambulation, transfers, stairs, etc )    - Identify cognitive and physical deficits and behaviors that affect mobility  - Identify mobility aids required to assist with transfers and/or ambulation (gait belt, sit-to-stand, lift, walker, cane, etc )  - [place] fall precautions as indicated by assessment  - Record patient progress and toleration of activity level on Mobility SBAR; progress patient to next Phase/Stage  - Instruct patient to call for assistance with activity based on assessment  - Consider rehabilitation consult to assist with strengthening/weightbearing, etc   Outcome: Progressing     Problem: DISCHARGE PLANNING  Goal: Discharge to home or other facility with appropriate resources  Description: INTERVENTIONS:  - Identify barriers to discharge w/patient and caregiver  - Arrange for needed discharge resources and transportation as appropriate  - Identify discharge learning needs (meds, wound care, etc )  - Arrange for interpretive services to assist at discharge as needed  - Refer to Case Management Department for coordinating discharge planning if the patient needs post-hospital services based on physician/advanced practitioner order or complex needs related to functional status, cognitive ability, or social support system  Outcome: Progressing     Problem: Knowledge Deficit  Goal: Patient/family/caregiver demonstrates understanding of disease process, treatment plan, medications, and discharge instructions  Description: Complete learning assessment and assess knowledge base  Interventions:  - Provide teaching at level of understanding  - Provide teaching via preferred learning methods  Outcome: Progressing     Problem: Prexisting or High Potential for Compromised Skin Integrity  Goal: Skin integrity is maintained or improved  Description: INTERVENTIONS:  - Identify patients at risk for skin breakdown  - Assess and monitor skin integrity  - Assess and monitor nutrition and hydration status  - Monitor labs   - Assess for incontinence   - Turn and reposition patient  - Assist with mobility/ambulation  - Relieve pressure over bony prominences  - Avoid friction and shearing  - Provide appropriate hygiene as needed including keeping skin clean and dry  - Evaluate need for skin moisturizer/barrier cream  - Collaborate with interdisciplinary team   - Patient/family teaching  - Consider wound care consult   Outcome: Progressing     Problem: Nutrition/Hydration-ADULT  Goal: Nutrient/Hydration intake appropriate for improving, restoring or maintaining nutritional needs  Description: Monitor and assess patient's nutrition/hydration status for malnutrition  Collaborate with interdisciplinary team and initiate plan and interventions as ordered  Monitor patient's weight and dietary intake as ordered or per policy  Utilize nutrition screening tool and intervene as necessary  Determine patient's food preferences and provide high-protein, high-caloric foods as appropriate       INTERVENTIONS:  - Monitor oral intake, urinary output, labs, and treatment plans  - Assess nutrition and hydration status and recommend course of action  - Evaluate amount of meals eaten  - Assist patient with eating if necessary   - Allow adequate time for meals  - Recommend/ encourage appropriate diets, oral nutritional supplements, and vitamin/mineral supplements  - Order, calculate, and assess calorie counts as needed  - Recommend, monitor, and adjust tube feedings and TPN/PPN based on assessed needs  - Assess need for intravenous fluids  - Provide specific nutrition/hydration education as appropriate  - Include patient/family/caregiver in decisions related to nutrition  Outcome: Progressing

## 2021-04-14 NOTE — PROCEDURES
Thoracentesis    Date/Time: 4/14/2021 10:43 AM  Performed by: Amber Sabillon MD  Authorized by: Amber Sabillon MD     Patient location:  Bedside  Consent:     Consent obtained:  Verbal and written    Consent given by:  Patient and spouse    Risks discussed:  Bleeding, infection, pneumothorax, pain, nerve damage and incomplete drainage    Alternatives discussed:  No treatment and delayed treatment  Universal protocol:     Procedure explained and questions answered to patient or proxy's satisfaction: yes      Relevant documents present and verified: yes      Test results available and properly labeled: yes      Radiology Images displayed and confirmed  If images not available, report reviewed: yes      Required blood products, implants, devices and special equipment available: yes      Site/side marked: yes      Immediately prior to procedure a time out was called: yes      Patient identity confirmed:  Verbally with patient and arm band  Indications:     Procedure Purpose: diagnostic and therapeutic      Indications: pleural effusion    Anesthesia (see MAR for exact dosages): Anesthesia method:  Local infiltration    Local anesthetic:  Lidocaine 1% w/o epi  Procedure details:     Preparation: Patient was prepped and draped in usual sterile fashion      Standard thoracentesis cath kit used: Yes      Patient position:  Sitting    Laterality:  Left    Location:  Midscapular line    Intercostal space:  7th    Puncture method:  Over-the-needle catheter    Ultrasound guidance: yes      Reason for ultrasound: Identify fluid collection and guide catheter placement        Ultrasound image availability:  Not saved    Sterile ultrasound techniques: Sterile gel and sterile probe covers were used      Indwelling catheter placed: no      Number of attempts:  1    Needle gauge:  22    Catheter size:  8 Fr    Drainage color:  Yellow    Drainage characteristics:  Clear    Fluid removed amount:  1200cc  Post-procedure details: Post-procedure chest x-ray: pending       Patient tolerance of procedure:   Tolerated well, no immediate complications

## 2021-04-14 NOTE — PROGRESS NOTES
[facility] 50 PROGRESS NOTE   Soheila Cortes 76 y o  male MRN: [id number]  Unit/Bed#: [facility] 803-01 Encounter: [id number]  Reason for Consult: KAZ    ASSESSMENT and PLAN:  1  Acute kidney injury:  · Transferred from [facility] on 4/10/21 with an SCr of 2 20  · Creatinine was 2 12 to 2 38 from 4/7 to 4/11 and increased to around 2 6 to 2 7    · SCr has been stable at 2 6 to 2 7 the past 3 days  · Etiology appears to be cardiorenal syndrome  There may be an element of ATN given granular casts on UA  · Respiratory failure improved with diuresis  Currently on Bumex 2 mg IV q8H  2  Chronic kidney disease, stage IIIB:  · Baseline creatinine around 1 2-1 5  · Follows with Dr Gerri Tyson  3  Diastolic CHF:  · Switched from Furosemide to Bumex 2 mg IV TID by cards - no objection  · Keep O>I    · Continue     4  Hypertension:  · BP controlled  · Not on BP meds  Only on diuretics  5  Respiratory failure:  · Felt to be due to pneumonia and fluid overload  · On abx and diuresis  6  Gm (+) rods bacteremia:  · On Abx per ID       7  Hyponatremia:  · Mild  Continue fluid restriction  · Stable Na 136    8  Low bicarbonate:  · Latest VBG indicative of respiratory alkalosis  · Sodium bicarbonate stopped on 4/13/21  · CO2 is stable  9  Leukocytosis:  · Being treated for pneumonia and bacteremia  · Improving on abx  10  Encephalopathy  11  Pleural effusions  12  Anemia: per SLIM  DISPOSITION:  · Stable renal function - albeit above baseline creatinine  · Continue Bumex 2 mg IV TID per cardiology  Wife, Claudine Prater, updated over the phone  SUBJECTIVE / 24H INTERVAL HISTORY:  Much more awake today  Oxygen requirements down to 4L via NC per chart - was 2L when I saw him  Denies any CP or SOB       OBJECTIVE:  Current Weight: Weight - Scale: 73 1 kg (161 lb 2 5 oz)  Vitals:    04/14/21 0255 04/14/21 0402 04/14/21 0548 04/14/21 0807   BP: 108/63 108/63  109/62   Pulse: 86 86  91   Resp:    16 Temp: 97 7 °F (36 5 °C)   97 6 °F (36 4 °C)   TempSrc:       SpO2: 91% 96%  91%   Weight:   73 1 kg (161 lb 2 5 oz)    Height:           Intake/Output Summary (Last 24 hours) at 4/14/2021 0916  Last data filed at 4/14/2021 0255  Gross per 24 hour   Intake 410 ml   Output 1675 ml   Net -1265 ml     General: conscious, cooperative, no distress  Skin: dry  Eyes: pale conjunctivae  ENT: moist mucous membranes  Chest/Lungs: equal chest expansion, (+) crackles in bases - seems better  CVS: distinct heart sounds, normal rate, regular rhythm, no rub  Abdomen: soft, non tender, non distended, normal bowel sounds  Extremities: (+) trace LE edema  : (+) alegria catheter  Neuro: awake, alert  Psych: more appropriate       Medications:    Current Facility-Administered Medications:     acetaminophen (TYLENOL) tablet 650 mg, 650 mg, Oral, Q6H PRN, Sharon Pryor, DO    aspirin chewable tablet 81 mg, 81 mg, Oral, Daily, Sharon Pryor, DO, 81 mg at 04/14/21 0802    atorvastatin (LIPITOR) tablet 5 mg, 5 mg, Oral, Daily, Sharon Pryor, DO, 5 mg at 04/14/21 0802    bisacodyl (DULCOLAX) rectal suppository 10 mg, 10 mg, Rectal, Daily PRN, Sharon Pryor, DO    bumetanide (BUMEX) injection 2 mg, 2 mg, Intravenous, Q8H, Kamala De La Cruz, DO, 2 mg at 04/14/21 0404    cefepime (MAXIPIME) 1,000 mg in dextrose 5 % 50 mL IVPB, 1,000 mg, Intravenous, Q12H, Wood Alcaraz MD, Stopped at 04/13/21 2200    cholecalciferol (VITAMIN D3) tablet 4,000 Units, 4,000 Units, Oral, Daily, Sharon Pryor, DO, 4,000 Units at 04/14/21 0802    docusate sodium (COLACE) capsule 100 mg, 100 mg, Oral, BID, Sharon Pryor, DO, 100 mg at 04/13/21 1719    ferrous sulfate tablet 325 mg, 325 mg, Oral, Daily With Breakfast, Sharon Pryor, DO, 325 mg at 04/13/21 0826    heparin (porcine) subcutaneous injection 5,000 Units, 5,000 Units, Subcutaneous, Q8H [address], Sharon Pryor, DO, 5,000 Units at 04/14/21 0548    insulin glargine (LANTUS) subcutaneous injection 5 Units 0 05 mL, 5 Units, Subcutaneous, HS, Debbie Ferreira MD, 5 Units at 04/13/21 2129    insulin lispro (HumaLOG) 100 units/mL subcutaneous injection 1-5 Units, 1-5 Units, Subcutaneous, TID AC, 3 Units at 04/13/21 1719 **AND** Fingerstick Glucose (POCT), , , TID AC, Lincoln Norman,     insulin lispro (HumaLOG) 100 units/mL subcutaneous injection 1-5 Units, 1-5 Units, Subcutaneous, HS, Lincoln Norman DO, 2 Units at 04/13/21 2130    insulin lispro (HumaLOG) 100 units/mL subcutaneous injection 2 Units, 2 Units, Subcutaneous, TID With Meals, Debbie Ferreira MD, 2 Units at 04/14/21 0808    levothyroxine tablet 125 mcg, 125 mcg, Oral, Early Morning, Lincoln Norman DO, 125 mcg at 04/13/21 0544    melatonin tablet 3 mg, 3 mg, Oral, HS PRN, Lul Wilson PA-C, 3 mg at 04/12/21 2101    ondansetron (ZOFRAN) injection 4 mg, 4 mg, Intravenous, Q6H PRN, Lincoln Norman DO    pantoprazole (PROTONIX) EC tablet 40 mg, 40 mg, Oral, Daily Before Breakfast, Lincoln Norman DO, 40 mg at 04/13/21 0612    polyethylene glycol (MIRALAX) packet 17 g, 17 g, Oral, Daily PRN, Lincoln Norman DO    [facility]) capsule 0 4 mg, 0 4 mg, Oral, Daily With Dinner, Lincoln Norman DO, 0 4 mg at 04/13/21 1719    vancomycin (VANCOCIN) IVPB (premix in dextrose) 750 mg 150 mL, 750 mg, Intravenous, Q24H, Arya Phillip MD, Last Rate: 150 mL/hr at 04/13/21 1131, 750 mg at 04/13/21 1131    Laboratory Results:  Results from last 7 days   Lab Units 04/14/21  0403 04/13/21  0648 04/12/21  0455 04/11/21  0601 04/11/21  0356 04/10/21  0829 04/10/21  0821 04/10/21  0508 04/09/21  0855   WBC Thousand/uL 14 83* 19 24* 16 69*  --  15 19*  --  15 67* 15 81* 18 45*   HEMOGLOBIN g/dL 7 3* 7 9* 7 4*  --  7 8*  --  7 9* 8 0* 8 1*   I STAT HEMOGLOBIN g/dl  --   --   --   --   --  8 2*  --   --   --    HEMATOCRIT % 22 6* 25 3* 23 1*  --  24 6*  --  24 1* 25 1* 25 0*   HEMATOCRIT, ISTAT %  --   --   --   --   --  24*  --   --   --    PLATELETS Thousands/uL 136* 147* 157  --  151  --  168 168 156   POTASSIUM mmol/L 3 2* 3 7 3 8 4 8 5 5*  --   --  4 4 4 4   CHLORIDE mmol/L 99* 103 103 103 104  --   --  105 101   CO2 mmol/L 28 19* 25 23 25  --   --  23 22   CO2, I-STAT mmol/L  --   --   --   --   --  22  --   --   --    BUN mg/dL 58* 55* 53* 51* 48*  --   --  44* 43*   CREATININE mg/dL 2 64* 2 68* 2 62* 2 37* 2 35*  --   --  2 20* 2 28*   CALCIUM mg/dL 7 7* 7 9* 8 4 8 2* 7 9*  --   --  7 9* 7 6*   MAGNESIUM mg/dL  --   --   --   --  2 1  --   --   --   --    PHOSPHORUS mg/dL 4 4*  --   --   --  3 6  --   --   --   --    GLUCOSE, ISTAT mg/dl  --   --   --   --   --  176*  --   --   --

## 2021-04-14 NOTE — PROGRESS NOTES
Progress Note - Infectious Disease   Cammie Chicas 76 y o  male MRN: [id number]  Unit/Bed#: [facility] 803-01 Encounter: [id number]      Impression/Recommendations:  1  Sepsis, POA, presented with leukocytosis and tachypnea  Source of sepsis is most likely pneumonia and bacteremia  Patient is clinically improved  Temperature stays down  WBC decreasing  Fortunately, patient has remained hemodynamically stable, without hypotension  He is clinically improved  Antibiotic plan as in below  Monitor temperature/WBC  Monitor hemodynamics      2   Corynebacterium stratum bacteremia  During recent admission, patient had Corynebacterium in admission blood cultures but not in repeat blood cultures  Therefore, this was felt to be contaminant  However, given recurrent Corynebacterium striatum, this is likely be true bacteremia  Source is most likely the leg and foot ulcer  In addition, given presence of ppm, we need to consider the possibility of ppm infection, especially since Corynebacterium is very poorly pathogenic and is usually seen in hardware infection  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  However, ppm leads were not visualized  Patient should get ppm and lead extraction  Continue IV vancomycin  Follow-up on final blood culture results  Repeat blood cultures today  Recommend ppm and lead extraction      3   MV endocarditis, 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  In addition to endocarditis, I a m  Also concerned about the possibility of ppm infection, for reasons outlined above  Patient will need long-term IV antibiotic  He should get CT surgery evaluation regarding the need for MVR  He should get EP Cardiology evaluation regarding ppm and lead extraction  Antibiotic plan as in above  Treat x6 weeks from clearance of bacteremia  Recommend CT surgery evaluation regarding need for MVR    Recommend EP Cardiology evaluation regarding need for ppm and lead extraction  4  Pneumonia, confirm on chest CT  Patient is clinically improved  I suspect pneumonia is independent of Corynebacterium bacteremia above  Continue IV cefepime  Monitor respiratory symptoms  Monitor temperature/WBC  Treat x7 days total     5  Acute hypoxic respiratory failure  Patient is clinically improved on antibiotic  O2 support decreasing  Antibiotic plan as in above  O2 support and weaning per primary and Pulmonary service  Monitor respiratory symptoms      6  KAZ, superimposed on CKD, most likely secondary to sepsis  Creatinine is stable  Antibiotic dosages adjusted accordingly  Monitor creatinine      7  Right lower leg ulcer, with possible mild cellulitis on exam   This may be source of corynebacterium bacteremia above  Antibiotic plan as in above  Serial exams      8  Encephalopathy, most likely multifactorial, with sepsis and hypoxia contributing  Mental status is much improved  No clinical signs CNS infection  Monitor mental status      9  DM, somewhat poorly controlled, with elevated hemoglobin A1c  Management per primary service      10  Recent COVID, with chronic symptoms  No evidence of active infection  No COVID specific treatment needed  No isolation needed      Discussed with patient in detail regarding the above plan  Discussed with Dr Damari Arreola from [facility] service  Antibiotics:  Vancomycin/cefepime # 3     Subjective:  Patient is much more awake and alert today  Dyspnea is improved  Cough mild, mostly nonproductive  Temperature stays down  No chills  Patient is tolerating antibiotics well  No nausea, vomiting or diarrhea      Objective:  Vitals:  Temp:  [97 6 °F (36 4 °C)-97 9 °F (36 6 °C)] 97 9 °F (36 6 °C)  HR:  [86-91] 88  Resp:  [16-20] 16  BP: (105-110)/(59-64) 106/59  SpO2:  [90 %-96 %] 93 %  Temp (24hrs), Av 7 °F (36 5 °C), Min:97 6 °F (36 4 °C), Max:97 9 °F (36 6 °C)  Current: Temperature: 97 9 °F (36 6 °C)    Physical Exam:     General: Much more awake and alert, cooperative, no distress  Neck:  Supple  No mass  No lymphadenopathy  Lungs: Decreased breath sounds, sparse basilar rales, no wheezing, respirations unlabored  Heart:  Regular rate and rhythm, S1 and S2 normal, no murmur  Abdomen: Soft, nondistended, non-tender, bowel sounds active all four quadrants, no masses, no organomegaly  Extremities: Trace leg edema  Stable superficial ulcers, without purulence  No erythema/warmth  Minimal tenderness  Skin:  No rash  Neuro: Moves all extremities  Invasive Devices     Peripheral Intravenous Line            Peripheral IV 04/11/21 Left;Ventral (anterior); Upper Arm 3 days          Drain            Urethral Catheter 16 Fr  1 day                Labs studies:   I have personally reviewed pertinent labs  Results from last 7 days   Lab Units 04/14/21  0403 04/13/21  0648 04/12/21  0455  04/10/21  0829   POTASSIUM mmol/L 3 2* 3 7 3 8   < >  --    CHLORIDE mmol/L 99* 103 103   < >  --    CO2 mmol/L 28 19* 25   < >  --    CO2, I-STAT mmol/L  --   --   --   --  22   BUN mg/dL 58* 55* 53*   < >  --    CREATININE mg/dL 2 64* 2 68* 2 62*   < >  --    EGFR ml/min/1 73sq m 23 22 23   < >  --    GLUCOSE, ISTAT mg/dl  --   --   --   --  176*   CALCIUM mg/dL 7 7* 7 9* 8 4   < >  --     < > = values in this interval not displayed  Results from last 7 days   Lab Units 04/14/21  0403 04/13/21  0648 04/12/21  0455   WBC Thousand/uL 14 83* 19 24* 16 69*   HEMOGLOBIN g/dL 7 3* 7 9* 7 4*   PLATELETS Thousands/uL 136* 147* 157     Results from last 7 days   Lab Units 04/14/21  0416 04/14/21  0410 04/12/21  0951 04/12/21  0942 04/12/21  0935   BLOOD CULTURE  Received in Microbiology Lab  Culture in Progress  Received in Microbiology Lab  Culture in Progress   Corynebacterium striatum*  --  Corynebacterium striatum*   GRAM STAIN RESULT   --   --  Gram positive rods*  --  Gram positive rods*   MRSA CULTURE ONLY   --   --   --  No Methicillin Resistant Staphlyococcus aureus (MRSA) isolated  --        Imaging Studies:   I have personally reviewed pertinent imaging study reports and images in PACS  Chest CT reviewed personally  LINDA consolidation  Large bilateral pleural effusion with atelectasis  EKG, Pathology, and Other Studies:   I have personally reviewed pertinent reports

## 2021-04-14 NOTE — PROGRESS NOTES
[facility]  Progress Note - Soheila Cortes 1945, 76 y o  male MRN: [id number]  Unit/Bed#: [facility] 803-01 Encounter: [id number]  Primary Care Provider: See Granado MD   Date and time admitted to hospital: 4/10/2021 10:27 AM    * Acute respiratory failure with hypoxia [facility])  Assessment & Plan  POA suspect secondary to acute on chronic diastolic heart failure, Srini on CKD stage 3, b/l pleural effusion, Hcap, recent COVID infection in February  RRT at [facility] needing 10L O2    CT 4/13 showed dense airspace consolidation throughout much of the left upper lobe with lesser airspace infiltrates, subsegmental in the right upper lobe, does not have a typical pattern of CHF, possible infectious etiology    Large dependent bilateral pleural effusions  Weaned down from 10 L to 4 L  Negative balance 4 6 L since admission  Diuresis : lasix 80 mg q8 hours, transitioned to Bumex 2 mg IV tid 4/13  Continue cefepime, 7 days of antibiotics  Status post left sided thoracocentesis, 1 2 L removed  Goal oxygen saturation 89% and above  Monitor in and output  Daily weights    Acute on chronic diastolic heart failure (HCC)  Assessment & Plan  Wt Readings from Last 3 Encounters:   04/13/21 74 1 kg (163 lb 6 4 oz)   04/05/21 78 6 kg (173 lb 4 5 oz)   03/27/21 72 kg (158 lb 11 7 oz)     Echo in 03/29/2021 showed ejection fraction 65%, mild MR, mild TR  Lasix 80 mg IV t i d  was switched to Bumex 2 mg IV t i d  4/13  4 6 L negative balance since admission  Ortez catheter in place, prn irrigation  Monitor kidney function  Input and output, daily weight        HCAP (healthcare-associated pneumonia)  Assessment & Plan  Started on vanc and cefepime for possible Hcap  Blood cultures growing Gram-positive rods, blood cultures on 3/27 growing Corynebacterium striatum   Continue antibiotics for total of 7 days for pneumonia        Bacteremia  Assessment & Plan  Blood cultures from 04/12 growing Gram-positive rods  Blood cultures from 03/27/21 Corynebacterium striatum  Continue IV Vanco  Source could be right foot ulcer  ID on board, appreciate recommendation  Echo showed anterior mitral leaflet vegetation  CHARLES scheduled for today      Encephalopathy  Assessment & Plan  Multifactorial suspect in the setting of heart failure, kidney failure, bacteremia, Hcap  Improved today  Reorient, sleep-wake cycle      KAZ on CKD stage 3  Assessment & Plan  POA  Baseline creatinine from 1 2-1 5  Suspect cardiorenal, component of ATN  Creatinine today 2 64  Nephrology following    Bilateral pleural effusion  Assessment & Plan  CT showed large dependent bilateral pleural effusions associated with atelectasis of the lower lobes  Status post left-sided thoracocentesis by Pulm, removed 1 2 L    Acidosis  Assessment & Plan  Patient is being treated with bicarb by nephrology    Hyponatremia  Assessment & Plan  Sodium 136, resolved  Continue fluid restriction  Off salt tabs    Complete heart block   Assessment & Plan  Patient has a history of complete heart block, status post dual-chamber ppm  Echo showed possible mitral valve vegetation   CHARLES pending    Essential hypertension  Assessment & Plan  Bumex 2 mg IV t i d   Monitor blood pressure    Type I diabetes mellitus (HCC)  Assessment & Plan  Lab Results   Component Value Date    HGBA1C 7 7 (H) 02/05/2021       Recent Labs     04/12/21  2058 04/13/21  0719 04/13/21  1121 04/13/21  1613   POCGLU 185* 136 226* 272*       Blood Sugar Average: Last 72 hrs:  (P) 690 [id number]   Endo recommendations appreciated  On insulin pump OP  On SQ insulin IP  Currently on Lantus 5 units at bedtime and Humalog 2 units t i d   With meal    Anemia of chronic disease  Assessment & Plan  In the past 2 weeks has been range of 7 and 8  Hemoglobin today 7 3  Continue ferrous sulfate        VTE Pharmacologic Prophylaxis:   Pharmacologic: Heparin  Mechanical VTE Prophylaxis in Place: Yes    Patient Centered Rounds: I have performed bedside rounds with nursing staff today  Discussions with Specialists or Other Care Team Provider:  Infectious Disease, pulmonology, Cardiology    Education and Discussions with Family / Patient:  Patient    Time Spent for Care: 30 minutes  More than 50% of total time spent on counseling and coordination of care as described above  Current Length of Stay: 4 day(s)    Current Patient Status: Inpatient   Certification Statement: The patient will continue to require additional inpatient hospital stay due to Above    Discharge Plan: To be determined    Code Status: Level 1 - Full Code      Subjective:   Patient was seen and evaluated bedside, he says he is feeling much better since yesterday    Objective:     Vitals:   Temp (24hrs), Av 6 °F (36 4 °C), Min:97 3 °F (36 3 °C), Max:97 8 °F (36 6 °C)    Temp:  [97 3 °F (36 3 °C)-97 8 °F (36 6 °C)] 97 6 °F (36 4 °C)  HR:  [86-91] 90  Resp:  [16-20] 16  BP: (102-110)/(59-64) 110/63  SpO2:  [90 %-96 %] 93 %  Body mass index is 24 5 kg/m²  Input and Output Summary (last 24 hours): Intake/Output Summary (Last 24 hours) at 2021 1400  Last data filed at 2021 1301  Gross per 24 hour   Intake 810 ml   Output 925 ml   Net -115 ml       Physical Exam:     Physical Exam  Constitutional:       General: He is not in acute distress  Appearance: He is ill-appearing  HENT:      Head: Atraumatic  Neck:      Musculoskeletal: Neck supple  Cardiovascular:      Rate and Rhythm: Normal rate and regular rhythm  Heart sounds: No murmur  No friction rub  No gallop  Pulmonary:      Effort: Pulmonary effort is normal  No respiratory distress  Breath sounds: Normal breath sounds  No wheezing  Abdominal:      General: Bowel sounds are normal  There is no distension  Palpations: Abdomen is soft  Tenderness: There is no abdominal tenderness  Musculoskeletal:         General: No swelling        Comments: Right foot wound   Skin:     General: Skin is warm and dry  Neurological:      General: No focal deficit present  Mental Status: He is alert  Psychiatric:         Mood and Affect: Mood normal          Additional Data:     Labs:    Results from last 7 days   Lab Units 04/14/21  0403   WBC Thousand/uL 14 83*   HEMOGLOBIN g/dL 7 3*   HEMATOCRIT % 22 6*   PLATELETS Thousands/uL 136*   NEUTROS PCT % 87*   LYMPHS PCT % 5*   MONOS PCT % 7   EOS PCT % 0     Results from last 7 days   Lab Units 04/14/21  0403   SODIUM mmol/L 136   POTASSIUM mmol/L 3 2*   CHLORIDE mmol/L 99*   CO2 mmol/L 28   BUN mg/dL 58*   CREATININE mg/dL 2 64*   ANION GAP mmol/L 9   CALCIUM mg/dL 7 7*   GLUCOSE RANDOM mg/dL 212*         Results from last 7 days   Lab Units 04/14/21  1118 04/14/21  0803 04/13/21  2106 04/13/21  1613 04/13/21  1121 04/13/21  0719 04/12/21  2058 04/12/21  1523 04/12/21  1056 04/12/21  0758 04/11/21  2104 04/11/21  1704   POC GLUCOSE mg/dl 239* 242* 281* 272* 226* 136 185* 193* 133 74 246* 206*         Results from last 7 days   Lab Units 04/12/21  0455 04/11/21  0356 04/10/21  0813 04/10/21  0508 04/09/21  1217   LACTIC ACID mmol/L  --   --  1 7  --  1 3   PROCALCITONIN ng/ml 1 01* 0 73*  --  0 93* 1 08*           * I Have Reviewed All Lab Data Listed Above  * Additional Pertinent Lab Tests Reviewed: Tonya 66 Admission Reviewed    Imaging:    Imaging Reports Reviewed Today Include: all  Imaging Personally Reviewed by Myself Includes:      Recent Cultures (last 7 days):     Results from last 7 days   Lab Units 04/14/21  0416 04/14/21  0410 04/12/21  0951 04/12/21  0935   BLOOD CULTURE  Received in Microbiology Lab  Culture in Progress  Received in Microbiology Lab  Culture in Progress   Corynebacterium striatum* Corynebacterium striatum*   GRAM STAIN RESULT   --   --  Gram positive rods* Gram positive rods*       Last 24 Hours Medication List:   Current Facility-Administered Medications   Medication Dose Route Frequency Provider Last Rate    acetaminophen  650 mg Oral Q6H PRN Torie Perfect, DO      aspirin  81 mg Oral Daily Torie Perfect, DO      atorvastatin  5 mg Oral Daily Torie Perfect, DO      bisacodyl  10 mg Rectal Daily PRN Torie Perfect, DO      bumetanide  2 mg Intravenous Q8H Kamala De La Cruz, DO      cefepime  1,000 mg Intravenous Q12H Sonal Zamora MD 1,000 mg (04/14/21 1034)    cholecalciferol  4,000 Units Oral Daily Torie Perfect, DO      docusate sodium  100 mg Oral BID Torie Perfect, DO      ferrous sulfate  325 mg Oral Daily With Breakfast Torie Perfect, DO      heparin (porcine)  5,000 Units Subcutaneous [facility] Torie Perfect, DO      insulin glargine  5 Units Subcutaneous HS Seema Cooper MD      insulin lispro  1-5 Units Subcutaneous TID AC Torie Perfect, DO      insulin lispro  1-5 Units Subcutaneous HS Torie Perfect, DO      insulin lispro  2 Units Subcutaneous TID With Meals Seema Cooper MD      levothyroxine  125 mcg Oral Early Morning Torie Perfect, DO      melatonin  3 mg Oral HS PRN Johnathon Wilson PA-C      ondansetron  4 mg Intravenous Q6H PRN Torie Perfect, DO      pantoprazole  40 mg Oral Daily Before Breakfast Torie Perfect, DO      polyethylene glycol  17 g Oral Daily PRN Torie Perfect, DO      tamsulosin  0 4 mg Oral Daily With [facility], DO      vancomycin  750 mg Intravenous Q24H Sonal Zamora  mg (04/14/21 1129)        Today, Patient Was Seen By: Julio Cesar Stevenson MD    ** Please Note: Dictation voice to text software may have been used in the creation of this document   **

## 2021-04-14 NOTE — PROGRESS NOTES
Team 2 Cardiolo  Team 2 Cardiology - Progress Note  Ramesh Heredia 76 y o  male MRN: [id number]  Unit/Bed#: [facility] 803-01 Encounter: [id number]    Assessment:  Principal Problem:    Acute respiratory failure with hypoxia ([facility] [address] )  Active Problems:    Anemia of chronic disease    Type I diabetes mellitus ([facility] [address] )    Essential hypertension    Stage 3b chronic kidney disease    KAZ on CKD stage 3    Encephalopathy    Complete heart block     Hyponatremia    Bacteremia    Acute on chronic diastolic heart failure (HCC)    HCAP (healthcare-associated pneumonia)    Acidosis    #Acute on chronic diastolic heart failure  # acute on chronic kidney disease     Plan:  1  Acute on chronic diastolic heart failure   -Pt clinically still appears to be fluid overloaded  Continue on Bumex 2 mg TID    -Monitor BMP  Cre today was 2 64, yesterday 2 6 (baseline ~1 2-1 5)      2  Acute on chronic kidney disease:   -hold ACEi   -recs from Nephro appreciated     3  Sepsis:   -Currently on Vacoymycin   -Follow up CHARLES for further mass analysis          Subjective/Objective     Subjective: Pt was seen and examined at bedside  Pt was A+OX3  Currently sat at 92% on 2L 02  He has output ~1 2L in the last 24 hours  Echo yesterday revealed echogenic mobile mass 15 mm X 13 mm on the anterior leaflet of mitral valve         Objective:  Vitals: /63   Pulse 90   Temp 97 6 °F (36 4 °C)   Resp 16   Ht 5' 8" (1 727 m)   Wt 73 1 kg (161 lb 2 5 oz)   SpO2 93%   BMI 24 50 kg/m²   Vitals:    04/13/21 0600 04/14/21 0548   Weight: 74 1 kg (163 lb 6 4 oz) 73 1 kg (161 lb 2 5 oz)     Orthostatic Blood Pressures      Most Recent Value   Blood Pressure  110/63 filed at 04/14/2021 1120   Patient Position - Orthostatic VS  Lying filed at 04/12/2021 1508            Intake/Output Summary (Last 24 hours) at 4/14/2021 1216  Last data filed at 4/14/2021 0255  Gross per 24 hour   Intake 410 ml   Output 1675 ml   Net -1265 ml     Review of Systems Constitutional: Negative for chills and fever  HENT: Negative for ear pain and sore throat  Eyes: Negative for pain and visual disturbance  Respiratory: Negative for cough and shortness of breath  Cardiovascular: Negative for chest pain and palpitations  Gastrointestinal: Negative for abdominal pain and vomiting  Genitourinary: Negative for dysuria and hematuria  Musculoskeletal: Negative for arthralgias and back pain  Skin: Negative for color change and rash  Neurological: Negative for seizures and syncope  Psychiatric/Behavioral: Positive for confusion  All other systems reviewed and are negative  Physical Exam  Vitals signs and nursing note reviewed  Constitutional:       Appearance: He is well-developed  Comments: Pale in appearance    HENT:      Head: Normocephalic and atraumatic  Eyes:      Conjunctiva/sclera: Conjunctivae normal    Neck:      Musculoskeletal: Neck supple  Cardiovascular:      Rate and Rhythm: Normal rate and regular rhythm  Pulses: Normal pulses  Heart sounds: Normal heart sounds  No murmur  No gallop  Pulmonary:      Effort: Pulmonary effort is normal  No respiratory distress  Breath sounds: Normal breath sounds  Abdominal:      Palpations: Abdomen is soft  Tenderness: There is no abdominal tenderness  Musculoskeletal: Normal range of motion  Comments: R foot ulcer, wrapped in ace bandage   1+ pitting edema b/l      Skin:     General: Skin is warm and dry  Neurological:      Mental Status: He is alert           Medications:    Current Facility-Administered Medications:     acetaminophen (TYLENOL) tablet 650 mg, 650 mg, Oral, Q6H PRN, José Rosales DO    aspirin chewable tablet 81 mg, 81 mg, Oral, Daily, José Rosales DO, 81 mg at 04/14/21 0802    atorvastatin (LIPITOR) tablet 5 mg, 5 mg, Oral, Daily, José Rosales DO, 5 mg at 04/14/21 0802    bisacodyl (DULCOLAX) rectal suppository 10 mg, 10 mg, Rectal, Daily PRN, Shamir De La Cruz DO    bumetanide (BUMEX) injection 2 mg, 2 mg, Intravenous, Q8H, Kamala De La Cruz DO, 2 mg at 04/14/21 0404    cefepime (MAXIPIME) 1,000 mg in dextrose 5 % 50 mL IVPB, 1,000 mg, Intravenous, Q12H, Vania Crawford MD, Last Rate: 100 mL/hr at 04/14/21 1034, 1,000 mg at 04/14/21 1034    cholecalciferol (VITAMIN D3) tablet 4,000 Units, 4,000 Units, Oral, Daily, Shamir De La Cruz DO, 4,000 Units at 04/14/21 0802    docusate sodium (COLACE) capsule 100 mg, 100 mg, Oral, BID, Shamir De La Cruz DO, 100 mg at 04/13/21 1719    ferrous sulfate tablet 325 mg, 325 mg, Oral, Daily With Breakfast, Shaimr De La Cruz DO, 325 mg at 04/13/21 0826    heparin (porcine) subcutaneous injection 5,000 Units, 5,000 Units, Subcutaneous, Q8H [address], Shamir De La Cruz DO, 5,000 Units at 04/14/21 0548    insulin glargine (LANTUS) subcutaneous injection 5 Units 0 05 mL, 5 Units, Subcutaneous, HS, Ana Vickers MD, 5 Units at 04/13/21 2129    insulin lispro (HumaLOG) 100 units/mL subcutaneous injection 1-5 Units, 1-5 Units, Subcutaneous, TID AC, 3 Units at 04/13/21 1719 **AND** Fingerstick Glucose (POCT), , , TID AC, Shamir De La Cruz DO    insulin lispro (HumaLOG) 100 units/mL subcutaneous injection 1-5 Units, 1-5 Units, Subcutaneous, HS, Shamir De La Cruz DO, 2 Units at 04/13/21 2130    insulin lispro (HumaLOG) 100 units/mL subcutaneous injection 2 Units, 2 Units, Subcutaneous, TID With Meals, Ana Vickers MD, 2 Units at 04/14/21 0808    levothyroxine tablet 125 mcg, 125 mcg, Oral, Early Morning, Shamir De La Cruz DO, 125 mcg at 04/13/21 0544    melatonin tablet 3 mg, 3 mg, Oral, HS PRN, Zion Wilson PA-C, 3 mg at 04/12/21 2101    ondansetron (ZOFRAN) injection 4 mg, 4 mg, Intravenous, Q6H PRN, Shamir De La Cruz DO    pantoprazole (PROTONIX) EC tablet 40 mg, 40 mg, Oral, Daily Before Breakfast, Shamir De La Cruz DO, 40 mg at 04/13/21 0612    polyethylene glycol (MIRALAX) packet 17 g, 17 g, Oral, Daily PRN, Shamir De La Cruz DO   tamsulosin (FLOMAX) capsule 0 4 mg, 0 4 mg, Oral, Daily With Dinner, Chelle Ramirez, DO, 0 4 mg at 04/13/21 1719    vancomycin (VANCOCIN) IVPB (premix in dextrose) 750 mg 150 mL, 750 mg, Intravenous, Q24H, Arely An MD, Last Rate: 150 mL/hr at 04/14/21 1129, 750 mg at 04/14/21 1129    Lab Results:  Results from last 7 days   Lab Units 04/10/21  0813   TROPONIN I ng/mL 0 07*     Results from last 7 days   Lab Units 04/14/21  0403 04/13/21  0648 04/12/21  0455   WBC Thousand/uL 14 83* 19 24* 16 69*   HEMOGLOBIN g/dL 7 3* 7 9* 7 4*   HEMATOCRIT % 22 6* 25 3* 23 1*   PLATELETS Thousands/uL 136* 147* 157         Results from last 7 days   Lab Units 04/14/21  0403 04/13/21  0648 04/12/21  0455  04/10/21  0829   POTASSIUM mmol/L 3 2* 3 7 3 8   < >  --    CHLORIDE mmol/L 99* 103 103   < >  --    CO2 mmol/L 28 19* 25   < >  --    CO2, I-STAT mmol/L  --   --   --   --  22   BUN mg/dL 58* 55* 53*   < >  --    CREATININE mg/dL 2 64* 2 68* 2 62*   < >  --    CALCIUM mg/dL 7 7* 7 9* 8 4   < >  --    GLUCOSE, ISTAT mg/dl  --   --   --   --  176*    < > = values in this interval not displayed           Results from last 7 days   Lab Units 04/11/21  0356   MAGNESIUM mg/dL 2 1       Maricarmen Padilla   [facility]

## 2021-04-14 NOTE — ASSESSMENT & PLAN NOTE
CT showed large dependent bilateral pleural effusions associated with atelectasis of the lower lobes  Status post left-sided thoracocentesis by Pulm, removed 1 2 L

## 2021-04-14 NOTE — QUICK NOTE
QUICK NOTE - Deterioration Index  Virginia Damon 76 y o  male MRN: [id number]  Unit/Bed#: [facility] 803-01 Encounter: [id number]    Date Paged: 21  Time Paged: [phone]  Room #: P803  Arrival Time: 08  Deterioration index score at time of page: 49 1  %- 70 9 at time of page  Spoke with primary RN from primary team  Need to escalate level of care: no     PROBLEMS resulting in high DI score:   24% Age is 75   22% Supplemental oxygen is Nasal cannula   13% Edilberto coma scale is 14   8% Cardiac rhythm is Paced   7% WBC count is 14 83 Thousand/uL   7% Hematocrit is 22 6 %   7% Pulse oximetry is 90 %   3% Blood pH is 7 441   2% Sodium is 136 mmol/L   2% Potassium is 3 2 mmol/L   2% BUN is 58 mg/dL       PLAN:     No change in care    Please contact critical care via [employer] with any questions or concerns       Vitals:   Vitals:    21 0255 21 0402 21 0548 21 0807   BP: 108/63 108/63  109/62   Pulse: 86 86  91   Resp:    16   Temp: 97 7 °F (36 5 °C)   97 6 °F (36 4 °C)   TempSrc:       SpO2: 91% 96%  91%   Weight:   73 1 kg (161 lb 2 5 oz)    Height:           Respiratory:  SpO2: SpO2: 91 %, SpO2 Activity: SpO2 Activity: At Rest, SpO2 Device: O2 Device: Nasal cannula  Nasal Cannula O2 Flow Rate (L/min): 4 L/min    Temperature: Temp (24hrs), Av 5 °F (36 4 °C), Min:96 5 °F (35 8 °C), Max:97 8 °F (36 6 °C)  Current: Temperature: 97 6 °F (36 4 °C)    Labs:   Results from last 7 days   Lab Units 21  0403 21  0648 21  0455   WBC Thousand/uL 14 83* 19 24* 16 69*   HEMOGLOBIN g/dL 7 3* 7 9* 7 4*   HEMATOCRIT % 22 6* 25 3* 23 1*   PLATELETS Thousands/uL 136* 147* 157   NEUTROS PCT % 87* 83* 85*   MONOS PCT % 7 11 10     Results from last 7 days   Lab Units 21  0403 21  0648 21  0455  04/10/21  0829   SODIUM mmol/L 136 134* 136   < >  --    POTASSIUM mmol/L 3 2* 3 7 3 8   < >  --    CHLORIDE mmol/L 99* 103 103   < >  --    CO2 mmol/L 28 19* 25   < >  --    CO2, I-STAT mmol/L  --   --   --   --  22   BUN mg/dL 58* 55* 53*   < >  --    CREATININE mg/dL 2 64* 2 68* 2 62*   < >  --    CALCIUM mg/dL 7 7* 7 9* 8 4   < >  --    GLUCOSE, ISTAT mg/dl  --   --   --   --  176*    < > = values in this interval not displayed       Results from last 7 days   Lab Units 04/11/21  0356   MAGNESIUM mg/dL 2 1     Results from last 7 days   Lab Units 04/10/21  0813 04/09/21  1217   LACTIC ACID mmol/L 1 7 1 3     Results from last 7 days   Lab Units 04/10/21  0813   TROPONIN I ng/mL 0 07*     Results from last 7 days   Lab Units 04/12/21  0455 04/11/21  0356 04/10/21  0508 04/09/21  1217   PROCALCITONIN ng/ml 1 01* 0 73* 0 93* 1 08*       Code Status: Level 1 - Full Code    Yany Matthews PA-C

## 2021-04-14 NOTE — PROGRESS NOTES
Vancomycin IV Pharmacy-to-Dose Consultation    Bonita Ruffin is a 76 y o  male who is currently receiving Vancomycin IV with management by the Pharmacy Consult service  Assessment/Plan:  The patient was reviewed  Renal function is stable and no signs or symptoms of nephrotoxicity and/or infusion reactions were documented in the chart  Based on todays assessment, continue current vancomycin (day # 3) dosing of 750 mg IV q24h, with a plan for trough to be drawn at 1030 on 4/15  We will continue to follow the patients culture results and clinical progress daily      Shweta Pruitt, Pharmacist

## 2021-04-14 NOTE — ANESTHESIA POSTPROCEDURE EVALUATION
Post-Op Assessment Note    CV Status:  Stable  Pain Score: 0    Pain management: adequate     Mental Status:  Alert and awake   Hydration Status:  Euvolemic   PONV Controlled:  Controlled   Airway Patency:  Patent      Post Op Vitals Reviewed: Yes      Staff: CRNA         No complications documented      BP   92/53 (69)   Temp      Pulse   83   Resp   17   SpO2   99%

## 2021-04-14 NOTE — PROGRESS NOTES
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 gram positive gonzalo bacteremia, planned for CHARLES   6  KAZ on CKD 3B  7  Hypertension  8  Hyponatremia  9  Anemia  10  COVID infection February 2021     Plan:  - Currently receiving  Bumex 2 mg q 8 hours and net -4 6 L since admission, would continue diuresis, nephro and cardiology following   -  Currently saturating mid 90s on 2 L nasal cannula, wean as tolerated, aggressive ics, out of bed to chair,  Goal SpO2 greater than 88%  -  continue broad-spectrum antibiotics vancomycin/cefepime for HCAP and Gram-positive bacteremia-  Would recommend 5-7 day course of antibiotics for pneumonia, but will need longer duration given bacteremia, possible endocarditis,  Planning for CHARLES  -  Infectious Disease following  - will discuss possible bedside vs IR drainage of bilateral pleural effusions - if bedside will start with thora of L       Subjective:   Patient seen and examined at bedside  More alert today  Saturating mid 90s on 2 L nasal cannula  Intermittent shortness of breath and nonproductive cough  Denies fever, chills, nausea, vomiting, chest pain  Review of Systems   Constitutional: Positive for fatigue  Negative for chills, fever and unexpected weight change  HENT: Negative for congestion, rhinorrhea, sneezing and sore throat  Respiratory: Positive for shortness of breath  Negative for cough and wheezing  Cardiovascular: Positive for leg swelling  Negative for chest pain and palpitations     Gastrointestinal: Negative for abdominal pain, constipation, diarrhea, nausea and vomiting  Endocrine: Negative for cold intolerance and heat intolerance  Genitourinary: Negative for dysuria  Musculoskeletal: Negative for arthralgias  Allergic/Immunologic: Negative for immunocompromised state  Neurological: Negative for dizziness and numbness  Objective:     Vitals:    04/14/21 0255 04/14/21 0255 04/14/21 0402 04/14/21 0548   BP: 108/63 108/63 108/63    Pulse:  86 86    Resp: 20      Temp: 97 7 °F (36 5 °C) 97 7 °F (36 5 °C)     TempSrc:       SpO2:  91% 96%    Weight:    73 1 kg (161 lb 2 5 oz)   Height:               Intake/Output Summary (Last 24 hours) at 4/14/2021 0805  Last data filed at 4/14/2021 0255  Gross per 24 hour   Intake 530 ml   Output 1675 ml   Net -1145 ml         Physical Exam  Constitutional:       General: He is not in acute distress  Appearance: He is well-developed  He is not diaphoretic  HENT:      Head: Normocephalic and atraumatic  Mouth/Throat:      Mouth: Mucous membranes are moist       Pharynx: Oropharynx is clear  No oropharyngeal exudate  Eyes:      General: No scleral icterus  Cardiovascular:      Rate and Rhythm: Normal rate and regular rhythm  Heart sounds: Normal heart sounds  No murmur  Comments: Left chest pacemaker  Pulmonary:      Effort: Pulmonary effort is normal  No respiratory distress  Breath sounds: No wheezing  Comments: Scattered rhonchi with diminished breath sounds at the bases  Abdominal:      General: Bowel sounds are normal  There is no distension  Palpations: Abdomen is soft  Tenderness: There is no abdominal tenderness  Musculoskeletal:      Right lower leg: Edema present  Left lower leg: Edema present  Neurological:      Mental Status: He is alert and oriented to person, place, and time  Labs: I have personally reviewed pertinent lab results    Results from last 7 days   Lab Units 04/14/21  0403 04/13/21  0648 04/12/21  0455   WBC Thousand/uL 14 83* 19 24* 16 69*   HEMOGLOBIN g/dL 7 3* 7 9* 7 4*   HEMATOCRIT % 22 6* 25 3* 23 1*   PLATELETS Thousands/uL 136* 147* 157   NEUTROS PCT % 87* 83* 85*   MONOS PCT % 7 11 10      Results from last 7 days   Lab Units 04/14/21  0403 04/13/21  0648 04/12/21  0455  04/10/21  0829   POTASSIUM mmol/L 3 2* 3 7 3 8   < >  --    CHLORIDE mmol/L 99* 103 103   < >  --    CO2 mmol/L 28 19* 25   < >  --    CO2, I-STAT mmol/L  --   --   --   --  22   BUN mg/dL 58* 55* 53*   < >  --    CREATININE mg/dL 2 64* 2 68* 2 62*   < >  --    CALCIUM mg/dL 7 7* 7 9* 8 4   < >  --    GLUCOSE, ISTAT mg/dl  --   --   --   --  176*    < > = values in this interval not displayed  Results from last 7 days   Lab Units 04/11/21  0356   MAGNESIUM mg/dL 2 1     Results from last 7 days   Lab Units 04/14/21  0403 04/11/21  0356   PHOSPHORUS mg/dL 4 4* 3 6          Results from last 7 days   Lab Units 04/10/21  0813   LACTIC ACID mmol/L 1 7     0   Lab Value Date/Time    TROPONINI 0 07 (H) 04/10/2021 0813    TROPONINI 0 32 (H) 03/27/2021 2317    TROPONINI 0 38 (H) 03/27/2021 2024    TROPONINI 0 47 (H) 03/27/2021 1619    TROPONINI <0 02 03/24/2021 1643    TROPONINI 0 08 (H) 02/06/2021 0255    TROPONINI 0 09 (H) 02/05/2021 2350    TROPONINI 0 08 (H) 02/05/2021 2028    TROPONINI 0 06 (H) 02/05/2021 1554    TROPONINI 0 03 07/13/2019 1629       Microbiology:    Blood cultures Gram-positive rods   MRSA nares negative    Imaging and other studies: I have personally reviewed pertinent reports     and I have personally reviewed pertinent films in PACS   CT chest 04/13/2021   left upper lobe consolidation   large bilateral pleural effusions      Meredith Franco MD  Pulmonary & Critical Care Fellow, [facility]

## 2021-04-14 NOTE — ANESTHESIA PREPROCEDURE EVALUATION
Procedure:  CHARLES    Relevant Problems   CARDIO   (+) Complete heart block    (+) Essential hypertension   (+) Hyperlipidemia      ENDO   (+) Hypothyroidism   (+) Type 1 diabetes mellitus (HCC)   (+) Type 2 diabetes mellitus with hyperglycemia (HCC)   (+) Type I diabetes mellitus (HCC)      GI/HEPATIC   (+) Acid reflux      /RENAL   (+) KAZ on CKD stage 3   (+) ATN (acute tubular necrosis) (HCC)   (+) Stage 3b chronic kidney disease      HEMATOLOGY   (+) Anemia of chronic disease   (+) Thrombocytopenia (HCC)      MUSCULOSKELETAL   (+) Arthritis      NEURO/PSYCH   (+) Diabetic peripheral neuropathy (HCC)   (+) Medtronic dual chamber PM   (+) Personal history of diabetic foot ulcer      PULMONARY   (+) Acute respiratory failure with hypoxia (HCC)   (+) HCAP (healthcare-associated pneumonia)        Physical Exam    Airway    Mallampati score: II  TM Distance: >3 FB  Neck ROM: full     Dental       Cardiovascular  Rhythm: regular, Rate: normal,     Pulmonary  Breath sounds clear to auscultation,     Other Findings        Anesthesia Plan  ASA Score- 3     Anesthesia Type- IV sedation with anesthesia with ASA Monitors  Additional Monitors:   Airway Plan:           Plan Factors-Exercise tolerance (METS): <4 METS  Chart reviewed  EKG reviewed  Patient summary reviewed  Induction- intravenous  Postoperative Plan- Plan for postoperative opioid use  Informed Consent- Anesthetic plan and risks discussed with patient  I personally reviewed this patient with the CRNA  Discussed and agreed on the Anesthesia Plan with the CRNA  Abiel Harden

## 2021-04-15 PROBLEM — S32.010S CLOSED COMPRESSION FRACTURE OF L1 LUMBAR VERTEBRA, SEQUELA: Status: ACTIVE | Noted: 2021-01-01

## 2021-04-15 PROBLEM — S32.010D CLOSED COMPRESSION FRACTURE OF L1 LUMBAR VERTEBRA, WITH ROUTINE HEALING, SUBSEQUENT ENCOUNTER: Status: ACTIVE | Noted: 2021-01-01

## 2021-04-15 PROBLEM — I38 ENDOCARDITIS: Status: ACTIVE | Noted: 2021-01-01

## 2021-04-15 NOTE — PROGRESS NOTES
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 gram positive gonzalo bacteremia, confirmed on CHARLES   6  KAZ on CKD 3B  7  Hypertension  8  Hyponatremia  9  Anemia  10  COVID infection February 2021     Plan:  - Currently receiving  Bumex 2 mg q 8 hours and net -5 L since admission, would continue diuresis, nephro and cardiology following   -  Currently saturating mid 90s on 4 L nasal cannula, wean as tolerated, aggressive ics, out of bed to chair,  Goal SpO2 greater than 88%  -  continue broad-spectrum antibiotics vancomycin/cefepime for HCAP and corynebacterium bacteremia-  Would recommend 7 day course of antibiotics for pneumonia, but will need longer duration given MV endocarditis likely 6 weeks  - recommend  CT surgery evaluation regarding need for mitral valve repair and EP cardiology evaluation regarding need for pacemaker extraction  -  Infectious Disease following  - s/p left thoracentesis with 1 2 L removed, fluid analysis consistent with simple transudative effusion, will discuss more aggressive diuresis and continued obs vs eval for right thoracentesis       Subjective:   Patient seen and examined at bedside  Underwent thoracentesis yesterday with 1 2 L removed from the left side  Mild improvement in shortness of breath, remains on 4 L saturating mid 90s  Denies fever, chills, nausea, vomiting, chest pain  Underwent CHARLES yesterday, which confirmed mitral valve endocarditis, blood cultures positive with corynebacterium, repeat pending  Review of Systems   Constitutional: Positive for fatigue  Negative for chills, fever and unexpected weight change  HENT: Negative for congestion, rhinorrhea, sneezing and sore throat  Respiratory: Positive for shortness of breath  Negative for cough and wheezing  Cardiovascular: Positive for leg swelling  Negative for chest pain and palpitations  Gastrointestinal: Negative for abdominal pain, constipation, diarrhea, nausea and vomiting  Endocrine: Negative for cold intolerance and heat intolerance  Genitourinary: Negative for dysuria  Musculoskeletal: Negative for arthralgias  Allergic/Immunologic: Negative for immunocompromised state  Neurological: Negative for dizziness and numbness  Objective:     Vitals:    04/14/21 1907 04/14/21 2021 04/15/21 0305 04/15/21 0719   BP: 105/58 103/57 103/57 106/58   Pulse: 75 90 85 87   Resp:   (!) 24 16   Temp: 98 3 °F (36 8 °C)  98 1 °F (36 7 °C) 98 °F (36 7 °C)   TempSrc:       SpO2: 92% 94% 93% (!) 89%   Weight:       Height:               Intake/Output Summary (Last 24 hours) at 4/15/2021 0753  Last data filed at 4/15/2021 0523  Gross per 24 hour   Intake 580 ml   Output 1400 ml   Net -820 ml         Physical Exam  Constitutional:       General: He is not in acute distress  Appearance: He is well-developed  He is not diaphoretic  Comments: Thin, ill-appearing   HENT:      Head: Normocephalic and atraumatic  Mouth/Throat:      Mouth: Mucous membranes are moist       Pharynx: Oropharynx is clear  No oropharyngeal exudate  Eyes:      General: No scleral icterus  Cardiovascular:      Rate and Rhythm: Normal rate and regular rhythm  Heart sounds: Normal heart sounds  No murmur  Pulmonary:      Effort: Pulmonary effort is normal  No respiratory distress  Breath sounds: No wheezing  Comments: Scattered rhonchi, decreased breath sounds at the right base  Abdominal:      General: Bowel sounds are normal  There is no distension  Palpations: Abdomen is soft        Tenderness: There is no abdominal tenderness  Musculoskeletal:      Right lower leg: Edema present  Left lower leg: Edema present  Neurological:      Mental Status: He is alert and oriented to person, place, and time  Labs: I have personally reviewed pertinent lab results  Results from last 7 days   Lab Units 04/15/21  0445 04/14/21  0403 04/13/21  0648   WBC Thousand/uL 13 74* 14 83* 19 24*   HEMOGLOBIN g/dL 7 5* 7 3* 7 9*   HEMATOCRIT % 23 6* 22 6* 25 3*   PLATELETS Thousands/uL 139* 136* 147*   NEUTROS PCT % 86* 87* 83*   MONOS PCT % 8 7 11      Results from last 7 days   Lab Units 04/15/21  0445 04/14/21  0403 04/13/21  0648  04/10/21  0829   POTASSIUM mmol/L 3 2* 3 2* 3 7   < >  --    CHLORIDE mmol/L 99* 99* 103   < >  --    CO2 mmol/L 28 28 19*   < >  --    CO2, I-STAT mmol/L  --   --   --   --  22   BUN mg/dL 59* 58* 55*   < >  --    CREATININE mg/dL 2 68* 2 64* 2 68*   < >  --    CALCIUM mg/dL 7 8* 7 7* 7 9*   < >  --    GLUCOSE, ISTAT mg/dl  --   --   --   --  176*    < > = values in this interval not displayed  Results from last 7 days   Lab Units 04/11/21  0356   MAGNESIUM mg/dL 2 1     Results from last 7 days   Lab Units 04/14/21  0403 04/11/21  0356   PHOSPHORUS mg/dL 4 4* 3 6          Results from last 7 days   Lab Units 04/10/21  0813   LACTIC ACID mmol/L 1 7     0   Lab Value Date/Time    TROPONINI 0 07 (H) 04/10/2021 0813    TROPONINI 0 32 (H) 03/27/2021 2317    TROPONINI 0 38 (H) 03/27/2021 2024    TROPONINI 0 47 (H) 03/27/2021 1619    TROPONINI <0 02 03/24/2021 1643    TROPONINI 0 08 (H) 02/06/2021 0255    TROPONINI 0 09 (H) 02/05/2021 2350    TROPONINI 0 08 (H) 02/05/2021 2028    TROPONINI 0 06 (H) 02/05/2021 1554    TROPONINI 0 03 07/13/2019 1629       Microbiology:    Blood cultures +corynebacterium    MRSA nares negative     Imaging and other studies: I have personally reviewed pertinent reports     and I have personally reviewed pertinent films in PACS   CT chest 04/13/2021   left upper lobe consolidation   large bilateral pleural effusions     chest x-ray 04/14/2021   left upper lobe and right perihilar infiltrates with moderate right pleural effusion and improvement in left basilar effusion      Stephanie Baesley MD  Pulmonary & Critical Care Fellow, [facility]

## 2021-04-15 NOTE — PROGRESS NOTES
Progress Note - Infectious Disease   Dov Willis 76 y o  male MRN: [id number]  [facility] Encounter: [id number]      Impression/Recommendations:  1   Corynebacterium stratum bacteremia   During recent admission, patient had Corynebacterium in admission blood cultures but not in repeat blood cultures   Therefore, this was felt to be contaminant   However, given recurrent Corynebacterium striatum, this is likely be true bacteremia   Source is most likely the leg and foot ulcer   In addition, given presence of ppm, we need to consider the possibility of ppm infection, especially since Corynebacterium is very poorly pathogenic and is usually seen in hardware infection  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  However, ppm leads were not visualized  Patient should get ppm and lead extraction  EP Cardiology evaluation noted  Plan for ppm extraction either at probable MVR or as an independent procedure noted  Continue IV vancomycin  Microbiology to do susceptibilities of brian bacteria  Follow-up repeat blood cultures today  Recommend ppm and lead extraction      2   MV endocarditis, 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  In addition to endocarditis, I am also concerned about the possibility of ppm infection, for reasons outlined above  Patient will need long-term IV antibiotic  He should get CT surgery evaluation regarding the need for MVR  He will also need ppm and lead extraction  Antibiotic plan as in above  Treat x6 weeks from clearance of bacteremia  Recommend CT surgery evaluation regarding need for MVR  EP Cardiology plan for ppm extraction noted      3  Pneumonia, confirm on chest CT  Patient is clinically improved  I suspect pneumonia is independent of Corynebacterium bacteremia above  Continue IV cefepime  Monitor respiratory symptoms  Monitor temperature/WBC  Treat x7 days total      4  Sepsis, POA, presented with leukocytosis and tachypnea   Source of sepsis is probably more likely pneumonia than  bacteremia  Patient is clinically improved  Temperature stays down  WBC decreasing    Fortunately, patient has remained hemodynamically stable, without hypotension   He is clinically improved  Antibiotic plan as in above  Monitor temperature/WBC  Monitor hemodynamics      5  Acute hypoxic respiratory failure  Patient is clinically improved on antibiotic  O2 support decreasing  Antibiotic plan as in above  O2 support and weaning per primary and Pulmonary service  Monitor respiratory symptoms      6  KAZ, superimposed on CKD, most likely secondary to sepsis   Creatinine is stable  Antibiotic dosages adjusted accordingly  Monitor creatinine      7  Right lower leg ulcer, with possible mild cellulitis on exam   This may be source of corynebacterium bacteremia above  Antibiotic plan as in above  Serial exams      8  Encephalopathy, most likely multifactorial, with sepsis and hypoxia contributing   Mental status is much improved   No clinical signs CNS infection  Monitor mental status      9  DM, somewhat poorly controlled, with elevated hemoglobin A1c  Management per primary service      10  Recent COVID, with chronic symptoms   No evidence of active infection  No COVID specific treatment needed  No isolation needed      Discussed with patient in detail regarding the above plan  Discussed with Dr Zhang from [facility] service earlier      Antibiotics:  Vancomycin/cefepime # 4     Subjective:  Patient actually feels better  Dyspnea is improved  Cough mild, mostly nonproductive  Temperature stays down  No chills  Patient is tolerating antibiotics well    No nausea, vomiting or diarrhea      Objective:  Vitals:  Temp:  [97 9 °F (36 6 °C)-98 5 °F (36 9 °C)] 98 5 °F (36 9 °C)  HR:  [75-90] 87  Resp:  [16-24] 16  BP: (103-106)/(57-59) 104/58  SpO2:  [89 %-94 %] 89 %  Temp (24hrs), Av 2 °F (36 8 °C), Min:97 9 °F (36 6 °C), Max:98 5 °F (36 9 °C)  Current: Temperature: 98 5 °F (36 9 °C)    Physical Exam:     General: Awake, alert, cooperative, no distress  Neck:  Supple  No mass  No lymphadenopathy  Lungs: Expansion symmetric, sparse basilar rales, no wheezing, respirations unlabored  Heart:  Regular rate and rhythm, S1 and S2 normal, no murmur  Abdomen: Soft, nondistended, non-tender, bowel sounds active all four quadrants, no masses, no organomegaly  Extremities: Trace leg edema  Stable superficial ulcers, without purulence  No erythema/warmth  Nontender to palpation  Skin:  No rash  Neuro: Moves all extremities  Invasive Devices     Peripheral Intravenous Line            Peripheral IV 04/11/21 Left;Ventral (anterior); Upper Arm 4 days    Peripheral IV 04/14/21 Dorsal (posterior); Right Forearm 1 day          Drain            Urethral Catheter 16 Fr  2 days                Labs studies:   I have personally reviewed pertinent labs  Results from last 7 days   Lab Units 04/15/21  0445 04/14/21  0403 04/13/21  0648  04/10/21  0829   POTASSIUM mmol/L 3 2* 3 2* 3 7   < >  --    CHLORIDE mmol/L 99* 99* 103   < >  --    CO2 mmol/L 28 28 19*   < >  --    CO2, I-STAT mmol/L  --   --   --   --  22   BUN mg/dL 59* 58* 55*   < >  --    CREATININE mg/dL 2 68* 2 64* 2 68*   < >  --    EGFR ml/min/1 73sq m 22 23 22   < >  --    GLUCOSE, ISTAT mg/dl  --   --   --   --  176*   CALCIUM mg/dL 7 8* 7 7* 7 9*   < >  --     < > = values in this interval not displayed  Results from last 7 days   Lab Units 04/15/21  0445 04/14/21  0403 04/13/21  0648   WBC Thousand/uL 13 74* 14 83* 19 24*   HEMOGLOBIN g/dL 7 5* 7 3* 7 9*   PLATELETS Thousands/uL 139* 136* 147*     Results from last 7 days   Lab Units 04/14/21  1031 04/14/21  0416 04/14/21  0410 04/12/21  0951 04/12/21  0942 04/12/21  0935   BLOOD CULTURE   --  No Growth at 24 hrs  No Growth at 24 hrs   Corynebacterium striatum*  --  Corynebacterium striatum*   GRAM STAIN RESULT  1+ Polys  No bacteria seen  --   --  Gram positive rods*  --  Gram positive rods*   MRSA CULTURE ONLY   --   --   --   --  No Methicillin Resistant Staphlyococcus aureus (MRSA) isolated  --        Imaging Studies:   I have personally reviewed pertinent imaging study reports and images in PACS  EKG, Pathology, and Other Studies:   I have personally reviewed pertinent reports

## 2021-04-15 NOTE — PROGRESS NOTES
Linnea Alexis PROGRESS NOTE   Cammie Chicas 76 y o  male MRN: [id number]  Unit/Bed#: [facility] 803-01 Encounter: [id number]  Reason for Consult: KAZ    ASSESSMENT and PLAN:  1  Acute kidney injury:  · Transferred from [facility] on 4/10/21 with an SCr of 2 20  · Creatinine was 2 12 to 2 38 from 4/7 to 4/11 and increased to around 2 6 to 2 7    · SCr has been stable at 2 6 to 2 7 the past 4 days  · Etiology appears to be cardiorenal syndrome  There may be an element of ATN given granular casts on UA  · Respiratory failure better with diuresis  · Currently on Bumex 2 mg IV q8H per cardiology - okay to continue  2  Chronic kidney disease, stage IIIB:  · Baseline creatinine around 1 2-1 5  · Follows with Dr Christopher Fisher  3  Diastolic CHF:  · Keep O>I  · Bumex 2 mg IV q8H per cardiology    4  Hypertension:  · BP controlled  · Not on BP meds  Only on diuretics  5  Respiratory failure:  · Felt to be due to pneumonia and fluid overload  · On abx and diuresis  6  Corynebacterium bacteremia:  · On Vancomycin per ID       7  MV endocarditis:  · PPM infection? · Getting CT surgery evaluation  8  Hypokalemia:  · Kdur 40 meq PO x 4 doses today  9  Hyponatremia:  · Mild  Continue fluid restriction  10  Low bicarbonate:  · Resolved  · Sodium bicarbonate stopped on 4/13/21  11  Encephalopathy  12  Pleural effusions s/p thoracentesis  13  Anemia: per SLIM  DISPOSITION:  · Stable renal function  · Defer to cardiology regarding change to oral diuretics  · Kdur 40 meq PO x 4 doses today  SUBJECTIVE / 24H INTERVAL HISTORY:  Found to have large MV vegetation on CHARLES  Had L thoracentesis yesterday  Mental status stable today  UO 1400 cc on Bumex       OBJECTIVE:  Current Weight: Weight - Scale: 73 1 kg (161 lb 2 5 oz)  Vitals:    04/14/21 2021 04/15/21 0305 04/15/21 0719 04/15/21 1058   BP: 103/57 103/57 106/58 104/58   Pulse: 90 85 87 87   Resp:  (!) 24 16 16   Temp:  98 1 °F (36 7 °C) 98 °F (36 7 °C) 98 5 °F (36 9 °C)   TempSrc:       SpO2: 94% 93% (!) 89% (!) 89%   Weight:       Height:           Intake/Output Summary (Last 24 hours) at 4/15/2021 1425  Last data filed at 4/15/2021 0900  Gross per 24 hour   Intake 300 ml   Output 1400 ml   Net -1100 ml     General: conscious, cooperative, no distress  Skin: dry  Eyes: pale conjunctivae  ENT: moist mucous membranes  Chest/Lungs: equal chest expansion, less crackles today  CVS: distinct heart sounds, normal rate, regular rhythm, no rub  Abdomen: soft, non tender, non distended, normal bowel sounds  Extremities: no edema  : (+) alegria catheter  Neuro: awake, alert     Psych: appropriate affect    Medications:    Current Facility-Administered Medications:     acetaminophen (TYLENOL) tablet 650 mg, 650 mg, Oral, Q6H PRN, Dilcia Bale, DO    aspirin chewable tablet 81 mg, 81 mg, Oral, Daily, Dilcia Bale, DO, 81 mg at 04/15/21 0956    atorvastatin (LIPITOR) tablet 5 mg, 5 mg, Oral, Daily, Dilcia Bale, DO, 5 mg at 04/15/21 0957    bisacodyl (DULCOLAX) rectal suppository 10 mg, 10 mg, Rectal, Daily PRN, Dilcia Bale, DO    bumetanide (BUMEX) injection 2 mg, 2 mg, Intravenous, Q8H, Kamala De La Cruz, DO, 2 mg at 04/15/21 1146    cefepime (MAXIPIME) 1,000 mg in dextrose 5 % 50 mL IVPB, 1,000 mg, Intravenous, Q12H, Kathi Mortimer, MD, Last Rate: 100 mL/hr at 04/15/21 1011, 1,000 mg at 04/15/21 1011    cholecalciferol (VITAMIN D3) tablet 4,000 Units, 4,000 Units, Oral, Daily, Dilcia Bale, DO, 4,000 Units at 04/15/21 0957    docusate sodium (COLACE) capsule 100 mg, 100 mg, Oral, BID, Dilcia Bale, DO, 100 mg at 04/15/21 0957    ferrous sulfate tablet 325 mg, 325 mg, Oral, Daily With Breakfast, Dilcia Monahan DO, 325 mg at 04/15/21 0956    heparin (porcine) subcutaneous injection 5,000 Units, 5,000 Units, Subcutaneous, Q8H [facility], Dilcia Monahan DO, 5,000 Units at 04/15/21 0525    insulin glargine (LANTUS) subcutaneous injection 10 Units 0 1 mL, 10 Units, Subcutaneous, HS, Hodan Zhang MD    insulin lispro (HumaLOG) 100 units/mL subcutaneous injection 1-5 Units, 1-5 Units, Subcutaneous, TID AC, 1 Units at 04/15/21 1145 **AND** Fingerstick Glucose (POCT), , , TID AC, Torie Perfect, DO    insulin lispro (HumaLOG) 100 units/mL subcutaneous injection 1-5 Units, 1-5 Units, Subcutaneous, HS, Torie Perfect, DO, 2 Units at 04/14/21 2216    insulin lispro (HumaLOG) 100 units/mL subcutaneous injection 4 Units, 4 Units, Subcutaneous, TID With Meals, Julio Cesar Stevenson MD, 4 Units at 04/15/21 1145    levothyroxine tablet 125 mcg, 125 mcg, Oral, Early Morning, Torie Perfect, DO, 125 mcg at 04/15/21 0525    melatonin tablet 3 mg, 3 mg, Oral, HS PRN, Johnathon Wilson PA-C, 3 mg at 04/12/21 2101    ondansetron (ZOFRAN) injection 4 mg, 4 mg, Intravenous, Q6H PRN, Torie Perfect, DO    pantoprazole (PROTONIX) EC tablet 40 mg, 40 mg, Oral, Daily Before Breakfast, Torie Perfect DO, 40 mg at 04/15/21 0605    polyethylene glycol (MIRALAX) packet 17 g, 17 g, Oral, Daily PRN, Torie Perfect, DO    potassium chloride (K-DUR,KLOR-CON) CR tablet 40 mEq, 40 mEq, Oral, Q6H, Daniel Resendiz MD, 40 mEq at 04/15/21 1145    tamsulosin (FLOMAX) capsule 0 4 mg, 0 4 mg, Oral, Daily With Dinner, Torie Perfect, DO, 0 4 mg at 04/14/21 1749    vancomycin (VANCOCIN) IVPB (premix in dextrose) 750 mg 150 mL, 750 mg, Intravenous, Q24H, Sonal Zamora MD, Last Rate: 150 mL/hr at 04/15/21 1145, 750 mg at 04/15/21 1145    Laboratory Results:  Results from last 7 days   Lab Units 04/15/21  0445 04/14/21  0403 04/13/21  0648 04/12/21  0455 04/11/21  0601 04/11/21  0356 04/10/21  0829 04/10/21  0821 04/10/21  0508   WBC Thousand/uL 13 74* 14 83* 19 24* 16 69*  --  15 19*  --  15 67* 15 81*   HEMOGLOBIN g/dL 7 5* 7 3* 7 9* 7 4*  --  7 8*  --  7 9* 8 0*   I STAT HEMOGLOBIN g/dl  --   --   --   --   --   --  8 2*  --   --    HEMATOCRIT % 23 6* 22 6* 25 3* 23 1*  --  24 6*  -- 24 1* 25 1*   HEMATOCRIT, ISTAT %  --   --   --   --   --   --  24*  --   --    PLATELETS Thousands/uL 139* 136* 147* 157  --  151  --  168 168   POTASSIUM mmol/L 3 2* 3 2* 3 7 3 8 4 8 5 5*  --   --  4 4   CHLORIDE mmol/L 99* 99* 103 103 103 104  --   --  105   CO2 mmol/L 28 28 19* 25 23 25  --   --  23   CO2, I-STAT mmol/L  --   --   --   --   --   --  22  --   --    BUN mg/dL 59* 58* 55* 53* 51* 48*  --   --  44*   CREATININE mg/dL 2 68* 2 64* 2 68* 2 62* 2 37* 2 35*  --   --  2 20*   CALCIUM mg/dL 7 8* 7 7* 7 9* 8 4 8 2* 7 9*  --   --  7 9*   MAGNESIUM mg/dL  --   --   --   --   --  2 1  --   --   --    PHOSPHORUS mg/dL  --  4 4*  --   --   --  3 6  --   --   --    GLUCOSE, ISTAT mg/dl  --   --   --   --   --   --  176*  --   --

## 2021-04-15 NOTE — PROGRESS NOTES
Team 2 Cardiolo  Team 2 Cardiology - Progress Note  Joana Pulse 76 y o  male MRN: [id number]  Unit/Bed#: [facility] 803-01 Encounter: [id number]    Assessment:  Principal Problem:    Acute respiratory failure with hypoxia ([facility] [address] )  Active Problems:    Anemia of chronic disease    Type I diabetes mellitus ([facility] [address] )    Essential hypertension    Stage 3b chronic kidney disease    KAZ on CKD stage 3    Encephalopathy    Complete heart block     Hyponatremia    Bacteremia    Acute on chronic diastolic heart failure (HCC)    HCAP (healthcare-associated pneumonia)    Acidosis    Bilateral pleural effusion    #Acute on chronic diastolic heart failure  # acute on chronic kidney disease   #Mitral Valve vegetation     Plan:  1  Acute on chronic diastolic heart failure   -Pt clinically still appears to be fluid overloaded  Continue on Bumex 2 mg TID    -Monitor BMP  Cre today was 2 68, yesterday 2 64 (baseline ~1 2-1 5)      2  Mitral valve vegetation:   -due to CHARLES findings and settings of sepsis, recommend consult by EP to remove pacemaker and leads and CT surgery for valve replacement in the setting of severe valve regurgitation as per ID    -blood cultures grew gram + rods  3  Sepsis:   -Currently on Vacoymycin and Cefepime     4  Hypokalemia:   -repleted potassium     5  Bilateral PE:   -possible right thoracentesis as per pulm    6  Acute on chronic kidney disease:   -hold ACEi   -recs from Nephro appreciated     Subjective/Objective     Subjective: Pt was seen and examined at bedside  Pt was alert but mildly disoriented  Currently sat at 92% on 4L 02  He has output ~-700mL in the last 24 hours  CHARLES revealed large protruding 19 mm X 17 mm anterior leaflet, spanning A1-2 scallops with valve perforation at A2  It was located on the atrial aspect of the valve, but prolapsed into ventricle with diastole  Eccentric and posterolateral directed regurgitation through A2 and additional areas of malcoptation   Pacing wires were present in atrium with no associated vegetation  He underwent a thoracentesis yesterday and 1 2 L were removed and revealed simple transudative effusion  Objective:  Vitals: /58   Pulse 87   Temp 98 5 °F (36 9 °C)   Resp 16   Ht 5' 8" (1 727 m)   Wt 73 1 kg (161 lb 2 5 oz)   SpO2 (!) 89%   BMI 24 50 kg/m²   Vitals:    04/13/21 0600 04/14/21 0548   Weight: 74 1 kg (163 lb 6 4 oz) 73 1 kg (161 lb 2 5 oz)     Orthostatic Blood Pressures      Most Recent Value   Blood Pressure  104/58 filed at 04/15/2021 1058   Patient Position - Orthostatic VS  Lying filed at 04/12/2021 1508            Intake/Output Summary (Last 24 hours) at 4/15/2021 1233  Last data filed at 4/15/2021 0900  Gross per 24 hour   Intake 700 ml   Output 1400 ml   Net -700 ml     Review of Systems   Constitutional: Negative for chills and fever  HENT: Negative for ear pain and sore throat  Eyes: Negative for pain and visual disturbance  Respiratory: Negative for cough and shortness of breath  Cardiovascular: Negative for chest pain and palpitations  Gastrointestinal: Negative for abdominal pain and vomiting  Genitourinary: Negative for dysuria and hematuria  Musculoskeletal: Negative for arthralgias and back pain  Skin: Negative for color change and rash  Neurological: Negative for seizures and syncope  Psychiatric/Behavioral: Positive for confusion  All other systems reviewed and are negative  Physical Exam  Vitals signs and nursing note reviewed  Constitutional:       Appearance: He is well-developed  Comments: Pale in appearance    HENT:      Head: Normocephalic and atraumatic  Eyes:      Conjunctiva/sclera: Conjunctivae normal    Neck:      Musculoskeletal: Neck supple  Cardiovascular:      Rate and Rhythm: Normal rate and regular rhythm  Pulses: Normal pulses  Heart sounds: Normal heart sounds  No murmur  No gallop      Pulmonary:      Effort: Pulmonary effort is normal  No respiratory distress  Breath sounds: Normal breath sounds  Abdominal:      Palpations: Abdomen is soft  Tenderness: There is no abdominal tenderness  Musculoskeletal: Normal range of motion  Comments: R foot ulcer, wrapped in ace bandage   1+ pitting edema b/l      Skin:     General: Skin is warm and dry  Neurological:      Mental Status: He is alert           Medications:    Current Facility-Administered Medications:     acetaminophen (TYLENOL) tablet 650 mg, 650 mg, Oral, Q6H PRN, Otto Jane, DO    aspirin chewable tablet 81 mg, 81 mg, Oral, Daily, Otto Jane, DO, 81 mg at 04/14/21 0802    atorvastatin (LIPITOR) tablet 5 mg, 5 mg, Oral, Daily, Otto Jane, DO, 5 mg at 04/14/21 0802    bisacodyl (DULCOLAX) rectal suppository 10 mg, 10 mg, Rectal, Daily PRN, Otto Jane, DO    bumetanide (BUMEX) injection 2 mg, 2 mg, Intravenous, Q8H, Kamala De La Cruz DO, 2 mg at 04/15/21 0524    cefepime (MAXIPIME) 1,000 mg in dextrose 5 % 50 mL IVPB, 1,000 mg, Intravenous, Q12H, Kaycee Martinez MD, Last Rate: 100 mL/hr at 04/14/21 2216, 1,000 mg at 04/14/21 2216    cholecalciferol (VITAMIN D3) tablet 4,000 Units, 4,000 Units, Oral, Daily, Otto Jane, DO, 4,000 Units at 04/14/21 0802    docusate sodium (COLACE) capsule 100 mg, 100 mg, Oral, BID, Otto Jane, DO, 100 mg at 04/14/21 1749    ferrous sulfate tablet 325 mg, 325 mg, Oral, Daily With Breakfast, Otto Jane, DO, 325 mg at 04/13/21 0826    heparin (porcine) subcutaneous injection 5,000 Units, 5,000 Units, Subcutaneous, Q8H [address], Otto Jane, DO, 5,000 Units at 04/15/21 0525    insulin glargine (LANTUS) subcutaneous injection 10 Units 0 1 mL, 10 Units, Subcutaneous, HS, Hodan Zhang MD    insulin lispro (HumaLOG) 100 units/mL subcutaneous injection 1-5 Units, 1-5 Units, Subcutaneous, TID AC, 1 Units at 04/14/21 9009 **AND** Fingerstick Glucose (POCT), , , TID JESSIE, Otto Lara,     insulin lispro (HumaLOG) 100 units/mL subcutaneous injection 1-5 Units, 1-5 Units, Subcutaneous, HS, Suzette Russell DO, 2 Units at 04/14/21 2216    insulin lispro (HumaLOG) 100 units/mL subcutaneous injection 4 Units, 4 Units, Subcutaneous, TID With Meals, Joanie Salgado MD    levothyroxine tablet 125 mcg, 125 mcg, Oral, Early Morning, Suzette Russell DO, 125 mcg at 04/15/21 0525    melatonin tablet 3 mg, 3 mg, Oral, HS PRN, Inessa Wilson PA-C, 3 mg at 04/12/21 2101    ondansetron (ZOFRAN) injection 4 mg, 4 mg, Intravenous, Q6H PRN, Suzette Russell DO    pantoprazole (PROTONIX) EC tablet 40 mg, 40 mg, Oral, Daily Before Breakfast, Suzette Russell DO, 40 mg at 04/15/21 0605    polyethylene glycol (MIRALAX) packet 17 g, 17 g, Oral, Daily PRN, Suzette Russell DO    potassium chloride (K-DUR,KLOR-CON) CR tablet 40 mEq, 40 mEq, Oral, Once, Joanie Salgado MD    tamsulosin (FLOMAX) capsule 0 4 mg, 0 4 mg, Oral, Daily With Dinner, Suzette Russell DO, 0 4 mg at 04/14/21 1749    vancomycin (VANCOCIN) IVPB (premix in dextrose) 750 mg 150 mL, 750 mg, Intravenous, Q24H, Sangita Weiss MD, Last Rate: 150 mL/hr at 04/14/21 1129, 750 mg at 04/14/21 1129    Lab Results:  Results from last 7 days   Lab Units 04/10/21  0813   TROPONIN I ng/mL 0 07*     Results from last 7 days   Lab Units 04/15/21  0445 04/14/21  0403 04/13/21  0648   WBC Thousand/uL 13 74* 14 83* 19 24*   HEMOGLOBIN g/dL 7 5* 7 3* 7 9*   HEMATOCRIT % 23 6* 22 6* 25 3*   PLATELETS Thousands/uL 139* 136* 147*         Results from last 7 days   Lab Units 04/15/21  0445 04/14/21  0403 04/13/21  0648  04/10/21  0829   POTASSIUM mmol/L 3 2* 3 2* 3 7   < >  --    CHLORIDE mmol/L 99* 99* 103   < >  --    CO2 mmol/L 28 28 19*   < >  --    CO2, I-STAT mmol/L  --   --   --   --  22   BUN mg/dL 59* 58* 55*   < >  --    CREATININE mg/dL 2 68* 2 64* 2 68*   < >  --    CALCIUM mg/dL 7 8* 7 7* 7 9*   < >  --    GLUCOSE, ISTAT mg/dl  --   --   --   --  176*    < > = values in this interval not displayed           Results from last 7 days   Lab Units 04/11/21  0356   MAGNESIUM mg/dL 2 1       Petty Gardner   [facility]

## 2021-04-15 NOTE — ASSESSMENT & PLAN NOTE
Patient presented on 3/27 with generalized weakness, complaints of progressively worsening back pain, found to have worsening L1 compression fracture, TLICS 1  · Hx osteoporosis as evidenced on DEXA scan completed in 2018  · Known prior L1 compression fracture s/p fall in 2017, no intervention at that time  · Patient remains in the hospital secondary to endocarditis with vegetation spread to colon per nursing staff  Imaging reviewed personally and with attending, results are as follows:   CT abdomen pelvis wo contrast 3/27/2021:  Significant interval loss of L1 vertebral body height consistent with progression of 1 stable chronic fracture  No significant fracture fragment retropulsion  Transitional S1 vertebra   Lumbar x-ray 4/14/21:Unchanged L1 compression fracture  Plan:   Will offer patient conservative management for fracture  His complains of on and off back pain for years but states it is getting worse over the last few weeks, no new falls but is feeling more off balance, wife endorses worsening back pain  Exam with full strength, peripheral neuropathy from DM, no red flag signs, subjective tingling in lumbar spine to palpation  o Continue TLSO brace when OOB and HOB greater than 45 degrees  o Will order outpatient upright thoracolumbar spine XR to be completed in brace  o Do not anticipate that patient will need to wear the brace for an extended period of time - fracture does appear worse when compared to imaging in 2017 however it appears chronic    Medical management and pain control per primary team   DVT ppx:  SCDs, heparin    Neurosurgery will sign off and follow up in 4 weeks with repeat upright thoracolumbar imaging  Please call with questions or concerns

## 2021-04-15 NOTE — PROGRESS NOTES
[facility]  Progress Note - Maria Luz Caro 1945, 76 y o  male MRN: [id number]  Unit/Bed#: [facility] 803-01 Encounter: [id number]  Primary Care Provider: Tara Spaulding MD   Date and time admitted to hospital: 4/10/2021 10:27 AM    Closed compression fracture of L1 lumbar vertebra, with routine healing, subsequent encounter  Assessment & Plan  Patient presented on 3/27 with generalized weakness, complaints of progressively worsening back pain, found to have worsening L1 compression fracture, TLICS 1  · Hx osteoporosis as evidenced on DEXA scan completed in 2018  · Known prior L1 compression fracture s/p fall in 2017, no intervention at that time  · Patient remains in the hospital secondary to endocarditis with vegetation spread to colon per nursing staff  Imaging reviewed personally and with attending, results are as follows:   CT abdomen pelvis wo contrast 3/27/2021:  Significant interval loss of L1 vertebral body height consistent with progression of 1 stable chronic fracture  No significant fracture fragment retropulsion  Transitional S1 vertebra   Lumbar x-ray 4/14/21:Unchanged L1 compression fracture  Plan:   Will offer patient conservative management for fracture  His complains of on and off back pain for years but states it is getting worse over the last few weeks, no new falls but is feeling more off balance, wife endorses worsening back pain    Exam with full strength, peripheral neuropathy from DM, no red flag signs, subjective tingling in lumbar spine to palpation  o Continue TLSO brace when OOB and HOB greater than 45 degrees  o Will order outpatient upright thoracolumbar spine XR to be completed in brace  o Do not anticipate that patient will need to wear the brace for an extended period of time - fracture does appear worse when compared to imaging in 2017 however it appears chronic    Medical management and pain control per primary team   DVT ppx:  SCDs, heparin    Neurosurgery will sign off and follow up in 4 weeks with repeat upright thoracolumbar imaging  Please call with questions or concerns  Subjective/Objective     Patient seen and examined  Notes continue back pain but intermittent in nature  I/O       04/13 0701 - 04/14 0700 04/14 0701 - 04/15 0700 04/15 0701 - 04/16 0700    P  O  480 180 120    I V  (mL/kg)  400 (5 5)     IV Piggyback 50      Total Intake(mL/kg) 530 (7 3) 580 (7 9) 120 (1 6)    Urine (mL/kg/hr) 1675 (1) 1400 (0 8)     Stool 0 0     Total Output 1675 1400     Net -1145 -820 +120           Unmeasured Stool Occurrence 2 x 1 x           Invasive Devices     Peripheral Intravenous Line            Peripheral IV 04/11/21 Left;Ventral (anterior); Upper Arm 4 days    Peripheral IV 04/14/21 Dorsal (posterior); Right Forearm 1 day          Drain            Urethral Catheter 16 Fr  2 days                Physical Exam:  Vitals: Blood pressure 104/58, pulse 87, temperature 98 5 °F (36 9 °C), resp  rate 16, height 5' 8" (1 727 m), weight 73 1 kg (161 lb 2 5 oz), SpO2 (!) 89 %  ,Body mass index is 24 5 kg/m²  General appearance: alert, appears stated age, cooperative and no distress  Head: Normocephalic, without obvious abnormality, atraumatic  Eyes: EOMI, PERRL  Neck: supple, symmetrical, trachea midline and NT  Back: no kyphosis present, no tenderness to percussion or palpation  Lungs: non labored breathing  Heart: regular heart rate  Neurologic:   Mental status: Alert, awake, x 2, thought content appropriate but tangential and repetitive in the setting of encephalopathy  Cranial nerves: grossly intact (Cranial nerves II-XII)  Sensory: normal to light touch  Motor: moving all 4 extremities  TLSO brace noted in the room         Lab Results:  Results from last 7 days   Lab Units 04/15/21  0445 04/14/21  0403 04/13/21  0648   WBC Thousand/uL 13 74* 14 83* 19 24*   HEMOGLOBIN g/dL 7 5* 7 3* 7 9*   HEMATOCRIT % 23 6* 22 6* 25 3* PLATELETS Thousands/uL 139* 136* 147*   NEUTROS PCT % 86* 87* 83*   MONOS PCT % 8 7 11     Results from last 7 days   Lab Units 04/15/21  0445 04/14/21  0403 04/13/21  0648  04/10/21  0829   POTASSIUM mmol/L 3 2* 3 2* 3 7   < >  --    CHLORIDE mmol/L 99* 99* 103   < >  --    CO2 mmol/L 28 28 19*   < >  --    CO2, I-STAT mmol/L  --   --   --   --  22   BUN mg/dL 59* 58* 55*   < >  --    CREATININE mg/dL 2 68* 2 64* 2 68*   < >  --    CALCIUM mg/dL 7 8* 7 7* 7 9*   < >  --    GLUCOSE, ISTAT mg/dl  --   --   --   --  176*    < > = values in this interval not displayed  Results from last 7 days   Lab Units 04/11/21  0356   MAGNESIUM mg/dL 2 1     Results from last 7 days   Lab Units 04/14/21  0403 04/11/21  0356   PHOSPHORUS mg/dL 4 4* 3 6         No results found for: TROPONINT  ABG:  Lab Results   Component Value Date    PHART 7 450 04/11/2021    TXN7HED 37 6 04/11/2021    PO2ART 21 5 (LL) 04/11/2021    [id number] 25 5 04/11/2021    BEART 1 5 04/11/2021    SOURCE Radial, Right 04/11/2021       Imaging Studies: I have personally reviewed pertinent reports  and I have personally reviewed pertinent films in PACS    Xr Chest Portable    Result Date: 4/14/2021  Impression: No evidence of postthoracentesis complication Increased left upper lobe infiltrate  Persistent right perihilar infiltrate Workstation performed: [id number]     Xr Chest Portable    Result Date: 4/11/2021  Impression: Mild pulmonary edema with small left pleural effusion, improved from prior exam  Workstation performed: [id number]     Xr Chest Portable    Result Date: 4/10/2021  Impression: Evidence of CHF which has slightly improved  Left greater than right effusions are again noted  Atelectasis versus infiltrate at the left lung base is probably evident  Workstation performed: [id number]     Xr Spine Lumbar 2 Or 3 Views Injury    Result Date: 4/14/2021  Impression: Unchanged L1 compression fracture  Degenerative changes as above   Workstation performed: [id number]     Ct Chest Wo Contrast    Result Date: 4/14/2021  Impression: Dense airspace consolidation throughout much of the left upper lobe with lesser airspace infiltrates, subsegmental in the right upper lobe  Although nonspecific, does not have typical pattern of CHF  Infectious etiology remains in the differential  Large dependent bilateral pleural effusions with associated atelectasis of the lower lobes  The study was marked in [facility] for immediate notification  Workstation performed: [id number]       EKG, Pathology, and Other Studies: I have personally reviewed pertinent reports        VTE Pharmacologic Prophylaxis: Sequential compression device (Venodyne)  and Heparin    VTE Mechanical Prophylaxis: sequential compression device

## 2021-04-15 NOTE — ASSESSMENT & PLAN NOTE
CT showed large dependent bilateral pleural effusions associated with atelectasis of the lower lobes  Status post left-sided thoracocentesis by Pulm 4/14, removed 1 2 L

## 2021-04-15 NOTE — QUICK NOTE
CHARLES images reviewed with Dr Donis Conway  A large vegetation was noted on the mitral valve, and thus we feel that this patient should be evaluated by CT surgery and can likely have his device extracted at that time  It is a dual-chamber [employer] pacemaker, implanted 06/2015, with [id number] leads  He is pacemaker dependent, with 100% ventricular pacing  Based on a recent device interrogation, he does have an underlying rhythm, however he is bradycardic and it appears to be complete heart block  If he is a surgical candidate, epicardial wires can be placed at that time

## 2021-04-15 NOTE — ASSESSMENT & PLAN NOTE
Started on vanc and cefepime for possible Hcap  Blood cultures 4/12/21 growing Corynebacterium striatum  Continue vancomycin

## 2021-04-15 NOTE — ASSESSMENT & PLAN NOTE
POA  Baseline creatinine from 1 2-1 5  Suspect cardiorenal, component of ATN  Creatinine today 2 68  On Bumex 2 mg IV q 8 hours  Nephrology following

## 2021-04-15 NOTE — ASSESSMENT & PLAN NOTE
Multifactorial suspect in the setting of heart failure, kidney failure, bacteremia, Hcap  Improved   Reorient, sleep-wake cycle

## 2021-04-15 NOTE — RESTORATIVE TECHNICIAN NOTE
Restorative Specialist Mobility Note       Activity: Stand at bedside     Assistive Device: Other (Comment)(HHAx2)

## 2021-04-15 NOTE — PROGRESS NOTES
[address] [facility]  Progress Note - Malena James 1945, 76 y o  male MRN: [id number]  Unit/Bed#: [facility] 803-01 Encounter: [id number]  Primary Care Provider: Darrion Kyle MD   Date and time admitted to hospital: 4/10/2021 10:27 AM    * Acute respiratory failure with hypoxia [facility])  Assessment & Plan  POA suspect secondary to acute on chronic diastolic heart failure, Srini on CKD stage 3, b/l pleural effusion, Hcap, recent COVID infection in February  RRT at [facility] needing 10L O2    CT 4/13 showed dense airspace consolidation throughout much of the left upper lobe with lesser airspace infiltrates, subsegmental in the right upper lobe, does not have a typical pattern of CHF, possible infectious etiology    Large dependent bilateral pleural effusions  Weaned down from 10 L to 4 L  Negative balance 5 3 L since admission  Diuresis : lasix 80 mg q8 hours, transitioned to Bumex 2 mg IV tid 4/13  Continue cefepime and Vanco for HCAP  Status post left sided thoracocentesis 4/14, 1 2 L removed, transudate  Goal oxygen saturation 89% and above  Monitor in and output  Daily weights    Acute on chronic diastolic heart failure (HCC)  Assessment & Plan  Wt Readings from Last 3 Encounters:   04/14/21 73 1 kg (161 lb 2 5 oz)   04/05/21 78 6 kg (173 lb 4 5 oz)   03/27/21 72 kg (158 lb 11 7 oz)     Echo in 03/29/2021 showed ejection fraction 65%, mild MR, mild TR  Lasix 80 mg IV t i d  was switched to Bumex 2 mg IV t i d  4/13  5 3 L negative balance since admission  Ortez catheter in place, prn irrigation  Monitor kidney function  Input and output, daily weight        HCAP (healthcare-associated pneumonia)  Assessment & Plan  Started on vanc and cefepime for possible Hcap  Blood cultures 4/12/21 growing Corynebacterium striatum  Continue vancomycin        Bacteremia  Assessment & Plan  Blood cultures from 04/12 growing Corynebacterium striatum  Continue IV Vanco  Source could be right foot ulcer  ID on board, appreciate recommendation  Echo showed anterior mitral leaflet vegetation  CHARLES ejection fraction was 60%, no regional wall motion abnormalities, large protruding echogenic mobile vegetation measuring 19 mm time 17 mm on the anterior leaflet, spanning the A1/A2 scallop with valve perforation at A2, located in the atrial aspect of the valve, prolapse into the ventricle with diastole, severe regurgitation  CT surgery consult      Endocarditis  Assessment & Plan  Echo showed mitral valve vegetation  CHARLES surgery showed large mitral valve vegetation measuring 19 mm x 17 mm  CT surgery consulted  EP consult    Encephalopathy  Assessment & Plan  Multifactorial suspect in the setting of heart failure, kidney failure, bacteremia, Hcap  Improved   Reorient, sleep-wake cycle      KAZ on CKD stage 3  Assessment & Plan  POA  Baseline creatinine from 1 2-1 5  Suspect cardiorenal, component of ATN  Creatinine today 2 68  On Bumex 2 mg IV q 8 hours  Nephrology following    Bilateral pleural effusion  Assessment & Plan  CT showed large dependent bilateral pleural effusions associated with atelectasis of the lower lobes  Status post left-sided thoracocentesis by Pulm 4/14, removed 1 2 L    Acidosis  Assessment & Plan  Resolved, bicarb tabs discontinued 4/13    Hyponatremia  Assessment & Plan  Sodium 135  Continue fluid restriction  Off salt tabs    Complete heart block   Assessment & Plan  Patient has a history of complete heart block, status post Medtronic dual-chamber ppm implanted 2015  Now with endocarditis  Evaluated by EP, appreciate recommendations  He is pacemaker dependent  On interrogation he has an underlying rhythm and he is bradycardic and has CHB     Pending CT surgery eval    Essential hypertension  Assessment & Plan  Bumex 2 mg IV t i d   Monitor blood pressure    Type I diabetes mellitus [facility])  Assessment & Plan  Lab Results   Component Value Date    HGBA1C 7 7 (H) 02/05/2021       Recent Labs 21  1628 21  2125 04/15/21  0737 04/15/21  1056   POCGLU 200* 252* 167* 173*       Blood Sugar Average: Last 72 hrs:  (P) 853 [id number]   Endo recommendations appreciated  On insulin pump OP  On SQ insulin IP  Increased Lantus from 7 to10 units at bedtime 5 units at bedtime and Humalog 4 units t i d  With meal    Anemia of chronic disease  Assessment & Plan  In the past 2 weeks has been range of 7 and 8  Hemoglobin today 7 5  Continue ferrous sulfate          VTE Pharmacologic Prophylaxis:   Pharmacologic: Heparin  Mechanical VTE Prophylaxis in Place: Yes    Patient Centered Rounds: I have performed bedside rounds with nursing staff today  Discussions with Specialists or Other Care Team Provider:  Cardiology    Education and Discussions with Family / Patient:  Wife over the phone    Time Spent for Care: 30 minutes  More than 50% of total time spent on counseling and coordination of care as described above  Current Length of Stay: 5 day(s)    Current Patient Status: Inpatient   Certification Statement: The patient will continue to require additional inpatient hospital stay due to Endocarditis, bacteremia    Discharge Plan: To be determined    Code Status: Level 1 - Full Code      Subjective:   Patient was seen and evaluated bedside  He says he is feeling better  No complaints  Objective:     Vitals:   Temp (24hrs), Av 2 °F (36 8 °C), Min:97 9 °F (36 6 °C), Max:98 5 °F (36 9 °C)    Temp:  [97 9 °F (36 6 °C)-98 5 °F (36 9 °C)] 98 5 °F (36 9 °C)  HR:  [75-90] 87  Resp:  [16-24] 16  BP: (103-106)/(57-59) 104/58  SpO2:  [89 %-94 %] 89 %  Body mass index is 24 5 kg/m²  Input and Output Summary (last 24 hours): Intake/Output Summary (Last 24 hours) at 4/15/2021 1129  Last data filed at 4/15/2021 0900  Gross per 24 hour   Intake 700 ml   Output 1400 ml   Net -700 ml       Physical Exam:     Physical Exam  Constitutional:       General: He is not in acute distress  Appearance: He is ill-appearing  HENT:      Head: Atraumatic  Neck:      Musculoskeletal: Neck supple  Cardiovascular:      Rate and Rhythm: Normal rate and regular rhythm  Heart sounds: No murmur  No friction rub  No gallop  Pulmonary:      Effort: Pulmonary effort is normal  No respiratory distress  Breath sounds: Normal breath sounds  No wheezing  Abdominal:      General: Bowel sounds are normal  There is no distension  Palpations: Abdomen is soft  Tenderness: There is no abdominal tenderness  Musculoskeletal:         General: No swelling  Comments: Right foot wound   Skin:     General: Skin is warm and dry  Comments: Right foot wound   Neurological:      General: No focal deficit present  Mental Status: He is alert  Psychiatric:         Mood and Affect: Mood normal          Additional Data:     Labs:    Results from last 7 days   Lab Units 04/15/21  0445   WBC Thousand/uL 13 74*   HEMOGLOBIN g/dL 7 5*   HEMATOCRIT % 23 6*   PLATELETS Thousands/uL 139*   NEUTROS PCT % 86*   LYMPHS PCT % 5*   MONOS PCT % 8   EOS PCT % 0     Results from last 7 days   Lab Units 04/15/21  0445   SODIUM mmol/L 135*   POTASSIUM mmol/L 3 2*   CHLORIDE mmol/L 99*   CO2 mmol/L 28   BUN mg/dL 59*   CREATININE mg/dL 2 68*   ANION GAP mmol/L 8   CALCIUM mg/dL 7 8*   GLUCOSE RANDOM mg/dL 155*         Results from last 7 days   Lab Units 04/15/21  1056 04/15/21  0737 04/14/21  2125 04/14/21  1628 04/14/21  1118 04/14/21  0803 04/13/21  2106 04/13/21  1613 04/13/21  1121 04/13/21  0719 04/12/21  2058 04/12/21  1523   POC GLUCOSE mg/dl 173* 167* 252* 200* 239* 242* 281* 272* 226* 136 185* 193*         Results from last 7 days   Lab Units 04/12/21  0455 04/11/21  0356 04/10/21  0813 04/10/21  0508 04/09/21  1217   LACTIC ACID mmol/L  --   --  1 7  --  1 3   PROCALCITONIN ng/ml 1 01* 0 73*  --  0 93* 1 08*           * I Have Reviewed All Lab Data Listed Above    * Additional Pertinent Lab Tests Reviewed: Tonya 66 Admission Reviewed    Imaging:    Imaging Reports Reviewed Today Include: all  Imaging Personally Reviewed by Myself Includes:      Recent Cultures (last 7 days):     Results from last 7 days   Lab Units 04/14/21  1031 04/14/21  0416 04/14/21  0410 04/12/21  0951 04/12/21  0935   BLOOD CULTURE   --  No Growth at 24 hrs  No Growth at 24 hrs   Corynebacterium striatum* Corynebacterium striatum*   GRAM STAIN RESULT  1+ Polys  No bacteria seen  --   --  Gram positive rods* Gram positive rods*       Last 24 Hours Medication List:   Current Facility-Administered Medications   Medication Dose Route Frequency Provider Last Rate    acetaminophen  650 mg Oral Q6H PRN Constantin Brow, DO      aspirin  81 mg Oral Daily Constantin Brow, DO      atorvastatin  5 mg Oral Daily Constantin Brow, DO      bisacodyl  10 mg Rectal Daily PRN Constantin Brow, DO      bumetanide  2 mg Intravenous Q8H Kamala De La Cruz, DO      cefepime  1,000 mg Intravenous Q12H Modesto Garza MD 1,000 mg (04/15/21 1011)    cholecalciferol  4,000 Units Oral Daily Constantin Brow, DO      docusate sodium  100 mg Oral BID Constantin Brow, DO      ferrous sulfate  325 mg Oral Daily With Breakfast Constantinmarcela Rodríguez, DO      heparin (porcine)  5,000 Units Subcutaneous [facility] Constantin Brow, DO      insulin glargine  10 Units Subcutaneous HS Hodan Zhang MD      insulin lispro  1-5 Units Subcutaneous TID AC Constantin Brow, DO      insulin lispro  1-5 Units Subcutaneous HS Constantin Brow, DO      insulin lispro  4 Units Subcutaneous TID With Meals Mark Schulz MD      levothyroxine  125 mcg Oral Early Morning Constantin Marcos, DO      melatonin  3 mg Oral HS PRN Johnathon Wilson PA-C      ondansetron  4 mg Intravenous Q6H PRN Constantin Marcos, DO      pantoprazole  40 mg Oral Daily Before Breakfast Constantin Marcos, DO      polyethylene glycol  17 g Oral Daily PRN Constantin Brow, DO      potassium chloride  40 mEq Doug Ortega MD      tamsulosin  0 4 mg Oral Daily With [address] [place], [place]      vancomycin  750 mg Intravenous Q24H Nelli Henriquez  mg (04/14/21 1129)        Today, Patient Was Seen By: Rosita Hernandez MD    ** Please Note: Dictation voice to text software may have been used in the creation of this document   **

## 2021-04-15 NOTE — ASSESSMENT & PLAN NOTE
Wt Readings from Last 3 Encounters:   04/14/21 73 1 kg (161 lb 2 5 oz)   04/05/21 78 6 kg (173 lb 4 5 oz)   03/27/21 72 kg (158 lb 11 7 oz)     Echo in 03/29/2021 showed ejection fraction 65%, mild MR, mild TR  Lasix 80 mg IV t i d  was switched to Bumex 2 mg IV t i d  4/13  5 3 L negative balance since admission  Ortez catheter in place, prn irrigation  Monitor kidney function  Input and output, daily weight

## 2021-04-15 NOTE — ASSESSMENT & PLAN NOTE
Blood cultures from 04/12 growing Corynebacterium striatum  Continue IV Vanco  Source could be right foot ulcer  ID on board, appreciate recommendation  Echo showed anterior mitral leaflet vegetation  CHARLES ejection fraction was 60%, no regional wall motion abnormalities, large protruding echogenic mobile vegetation measuring 19 mm time 17 mm on the anterior leaflet, spanning the A1/A2 scallop with valve perforation at A2, located in the atrial aspect of the valve, prolapse into the ventricle with diastole, severe regurgitation  CT surgery consult

## 2021-04-15 NOTE — PROGRESS NOTES
Vancomycin Assessment    Marlene Weinberg is a 76 y o  male who is currently receiving vancomycin 750 mg q24 for Pneumonia     Relevant clinical data and objective history reviewed:  Creatinine   Date Value Ref Range Status   04/15/2021 2 68 (H) 0 60 - 1 30 mg/dL Final     Comment:     Standardized to IDMS reference method   04/14/2021 2 64 (H) 0 60 - 1 30 mg/dL Final     Comment:     Standardized to IDMS reference method   04/13/2021 2 68 (H) 0 60 - 1 30 mg/dL Final     Comment:     Standardized to IDMS reference method   06/24/2015 0 96 0 60 - 1 30 mg/dL Final     Comment:     Standardized to IDMS reference method   06/23/2015 1 03 0 60 - 1 30 mg/dL Final     Comment:     Standardized to IDMS reference method   06/22/2015 0 86 0 60 - 1 30 mg/dL Final     Comment:     Standardized to IDMS reference method     /58   Pulse 87   Temp 98 5 °F (36 9 °C)   Resp 16   Ht 5' 8" (1 727 m)   Wt 73 1 kg (161 lb 2 5 oz)   SpO2 (!) 89%   BMI 24 50 kg/m²   I/O last 3 completed shifts: In: 630 [P O :180; I V :400; IV Piggyback:50]  Out: 2175 [Urine:2175]  Lab Results   Component Value Date/Time    BUN 59 (H) 04/15/2021 04:45 AM    BUN 24 06/24/2015 06:00 AM    WBC 13 74 (H) 04/15/2021 04:45 AM    WBC 7 35 06/24/2015 06:00 AM    HGB 7 5 (L) 04/15/2021 04:45 AM    HGB 12 4 06/24/2015 06:00 AM    HCT 23 6 (L) 04/15/2021 04:45 AM    HCT 34 9 (L) 06/24/2015 06:00 AM    MCV 87 04/15/2021 04:45 AM    MCV 83 06/24/2015 06:00 AM     (L) 04/15/2021 04:45 AM     06/24/2015 06:00 AM     Temp Readings from Last 3 Encounters:   04/15/21 98 5 °F (36 9 °C)   04/09/21 98 2 °F (36 8 °C) (Oral)   04/02/21 97 6 °F (36 4 °C)     Vancomycin Days of Therapy: 4    Assessment/Plan  The patient is currently on vancomycin utilizing scheduled dosing    The patient is receiving 750 mg q24 with the most recent vancomycin level being at steady-state and therapeutic based on a goal of 15-20 (appropriate for most indications) ; therefore, is clinically appropriate and dose will be continued   Pharmacy will continue to follow closely for s/sx of nephrotoxicity, infusion reactions, and appropriateness of therapy  BMP and CBC will be ordered per protocol  Plan for follow up trough in one week at approximately 1030am 4/22  Pharmacy will continue to follow the patients culture results and clinical progress daily      William Allred, Pharmacist

## 2021-04-15 NOTE — ASSESSMENT & PLAN NOTE
Echo showed mitral valve vegetation  CHARLES surgery showed large mitral valve vegetation measuring 19 mm x 17 mm  CT surgery consulted  EP consult

## 2021-04-15 NOTE — SPEECH THERAPY NOTE
Speech Language/Pathology    Speech/Language Pathology Progress Note    Patient Name: Melecio Nava  CPYKN'I Date: 4/15/2021     Problem List  Principal Problem:    Acute respiratory failure with hypoxia ([address] )  Active Problems:    Anemia of chronic disease    Type I diabetes mellitus (HCC)    Essential hypertension    Stage 3b chronic kidney disease    KAZ on CKD stage 3    Encephalopathy    Complete heart block     Hyponatremia    Bacteremia    Acute on chronic diastolic heart failure (HCC)    HCAP (healthcare-associated pneumonia)    Acidosis    Bilateral pleural effusion    Endocarditis    Closed compression fracture of L1 lumbar vertebra, with routine healing, subsequent encounter       Past Medical History  Past Medical History:   Diagnosis Date    Arthritis     Cardiac disease     Closed fracture of fibula, lateral malleolus, right     Complete heart block ([address] )     Diabetes mellitus ([facility] [address] )     Disease of thyroid gland     External incisional dehiscence     GERD (gastroesophageal reflux disease)     Heart disease     Muscular deconditioning     Pacemaker     Sleep apnea     cannot tolerate cpap    Thyroid disease         Past Surgical History  Past Surgical History:   Procedure Laterality Date    CARDIAC PACEMAKER PLACEMENT Left 2015    CARDIAC PACEMAKER PLACEMENT      CARDIAC SURGERY      CATARACT EXTRACTION      MUSCLE FLAP      right foot    NERVE BLOCK      Transforaminal Epidural Lumbar with Fluoroscopic Guidance     NC COLONOSCOPY FLX DX W/COLLJ SPEC WHEN PFRMD N/A 5/4/2018    Procedure: EGD AND COLONOSCOPY;  Surgeon: Silas Polo MD;  Location: [facility]; Service: Gastroenterology    NC SPLIT [address] <100 SQCM Right 6/10/2019    Procedure: SKIN GRAFT SPLIT THICKNESS ([facility])  EXTREMITY right leg;  Surgeon: Reinier Reyes DPM;  Location:  MAIN OR;  Service: Podiatry    TOE AMPUTATION      left 5th toe         Subjective:  Pt awake, alert, and talkative  Current Diet:  Regular textures w/ thin liquids - SLP rec dysphagia 3 w/ NTL though dysphagia modifications not reflected on diet change to Renal     Objective:  Pt seen for dx dysphagia tx w/ lunch tray - hamburger and thin liquids  Pt needs assistance for feeding  Bite strength is adequate for regular textures  Mastication and oral manipulation of regular texture material is prolonged, pt states "I've always been a slow eater " Bolus breakdown is complete, transfers w/ adequate strength, no significant oral residue  Pt takes straw sips of thin liquids  Swallow initiation suspected timely  No overt s/s aspiration across meal tray regular textures and 7 5 oz thin liquids  Pt also took pills whole w/ thin liquids w/ RN, no difficulty  Assessment:  Pt w/ overall improved mental status and alertness  Pt well-tolerated meal tray of regular textures w/ thin liquids without overt s/s aspiration, min oral difficulties       Plan/Recommendations:  Regular textures w/ thin liquids  Frequent and thorough oral care  ST f/u as able and appropriate

## 2021-04-15 NOTE — ASSESSMENT & PLAN NOTE
Lab Results   Component Value Date    HGBA1C 7 7 (H) 02/05/2021       Recent Labs     04/14/21  1628 04/14/21  2125 04/15/21  0737 04/15/21  1056   POCGLU 200* 252* 167* 173*       Blood Sugar Average: Last 72 hrs:  (P) 408 [id number]   Endo recommendations appreciated  On insulin pump OP  On SQ insulin IP  Increased Lantus from 7 to10 units at bedtime 5 units at bedtime and Humalog 4 units t i d   With meal

## 2021-04-15 NOTE — ASSESSMENT & PLAN NOTE
Patient has a history of complete heart block, status post Medtronic dual-chamber ppm implanted 2015  Now with endocarditis  Evaluated by EP, appreciate recommendations  He is pacemaker dependent  On interrogation he has an underlying rhythm and he is bradycardic and has CHB     Pending CT surgery eval

## 2021-04-16 NOTE — PROGRESS NOTES
Progress Note - Infectious Disease   Malena James 76 y o  male MRN: [id number]  [facility] Encounter: [id number]      Impression/Recommendations:  1   Corynebacterium stratum bacteremia   During recent admission, patient had Corynebacterium in admission blood cultures but not in repeat blood cultures   Therefore, this was felt to be contaminant   However, given recurrent Corynebacterium striatum, this is likely be true bacteremia   Source is most likely the leg and foot ulcer   In addition, given presence of ppm, we need to consider the possibility of ppm infection, especially since Corynebacterium is very poorly pathogenic and is usually seen in hardware infection   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   However, ppm leads were not visualized   Patient should get ppm and lead extraction  EP Cardiology evaluation noted  Plan for ppm extraction either at probable MVR or as an independent procedure noted  Continue IV vancomycin  Microbiology to do susceptibilities of Corynebacterium  Follow-up repeat blood cultures  Recommend ppm and lead extraction      2   MV endocarditis, 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   In addition to endocarditis, I am also concerned about the possibility of ppm infection, for reasons outlined above   Patient will need long-term IV antibiotic  [facility] should get CT surgery evaluation regarding the need for MVR  [facility] will also need ppm and lead extraction  Antibiotic plan as in above  Treat x6 weeks from clearance of bacteremia  Awaiting CT surgery evaluation regarding need for MVR  EP Cardiology plan for ppm extraction noted      3  Pneumonia, confirm on chest CT   Patient is clinically improved   I suspect pneumonia is independent of Corynebacterium bacteremia above  Continue IV cefepime  Monitor respiratory symptoms  Monitor temperature/WBC    Treat x7 days total, another 2 days      4  Sepsis, POA, presented with leukocytosis and tachypnea   Source of sepsis is probably more likely pneumonia than  bacteremia   Patient is clinically improved   Temperature stays down   WBC decreasing    Fortunately, patient has remained hemodynamically stable, without hypotension   He is clinically improved  Antibiotic plan as in above  Monitor temperature/WBC  Monitor hemodynamics        5  Acute hypoxic respiratory failure   Patient is clinically improved on antibiotic   O2 support decreasing     Antibiotic plan as in above  O2 support and weaning per primary and Pulmonary service  Monitor respiratory symptoms      6  KAZ, superimposed on CKD, most likely secondary to sepsis   Creatinine is stable  Antibiotic dosages adjusted accordingly  Monitor creatinine      7  Right lower leg ulcer, with possible mild cellulitis on exam   This may be source of corynebacterium bacteremia above  Antibiotic plan as in above  Serial exams      8  Encephalopathy, most likely multifactorial, with sepsis and hypoxia contributing   Mental status is much improved   No clinical signs CNS infection  Monitor mental status      9  DM, somewhat poorly controlled, with elevated hemoglobin A1c  Management per primary service      10  Recent COVID, with chronic symptoms   No evidence of active infection  No COVID specific treatment needed  No isolation needed      Discussed with patient in detail regarding the above plan  Discussed with Dr Zhang from [facility] service      Antibiotics:  Vancomycin/cefepime # 5     Subjective:  Patient  feels reasonably well  Dyspnea is mild at rest   Cough mild, mostly nonproductive  Temperature stays down   No chills    Patient is tolerating antibiotics well   No nausea, vomiting or diarrhea      Objective:  Vitals:  Temp:  [96 4 °F (35 8 °C)-98 5 °F (36 9 °C)] 98 1 °F (36 7 °C)  HR:  [69-88] 77  Resp:  [16-22] 16  BP: (101-115)/(51-58) 112/56  SpO2:  [88 %-94 %] 94 %  Temp (24hrs), Av 7 °F (36 5 °C), Min:96 4 °F (35 8 °C), Max:98 5 °F (36 9 °C)  Current: Temperature: 98 1 °F (36 7 °C)    Physical Exam:     General: Awake, alert, cooperative, no distress  Neck:  Supple  No mass  No lymphadenopathy  Lungs: Expansion symmetric, sparse basilar rales, no wheezing, respirations unlabored  Heart:  Regular rate and rhythm, S1 and S2 normal, no murmur  Abdomen: Soft, nondistended, non-tender, bowel sounds active all four quadrants, no masses, no organomegaly  Extremities: No edema  Stable chronic ulcers, without purulence  No erythema/warmth  Nontender to palpation  Skin:  No rash  Neuro: Moves all extremities  Invasive Devices     Peripheral Intravenous Line            Peripheral IV 04/14/21 Dorsal (posterior); Right Forearm 1 day          Drain            Urethral Catheter 16 Fr  3 days                Labs studies:   I have personally reviewed pertinent labs  Results from last 7 days   Lab Units 04/16/21  0515 04/15/21  0445 04/14/21  0403  04/10/21  0829   POTASSIUM mmol/L 3 8 3 2* 3 2*   < >  --    CHLORIDE mmol/L 101 99* 99*   < >  --    CO2 mmol/L 26 28 28   < >  --    CO2, I-STAT mmol/L  --   --   --   --  22   BUN mg/dL 63* 59* 58*   < >  --    CREATININE mg/dL 2 68* 2 68* 2 64*   < >  --    EGFR ml/min/1 73sq m 22 22 23   < >  --    GLUCOSE, ISTAT mg/dl  --   --   --   --  176*   CALCIUM mg/dL 7 9* 7 8* 7 7*   < >  --     < > = values in this interval not displayed  Results from last 7 days   Lab Units 04/16/21  0515 04/15/21  0445 04/14/21  0403   WBC Thousand/uL 15 47* 13 74* 14 83*   HEMOGLOBIN g/dL 6 8* 7 5* 7 3*   PLATELETS Thousands/uL 115* 139* 136*     Results from last 7 days   Lab Units 04/14/21  1031 04/14/21  0416 04/14/21  0410 04/12/21  0951 04/12/21  0942 04/12/21  0935   BLOOD CULTURE   --  No Growth at 48 hrs  No Growth at 48 hrs   Corynebacterium striatum*  --  Corynebacterium striatum*   GRAM STAIN RESULT  1+ Polys  No bacteria seen  --   --  Gram positive rods*  --  Gram positive rods*   BODY FLUID CULTURE, STERILE  No growth  --   --   --   --   --    MRSA CULTURE ONLY   --   --   --   --  No Methicillin Resistant Staphlyococcus aureus (MRSA) isolated  --        Imaging Studies:   I have personally reviewed pertinent imaging study reports and images in PACS  EKG, Pathology, and Other Studies:   I have personally reviewed pertinent reports

## 2021-04-16 NOTE — ASSESSMENT & PLAN NOTE
Lab Results   Component Value Date    HGBA1C 7 7 (H) 02/05/2021       Recent Labs     04/15/21  1056 04/15/21  1636 04/15/21  1645 04/15/21  2105   POCGLU 173* 46* 306* 250*       Blood Sugar Average: Last 72 hrs:  (P) 696 [id number]   Endo recommendations appreciated  On insulin pump OP  On SQ insulin IP      Lantus 5 units HS and sliding scale, appetite fluctuates

## 2021-04-16 NOTE — PROGRESS NOTES
[facility]  Progress Note - Fara Boyce 1945, 76 y o  male MRN: [id number]  Unit/Bed#: [facility] 803-01 Encounter: [id number]  Primary Care Provider: Emilie Landers MD   Date and time admitted to hospital: 4/10/2021 10:27 AM    * Acute respiratory failure with hypoxia [facility])  Assessment & Plan  POA, secondary to acute on chronic diastolic heart failure, Srini on CKD stage 3, b/l pleural effusion, Hcap, recent COVID infection in February  RRT at [place] needing 10L O2    CT 4/13 showed dense airspace consolidation throughout much of the left upper lobe with lesser airspace infiltrates, subsegmental in the right upper lobe, does not have a typical pattern of CHF, possible infectious etiology    Large dependent bilateral pleural effusions  Weaned down from 10 L to 4 L  Negative balance 6 2 L since admission  Diuresis : lasix 80 mg q8 hours, transitioned to Bumex 2 mg IV tid 4/13  Continue cefepime and Vanco for HCAP, endocarditis  Status post left sided thoracocentesis 4/14, 1 2 L removed, transudate  Goal oxygen saturation 89% and above  Monitor in and output  Daily weights    Acute on chronic diastolic heart failure (HCC)  Assessment & Plan  Wt Readings from Last 3 Encounters:   04/16/21 69 6 kg (153 lb 8 oz)   04/05/21 78 6 kg (173 lb 4 5 oz)   03/27/21 72 kg (158 lb 11 7 oz)     Echo in 03/29/2021 showed ejection fraction 65%, mild MR, mild TR  Lasix 80 mg IV t i d  was switched to Bumex 2 mg IV t i d  4/13  6 2 L negative balance since admission  Dc Pérez myers Nephro  Monitor kidney function  Input and output, daily weight        HCAP (healthcare-associated pneumonia)  Assessment & Plan  Started on vanc and cefepime for possible Hcap  Blood cultures 4/12/21 growing Corynebacterium striatum  Continue vancomycin and Cefepime        Bacteremia  Assessment & Plan  Blood cultures from 04/12 growing Corynebacterium striatum  Continue IV Vanco  Source could be right foot ulcer  ID on board, appreciate recommendation  Echo showed anterior mitral leaflet vegetation  CHARLES ejection fraction was 60%, no regional wall motion abnormalities, large protruding echogenic mobile vegetation measuring 19 mm time 17 mm on the anterior leaflet, spanning the A1/A2 scallop with valve perforation at A2, located in the atrial aspect of the valve, prolapse into the ventricle with diastole, severe regurgitation  CT surgery consult pending      Complete heart block   Assessment & Plan  Patient has a history of complete heart block, status post [employer]onic dual-chamber ppm implanted 2015  Now with endocarditis  Evaluated by EP, appreciate recommendations  He is pacemaker dependent  On interrogation he has an underlying rhythm and he is bradycardic and has CHB     Pending CT surgery eval    Endocarditis  Assessment & Plan  Echo showed mitral valve vegetation  CHARLES showed large mitral valve vegetation measuring 19 mm x 17 mm  CT surgery consulted  EP on board    Bilateral pleural effusion  Assessment & Plan  CT showed large dependent bilateral pleural effusions associated with atelectasis of the lower lobes  Status post left-sided thoracocentesis by Pulm 4/14, removed 1 2 L    Encephalopathy  Assessment & Plan  Multifactorial suspect in the setting of heart failure, kidney failure, bacteremia, Hcap  Improved   Reorient, sleep-wake cycle      KAZ on CKD stage 3  Assessment & Plan  POA  Baseline creatinine from 1 2-1 5  Suspect cardiorenal, component of ATN  Creatinine today 2 68  On Bumex 2 mg IV q 8 hours  Nephrology following    Closed compression fracture of L1 lumbar vertebra, with routine healing, subsequent encounter  Assessment & Plan  Followed by neuro surgery    Acidosis  Assessment & Plan  Resolved, bicarb tabs discontinued 4/13    Hyponatremia  Assessment & Plan  Sodium 135  Continue fluid restriction  Off salt tabs    Stage 3b chronic kidney disease  Assessment & Plan  Lab Results   Component Value Date    EGFR 22 04/16/2021    EGFR 22 04/15/2021    EGFR 23 04/14/2021    CREATININE 2 68 (H) 04/16/2021    CREATININE 2 68 (H) 04/15/2021    CREATININE 2 64 (H) 04/14/2021   conitnue with lasix   Monitor creatinine closely   Input and output  Daily weights  Nephrology on board  Avoid nephrotoxic medications  Keep SBP>130    Essential hypertension  Assessment & Plan  Bumex 2 mg IV t i d   Monitor blood pressure    Type I diabetes mellitus [facility])  Assessment & Plan  Lab Results   Component Value Date    HGBA1C 7 7 (H) 02/05/2021       Recent Labs     04/15/21  1056 04/15/21  1636 04/15/21  1645 04/15/21  2105   POCGLU 173* 46* 306* 250*       Blood Sugar Average: Last 72 hrs:  (P) 014 [id number]   Endo recommendations appreciated  On insulin pump OP  On SQ insulin IP  Lantus 5 units HS and sliding scale, appetite fluctuates    Anemia of chronic disease  Assessment & Plan  In the past 2 weeks has been range of 7 and 8  Hemoglobin today 6 8  Will order a unit of blood  Continue ferrous sulfate          VTE Pharmacologic Prophylaxis:   Pharmacologic: Hold due to needing transfusion  Mechanical VTE Prophylaxis in Place: Yes    Patient Centered Rounds: I have performed bedside rounds with nursing staff today  Discussions with Specialists or Other Care Team Provider:  Infectious, Urology CT surgery    Education and Discussions with Family / Patient:  Wife Tony Gee her the phone    Time Spent for Care: 30 minutes  More than 50% of total time spent on counseling and coordination of care as described above  Current Length of Stay: 6 day(s)    Current Patient Status: Inpatient   Certification Statement: The patient will continue to require additional inpatient hospital stay due to Bacteremia, endocarditis, acute kidney injury, heart failure    Discharge Plan: To be determined    Code Status: Level 1 - Full Code      Subjective:   Patient was seen and evaluated bedside    He did not sleep per nursing staff overnight, now more sleepy but easily arousable open his eyes and answer simple questions  Said he is feeling okay    Objective:     Vitals:   Temp (24hrs), Av 7 °F (36 5 °C), Min:96 4 °F (35 8 °C), Max:98 5 °F (36 9 °C)    Temp:  [96 4 °F (35 8 °C)-98 5 °F (36 9 °C)] 98 1 °F (36 7 °C)  HR:  [69-88] 77  Resp:  [16-22] 16  BP: (101-115)/(51-58) 112/56  SpO2:  [88 %-94 %] 94 %  Body mass index is 23 34 kg/m²  Input and Output Summary (last 24 hours): Intake/Output Summary (Last 24 hours) at 2021 1036  Last data filed at 2021 1001  Gross per 24 hour   Intake 680 ml   Output 1600 ml   Net -920 ml       Physical Exam:     Physical Exam  Constitutional:       General: He is not in acute distress  Appearance: He is ill-appearing  HENT:      Head: Atraumatic  Neck:      Musculoskeletal: Neck supple  Cardiovascular:      Rate and Rhythm: Normal rate and regular rhythm  Heart sounds: No murmur  No friction rub  No gallop  Pulmonary:      Effort: Pulmonary effort is normal  No respiratory distress  Breath sounds: No wheezing  Comments: Decreased bilaterally, especially bibasilar  Abdominal:      General: Bowel sounds are normal  There is no distension  Palpations: Abdomen is soft  Musculoskeletal:         General: No swelling  Skin:     General: Skin is warm and dry  Comments: Right foot wound   Neurological:      General: No focal deficit present  Mental Status: He is alert     Psychiatric:         Mood and Affect: Mood normal          Additional Data:     Labs:    Results from last 7 days   Lab Units 21  0515   WBC Thousand/uL 15 47*   HEMOGLOBIN g/dL 6 8*   HEMATOCRIT % 21 1*   PLATELETS Thousands/uL 115*   NEUTROS PCT % 86*   LYMPHS PCT % 6*   MONOS PCT % 7   EOS PCT % 0     Results from last 7 days   Lab Units 21  0515   SODIUM mmol/L 135*   POTASSIUM mmol/L 3 8   CHLORIDE mmol/L 101   CO2 mmol/L 26   BUN mg/dL 63*   CREATININE mg/dL 2 68*   ANION GAP mmol/L 8 CALCIUM mg/dL 7 9*   GLUCOSE RANDOM mg/dL 127         Results from last 7 days   Lab Units 04/15/21  2105 04/15/21  1645 04/15/21  1636 04/15/21  1056 04/15/21  0737 04/14/21  2125 04/14/21  1628 04/14/21  1118 04/14/21  0803 04/13/21  2106 04/13/21  1613 04/13/21  1121   POC GLUCOSE mg/dl 250* 306* 46* 173* 167* 252* 200* 239* 242* 281* 272* 226*         Results from last 7 days   Lab Units 04/12/21  0455 04/11/21  0356 04/10/21  0813 04/10/21  0508 04/09/21  1217   LACTIC ACID mmol/L  --   --  1 7  --  1 3   PROCALCITONIN ng/ml 1 01* 0 73*  --  0 93* 1 08*           * I Have Reviewed All Lab Data Listed Above  * Additional Pertinent Lab Tests Reviewed: Tonya 66 Admission Reviewed    Imaging:    Imaging Reports Reviewed Today Include: all  Imaging Personally Reviewed by Myself Includes:      Recent Cultures (last 7 days):     Results from last 7 days   Lab Units 04/14/21  1031 04/14/21  0416 04/14/21  0410 04/12/21  0951 04/12/21  0935   BLOOD CULTURE   --  No Growth at 48 hrs  No Growth at 48 hrs   Corynebacterium striatum* Corynebacterium striatum*   GRAM STAIN RESULT  1+ Polys  No bacteria seen  --   --  Gram positive rods* Gram positive rods*   BODY FLUID CULTURE, STERILE  No growth  --   --   --   --        Last 24 Hours Medication List:   Current Facility-Administered Medications   Medication Dose Route Frequency Provider Last Rate    acetaminophen  650 mg Oral Q6H PRN Kelsey Croak, DO      aspirin  81 mg Oral Daily Kelsey Croak, DO      atorvastatin  5 mg Oral Daily Kelsey Croak, DO      bisacodyl  10 mg Rectal Daily PRN Kelsey Croak, DO      bumetanide  2 mg Intravenous Q8H Kamala De La Cruz, DO      cefepime  1,000 mg Intravenous Q12H Bill White MD 1,000 mg (04/15/21 2132)    cholecalciferol  4,000 Units Oral Daily Kelsey Croak, DO      docusate sodium  100 mg Oral BID Kelsey Acuña, DO      ferrous sulfate  325 mg Oral Daily With Breakfast Kelsey Acuña, DO      heparin (porcine)  5,000 Units Subcutaneous [facility] Rehob[place] Lavern, DO      insulin glargine  5 Units Subcutaneous HS Rosita Hernandez MD      insulin lispro  1-5 Units Subcutaneous TID AC Jan Santillan, DO      insulin lispro  1-5 Units Subcutaneous HS Re[place]ob[place] Lavern, DO      levothyroxine  125 mcg Oral Early Morning Re[place]ob[place] Lavern, DO      melatonin  3 mg Oral HS PRN [facility] [facility] JOSE Wilson      ondansetron  4 mg Intravenous Q6H PRN [place] Lavern, DO      pantoprazole  40 mg Oral Daily Before Breakfast Re[place]ob[place] Lavern, DO      polyethylene glycol  17 g Oral Daily PRN Re[place]ob[place] Lavern, DO      potassium chloride  40 mEq Oral Daily Phuong Rhoades MD      tamsulosin  0 4 mg Oral Daily With [facility],       vancomycin  750 mg Intravenous Q24H Nelli Henriquez  mg (04/15/21 1145)        Today, Patient Was Seen By: Rosita Hernandez MD    ** Please Note: Dictation voice to text software may have been used in the creation of this document   **

## 2021-04-16 NOTE — ASSESSMENT & PLAN NOTE
Lab Results   Component Value Date    EGFR 22 04/16/2021    EGFR 22 04/15/2021    EGFR 23 04/14/2021    CREATININE 2 68 (H) 04/16/2021    CREATININE 2 68 (H) 04/15/2021    CREATININE 2 64 (H) 04/14/2021   conitnue with lasix   Monitor creatinine closely   Input and output  Daily weights  Nephrology on board  Avoid nephrotoxic medications  Keep SBP>130

## 2021-04-16 NOTE — SPEECH THERAPY NOTE
Speech Language/Pathology    Speech/Language Pathology Progress Note    Patient Name: Carmen DUBOIS Date: 4/16/2021     Problem List  Principal Problem:    Acute respiratory failure with hypoxia ([address] )  Active Problems:    Anemia of chronic disease    Type I diabetes mellitus (HCC)    Essential hypertension    Stage 3b chronic kidney disease    KAZ on CKD stage 3    Encephalopathy    Complete heart block     Hyponatremia    Bacteremia    Acute on chronic diastolic heart failure (HCC)    HCAP (healthcare-associated pneumonia)    Acidosis    Bilateral pleural effusion    Endocarditis    Closed compression fracture of L1 lumbar vertebra, with routine healing, subsequent encounter       Past Medical History  Past Medical History:   Diagnosis Date    Arthritis     Cardiac disease     Closed fracture of fibula, lateral malleolus, right     Complete heart block ([address] )     Diabetes mellitus ([address] )     Disease of thyroid gland     External incisional dehiscence     GERD (gastroesophageal reflux disease)     Heart disease     Muscular deconditioning     Pacemaker     Sleep apnea     cannot tolerate cpap    Thyroid disease         Past Surgical History  Past Surgical History:   Procedure Laterality Date    CARDIAC PACEMAKER PLACEMENT Left 2015    CARDIAC PACEMAKER PLACEMENT      CARDIAC SURGERY      CATARACT EXTRACTION      MUSCLE FLAP      right foot    NERVE BLOCK      Transforaminal Epidural Lumbar with Fluoroscopic Guidance     RI COLONOSCOPY FLX DX W/COLLJ SPEC WHEN PFRMD N/A 5/4/2018    Procedure: EGD AND COLONOSCOPY;  Surgeon: Alsison Casper MD;  Location[facility];   Service: Gastroenterology    RI SPLIT [address] <100 SQCM Right 6/10/2019    Procedure: SKIN GRAFT SPLIT THICKNESS ([facility])  EXTREMITY right leg;  Surgeon: Laila Abreu DPM;  Location:  MAIN OR;  Service: Podiatry    TOE AMPUTATION      left 5th toe         Subjective:  Pt lethargic, had difficulty staying alert during tx session  Current Diet:  Regular textures w/ thin liquids    Objective:  Pt seen for dx dysphagia tx w/ breakfast tray muffin, scrambled eggs, and thin liquids  Retrieval is weak  Mastication and oral manipulation are prolonged  Pt eventually manipulates and transfers material, no oral residue noted  Pt able to draw thin liquids via straw  Swallow initiation appears prompt  No overt s/s aspiration across meal  Pt noted to begin to fall asleep during meal, needed cues to stay awake and continue w/ meal  Pt continues w/ Left upper lobe infiltrate and right perihilar infiltrate - silent aspiration cannot be ruled out without VBS, may need to consider if aspiration is suspected to be etiology  Assessment:  Oropharyngeal swallow function impacted by alertness level, when fully awake and alert regular textures and thin liquids appear well-tolerated  Plan/Recommendations:  Continue current diet when fully awake and alert only - full supervision   Frequent and thorough oral care  ?  VBS if indicated

## 2021-04-16 NOTE — PROGRESS NOTES
Team 2 Cardiolo  Team 2 Cardiology - Progress Note  Moira Aguayo 76 y o  male MRN: [id number]  Unit/Bed#: [facility] 803-01 Encounter: [id number]    Assessment:  Principal Problem:    Acute respiratory failure with hypoxia ([facility] [address] )  Active Problems:    Anemia of chronic disease    Type I diabetes mellitus ([facility] [address] )    Essential hypertension    Stage 3b chronic kidney disease    KAZ on CKD stage 3    Encephalopathy    Complete heart block     Hyponatremia    Bacteremia    Acute on chronic diastolic heart failure (HCC)    HCAP (healthcare-associated pneumonia)    Acidosis    Bilateral pleural effusion    Endocarditis    Closed compression fracture of L1 lumbar vertebra, with routine healing, subsequent encounter    #Acute on chronic diastolic heart failure  # acute on chronic kidney disease   #Mitral Valve vegetation     Plan:  1  Acute on chronic diastolic heart failure   - Continue on Bumex 2 mg TID    -Monitor BMP  Cre stable at 2 68 today (baseline ~1 2-1 5)      2  Mitral valve vegetation:   -due to CHARLES findings and settings of sepsis, recommend consult by EP to remove pacemaker and leads and CT surgery for valve replacement in the setting of severe valve regurgitation as per ID    -blood cultures grew gram + rods  3  Sepsis:   -Currently on Vacoymycin and Cefepime     5  Bilateral PE:   -undergoing right sided thoracentesis later today      6  Acute on chronic kidney disease:   -hold ACEi   -recs from Nephro appreciated     Subjective/Objective     Subjective: Pt was seen and examined at bedside  Pt was alert but more disoriented than yesterday  Currently sat at 92% on 4L 02  He has output ~-800mL in the last 24 hours       Objective:  Vitals: /56   Pulse 83   Temp 98 1 °F (36 7 °C)   Resp 20   Ht 5' 8" (1 727 m)   Wt 69 6 kg (153 lb 8 oz)   SpO2 (!) 88%   BMI 23 34 kg/m²   Vitals:    04/14/21 0548 04/16/21 0600   Weight: 73 1 kg (161 lb 2 5 oz) 69 6 kg (153 lb 8 oz)     Orthostatic Blood Pressures Most Recent Value   Blood Pressure  111/56 filed at 04/16/2021 0256   Patient Position - Orthostatic VS  Lying filed at 04/12/2021 1508            Intake/Output Summary (Last 24 hours) at 4/16/2021 0801  Last data filed at 4/16/2021 0519  Gross per 24 hour   Intake 580 ml   Output 1400 ml   Net -820 ml     Review of Systems   Constitutional: Negative for chills and fever  HENT: Negative for ear pain and sore throat  Eyes: Negative for pain and visual disturbance  Respiratory: Negative for cough and shortness of breath  Cardiovascular: Negative for chest pain and palpitations  Gastrointestinal: Negative for abdominal pain and vomiting  Genitourinary: Negative for dysuria and hematuria  Musculoskeletal: Negative for arthralgias and back pain  Skin: Negative for color change and rash  Neurological: Negative for seizures and syncope  Psychiatric/Behavioral: Positive for confusion  All other systems reviewed and are negative  Physical Exam  Vitals signs and nursing note reviewed  Constitutional:       Appearance: He is well-developed  Comments: Pale in appearance    HENT:      Head: Normocephalic and atraumatic  Eyes:      Conjunctiva/sclera: Conjunctivae normal    Neck:      Musculoskeletal: Neck supple  Cardiovascular:      Rate and Rhythm: Normal rate and regular rhythm  Pulses: Normal pulses  Heart sounds: Normal heart sounds  No murmur  No gallop  Pulmonary:      Effort: Pulmonary effort is normal  No respiratory distress  Breath sounds: Normal breath sounds  Abdominal:      Palpations: Abdomen is soft  Tenderness: There is no abdominal tenderness  Musculoskeletal: Normal range of motion  Comments: R foot ulcer, wrapped in ace bandage   1+ pitting edema b/l      Skin:     General: Skin is warm and dry  Neurological:      Mental Status: He is alert           Medications:    Current Facility-Administered Medications:     acetaminophen (TYLENOL) tablet 650 mg, 650 mg, Oral, Q6H PRN, Naina Hurtado DO, 650 mg at 04/16/21 0054    aspirin chewable tablet 81 mg, 81 mg, Oral, Daily, Naina Hurtado DO, 81 mg at 04/15/21 0956    atorvastatin (LIPITOR) tablet 5 mg, 5 mg, Oral, Daily, Naina Hurtado DO, 5 mg at 04/15/21 0957    bisacodyl (DULCOLAX) rectal suppository 10 mg, 10 mg, Rectal, Daily PRN, Naina Hurtado DO    bumetanide (BUMEX) injection 2 mg, 2 mg, Intravenous, Q8H, Kamala De La Cruz DO, 2 mg at 04/16/21 0330    cefepime (MAXIPIME) 1,000 mg in dextrose 5 % 50 mL IVPB, 1,000 mg, Intravenous, Q12H, Mary Krishna MD, Last Rate: 100 mL/hr at 04/15/21 2132, 1,000 mg at 04/15/21 2132    cholecalciferol (VITAMIN D3) tablet 4,000 Units, 4,000 Units, Oral, Daily, Naina Hurtado DO, 4,000 Units at 04/15/21 0957    docusate sodium (COLACE) capsule 100 mg, 100 mg, Oral, BID, Naina Hurtado DO, 100 mg at 04/15/21 1818    ferrous sulfate tablet 325 mg, 325 mg, Oral, Daily With Breakfast, Naina Hurtado DO, 325 mg at 04/16/21 0800    heparin (porcine) subcutaneous injection 5,000 Units, 5,000 Units, Subcutaneous, Q8H [address], Naina Hurtado DO, 5,000 Units at 04/16/21 0639    insulin glargine (LANTUS) subcutaneous injection 5 Units 0 05 mL, 5 Units, Subcutaneous, HS, Hodan Zhang MD, 5 Units at 04/15/21 2132    insulin lispro (HumaLOG) 100 units/mL subcutaneous injection 1-5 Units, 1-5 Units, Subcutaneous, TID AC, 1 Units at 04/15/21 1145 **AND** Fingerstick Glucose (POCT), , , TID AC, Naina Hurtado DO    insulin lispro (HumaLOG) 100 units/mL subcutaneous injection 1-5 Units, 1-5 Units, Subcutaneous, HS, Naina Hurtado DO, 2 Units at 04/15/21 2131    levothyroxine tablet 125 mcg, 125 mcg, Oral, Early Morning, Naina Hurtado DO, 125 mcg at 04/15/21 0525    melatonin tablet 3 mg, 3 mg, Oral, HS PRN, Johnathon Wilson PA-C, 3 mg at 04/16/21 0053    ondansetron (ZOFRAN) injection 4 mg, 4 mg, Intravenous, Q6H PRN, Naina Hurtado DO    pantoprazole (PROTONIX) EC tablet 40 mg, 40 mg, Oral, Daily Before Breakfast, Naina Hurtado DO, 40 mg at 04/15/21 0605    polyethylene glycol (MIRALAX) packet 17 g, 17 g, Oral, Daily PRN, Naina Hurtado DO    [facility] capsule 0 4 mg, 0 4 mg, Oral, Daily With Dinner, Naina Hurtado DO, 0 4 mg at 04/15/21 1818    vancomycin (VANCOCIN) IVPB (premix in dextrose) 750 mg 150 mL, 750 mg, Intravenous, Q24H, Mary Krishna MD, Last Rate: 150 mL/hr at 04/15/21 1145, 750 mg at 04/15/21 1145    Lab Results:  Results from last 7 days   Lab Units 04/10/21  0813   TROPONIN I ng/mL 0 07*     Results from last 7 days   Lab Units 04/16/21  0515 04/15/21  0445 04/14/21  0403   WBC Thousand/uL 15 47* 13 74* 14 83*   HEMOGLOBIN g/dL 6 8* 7 5* 7 3*   HEMATOCRIT % 21 1* 23 6* 22 6*   PLATELETS Thousands/uL 115* 139* 136*         Results from last 7 days   Lab Units 04/16/21  0515 04/15/21  0445 04/14/21  0403  04/10/21  0829   POTASSIUM mmol/L 3 8 3 2* 3 2*   < >  --    CHLORIDE mmol/L 101 99* 99*   < >  --    CO2 mmol/L 26 28 28   < >  --    CO2, I-STAT mmol/L  --   --   --   --  22   BUN mg/dL 63* 59* 58*   < >  --    CREATININE mg/dL 2 68* 2 68* 2 64*   < >  --    CALCIUM mg/dL 7 9* 7 8* 7 7*   < >  --    GLUCOSE, ISTAT mg/dl  --   --   --   --  176*    < > = values in this interval not displayed           Results from last 7 days   Lab Units 04/11/21  0356   MAGNESIUM mg/dL 2 1       [facility]

## 2021-04-16 NOTE — ASSESSMENT & PLAN NOTE
Echo showed mitral valve vegetation  CHARLES showed large mitral valve vegetation measuring 19 mm x 17 mm  CT surgery consulted  EP on board

## 2021-04-16 NOTE — PROCEDURES
Thoracentesis    Date/Time: 4/16/2021 11:08 AM  Performed by: Stephanie Beasley MD  Authorized by: Stephanie Beasley MD     Patient location:  Bedside  Consent:     Consent obtained:  Verbal and written    Consent given by:  Patient and spouse    Risks discussed:  Bleeding, incomplete drainage, nerve damage, infection, pain and pneumothorax    Alternatives discussed:  No treatment and delayed treatment  Universal protocol:     Procedure explained and questions answered to patient or proxy's satisfaction: yes      Relevant documents present and verified: yes      Test results available and properly labeled: yes      Radiology Images displayed and confirmed  If images not available, report reviewed: yes      Required blood products, implants, devices and special equipment available: yes      Site/side marked: yes      Immediately prior to procedure a time out was called: yes      Patient identity confirmed:  Arm band and verbally with patient  Indications:     Procedure Purpose: diagnostic and therapeutic      Indications: pleural effusion    Anesthesia (see MAR for exact dosages): Anesthesia method:  Local infiltration    Local anesthetic:  Lidocaine 1% w/o epi  Procedure details:     Preparation: Patient was prepped and draped in usual sterile fashion      Standard thoracentesis cath kit used: Yes      Patient position:  Sitting    Location:  Midscapular line    Intercostal space:  7th    Puncture method:  Over-the-needle catheter    Ultrasound guidance: yes      Reason for ultrasound: Identify fluid collection and guide catheter placement        Ultrasound image availability:  Not saved    Sterile ultrasound techniques: Sterile gel and sterile probe covers were used      Indwelling catheter placed: no      Number of attempts:  1    Needle gauge:  22    Catheter size:  8 Fr    Drainage color:  Yellow and clear    Drainage characteristics:  Clear    Fluid removed amount:  1000mL   Post-procedure details:     Post-procedure chest x-ray: pending       Patient tolerance of procedure:   Tolerated well, no immediate complications

## 2021-04-16 NOTE — PROGRESS NOTES
Progress Note - Ramesh Heredia 76 y o  male MRN: [id number]    Unit/Bed#: [address] 803-01 Encounter: [id number]      CC: diabetes f/u    Subjective:   Ramesh Heredia is a 76y o  year old male with type 1 diabetes on insulin pump at home was admitted in [facility] for L1 fracture and transferred to the floor for acute hypoxic respiratory failure  Patient was seen and examined at bedside  Patient is drowsy  As per primary team patient's appetite is fluctuating  And he has had hypoglycemia and hyperglycemia  Objective:     Vitals: Blood pressure 115/63, pulse 84, temperature 97 6 °F (36 4 °C), resp  rate 19, height 5' 8" (1 727 m), weight 69 6 kg (153 lb 8 oz), SpO2 (!) 88 %  ,Body mass index is 23 34 kg/m²  Intake/Output Summary (Last 24 hours) at 4/16/2021 1804  Last data filed at 4/16/2021 1640  Gross per 24 hour   Intake 580 ml   Output 1243 ml   Net -663 ml       Physical Exam:  General Appearance:  Drowsy  Head: Normocephalic, without obvious abnormality, atraumatic  Extremities: moves all extremities  Skin: Skin color and temperature normal    Pulm: no labored breathing    Lab, Imaging and other studies: I have personally reviewed pertinent reports  POC Glucose (mg/dl)   Date Value   04/16/2021 314 (H)   04/16/2021 266 (H)   04/16/2021 129   04/15/2021 250 (H)   04/15/2021 306 (H)   04/15/2021 46 (L)   04/15/2021 173 (H)   04/15/2021 167 (H)   04/14/2021 252 (H)   04/14/2021 200 (H)       Assessment and plan:    Type 1 diabetes with hyper and hypoglycemia:  On insulin pump therapy at home   Currently patient is on Lantus 6 units q h s  And lispro was discontinued by primary team as he had hypoglycemia at 4:00 p m  Yesterday however he received correctional insulin in 1 hour a part    Continue Lantus to 5 units q h s  Add Humalog to 4 units t i d  A c  with this parameter(If patient eats more then 50 % of meal give 4 units otherwise give 2 units only )   And continue correctional sliding scale   Continue to monitor blood sugar over time and make adjustments to the regimen if necessary         Portions of the record may have been created with voice recognition software

## 2021-04-16 NOTE — PROGRESS NOTES
Linnea Alexis PROGRESS NOTE   Fara Boyce 76 y o  male MRN: [id number]  Unit/Bed#: [facility] 803-01 Encounter: [id number]  Reason for Consult: KAZ    ASSESSMENT and PLAN:  1  Acute kidney injury:  · Transferred from [facility] on 4/10/21 with an SCr of 2 20  · Creatinine was 2 12 to 2 38 from 4/7 to 4/11 and increased to around 2 6 to 2 7    · SCr has been stable at 2 6 to 2 7 the past 5 days  · Etiology appears to be cardiorenal syndrome  There may be an element of ATN given granular casts on UA vs immune complex GN from endocarditis  · Renal function stable  · Clinically fluid overloaded - continue Bumex 2 mg IV q8H per cards  2  Chronic kidney disease, stage IIIB:  · Baseline creatinine around 1 2-1 5  · Follows with Dr Nabil Hernandez  3  Diastolic CHF:  · Keep O>I  · Bumex 2 mg IV q8H per cardiology    4  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:  · On Vancomycin per ID  · Awaiting CT surgery evaluation  · May need surgery and PPM removal      5  Hypertension:  · BP controlled  · Not on BP meds  Only on diuretics  6  Respiratory failure:  · Felt to be due to pneumonia and fluid overload  · On abx and diuresis  7  Anemia:  · Hgb down to 6 8 today  · Consider PRBC transfusion  8  Hypokalemia:  · Add Kdur 40 meq daily   9  Hyponatremia:  · Mild  Continue fluid restriction  10  Low bicarbonate:  · Resolved  · Sodium bicarbonate stopped on 4/13/21  11  Encephalopathy  12  Pleural effusions s/p thoracentesis  DISPOSITION:  · Renal function stable  · Await CT surgery evaluation  · Continue Bumex 2 mg IV q8H per cards  · Kdur 40 meq daily  · Consider PRBC transfusion  · Remove alegria catheter  The above plan was discussed with SLIM  SUBJECTIVE / 24H INTERVAL HISTORY:  CT surgery evaluation pending  Slow to respond today  Denies CP     Remains on 4L NC    OBJECTIVE:  Current Weight: Weight - Scale: 69 6 kg (153 lb 8 oz)  Vitals: 04/15/21 2212 04/16/21 0256 04/16/21 0600 04/16/21 0839   BP: 113/56 111/56  112/56   Pulse: 86 83  77   Resp: 18 20  16   Temp: 97 9 °F (36 6 °C) 98 1 °F (36 7 °C)     TempSrc:       SpO2: 90% (!) 88%  94%   Weight:   69 6 kg (153 lb 8 oz)    Height:           Intake/Output Summary (Last 24 hours) at 4/16/2021 0925  Last data filed at 4/16/2021 0801  Gross per 24 hour   Intake 680 ml   Output 1400 ml   Net -720 ml     General: conscious, cooperative, no distress  Skin: dry  Eyes: pale conjunctivae  ENT: moist mucous membranes  Chest/Lungs: equal chest expansion, (+) crackles on both lower LF    CVS: distinct heart sounds, normal rate, regular rhythm, JVP 10 cm  Abdomen: soft, non tender, non distended, normal bowel sounds  Extremities: no edema  : (+) alegria catheter  Neuro: awake, alert  Psych: confused       Medications:    Current Facility-Administered Medications:     acetaminophen (TYLENOL) tablet 650 mg, 650 mg, Oral, Q6H PRN, Susanne Miguel, DO, 650 mg at 04/16/21 0054    aspirin chewable tablet 81 mg, 81 mg, Oral, Daily, Susanne Miguel DO, 81 mg at 04/15/21 0956    atorvastatin (LIPITOR) tablet 5 mg, 5 mg, Oral, Daily, Susanne Miguel, , 5 mg at 04/15/21 0957    bisacodyl (DULCOLAX) rectal suppository 10 mg, 10 mg, Rectal, Daily PRN, Susanne Miguel DO    bumetanide (BUMEX) injection 2 mg, 2 mg, Intravenous, Q8H, Kamala De La Cruz, DO, 2 mg at 04/16/21 0330    cefepime (MAXIPIME) 1,000 mg in dextrose 5 % 50 mL IVPB, 1,000 mg, Intravenous, Q12H, Amber Sabillon MD, Last Rate: 100 mL/hr at 04/15/21 2132, 1,000 mg at 04/15/21 2132    cholecalciferol (VITAMIN D3) tablet 4,000 Units, 4,000 Units, Oral, Daily, Susanne Miguel DO, 4,000 Units at 04/15/21 0957    docusate sodium (COLACE) capsule 100 mg, 100 mg, Oral, BID, Susanne Miguel DO, 100 mg at 04/15/21 1818    ferrous sulfate tablet 325 mg, 325 mg, Oral, Daily With Breakfast, Susanne Miguel DO, 325 mg at 04/16/21 0800    heparin (porcine) subcutaneous injection 5,000 Units, 5,000 Units, Subcutaneous, Q8H [address], Ulises Nipper, DO, 5,000 Units at 04/16/21 0639    insulin glargine (LANTUS) subcutaneous injection 5 Units 0 05 mL, 5 Units, Subcutaneous, HS, Hodan Zhang MD, 5 Units at 04/15/21 2132    insulin lispro (HumaLOG) 100 units/mL subcutaneous injection 1-5 Units, 1-5 Units, Subcutaneous, TID AC, 1 Units at 04/15/21 1145 **AND** Fingerstick Glucose (POCT), , , TID AC, Ulises Nipper, DO    insulin lispro (HumaLOG) 100 units/mL subcutaneous injection 1-5 Units, 1-5 Units, Subcutaneous, HS, Ulises Nipper, DO, 2 Units at 04/15/21 2131    levothyroxine tablet 125 mcg, 125 mcg, Oral, Early Morning, Ulises Nipper, DO, 125 mcg at 04/15/21 0525    melatonin tablet 3 mg, 3 mg, Oral, HS PRN, Johnathon Wilson PA-C, 3 mg at 04/16/21 0053    ondansetron (ZOFRAN) injection 4 mg, 4 mg, Intravenous, Q6H PRN, Ulises Nipper, DO    pantoprazole (PROTONIX) EC tablet 40 mg, 40 mg, Oral, Daily Before Breakfast, Ulises Nipper, DO, 40 mg at 04/15/21 0605    polyethylene glycol (MIRALAX) packet 17 g, 17 g, Oral, Daily PRN, Ulises Nipper, DO    tamsulosin (FLOMAX) capsule 0 4 mg, 0 4 mg, Oral, Daily With Dinner, Ulises Nipper, DO, 0 4 mg at 04/15/21 1818    vancomycin (VANCOCIN) IVPB (premix in dextrose) 750 mg 150 mL, 750 mg, Intravenous, Q24H, Jeanette Grewal MD, Last Rate: 150 mL/hr at 04/15/21 1145, 750 mg at 04/15/21 1145    Laboratory Results:  Results from last 7 days   Lab Units 04/16/21  0515 04/15/21  0445 04/14/21  0403 04/13/21  0648 04/12/21  0455 04/11/21  0601 04/11/21  0356 04/10/21  0829 04/10/21  0821   WBC Thousand/uL 15 47* 13 74* 14 83* 19 24* 16 69*  --  15 19*  --  15 67*   HEMOGLOBIN g/dL 6 8* 7 5* 7 3* 7 9* 7 4*  --  7 8*  --  7 9*   I STAT HEMOGLOBIN g/dl  --   --   --   --   --   --   --  8 2*  --    HEMATOCRIT % 21 1* 23 6* 22 6* 25 3* 23 1*  --  24 6*  --  24 1*   HEMATOCRIT, ISTAT %  --   --   --   --   --   --   --  24*  -- PLATELETS Thousands/uL 115* 139* 136* 147* 157  --  151  --  168   POTASSIUM mmol/L 3 8 3 2* 3 2* 3 7 3 8 4 8 5 5*  --   --    CHLORIDE mmol/L 101 99* 99* 103 103 103 104  --   --    CO2 mmol/L 26 28 28 19* 25 23 25  --   --    CO2, I-STAT mmol/L  --   --   --   --   --   --   --  22  --    BUN mg/dL 63* 59* 58* 55* 53* 51* 48*  --   --    CREATININE mg/dL 2 68* 2 68* 2 64* 2 68* 2 62* 2 37* 2 35*  --   --    CALCIUM mg/dL 7 9* 7 8* 7 7* 7 9* 8 4 8 2* 7 9*  --   --    MAGNESIUM mg/dL  --   --   --   --   --   --  2 1  --   --    PHOSPHORUS mg/dL  --   --  4 4*  --   --   --  3 6  --   --    GLUCOSE, ISTAT mg/dl  --   --   --   --   --   --   --  176*  --

## 2021-04-16 NOTE — ASSESSMENT & PLAN NOTE
Blood cultures from 04/12 growing Corynebacterium striatum  Continue IV Vanco  Source could be right foot ulcer  ID on board, appreciate recommendation  Echo showed anterior mitral leaflet vegetation  CHARLES ejection fraction was 60%, no regional wall motion abnormalities, large protruding echogenic mobile vegetation measuring 19 mm time 17 mm on the anterior leaflet, spanning the A1/A2 scallop with valve perforation at A2, located in the atrial aspect of the valve, prolapse into the ventricle with diastole, severe regurgitation  CT surgery consult pending

## 2021-04-16 NOTE — ASSESSMENT & PLAN NOTE
In the past 2 weeks has been range of 7 and 8  Hemoglobin today 6 8  Will order a unit of blood  Continue ferrous sulfate

## 2021-04-16 NOTE — ASSESSMENT & PLAN NOTE
Started on vanc and cefepime for possible Hcap  Blood cultures 4/12/21 growing Corynebacterium striatum  Continue vancomycin and Cefepime

## 2021-04-16 NOTE — PHYSICAL THERAPY NOTE
PT orders received  Chart reviewed  PT receiving a blood transfusion will hold     Julia Mena, Pt, DPT     04/16/21 1043   PT Last Visit   PT Visit Date 04/16/21   Note Type   Note Type Treatment   Cancel Reasons Other

## 2021-04-16 NOTE — CONSULTS
Consultation - Cardiothoracic Surgery   Joana Cross 76 y o  male MRN: [id number]  Unit/Bed#: [facility] 803-01 Encounter: [id number]    Physician Requesting Consult: Cecily Buckley MD    Reason for Consult / Principal Problem: MV endocarditis    Inpatient consult to Cardiothoracic Surgery  Consult performed by: Karlie Browne PA-C  Consult ordered by: Cecily Buckley MD        History of Present Illness: Joana Cross is a 76y o  year old male who cardiovascular past medical history significant for complete heart block having previously undergone permanent pacemaker implantation of Medtronic dual-chamber pacemaker  He follows with primary cardiologist, Dr Amy Jason  His current clinical course began early February  At that time he developed COVID-19 infection  This required treatment with corticosteroids and prolonged hospitalization  He was ultimately discharged to [facility] for ongoing inpatient rehabilitation  However following a prolonged stay in rehab he did have prolonged failure to thrive requiring repeat admission into the hospital on April 10 for acute hypoxic respiratory failure  Upon admission blood cultures were sent and found to be positive for g positive rods  Infectious Disease consultation was obtained and appropriate anti microbial were initiated  His workup next included transthoracic echocardiogram which demonstrated severe mitral regurgitation with mitral valve vegetation  This was confirmed with transesophageal echocardiogram   At this point, the cardiovascular surgery consultation was obtained to evaluate for surgical intervention  During interview today Garrett Patino is extremely lethargic and slow to respond his questions  He is unable to provide detailed responses regard to his past medical history or current clinical course  During our interview he also fell asleep multiple times  He specifically denies progressive exertional dyspnea or fatigue or angina       Past Medical History:  Past Medical History:   Diagnosis Date    Arthritis     Cardiac disease     Closed fracture of fibula, lateral malleolus, right     Complete heart block (HCC)     Diabetes mellitus ([address] )     Disease of thyroid gland     External incisional dehiscence     GERD (gastroesophageal reflux disease)     Heart disease     Muscular deconditioning     Pacemaker     Sleep apnea     cannot tolerate cpap    Thyroid disease        Past Surgical History:   Past Surgical History:   Procedure Laterality Date    CARDIAC PACEMAKER PLACEMENT Left 2015    CARDIAC PACEMAKER PLACEMENT      CARDIAC SURGERY      CATARACT EXTRACTION      MUSCLE FLAP      right foot    NERVE BLOCK      Transforaminal Epidural Lumbar with Fluoroscopic Guidance     SD COLONOSCOPY FLX DX W/COLLJ SPEC WHEN PFRMD N/A 2018    Procedure: EGD AND COLONOSCOPY;  Surgeon: Lakeisha Stanley MD;  Location: [facility]  GI LAB;   Service: Gastroenterology    SD SPLIT [address] <100 SQCM Right 6/10/2019    Procedure: SKIN GRAFT SPLIT THICKNESS (STSG)  EXTREMITY right leg;  Surgeon: Josie Saul DPM;  Location:  MAIN OR;  Service: Podiatry    TOE AMPUTATION      left 5th toe       Family History:  Family History   Problem Relation Age of Onset    Diabetes Mother     Heart disease Mother     Thyroid disease Mother         Disorder     Cancer Brother     Gout Brother     Diabetes Maternal Grandmother        Social History:  Social History     Substance and Sexual Activity   Alcohol Use Yes    Alcohol/week: 0 0 standard drinks    Frequency: Never    Binge frequency: Never    Comment: socially     Social History     Substance and Sexual Activity   Drug Use No     Social History     Tobacco Use   Smoking Status Former Smoker    Years: 15 00    Types: Pipe    Quit date: 200    Years since quittin 3   Smokeless Tobacco Never Used     Marital Status: /Civil Union    Home Medications:   Prior to Admission medications    Medication Sig Start Date End Date Taking?  Authorizing Provider   amLODIPine (NORVASC) 5 mg tablet Take 1 tablet (5 mg total) by mouth daily 3/2/21   Laine Oppenheim, MD   ascorbic acid (VITAMIN C) 1000 MG tablet Take 1 tablet (1,000 mg total) by mouth daily for 4 days 2/9/21 2/13/21  Desmond Dennison MD   aspirin 81 mg chewable tablet Chew 81 mg daily    Historical Provider, MD   Atorvastatin Calcium (LIPITOR PO) Take 5 mg by mouth daily     Historical Provider, MD   CALCIUM CITRATE PO Take 2,000 mg by mouth daily  11/3/17   Historical Provider, MD   Cholecalciferol (VITAMIN D3) 2000 units capsule Take 2,000 Units by mouth daily    Historical Provider, MD   Continuous Blood Gluc Sensor (DEXCOM G6 SENSOR) MISC As directed dx:250 03 11/26/14   Historical Provider, MD   cyanocobalamin (VITAMIN B-12) 1,000 mcg tablet Take 2,000 mcg by mouth daily    Historical Provider, MD   DULoxetine (CYMBALTA) 30 mg delayed release capsule Take 30 mg by mouth daily     Historical Provider, MD   ezetimibe (ZETIA) 10 mg tablet TAKE 5MG (ONE-HALF TABLET) BY MOUTH DAILY FOR CHOLESTEROL    Historical Provider, MD   glucose 40 % Take by mouth once    Historical Provider, MD   glucose 77 4 % TAKE 1/2 TUBE BY MOUTH  AS NEEDED FOR LOW BLOOD SUGAR 6/8/20   Historical Provider, MD   insulin aspart (NovoLOG) 100 units/mL injection Inject under the skin Via insulin pump    Historical Provider, MD   levothyroxine 125 mcg tablet Take 1 tablet (125 mcg total) by mouth daily in the early morning 11/26/19   Laine Oppenheim, MD   loperamide (IMODIUM) 2 mg capsule Take 2 capsules (4 mg total) by mouth 4 (four) times a day as needed for diarrhea 2/8/21   Desmond Dennison MD   pantoprazole (PROTONIX) 40 mg tablet Take 40 mg by mouth    Historical Provider, MD   tamsulosin (FLOMAX) 0 4 mg Take 0 4 mg by mouth daily with dinner      Historical Provider, MD   zinc sulfate (ZINCATE) 220 mg capsule Take 1 capsule (220 mg total) by mouth daily for 4 doses 2/9/21 2/13/21  Mango James MD       Inpatient Medications:  Scheduled Meds:   Current Facility-Administered Medications   Medication Dose Route Frequency Provider Last Rate    acetaminophen  650 mg Oral Q6H PRN Brandi De Paz, DO      aspirin  81 mg Oral Daily Brandi De Paz, DO      atorvastatin  5 mg Oral Daily Brandi De Paz, DO      bisacodyl  10 mg Rectal Daily PRN Brandi De Paz, DO      bumetanide  2 mg Intravenous Q8H Kamala De La Cruz, DO      cefepime  1,000 mg Intravenous Q12H Oliverio Chapman MD 1,000 mg (04/16/21 1027)    cholecalciferol  4,000 Units Oral Daily Brandi De Paz, DO      docusate sodium  100 mg Oral BID Brandi De Paz, DO      ferrous sulfate  325 mg Oral Daily With Breakfast Brandi De Paz, DO      heparin (porcine)  5,000 Units Subcutaneous [facility] Brandi De Paz, DO      insulin glargine  5 Units Subcutaneous HS Abe Rosales MD      insulin lispro  1-5 Units Subcutaneous TID AC Brandi De Paz, DO      insulin lispro  1-5 Units Subcutaneous HS Brandi De Paz, DO      levothyroxine  125 mcg Oral Early Morning Brandi De Paz, DO      melatonin  3 mg Oral HS PRN Fab Wilson PA-C      ondansetron  4 mg Intravenous Q6H PRN Brandi De Paz, DO      pantoprazole  40 mg Oral Daily Before Breakfast Brandi De Paz, DO      polyethylene glycol  17 g Oral Daily PRN Brandi De Paz, DO      potassium chloride  40 mEq Oral Daily Prieto Bryant MD      tamsulosin  0 4 mg Oral Daily With [facility], DO      vancomycin  750 mg Intravenous Q24H Oliverio Chapman  mg (04/16/21 1027)     Continuous Infusions:    PRN Meds:  acetaminophen, 650 mg, Q6H PRN  bisacodyl, 10 mg, Daily PRN  melatonin, 3 mg, HS PRN  ondansetron, 4 mg, Q6H PRN  polyethylene glycol, 17 g, Daily PRN        Allergies:   Allergies   Allergen Reactions    Contrast  [Iodinated Diagnostic Agents]     Lipitor  [Atorvastatin Calcium] Drowsiness    Ibuprofen      sensitive    Iodine - Food Allergy Rash    Simvastatin Anxiety     Other reaction(s): Malaise (finding)       Review of Systems:  Review of Systems   Constitutional: Positive for chills, diaphoresis and fatigue  HENT: Negative  Eyes: Negative  Respiratory: Positive for cough and shortness of breath  Negative for chest tightness  Cardiovascular: Positive for leg swelling  Negative for chest pain  Endocrine: Negative  Genitourinary: Negative  Musculoskeletal: Negative  Skin: Negative  Neurological: Positive for weakness  Psychiatric/Behavioral: Positive for confusion  Vital Signs:     Vitals:    04/16/21 0256 04/16/21 0600 04/16/21 0839 04/16/21 1134   BP: 111/56  112/56 111/57   Pulse: 83  77 80   Resp: 20  16 18   Temp: 98 1 °F (36 7 °C)   (!) 96 4 °F (35 8 °C)   TempSrc:       SpO2: (!) 88%  94% (!) 87%   Weight:  69 6 kg (153 lb 8 oz)     Height:         Invasive Devices     Peripheral Intravenous Line            Peripheral IV 04/14/21 Dorsal (posterior); Right Forearm 2 days          Drain            Urethral Catheter 16 Fr  3 days                Physical Exam:  Physical Exam  Constitutional:       Appearance: Normal appearance  He is well-developed  He is ill-appearing and toxic-appearing  HENT:      Head: Normocephalic and atraumatic  Eyes:      Conjunctiva/sclera: Conjunctivae normal    Neck:      Musculoskeletal: Full passive range of motion without pain and normal range of motion  Thyroid: No thyromegaly  Vascular: No carotid bruit or JVD  Trachea: No tracheal deviation  Cardiovascular:      Rate and Rhythm: Normal rate and regular rhythm  Pulses:           Carotid pulses are 2+ on the right side and 2+ on the left side  Dorsalis pedis pulses are 2+ on the right side and 2+ on the left side  Posterior tibial pulses are 2+ on the right side and 2+ on the left side  Heart sounds: S1 normal and S2 normal  Murmur present     Pulmonary:      Effort: No accessory muscle usage or respiratory distress  Breath sounds: No wheezing or rales  Chest:      Chest wall: No tenderness  Abdominal:      General: Bowel sounds are normal       Palpations: Abdomen is soft  Tenderness: There is no abdominal tenderness  Musculoskeletal: Normal range of motion  Skin:     General: Skin is warm and dry  Neurological:      Mental Status: He is alert and oriented to person, place, and time  Cranial Nerves: No cranial nerve deficit  Sensory: No sensory deficit  Motor: Weakness present  Psychiatric:         Speech: Speech normal       Comments: Lethargic  Steph Power asleep multiple times during this consultation  Lab Results:     Results from last 7 days   Lab Units 04/16/21  0515 04/15/21  0445 04/14/21  0403   WBC Thousand/uL 15 47* 13 74* 14 83*   HEMOGLOBIN g/dL 6 8* 7 5* 7 3*   HEMATOCRIT % 21 1* 23 6* 22 6*   PLATELETS Thousands/uL 115* 139* 136*     Results from last 7 days   Lab Units 04/16/21  0515 04/15/21  0445 04/14/21  0403  04/10/21  0829   POTASSIUM mmol/L 3 8 3 2* 3 2*   < >  --    CHLORIDE mmol/L 101 99* 99*   < >  --    CO2 mmol/L 26 28 28   < >  --    CO2, I-STAT mmol/L  --   --   --   --  22   BUN mg/dL 63* 59* 58*   < >  --    CREATININE mg/dL 2 68* 2 68* 2 64*   < >  --    GLUCOSE, ISTAT mg/dl  --   --   --   --  176*   CALCIUM mg/dL 7 9* 7 8* 7 7*   < >  --     < > = values in this interval not displayed  Lab Results   Component Value Date    HGBA1C 7 7 (H) 02/05/2021     Lab Results   Component Value Date    CKTOTAL 86 03/24/2021    CKMB 3 4 02/05/2021    CKMBINDEX 1 2 02/05/2021    TROPONINI 0 07 (H) 04/10/2021       Imaging Studies:     Echocardiogram: EF 60%  Mitral valve: There was a large, protruding, echogenic, mobile vegetation with central echolucency, measuring 19 mm x 17 mm  Severe MR  Mild to moderate TR  I have personally reviewed pertinent reports     and I have personally reviewed pertinent films in PACS    Assessment:  Principal Problem:    Acute respiratory failure with hypoxia (HCC)  Active Problems:    Anemia of chronic disease    Type I diabetes mellitus (HCC)    Essential hypertension    Stage 3b chronic kidney disease    KAZ on CKD stage 3    Encephalopathy    Complete heart block     Hyponatremia    Bacteremia    Acute on chronic diastolic heart failure (HCC)    HCAP (healthcare-associated pneumonia)    Acidosis    Bilateral pleural effusion    Endocarditis    Closed compression fracture of L1 lumbar vertebra, with routine healing, subsequent encounter    Mitral Valve endocarditis; Ongoing MVR workup    Plan:  Risks and benefits of mitral valve repair vs replacement were discussed in detail today with the patient  We have ordered routine preoperative laboratory and vascular studies  Pending the results of these tests, final surgical recommendation will follow by SADIE Wattersbrant was comfortable with our recommendations, and their questions were answered to their satisfaction  We will continue to evaluate the patient daily with further recommendations as work up is completed  Thank you for allowing us to participate in the care of this patient  SIGNATURE: Moira Salas PA-C  DATE: April 16, 2021  TIME: 12:33 PM    * This note was completed in part utilizing [employer] direct voice recognition software  Grammatical errors, random word insertion, spelling mistakes, and incomplete sentences may be an occasional consequence of the system secondary to software limitations, ambient noise and hardware issues  At the time of dictation, efforts were made to edit, clarify and /or correct errors  Please read the chart carefully and recognize, using context, where substitutions have occurred    If you have any questions or concerns about the context, text or information contained within the body of this dictation, please contact myself, the provider, for further clarification

## 2021-04-16 NOTE — PLAN OF CARE
Problem: Potential for Falls  Goal: Patient will remain free of falls  Description: INTERVENTIONS:  - Assess patient frequently for physical needs  -  Identify cognitive and physical deficits and behaviors that affect risk of falls  -  [place] fall precautions as indicated by assessment   - Educate patient/family on patient safety including physical limitations  - Instruct patient to call for assistance with activity based on assessment  - Modify environment to reduce risk of injury  - Consider OT/PT consult to assist with strengthening/mobility  Outcome: Progressing     Problem: PAIN - ADULT  Goal: Verbalizes/displays adequate comfort level or baseline comfort level  Description: Interventions:  - Encourage patient to monitor pain and request assistance  - Assess pain using appropriate pain scale  - Administer analgesics based on type and severity of pain and evaluate response  - Implement non-pharmacological measures as appropriate and evaluate response  - Consider cultural and social influences on pain and pain management  - Notify physician/advanced practitioner if interventions unsuccessful or patient reports new pain  Outcome: Progressing     Problem: SAFETY ADULT  Goal: Patient will remain free of falls  Description: INTERVENTIONS:  - Assess patient frequently for physical needs  -  Identify cognitive and physical deficits and behaviors that affect risk of falls    -  [place] fall precautions as indicated by assessment   - Educate patient/family on patient safety including physical limitations  - Instruct patient to call for assistance with activity based on assessment  - Modify environment to reduce risk of injury  - Consider OT/PT consult to assist with strengthening/mobility  Outcome: Progressing  Goal: Maintain or return to baseline ADL function  Description: INTERVENTIONS:  -  Assess patient's ability to carry out ADLs; assess patient's baseline for ADL function and identify physical deficits which impact ability to perform ADLs (bathing, care of mouth/teeth, toileting, grooming, dressing, etc )  - Assess/evaluate cause of self-care deficits   - Assess range of motion  - Assess patient's mobility; develop plan if impaired  - Assess patient's need for assistive devices and provide as appropriate  - Encourage maximum independence but intervene and supervise when necessary  - Involve family in performance of ADLs  - Assess for home care needs following discharge   - Consider OT consult to assist with ADL evaluation and planning for discharge  - Provide patient education as appropriate  Outcome: Progressing  Goal: Maintain or return mobility status to optimal level  Description: INTERVENTIONS:  - Assess patient's baseline mobility status (ambulation, transfers, stairs, etc )    - Identify cognitive and physical deficits and behaviors that affect mobility  - Identify mobility aids required to assist with transfers and/or ambulation (gait belt, sit-to-stand, lift, walker, cane, etc )  - [place] fall precautions as indicated by assessment  - Record patient progress and toleration of activity level on Mobility SBAR; progress patient to next Phase/Stage  - Instruct patient to call for assistance with activity based on assessment  - Consider rehabilitation consult to assist with strengthening/weightbearing, etc   Outcome: Progressing     Problem: DISCHARGE PLANNING  Goal: Discharge to home or other facility with appropriate resources  Description: INTERVENTIONS:  - Identify barriers to discharge w/patient and caregiver  - Arrange for needed discharge resources and transportation as appropriate  - Identify discharge learning needs (meds, wound care, etc )  - Arrange for interpretive services to assist at discharge as needed  - Refer to Case Management Department for coordinating discharge planning if the patient needs post-hospital services based on physician/advanced practitioner order or complex needs related to functional status, cognitive ability, or social support system  Outcome: Progressing     Problem: Knowledge Deficit  Goal: Patient/family/caregiver demonstrates understanding of disease process, treatment plan, medications, and discharge instructions  Description: Complete learning assessment and assess knowledge base  Interventions:  - Provide teaching at level of understanding  - Provide teaching via preferred learning methods  Outcome: Progressing     Problem: Prexisting or High Potential for Compromised Skin Integrity  Goal: Skin integrity is maintained or improved  Description: INTERVENTIONS:  - Identify patients at risk for skin breakdown  - Assess and monitor skin integrity  - Assess and monitor nutrition and hydration status  - Monitor labs   - Assess for incontinence   - Turn and reposition patient  - Assist with mobility/ambulation  - Relieve pressure over bony prominences  - Avoid friction and shearing  - Provide appropriate hygiene as needed including keeping skin clean and dry  - Evaluate need for skin moisturizer/barrier cream  - Collaborate with interdisciplinary team   - Patient/family teaching  - Consider wound care consult   Outcome: Progressing     Problem: Nutrition/Hydration-ADULT  Goal: Nutrient/Hydration intake appropriate for improving, restoring or maintaining nutritional needs  Description: Monitor and assess patient's nutrition/hydration status for malnutrition  Collaborate with interdisciplinary team and initiate plan and interventions as ordered  Monitor patient's weight and dietary intake as ordered or per policy  Utilize nutrition screening tool and intervene as necessary  Determine patient's food preferences and provide high-protein, high-caloric foods as appropriate       INTERVENTIONS:  - Monitor oral intake, urinary output, labs, and treatment plans  - Assess nutrition and hydration status and recommend course of action  - Evaluate amount of meals eaten  - Assist patient with eating if necessary   - Allow adequate time for meals  - Recommend/ encourage appropriate diets, oral nutritional supplements, and vitamin/mineral supplements  - Order, calculate, and assess calorie counts as needed  - Recommend, monitor, and adjust tube feedings and TPN/PPN based on assessed needs  - Assess need for intravenous fluids  - Provide specific nutrition/hydration education as appropriate  - Include patient/family/caregiver in decisions related to nutrition  Outcome: Progressing

## 2021-04-16 NOTE — ASSESSMENT & PLAN NOTE
Wt Readings from Last 3 Encounters:   04/16/21 69 6 kg (153 lb 8 oz)   04/05/21 78 6 kg (173 lb 4 5 oz)   03/27/21 72 kg (158 lb 11 7 oz)     Echo in 03/29/2021 showed ejection fraction 65%, mild MR, mild TR  Lasix 80 mg IV t i d  was switched to Bumex 2 mg IV t i d  4/13  6 2 L negative balance since admission  Dc Ortez per Nephro  Monitor kidney function  Input and output, daily weight

## 2021-04-16 NOTE — ASSESSMENT & PLAN NOTE
POA, secondary to acute on chronic diastolic heart failure, [place] on CKD stage 3, b/l pleural effusion, Hcap, recent COVID infection in February  RRT at [facility] needing 10L O2    CT 4/13 showed dense airspace consolidation throughout much of the left upper lobe with lesser airspace infiltrates, subsegmental in the right upper lobe, does not have a typical pattern of CHF, possible infectious etiology    Large dependent bilateral pleural effusions  Weaned down from 10 L to 4 L  Negative balance 6 2 L since admission  Diuresis : lasix 80 mg q8 hours, transitioned to Bumex 2 mg IV tid 4/13  Continue cefepime and Vanco for HCAP, endocarditis  Status post left sided thoracocentesis 4/14, 1 2 L removed, transudate  Goal oxygen saturation 89% and above  Monitor in and output  Daily weights

## 2021-04-16 NOTE — PROGRESS NOTES
Progress Note - Pulmonary   Nae Treva 76 y o  male MRN: [id number]  Unit/Bed#: 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, confirmed on CHARLES   6  KAZ on CKD 3B  7  Hypertension  8  Hyponatremia  9  Anemia  10  COVID infection February 2021     Plan:  - c/w diuresis per cardiology/nephrolgoy   -  Currently saturating mid 90s on 4 L nasal cannula, wean as tolerated, aggressive ics, out of bed to chair,  Goal SpO2 greater than 88%  -  continue broad-spectrum antibiotics vancomycin/cefepime for HCAP and corynebacterium bacteremia  -  Would recommend 7 day course of antibiotics for pneumoni (Cefepime day 5/7), but will need longer duration given MV endocarditis likely 6 weeks (currently on vanco as well)  - recommend  CT surgery evaluation regarding need for mitral valve repair and EP cardiology evaluation regarding need for pacemaker extraction  -  Infectious Disease following  - HGB 6 8 this am, management per primary team, would keep hgb >7   - s/p left thoracentesis with 1 2 L removed, fluid analysis consistent with simple transudative effusion, will discuss continued diuresis and obs vs eval for right thoracentesis     Subjective:   Patient seen and examined at bedside  He is more confused today  Mild shortness of breath and intermittent cough  Denies fever, chills, nausea, vomiting, chest pain  Review of Systems   Constitutional: Positive for fatigue  Negative for chills, fever and unexpected weight change  HENT: Negative for congestion, rhinorrhea, sneezing and sore throat  Respiratory: Positive for shortness of breath   Negative for cough and wheezing  Cardiovascular: Positive for leg swelling  Negative for chest pain and palpitations  Gastrointestinal: Negative for abdominal pain, constipation, diarrhea, nausea and vomiting  Endocrine: Negative for cold intolerance and heat intolerance  Genitourinary: Negative for dysuria  Musculoskeletal: Negative for arthralgias  Allergic/Immunologic: Negative for immunocompromised state  Neurological: Negative for dizziness and numbness  Objective:     Vitals:    04/15/21 1849 04/15/21 2212 04/16/21 0256 04/16/21 0600   BP: 115/55 113/56 111/56    Pulse: 88 86 83    Resp: 22 18 20    Temp: 97 7 °F (36 5 °C) 97 9 °F (36 6 °C) 98 1 °F (36 7 °C)    TempSrc:       SpO2: 92% 90% (!) 88%    Weight:    69 6 kg (153 lb 8 oz)   Height:               Intake/Output Summary (Last 24 hours) at 4/16/2021 0752  Last data filed at 4/16/2021 0519  Gross per 24 hour   Intake 580 ml   Output 1400 ml   Net -820 ml         Physical Exam  Constitutional:       General: He is not in acute distress  Appearance: He is well-developed  He is ill-appearing  He is not diaphoretic  HENT:      Head: Normocephalic and atraumatic  Mouth/Throat:      Mouth: Mucous membranes are moist       Pharynx: Oropharynx is clear  No oropharyngeal exudate  Eyes:      General: No scleral icterus  Cardiovascular:      Rate and Rhythm: Normal rate and regular rhythm  Heart sounds: Normal heart sounds  No murmur  Pulmonary:      Effort: Pulmonary effort is normal  No respiratory distress  Breath sounds: No wheezing  Comments: Rales, decreased breath sounds at the right base  Abdominal:      General: Bowel sounds are normal  There is no distension  Palpations: Abdomen is soft  Tenderness: There is no abdominal tenderness  Musculoskeletal:      Right lower leg: No edema  Left lower leg: No edema  Neurological:      Mental Status: He is alert  Comments: Confused         Labs:  I have personally reviewed pertinent lab results  Results from last 7 days   Lab Units 04/16/21  0515 04/15/21  0445 04/14/21  0403   WBC Thousand/uL 15 47* 13 74* 14 83*   HEMOGLOBIN g/dL 6 8* 7 5* 7 3*   HEMATOCRIT % 21 1* 23 6* 22 6*   PLATELETS Thousands/uL 115* 139* 136*   NEUTROS PCT % 86* 86* 87*   MONOS PCT % 7 8 7      Results from last 7 days   Lab Units 04/16/21  0515 04/15/21  0445 04/14/21  0403  04/10/21  0829   POTASSIUM mmol/L 3 8 3 2* 3 2*   < >  --    CHLORIDE mmol/L 101 99* 99*   < >  --    CO2 mmol/L 26 28 28   < >  --    CO2, I-STAT mmol/L  --   --   --   --  22   BUN mg/dL 63* 59* 58*   < >  --    CREATININE mg/dL 2 68* 2 68* 2 64*   < >  --    CALCIUM mg/dL 7 9* 7 8* 7 7*   < >  --    GLUCOSE, ISTAT mg/dl  --   --   --   --  176*    < > = values in this interval not displayed       Results from last 7 days   Lab Units 04/11/21  0356   MAGNESIUM mg/dL 2 1     Results from last 7 days   Lab Units 04/14/21  0403 04/11/21  0356   PHOSPHORUS mg/dL 4 4* 3 6          Results from last 7 days   Lab Units 04/10/21  0813   LACTIC ACID mmol/L 1 7     0   Lab Value Date/Time    TROPONINI 0 07 (H) 04/10/2021 0813    TROPONINI 0 32 (H) 03/27/2021 2317    TROPONINI 0 38 (H) 03/27/2021 2024    TROPONINI 0 47 (H) 03/27/2021 1619    TROPONINI <0 02 03/24/2021 1643    TROPONINI 0 08 (H) 02/06/2021 0255    TROPONINI 0 09 (H) 02/05/2021 2350    TROPONINI 0 08 (H) 02/05/2021 2028    TROPONINI 0 06 (H) 02/05/2021 1554    TROPONINI 0 03 07/13/2019 1629     Microbiology:    Blood cultures +corynebacterium    MRSA nares negative  Repeat blood cultures NGTD      Imaging and other studies: I have personally reviewed pertinent reports    and I have personally reviewed pertinent films in PACS   CT chest 04/13/2021   left upper lobe consolidation   large bilateral pleural effusions      chest x-ray 04/14/2021   left upper lobe and right perihilar infiltrates with moderate right pleural effusion and improvement in left basilar kait Samuels MD  Pulmonary & Critical Care Fellow, [facility]

## 2021-04-16 NOTE — PROGRESS NOTES
Vancomycin IV Pharmacy-to-Dose Consultation    Beverly Gould is a 76 y o  male who is currently receiving Vancomycin IV with management by the Pharmacy Consult service for the treatment of Pneumonia    Assessment/Plan:    The patient's chart was reviewed  Renal function is stable  There are no signs or symptoms of nephrotoxicity and/or infusion reactions documented  The following nephrotoxicity factors are present:  Medications: diuretics  Patient-Factors: elderly, renal dysfunction    Based on today's assessment, will continue current vancomycin (Day # 5) dosing of 750 mg q24, with a plan for trough to be drawn at 1030 on 4/21  We will continue to follow the patient's culture results and clinical progress daily        Nadia Lane, Pharmacist

## 2021-04-17 PROBLEM — Z71.89 GOALS OF CARE, COUNSELING/DISCUSSION: Status: ACTIVE | Noted: 2021-01-01

## 2021-04-17 NOTE — PROGRESS NOTES
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  - After review of records In [facility] as well as Care everywhere it appears that the patient has a baseline Creatinine of 1 2-1 5  mg/dL  - transferred in from [facility] on 04/10/2021 with a serum creatinine of 2 20 mg/dL  - serum creatinine has been stable around 2 5-2 7 mg/dL  - patient's creatinine today is at 2 79 mg/dL, stable  - recommend considering changing Bumex IV to p o  If okay with Cardiology  - check BMP in a m   - Await renal recovery  - Optimize hemodynamic status to avoid delay in renal recovery  - Place on a renal diet when allowed diet order    - Avoid nephrotoxins, adjust meds to appropriate GFR   - Strict I/O   - Daily weights  - Urinary retention protocol if patient does not have a Ortez  - will need to set up patient for follow up with Nephrology as an outpatient post hospitalization   - for nephrology as an outpatient patient follows up with Dr Parisa Merida  - agree with family meeting for goals of care     Blood pressure:  - current medications:  Bumex 2 mg IV q 8  - recommendations:  Recommend changing over to p o  Diuretics if okay with Cardiology  - Optimize hemodynamics   - Maintain MAP > 65mmHg  - Avoid BP fluctuations   H/H/anemia:  - most recent hemoglobin at 9 0 grams/deciliter  - maintain hemoglobin greater than 8 grams/deciliter     Acid-base electrolytes:  o Hyponatremia:    - Most recent sodium at 136 mEq  - Continue fluid restriction    o Hypokalemia:  - On K-Dur 40 mEq p o   Q day  - Most recent potassium 4 0    o Hyperphosphatemia:  - Most recent phosphorus of 4 4  o  Acid-base:    - Most recent bicarb at 32     Volume status:  o  Clinically euvolemic diuretics above     Other medical problems:  o Altered mental status/encephalopathy:  Management per primary team     o Agree with goals of care/family meeting  o 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:  Management per primary team   On vancomycin per ID not a surgical candidate as per CT surgery  Thanks for the consult  Will continue to follow  Please call with questions/ concerns  Above-mentioned orders and Plan acute kidney injury was discussed with the team in [address] [facility] AM              Objective :   Patient seen and examined in his room no overnight events hemodynamically stable remains afebrile urine output 1 1 L last 24 hours remains afebrile seen by CT surgery not a surgical candidate  Primary team to engage in palliative care and family meeting  PHYSICAL EXAM  /70   Pulse 81   Temp (!) 96 6 °F (35 9 °C)   Resp 20   Ht 5' 8" (1 727 m)   Wt 70 1 kg (154 lb 8 oz)   SpO2 93%   BMI 23 49 kg/m²   Temp (24hrs), Av 8 °F (36 °C), Min:95 9 °F (35 5 °C), Max:97 6 °F (36 4 °C)        Intake/Output Summary (Last 24 hours) at 2021 0628  Last data filed at 2021 2212  Gross per 24 hour   Intake 340 ml   Output 543 ml   Net -203 ml       I/O last 24 hours: In: 340 [P O :340]  Out: 1443 [Urine:1443]      Current Weight: Weight - Scale: 70 1 kg (154 lb 8 oz)  First Weight: Weight - Scale: 78 6 kg (173 lb 4 5 oz)  Physical Exam  Vitals signs and nursing note reviewed  Constitutional:       General: He is not in acute distress  Appearance: Normal appearance  He is ill-appearing  He is not toxic-appearing or diaphoretic  HENT:      Head: Normocephalic and atraumatic  Mouth/Throat:      Mouth: Mucous membranes are moist       Pharynx: Oropharynx is clear  No oropharyngeal exudate  Eyes:      General: No scleral icterus       Conjunctiva/sclera: Conjunctivae normal    Neck:      Musculoskeletal: Normal range of motion and neck supple  Cardiovascular:      Rate and Rhythm: Normal rate  Heart sounds: Normal heart sounds  No friction rub  Pulmonary:      Effort: No respiratory distress  Breath sounds: Normal breath sounds  No wheezing  Abdominal:      General: There is no distension  Palpations: Abdomen is soft  There is no mass  Tenderness: There is no abdominal tenderness  Musculoskeletal:         General: No swelling  Skin:     General: Skin is warm  Coloration: Skin is not jaundiced  Neurological:      General: No focal deficit present  Mental Status: He is alert     Psychiatric:         Mood and Affect: Mood normal              Review of Systems   Unable to perform ROS: Mental status change       Scheduled Meds:  Current Facility-Administered Medications   Medication Dose Route Frequency Provider Last Rate    acetaminophen  650 mg Oral Q6H PRN Torie Zamorano, DO      aspirin  81 mg Oral Daily Torie Zamorano, DO      atorvastatin  5 mg Oral Daily Torie Zamorano, DO      bisacodyl  10 mg Rectal Daily PRN Torie Zamorano, DO      bumetanide  2 mg Intravenous Q8H Kamala De La Cruz, DO      cefepime  1,000 mg Intravenous Q12H Sonal Zamora MD 1,000 mg (04/16/21 2124)    cholecalciferol  4,000 Units Oral Daily Torie Zamorano, DO      docusate sodium  100 mg Oral BID Torie Zamorano, DO      ferrous sulfate  325 mg Oral Daily With Breakfast Torie Zamorano, DO      heparin (porcine)  5,000 Units Subcutaneous [facility] Torie Zamorano, DO      insulin glargine  5 Units Subcutaneous HS Hodan Zhang MD      insulin lispro  1-5 Units Subcutaneous TID JESSIE Santillan, DO      insulin lispro  1-5 Units Subcutaneous HS Torie Zamorano, DO      insulin lispro  4 Units Subcutaneous TID after meals Faraz Cohn MD      levothyroxine  125 mcg Oral Early Morning Torie Zamorano, DO      melatonin  3 mg Oral HS PRN Johnathon D JOSE Wilson      ondansetron  4 mg Intravenous Q6H PRN [place] Mixer, DO      pantoprazole  40 mg Oral Daily Before Breakfast Tiny Mixer, DO      polyethylene glycol  17 g Oral Daily PRN Tiny Mixer, DO      potassium chloride  40 mEq Oral Daily Africa Panda MD      tamsulosin  0 4 mg Oral Daily With [address], [place]      vancomycin  750 mg Intravenous Q24H Jaime Ling  mg (04/16/21 1027)       PRN Meds:   acetaminophen    bisacodyl    melatonin    ondansetron    polyethylene glycol    Continuous Infusions:       Invasive Devices:      Invasive Devices     Peripheral Intravenous Line            Peripheral IV 04/16/21 Left Wrist less than 1 day          Drain            External Urinary Catheter less than 1 day                  LABORATORY:    Results from last 7 days   Lab Units 04/17/21  0612 04/16/21  0515 04/15/21  0445 04/14/21  0403 04/13/21  3358 04/12/21  0455 04/11/21  0601 04/11/21  0356 04/10/21  0829   WBC Thousand/uL 19 36* 15 47* 13 74* 14 83* 19 24* 16 69*  --  15 19*  --    HEMOGLOBIN g/dL 9 0* 6 8* 7 5* 7 3* 7 9* 7 4*  --  7 8*  --    I STAT HEMOGLOBIN g/dl  --   --   --   --   --   --   --   --  8 2*   HEMATOCRIT % 27 2* 21 1* 23 6* 22 6* 25 3* 23 1*  --  24 6*  --    HEMATOCRIT, ISTAT %  --   --   --   --   --   --   --   --  24*   PLATELETS Thousands/uL 142* 115* 139* 136* 147* 157  --  151  --    POTASSIUM mmol/L  --  3 8 3 2* 3 2* 3 7 3 8 4 8 5 5*  --    CHLORIDE mmol/L  --  101 99* 99* 103 103 103 104  --    CO2 mmol/L  --  26 28 28 19* 25 23 25  --    CO2, I-STAT mmol/L  --   --   --   --   --   --   --   --  22   BUN mg/dL  --  63* 59* 58* 55* 53* 51* 48*  --    CREATININE mg/dL  --  2 68* 2 68* 2 64* 2 68* 2 62* 2 37* 2 35*  --    CALCIUM mg/dL  --  7 9* 7 8* 7 7* 7 9* 8 4 8 2* 7 9*  --    MAGNESIUM mg/dL  --   --   --   --   --   --   --  2 1  --    PHOSPHORUS mg/dL  --   --   --  4 4*  --   --   --  3 6  --    GLUCOSE, ISTAT mg/dl  --   --   --   --   --   -- --   --  176*      rest all reviewed    RADIOLOGY:  XR chest portable   Final Result by Ramy Cameron MD (04/16 1328)      No visible right effusion or right pneumothorax  Persistent left effusion, left base atelectasis, and bilateral pneumonia  Workstation performed: [id number]         XR spine lumbar 2 or 3 views injury   Final Result by Marco Antonio Sheriff DO (04/14 2047)      Unchanged L1 compression fracture  Degenerative changes as above  Workstation performed: [id number]         XR chest portable   Final Result by Joe Caldwell MD (04/14 1306)   No evidence of postthoracentesis complication      Increased left upper lobe infiltrate  Persistent right perihilar infiltrate               Workstation performed: [id number]         CT chest wo contrast   Final Result by Adilia Degroot MD (04/14 0631)      Dense airspace consolidation throughout much of the left upper lobe with lesser airspace infiltrates, subsegmental in the right upper lobe  Although nonspecific, does not have typical pattern of CHF  Infectious etiology remains in the differential       Large dependent bilateral pleural effusions with associated atelectasis of the lower lobes  The study was marked in [facility] for immediate notification  Workstation performed: [id number]         XR chest portable   Final Result by Des Alaniz MD (04/11 1804)      Mild pulmonary edema with small left pleural effusion, improved from prior exam                   Workstation performed: [id number]         XR spine thoracolumbar 2 vw    (Results Pending)     Rest all reviewed    Portions of the record may have been created with voice recognition software  Occasional wrong word or "sound a like" substitutions may have occurred due to the inherent limitations of voice recognition software  Read the chart carefully and recognize, using context, where substitutions have occurred  If you have any questions, please contact the dictating provider

## 2021-04-17 NOTE — PROGRESS NOTES
[address] [facility]  Progress Note - Dariela De La Garza 1945, 76 y o  male MRN: [id number]  Unit/Bed#: [facility] 803-01 Encounter: [id number]  Primary Care Provider: Darion Palafox MD   Date and time admitted to hospital: 4/10/2021 10:27 AM    * Acute respiratory failure with hypoxia [facility])  Assessment & Plan  POA, secondary to acute on chronic diastolic heart failure, Srini on CKD stage 3, b/l pleural effusion, Hcap, recent COVID infection in February  RRT at [facility] needing 10L O2    CT 4/13 showed dense airspace consolidation throughout much of the left upper lobe with lesser airspace infiltrates, subsegmental in the right upper lobe, does not have a typical pattern of CHF, possible infectious etiology    Large dependent bilateral pleural effusions  Weaned down from 10 L to 3 L  Negative balance 7 4 L since admission  Diuresis : lasix 80 mg q8 hours, transitioned to Bumex 2 mg IV tid 4/13, transitioned to Bumex 1 mg TID 4/17  Continue cefepime and Vanco for HCAP, endocarditis  Status post left sided thoracocentesis 4/14, 1 2 L removed, transudate  Status post right-sided thoracocentesis for/16, 1 L of clear yellow fluid removed  Goal oxygen saturation 89% and above  Monitor in and output  Daily weights    Acute on chronic diastolic heart failure (HCC)  Assessment & Plan  Wt Readings from Last 3 Encounters:   04/17/21 70 1 kg (154 lb 8 oz)   04/05/21 78 6 kg (173 lb 4 5 oz)   03/27/21 72 kg (158 lb 11 7 oz)     Echo in 03/29/2021 showed ejection fraction 65%, mild MR, mild TR  Lasix 80 mg IV t i d  was switched to Bumex 2 mg IV t i d  4/13, then to Bumex 1 mg TID 4/17  7 4 L negative balance since admission  Gabe myers Nephro 4/16  Monitor kidney function  Input and output, daily weight        HCAP (healthcare-associated pneumonia)  Assessment & Plan  Started on vanc and cefepime for possible Hcap  Blood cultures 4/12/21 growing Corynebacterium striatum  Continue vancomycin and Cefepime        Bacteremia  Assessment & Plan  Blood cultures from 04/12 growing Corynebacterium striatum  Continue IV Vanco  Source could be right foot ulcer  ID on board, appreciate recommendation  Echo showed anterior mitral leaflet vegetation  CHARLES ejection fraction was 60%, no regional wall motion abnormalities, large protruding echogenic mobile vegetation measuring 19 mm time 17 mm on the anterior leaflet, spanning the A1/A2 scallop with valve perforation at A2, located in the atrial aspect of the valve, prolapse into the ventricle with diastole, severe regurgitation  Evaluated by CT surgery, not a candidate for surgery      Complete heart block   Assessment & Plan  Patient has a history of complete heart block, status post Medtronic dual-chamber ppm implanted 2015  Now with endocarditis  Evaluated by EP, appreciate recommendations  He is pacemaker dependent  On interrogation he has an underlying rhythm and he is bradycardic and has CHB     Per CT surgery, not a candidate for surgery    Endocarditis  Assessment & Plan  Echo showed mitral valve vegetation  CHARLES showed large mitral valve vegetation measuring 19 mm x 17 mm  Not a candidate for surgery per CT surgery  EP on board    Bilateral pleural effusion  Assessment & Plan  CT showed large dependent bilateral pleural effusions associated with atelectasis of the lower lobes  Status post left-sided thoracocentesis by Pulm 4/14, removed 1 2 L  Status post right-sided thoracocentesis 4/16, 1 L of clear yellow fluid removed    Encephalopathy  Assessment & Plan  Multifactorial suspect in the setting of heart failure, kidney failure, bacteremia, Hcap  Reorient, sleep-wake cycle      KAZ on CKD stage 3  Assessment & Plan  POA  Baseline creatinine from 1 2-1 5  Suspect cardiorenal, component of ATN  Creatinine today 2 79  On Bumex 1 mg IV q 8 hours  Nephrology following    Goals of care, counseling/discussion  Assessment & Plan  Palliative care consult placed  Per CT surgery patient is not a candidate for surgery, I reached out if they can update family  Dr Deuce Cárdenas from [facility] and myself met with patient's wife and son today  Updated about his current condition and recommendations from subspecialists  They would like to continue full treatment for the next few days to see if he will improve and have another meeting early next week  They would like him to be full code       Closed compression fracture of L1 lumbar vertebra, with routine healing, subsequent encounter  Assessment & Plan  Followed by neuro surgery    Acidosis  Assessment & Plan  Resolved, bicarb tabs discontinued 4/13    Hyponatremia  Assessment & Plan  Sodium 136  Continue fluid restriction  Off salt tabs    Stage 3b chronic kidney disease  Assessment & Plan  Lab Results   Component Value Date    EGFR 21 04/17/2021    EGFR 22 04/16/2021    EGFR 22 04/15/2021    CREATININE 2 79 (H) 04/17/2021    CREATININE 2 68 (H) 04/16/2021    CREATININE 2 68 (H) 04/15/2021   conitnue with Bumex  Monitor creatinine closely   Input and output  Daily weights  Nephrology on board  Avoid nephrotoxic medications  Keep SBP>130    Essential hypertension  Assessment & Plan  Bumex 1 mg IV t i d   Monitor blood pressure    Type I diabetes mellitus ([facility] [address] )  Assessment & Plan  Lab Results   Component Value Date    HGBA1C 7 7 (H) 02/05/2021       Recent Labs     04/16/21  1550 04/16/21  2100 04/17/21  0733 04/17/21  1114   POCGLU 314* 177* 72 91       Blood Sugar Average: Last 72 hrs:  (P) 832 [id number]   Endo recommendations appreciated  On insulin pump OP  On SQ insulin IP  Aappetite fluctuates  Continue Lantus 5 units q h s  Humalog 4 units t i d   If patient eats more than 50% of meal, if not then 2 units    Anemia of chronic disease  Assessment & Plan  In the past 2 weeks has been range of 7 and 8  Hemoglobin 4/16 6 8, status post 1 PRBC  Hemoglobin today 9 0        VTE Pharmacologic Prophylaxis:   Pharmacologic: Heparin  Mechanical VTE Prophylaxis in Place: Yes    Patient Centered Rounds: I have performed bedside rounds with nursing staff today  Discussions with Specialists or Other Care Team Provider:  ID, CT surgery, Nephrology    Education and Discussions with Family / Patient:  Wife and son    Time Spent for Care: 45 minutes  More than 50% of total time spent on counseling and coordination of care as described above  Current Length of Stay: 7 day(s)    Current Patient Status: Inpatient   Certification Statement: The patient will continue to require additional inpatient hospital stay due to Above    Discharge Plan: To be determined    Code Status: Level 1 - Full Code      Subjective:   Patient was seen and evaluated bedside  Lethargic but opens his eyes on verbal command  Objective:     Vitals:   Temp (24hrs), Av 2 °F (36 2 °C), Min:95 9 °F (35 5 °C), Max:98 5 °F (36 9 °C)    Temp:  [95 9 °F (35 5 °C)-98 5 °F (36 9 °C)] 97 4 °F (36 3 °C)  HR:  [77-88] 86  Resp:  [16-24] 20  BP: ()/(55-96) 113/55  SpO2:  [88 %-95 %] 95 %  Body mass index is 23 49 kg/m²  Input and Output Summary (last 24 hours): Intake/Output Summary (Last 24 hours) at 2021 1557  Last data filed at 2021 1300  Gross per 24 hour   Intake 300 ml   Output 1643 ml   Net -1343 ml       Physical Exam:     Physical Exam  Constitutional:       General: He is not in acute distress  Appearance: He is ill-appearing  Comments: Lethargic   HENT:      Head: Atraumatic  Neck:      Musculoskeletal: Neck supple  Cardiovascular:      Rate and Rhythm: Normal rate and regular rhythm  Heart sounds: No murmur  No friction rub  No gallop  Pulmonary:      Effort: Pulmonary effort is normal       Comments: Decreased breath sounds bilaterally, on 3 L of oxygen via nasal cannula, no respiratory distress, no wheezing  Abdominal:      General: Bowel sounds are normal  There is no distension  Palpations: Abdomen is soft  Tenderness:  There is no abdominal tenderness  Musculoskeletal:         General: No swelling  Skin:     General: Skin is warm and dry  Comments: Right foot wound   Neurological:      General: No focal deficit present  Psychiatric:      Comments: Unable to assess         Additional Data:     Labs:    Results from last 7 days   Lab Units 04/17/21  0612 04/16/21  0515   WBC Thousand/uL 19 36* 15 47*   HEMOGLOBIN g/dL 9 0* 6 8*   HEMATOCRIT % 27 2* 21 1*   PLATELETS Thousands/uL 142* 115*   NEUTROS PCT %  --  86*   LYMPHS PCT %  --  6*   MONOS PCT %  --  7   EOS PCT %  --  0     Results from last 7 days   Lab Units 04/17/21  0612   SODIUM mmol/L 136   POTASSIUM mmol/L 4 0   CHLORIDE mmol/L 101   CO2 mmol/L 26   BUN mg/dL 65*   CREATININE mg/dL 2 79*   ANION GAP mmol/L 9   CALCIUM mg/dL 8 3   GLUCOSE RANDOM mg/dL 69         Results from last 7 days   Lab Units 04/17/21  1114 04/17/21  0733 04/16/21  2100 04/16/21  1550 04/16/21  1131 04/16/21  0754 04/15/21  2105 04/15/21  1645 04/15/21  1636 04/15/21  1056 04/15/21  0737 04/14/21  2125   POC GLUCOSE mg/dl 91 72 177* 314* 266* 129 250* 306* 46* 173* 167* 252*         Results from last 7 days   Lab Units 04/12/21  0455 04/11/21  0356   PROCALCITONIN ng/ml 1 01* 0 73*           * I Have Reviewed All Lab Data Listed Above  * Additional Pertinent Lab Tests Reviewed: Tonya 66 Admission Reviewed    Imaging:    Imaging Reports Reviewed Today Include: all  Imaging Personally Reviewed by Myself Includes:      Recent Cultures (last 7 days):     Results from last 7 days   Lab Units 04/16/21  1119 04/14/21  1031 04/14/21  0416 04/14/21  0410 04/12/21  0951 04/12/21  0935   BLOOD CULTURE   --   --  No Growth at 72 hrs  No Growth at 72 hrs   Corynebacterium striatum* Corynebacterium striatum*   GRAM STAIN RESULT  2+ Polys  No bacteria seen 1+ Polys  No bacteria seen  --   --  Gram positive rods* Gram positive rods*   BODY FLUID CULTURE, STERILE  No growth No growth  -- --   --   --        Last 24 Hours Medication List:   Current Facility-Administered Medications   Medication Dose Route Frequency Provider Last Rate    acetaminophen  650 mg Oral Q6H PRN Lendia Hurtado, DO      aspirin  81 mg Oral Daily Lendia Hurtado, DO      atorvastatin  5 mg Oral Daily Lendia Hurtado, DO      bisacodyl  10 mg Rectal Daily PRN Lendia Hurtado, DO      bumetanide  1 mg Intravenous Q8H Maco Shah, DO      cefepime  1,000 mg Intravenous Q12H Mary Krishna MD 1,000 mg (04/17/21 1006)    cholecalciferol  4,000 Units Oral Daily Lendia Hurtado, DO      docusate sodium  100 mg Oral BID Lendia Hurtado, DO      ferrous sulfate  325 mg Oral Daily With Breakfast Lendia Hurtado, DO      heparin (porcine)  5,000 Units Subcutaneous [facility] Lendia Hurtado, DO      insulin glargine  5 Units Subcutaneous HS Hodan Zhang MD      insulin lispro  1-5 Units Subcutaneous TID AC Lendia Hurtado, DO      insulin lispro  1-5 Units Subcutaneous HS Lendia Hurtado, DO      insulin lispro  4 Units Subcutaneous TID after meals Rosalie Orozco MD      levothyroxine  125 mcg Oral Early Morning Lendia Hurtado, DO      melatonin  3 mg Oral HS PRN Johnathon Wilson PA-C      ondansetron  4 mg Intravenous Q6H PRN Lendia Hurtado, DO      pantoprazole  40 mg Oral Daily Before Breakfast Lendia Hurtado, DO      polyethylene glycol  17 g Oral Daily PRN Lendia Hurtado, DO      potassium chloride  40 mEq Oral Daily Erik Jason MD      tamsulosin  0 4 mg Oral Daily With [facility], DO      vancomycin  750 mg Intravenous Q24H Mary Krishna  mg (04/17/21 1008)        Today, Patient Was Seen By: Amy Pascual MD    ** Please Note: Dictation voice to text software may have been used in the creation of this document   **

## 2021-04-17 NOTE — ASSESSMENT & PLAN NOTE
In the past 2 weeks has been range of 7 and 8  Hemoglobin 4/16 6 8, status post 1 PRBC  Hemoglobin today 9 0

## 2021-04-17 NOTE — ASSESSMENT & PLAN NOTE
Palliative care consult placed  Per CT surgery patient is not a candidate for surgery, I reached out if they can update family  Dr Francisca Amanda from [facility] and myself met with patient's wife and son today  Updated about his current condition and recommendations from subspecialists    They would like to continue full treatment for the next few days to see if he will improve and have another meeting early next week  They would like him to be full code

## 2021-04-17 NOTE — ASSESSMENT & PLAN NOTE
POA  Baseline creatinine from 1 2-1 5  Suspect cardiorenal, component of ATN  Creatinine today 2 79  On Bumex 1 mg IV q 8 hours  Nephrology following

## 2021-04-17 NOTE — PROGRESS NOTES
Vancomycin IV Pharmacy-to-Dose Consultation    Althea Grant is a 76 y o  male who is currently receiving Vancomycin IV with management by the Pharmacy Consult service  Assessment/Plan:  The patient was reviewed  Renal function is stable, but poor  No infusion reactions were documented in the chart  Based on todays assessment, continue current vancomycin (day # 6) dosing of 750 mg q24h, with a plan for trough to be drawn at 1030 on 4/22  We will continue to follow the patients culture results and clinical progress daily      Nataly Dixon, NinaD, BCIDP

## 2021-04-17 NOTE — ASSESSMENT & PLAN NOTE
Lab Results   Component Value Date    EGFR 21 04/17/2021    EGFR 22 04/16/2021    EGFR 22 04/15/2021    CREATININE 2 79 (H) 04/17/2021    CREATININE 2 68 (H) 04/16/2021    CREATININE 2 68 (H) 04/15/2021   conitnue with Bumex  Monitor creatinine closely   Input and output  Daily weights  Nephrology on board  Avoid nephrotoxic medications  Keep SBP>130

## 2021-04-17 NOTE — ASSESSMENT & PLAN NOTE
Wt Readings from Last 3 Encounters:   04/17/21 70 1 kg (154 lb 8 oz)   04/05/21 78 6 kg (173 lb 4 5 oz)   03/27/21 72 kg (158 lb 11 7 oz)     Echo in 03/29/2021 showed ejection fraction 65%, mild MR, mild TR  Lasix 80 mg IV t i d  was switched to Bumex 2 mg IV t i d  4/13, then to Bumex 1 mg TID 4/17  7 4 L negative balance since admission  Gabe Ortez per Nephro 4/16  Monitor kidney function  Input and output, daily weight

## 2021-04-17 NOTE — ASSESSMENT & PLAN NOTE
Patient has a history of complete heart block, status post Medtronic dual-chamber ppm implanted 2015  Now with endocarditis  Evaluated by EP, appreciate recommendations  He is pacemaker dependent  On interrogation he has an underlying rhythm and he is bradycardic and has CHB     Per CT surgery, not a candidate for surgery

## 2021-04-17 NOTE — ASSESSMENT & PLAN NOTE
POA, secondary to acute on chronic diastolic heart failure, Srini on CKD stage 3, b/l pleural effusion, Hcap, recent COVID infection in February  RRT at [facility] needing 10L O2    CT 4/13 showed dense airspace consolidation throughout much of the left upper lobe with lesser airspace infiltrates, subsegmental in the right upper lobe, does not have a typical pattern of CHF, possible infectious etiology    Large dependent bilateral pleural effusions  Weaned down from 10 L to 3 L  Negative balance 7 4 L since admission  Diuresis : lasix 80 mg q8 hours, transitioned to Bumex 2 mg IV tid 4/13, transitioned to Bumex 1 mg TID 4/17  Continue cefepime and Vanco for HCAP, endocarditis  Status post left sided thoracocentesis 4/14, 1 2 L removed, transudate  Status post right-sided thoracocentesis for/16, 1 L of clear yellow fluid removed  Goal oxygen saturation 89% and above  Monitor in and output  Daily weights

## 2021-04-17 NOTE — PROGRESS NOTES
Cardiology Progress Note - Ramesh Heredia 76 y o  male MRN: [id number]    Unit/Bed#: [facility] 803-01 Encounter: [id number]  Assessment and plan  1  Mitral valve endocarditis  2  Acute on chronic diastolic congestive heart failure  3  Severe mitral regurgitation secondary to valve perforation  4  Bilateral pleural effusions status post thoracentesis  5  Acute on chronic kidney injury  6  Severe deconditioning   7  Pneumonia  8  history of complete heart block status post permanent pacemaker likely infected  9  Metabolic encephalopathy    Recommendations: Today is my 1st meeting with the patient  He is severely deconditioned and encephalopathic  He has been deemed a nonsurgical candidate by CT surgery which I agree with  He has severe mitral regurgitation due to valve perforation and large vegetation  At this point time I would recommend a goals of care discussion and evaluation by palliative  Continue antibiotics  Decrease IV Bumex to 1 mg q 8    Subjective:    No significant events overnight  Patient several a past think answers a few simple questions but not much more than that  He appears to be weak all over no specific focal neurologic finding  ROS    Objective:   Vitals: Blood pressure 114/71, pulse 88, temperature (!) 96 9 °F (36 1 °C), temperature source Axillary, resp  rate 20, height 5' 8" (1 727 m), weight 70 1 kg (154 lb 8 oz), SpO2 90 %  , Body mass index is 23 49 kg/m² ,   Orthostatic Blood Pressures      Most Recent Value   Blood Pressure  114/71 filed at 04/17/2021 0737   Patient Position - Orthostatic VS  Lying filed at 04/12/2021 9424         Systolic (36UKS), SYT:147 , Min:84 , IGC:236     Diastolic (55ZEF), XFU:69, Min:56, Max:96      Intake/Output Summary (Last 24 hours) at 4/17/2021 0822  Last data filed at 4/17/2021 0635  Gross per 24 hour   Intake 120 ml   Output 1143 ml   Net -1023 ml     Weight (last 2 days)     Date/Time   Weight    04/17/21 0555   70 1 (154 5)    04/16/21 0600 69 6 (153 5)                Telemetry Review: No significant arrhythmias seen on telemetry review  EKG personally reviewed by Kelli Howard DO  Physical Exam  Vitals signs and nursing note reviewed  Constitutional:       Appearance: He is well-developed  He is ill-appearing  HENT:      Head: Normocephalic and atraumatic  Eyes:      Conjunctiva/sclera: Conjunctivae normal       Pupils: Pupils are equal, round, and reactive to light  Neck:      Musculoskeletal: Neck supple  Cardiovascular:      Rate and Rhythm: Normal rate and regular rhythm  Heart sounds: Murmur present  No friction rub  Pulmonary:      Effort: Pulmonary effort is normal  No respiratory distress  Breath sounds: Normal breath sounds  No wheezing or rales  Abdominal:      General: Bowel sounds are normal  There is no distension  Palpations: Abdomen is soft  Tenderness: There is no abdominal tenderness  There is no rebound  Musculoskeletal: Normal range of motion  General: No tenderness or deformity  Skin:     General: Skin is warm and dry  Findings: No erythema  Neurological:      Mental Status: He is lethargic  Cranial Nerves: No cranial nerve deficit  Motor: Weakness present             Laboratory Results:        CBC with diff:   Results from last 7 days   Lab Units 04/17/21  0612 04/16/21  0515 04/15/21  0445 04/14/21  0403 04/13/21  0648 04/12/21  0455 04/11/21  0356   WBC Thousand/uL 19 36* 15 47* 13 74* 14 83* 19 24* 16 69* 15 19*   HEMOGLOBIN g/dL 9 0* 6 8* 7 5* 7 3* 7 9* 7 4* 7 8*   HEMATOCRIT % 27 2* 21 1* 23 6* 22 6* 25 3* 23 1* 24 6*   MCV fL 88 87 87 86 91 86 87   PLATELETS Thousands/uL 142* 115* 139* 136* 147* 157 151   MCH pg 28 9 28 1 27 6 27 9 28 3 27 6 27 7   MCHC g/dL 33 1 32 2 31 8 32 3 31 2* 32 0 31 7   RDW % 17 5* 18 0* 17 5* 17 5* 17 8* 16 9* 16 6*   MPV fL 10 1 9 8 10 8 10 5 9 9 10 6 10 0   NRBC AUTO /100 WBCs  --  0 0 0 0 0  --          CMP:  Results from last 7 days   Lab Units 04/17/21  0612 04/16/21  0515 04/15/21  0445 04/14/21  0403 04/13/21  9272 04/12/21  0455 04/11/21  0601  04/10/21  0829   POTASSIUM mmol/L 4 0 3 8 3 2* 3 2* 3 7 3 8 4 8   < >  --    CHLORIDE mmol/L 101 101 99* 99* 103 103 103   < >  --    CO2 mmol/L 26 26 28 28 19* 25 23   < >  --    CO2, I-STAT mmol/L  --   --   --   --   --   --   --   --  22   BUN mg/dL 65* 63* 59* 58* 55* 53* 51*   < >  --    CREATININE mg/dL 2 79* 2 68* 2 68* 2 64* 2 68* 2 62* 2 37*   < >  --    GLUCOSE, ISTAT mg/dl  --   --   --   --   --   --   --   --  176*   CALCIUM mg/dL 8 3 7 9* 7 8* 7 7* 7 9* 8 4 8 2*   < >  --    EGFR ml/min/1 73sq m 21 22 22 23 22 23 26   < >  --     < > = values in this interval not displayed  BMP:  Results from last 7 days   Lab Units 04/17/21  0612 04/16/21  0515 04/15/21  0445 04/14/21  0403 04/13/21  9787 04/12/21  0455 04/11/21  0601  04/10/21  0829   POTASSIUM mmol/L 4 0 3 8 3 2* 3 2* 3 7 3 8 4 8   < >  --    CHLORIDE mmol/L 101 101 99* 99* 103 103 103   < >  --    CO2 mmol/L 26 26 28 28 19* 25 23   < >  --    CO2, I-STAT mmol/L  --   --   --   --   --   --   --   --  22   BUN mg/dL 65* 63* 59* 58* 55* 53* 51*   < >  --    CREATININE mg/dL 2 79* 2 68* 2 68* 2 64* 2 68* 2 62* 2 37*   < >  --    GLUCOSE, ISTAT mg/dl  --   --   --   --   --   --   --   --  176*   CALCIUM mg/dL 8 3 7 9* 7 8* 7 7* 7 9* 8 4 8 2*   < >  --     < > = values in this interval not displayed  BNP: No results for input(s): BNP in the last 72 hours      Magnesium:   Results from last 7 days   Lab Units 04/17/21  0612 04/11/21  0356   MAGNESIUM mg/dL 2 1 2 1       Coags:       TSH:        Hemoglobin A1C       Lipid Profile:       Cardiac testing:   Results for orders placed during the hospital encounter of 03/27/21   Echo complete with contrast if indicated    Narrative [address]  [facility], [address] [facility]  [phone]    Transthoracic Echocardiogram  2D, M-mode, Doppler, and Color Doppler    Study date:  29-Mar-2021    Patient: Brad Melendrez  MR number: [id number]  Account number: [de-identified]  : 1945  Age: 76 years  Gender: Male  Status: Inpatient  Location: Bedside  Height: 68 in  Weight: 158 lb  BP: 117/ 63 mmHg    Indications: Recent COVID-19 infection  Diagnoses: I50 9 - Heart failure, unspecified    Sonographer:  Dilcia Lanier RDCS  Referring Physician:  Ksenia Machado DO  Group:  [facility]  Cardiology Fellow:  Renee Camacho MD  Interpreting Physician:  Tyler Morales MD    SUMMARY    LEFT VENTRICLE:  Systolic function was normal  Ejection fraction was estimated to be 65 %  Although no diagnostic regional wall motion abnormality was identified, this possibility cannot be completely excluded on the basis of this study  Wall thickness was mildly increased  There was mild concentric hypertrophy  RIGHT ATRIUM:  The atrium was mildly dilated  MITRAL VALVE:  There was mild annular calcification  There was mild regurgitation  TRICUSPID VALVE:  Transtricuspid velocity was increased due to increased transvalvular flow  There was at least mild regurgitation  The TR may have been underestimated based on color Doppler  The dense, triangular CW Doppler profile is concerning for more significant TR  Pulmonary artery systolic pressure was at the upper limits of normal   Estimated peak PA pressure was 37 mmHg  IVC, HEPATIC VEINS:  The inferior vena cava was dilated  Respirophasic changes were blunted (less than 50% variation)  PERICARDIUM:  There was a left pleural effusion  HISTORY: PRIOR HISTORY: Complete heart block  DM2  CKD3  Pacemaker  GERD  PROCEDURE: The procedure was performed at the bedside  This was a routine study  The transthoracic approach was used  The study included complete 2D imaging, M-mode, complete spectral Doppler, and color Doppler   The heart rate was 95 bpm,  at the start of the study  Image quality was adequate  LEFT VENTRICLE: Size was normal  Systolic function was normal  Ejection fraction was estimated to be 65 %  Although no diagnostic regional wall motion abnormality was identified, this possibility cannot be completely excluded on the basis  of this study  Wall thickness was mildly increased  There was mild concentric hypertrophy  DOPPLER: Due to tachycardia, there was fusion of early and atrial contributions to ventricular filling  The study was not technically sufficient to  allow evaluation of LV diastolic function  RIGHT VENTRICLE: The size was normal  Systolic function was normal  A pacing wire was present in the ventricular cavity  LEFT ATRIUM: Size was normal     RIGHT ATRIUM: The atrium was mildly dilated  A pacing wire was present in the atrial cavity  MITRAL VALVE: There was mild annular calcification  There was mild diffuse thickening  DOPPLER: The transmitral velocity was within the normal range  There was no evidence for stenosis  There was mild regurgitation  AORTIC VALVE: The valve was trileaflet  Leaflets exhibited mildly increased thickness, mild calcification, and sclerosis  DOPPLER: Transaortic velocity was within the normal range  There was no evidence for stenosis  There was no  regurgitation  TRICUSPID VALVE: There was normal leaflet separation  DOPPLER: Transtricuspid velocity was increased due to increased transvalvular flow  There was no evidence for stenosis  There was at least mild regurgitation  The TR may have been  underestimated based on color Doppler  The dense, triangular CW Doppler profile is concerning for more significant TR  Pulmonary artery systolic pressure was at the upper limits of normal  Estimated peak PA pressure was 37 mmHg  PULMONIC VALVE: Leaflets exhibited normal thickness, no calcification, and normal cuspal separation  DOPPLER: The transpulmonic velocity was within the normal range   There was trace regurgitation  PERICARDIUM: There was no pericardial effusion  There was a left pleural effusion  AORTA: The root exhibited normal size  SYSTEMIC VEINS: IVC: The inferior vena cava was dilated  Respirophasic changes were blunted (less than 50% variation)      SYSTEM MEASUREMENT TABLES    2D  %FS: 30 81 %  Ao Diam: 3 51 cm  EDV(Teich): 56 92 ml  EF(Teich): 59 29 %  ESV(Teich): 23 17 ml  IVSd: 1 29 cm  LA Area: 17 98 cm2  LA Diam: 3 62 cm  LVEDV MOD A4C: 68 58 ml  LVEF MOD A4C: 61 %  LVESV MOD A4C: 26 75 ml  LVIDd: 3 67 cm  LVIDs: 2 54 cm  LVLd A4C: 6 81 cm  LVLs A4C: 5 96 cm  LVPWd: 1 28 cm  RA Area: 17 29 cm2  RVIDd: 3 38 cm  SV MOD A4C: 41 84 ml  SV(Teich): 33 75 ml    CW  MR VTI: 140 cm  MR Vmax: 5 12 m/s  MR Vmean: 4 09 m/s  MR maxP 99 mmHg  MR meanP 73 mmHg  TR Vmax: 2 32 m/s  TR maxP 54 mmHg  TV VTI: 25 53 cm  TV Vmax: 1 65 m/s  TV Vmean: 0 89 m/s  TV maxPG: 10 87 mmHg  TV meanP 32 mmHg    MM  TAPSE: 2 59 cm    [facility] Accredited Echocardiography Laboratory    Prepared and electronically signed by    Nikky Montana MD  Signed 30-Mar-2021 10:10:14       Results for orders placed during the hospital encounter of 04/10/21   CHARLES    N[facility]ative [facility]  [facility], [address] [facility]  [phone]    Transesophageal Echocardiogram  2D, 3D, M-mode, Doppler, and Color Doppler    Study date:  2021    Patient: Meena Ley  MR number: [id number]  Account number: [de-identified]  : 1945  Age: 76 years  Gender: Male  Status: Inpatient  Location: Echo lab  Height: 68 in  Weight: 161 lb  BP: 106/ 59 mmHg    Indications: Bacteremia with mitral valve vegetation    Diagnoses: R78 81 - Bacteremia    Sonographer:  Supriya Azevedo  Interpreting Physician:  Jacek Redd MD  Primary Physician:  Marcelina Acuna MD  Referring Physician:  Javon Kenny DO  Group:  [facility]  Cardiology Fellow: Norma Duncan MD  RN:  Dennis Reza RN    SUMMARY    LEFT VENTRICLE:  Systolic function was normal  Ejection fraction was estimated to be 60 %  There were no regional wall motion abnormalities  Wall thickness was mildly increased  RIGHT VENTRICLE:  The size was normal   Systolic function was normal   A pacing wire was present in the ventricular cavity but was not well visualized  Cannot rule out associated vegetation  RIGHT ATRIUM:  Pacing wires were present in the atrial cavity  There was no associated vegetation  MITRAL VALVE:  There was a large, protruding, echogenic, mobile vegetation with central echolucency, measuring 19 mm x 17 mm on the anterior leaflet, spanning the A1-A2 scallops with valve perforation at A2  It was located on the atrial aspect of the  valve but prolapsed into the ventricle with diastole  There was severe, eccentric and postero-laterally directed regurgitation though the perforated A2 scallop and additional areas of malcoaptation  AORTIC VALVE:  There was no echocardiographic evidence of vegetation or abscess  TRICUSPID VALVE:  There was mild to moderate regurgitation  Pulmonary artery systolic pressure was within the normal range  Estimated peak PA pressure was 30 mmHg  SVC, INNOMINATE VEIN:  Pacing wires were present at the junction of the superior vena cava with the right atrium  There was no associated vegetation  PULMONARY VEINS:  There was systolic flow reversal in the right upper, right lower, and left upper pulmonary vein(s)  HISTORY: PRIOR HISTORY: Hypertension; Complete heart block; CKD; Heart failure; Pacemaker; Covid-19    PROCEDURE: The procedure was performed in the echo lab  This was a routine study  The risks and alternatives of the procedure were explained to the patient and informed consent was obtained  The transesophageal approach was used   The study  included complete 2D imaging, 3D imaging, M-mode, complete spectral Doppler, and color Doppler  The heart rate was 77 bpm, at the start of the study  An adult omniplane probe was inserted by the cardiology fellow under direct supervision  of the attending cardiologist  Intubated with ease  Two intubation attempt(s)  There was no blood detected on the probe  Image quality was adequate  There were no complications during the procedure  MEDICATIONS: Anesthesia administered by  anesthesia team     LEFT VENTRICLE: Size was normal  Systolic function was normal  Ejection fraction was estimated to be 60 %  There were no regional wall motion abnormalities  Wall thickness was mildly increased  DOPPLER: The study was not technically  sufficient to allow evaluation of LV diastolic function  RIGHT VENTRICLE: The size was normal  Systolic function was normal  A pacing wire was present in the ventricular cavity but was not well visualized  Cannot rule out associated vegetation  LEFT ATRIUM: Size was normal  No thrombus was identified  APPENDAGE: The size was normal  No thrombus was identified  DOPPLER: The function was normal (normal emptying velocity)  ATRIAL SEPTUM: No defect or patent foramen ovale was identified by color Doppler assessment  RIGHT ATRIUM: Size was normal  Pacing wires were present in the atrial cavity  There was no associated vegetation  No thrombus was identified  MITRAL VALVE: There was a large, protruding, echogenic, mobile vegetation with central echolucency, measuring 19 mm x 17 mm on the anterior leaflet, spanning the A1-A2 scallops with valve perforation at A2  It was located on the atrial  aspect of the valve but prolapsed into the ventricle with diastole  There was severe, eccentric and postero-laterally directed regurgitation though the perforated A2 scallop and additional areas of malcoaptation  AORTIC VALVE: The valve was trileaflet  Leaflets exhibited normal thickness and normal cuspal separation   There was no echocardiographic evidence of vegetation or abscess  DOPPLER: There was no evidence for stenosis  There was no  regurgitation  TRICUSPID VALVE: The valve structure was normal  There was normal leaflet separation  There was no echocardiographic evidence of vegetation  DOPPLER: The transtricuspid velocity was within the normal range  There was no evidence for  stenosis  There was mild to moderate regurgitation  Regurgitation grade was 1-2+ on a scale of 0 to 4+  The regurgitant jet was directed centrally  Pulmonary artery systolic pressure was within the normal range  Estimated peak PA pressure  was 30 mmHg  PULMONIC VALVE: Leaflets exhibited normal thickness, no calcification, and normal cuspal separation  There was no echocardiographic evidence of vegetation  DOPPLER: There was no evidence for stenosis  There was trace regurgitation  PERICARDIUM: There was no pericardial effusion  The pericardium was normal in appearance  AORTA: The root exhibited normal size  The ascending aorta was normal in size  There was no atheroma  There was no evidence for dissection  There was no evidence for aneurysm  SYSTEMIC VEINS: SVC: Pacing wires were present at the junction of the superior vena cava with the right atrium  There was no associated vegetation  PULMONARY VEINS: DOPPLER: There was systolic flow reversal in the right upper, right lower, and left upper pulmonary vein(s)  [address] Accredited Echocardiography Laboratory    Prepared and electronically signed by    Sonia Barney MD  Signed 14-Apr-2021 14:56:53       No results found for this or any previous visit  No results found for this or any previous visit      Meds/Allergies   all current active meds have been reviewed  Medications Prior to Admission   Medication    amLODIPine (NORVASC) 5 mg tablet    ascorbic acid (VITAMIN C) 1000 MG tablet    aspirin 81 mg chewable tablet    Atorvastatin Calcium (LIPITOR PO)    CALCIUM CITRATE PO    Cholecalciferol (VITAMIN D3) 2000 units capsule    Continuous Blood Gluc Sensor (DEXCOM G6 SENSOR) MISC    cyanocobalamin (VITAMIN B-12) 1,000 mcg tablet    DULoxetine (CYMBALTA) 30 mg delayed release capsule    ezetimibe (ZETIA) 10 mg tablet    glucose 40 %    glucose 77 4 %    insulin aspart (NovoLOG) 100 units/mL injection    levothyroxine 125 mcg tablet    loperamide (IMODIUM) 2 mg capsule    pantoprazole (PROTONIX) 40 mg tablet    tamsulosin (FLOMAX) 0 4 mg    zinc sulfate (ZINCATE) 220 mg capsule          Assessment:  Principal Problem:    Acute respiratory failure with hypoxia (HCC)  Active Problems:    Anemia of chronic disease    Type I diabetes mellitus (HCC)    Essential hypertension    Stage 3b chronic kidney disease    KAZ on CKD stage 3    Encephalopathy    Complete heart block     Hyponatremia    Bacteremia    Acute on chronic diastolic heart failure (HCC)    HCAP (healthcare-associated pneumonia)    Acidosis    Bilateral pleural effusion    Endocarditis    Closed compression fracture of L1 lumbar vertebra, with routine healing, subsequent encounter            Counseling / Coordination of Care  Total floor / unit time spent today 25 minutes  Greater than 50% of total time was spent with the patient and / or family counseling and / or coordination of care    A description of the counseling / coordination of care:  Discussed with nursing

## 2021-04-17 NOTE — ASSESSMENT & PLAN NOTE
Echo showed mitral valve vegetation  CHARLES showed large mitral valve vegetation measuring 19 mm x 17 mm  Not a candidate for surgery per CT surgery  EP on board

## 2021-04-17 NOTE — ASSESSMENT & PLAN NOTE
Blood cultures from 04/12 growing Corynebacterium striatum  Continue IV Vanco  Source could be right foot ulcer  ID on board, appreciate recommendation  Echo showed anterior mitral leaflet vegetation  CHARLES ejection fraction was 60%, no regional wall motion abnormalities, large protruding echogenic mobile vegetation measuring 19 mm time 17 mm on the anterior leaflet, spanning the A1/A2 scallop with valve perforation at A2, located in the atrial aspect of the valve, prolapse into the ventricle with diastole, severe regurgitation  Evaluated by CT surgery, not a candidate for surgery

## 2021-04-17 NOTE — ASSESSMENT & PLAN NOTE
Lab Results   Component Value Date    HGBA1C 7 7 (H) 02/05/2021       Recent Labs     04/16/21  1550 04/16/21  2100 04/17/21  0733 04/17/21  1114   POCGLU 314* 177* 72 91       Blood Sugar Average: Last 72 hrs:  (P) 044 [id number]   Endo recommendations appreciated  On insulin pump OP  On SQ insulin IP  Aappetite fluctuates  Continue Lantus 5 units q h s  Humalog 4 units t i d   If patient eats more than 50% of meal, if not then 2 units

## 2021-04-17 NOTE — PROGRESS NOTES
Progress Note - Infectious Disease   Aren Gallagher 76 y o  male MRN: [id number]  Unit/Bed#: [facility] 803-01 Encounter: [id number]      Impression/Recommendations:  1   Corynebacterium stratum bacteremia   During recent admission, patient had Corynebacterium in admission blood cultures but not in repeat blood cultures   Therefore, this was felt to be contaminant   However, given recurrent Corynebacterium striatum, this is likely be true bacteremia   Source is most likely the leg and foot ulcer   In addition, given presence of ppm, we need to consider the possibility of ppm infection, especially since Corynebacterium is very poorly pathogenic and is usually seen in hardware infection   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   However, ppm leads were not visualized   Ideally, patient should get ppm and lead extraction   Bacteremia clearing  Continue IV vancomycin  Microbiology to do susceptibilities of Corynebacterium  Follow-up repeat blood cultures  Ideally, recommend ppm and lead extraction, if plan is to continue aggressive care      2   MV endocarditis, 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   In addition to endocarditis, I am also concerned about the possibility of ppm infection, for reasons outlined above   Patient will need long-term IV antibiotic  Ideally, patient should get MVR  CT surgery evaluation noted  Patient is felt not to be a candidate for MVR  Antibiotic plan as in above  Treat x6 weeks from clearance of bacteremia  Patient not being a surgical candidate for MVR noted      3  Pneumonia, confirm on chest CT   Patient is clinically improved   I suspect pneumonia is independent of Corynebacterium bacteremia above  Continue IV cefepime  Monitor respiratory symptoms  Monitor temperature/WBC    Treat x7 days total, through tomorrow      4  Sepsis, POA, presented with leukocytosis and tachypnea   Source of sepsis is probably more likely pneumonia than  bacteremia   Patient is clinically improved   Temperature stays down   WBC decreasing    Fortunately, patient has remained hemodynamically stable, without hypotension   He is clinically improved  Antibiotic plan as in above  Monitor temperature/WBC  Monitor hemodynamics        5  Acute hypoxic respiratory failure   Patient is clinically improved on antibiotic   O2 support decreasing     Antibiotic plan as in above  O2 support and weaning per primary and Pulmonary service  Monitor respiratory symptoms      6  KAZ, superimposed on CKD, most likely secondary to sepsis   Creatinine is stable  Antibiotic dosages adjusted accordingly  Monitor creatinine      7  Right lower leg ulcer, with possible mild cellulitis on exam   This may be source of corynebacterium bacteremia above  Antibiotic plan as in above  Serial exams      8  Encephalopathy, most likely multifactorial, with sepsis and hypoxia contributing   Mental status is much improved   No clinical signs CNS infection  Monitor mental status      9  DM, somewhat poorly controlled, with elevated hemoglobin A1c  Management per primary service      10  Recent COVID, with chronic symptoms   No evidence of active infection  No COVID specific treatment needed  No isolation needed      11  Disposition  With multiple organ dysfunction and with bacteremia/endocarditis but not being a surgical candidate, patient has very poor prognosis for recovery  I discussed with the patient's wife and his son in great detail, with Dr Steven Ornelas  At this point, patient's family would like to continue aggressive care and monitor his progress the next few days  I think this is a reasonable plan  Discussed with patient, his wife and his son in detail regarding the above plan  Discussed with Dr Zhang from [facility] service    Time in discussion > 30 minutes      Antibiotics:  Vancomycin/cefepime # 6     Subjective:  Patient is more sleepy and less arousable today  Dyspnea appears mild   Cough mild, mostly nonproductive  Temperature stays down   No chills  Patient is tolerating antibiotics well   No nausea, vomiting or diarrhea      Objective:  Vitals:  Temp:  [95 9 °F (35 5 °C)-98 5 °F (36 9 °C)] 97 4 °F (36 3 °C)  HR:  [77-88] 86  Resp:  [16-24] 20  BP: ()/(55-96) 113/55  SpO2:  [88 %-95 %] 95 %  Temp (24hrs), Av 2 °F (36 2 °C), Min:95 9 °F (35 5 °C), Max:98 5 °F (36 9 °C)  Current: Temperature: (!) 97 4 °F (36 3 °C)    Physical Exam:     General: More sleepy  Less arousable  Comfortable  Nontoxic  Neck:  Supple  No mass  No lymphadenopathy  Lungs: Decreased breath sounds, no rales, no wheezing, respirations unlabored  Heart:  Regular rate and rhythm, S1 and S2 normal, no murmur  Abdomen: Soft, nondistended, non-tender, bowel sounds active all four quadrants, no masses, no organomegaly  Extremities: No edema  Stable chronic ulcers, without purulence  No erythema/warmth  Nontender to palpation  Skin:  No rash  Neuro: Difficult to assess  Invasive Devices     Peripheral Intravenous Line            Peripheral IV 21 Left Wrist 1 day          Drain            External Urinary Catheter 1 day                Labs studies:   I have personally reviewed pertinent labs    Results from last 7 days   Lab Units 21  0612 21  0515 04/15/21  0445   POTASSIUM mmol/L 4 0 3 8 3 2*   CHLORIDE mmol/L 101 101 99*   CO2 mmol/L 26 26 28   BUN mg/dL 65* 63* 59*   CREATININE mg/dL 2 79* 2 68* 2 68*   EGFR ml/min/1 73sq m 21 22 22   CALCIUM mg/dL 8 3 7 9* 7 8*     Results from last 7 days   Lab Units 21  0612 21  0515 04/15/21  0445   WBC Thousand/uL 19 36* 15 47* 13 74*   HEMOGLOBIN g/dL 9 0* 6 8* 7 5*   PLATELETS Thousands/uL 142* 115* 139*     Results from last 7 days   Lab Units 21  1119 21  1031 21  0416 21  0410 21  0951 21  0942 21  0935   BLOOD CULTURE   --   --  No Growth at 72 hrs  No Growth at 72 hrs  Corynebacterium striatum*  --  Corynebacterium striatum*   GRAM STAIN RESULT  2+ Polys  No bacteria seen 1+ Polys  No bacteria seen  --   --  Gram positive rods*  --  Gram positive rods*   BODY FLUID CULTURE, STERILE  No growth No growth  --   --   --   --   --    MRSA CULTURE ONLY   --   --   --   --   --  No Methicillin Resistant Staphlyococcus aureus (MRSA) isolated  --        Imaging Studies:   I have personally reviewed pertinent imaging study reports and images in PACS  EKG, Pathology, and Other Studies:   I have personally reviewed pertinent reports

## 2021-04-17 NOTE — ASSESSMENT & PLAN NOTE
Multifactorial suspect in the setting of heart failure, kidney failure, bacteremia, Hcap  Reorient, sleep-wake cycle

## 2021-04-18 NOTE — PROGRESS NOTES
Progress Note - Infectious Disease   López Lloyd 76 y o  male MRN: [id number]  [facility] Encounter: [id number]      Impression/Recommendations:  1   Corynebacterium stratum bacteremia   During recent admission, patient had Corynebacterium in admission blood cultures but not in repeat blood cultures   Therefore, this was felt to be contaminant   However, given recurrent Corynebacterium striatum, this is likely be true bacteremia   Source is most likely the leg and foot ulcer   In addition, given presence of ppm, we need to consider the possibility of ppm infection, especially since Corynebacterium is very poorly pathogenic and is usually seen in hardware infection   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   However, ppm leads were not visualized   Ideally, patient should get ppm and lead extraction   Bacteremia clearing  Continue IV vancomycin  Microbiology to do susceptibilities of Corynebacterium  Follow-up repeat blood cultures  Ideally, recommend ppm and lead extraction, if plan is to continue aggressive care      2   MV endocarditis, 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   In addition to endocarditis, I am also concerned about the possibility of ppm infection, for reasons outlined above   Patient will need long-term IV antibiotic  Ideally, patient should get MVR  CT surgery evaluation noted  Patient is determined not to be a candidate for MVR  Antibiotic plan as in above  Treat x6 weeks from clearance of bacteremia  Patient not being a surgical candidate for MVR noted      3  Pneumonia, confirm on chest CT   Patient is clinically improved   I suspect pneumonia is independent of Corynebacterium bacteremia above  Continue IV cefepime  Monitor respiratory symptoms  Monitor temperature/WBC    Treat x7 days total,  complete today      4  Sepsis, POA, presented with leukocytosis and tachypnea   Source of sepsis is probably more likely pneumonia than  bacteremia   Patient is clinically improved   Temperature stays down   WBC decreasing    Fortunately, patient has remained hemodynamically stable, without hypotension   He is clinically improved  Antibiotic plan as in above  Monitor temperature/WBC  Monitor hemodynamics        5  Acute hypoxic respiratory failure   Patient is clinically improved on antibiotic   O2 support decreasing     Antibiotic plan as in above  O2 support and weaning per primary and Pulmonary service  Monitor respiratory symptoms      6  KAZ, superimposed on CKD, most likely secondary to sepsis   Creatinine is stable  Antibiotic dosages adjusted accordingly  Monitor creatinine      7  Right lower leg ulcer, with possible mild cellulitis on exam   This may be source of corynebacterium bacteremia above  Antibiotic plan as in above  Serial exams      8  Encephalopathy, most likely multifactorial, with sepsis and hypoxia contributing   Mental status is much improved   No clinical signs CNS infection  Monitor mental status      9  DM, somewhat poorly controlled, with elevated hemoglobin A1c  Management per primary service      10  Recent COVID, with chronic symptoms   No evidence of active infection  No COVID specific treatment needed  No isolation needed      11  Disposition  With multiple organ dysfunction and with bacteremia/endocarditis but not being a surgical candidate, patient has very poor prognosis for recovery  Ongoing discussion with family regarding level of care        Discussed with patient and his daughter  in detail regarding the above plan  Discussed with Dr Zhang from [facility] service      Antibiotics:  Vancomycin/cefepime # 7     Subjective:  Patient is more awake and alert today  He has confusion  Dyspnea appears mild   Cough mild, mostly nonproductive  Temperature stays down   No chills    Patient is tolerating antibiotics well   No nausea, vomiting or diarrhea      Objective:  Vitals:  Temp:  [97 °F (36 1 °C)-98 1 °F (36 7 °C)] 98 1 °F (36 7 °C)  HR:  [82-90] 82  Resp:  [16-20] 16  BP: (105-113)/(55-59) 109/59  SpO2:  [87 %-95 %] 91 %  Temp (24hrs), Av 5 °F (36 4 °C), Min:97 °F (36 1 °C), Max:98 1 °F (36 7 °C)  Current: Temperature: 98 1 °F (36 7 °C)    Physical Exam:     General: Awake, alert, cooperative, no distress  Mild to moderate confusion  Neck:  Supple  No mass  No lymphadenopathy  Lungs: Expansion symmetric, no rales, no wheezing, respirations unlabored  Heart:  Regular rate and rhythm, S1 and S2 normal, no murmur  Abdomen: Soft, nondistended, non-tender, bowel sounds active all four quadrants, no masses, no organomegaly  Extremities: No edema  Stable superficial ulcers, without purulence  No erythema/warmth  Nontender to palpation  Skin:  No rash  Neuro: Moves all extremities  Invasive Devices     Peripheral Intravenous Line            Peripheral IV 21 Dorsal (posterior); Left Forearm less than 1 day          Drain            External Urinary Catheter 1 day                Labs studies:   I have personally reviewed pertinent labs  Results from last 7 days   Lab Units 21  0548 21  0612 21  0515   POTASSIUM mmol/L 3 7 4 0 3 8   CHLORIDE mmol/L 102 101 101   CO2 mmol/L 27 26 26   BUN mg/dL 69* 65* 63*   CREATININE mg/dL 2 96* 2 79* 2 68*   EGFR ml/min/1 73sq m 20 21 22   CALCIUM mg/dL 7 9* 8 3 7 9*     Results from last 7 days   Lab Units 21  0548 21  0612 21  0515   WBC Thousand/uL 15 61* 19 36* 15 47*   HEMOGLOBIN g/dL 8 2* 9 0* 6 8*   PLATELETS Thousands/uL 122* 142* 115*     Results from last 7 days   Lab Units 21  1119 21  1031 21  0416 21  0410 21  0951 21  0942 21  0935   BLOOD CULTURE   --   --  No Growth After 4 Days  No Growth After 4 Days   Corynebacterium striatum*  --  Corynebacterium striatum*   GRAM STAIN RESULT  2+ Polys  No bacteria seen 1+ Polys  No bacteria seen  --   --  Gram positive rods*  --  Gram positive rods*   BODY FLUID CULTURE, STERILE  No growth No growth  --   --   --   --   --    MRSA CULTURE ONLY   --   --   --   --   --  No Methicillin Resistant Staphlyococcus aureus (MRSA) isolated  --        Imaging Studies:   I have personally reviewed pertinent imaging study reports and images in PACS  EKG, Pathology, and Other Studies:   I have personally reviewed pertinent reports

## 2021-04-18 NOTE — ASSESSMENT & PLAN NOTE
POA, secondary to acute on chronic diastolic heart failure, Srini on CKD stage 3, b/l pleural effusion, Hcap, recent COVID infection in February  RRT at [facility] needing 10L O2    CT 4/13 showed dense airspace consolidation throughout much of the left upper lobe with lesser airspace infiltrates, subsegmental in the right upper lobe, does not have a typical pattern of CHF, possible infectious etiology    Large dependent bilateral pleural effusions  Weaned down from 10 L to 3 L, now up to 4 L  Negative balance 7 7 L since admission  Diuresis :  IV diuretics stopped today due to jump in creatinine  Continue cefepime and Vanco for HCAP, endocarditis  Status post left sided thoracocentesis 4/14, 1 2 L removed, transudate  Status post right-sided thoracocentesis 4/16, 1 L of clear yellow fluid removed  Goal oxygen saturation 89% and above  Monitor in and output  Daily weights

## 2021-04-18 NOTE — CONSULTS
Consultation - Palliative & Supportive Care   Kendal Leach  76 y o   male  PPHP 803/PPHP 803-01   MRN: [id number]  Encounter: [id number]    ASSESSMENT:    Patient Active Problem List   Diagnosis    Acid reflux    Hypothyroidism    Anemia of chronic disease    Arthritis    Type I diabetes mellitus ([facility] )    Diabetic peripheral neuropathy ([place] [address] )    DM type 2, not at goal [facility])    Essential hypertension    Hyperlipidemia    Insulin pump status    Medtronic dual chamber PM    Screening for colon cancer    Encounter for diabetic foot exam ([place] [address] )    Risk for falls    Stage 3b chronic kidney disease    Thrombocytopenia (HCC)    High anion gap metabolic acidosis    Elevated troponin level not due myocardial infarction    KAZ on CKD stage 3    Leukocytosis    Low bicarbonate    Pacemaker    Encephalopathy    Type 2 diabetes mellitus with hyperglycemia (HCC)    Vitreous hemorrhage of left eye ([facility]    Transition of care performed with sharing of clinical summary    Microalbuminuria    Complete heart block     Preoperative clearance    Status post split thickness skin graft    Acquired absence of other left toe(s) (HCC)    [facility]    Diabetic ulcer of right foot ([place] [address] )    Personal history of diabetic foot ulcer    COVID-19    Hyperglycemia due to type 1 diabetes mellitus (HCC)    Weakness    Hyponatremia    Bacteremia    Progression of L1 vertebral fracture     ATN (acute tubular necrosis) (HCC)    Hypoxemia    Acute on chronic diastolic heart failure (HCC)    Acute respiratory failure with hypoxia (HCC)    HCAP (healthcare-associated pneumonia)    Acidosis    Bilateral pleural effusion    Endocarditis    Closed compression fracture of L1 lumbar vertebra, with routine healing, subsequent encounter    Goals of care, counseling/discussion       PLAN:    1  Goals:    Evolving   After much discussion with SACHA/Dr Dago Finn, a multidisciplinary family meeting with ID, CT surgery next week may be most beneficial     SLIM will reach out to CT surgery about meeting  [facility] can also provide assistance with this more tomorrow  Code status: Level 1 - Full Code   Decisional apparatus:  Patient does not have capacity to make medical decisions on my exam today  If such capacity is lost, patient's substitute decision maker would default to wife [place]  Advance Directive / Living Will / POLST:  None on file    We appreciate the opportunity to participate in this patient's care  We will continue to follow  Please do not hesitate to contact our on-call provider through our clinic answering service at [phone] should you have acute symptom control concerns  IDENTIFICATION:  Inpatient consult to Palliative Care  Consult performed by: Prasanth Chamorro MD  Consult ordered by: Kinjal Clark MD        Reason for Consult / Principal Problem: assistance with GOC    HISTORY OF PRESENT ILLNESS:    Althea Grant is a 76 y o  male with CKD3, HTN, HLD, DM2, complete heart block s/p PPM, who was diagnosed with Covid-19 in 2/2021  Per review of medical records, he became progressively weaker with poor appetite since  He was hospitalized in 3/27 for diarrhea and weakness  He was treated for gram positive rods (Corynebacterium striatum) bacteremia and was started on antibiotics that was eventually d/c'ed after BCx deemed contaminant (subsequent BCs negative, and afebrile x 72H off of Abx)  He was eventually d/c'ed to [facility] on 4/2/2021  However, on 4/10 an RRT was called while in [facility] for encephalopathy and acute hypoxic respiratory failure  He was noted to have some pulmonary edema 2/2 acute CHF exacerbtion on 4/9  He was again admitted on 4/10 where he was started/continued on oxygen supplementation, lasix, and gentle fluid hydration for KAZ 2/2 ATN  His BCx grew GPR/Corynebacterium striatum again and was started on IV antibiotic with direction of ID   A TTE was done on 4/13 that showed a [id number] medium-sized vegetation on the mitral valve  A CHARLES was done on 4/14 that showed a large [id number] vegetation in the anterior leaflet of the mitral valve that would prolapse into the ventricle with diastole  MV is perforated  Electrophysiology was consulted for consideration of PPM extraction given bacteremia  He is 100% pacer dependent  They can consider replacing if CTS able to perform surgery  Cardiothoracic surgery was consulted on 4/16 for consideration of MV surgery and was deemed not a candidate for surgery  Cardiology was consulted for acute HFrEF and severe MR due to valve perforation and large vegetation  A family meeting was held by [facility] with ID/Dr Denisse Kelsey and the patient's wife and son yesterday, 4/17 at noon  After much discussion, it was decided that they will continue current cares with IV antibiotics for a few days  but long term goals and plans were not specified as family had questions about surgery to remove the vegetation for source control  Family still awaiting input from CT surgery regarding the procedure  Noted that he was deemed nonsurgical by CTS, but unclear if this was relayed to family  Discussed case first with SLIM today who provided update of above family meeting  SLIM requested for a multidisciplinary family meeting for this complex case next week, ideally Tuesday, to give time for what was decided only yesterday  Prognosis is poor if we cannot optimally control source of infection  Per SACHA, patient continues to decline clinically at least since Tuesday, 4/13  Met with patient and his DIL/Yuli at bedside  Introduced palliative care and have asked Cait Stover to relay to both wife and son about another family meeting for next week  She asked if there will be changes in his current cares, and I provide assurance that all efforts for restorative cares are still underway and nothing will change unless we hear otherwise from his POAs       Patient was awake, but appears disoriented  He was restless and was trying to grab things in the air  He couldn't verbalize to me effectively what is bothering him  Interview and exam limited by: disoriented, confused    Review of Systems   Unable to perform ROS: Mental status change       Past Medical History:   Diagnosis Date    Arthritis     Cardiac disease     Closed fracture of fibula, lateral malleolus, right     Complete heart block ([facility] )     Diabetes mellitus ([facility] )     Disease of thyroid gland     External incisional dehiscence     GERD (gastroesophageal reflux disease)     Heart disease     Muscular deconditioning     Pacemaker     Sleep apnea     cannot tolerate cpap    Thyroid disease      Past Surgical History:   Procedure Laterality Date    CARDIAC PACEMAKER PLACEMENT Left 2015    CARDIAC PACEMAKER PLACEMENT      CARDIAC SURGERY      CATARACT EXTRACTION      MUSCLE FLAP      right foot    NERVE BLOCK      Transforaminal Epidural Lumbar with Fluoroscopic Guidance     MI COLONOSCOPY FLX DX W/COLLJ SPEC WHEN PFRMD N/A 5/4/2018    Procedure: EGD AND COLONOSCOPY;  Surgeon: Jessica Monson MD;  Location: [facility]  GI LAB;   Service: Gastroenterology    MI SPLIT [address] <100 SQCM Right 6/10/2019    Procedure: SKIN GRAFT SPLIT THICKNESS (STSG)  EXTREMITY right leg;  Surgeon: Kim Ryan DPM;  Location:  MAIN OR;  Service: Podiatry    TOE AMPUTATION      left 5th toe     Social History     Socioeconomic History    Marital status: /Civil Union     Spouse name: Not on file    Number of children: 1    Years of education: Some college     Highest education level: Not on file   Occupational History    Occupation: Disabled    Occupation: Retired      Comment: [employer]   Social Needs    Financial resource strain: Somewhat hard   John-Marie insecurity     Worry: Not on file     Inability: Not on file    Transportation needs     Medical: No     Non-medical: No   Tobacco Use  Smoking status: Former Smoker     Years: 15 00     Types: Pipe     Quit date:      Years since quittin 3    Smokeless tobacco: Never Used   Substance and Sexual Activity    Alcohol use:  Yes     Alcohol/week: 0 0 standard drinks     Frequency: Never     Binge frequency: Never     Comment: socially    Drug use: No    Sexual activity: Not Currently   Lifestyle    Physical activity     Days per week: Not on file     Minutes per session: Not on file    Stress: Not on file   Relationships    Social connections     Talks on phone: Not on file     Gets together: Not on file     Attends [employer] service: Not on file     Active member of club or organization: Not on file     Attends meetings of clubs or organizations: Not on file     Relationship status: Not on file    Intimate partner violence     Fear of current or ex partner: Not on file     Emotionally abused: Not on file     Physically abused: Not on file     Forced sexual activity: Not on file   Other Topics Concern    Not on file   Social History Narrative    Caffeine Use - 15-20 oz/day     Daily caffeinated coffee consumption     Dental care, regularly - Denied     Guns in the home: Stored in locked cabinet - Denied    Pets/Animals: Dog     Church practices    Tea    Water intake, adequate (per day)     Family History   Problem Relation Age of Onset    Diabetes Mother     Heart disease Mother     Thyroid disease Mother         Disorder     Cancer Brother     Gout Brother     Diabetes Maternal Grandmother        MEDICATIONS / ALLERGIES:  all current active meds have been reviewed    Allergies   Allergen Reactions    Contrast  [Iodinated Diagnostic Agents]     Lipitor  [Atorvastatin Calcium] Drowsiness    Ibuprofen      sensitive    Iodine - Food Allergy Rash    Simvastatin Anxiety     Other reaction(s): Malaise (finding)       OBJECTIVE:  /59   Pulse 82   Temp 98 1 °F (36 7 °C)   Resp 16   Ht 5' 8" (1 727 m)   Wt 69 5 kg (153 lb 3 5 oz)   SpO2 91%   BMI 23 30 kg/m²   Physical Exam:  Constitutional: Appears well-developed and well-nourished  Sickly but not toxic-looking  In no acute physical or emotional distress  Head: Normocephalic and atraumatic  Eyes: EOM are normal  No ocular discharge  No scleral icterus  Neck: No visible adenopathy or masses  Respiratory: Effort normal  No stridor  No respiratory distress  On NC  Gastrointestinal: No abdominal distension  Musculoskeletal: No edema  Neurological: awake, Alert, disoriented  Unable to effectively verbalize needs at this time  Grabbing things in the air  Skin: Dry, no diaphoresis  Pale  Psychiatric: Slightly restless, agitated    Lab Results: I have personally reviewed pertinent labs  Imaging Studies: I have personally reviewed pertinent reports  EKG, Pathology, and Other Studies: I have personally reviewed pertinent reports  Counseling / Coordination of Care:  Counseling / Coordination of Care  Total floor / unit time spent today 25 minutes  Greater than 50% of total time was spent with the patient and / or family counseling and / or coordination of care  A description of the counseling / coordination of care: provided medical updates, discussed palliative care, determined competency, determined goals of care, determined POA, determined social/family support, discussed plans of care, discussed symptom management, provided psychosocial support       Miya Thorne MD  [address] and Supportive Care

## 2021-04-18 NOTE — ASSESSMENT & PLAN NOTE
In the past 2 weeks has been range of 7 and 8  Hemoglobin 4/16 6 8, status post 1 PRBC  Hemoglobin today 8 2

## 2021-04-18 NOTE — ASSESSMENT & PLAN NOTE
POA  Baseline creatinine from 1 2-1 5  Suspect cardiorenal, component of ATN  Creatinine slightly worsening, 2 96 today  Hold IV diuretics today  Nephrology following

## 2021-04-18 NOTE — PROGRESS NOTES
Vancomycin IV Pharmacy-to-Dose Consultation    Dov Willis is a 76 y o  male who is currently receiving Vancomycin IV with management by the Pharmacy Consult service  Assessment/Plan:  The patient was reviewed  Renal function appears to be slowly worsening (2 68 -> 2 79 -> 2 96)  No infusion reactions were documented in the chart  Based on todays assessment, continue current vancomycin (day #7) dosing of 750 mg q24h  Due to increasing SCr, will obtain repeat trough tomorrow, 4/19 @ 1030  We will continue to follow the patients culture results and clinical progress daily      Claire Scott, NinaD, BCIDP

## 2021-04-18 NOTE — ASSESSMENT & PLAN NOTE
Wt Readings from Last 3 Encounters:   04/18/21 69 5 kg (153 lb 3 5 oz)   04/05/21 78 6 kg (173 lb 4 5 oz)   03/27/21 72 kg (158 lb 11 7 oz)     Echo in 03/29/2021 showed ejection fraction 65%, mild MR, mild TR  Hold IV diuretics today due to jump in creatinine, possible transition to orals tomorrow  7 7 L negative balance since admission  Gabe Ortez per Nephro 4/16  Monitor kidney function  Input and output, daily weight

## 2021-04-18 NOTE — PROGRESS NOTES
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  - After review of records [facility] as well as Care everywhere it appears that the patient has a baseline Creatinine of 1 2-1 5  mg/dL  - [facility] with a serum creatinine of 2 20 mg/dL  - serum creatinine has been stable around 2 5-2 7 mg/dL  - patient's creatinine today is at 2 96 mg/dL, elevated from yesterday likely secondary to over-diuresis  - recommend holding further IV diuretics and re-evaluating to see when p o  Diuretics need to be re-initiated  - check BMP in a m   - Await renal recovery  - Optimize hemodynamic status to avoid delay in renal recovery  - Place on a renal diet when allowed diet order    - Avoid nephrotoxins, adjust meds to appropriate GFR   - Strict I/O   - Daily weights  - Urinary retention protocol if patient does not have a Ortez  - will need to set up patient for follow up with Nephrology as an outpatient post hospitalization   - for nephrology as an outpatient patient follows up with Dr Marrian Severe  - agree with family meeting for goals of care     Blood pressure:  - current medications:  Bumex 2 mg IV q 8  - recommendations:  Recommend holding IV diuretics and seeing when hold diuretics may need to be re-initiated if okay with Cardiology  - Optimize hemodynamics   - Maintain MAP > 65mmHg  - Avoid BP fluctuations       H/H/anemia:  - most recent hemoglobin at 8 2 grams/deciliter  - maintain hemoglobin greater than 8 grams/deciliter     Acid-base electrolytes:  o Hyponatremia:    - Most recent sodium at 137 mEq  - Continue fluid restriction    o Hypokalemia:  - On K-Dur 40 mEq p o  Q day  - Most recent potassium 3 7    o Hyperphosphatemia:  - Most recent phosphorus of 4 4  o  Acid-base:    - Most recent bicarb at 26     Volume status:  o  Clinically hypovolemic diuretics above     Other medical problems:  o Altered mental status/encephalopathy:  Management per primary team     o Agree with goals of care/family meeting  o 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:  Management per primary team   On vancomycin per ID not a surgical candidate as per CT surgery  Thanks for the consult  Will continue to follow  Please call with questions/ concerns  Above-mentioned orders and Plan acute kidney injury was discussed with the team in [address] MD, FASN, 2021, 6:14 AM              Objective :   Patient seen and examined in his room no overnight events hemodynamically stable remains afebrile urine output 1 3 L last 24 hours  Remains pleasantly confused      PHYSICAL EXAM  /58   Pulse 84   Temp (!) 97 °F (36 1 °C)   Resp 20   Ht 5' 8" (1 727 m)   Wt 69 5 kg (153 lb 3 5 oz)   SpO2 92%   BMI 23 30 kg/m²   Temp (24hrs), Av 5 °F (36 4 °C), Min:96 9 °F (36 1 °C), Max:98 5 °F (36 9 °C)        Intake/Output Summary (Last 24 hours) at 2021 0614  Last data filed at 2021 0600  Gross per 24 hour   Intake 660 ml   Output 1875 ml   Net -1215 ml       I/O last 24 hours: In: 660 [P O :660]  Out: 2075 [Urine:5]      Current Weight: Weight - Scale: 69 5 kg (153 lb 3 5 oz)  First Weight: Weight - Scale: 78 6 kg (173 lb 4 5 oz)  Physical Exam  Vitals signs and nursing note reviewed  Constitutional:       General: He is not in acute distress  Appearance: Normal appearance  He is ill-appearing  He is not toxic-appearing or diaphoretic  HENT:      Head: Normocephalic and atraumatic  Mouth/Throat:      Mouth: Mucous membranes are dry  Pharynx: Oropharynx is clear  No oropharyngeal exudate     Eyes:      General: No scleral icterus  Conjunctiva/sclera: Conjunctivae normal    Neck:      Musculoskeletal: Normal range of motion and neck supple  Cardiovascular:      Rate and Rhythm: Normal rate  Heart sounds: Normal heart sounds  No friction rub  Pulmonary:      Effort: Pulmonary effort is normal  No respiratory distress  Breath sounds: Normal breath sounds  No wheezing  Abdominal:      General: There is no distension  Palpations: Abdomen is soft  There is no mass  Tenderness: There is no abdominal tenderness  Musculoskeletal:         General: No swelling  Skin:     General: Skin is warm  Coloration: Skin is not jaundiced  Neurological:      General: No focal deficit present  Mental Status: He is alert  He is disoriented  Psychiatric:         Mood and Affect: Mood normal              Review of Systems   Constitutional: Positive for fatigue  Negative for chills  Respiratory: Negative for cough and shortness of breath  Cardiovascular: Negative for leg swelling  Gastrointestinal: Negative for diarrhea and vomiting  Musculoskeletal: Negative for back pain  Skin: Positive for pallor  Neurological: Negative for headaches  Psychiatric/Behavioral: Positive for confusion  Negative for agitation  All other systems reviewed and are negative        Scheduled Meds:  Current Facility-Administered Medications   Medication Dose Route Frequency Provider Last Rate    acetaminophen  650 mg Oral Q6H PRN Liz Ndiaye DO      aspirin  81 mg Oral Daily Liz Ndiaye DO      atorvastatin  5 mg Oral Daily Liz Ndiaye DO      bisacodyl  10 mg Rectal Daily PRN Liz Ndiaye DO      bumetanide  1 mg Intravenous Q8H Maco Shah DO      cefepime  1,000 mg Intravenous Q12H Belle Malagon MD 1,000 mg (04/17/21 2123)    cholecalciferol  4,000 Units Oral Daily Liz Ndiaye DO      docusate sodium  100 mg Oral BID Liz Ndiaye DO      ferrous sulfate  325 mg Oral Daily With Breakfast [facility], DO      heparin (porcine)  5,000 Units Subcutaneous [facility] [place]      insulin lispro  1-5 Units Subcutaneous TID [facility], DO      insulin lispro  1-5 Units Subcutaneous HS [facility], DO      insulin lispro  4 Units Subcutaneous TID after meals Tyrese Farrell MD      levothyroxine  125 mcg Oral Early Morning [facility], DO      melatonin  3 mg Oral HS PRN Gena Villa PA-C      ondansetron  4 mg Intravenous Q6H PRN Floyd Scarlet, DO      pantoprazole  40 mg Oral Daily Before Breakfast [facility], DO      polyethylene glycol  17 g Oral Daily PRN [facility], DO      potassium chloride  40 mEq Oral Daily Lord Crystal MD      tamsulosin  0 4 mg Oral Daily With [address] [place], [place]      vancomycin  750 mg Intravenous Q24H Luis Morris  mg (04/17/21 1008)       PRN Meds:   acetaminophen    bisacodyl    melatonin    ondansetron    polyethylene glycol    Continuous Infusions:       Invasive Devices: Invasive Devices     Peripheral Intravenous Line            Peripheral IV 04/16/21 Left Wrist 1 day    Peripheral IV 04/17/21 Dorsal (posterior); Left Forearm less than 1 day          Drain            External Urinary Catheter 1 day                  LABORATORY:    Results from last 7 days   Lab Units 04/17/21  0612 04/16/21  0515 04/15/21  0445 04/14/21  0403 04/13/21  0648 04/12/21  0455   WBC Thousand/uL 19 36* 15 47* 13 74* 14 83* 19 24* 16 69*   HEMOGLOBIN g/dL 9 0* 6 8* 7 5* 7 3* 7 9* 7 4*   HEMATOCRIT % 27 2* 21 1* 23 6* 22 6* 25 3* 23 1*   PLATELETS Thousands/uL 142* 115* 139* 136* 147* 157   POTASSIUM mmol/L 4 0 3 8 3 2* 3 2* 3 7 3 8   CHLORIDE mmol/L 101 101 99* 99* 103 103   CO2 mmol/L 26 26 28 28 19* 25   BUN mg/dL 65* 63* 59* 58* 55* 53*   CREATININE mg/dL 2 79* 2 68* 2 68* 2 64* 2 68* 2 62*   CALCIUM mg/dL 8 3 7 9* 7 8* 7 7* 7 9* 8 4   MAGNESIUM mg/dL 2 1  --   --   --   --   --    PHOSPHORUS mg/dL  --   --   --  4 4*  -- --       rest all reviewed    RADIOLOGY:  XR chest portable   Final Result by Robbie Heath MD (04/16 1328)      No visible right effusion or right pneumothorax  Persistent left effusion, left base atelectasis, and bilateral pneumonia  Workstation performed: [id number]         XR spine lumbar 2 or 3 views injury   Final Result by Jennifer Martin DO (04/14 2047)      Unchanged L1 compression fracture  Degenerative changes as above  Workstation performed: [id number]         XR chest portable   Final Result by Jojo Mccray MD (04/14 1306)   No evidence of postthoracentesis complication      Increased left upper lobe infiltrate  Persistent right perihilar infiltrate               Workstation performed: [id number]         CT chest wo contrast   Final Result by Altagracia Dozier MD (04/14 0631)      Dense airspace consolidation throughout much of the left upper lobe with lesser airspace infiltrates, subsegmental in the right upper lobe  Although nonspecific, does not have typical pattern of CHF  Infectious etiology remains in the differential       Large dependent bilateral pleural effusions with associated atelectasis of the lower lobes  The study was marked in [facility] for immediate notification  Workstation performed: [id number]         XR chest portable   Final Result by Daina Brar MD (04/11 1804)      Mild pulmonary edema with small left pleural effusion, improved from prior exam                   Workstation performed: [id number]         XR spine thoracolumbar 2 vw    (Results Pending)     Rest all reviewed    Portions of the record may have been created with voice recognition software  Occasional wrong word or "sound a like" substitutions may have occurred due to the inherent limitations of voice recognition software  Read the chart carefully and recognize, using context, where substitutions have occurred  If you have any questions, please contact the dictating provider

## 2021-04-18 NOTE — ASSESSMENT & PLAN NOTE
Patient has a history of complete heart block, status post Medtronic dual-chamber ppm implanted 2015  Now with endocarditis  Evaluated by EP, appreciate recommendations  He is 100% pacemaker dependent  On interrogation he has an underlying rhythm and he is bradycardic and has CHB     Per CT surgery, not a candidate for surgery

## 2021-04-18 NOTE — PROGRESS NOTES
Cardiology Progress Note - Beverly Gould 76 y o  male MRN: [id number]    Unit/Bed#: [facility] 803-01 Encounter: [id number]  Assessment and plan  1  Mitral valve endocarditis  2  Acute on chronic diastolic congestive heart failure  3  Severe mitral regurgitation secondary to valve perforation  4  Bilateral pleural effusions status post thoracentesis  5  Acute on chronic kidney injury  6  Severe deconditioning   7  Pneumonia  8  history of complete heart block status post permanent pacemaker likely infected  9  Metabolic encephalopathy    Recommendations:  He has been deemed a nonsurgical candidate by CT surgery which I agree with  He has severe mitral regurgitation due to valve perforation and large vegetation  Patient is resting comfortably denies any complaints this morning  Creatinine did rise today will discontinue IV Bumex for today start oral tomorrow  Continue antibiotic therapy  Overall prognosis is poor  Subjective:    No significant events overnight  A little more alert today  Denies any chest pain or dyspnea laying flat in bed respiratory stay comfortable  ROS    Objective:   Vitals: Blood pressure 109/59, pulse 82, temperature 98 1 °F (36 7 °C), resp   rate 16, height 5' 8" (1 727 m), weight 69 5 kg (153 lb 3 5 oz), SpO2 91 % , Body mass index is 23 3 kg/m² ,   Orthostatic Blood Pressures      Most Recent Value   Blood Pressure  109/59 filed at 04/18/2021 0737   Patient Position - Orthostatic VS  Lying filed at 04/12/2021 5723         Systolic (19YQH), BRIAN:430 , Min:105 , ZWL:276     Diastolic (74FHB), NZP:98, Min:55, Max:72      Intake/Output Summary (Last 24 hours) at 4/18/2021 0808  Last data filed at 4/18/2021 0600  Gross per 24 hour   Intake 660 ml   Output 1275 ml   Net -615 ml     Weight (last 2 days)     Date/Time   Weight    04/18/21 0600   69 5 (153 22)    04/17/21 0555   70 1 (154 5)    04/16/21 0600   69 6 (153 5)                Telemetry Review: No significant arrhythmias seen on telemetry review  EKG personally reviewed by Tamia Elder DO  Physical Exam:  Vital signs reviewed  General:  Alert answer simple questions, appears stated age, no acute distress  HEENT:  PERRLA, EOMI, no scleral icterus, no conjunctival pallor  Neck:  No lymphadenopathy, no thyromegaly, no carotid bruits, no elevated JVP  Heart:  Regular rate and rhythm, normal S1/S2, no V0/X4, holosystolic murmur at the apexLungs:  Clear to auscultation bilaterally, no wheezes rales or rhonchi  Abdomen:  Soft, non-tender, positive bowel sounds, no rebound or guarding,   no organomegaly   Extremities:  Normal range of motion    No clubbing, cyanosis or edema   Vascular:  2+ pedal pulses  Skin:  No rashes or lesions on exposed skin  Neurologic:  Cranial nerves II-XII grossly intact without focal deficits  Psych:  Normal mood and affect      Laboratory Results:        CBC with diff:   Results from last 7 days   Lab Units 04/18/21  0548 04/17/21  0612 04/16/21  0515 04/15/21  0445 04/14/21  0403 04/13/21  0648 04/12/21  0455   WBC Thousand/uL 15 61* 19 36* 15 47* 13 74* 14 83* 19 24* 16 69*   HEMOGLOBIN g/dL 8 2* 9 0* 6 8* 7 5* 7 3* 7 9* 7 4*   HEMATOCRIT % 26 0* 27 2* 21 1* 23 6* 22 6* 25 3* 23 1*   MCV fL 90 88 87 87 86 91 86   PLATELETS Thousands/uL 122* 142* 115* 139* 136* 147* 157   MCH pg 28 3 28 9 28 1 27 6 27 9 28 3 27 6   MCHC g/dL 31 5 33 1 32 2 31 8 32 3 31 2* 32 0   RDW % 18 2* 17 5* 18 0* 17 5* 17 5* 17 8* 16 9*   MPV fL 10 0 10 1 9 8 10 8 10 5 9 9 10 6   NRBC AUTO /100 WBCs 0  --  0 0 0 0 0         CMP:  Results from last 7 days   Lab Units 04/18/21  0548 04/17/21  0612 04/16/21  0515 04/15/21  0445 04/14/21  0403 04/13/21  0648 04/12/21  0455   POTASSIUM mmol/L 3 7 4 0 3 8 3 2* 3 2* 3 7 3 8   CHLORIDE mmol/L 102 101 101 99* 99* 103 103   CO2 mmol/L 27 26 26 28 28 19* 25   BUN mg/dL 69* 65* 63* 59* 58* 55* 53*   CREATININE mg/dL 2 96* 2 79* 2 68* 2 68* 2 64* 2 68* 2 62*   CALCIUM mg/dL 7 9* 8 3 7 9* 7 8* 7  7* 7 9* 8 4   EGFR ml/min/1 73sq m 20 21 22 22 23 22 23         BMP:  Results from last 7 days   Lab Units 21  0548 21  0612 21  0515 04/15/21  0445 21  0403 21  0648 21  0455   POTASSIUM mmol/L 3 7 4 0 3 8 3 2* 3 2* 3 7 3 8   CHLORIDE mmol/L 102 101 101 99* 99* 103 103   CO2 mmol/L 27 26 26 28 28 19* 25   BUN mg/dL 69* 65* 63* 59* 58* 55* 53*   CREATININE mg/dL 2 96* 2 79* 2 68* 2 68* 2 64* 2 68* 2 62*   CALCIUM mg/dL 7 9* 8 3 7 9* 7 8* 7 7* 7 9* 8 4       BNP: No results for input(s): BNP in the last 72 hours  Magnesium:   Results from last 7 days   Lab Units 21  0612   MAGNESIUM mg/dL 2 1       Coags:       TSH:        Hemoglobin A1C       Lipid Profile:       Cardiac testing:   Results for orders placed during the hospital encounter of 21   Echo complete with contrast if indicated    Narrative [facility] [address]  [facility], [address] [facility]  [phone]    Transthoracic Echocardiogram  2D, M-mode, Doppler, and Color Doppler    Study date:  29-Mar-2021    Patient: Noma Kehr  MR number: [id number]  Account number: [de-identified]  : 1945  Age: 76 years  Gender: Male  Status: Inpatient  Location: Bedside  Height: 68 in  Weight: 158 lb  BP: 117/ 63 mmHg    Indications: Recent COVID-19 infection  Diagnoses: I50 9 - Heart failure, unspecified    Sonographer:  Cristina Woo RDCS  Referring Physician:  Phillip Schuster DO  Group:  [facility]  Cardiology Fellow:  Jim Mohan MD  Interpreting Physician:  Nel Prado MD    SUMMARY    LEFT VENTRICLE:  Systolic function was normal  Ejection fraction was estimated to be 65 %  Although no diagnostic regional wall motion abnormality was identified, this possibility cannot be completely excluded on the basis of this study  Wall thickness was mildly increased  There was mild concentric hypertrophy      RIGHT ATRIUM:  The atrium was mildly dilated  MITRAL VALVE:  There was mild annular calcification  There was mild regurgitation  TRICUSPID VALVE:  Transtricuspid velocity was increased due to increased transvalvular flow  There was at least mild regurgitation  The TR may have been underestimated based on color Doppler  The dense, triangular CW Doppler profile is concerning for more significant TR  Pulmonary artery systolic pressure was at the upper limits of normal   Estimated peak PA pressure was 37 mmHg  IVC, HEPATIC VEINS:  The inferior vena cava was dilated  Respirophasic changes were blunted (less than 50% variation)  PERICARDIUM:  There was a left pleural effusion  HISTORY: PRIOR HISTORY: Complete heart block  DM2  CKD3  Pacemaker  GERD  PROCEDURE: The procedure was performed at the bedside  This was a routine study  The transthoracic approach was used  The study included complete 2D imaging, M-mode, complete spectral Doppler, and color Doppler  The heart rate was 95 bpm,  at the start of the study  Image quality was adequate  LEFT VENTRICLE: Size was normal  Systolic function was normal  Ejection fraction was estimated to be 65 %  Although no diagnostic regional wall motion abnormality was identified, this possibility cannot be completely excluded on the basis  of this study  Wall thickness was mildly increased  There was mild concentric hypertrophy  DOPPLER: Due to tachycardia, there was fusion of early and atrial contributions to ventricular filling  The study was not technically sufficient to  allow evaluation of LV diastolic function  RIGHT VENTRICLE: The size was normal  Systolic function was normal  A pacing wire was present in the ventricular cavity  LEFT ATRIUM: Size was normal     RIGHT ATRIUM: The atrium was mildly dilated  A pacing wire was present in the atrial cavity  MITRAL VALVE: There was mild annular calcification  There was mild diffuse thickening   DOPPLER: The transmitral velocity was within the normal range  There was no evidence for stenosis  There was mild regurgitation  AORTIC VALVE: The valve was trileaflet  Leaflets exhibited mildly increased thickness, mild calcification, and sclerosis  DOPPLER: Transaortic velocity was within the normal range  There was no evidence for stenosis  There was no  regurgitation  TRICUSPID VALVE: There was normal leaflet separation  DOPPLER: Transtricuspid velocity was increased due to increased transvalvular flow  There was no evidence for stenosis  There was at least mild regurgitation  The TR may have been  underestimated based on color Doppler  The dense, triangular CW Doppler profile is concerning for more significant TR  Pulmonary artery systolic pressure was at the upper limits of normal  Estimated peak PA pressure was 37 mmHg  PULMONIC VALVE: Leaflets exhibited normal thickness, no calcification, and normal cuspal separation  DOPPLER: The transpulmonic velocity was within the normal range  There was trace regurgitation  PERICARDIUM: There was no pericardial effusion  There was a left pleural effusion  AORTA: The root exhibited normal size  SYSTEMIC VEINS: IVC: The inferior vena cava was dilated  Respirophasic changes were blunted (less than 50% variation)      SYSTEM MEASUREMENT TABLES    2D  %FS: 30 81 %  Ao Diam: 3 51 cm  EDV(Teich): 56 92 ml  EF(Teich): 59 29 %  ESV(Teich): 23 17 ml  IVSd: 1 29 cm  LA Area: 17 98 cm2  LA Diam: 3 62 cm  LVEDV MOD A4C: 68 58 ml  LVEF MOD A4C: 61 %  LVESV MOD A4C: 26 75 ml  LVIDd: 3 67 cm  LVIDs: 2 54 cm  LVLd A4C: 6 81 cm  LVLs A4C: 5 96 cm  LVPWd: 1 28 cm  RA Area: 17 29 cm2  RVIDd: 3 38 cm  SV MOD A4C: 41 84 ml  SV(Teich): 33 75 ml    CW  MR VTI: 140 cm  MR Vmax: 5 12 m/s  MR Vmean: 4 09 m/s  MR maxP 99 mmHg  MR meanP 73 mmHg  TR Vmax: 2 32 m/s  TR maxP 54 mmHg  TV VTI: 25 53 cm  TV Vmax: 1 65 m/s  TV Vmean: 0 89 m/s  TV maxPG: 10 87 mmHg  TV meanP 32 mmHg    MM  TAPSE: 2 59     [facility] Accredited Echocardiography Laboratory    Prepared and electronically signed by    Leny Connolly MD  Signed 30-Mar-2021 10:10:14       Results for orders placed during the hospital encounter of 04/10/21   CHARLES    Narrative [address]  [facility] [place], [address] [facility]  [phone]    Transesophageal Echocardiogram  2D, 3D, M-mode, Doppler, and Color Doppler    Study date:  2021    Patient: Nolvia Menendez  MR number: [id number]  Account number: [de-identified]  : 1945  Age: 76 years  Gender: Male  Status: Inpatient  Location: Echo lab  Height: 68 in  Weight: 161 lb  BP: 106/ 59 mmHg    Indications: Bacteremia with mitral valve vegetation    Diagnoses: R78 81 - Bacteremia    Sonographer:  Mihai Ventura  Interpreting Physician:  Genaro De La Rosa MD  Primary Physician:  Manas Wagoner MD  Referring Physician:  oJse Ledezma DO  Group:  [facility]  Cardiology Fellow: Herminio Zaragoza MD  RN:  Rupert Bajwa RN    SUMMARY    LEFT VENTRICLE:  Systolic function was normal  Ejection fraction was estimated to be 60 %  There were no regional wall motion abnormalities  Wall thickness was mildly increased  RIGHT VENTRICLE:  The size was normal   Systolic function was normal   A pacing wire was present in the ventricular cavity but was not well visualized  Cannot rule out associated vegetation  RIGHT ATRIUM:  Pacing wires were present in the atrial cavity  There was no associated vegetation  MITRAL VALVE:  There was a large, protruding, echogenic, mobile vegetation with central echolucency, measuring 19 mm x 17 mm on the anterior leaflet, spanning the A1-A2 scallops with valve perforation at A2  It was located on the atrial aspect of the  valve but prolapsed into the ventricle with diastole    There was severe, eccentric and postero-laterally directed regurgitation though the perforated A2 scallop and additional areas of malcoaptation  AORTIC VALVE:  There was no echocardiographic evidence of vegetation or abscess  TRICUSPID VALVE:  There was mild to moderate regurgitation  Pulmonary artery systolic pressure was within the normal range  Estimated peak PA pressure was 30 mmHg  SVC, INNOMINATE VEIN:  Pacing wires were present at the junction of the superior vena cava with the right atrium  There was no associated vegetation  PULMONARY VEINS:  There was systolic flow reversal in the right upper, right lower, and left upper pulmonary vein(s)  HISTORY: PRIOR HISTORY: Hypertension; Complete heart block; CKD; Heart failure; Pacemaker; Covid-19    PROCEDURE: The procedure was performed in the echo lab  This was a routine study  The risks and alternatives of the procedure were explained to the patient and informed consent was obtained  The transesophageal approach was used  The study  included complete 2D imaging, 3D imaging, M-mode, complete spectral Doppler, and color Doppler  The heart rate was 77 bpm, at the start of the study  An adult omniplane probe was inserted by the cardiology fellow under direct supervision  of the attending cardiologist  Intubated with ease  Two intubation attempt(s)  There was no blood detected on the probe  Image quality was adequate  There were no complications during the procedure  MEDICATIONS: Anesthesia administered by  anesthesia team     LEFT VENTRICLE: Size was normal  Systolic function was normal  Ejection fraction was estimated to be 60 %  There were no regional wall motion abnormalities  Wall thickness was mildly increased  DOPPLER: The study was not technically  sufficient to allow evaluation of LV diastolic function  RIGHT VENTRICLE: The size was normal  Systolic function was normal  A pacing wire was present in the ventricular cavity but was not well visualized  Cannot rule out associated vegetation      LEFT ATRIUM: Size was normal  No thrombus was identified  APPENDAGE: The size was normal  No thrombus was identified  DOPPLER: The function was normal (normal emptying velocity)  ATRIAL SEPTUM: No defect or patent foramen ovale was identified by color Doppler assessment  RIGHT ATRIUM: Size was normal  Pacing wires were present in the atrial cavity  There was no associated vegetation  No thrombus was identified  MITRAL VALVE: There was a large, protruding, echogenic, mobile vegetation with central echolucency, measuring 19 mm x 17 mm on the anterior leaflet, spanning the A1-A2 scallops with valve perforation at A2  It was located on the atrial  aspect of the valve but prolapsed into the ventricle with diastole  There was severe, eccentric and postero-laterally directed regurgitation though the perforated A2 scallop and additional areas of malcoaptation  AORTIC VALVE: The valve was trileaflet  Leaflets exhibited normal thickness and normal cuspal separation  There was no echocardiographic evidence of vegetation or abscess  DOPPLER: There was no evidence for stenosis  There was no  regurgitation  TRICUSPID VALVE: The valve structure was normal  There was normal leaflet separation  There was no echocardiographic evidence of vegetation  DOPPLER: The transtricuspid velocity was within the normal range  There was no evidence for  stenosis  There was mild to moderate regurgitation  Regurgitation grade was 1-2+ on a scale of 0 to 4+  The regurgitant jet was directed centrally  Pulmonary artery systolic pressure was within the normal range  Estimated peak PA pressure  was 30 mmHg  PULMONIC VALVE: Leaflets exhibited normal thickness, no calcification, and normal cuspal separation  There was no echocardiographic evidence of vegetation  DOPPLER: There was no evidence for stenosis  There was trace regurgitation  PERICARDIUM: There was no pericardial effusion  The pericardium was normal in appearance  AORTA: The root exhibited normal size   The ascending aorta was normal in size  There was no atheroma  There was no evidence for dissection  There was no evidence for aneurysm  SYSTEMIC VEINS: SVC: Pacing wires were present at the junction of the superior vena cava with the right atrium  There was no associated vegetation  PULMONARY VEINS: DOPPLER: There was systolic flow reversal in the right upper, right lower, and left upper pulmonary vein(s)  [address] Accredited Echocardiography Laboratory    Prepared and electronically signed by    Sonia Barney MD  Signed 14-Apr-2021 14:56:53       No results found for this or any previous visit  No results found for this or any previous visit      Meds/Allergies   all current active meds have been reviewed  Medications Prior to Admission   Medication    amLODIPine (NORVASC) 5 mg tablet    ascorbic acid (VITAMIN C) 1000 MG tablet    aspirin 81 mg chewable tablet    Atorvastatin Calcium (LIPITOR PO)    CALCIUM CITRATE PO    Cholecalciferol (VITAMIN D3) 2000 units capsule    Continuous Blood Gluc Sensor (DEXCOM G6 SENSOR) MISC    cyanocobalamin (VITAMIN B-12) 1,000 mcg tablet    DULoxetine (CYMBALTA) 30 mg delayed release capsule    ezetimibe (ZETIA) 10 mg tablet    glucose 40 %    glucose 77 4 %    insulin aspart (NovoLOG) 100 units/mL injection    levothyroxine 125 mcg tablet    loperamide (IMODIUM) 2 mg capsule    pantoprazole (PROTONIX) 40 mg tablet    tamsulosin (FLOMAX) 0 4 mg    zinc sulfate (ZINCATE) 220 mg capsule          Assessment:  Principal Problem:    Acute respiratory failure with hypoxia (HCC)  Active Problems:    Anemia of chronic disease    Type I diabetes mellitus (HCC)    Essential hypertension    Stage 3b chronic kidney disease    KAZ on CKD stage 3    Encephalopathy    Complete heart block     Hyponatremia    Bacteremia    Acute on chronic diastolic heart failure (HCC)    HCAP (healthcare-associated pneumonia)    Acidosis    Bilateral pleural effusion Endocarditis    Closed compression fracture of L1 lumbar vertebra, with routine healing, subsequent encounter    Goals of care, counseling/discussion            Counseling / Coordination of Care  Total floor / unit time spent today 25 minutes  Greater than 50% of total time was spent with the patient and / or family counseling and / or coordination of care    A description of the counseling / coordination of care:  Discussed with nursing

## 2021-04-18 NOTE — PROGRESS NOTES
[address] [facility]  Progress Note - Lien Brush 1945, 76 y o  male MRN: [id number]  Unit/Bed#: [facility] 803-01 Encounter: [id number]  Primary Care Provider: Penny Adorno MD   Date and time admitted to hospital: 4/10/2021 10:27 AM    * Acute respiratory failure with hypoxia [facility])  Assessment & Plan  POA, secondary to acute on chronic diastolic heart failure, Srini on CKD stage 3, b/l pleural effusion, Hcap, recent COVID infection in February  RRT at [facility] needing 10L O2    CT 4/13 showed dense airspace consolidation throughout much of the left upper lobe with lesser airspace infiltrates, subsegmental in the right upper lobe, does not have a typical pattern of CHF, possible infectious etiology    Large dependent bilateral pleural effusions  Weaned down from 10 L to 3 L, now up to 4 L  Negative balance 7 7 L since admission  Diuresis :  IV diuretics stopped today due to jump in creatinine  Continue cefepime and Vanco for HCAP, endocarditis  Status post left sided thoracocentesis 4/14, 1 2 L removed, transudate  Status post right-sided thoracocentesis 4/16, 1 L of clear yellow fluid removed  Goal oxygen saturation 89% and above  Monitor in and output  Daily weights    Acute on chronic diastolic heart failure (HCC)  Assessment & Plan  Wt Readings from Last 3 Encounters:   04/18/21 69 5 kg (153 lb 3 5 oz)   04/05/21 78 6 kg (173 lb 4 5 oz)   03/27/21 72 kg (158 lb 11 7 oz)     Echo in 03/29/2021 showed ejection fraction 65%, mild MR, mild TR  Hold IV diuretics today due to jump in creatinine, possible transition to orals tomorrow  7 7 L negative balance since admission  Gabe myers Nephro 4/16  Monitor kidney function  Input and output, daily weight        HCAP (healthcare-associated pneumonia)  Assessment & Plan  Started on vanc and cefepime for possible Hcap  Blood cultures 4/12/21 growing Corynebacterium striatum  Continue vancomycin and Cefepime        Bacteremia  Assessment & Plan  Blood cultures from 04/12 growing Corynebacterium striatum  Continue IV Vanco  Source could be right foot ulcer  ID on board, appreciate recommendation  Echo showed anterior mitral leaflet vegetation  CHARLES ejection fraction was 60%, no regional wall motion abnormalities, large protruding echogenic mobile vegetation measuring 19 mm time 17 mm on the anterior leaflet, spanning the A1/A2 scallop with valve perforation at A2, located in the atrial aspect of the valve, prolapse into the ventricle with diastole, severe regurgitation  Evaluated by CT surgery, not a candidate for surgery      Complete heart block   Assessment & Plan  Patient has a history of complete heart block, status post [employer] dual-chamber ppm implanted 2015  Now with endocarditis  Evaluated by EP, appreciate recommendations  He is 100% pacemaker dependent  On interrogation he has an underlying rhythm and he is bradycardic and has CHB     Per CT surgery, not a candidate for surgery    Endocarditis  Assessment & Plan  Echo showed mitral valve vegetation  CHARLES showed large mitral valve vegetation measuring 19 mm x 17 mm  Not a candidate for surgery per CT surgery  EP on board    Bilateral pleural effusion  Assessment & Plan  CT showed large dependent bilateral pleural effusions associated with atelectasis of the lower lobes  Status post left-sided thoracocentesis by Pulm 4/14, removed 1 2 L  Status post right-sided thoracocentesis 4/16, 1 L of clear yellow fluid removed    Encephalopathy  Assessment & Plan  Multifactorial suspect in the setting of heart failure, kidney failure, bacteremia, Hcap  Reorient, sleep-wake cycle      KAZ on CKD stage 3  Assessment & Plan  POA  Baseline creatinine from 1 2-1 5  Suspect cardiorenal, component of ATN  Creatinine slightly worsening, 2 96 today  Hold IV diuretics today  Nephrology following    Goals of care, counseling/discussion  Assessment & Plan  Palliative care consult placed  Per CT surgery patient is not a candidate for surgery, I reached out if they can update family  Dr Gloria Sevilla from [facility] and myself met with patient's wife and son on Saturday  Updated about his current condition and recommendations from subspecialists  They would like to continue full treatment for the next few days to see if he will improve and have another meeting early next week  They would like him to be full code   Multi disciplinary family meeting will most likely be arranged on Tuesday by palliative care      Closed compression fracture of L1 lumbar vertebra, with routine healing, subsequent encounter  Assessment & Plan  Followed by neuro surgery    Acidosis  Assessment & Plan  Resolved, bicarb tabs discontinued 4/13    Hyponatremia  Assessment & Plan  Sodium 137  Continue fluid restriction  Off salt tabs    Stage 3b chronic kidney disease  Assessment & Plan  Lab Results   Component Value Date    EGFR 20 04/18/2021    EGFR 21 04/17/2021    EGFR 22 04/16/2021    CREATININE 2 96 (H) 04/18/2021    CREATININE 2 79 (H) 04/17/2021    CREATININE 2 68 (H) 04/16/2021     Monitor creatinine closely   Input and output  Daily weights  Nephrology on board  Avoid nephrotoxic medications  Keep SBP>130    Essential hypertension  Assessment & Plan  Hold diuretics today  Monitor blood pressure    Type I diabetes mellitus ([facility] [address] )  Assessment & Plan  Lab Results   Component Value Date    HGBA1C 7 7 (H) 02/05/2021       Recent Labs     04/17/21  1114 04/17/21  1637 04/17/21  2116 04/18/21  0735   POCGLU 91 208* 221* 280*       Blood Sugar Average: Last 72 hrs:  (P) 192 [id number]   Endo recommendations appreciated  On insulin pump OP  On SQ insulin IP  Aappetite fluctuates  Continue Lantus 5 units q h s  Humalog 4 units t i d   If patient eats more than 50% of meal, if not then 2 units    Anemia of chronic disease  Assessment & Plan  In the past 2 weeks has been range of 7 and 8  Hemoglobin 4/16 6 8, status post 1 PRBC  Hemoglobin today 8 2          VTE Pharmacologic Prophylaxis:   Pharmacologic: Heparin  Mechanical VTE Prophylaxis in Place: Yes    Patient Centered Rounds: I have performed bedside rounds with nursing staff today  Discussions with Specialists or Other Care Team Provider:  Nephrology, ID    Education and Discussions with Family / Patient:  Patient, daughter-in-law at bedside    Time Spent for Care: 30 minutes  More than 50% of total time spent on counseling and coordination of care as described above  Current Length of Stay: 8 day(s)    Current Patient Status: Inpatient   Certification Statement: The patient will continue to require additional inpatient hospital stay due to Above    Discharge Plan: To be determined    Code Status: Level 1 - Full Code      Subjective:   Patient was seen and evaluated bedside, he is a low bit more awake today recognized me  Says he is feeling well  Objective:     Vitals:   Temp (24hrs), Av 7 °F (36 5 °C), Min:97 °F (36 1 °C), Max:98 5 °F (36 9 °C)    Temp:  [97 °F (36 1 °C)-98 5 °F (36 9 °C)] 98 1 °F (36 7 °C)  HR:  [82-90] 82  Resp:  [16-20] 16  BP: (105-115)/(55-72) 109/59  SpO2:  [87 %-95 %] 91 %  Body mass index is 23 3 kg/m²  Input and Output Summary (last 24 hours): Intake/Output Summary (Last 24 hours) at 2021 1107  Last data filed at 2021 0600  Gross per 24 hour   Intake 660 ml   Output 1275 ml   Net -615 ml       Physical Exam:     Physical Exam  Constitutional:       General: He is not in acute distress  Appearance: He is ill-appearing  HENT:      Head: Atraumatic  Neck:      Musculoskeletal: Neck supple  Cardiovascular:      Rate and Rhythm: Normal rate and regular rhythm  Heart sounds: No murmur  No friction rub  No gallop      Pulmonary:      Effort: Pulmonary effort is normal       Comments: Decreased breath sounds especially bibasilar on 4 L oxygen via nasal cannula, not in respiratory distress  Abdominal:      General: Bowel sounds are normal  There is no distension  Palpations: Abdomen is soft  Tenderness: There is no abdominal tenderness  Musculoskeletal:         General: No swelling  Skin:     Comments: Right foot wound, clean   Neurological:      Mental Status: He is alert  Comments: Oriented to himself  Lethargic, nonfocal exam   Psychiatric:      Comments: Unable to assess         Additional Data:     Labs:    Results from last 7 days   Lab Units 04/18/21  0548   WBC Thousand/uL 15 61*   HEMOGLOBIN g/dL 8 2*   HEMATOCRIT % 26 0*   PLATELETS Thousands/uL 122*   NEUTROS PCT % 88*   LYMPHS PCT % 5*   MONOS PCT % 5   EOS PCT % 0     Results from last 7 days   Lab Units 04/18/21  0548   SODIUM mmol/L 137   POTASSIUM mmol/L 3 7   CHLORIDE mmol/L 102   CO2 mmol/L 27   BUN mg/dL 69*   CREATININE mg/dL 2 96*   ANION GAP mmol/L 8   CALCIUM mg/dL 7 9*   GLUCOSE RANDOM mg/dL 237*         Results from last 7 days   Lab Units 04/18/21  0735 04/17/21  2116 04/17/21  1637 04/17/21  1114 04/17/21  0733 04/16/21  2100 04/16/21  1550 04/16/21  1131 04/16/21  0754 04/15/21  2105 04/15/21  1645 04/15/21  1636   POC GLUCOSE mg/dl 280* 221* 208* 91 72 177* 314* 266* 129 250* 306* 46*         Results from last 7 days   Lab Units 04/12/21  0455   PROCALCITONIN ng/ml 1 01*           * I Have Reviewed All Lab Data Listed Above  * Additional Pertinent Lab Tests Reviewed: Tonya 66 Admission Reviewed    Imaging:    Imaging Reports Reviewed Today Include: all  Imaging Personally Reviewed by Myself Includes:      Recent Cultures (last 7 days):     Results from last 7 days   Lab Units 04/16/21  1119 04/14/21  1031 04/14/21  0416 04/14/21  0410 04/12/21  0951 04/12/21  0935   BLOOD CULTURE   --   --  No Growth After 4 Days  No Growth After 4 Days   Corynebacterium striatum* Corynebacterium striatum*   GRAM STAIN RESULT  2+ Polys  No bacteria seen 1+ Polys  No bacteria seen  --   --  Gram positive rods* Gram positive rods*   BODY FLUID CULTURE, STERILE  No growth No growth  --   --   --   --        Last 24 Hours Medication List:   Current Facility-Administered Medications   Medication Dose Route Frequency Provider Last Rate    acetaminophen  650 mg Oral Q6H PRN Rendell Keshav, DO      aspirin  81 mg Oral Daily Rendell Keshav, DO      atorvastatin  5 mg Oral Daily Rendell Keshav, DO      bisacodyl  10 mg Rectal Daily PRN Rendell Keshav, DO      cefepime  1,000 mg Intravenous Q12H Shalonda Ramos MD 1,000 mg (04/18/21 1024)    cholecalciferol  4,000 Units Oral Daily Rendell Keshav, DO      docusate sodium  100 mg Oral BID Rendell Keshav, DO      ferrous sulfate  325 mg Oral Daily With Breakfast Rendell Keshav, DO      heparin (porcine)  5,000 Units Subcutaneous [facility] Rendell Keshav, DO      insulin lispro  1-5 Units Subcutaneous TID AC Rendell Keshav, DO      insulin lispro  1-5 Units Subcutaneous HS Rendell Keshav, DO      insulin lispro  4 Units Subcutaneous TID after meals Jami Soto MD      levothyroxine  125 mcg Oral Early Morning Rendell Keshav, DO      melatonin  3 mg Oral HS PRN Johnathon Wilson PA-C      ondansetron  4 mg Intravenous Q6H PRN Rendell Keshav, DO      pantoprazole  40 mg Oral Daily Before Breakfast Rendell Keshav, DO      polyethylene glycol  17 g Oral Daily PRN Rendell Keshav, DO      potassium chloride  40 mEq Oral Daily Abel Prince MD      tamsulosin  0 4 mg Oral Daily With [facility], DO      vancomycin  750 mg Intravenous Q24H Shalonda Ramos  mg (04/17/21 1008)        Today, Patient Was Seen By: Oneyda Bean MD    ** Please Note: Dictation voice to text software may have been used in the creation of this document   **

## 2021-04-18 NOTE — ASSESSMENT & PLAN NOTE
CT showed large dependent bilateral pleural effusions associated with atelectasis of the lower lobes  Status post left-sided thoracocentesis by Pulm 4/14, removed 1 2 L  Status post right-sided thoracocentesis 4/16, 1 L of clear yellow fluid removed

## 2021-04-18 NOTE — ASSESSMENT & PLAN NOTE
Lab Results   Component Value Date    EGFR 20 04/18/2021    EGFR 21 04/17/2021    EGFR 22 04/16/2021    CREATININE 2 96 (H) 04/18/2021    CREATININE 2 79 (H) 04/17/2021    CREATININE 2 68 (H) 04/16/2021     Monitor creatinine closely   Input and output  Daily weights  Nephrology on board  Avoid nephrotoxic medications  Keep SBP>130

## 2021-04-18 NOTE — ASSESSMENT & PLAN NOTE
Palliative care consult placed  Per CT surgery patient is not a candidate for surgery, I reached out if they can update family  Dr Elayne Landeros from [facility] and myself met with patient's wife and son on Saturday  Updated about his current condition and recommendations from subspecialists    They would like to continue full treatment for the next few days to see if he will improve and have another meeting early next week  They would like him to be full code   Multi disciplinary family meeting will most likely be arranged on Tuesday by palliative care

## 2021-04-18 NOTE — ASSESSMENT & PLAN NOTE
Lab Results   Component Value Date    HGBA1C 7 7 (H) 02/05/2021       Recent Labs     04/17/21  1114 04/17/21  1637 04/17/21  2116 04/18/21  0735   POCGLU 91 208* 221* 280*       Blood Sugar Average: Last 72 hrs:  (P) 192 [id number]   Endo recommendations appreciated  On insulin pump OP  On SQ insulin IP  Aappetite fluctuates  Continue Lantus 5 units q h s  Humalog 4 units t i d   If patient eats more than 50% of meal, if not then 2 units

## 2021-04-19 PROBLEM — R13.10 DYSPHAGIA: Status: ACTIVE | Noted: 2021-01-01

## 2021-04-19 NOTE — PROGRESS NOTES
Pt fluctuating this am between wakefulness/restlessness and then lethargy  respirations will go up to approx 36-40 and then down to 20   sats remain in the 90's  Sugar this am was 409    I held meds this am due to patient's intermittent lethargy - I will monitor closely Secondary Intention Text (Leave Blank If You Do Not Want): The defect will heal with secondary intention.

## 2021-04-19 NOTE — ASSESSMENT & PLAN NOTE
Echo showed mitral valve vegetation  CHARLES showed large mitral valve vegetation measuring 19 mm x 17 mm, and severe MR due to vegetation  Evaluated by CT surgery on Friday, not a candidate for surgery  Family requested a 2nd opinion  Evaluated by Dr Dunne Reading today, also thinks he is not a candidate for surgery  Patient would like a 3rd opinion outside [facility]

## 2021-04-19 NOTE — PHYSICAL THERAPY NOTE
Physical Therapy Treatment Note    Patient's Name: Amirah Garcia  : 1945       04/19/21 0912   PT Last Visit   PT Visit Date 21   Note Type   Note Type Treatment   Pain Assessment   Pain Assessment Tool Pain Assessment not indicated - pt denies pain   Pain Score No Pain   Restrictions/Precautions   Weight Bearing Precautions Per Order Yes   RLE Weight Bearing Per Order WBAT  (in Darco wedge shoe)   Braces or Orthoses TLSO   Other Precautions Cognitive; Chair Alarm; Bed Alarm; Fall Risk;Pain;O2;Multiple lines  (5L O2)   General   Chart Reviewed Yes   Family/Caregiver Present Yes   Cognition   Overall Cognitive Status Impaired   Arousal/Participation Alert   Attention Attends with cues to redirect   Orientation Level Oriented to person   Following Commands Follows one step commands with increased time or repetition   Comments Pt alert and cooperative, requires cues to attend to task, requires simple commands, difficulty coordinating tasks   Bed Mobility   Supine to Sit 2  Maximal assistance   Additional items Assist x 1; Increased time required;Verbal cues;HOB elevated; Bedrails   Additional Comments VC's for sequencing, limited initiation, trunk stiffness apparent  MaxA for TLSO donning at EOB  Pt required min-modA to maintain sitting balance, tremors and jerky movements noted in B/L UE's and LE's   Transfers   Sit to Stand 3  Moderate assistance   Additional items Assist x 2; Increased time required;Verbal cues   Stand to Sit 3  Moderate assistance   Additional items Assist x 2; Increased time required;Verbal cues   Additional Comments with B/L HHA, verbal and tactile cues for anterior weight shift, B/L foot block  Improved COG, limited posterior lean  Ambulation/Elevation   Gait pattern Excessively slow; Short stride; Forward Flexion;Decreased foot clearance   Gait Assistance 2  Maximal assist   Additional items Assist x 2   Assistive Device Other (Comment)  (B/L HHA)   Distance 3 ft to chair, limited by fatigue  Tactile cues for lateral weight shifting, good advancement of LE's although ataxic   Balance   Static Sitting Poor +   Dynamic Sitting Poor   Static Standing Poor   Dynamic Standing Poor   Ambulatory Poor -   Activity Tolerance   Activity Tolerance Patient limited by fatigue   Medical Staff Made Aware Restorative, Bhavyaa  Shayna [address]   Nurse Made Aware RN updated  Chair alarm engaged, spouse present   Exercises   Hip Abduction Supine;10 reps;AAROM; Bilateral   Knee AROM Long Arc Quad Sitting;10 reps;Bilateral  (AAROM R LE, AROM R LE)   Marching Sitting;10 reps;Bilateral  (AROM L LE, AAROM R LE)   Assessment   Prognosis Fair   Problem List Decreased strength;Decreased endurance; Impaired balance;Decreased mobility; Decreased cognition;Decreased safety awareness;Pain;Orthopedic restrictions   Assessment Pt alert and cooperative this session, requires cues to attend to tasks, difficulty sequencing tasks  Pt with poor motor control, requires constant tactile/verbal cues for LE exercise performance, AA for R LE   Pt's spouse present and very supportive  Pt requires modA x2 for sit to stand and standing balance, improved advancement and initiation of steps, but requires maxA x2 due to quick fatigue  Pt will benefit from continued skilled PT intervention during course of hospital stay to improve strength, endurance and balance to improve safety with functional mobility  Recommend IP rehab upon hospital D/C  Goals   Patient Goals family: to go to rehab   STG Expiration Date 04/26/21   PT Treatment Day 2   Plan   Treatment/Interventions Functional transfer training;LE strengthening/ROM; Therapeutic exercise; Endurance training;Patient/family training;Cognitive reorientation;Equipment eval/education; Bed mobility;Gait training   Progress Slow progress, decreased activity tolerance   PT Frequency Other (Comment)  (3-5x/week)   Recommendation   PT Discharge Recommendation Post acute rehabilitation services   PT - OK to Discharge Yes  (to IP rehab)   Erica Jacobo 435   Turning in Bed Without Bedrails 2   Lying on Back to Sitting on Edge of Flat Bed 2   Moving Bed to Chair 1   Standing Up From Chair 1   Walk in Room 1   Climb 3-5 Stairs 1   Basic Mobility Inpatient Raw Score 8   Turning Head Towards Sound 3   Follow Simple Instructions 2   Low Function Basic Mobility Raw Score 13   Low Function Basic Mobility Standardized Score 20 14       Kassandra Landers, PT, DPT

## 2021-04-19 NOTE — ASSESSMENT & PLAN NOTE
Palliative care on board, appreciate recommendation  Per CT surgery patient is not a candidate for surgery, he was evaluated by 2 CT surgeons  Patient would like a 3rd opinion[facility]    Multi disciplinary family meeting scheduled for tomorrow

## 2021-04-19 NOTE — PROGRESS NOTES
Vancomycin IV Pharmacy-to-Dose Consultation    Sabino Palafox is a 76 y o  male who is currently receiving Vancomycin IV with management by the Pharmacy Consult service  Relevant clinical data and objective history reviewed:  Temp Readings from Last 3 Encounters:   21 97 9 °F (36 6 °C) (Axillary)   21 98 2 °F (36 8 °C) (Oral)   21 97 6 °F (36 4 °C)     /58   Pulse 85   Temp 97 9 °F (36 6 °C) (Axillary)   Resp 16   Ht 5' 8" (1 727 m)   Wt 69 kg (152 lb 1 9 oz)   SpO2 92%   BMI 23 13 kg/m²     I/O last 3 completed shifts:   In: 736 [P O :456; IV Piggyback:280]  Out: 4845 [Urine:1650]    Lab Results   Component Value Date/Time    BUN 70 (H) 2021 11:39 AM    BUN 24 2015 06:00 AM    WBC 17 30 (H) 2021 11:40 AM    WBC 7 35 2015 06:00 AM    HGB 8 2 (L) 2021 11:40 AM    HGB 12 4 2015 06:00 AM    HCT 26 2 (L) 2021 11:40 AM    HCT 34 9 (L) 2015 06:00 AM    MCV 91 2021 11:40 AM    MCV 83 2015 06:00 AM     (L) 2021 11:40 AM     2015 06:00 AM     Creatinine   Date Value Ref Range Status   2021 3 15 (H) 0 60 - 1 30 mg/dL Final     Comment:     Standardized to IDMS reference method   2021 2 96 (H) 0 60 - 1 30 mg/dL Final     Comment:     Standardized to IDMS reference method   2015 0 96 0 60 - 1 30 mg/dL Final     Comment:     Standardized to IDMS reference method   2015 1 03 0 60 - 1 30 mg/dL Final     Comment:     Standardized to IDMS reference method     Vancomycin Tr   Date Value Ref Range Status   2021 37 2 (HH) 10 0 - 20 0 ug/mL Final   04/15/2021 16 3 10 0 - 20 0 ug/mL Final         Vancomycin Assessment:  Indication: Pneumonia    Renal Function: declining; Scr increasin/19=3 15, =2 96, = 2 79  Potential Nephrotoxicity Factors:  Medications: none  Patient-Factors: elderly, renal dysfunction  Days of Therapy: 8  Current Dose: 750 mg q24 (dose prior to level: [email or web address])  Goal Trough: 15-20 (appropriate for most indications)   Goal AUC(24h): 400-600  Last Level: 4/19 @ 1140 = 37 2      Vancomycin Plan:  New Dosing: Hold vanco doses until daily random level is < 20 then dose with 1000mg (15mg/kg) IV once  Next Level: Random level 4/20 with AM labs  Renal Function Monitoring: Scr      Pharmacy will continue to follow closely for s/sx of nephrotoxicity, infusion reactions and appropriateness of therapy  BMP and CBC will be ordered per protocol  We will continue to follow the patient's culture results and clinical progress daily         Igor Rodriguez, Pharmacist

## 2021-04-19 NOTE — SOCIAL WORK
Per the request of the primary team LAURIE reached out to patient's wife requesting a time to discuss current medical status with the team   The wife and son are both available on Tuesday at 2:00    All teams currently involved have been requested to participate:  SLIM - confirmed   Cardiac Surgery  Nephrology - confirmed   Infectious Disease - confirmed via phone  Palliative - confirmed   Cardiology     Despite family receiving  input separately from the specialty teams -  they would benefit from the WHOLE team present for the discussion

## 2021-04-19 NOTE — PROGRESS NOTES
I was asked to render a second opinion on mitral valve surgery at request of patient's family  I had a lengthy discussion with the patient's wife and daughter  Patient was present but was not able to participate in discussion  I spent approximately 45 minutes reviewing patient's history, test results, and counseling the patient's family  Greater than 50% of the visit was spent in counseling and coordination of care  He is at extreme risk of mortality and/or serious permanent morbidity if he undergoes open mitral valve surgery  This is due to his deconditioning and underlying multisystem organ dysfunction (cardiac, pulmonary, renal, endocrine, neurologic, vascular)  I explained to the patient's wife and daughter that this situation can be compared with metastatic brain tumors  Though surgery could successfully remove such tumors, it would not help the patient to have a better ultimate outcome  Therefore, in that instance, chemotherapy and radiation might be recommended as palliative treatment, even though they would not be expected to result in a cure  So even though she is having a hard time accepting that her  should not get surgery, I would still not offer surgery because it would not help him achieve a better outcome  Therefore palliative treatments (ie ongoing antibiotic therapy) are his best option, even though we do not reasonably expect them to result in a cure  She expressed frustration and she admits that she is not ready to accept this outcome, but I did assure her that his outcome can not be improved by surgery        Shannon Jiménez MD  DATE: April 19, 2021  TIME: 4:40 PM

## 2021-04-19 NOTE — PLAN OF CARE
Problem: PHYSICAL THERAPY ADULT  Goal: Performs mobility at highest level of function for planned discharge setting  See evaluation for individualized goals  Description: Treatment/Interventions: Functional transfer training, LE strengthening/ROM, Therapeutic exercise, Endurance training, Cognitive reorientation, Patient/family training, Equipment eval/education, Bed mobility, Gait training          See flowsheet documentation for full assessment, interventions and recommendations  Outcome: Progressing  Note: Prognosis: Fair  Problem List: Decreased strength, Decreased endurance, Impaired balance, Decreased mobility, Decreased cognition, Decreased safety awareness, Pain, Orthopedic restrictions  Assessment: Pt alert and cooperative this session, requires cues to attend to tasks, difficulty sequencing tasks  Pt with poor motor control, requires constant tactile/verbal cues for LE exercise performance, AA for R LE   Pt's spouse present and very supportive  Pt requires modA x2 for sit to stand and standing balance, improved advancement and initiation of steps, but requires maxA x2 due to quick fatigue  Pt will benefit from continued skilled PT intervention during course of hospital stay to improve strength, endurance and balance to improve safety with functional mobility  Recommend IP rehab upon hospital D/C  PT Discharge Recommendation: Post-Acute Rehabilitation Services  PT Discharge Recommendation: Post acute rehabilitation services     PT - OK to Discharge: Yes(to IP rehab)    See flowsheet documentation for full assessment

## 2021-04-19 NOTE — PROGRESS NOTES
Team 2 Cardiolo  Team 2 Cardiology - Progress Note  Beverly Gould 76 y o  male MRN: [id number]  Unit/Bed#: [facility] 803-01 Encounter: [id number]    Assessment:  Principal Problem:    Acute respiratory failure with hypoxia ([facility] [address] )  Active Problems:    Anemia of chronic disease    Type I diabetes mellitus ([facility] [address] )    Essential hypertension    Stage 3b chronic kidney disease    KAZ on CKD stage 3    Encephalopathy    Complete heart block     Hyponatremia    Bacteremia    Acute on chronic diastolic heart failure (HCC)    HCAP (healthcare-associated pneumonia)    Acidosis    Bilateral pleural effusion    Endocarditis    Closed compression fracture of L1 lumbar vertebra, with routine healing, subsequent encounter    Goals of care, counseling/discussion    #Acute on chronic diastolic heart failure  # acute on chronic kidney disease   #Mitral Valve vegetation     Plan:  1  Acute on chronic diastolic heart failure   -Bumex prn, as tolerated   -Monitor BMP  Cre rising at 3 15 today (baseline ~1 2-1 5)      2  Mitral valve vegetation:   -Pt is not a surgical candidate at this time after 2 evaluations by CT surgery    -Palliative care as prognosis is poor  3  Sepsis:   -Currently on IV Vacoymycin   -blood cultures grew gram + rods  6  Acute on chronic kidney disease:   -hold ACEi   -recs from Nephro appreciated     Subjective/Objective     Subjective: Pt was seen and examined at bedside  Pt was lethargic and disoriented  His wife was at bedside during exam  He 02 sat was 92% on 5L  He has output ~500 ml         Objective:  Vitals: /60   Pulse 85   Temp 97 6 °F (36 4 °C) (Oral)   Resp 20   Ht 5' 8" (1 727 m)   Wt 69 kg (152 lb 1 9 oz)   SpO2 92%   BMI 23 13 kg/m²   Vitals:    04/18/21 0600 04/19/21 0600   Weight: 69 5 kg (153 lb 3 5 oz) 69 kg (152 lb 1 9 oz)     Orthostatic Blood Pressures      Most Recent Value   Blood Pressure  111/60 filed at 04/19/2021 0741   Patient Position - Orthostatic VS  Lying filed at 04/19/2021 0742            Intake/Output Summary (Last 24 hours) at 4/19/2021 1656  Last data filed at 4/19/2021 0900  Gross per 24 hour   Intake 416 ml   Output 875 ml   Net -459 ml     Review of Systems   Constitutional: Negative for chills and fever  HENT: Negative for ear pain and sore throat  Eyes: Negative for pain and visual disturbance  Respiratory: Negative for cough and shortness of breath  Cardiovascular: Negative for chest pain and palpitations  Gastrointestinal: Negative for abdominal pain and vomiting  Genitourinary: Negative for dysuria and hematuria  Musculoskeletal: Negative for arthralgias and back pain  Skin: Negative for color change and rash  Neurological: Negative for seizures and syncope  Psychiatric/Behavioral: Positive for confusion  All other systems reviewed and are negative  Physical Exam  Vitals signs and nursing note reviewed  Constitutional:       Appearance: He is ill appearing,    Comments: Pale in appearance    HENT:      Head: Normocephalic and atraumatic  Eyes:      Conjunctiva/sclera: Conjunctivae normal    Neck:      Musculoskeletal: Neck supple  Cardiovascular:      Rate and Rhythm: Normal rate and regular rhythm  Pulses: Normal pulses  Heart sounds: Normal heart sounds  No murmur  No gallop  Pulmonary:      Effort: Pulmonary effort is normal  No respiratory distress  Breath sounds: Normal breath sounds  Abdominal:      Palpations: Abdomen is soft  Tenderness: There is no abdominal tenderness  Musculoskeletal: Normal range of motion  Comments: R foot ulcer, wrapped in ace bandage   1+ pitting edema b/l      Skin:     General: Skin is warm and dry  Neurological:      Mental Status: He is alert           Medications:    Current Facility-Administered Medications:     acetaminophen (TYLENOL) tablet 650 mg, 650 mg, Oral, Q6H PRN, Suzette Russell DO, 650 mg at 04/18/21 1405    aspirin chewable tablet 81 mg, 81 mg, Oral, Daily, Shamir De La Cruz DO, 81 mg at 04/19/21 0914    atorvastatin (LIPITOR) tablet 5 mg, 5 mg, Oral, Daily, Shamir De La Cruz DO, 5 mg at 04/19/21 8781    bisacodyl (DULCOLAX) rectal suppository 10 mg, 10 mg, Rectal, Daily PRN, Shamir De La Cruz DO    cholecalciferol (VITAMIN D3) tablet 4,000 Units, 4,000 Units, Oral, Daily, Shamir De La Cruz DO, 4,000 Units at 04/19/21 0914    docusate sodium (COLACE) capsule 100 mg, 100 mg, Oral, BID, Shamir De La Cruz DO, 100 mg at 04/19/21 0915    ferrous sulfate tablet 325 mg, 325 mg, Oral, Daily With Breakfast, Shamir De La Cruz DO, 325 mg at 04/18/21 0757    heparin (porcine) subcutaneous injection 5,000 Units, 5,000 Units, Subcutaneous, Q8H [address], Shamir De La Cruz DO, 5,000 Units at 04/19/21 0544    insulin lispro (HumaLOG) 100 units/mL subcutaneous injection 1-5 Units, 1-5 Units, Subcutaneous, TID AC, 5 Units at 04/19/21 0732 **AND** Fingerstick Glucose (POCT), , , TID AC, Shamir De La Cruz DO    insulin lispro (HumaLOG) 100 units/mL subcutaneous injection 1-5 Units, 1-5 Units, Subcutaneous, HS, Shamir De La Cruz DO, 2 Units at 04/18/21 2301    insulin lispro (HumaLOG) 100 units/mL subcutaneous injection 4 Units, 4 Units, Subcutaneous, TID after meals, Sandra Rosa MD, 4 Units at 04/19/21 0732    levothyroxine tablet 125 mcg, 125 mcg, Oral, Early Morning, Shamir De La Cruz DO, 125 mcg at 04/19/21 0544    melatonin tablet 3 mg, 3 mg, Oral, HS PRN, Johnathon Wilson PA-C, 3 mg at 04/16/21 0053    ondansetron (ZOFRAN) injection 4 mg, 4 mg, Intravenous, Q6H PRN, Shamir De La Cruz DO    pantoprazole (PROTONIX) EC tablet 40 mg, 40 mg, Oral, Daily Before Breakfast, Shamir De La Cruz DO, 40 mg at 04/18/21 0622    polyethylene glycol (MIRALAX) packet 17 g, 17 g, Oral, Daily PRN, Shamir De La Cruz DO    potassium chloride (K-DUR,KLOR-CON) CR tablet 40 mEq, 40 mEq, Oral, Daily, Eleni Koehler MD, 40 mEq at 04/19/21 0914    tamsulosin (FLOMAX) capsule 0 4 mg, 0 4 mg, Oral, Daily With Dinner, Trish Mode, DO, 0 4 mg at 04/18/21 1624    vancomycin (VANCOCIN) IVPB (premix in dextrose) 750 mg 150 mL, 750 mg, Intravenous, Q24H, Ksenia Crane MD, Stopped at 04/18/21 1300    Lab Results:      Results from last 7 days   Lab Units 04/18/21  0548 04/17/21  0612 04/16/21  0515   WBC Thousand/uL 15 61* 19 36* 15 47*   HEMOGLOBIN g/dL 8 2* 9 0* 6 8*   HEMATOCRIT % 26 0* 27 2* 21 1*   PLATELETS Thousands/uL 122* 142* 115*         Results from last 7 days   Lab Units 04/18/21  0548 04/17/21  0612 04/16/21  0515   POTASSIUM mmol/L 3 7 4 0 3 8   CHLORIDE mmol/L 102 101 101   CO2 mmol/L 27 26 26   BUN mg/dL 69* 65* 63*   CREATININE mg/dL 2 96* 2 79* 2 68*   CALCIUM mg/dL 7 9* 8 3 7 9*         Results from last 7 days   Lab Units 04/17/21  0612   MAGNESIUM mg/dL 2 1       [facility]   [facility]

## 2021-04-19 NOTE — PROGRESS NOTES
[address] [place] [address]    Cardiology Progress Note  Jacob Vo 76 y o  male   YOB: 1945 MRN: [id number]  Unit/Bed#: [facility] 803-01 Encounter: [id number]      Subjective:   His mental status continues to be faxing and waning, but it reportedly she is much better today, per my discussion with family  A 2nd surgical opinion was completed today by Dr Gibran Gross, and he still not felt to be a candidate for surgery  He does still appear to be tachypneic and volume overloaded    Assessments  70-year-old gentleman with recent COVID infection (2/5/21), history of diabetes, Kaz on CKD, currently admitted with acute hypoxic respiratory failure, sepsis, infective endocarditis affecting mitral valve, with likely leaflet perforation and resultant severe mitral regurgitation       Principal Problem:    Acute respiratory failure with hypoxia (formerly [facility])  Active Problems:    Anemia of chronic disease    Type I diabetes mellitus (HCC)    Essential hypertension    Stage 3b chronic kidney disease    KAZ on CKD stage 3    Encephalopathy    Complete heart block     Hyponatremia    Bacteremia    Acute on chronic diastolic heart failure (HCC)    HCAP (healthcare-associated pneumonia)    Acidosis    Bilateral pleural effusion    Endocarditis    Closed compression fracture of L1 lumbar vertebra, with routine healing, subsequent encounter    Goals of care, counseling/discussion    Plan  Severe mitral regurgitation, infective endocarditis of MV  · Blood cultures positive for Caron bacterium stride item from 03/27, became negative, but then are again positive on 4/12/21  · Last blood culture from 4/14 is so far negative  · Antibiotics per primary service (currently vancomycin plus cefepime)  · Not deemed to be a surgical candidate on early CTS eval  · Overall prognosis seems to be poor without surgical intervention considering his leaflet perforation and severe acute MR  · A family meeting is being planned for tomorrow    Acute on chronic diastolic heart failure  · EF 60% on recent CHARLES, but has severe mitral regurgitation, likely related to endocarditis affecting mitral valve  · Oxygen requirements increasing, now 2 5 L, with SpO2 92-95%  · He was on IV Bumex 2mg q8h (4/13 to 4/17) and then  1 mg q 8h (4/17 to 4/18 morning), but was discontinued yesterday   · But he was more short of breath last night, and needed additional dose of IV Bumex overnight He did get 1 dose of IV Bumex 1 mg last night  · Overall net -5 L and weight appears to be down from 170 lb to 152 lb  · Bumex is held by Nephrology today due to worsening renal function, but he does continue to appear volume overloaded with significant edema mostly in the dependent portions of his upper thigh and buttocks  · IV bumex as needed today, and plan to resume oral Bumex tomorrow    Acute on chronic kidney injury  · Creatinine continues to worsen, further up to 2 96 today  · Urine output 1 L yesterday  · Bumex held    Goals of care discussion  · I had a long discussion with patient's wife and niece who were at bedside, and it appears that they believe he is improving and mental status is better over the past couple of days, and apparently he is telling them that he wants to fight it  · I did reiterate to them that with his severe MR, large vegetation, being a poor candidate for surgery, his prognosis is likely poor  · Palliative care is likely going to be more appropriate  · But they are considering getting additional external evaluation from [facility]  · Family meeting tomorrow      Review of Systems   All other systems reviewed and are negative  Telemetry Review: No significant arrhythmias seen on telemetry review  Objective:   Vitals: Blood pressure 111/60, pulse 85, temperature 97 6 °F (36 4 °C), temperature source Oral, resp  rate 20, height 5' 8" (1 727 m), weight 69 kg (152 lb 1 9 oz), SpO2 92 %  , Body mass index is 23 13 kg/m²  , Orthostatic Blood Pressures      Most Recent Value   Blood Pressure  111/60 filed at 04/19/2021 0741   Patient Position - Orthostatic VS  Lying filed at 04/19/2021 1269         Systolic (91ORV), ETT:774 , Min:109 , ZJD:714     Diastolic (59RBO), XCX:02, Min:57, Max:62    Wt Readings from Last 5 Encounters:   04/19/21 69 kg (152 lb 1 9 oz)   04/05/21 78 6 kg (173 lb 4 5 oz)   03/27/21 72 kg (158 lb 11 7 oz)   03/24/21 66 1 kg (145 lb 11 6 oz)   03/17/21 66 1 kg (145 lb 11 2 oz)     I/O       04/17 0701 - 04/18 0700 04/18 0701 - 04/19 0700 04/19 0701 - 04/20 0700    P  O  660 456     IV Piggyback  280     Total Intake(mL/kg) 660 (9 5) 736 (10 7)     Urine (mL/kg/hr) 1275 (0 8) 1075 (0 6)     Stool  0     Total Output 1275 1075     Net -615 -339            Unmeasured Urine Occurrence 1 x 1 x     Unmeasured Stool Occurrence  1 x               Physical Exam  Vitals signs and nursing note reviewed  Constitutional:       General: He is not in acute distress  Appearance: Normal appearance  He is well-developed  He is ill-appearing  HENT:      Head: Normocephalic and atraumatic  Nose: No congestion  Eyes:      General: No scleral icterus  Conjunctiva/sclera: Conjunctivae normal    Neck:      Musculoskeletal: Neck supple  No muscular tenderness  Vascular: JVD present  No carotid bruit  Cardiovascular:      Rate and Rhythm: Normal rate and regular rhythm  Heart sounds: Normal heart sounds  No murmur  No friction rub  No gallop  Pulmonary:      Effort: Respiratory distress present  Breath sounds: Decreased breath sounds present  No wheezing or rales  Chest:      Chest wall: No tenderness  Abdominal:      General: There is no distension  Palpations: Abdomen is soft  Tenderness: There is no abdominal tenderness  Musculoskeletal:         General: No swelling, tenderness or deformity  Right lower leg: Edema present  Left lower leg: Edema present        Comments: Edema more concentrated in the dependent portions in buttocks and upper thigh   Skin:     General: Skin is warm  Neurological:      General: No focal deficit present  Comments: Waxing and waning mental status, does make appropriate responses intermittently, but then appears to be drowsy  Psychiatric:         Behavior: Behavior normal          Laboratory Results: personally reviewed        CBC with diff:   Results from last 7 days   Lab Units 04/18/21  0548 04/17/21  0612 04/16/21  0515 04/15/21  0445 04/14/21  0403 04/13/21  0648   WBC Thousand/uL 15 61* 19 36* 15 47* 13 74* 14 83* 19 24*   HEMOGLOBIN g/dL 8 2* 9 0* 6 8* 7 5* 7 3* 7 9*   HEMATOCRIT % 26 0* 27 2* 21 1* 23 6* 22 6* 25 3*   MCV fL 90 88 87 87 86 91   PLATELETS Thousands/uL 122* 142* 115* 139* 136* 147*   MCH pg 28 3 28 9 28 1 27 6 27 9 28 3   MCHC g/dL 31 5 33 1 32 2 31 8 32 3 31 2*   RDW % 18 2* 17 5* 18 0* 17 5* 17 5* 17 8*   MPV fL 10 0 10 1 9 8 10 8 10 5 9 9   NRBC AUTO /100 WBCs 0  --  0 0 0 0         CMP:  Results from last 7 days   Lab Units 04/18/21  0548 04/17/21  0612 04/16/21  0515 04/15/21  0445 04/14/21  0403 04/13/21  0648   POTASSIUM mmol/L 3 7 4 0 3 8 3 2* 3 2* 3 7   CHLORIDE mmol/L 102 101 101 99* 99* 103   CO2 mmol/L 27 26 26 28 28 19*   BUN mg/dL 69* 65* 63* 59* 58* 55*   CREATININE mg/dL 2 96* 2 79* 2 68* 2 68* 2 64* 2 68*   CALCIUM mg/dL 7 9* 8 3 7 9* 7 8* 7 7* 7 9*   EGFR ml/min/1 73sq m 20 21 22 22 23 22         BMP:  Results from last 7 days   Lab Units 04/18/21  0548 04/17/21  0612 04/16/21  0515 04/15/21  0445 04/14/21  0403 04/13/21  0648   POTASSIUM mmol/L 3 7 4 0 3 8 3 2* 3 2* 3 7   CHLORIDE mmol/L 102 101 101 99* 99* 103   CO2 mmol/L 27 26 26 28 28 19*   BUN mg/dL 69* 65* 63* 59* 58* 55*   CREATININE mg/dL 2 96* 2 79* 2 68* 2 68* 2 64* 2 68*   CALCIUM mg/dL 7 9* 8 3 7 9* 7 8* 7 7* 7 9*       BNP: No results for input(s): BNP in the last 72 hours      Magnesium:   Results from last 7 days   Lab Units 04/17/21  0612 MAGNESIUM mg/dL 2 1       Coags:       TSH:        Hemoglobin A1C       Lipid Profile:       Meds/Allergies   all current active meds have been reviewed and current meds:   Current Facility-Administered Medications   Medication Dose Route Frequency    acetaminophen (TYLENOL) tablet 650 mg  650 mg Oral Q6H PRN    aspirin chewable tablet 81 mg  81 mg Oral Daily    atorvastatin (LIPITOR) tablet 5 mg  5 mg Oral Daily    bisacodyl (DULCOLAX) rectal suppository 10 mg  10 mg Rectal Daily PRN    cholecalciferol (VITAMIN D3) tablet 4,000 Units  4,000 Units Oral Daily    docusate sodium (COLACE) capsule 100 mg  100 mg Oral BID    ferrous sulfate tablet 325 mg  325 mg Oral Daily With Breakfast    heparin (porcine) subcutaneous injection 5,000 Units  5,000 Units Subcutaneous Q8H [address]    insulin lispro (HumaLOG) 100 units/mL subcutaneous injection 1-5 Units  1-5 Units Subcutaneous TID AC    insulin lispro (HumaLOG) 100 units/mL subcutaneous injection 1-5 Units  1-5 Units Subcutaneous HS    insulin lispro (HumaLOG) 100 units/mL subcutaneous injection 4 Units  4 Units Subcutaneous TID after meals    levothyroxine tablet 125 mcg  125 mcg Oral Early Morning    melatonin tablet 3 mg  3 mg Oral HS PRN    ondansetron (ZOFRAN) injection 4 mg  4 mg Intravenous Q6H PRN    pantoprazole (PROTONIX) EC tablet 40 mg  40 mg Oral Daily Before Breakfast    polyethylene glycol (MIRALAX) packet 17 g  17 g Oral Daily PRN    potassium chloride (K-DUR,KLOR-CON) CR tablet 40 mEq  40 mEq Oral Daily    tamsulosin (FLOMAX) capsule 0 4 mg  0 4 mg Oral Daily With Dinner    vancomycin (VANCOCIN) IVPB (premix in dextrose) 750 mg 150 mL  750 mg Intravenous Q24H     Medications Prior to Admission   Medication    amLODIPine (NORVASC) 5 mg tablet    ascorbic acid (VITAMIN C) 1000 MG tablet    aspirin 81 mg chewable tablet    Atorvastatin Calcium (LIPITOR PO)    CALCIUM CITRATE PO    Cholecalciferol (VITAMIN D3) 2000 units capsule    Continuous Blood Gluc Sensor (DEXCOM G6 SENSOR) MISC    cyanocobalamin (VITAMIN B-12) 1,000 mcg tablet    DULoxetine (CYMBALTA) 30 mg delayed release capsule    ezetimibe (ZETIA) 10 mg tablet    glucose 40 %    glucose 77 4 %    insulin aspart (NovoLOG) 100 units/mL injection    levothyroxine 125 mcg tablet    loperamide (IMODIUM) 2 mg capsule    pantoprazole (PROTONIX) 40 mg tablet    tamsulosin (FLOMAX) 0 4 mg    zinc sulfate (ZINCATE) 220 mg capsule            Cardiac testing: reviewed  Results for orders placed during the hospital encounter of 21   Echo complete with contrast if indicated    Narrative [facility]  [facility] [place] [address] [facility]  [phone]    Transthoracic Echocardiogram  2D, M-mode, Doppler, and Color Doppler    Study date:  29-Mar-2021    Patient: Emily Tapia  MR number: [id number]  Account number: [de-identified]  : 1945  Age: 76 years  Gender: Male  Status: Inpatient  Location: Bedside  Height: 68 in  Weight: 158 lb  BP: 117/ 63 mmHg    Indications: Recent COVID-19 infection  Diagnoses: I50 9 - Heart failure, unspecified    Sonographer:  Leif Nicholson RDCS  Referring Physician:  Reno Gilmore DO  Group:  [facility]  Cardiology Fellow:  Shivani Khanna MD  Interpreting Physician:  Ida Petersen MD    SUMMARY    LEFT VENTRICLE:  Systolic function was normal  Ejection fraction was estimated to be 65 %  Although no diagnostic regional wall motion abnormality was identified, this possibility cannot be completely excluded on the basis of this study  Wall thickness was mildly increased  There was mild concentric hypertrophy  RIGHT ATRIUM:  The atrium was mildly dilated  MITRAL VALVE:  There was mild annular calcification  There was mild regurgitation  TRICUSPID VALVE:  Transtricuspid velocity was increased due to increased transvalvular flow  There was at least mild regurgitation  The TR may have been underestimated based on color Doppler  The dense, triangular CW Doppler profile is concerning for more significant TR  Pulmonary artery systolic pressure was at the upper limits of normal   Estimated peak PA pressure was 37 mmHg  IVC, HEPATIC VEINS:  The inferior vena cava was dilated  Respirophasic changes were blunted (less than 50% variation)  PERICARDIUM:  There was a left pleural effusion  HISTORY: PRIOR HISTORY: Complete heart block  DM2  CKD3  Pacemaker  GERD  PROCEDURE: The procedure was performed at the bedside  This was a routine study  The transthoracic approach was used  The study included complete 2D imaging, M-mode, complete spectral Doppler, and color Doppler  The heart rate was 95 bpm,  at the start of the study  Image quality was adequate  LEFT VENTRICLE: Size was normal  Systolic function was normal  Ejection fraction was estimated to be 65 %  Although no diagnostic regional wall motion abnormality was identified, this possibility cannot be completely excluded on the basis  of this study  Wall thickness was mildly increased  There was mild concentric hypertrophy  DOPPLER: Due to tachycardia, there was fusion of early and atrial contributions to ventricular filling  The study was not technically sufficient to  allow evaluation of LV diastolic function  RIGHT VENTRICLE: The size was normal  Systolic function was normal  A pacing wire was present in the ventricular cavity  LEFT ATRIUM: Size was normal     RIGHT ATRIUM: The atrium was mildly dilated  A pacing wire was present in the atrial cavity  MITRAL VALVE: There was mild annular calcification  There was mild diffuse thickening  DOPPLER: The transmitral velocity was within the normal range  There was no evidence for stenosis  There was mild regurgitation  AORTIC VALVE: The valve was trileaflet  Leaflets exhibited mildly increased thickness, mild calcification, and sclerosis   DOPPLER: Transaortic velocity was within the normal range  There was no evidence for stenosis  There was no  regurgitation  TRICUSPID VALVE: There was normal leaflet separation  DOPPLER: Transtricuspid velocity was increased due to increased transvalvular flow  There was no evidence for stenosis  There was at least mild regurgitation  The TR may have been  underestimated based on color Doppler  The dense, triangular CW Doppler profile is concerning for more significant TR  Pulmonary artery systolic pressure was at the upper limits of normal  Estimated peak PA pressure was 37 mmHg  PULMONIC VALVE: Leaflets exhibited normal thickness, no calcification, and normal cuspal separation  DOPPLER: The transpulmonic velocity was within the normal range  There was trace regurgitation  PERICARDIUM: There was no pericardial effusion  There was a left pleural effusion  AORTA: The root exhibited normal size  SYSTEMIC VEINS: IVC: The inferior vena cava was dilated  Respirophasic changes were blunted (less than 50% variation)      SYSTEM MEASUREMENT TABLES    2D  %FS: 30 81 %  Ao Diam: 3 51 cm  EDV(Teich): 56 92 ml  EF(Teich): 59 29 %  ESV(Teich): 23 17 ml  IVSd: 1 29 cm  LA Area: 17 98 cm2  LA Diam: 3 62 cm  LVEDV MOD A4C: 68 58 ml  LVEF MOD A4C: 61 %  LVESV MOD A4C: 26 75 ml  LVIDd: 3 67 cm  LVIDs: 2 54 cm  LVLd A4C: 6 81 cm  LVLs A4C: 5 96 cm  LVPWd: 1 28 cm  RA Area: 17 29 cm2  RVIDd: 3 38 cm  SV MOD A4C: 41 84 ml  SV(Teich): 33 75 ml    CW  MR VTI: 140 cm  MR Vmax: 5 12 m/s  MR Vmean: 4 09 m/s  MR maxP 99 mmHg  MR meanP 73 mmHg  TR Vmax: 2 32 m/s  TR maxP 54 mmHg  TV VTI: 25 53 cm  TV Vmax: 1 65 m/s  TV Vmean: 0 89 m/s  TV maxPG: 10 87 mmHg  TV meanP 32 mmHg    MM  TAPSE: 2 59 cm    [employer] Accredited Echocardiography Laboratory    Prepared and electronically signed by    Ofe Davila MD  Signed 30-Mar-2021 10:10:14       Results for orders placed during the hospital encounter of 04/10/21   [facility]LES N[facility]ative [facility]  [facility] [place], [address] [place] [address]  [phone]    Transesophageal Echocardiogram  2D, 3D, M-mode, Doppler, and Color Doppler    Study date:  2021    Patient: Darian Curtis  MR number: [id number]  Account number: [de-identified]  : 1945  Age: 76 years  Gender: Male  Status: Inpatient  Location: Echo lab  Height: 68 in  Weight: 161 lb  BP: 106/ 59 mmHg    Indications: Bacteremia with mitral valve vegetation    Diagnoses: R78 81 - Bacteremia    Sonographer:  Denice Ledesma  Interpreting Physician:  Felipa Ochoa MD  Primary Physician:  Nabeel Lerner MD  Referring Physician:  Governor Daniela DO  Group:  [facility]  Cardiology Fellow: Marc Jiménez MD  RN:  Paul Ramos RN    SUMMARY    LEFT VENTRICLE:  Systolic function was normal  Ejection fraction was estimated to be 60 %  There were no regional wall motion abnormalities  Wall thickness was mildly increased  RIGHT VENTRICLE:  The size was normal   Systolic function was normal   A pacing wire was present in the ventricular cavity but was not well visualized  Cannot rule out associated vegetation  RIGHT ATRIUM:  Pacing wires were present in the atrial cavity  There was no associated vegetation  MITRAL VALVE:  There was a large, protruding, echogenic, mobile vegetation with central echolucency, measuring 19 mm x 17 mm on the anterior leaflet, spanning the A1-A2 scallops with valve perforation at A2  It was located on the atrial aspect of the  valve but prolapsed into the ventricle with diastole  There was severe, eccentric and postero-laterally directed regurgitation though the perforated A2 scallop and additional areas of malcoaptation  AORTIC VALVE:  There was no echocardiographic evidence of vegetation or abscess  TRICUSPID VALVE:  There was mild to moderate regurgitation    Pulmonary artery systolic pressure was within the normal range  Estimated peak PA pressure was 30 mmHg  SVC, INNOMINATE VEIN:  Pacing wires were present at the junction of the superior vena cava with the right atrium  There was no associated vegetation  PULMONARY VEINS:  There was systolic flow reversal in the right upper, right lower, and left upper pulmonary vein(s)  HISTORY: PRIOR HISTORY: Hypertension; Complete heart block; CKD; Heart failure; Pacemaker; Covid-19    PROCEDURE: The procedure was performed in the echo lab  This was a routine study  The risks and alternatives of the procedure were explained to the patient and informed consent was obtained  The transesophageal approach was used  The study  included complete 2D imaging, 3D imaging, M-mode, complete spectral Doppler, and color Doppler  The heart rate was 77 bpm, at the start of the study  An adult omniplane probe was inserted by the cardiology fellow under direct supervision  of the attending cardiologist  Intubated with ease  Two intubation attempt(s)  There was no blood detected on the probe  Image quality was adequate  There were no complications during the procedure  MEDICATIONS: Anesthesia administered by  anesthesia team     LEFT VENTRICLE: Size was normal  Systolic function was normal  Ejection fraction was estimated to be 60 %  There were no regional wall motion abnormalities  Wall thickness was mildly increased  DOPPLER: The study was not technically  sufficient to allow evaluation of LV diastolic function  RIGHT VENTRICLE: The size was normal  Systolic function was normal  A pacing wire was present in the ventricular cavity but was not well visualized  Cannot rule out associated vegetation  LEFT ATRIUM: Size was normal  No thrombus was identified  APPENDAGE: The size was normal  No thrombus was identified  DOPPLER: The function was normal (normal emptying velocity)  ATRIAL SEPTUM: No defect or patent foramen ovale was identified by color Doppler assessment      RIGHT ATRIUM: Size was normal  Pacing wires were present in the atrial cavity  There was no associated vegetation  No thrombus was identified  MITRAL VALVE: There was a large, protruding, echogenic, mobile vegetation with central echolucency, measuring 19 mm x 17 mm on the anterior leaflet, spanning the A1-A2 scallops with valve perforation at A2  It was located on the atrial  aspect of the valve but prolapsed into the ventricle with diastole  There was severe, eccentric and postero-laterally directed regurgitation though the perforated A2 scallop and additional areas of malcoaptation  AORTIC VALVE: The valve was trileaflet  Leaflets exhibited normal thickness and normal cuspal separation  There was no echocardiographic evidence of vegetation or abscess  DOPPLER: There was no evidence for stenosis  There was no  regurgitation  TRICUSPID VALVE: The valve structure was normal  There was normal leaflet separation  There was no echocardiographic evidence of vegetation  DOPPLER: The transtricuspid velocity was within the normal range  There was no evidence for  stenosis  There was mild to moderate regurgitation  Regurgitation grade was 1-2+ on a scale of 0 to 4+  The regurgitant jet was directed centrally  Pulmonary artery systolic pressure was within the normal range  Estimated peak PA pressure  was 30 mmHg  PULMONIC VALVE: Leaflets exhibited normal thickness, no calcification, and normal cuspal separation  There was no echocardiographic evidence of vegetation  DOPPLER: There was no evidence for stenosis  There was trace regurgitation  PERICARDIUM: There was no pericardial effusion  The pericardium was normal in appearance  AORTA: The root exhibited normal size  The ascending aorta was normal in size  There was no atheroma  There was no evidence for dissection  There was no evidence for aneurysm      SYSTEMIC VEINS: SVC: Pacing wires were present at the junction of the superior vena cava with the right atrium  There was no associated vegetation  PULMONARY VEINS: DOPPLER: There was systolic flow reversal in the right upper, right lower, and left upper pulmonary vein(s)  [address] Accredited Echocardiography Laboratory    Prepared and electronically signed by    Dorothy Guadalupe MD  Signed 14-Apr-2021 14:56:53       No results found for this or any previous visit  No results found for this or any previous visit

## 2021-04-19 NOTE — PROGRESS NOTES
Progress Note - Infectious Disease   Joana Pulse 76 y o  male MRN: [id number]  Unit/Bed#: [facility] 803-01 Encounter: [id number]      Impression/Recommendations:  1   Corynebacterium stratum bacteremia   During recent admission, patient had Corynebacterium in admission blood cultures but not in repeat blood cultures   Therefore, this was felt to be contaminant   However, given recurrent Corynebacterium striatum, this is likely be true bacteremia   Source is most likely the leg and foot ulcer   In addition, given presence of ppm, we need to consider the possibility of ppm infection, especially since Corynebacterium is very poorly pathogenic and is usually seen in hardware infection   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   However, ppm leads were not visualized   Ideally, patient should get ppm and lead extraction  However, due to overall poor prognosis, patient has been deemed not a surgical candidate   Bacteremia clearing  Corynebacterium is susceptible to vancomycin  Continue IV vancomycin  Microbiology to do susceptibilities of Corynebacterium  Follow-up repeat blood cultures  Ideally, recommend ppm and lead extraction, if plan is to continue aggressive care      2   MV endocarditis, 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   In addition to endocarditis, I am also concerned about the possibility of ppm infection, for reasons outlined above   Patient will need long-term IV antibiotic   Ideally, patient should get MVR   CT surgery evaluation noted   Patient is determined not to be a candidate for MVR  Antibiotic plan as in above  Treat x6 weeks from clearance of bacteremia  Patient not being a surgical candidate for MVR noted      3  Pneumonia, confirm on chest CT   Patient is clinically improved   I suspect pneumonia is independent of Corynebacterium bacteremia above  Pneumonia has resolved clinically  Patient completed antibiotic course    Observe off further antibiotic  Monitor respiratory symptoms  Monitor temperature/WBC      4  Sepsis, POA, presented with leukocytosis and tachypnea   Source of sepsis is probably more likely pneumonia than  bacteremia   Patient is clinically improved   Temperature stays down   WBC decreasing    Fortunately, patient has remained hemodynamically stable, without hypotension   He is clinically improved  Sepsis has resolved  Patient completed antibiotic course for pneumonia  Antibiotic plan as in above  Monitor temperature/WBC  Monitor hemodynamics        5  Acute hypoxic respiratory failure   Patient is clinically improved on antibiotic   O2 support decreasing     Antibiotic plan as in above  O2 support and weaning per primary and Pulmonary service  Monitor respiratory symptoms      6  KAZ, superimposed on CKD, most likely secondary to sepsis   Creatinine is slightly up today  Antibiotic dosages adjusted accordingly  Monitor creatinine      7  Right lower leg ulcer, with possible mild cellulitis on exam   This may be source of corynebacterium bacteremia above  Antibiotic plan as in above  Serial exams      8  Encephalopathy, most likely multifactorial, with sepsis and hypoxia contributing   Mental status is much improved but patient remains confused   No clinical signs CNS infection  Monitor mental status      9  DM, somewhat poorly controlled, with elevated hemoglobin A1c  Management per primary service      10  Recent COVID, with chronic symptoms   No evidence of active infection  No COVID specific treatment needed  No isolation needed      11  Disposition   With multiple organ dysfunction and with bacteremia/endocarditis but not being a surgical candidate, patient has very poor prognosis for recovery   Ongoing discussion with family regarding level of care   Plan for family meeting later this week noted      Discussed with patient and his wife in detail regarding the above plan    Discussed with Dr Zhang from slim service earlier      Antibiotics:  Vancomycin # 8     Subjective:  Patient is more awake and alert but has stable mild confusion  Dyspnea appears mild   Minimal cough  Temperature stays down   No chills  Patient is tolerating antibiotics well   No nausea, vomiting or diarrhea      Objective:  Vitals:  Temp:  [97 2 °F (36 2 °C)-97 9 °F (36 6 °C)] 97 9 °F (36 6 °C)  HR:  [85-90] 85  Resp:  [14-32] 16  BP: (109-115)/(58-62) 109/58  SpO2:  [92 %-95 %] 92 %  Temp (24hrs), Av 5 °F (36 4 °C), Min:97 2 °F (36 2 °C), Max:97 9 °F (36 6 °C)  Current: Temperature: 97 9 °F (36 6 °C)    Physical Exam:     General: Awake, alert, cooperative, no distress  Stable confusion  Neck:  Supple  No mass  No lymphadenopathy  Lungs: Expansion symmetric, no rales, no wheezing, respirations unlabored  Heart:  Regular rate and rhythm, S1 and S2 normal, no murmur  Abdomen: Soft, nondistended, non-tender, bowel sounds active all four quadrants, no masses, no organomegaly  Extremities: No edema  Stable chronic ulcers, without purulence  No erythema/warmth  Nontender to palpation  Skin:  No rash  Neuro: Moves all extremities  Invasive Devices     Peripheral Intravenous Line            Peripheral IV 21 Dorsal (posterior); Left Forearm 1 day          Drain            External Urinary Catheter 3 days                Labs studies:   I have personally reviewed pertinent labs    Results from last 7 days   Lab Units 21  0548 21  0621  0515   POTASSIUM mmol/L 3 7 4 0 3 8   CHLORIDE mmol/L 102 101 101   CO2 mmol/L 27 26 26   BUN mg/dL 69* 65* 63*   CREATININE mg/dL 2 96* 2 79* 2 68*   EGFR ml/min/1 73sq m 20 21 22   CALCIUM mg/dL 7 9* 8 3 7 9*     Results from last 7 days   Lab Units 21  0548 21  0612 21  0515   WBC Thousand/uL 15 61* 19 36* 15 47*   HEMOGLOBIN g/dL 8 2* 9 0* 6 8*   PLATELETS Thousands/uL 122* 142* 115*     Results from last 7 days   Lab Units 21  1119 04/14/21  1031 04/14/21  0416 04/14/21  0410   BLOOD CULTURE   --   --  No Growth After 5 Days  No Growth After 5 Days  GRAM STAIN RESULT  2+ Polys  No bacteria seen 1+ Polys  No bacteria seen  --   --    BODY FLUID CULTURE, STERILE  No growth No growth  --   --        Imaging Studies:   I have personally reviewed pertinent imaging study reports and images in PACS  EKG, Pathology, and Other Studies:   I have personally reviewed pertinent reports

## 2021-04-19 NOTE — PROGRESS NOTES
[address] [facility]  Progress Note - Eden Alexis 1945, 76 y o  male MRN: [id number]  Unit/Bed#: [facility] 803-01 Encounter: [id number]  Primary Care Provider: Nabeel Lerner MD   Date and time admitted to hospital: 4/10/2021 10:27 AM    * Acute respiratory failure with hypoxia [facility])  Assessment & Plan  POA, secondary to acute on chronic diastolic heart failure, Srini on CKD stage 3, b/l pleural effusion, Hcap, recent COVID infection in February  RRT at [facility] needing 10L O2    CT 4/13 showed dense airspace consolidation throughout much of the left upper lobe with lesser airspace infiltrates, subsegmental in the right upper lobe, does not have a typical pattern of CHF, possible infectious etiology    Large dependent bilateral pleural effusions  Weaned down from 10 L to 3 L, now up to 4 L  Negative balance 7 8 L since admission  Diuresis :  IV diuretics stopped 4/18 due to jump in creatinine  Continue cefepime and Vanco for HCAP, endocarditis  Status post left sided thoracocentesis 4/14, 1 2 L removed, transudate  Status post right-sided thoracocentesis 4/16, 1 L of clear yellow fluid removed  Goal oxygen saturation 89% and above  Monitor in and output  Daily weights    Acute on chronic diastolic heart failure (HCC)  Assessment & Plan  Wt Readings from Last 3 Encounters:   04/19/21 69 kg (152 lb 1 9 oz)   04/05/21 78 6 kg (173 lb 4 5 oz)   03/27/21 72 kg (158 lb 11 7 oz)     Echo in 03/29/2021 showed ejection fraction 65%, mild MR, mild TR  Continue to hold diuretics today, due to worsening kidney function  7 8 L negative balance since admission  Dc Pérez per Nephro 4/16  Monitor kidney function  Input and output, daily weight        Bacteremia  Assessment & Plan  Blood cultures from 04/12 growing Corynebacterium striatum  Continue IV Vanco  Source could be right foot ulcer  ID on board, appreciate recommendation  Echo showed anterior mitral leaflet vegetation  CHARLES ejection fraction was 60%, no regional wall motion abnormalities, large protruding echogenic mobile vegetation measuring 19 mm time 17 mm on the anterior leaflet, spanning the A1/A2 scallop with valve perforation at A2, located in the atrial aspect of the valve, prolapse into the ventricle with diastole, severe regurgitation  Evaluated by CT surgery, not a candidate for surgery      Complete heart block   Assessment & Plan  Patient has a history of complete heart block, status post [employer] dual-chamber ppm implanted 2015  Now with endocarditis  Evaluated by EP, appreciate recommendations  He is 100% pacemaker dependent  On interrogation he has an underlying rhythm and he is bradycardic and has CHB  Per CT surgery, not a candidate for surgery    Endocarditis  Assessment & Plan  Echo showed mitral valve vegetation  CHARLES showed large mitral valve vegetation measuring 19 mm x 17 mm, and severe MR due to vegetation  Evaluated by CT surgery on Friday, not a candidate for surgery  Family requested a 2nd opinion  Evaluated by Dr Ralf Heath today, also thinks he is not a candidate for surgery  Patient would like a 3rd opinion outside [place]         Bilateral pleural effusion  Assessment & Plan  CT showed large dependent bilateral pleural effusions associated with atelectasis of the lower lobes  Status post left-sided thoracocentesis by Pulm 4/14, removed 1 2 L  Status post right-sided thoracocentesis 4/16, 1 L of clear yellow fluid removed    Encephalopathy  Assessment & Plan  Multifactorial suspect in the setting of heart failure, kidney failure, bacteremia, Hcap  Reorient, sleep-wake cycle      KAZ on CKD stage 3  Assessment & Plan  POA  Baseline creatinine from 1 2-1 5  Suspect cardiorenal, component of ATN  Creatinine slightly worsening, 3 15 today  Continue to hold diuretics  Nephrology following    Dysphagia  Assessment & Plan  S/p VBS today  Silent aspiration  Pureed diet with nectar thick liquids    Goals of care, counseling/discussion  Assessment & Plan  Palliative care on board, appreciate recommendation  Per CT surgery patient is not a candidate for surgery, he was evaluated by 2 CT surgeons  Patient would like a 3rd opinion, [facility]  Multi disciplinary family meeting scheduled for tomorrow      Closed compression fracture of L1 lumbar vertebra, with routine healing, subsequent encounter  Assessment & Plan  Followed by neuro surgery  TLSO brace while out of bed    Acidosis  Assessment & Plan  Resolved, bicarb tabs discontinued 4/13    Hyponatremia  Assessment & Plan  Sodium 135  Continue fluid restriction  Off salt tabs    Stage 3b chronic kidney disease  Assessment & Plan  Lab Results   Component Value Date    EGFR 18 04/19/2021    EGFR 20 04/18/2021    EGFR 21 04/17/2021    CREATININE 3 15 (H) 04/19/2021    CREATININE 2 96 (H) 04/18/2021    CREATININE 2 79 (H) 04/17/2021     Monitor creatinine closely   Input and output  Daily weights  Nephrology on board  Avoid nephrotoxic medications  Keep SBP>130    Essential hypertension  Assessment & Plan  Continue to hold diuretics  Monitor blood pressure    Type I diabetes mellitus (HCC)  Assessment & Plan  Lab Results   Component Value Date    HGBA1C 7 7 (H) 02/05/2021       Recent Labs     04/18/21  2108 04/19/21  0731 04/19/21  1143 04/19/21  1603   POCGLU 294* 409* 396* 221*       Blood Sugar Average: Last 72 hrs:  (P) 222 8   Endo recommendations appreciated  On insulin pump OP  On SQ insulin IP  Aappetite fluctuates  Continue Lantus 5 units q h s  Humalog 4 units t i d   If patient eats more than 50% of meal, if not then 2 units    Anemia of chronic disease  Assessment & Plan  In the past 2 weeks has been range of 7 and 8  Hemoglobin 4/16 6 8, status post 1 PRBC  Hemoglobin today 8 2        VTE Pharmacologic Prophylaxis:   Pharmacologic: Heparin  Mechanical VTE Prophylaxis in Place: Yes    Patient Centered Rounds: I have performed bedside rounds with nursing staff today  Discussions with Specialists or Other Care Team Provider:  Palliative Care, id, Nephrology, CT surgery, Cardiology    Education and Discussions with Family / Patient:  Patient, wife at bedside, niece    Time Spent for Care: 30 minutes  More than 50% of total time spent on counseling and coordination of care as described above  Current Length of Stay: 9 day(s)    Current Patient Status: Inpatient   Certification Statement: The patient will continue to require additional inpatient hospital stay due to Bacteremia, endocarditis    Discharge Plan: To be determined    Code Status: Level 1 - Full Code      Subjective:   Patient was seen and evaluated bedside  He open his eyes on verbal command  Objective:     Vitals:   Temp (24hrs), Av 3 °F (36 3 °C), Min:96 5 °F (35 8 °C), Max:97 9 °F (36 6 °C)    Temp:  [96 5 °F (35 8 °C)-97 9 °F (36 6 °C)] 96 5 °F (35 8 °C)  HR:  [85-90] 85  Resp:  [14-32] 16  BP: (107-115)/(57-62) 107/57  SpO2:  [92 %-96 %] 92 %  Body mass index is 23 13 kg/m²  Input and Output Summary (last 24 hours): Intake/Output Summary (Last 24 hours) at 2021 1856  Last data filed at 2021 0900  Gross per 24 hour   Intake 180 ml   Output 875 ml   Net -695 ml       Physical Exam:     Physical Exam  Constitutional:       General: He is not in acute distress  Appearance: He is ill-appearing  HENT:      Head: Atraumatic  Neck:      Musculoskeletal: Neck supple  Cardiovascular:      Rate and Rhythm: Normal rate and regular rhythm  Heart sounds: No murmur  No friction rub  No gallop  Pulmonary:      Effort: Pulmonary effort is normal  No respiratory distress  Breath sounds: No wheezing  Comments: Decreased breath sounds bilaterally 3 L oxygen  Abdominal:      General: Bowel sounds are normal  There is no distension  Palpations: Abdomen is soft  Musculoskeletal:         General: No swelling  Skin:     General: Skin is warm and dry  Neurological:      Mental Status: He is alert  Comments: Confused   Psychiatric:      Comments: Unable to assess         Additional Data:     Labs:    Results from last 7 days   Lab Units 04/19/21  1140   WBC Thousand/uL 17 30*   HEMOGLOBIN g/dL 8 2*   HEMATOCRIT % 26 2*   PLATELETS Thousands/uL 119*   NEUTROS PCT % 89*   LYMPHS PCT % 4*   MONOS PCT % 5   EOS PCT % 1     Results from last 7 days   Lab Units 04/19/21  1139   SODIUM mmol/L 135*   POTASSIUM mmol/L 4 2   CHLORIDE mmol/L 100   CO2 mmol/L 24   BUN mg/dL 70*   CREATININE mg/dL 3 15*   ANION GAP mmol/L 11   CALCIUM mg/dL 8 6   GLUCOSE RANDOM mg/dL 278*         Results from last 7 days   Lab Units 04/19/21  1603 04/19/21  1143 04/19/21  0731 04/18/21  2108 04/18/21  1614 04/18/21  1131 04/18/21  0735 04/17/21  2116 04/17/21  1637 04/17/21  1114 04/17/21  0733 04/16/21  2100   POC GLUCOSE mg/dl 221* 396* 409* 294* 74 190* 280* 221* 208* 91 72 177*                   * I Have Reviewed All Lab Data Listed Above  * Additional Pertinent Lab Tests Reviewed: Tonya 66 Admission Reviewed    Imaging:    Imaging Reports Reviewed Today Include: all  Imaging Personally Reviewed by Myself Includes:      Recent Cultures (last 7 days):     Results from last 7 days   Lab Units 04/16/21  1119 04/14/21  1031 04/14/21  0416 04/14/21  0410   BLOOD CULTURE   --   --  No Growth After 5 Days  No Growth After 5 Days     GRAM STAIN RESULT  2+ Polys  No bacteria seen 1+ Polys  No bacteria seen  --   --    BODY FLUID CULTURE, STERILE  No growth No growth  --   --        Last 24 Hours Medication List:   Current Facility-Administered Medications   Medication Dose Route Frequency Provider Last Rate    acetaminophen  650 mg Oral Q6H PRN Constantin Rodríguez, DO      aspirin  81 mg Oral Daily Constantin Rodríguez, DO      atorvastatin  5 mg Oral Daily Constantin Rodríguez, DO      bisacodyl  10 mg Rectal Daily PRN Constantin Rodríguez, DO      cholecalciferol  4,000 Units Oral Daily Sharion Perks, DO      docusate sodium  100 mg Oral BID Sharion Perks, DO      ferrous sulfate  325 mg Oral Daily With Breakfast Sharion Perks, DO      heparin (porcine)  5,000 Units Subcutaneous [facility] Sharion Perks, DO      insulin glargine  5 Units Subcutaneous QAM Candido Yates MD      insulin lispro  1-5 Units Subcutaneous TID AC Sharion Perks, DO      insulin lispro  1-5 Units Subcutaneous HS Sharion Perks, DO      insulin lispro  4 Units Subcutaneous TID after meals Candido Yates MD      levothyroxine  125 mcg Oral Early Morning Sharion Perks, DO      melatonin  3 mg Oral HS PRN Johnathon Wilson PA-C      ondansetron  4 mg Intravenous Q6H PRN Sharion Perks, DO      pantoprazole  40 mg Oral Daily Before Breakfast Sharion Perks, DO      polyethylene glycol  17 g Oral Daily PRN Sharion Perks, DO      tamsulosin  0 4 mg Oral Daily With [place] [employer], DO      vancomycin  15 mg/kg Intravenous Daily PRN Valerie Rai MD          Today, Patient Was Seen By: Tiny Sharma MD    ** Please Note: Dictation voice to text software may have been used in the creation of this document   **

## 2021-04-19 NOTE — PROGRESS NOTES
NEPHROLOGY PROGRESS NOTE   Valente Werner 76 y o  male MRN: [id number]  Unit/Bed#: [facility] 803-01 Encounter: [id number]    ASSESSMENT & PLAN:   79-year-old male was admitted to [facility] on 03/27 with weakness and was found to have corynebacterium bacteremia plus also was found to have worsening L1 compression fracture which was treated with TLSO brace  He was transferred to [facility] for rehab and his creatinine trended up as well as developed hyponatremia and Nephrology was consulted on 04/05  He was then transferred from rehab to hospital side due to hypoxia and CHF and was diuresed with IV Bumex  Acute kidney injury, POA  -Baseline creatinine:1 2-1 5 mg/dl  -was transfer from [facility] on 04/10 with a serum creatinine 2 2 mg/dL  - Work up:   · UA with microscopy:  From 04/07 with 20-30 RBC as well as wbc's  -Etiology:  Acute kidney injury likely secondary to cardiorenal syndrome plus minus possible MPGN in the setting of endocarditis  Renal function worsening with diuresis  Would also consider possibility of AIN, infection associated GN  Would also consider possibility of vancomycin toxicity as vancomycin level was 37 2 on 04/19   [facility] Course:  Renal function gradually worsening to creatinine 3 15  Still with high oxygen requirement electrolytes stable  -Plan:   · Renal function continue to worsen with creatinine elevated to 3 15 today  He did receive a dose of IV Bumex 1 mg around midnight  Bumex 1 mg every 8 hours was stopped on 04/18  · For now would continue to monitor renal function off diuretics  · Repeat UA urine microscopy, check urine eosinophils  Check C3-C4  · May have to accept higher creatinine to maintain volume status  · No Indication for renal replacement therapy at this time but may need to consider dialysis if renal function continue to worsen, discussed with family members at bedside  · Avoid nephrotoxins and dose all medications per EGFR  · Avoid hypotension  Chronic Kidney Disease Stage 3b  -Outpatient Nephrologist: Dr Treva Forman  -Baseline Creatinine: 1 2-1 5 mg/dl  -Avoid Nephrotoxins and Dose all medications per eGFR  -Will need outpatient follow up after discharge  BP/HTN  -Currently BP acceptable, slightly on higher side  -Plan:  Continue to monitor, avoid hypotension, diuretics currently on hold due to worsening renal function    Fluid overload/ Acute on chronic diastolic CHF  -was treated with IV Bumex which would is on hold since 04/18  Did receive a dose midnight  Would continue to hold further diuretics at this time due to worsening renal function and monitor renal function  Okay to give another dose of IV Bumex if needed for respiratory distress  -patient also had pleural effusion and is status post thoracentesis  -personally reviewed chest x-ray from 04/18 in PACS, finding of persistent left-sided pleural effusion  -ordered for IV albumin x1 dose for intravascular volume expansion    Anemia  -hemoglobin 8 2 gram/deciliter, stable, continue to monitor per primary team    Corneybacterium bacteremia with mitral valve endocarditis plus pacemaker infection plus pneumonia confirmed on chest CT:  Management per primary team   On vancomycin per ID  Patient is not a surgical candidate per CT surgery    Hypokalemia:  -resolved with replacement, currently off diuretics so stopping oral potassium chloride to prevent hyperkalemia  Continue to monitor electrolytes and replace as needed    Encephalopathy most likely multifactorial:  Continue to monitor  Mental status fluctuating as per family members  Discussed with primary team- Dr Bridget Jewell:  Patient is intermittently confused, using TLSO brace, on oxygen 5 liters/minute by nasal cannula    Family member at bedside    OBJECTIVE:  Current Weight: Weight - Scale: 69 kg (152 lb 1 9 oz)  Vitals:    04/19/21 1129   BP: 109/58   Pulse:    Resp: 16   Temp: 97 9 °F (36 6 °C)   SpO2:        Intake/Output Summary (Last 24 hours) at 4/19/2021 1453  Last data filed at 4/19/2021 0900  Gross per 24 hour   Intake 416 ml   Output 875 ml   Net -459 ml       Physical Exam    General:  Ill looking, awake  Eyes: Conjunctivae pink,  Sclera anicteric  ENT: lips and mucous membranes moist  Neck: supple   Chest: decreased b/l air entry   CVS: S1 & S2 present, normal rate, regular rhythm, no murmur    Abdomen: soft, non-tender, non-distended, Bowel sounds normoactive  Extremities: trace edema of  legs  Skin: no rash  Neuro: awake, alert, follows commands   Psych: lethargic   Medications:    Current Facility-Administered Medications:     acetaminophen (TYLENOL) tablet 650 mg, 650 mg, Oral, Q6H PRN, Ulises Nipper, DO, 650 mg at 04/18/21 1405    aspirin chewable tablet 81 mg, 81 mg, Oral, Daily, Ulises Nipper, DO, 81 mg at 04/19/21 0914    atorvastatin (LIPITOR) tablet 5 mg, 5 mg, Oral, Daily, Ulises Nipper, DO, 5 mg at 04/19/21 3444    bisacodyl (DULCOLAX) rectal suppository 10 mg, 10 mg, Rectal, Daily PRN, Ulises Nipper, DO    cholecalciferol (VITAMIN D3) tablet 4,000 Units, 4,000 Units, Oral, Daily, Ulises Nipper, DO, 4,000 Units at 04/19/21 0914    docusate sodium (COLACE) capsule 100 mg, 100 mg, Oral, BID, Ulises Nipper, DO, 100 mg at 04/19/21 0915    ferrous sulfate tablet 325 mg, 325 mg, Oral, Daily With Breakfast, Ulises Nipper, DO, 325 mg at 04/18/21 0757    heparin (porcine) subcutaneous injection 5,000 Units, 5,000 Units, Subcutaneous, Q8H [address], Ulises Nipper, DO, 5,000 Units at 04/19/21 1333    insulin glargine (LANTUS) subcutaneous injection 5 Units 0 05 mL, 5 Units, Subcutaneous, QAM, Maureen Mitchell MD    insulin lispro (HumaLOG) 100 units/mL subcutaneous injection 1-5 Units, 1-5 Units, Subcutaneous, TID AC, 5 Units at 04/19/21 1147 **AND** Fingerstick Glucose (POCT), , , YULISA ROMAN, Ulises Matias,     insulin lispro (HumaLOG) 100 units/mL subcutaneous injection 1-5 Units, 1-5 Units, Subcutaneous, HS, Mariya Burrell DO, 2 Units at 04/18/21 2301    insulin lispro (HumaLOG) 100 units/mL subcutaneous injection 4 Units, 4 Units, Subcutaneous, TID after meals, Paz Singer MD, 4 Units at 04/19/21 1147    levothyroxine tablet 125 mcg, 125 mcg, Oral, Early Morning, Mariya Burrell DO, 125 mcg at 04/19/21 0544    melatonin tablet 3 mg, 3 mg, Oral, HS PRN, Colin Wilson PA-C, 3 mg at 04/16/21 0053    ondansetron (ZOFRAN) injection 4 mg, 4 mg, Intravenous, Q6H PRN, Mariya Burrell DO    pantoprazole (PROTONIX) EC tablet 40 mg, 40 mg, Oral, Daily Before Breakfast, Mariya Burrell DO, 40 mg at 04/18/21 0622    polyethylene glycol (MIRALAX) packet 17 g, 17 g, Oral, Daily PRN, Mariya Burrell DO    potassium chloride (K-DUR,KLOR-CON) CR tablet 40 mEq, 40 mEq, Oral, Daily, Checo Ellis MD, 40 mEq at 04/19/21 0914    tamsulosin (FLOMAX) capsule 0 4 mg, 0 4 mg, Oral, Daily With Dinner, Mariya Burrell DO, 0 4 mg at 04/18/21 1624    vancomycin (VANCOCIN) IVPB (premix in dextrose) 750 mg 150 mL, 750 mg, Intravenous, Q24H, Christine Newsome MD, Last Rate: 150 mL/hr at 04/19/21 1147, 750 mg at 04/19/21 1147    Invasive Devices:        Lab Results:   Results from last 7 days   Lab Units 04/19/21  1140 04/19/21  1139 04/18/21  0548 04/17/21  0612  04/14/21  0403   WBC Thousand/uL 17 30*  --  15 61* 19 36*   < > 14 83*   HEMOGLOBIN g/dL 8 2*  --  8 2* 9 0*   < > 7 3*   HEMATOCRIT % 26 2*  --  26 0* 27 2*   < > 22 6*   PLATELETS Thousands/uL 119*  --  122* 142*   < > 136*   POTASSIUM mmol/L  --  4 2 3 7 4 0   < > 3 2*   CHLORIDE mmol/L  --  100 102 101   < > 99*   CO2 mmol/L  --  24 27 26   < > 28   BUN mg/dL  --  70* 69* 65*   < > 58*   CREATININE mg/dL  --  3 15* 2 96* 2 79*   < > 2 64*   CALCIUM mg/dL  --  8 6 7 9* 8 3   < > 7 7*   MAGNESIUM mg/dL  --   --   --  2 1  --   --    PHOSPHORUS mg/dL  --   --   --   --   --  4 4*    < > = values in this interval not displayed         Previous work up:  Chest x-ray from 04/18 was personally reviewed by me, finding of persistent left pleural effusion      Portions of the record may have been created with voice recognition software  Occasional wrong word or "sound a like" substitutions may have occurred due to the inherent limitations of voice recognition software  Read the chart carefully and recognize, using context, where substitutions have occurred  If you have any questions, please contact the dictating provider

## 2021-04-19 NOTE — PROCEDURES
Video Swallow Study      Patient Name: Anish Padilla  FKTAB'P Date: 4/19/2021        Past Medical History  Past Medical History:   Diagnosis Date    Arthritis     Cardiac disease     Closed fracture of fibula, lateral malleolus, right     [facility] [address] [facility]     Diabetes mellitus ([facility] [address] [facility]     Disease of thyroid gland     External incisional dehiscence     GERD (gastroesophageal reflux disease)     Heart disease     Muscular deconditioning     Pacemaker     Sleep apnea     cannot tolerate cpap    Thyroid disease         Past Surgical History  Past Surgical History:   Procedure Laterality Date    CARDIAC PACEMAKER PLACEMENT Left 2015    CARDIAC PACEMAKER PLACEMENT      CARDIAC SURGERY      CATARACT EXTRACTION      MUSCLE FLAP      right foot    NERVE BLOCK      Transforaminal Epidural Lumbar with Fluoroscopic Guidance     MI COLONOSCOPY FLX DX W/COLLJ SPEC WHEN PFRMD N/A 5/4/2018    Procedure: EGD AND COLONOSCOPY;  Surgeon: Lakeisha Stanley MD;  [facility]: [facility]; Service: Gastroenterology    MI S[address] <100 SQCM Right 6/10/2019    Procedure: SKIN GRAFT SPLIT THICKNESS (STSG)  EXTREMITY right leg;  Surgeon: Josie Saul DPM;  Location: [facility] OR;  Service: Podiatry    TOE AMPUTATION      left 5th toe       Video Barium Swallow Study    Summary:  Images are on PACS for review  Pt presents w/ mod oral, mild/mod pharyngeal dysphagia zane by significantly prolonged and decreased mastication, reduced bolus control, delayed swallow initiation, and reduced airway protection during the swallow  Oral manipulation and bolus breakdown is significantly prolonged  All material spills to at least the valleculae prior to swallow initaition, liquids to the pyriforms  Pt w/ aspiration x1 w/ straw sip of thin liquids during the swallow/overflow from pyriform retention  Aspiration event was SILENT   Of note, pt w/ decreased alertness during the study, he required frequent cues to alert and continue mastication/degluttition  Recommendations:  Diet: Puree   Liquids: Nectar   Meds: Whole in puree  Strategies: ONLY FEED WHEN AWAKE AND ALERT   Upright position  F/u ST tx: Yes  Therapy Prognosis: Fair   Prognosis considerations: Age, current medical, mental status  Full Supervision  Aspiration Precautions  Reflux Precautions  Consider consult with: -   Results reviewed with: pt, nursing, physician  Aspiration precautions posted  If a dedicated assessment of the esophagus is desired, consider esophagram/barium swallow or EGD  Patient's goal:  None stated     Goals:  Pt will tolerate least restrictive diet w/out s/s aspiration or oral/pharyngeal difficulties  PMH:  76 y o  male with CKD3, HTN, HLD, DM2, complete heart block s/p PPM, who was diagnosed with Covid-19 in 2/2021  Per review of medical records, he became progressively weaker with poor appetite since  He was hospitalized in 3/27 for diarrhea and weakness  He was treated for gram positive rods (Corynebacterium striatum) bacteremia and was started on antibiotics that was eventually d/c'ed after BCx deemed contaminant (subsequent BCs negative, and afebrile x 72H off of Abx)  He was eventually d/c'ed to [facility] on 4/2/2021  However, on 4/10 an RRT was called while in [facility] for encephalopathy and acute hypoxic respiratory failure  He was noted to have some pulmonary edema 2/2 acute CHF exacerbtion on 4/9  He was again admitted on 4/10 where he was started/continued on oxygen supplementation, lasix, and gentle fluid hydration for KAZ 2/2 ATN  His BCx grew GPR/Corynebacterium striatum again and was started on IV antibiotic with direction of ID  A TTE was done on 4/13 that showed a [id number] medium-sized vegetation on the mitral valve  A CHARLES was done on 4/14 that showed a large [id number] vegetation in the anterior leaflet of the mitral valve that would prolapse into the ventricle with diastole   MV is perforated       Electrophysiology was consulted for consideration of PPM extraction given bacteremia  He is 100% pacer dependent  They can consider replacing if CTS able to perform surgery  Cardiothoracic surgery was consulted on 4/16 for consideration of MV surgery and was deemed not a candidate for surgery  Cardiology was consulted for acute HFrEF and severe MR due to valve perforation and large vegetation  Previous VBS:  None on record     Current Diet:  Regular w/ thin      Premorbid diet:  Regular w/ thin     Dentition:  Natural     O2 requirement:  NC    Oral mech:  Strength and ROM: WFL     Vocal Quality/Speech:  Clear    Cognitive status:  Wax and waning, altered at time of study     Consistencies administered: Barium laden applesauce, banana, chicken, hard solid, honey thick, nectar thick, thin liquids, 13mm barium pill whole/puree  Liquids were administered by cup and straw  Pt was seated laterally at 90 degrees       Oral stage:  Lip closure: wfl   Mastication: significantly prolonged, munching      Bolus formation: significantly prolonged    Bolus control: reduced  Transfer: weak   Residue: -     Pharyngeal stage:  Swallow promptness: mild/mod delay   Spill to valleculae: w/ all   Spill to pyriforms: w/ liquids, chicken   Epiglottic inversion: complete   Laryngeal excursion: reduced anterior   Pharyngeal constriction: fair   Vallecular retention: min   Pyriform retention: mild/mod w/ all   PPW coating: -   Osteophytes: -   CP prominence: -   Retropulsion from prominence: -   Transient penetration: w/ thin   Epiglottic undercoat: -   Penetration: -   Aspiration: w/ thin by straw x1   Strategies: -   Response to aspiration: SILENT    Screening of Esophageal stage:  Slow clearing

## 2021-04-19 NOTE — ASSESSMENT & PLAN NOTE
POA  Baseline creatinine from 1 2-1 5  Suspect cardiorenal, component of ATN  Creatinine slightly worsening, 3 15 today  Continue to hold diuretics  Nephrology following

## 2021-04-19 NOTE — ASSESSMENT & PLAN NOTE
Lab Results   Component Value Date    HGBA1C 7 7 (H) 02/05/2021       Recent Labs     04/18/21  2108 04/19/21  0731 04/19/21  1143 04/19/21  1603   POCGLU 294* 409* 396* 221*       Blood Sugar Average: Last 72 hrs:  (P) 222 8   Endo recommendations appreciated  On insulin pump OP  On SQ insulin IP  Aappetite fluctuates  Continue Lantus 5 units q h s  Humalog 4 units t i d   If patient eats more than 50% of meal, if not then 2 units

## 2021-04-19 NOTE — ASSESSMENT & PLAN NOTE
POA, secondary to acute on chronic diastolic heart failure, Srini on CKD stage 3, b/l pleural effusion, Hcap, recent COVID infection in February  RRT at [facility] needing 10L O2    CT 4/13 showed dense airspace consolidation throughout much of the left upper lobe with lesser airspace infiltrates, subsegmental in the right upper lobe, does not have a typical pattern of CHF, possible infectious etiology    Large dependent bilateral pleural effusions  Weaned down from 10 L to 3 L, now up to 4 L  Negative balance 7 8 L since admission  Diuresis :  IV diuretics stopped 4/18 due to jump in creatinine  Continue cefepime and Vanco for HCAP, endocarditis  Status post left sided thoracocentesis 4/14, 1 2 L removed, transudate  Status post right-sided thoracocentesis 4/16, 1 L of clear yellow fluid removed  Goal oxygen saturation 89% and above  Monitor in and output  Daily weights

## 2021-04-19 NOTE — ASSESSMENT & PLAN NOTE
Wt Readings from Last 3 Encounters:   04/19/21 69 kg (152 lb 1 9 oz)   04/05/21 78 6 kg (173 lb 4 5 oz)   03/27/21 72 kg (158 lb 11 7 oz)     Echo in 03/29/2021 showed ejection fraction 65%, mild MR, mild TR  Continue to hold diuretics today, due to worsening kidney function  7 8 L negative balance since admission  Dc Pérez per Nephro 4/16  Monitor kidney function  Input and output, daily weight

## 2021-04-19 NOTE — ASSESSMENT & PLAN NOTE
Lab Results   Component Value Date    EGFR 18 04/19/2021    EGFR 20 04/18/2021    EGFR 21 04/17/2021    CREATININE 3 15 (H) 04/19/2021    CREATININE 2 96 (H) 04/18/2021    CREATININE 2 79 (H) 04/17/2021     Monitor creatinine closely   Input and output  Daily weights  Nephrology on board  Avoid nephrotoxic medications  Keep SBP>130

## 2021-04-20 NOTE — DISCHARGE INSTR - DIET
Puree w/ NTL     Video Barium Swallow Study     Summary:  Images are on PACS for review       Pt presents w/ mod oral, mild/mod pharyngeal dysphagia zane by significantly prolonged and decreased mastication, reduced bolus control, delayed swallow initiation, and reduced airway protection during the swallow  Oral manipulation and bolus breakdown is significantly prolonged  All material spills to at least the valleculae prior to swallow initaition, liquids to the pyriforms  Pt w/ aspiration x1 w/ straw sip of thin liquids during the swallow/overflow from pyriform retention  Aspiration event was SILENT   Of note, pt w/ decreased alertness during the study, he required frequent cues to alert and continue mastication/degluttition       Recommendations:  Diet: Puree   Liquids: Nectar   Meds: Whole in puree  Strategies: [address]

## 2021-04-20 NOTE — ASSESSMENT & PLAN NOTE
Lab Results   Component Value Date    HGBA1C 7 7 (H) 02/05/2021       Recent Labs     04/19/21  1603 04/19/21  2102 04/20/21  0810 04/20/21  1107   POCGLU 221* 152* 173* 300*       Blood Sugar Average: Last 72 hrs:  (P) 645 [id number]   Endo recommendations appreciated  On insulin pump OP  On SQ insulin IP  Aappetite fluctuates  Continue Lantus 5 units q h s  Humalog 4 units t i d   If patient eats more than 50% of meal, if not then 2 units

## 2021-04-20 NOTE — ASSESSMENT & PLAN NOTE
POA, secondary to acute on chronic diastolic heart failure, Srini on CKD stage 3, b/l pleural effusion, Hcap, recent COVID infection in February  RRT at [facility] needing 10L O2    CT 4/13 showed dense airspace consolidation throughout much of the left upper lobe with lesser airspace infiltrates, subsegmental in the right upper lobe, does not have a typical pattern of CHF, possible infectious etiology  Large dependent bilateral pleural effusions  Weaned down from 10 L to 3 L, now up to 4 L  Negative balance 7 8 L since admission  Diuresis :  IV diuretics stopped 4/18 due to jump in creatinine  Continue cefepime and Vanco for HCAP, endocarditis  Status post left sided thoracocentesis 4/14, 1 2 L removed, transudate  Status post right-sided thoracocentesis 4/16, 1 L of clear yellow fluid removed  Goal oxygen saturation 89% and above  Monitor in and output  Daily weights  Patient again became hypoxic requiring 15 liters midflow  ABG looks alkalosis, would monitor  Pulmonary discussed with family regarding patient's current condition, however, family wants to continue with everything  He is still full code status, CPR and Intubation if he needs  Family will discuss and comeback with plan

## 2021-04-20 NOTE — PROGRESS NOTES
Pastoral Care Progress Note    2021  Patient: Yoselin Gurrola : 1945  Admission Date & Time: 4/10/2021 1027  MRN: [id number] CSN: [id number]     attended family mtg  Will continue to follow as able to offer support  21 1500   Clinical Encounter Type   Visited With Family; Health care provider   Routine Visit Introduction  (Family Mtg)

## 2021-04-20 NOTE — SPEECH THERAPY NOTE
Speech Language/Pathology    Speech/Language Pathology Progress Note    Patient Name: Marlene Weinberg  HYVZH'R Date: 4/20/2021     Problem List  Principal Problem:    Acute respiratory failure with hypoxia (HCC)  Active Problems:    Anemia of chronic disease    Type I diabetes mellitus (HCC)    Essential hypertension    Stage 3b chronic kidney disease    KAZ on CKD stage 3    Encephalopathy    Complete heart block     Hyponatremia    Bacteremia    Acute on chronic diastolic heart failure (HCC)    HCAP (healthcare-associated pneumonia)    Acidosis    Bilateral pleural effusion    Endocarditis    Closed compression fracture of L1 lumbar vertebra, with routine healing, subsequent encounter    Goals of care, counseling/discussion    Dysphagia       Past Medical History  Past Medical History:   Diagnosis Date    Arthritis     Cardiac disease     Closed fracture of fibula, lateral malleolus, right     Complete heart block ([address]     Diabetes mellitus ([address]     Disease of thyroid gland     External incisional dehiscence     GERD (gastroesophageal reflux disease)     Heart disease     Muscular deconditioning     Pacemaker     Sleep apnea     cannot tolerate cpap    Thyroid disease         Past Surgical History  Past Surgical History:   Procedure Laterality Date    CARDIAC PACEMAKER PLACEMENT Left 2015    CARDIAC PACEMAKER PLACEMENT      CARDIAC SURGERY      CATARACT EXTRACTION      MUSCLE FLAP      right foot    NERVE BLOCK      Transforaminal Epidural Lumbar with Fluoroscopic Guidance     OR COLONOSCOPY FLX DX W/COLLJ SPEC WHEN PFRMD N/A 5/4/2018    Procedure: EGD AND COLONOSCOPY;  Surgeon: Laisha Salcido MD;  Location: [facility];   Service: Gastroenterology    [address] SPLIT [address] <100 SQCM Right 6/10/2019    Procedure: SKIN GRAFT SPLIT THICKNESS (STSG)  EXTREMITY right leg;  Surgeon: Lucero Armstrong DPM;  Location:  MAIN OR;  Service: Podiatry    TOE AMPUTATION      left 5th toe Subjective:  Pt awake in bed, confused but talking  Pt w/ wet cough prior to PO trials when being positioned upright  "What are you gonna do about the trash re-use?"    Current Diet:  Puree w/ nectar thick     Objective:  Pt seen for dx dysphagia tx w/ breakfast tray puree and NTL  Pt refused all pureed items on tray, stating he only wanted coffee  Attempted cup sip of coffee, all material spilled anteriorly from oral cavity  Pt then given tsps of NTL, material inconsistently spills anteriorly and suspected posteriorly  No overt s/s aspiration w/ tsps of NTL, though pt w/ silent aspiration on VBS  Explained to pt that during VBS yesterday, material entered his airway  Pt did not state understanding or questions, continued talking about other topics  Pt refused ongoing trials after finishing half cup of coffee by tsp  Spoke w/ RN regarding VBS findings, diet recommendations, and aspiration precautions  Assessment:  Pt appears to have well-tolerated tsps of NTL  Pt w/ poor oral control, anterior spill noted, and posterior spill is also suspected       Plan/Recommendations:  Continue current diet w/ full supervision  Frequent and thorough oral care  ST f/u as able and appropriate

## 2021-04-20 NOTE — OCCUPATIONAL THERAPY NOTE
Occupational Therapy Cancellation Note        Patient Name: Eden HICKS Date: 4/20/2021 04/20/21 1125   OT Last Visit   OT Visit Date 04/20/21   Note Type   Note Type Treatment   Cancel Reasons Other-Chart reviewed  Pt scheduled for a team interdisciplinary meeting with family to review medical status and plan of care  Will continue to follow and continue treatment sessions as medically appropriate   Narendra GARRETT, OTR/L

## 2021-04-20 NOTE — RESPIRATORY THERAPY NOTE
RT Protocol Note  Vanda Shoulder 76 y o  male MRN: [id number]  Unit/Bed#: [facility] 803-01 Encounter: [id number]    Assessment    Principal Problem:    Acute respiratory failure with hypoxia ([facility] [address] )  Active Problems:    Anemia of chronic disease    Type I diabetes mellitus ([facility] [address] )    Essential hypertension    Stage 3b chronic kidney disease    KAZ on CKD stage 3    Encephalopathy    Complete heart block     Hyponatremia    Bacteremia    Acute on chronic diastolic heart failure (HCC)    HCAP (healthcare-associated pneumonia)    Acidosis    Bilateral pleural effusion    Endocarditis    Closed compression fracture of L1 lumbar vertebra, with routine healing, subsequent encounter    Goals of care, counseling/discussion    Dysphagia      Home Pulmonary Medications:  reviewed       Past Medical History:   Diagnosis Date    Arthritis     Cardiac disease     Closed fracture of fibula, lateral malleolus, right     Complete heart block ([facility] [address] )     Diabetes mellitus ([facility] [address] )     Disease of thyroid gland     External incisional dehiscence     GERD (gastroesophageal reflux disease)     Heart disease     Muscular deconditioning     Pacemaker     Sleep apnea     cannot tolerate cpap    Thyroid disease      Social History     Socioeconomic History    Marital status: /Civil Union     Spouse name: Not on file    Number of children: 1    Years of education: Some college     Highest education level: Not on file   Occupational History    Occupation: Disabled    Occupation: Retired      Comment: [employer]   Social Needs    Financial resource strain: Somewhat hard    Food insecurity     Worry: Not on file     Inability: Not on file    Transportation needs     Medical: No     Non-medical: No   Tobacco Use    Smoking status: Former Smoker     Years: 15 00     Types: Pipe     Quit date:      Years since quittin 3    Smokeless tobacco: Never Used   Substance and Sexual Activity    Alcohol use:  Yes Alcohol/week: 0 0 standard drinks     Frequency: Never     Binge frequency: Never     Comment: socially    Drug use: No    Sexual activity: Not Currently   Lifestyle    Physical activity     Days per week: Not on file     Minutes per session: Not on file    Stress: Not on file   Relationships    Social connections     Talks on phone: Not on file     Gets together: Not on file     Attends Scientologist service: Not on file     Active member of club or organization: Not on file     Attends meetings of clubs or organizations: Not on file     Relationship status: Not on file    Intimate partner violence     Fear of current or ex partner: Not on file     Emotionally abused: Not on file     Physically abused: Not on file     Forced sexual activity: Not on file   Other Topics Concern    Not on file   Social History Narrative    Caffeine Use - 15-20 oz/day     Daily caffeinated coffee consumption     Dental care, regularly - Denied     Guns in the home: Stored in locked cabinet - Denied    Pets/Animals: Dog     Presybeterian practices    Tea    Water intake, adequate (per day)       Subjective         Objective    Physical Exam:   Assessment Type: Assess only  General Appearance: Awake  Respiratory Pattern: Tachypneic, Shallow, Dyspnea at rest, Labored, Accessory muscle use  Chest Assessment: Chest expansion symmetrical  Bilateral Breath Sounds: Diminished, Rhonchi  R Breath Sounds: Diminished, Rhonchi  L Breath Sounds: Diminished, Rhonchi  Cough: Weak, Congested, Non-productive  Suction: Nasal  O2 Device: HFNC    Vitals:  Blood pressure 121/88, pulse 93, temperature (!) 97 2 °F (36 2 °C), resp  rate (!) 24, height 5' 8" (1 727 m), weight 70 7 kg (155 lb 13 8 oz), SpO2 96 %  Imaging and other studies: I have personally reviewed pertinent reports  O2 Device: HFNC     Plan    Respiratory Plan: Vent/NIV/HFNC        Resp Comments: Pt assessed per resp protocol   Throughout the day oxygen requirements have continuously increased  Started on 4lpm nasal cannula this morning  By the afternoon he was on 15lpm midflow  Attempted to nasal suction multiple time, however unable to advance to the lung field  Audible rhonchi noted  Currently on HFNC at 100% and 60lpm  Critical care consulted and accepting him to MICU  Nebulizer therapy isnt indicated at this time  However, wont rule out hypertonic UDN's via aerogen  Transporting patient at this time  Will continue to monitor and assess per resp protocol

## 2021-04-20 NOTE — PROGRESS NOTES
Progress Note - Pulmonary   Althea Grant 76 y o  male MRN: [id number]  Unit/Bed#: [facility] 803-01 Encounter: [id number]      Assessment:  1   Acute hypoxic respiratory failure, multifactorial secondary to pulmonary edema/pleural effusions and multifocal pneumonia   2  Abnormal CT Chest bilateral pleural effusions, left upper lobe consolidation  3  Multifocal Pna, at risk for resistant organisms   4  Corynebacterium bacteremia   5  Mitral valve vegetation, endocarditis in setting of gram positive gonzalo bacteremia, confirmed on CHARLES   6  KAZ on CKD 3B  7  Hypertension  8  Hyponatremia  9  Anemia  10  COVID infection February 2021     Plan:  -   Given that patient is not a surgical candidate for mitral valve repair/pacemaker extraction per CT surgery, he is at high risk for recurrent episodes of decompensation, volume overload, pneumonia given his underlying endocarditis and severe mitral regurgitation  -  Patient's mental status has been waxing and waning and he is currently able to participate minimally in interview, but does open his eyes and follows simple commands -  ABG 7 5/32/76/29  -  Recommend continued diuresis per Cardiology/ Nephrology, but despite this, he will  Likely continue to have episodes of pulmonary edema given mitral regurgitation secondary to endocarditis  - underwent bilateral thoracentesis last week with both sides consistent with transudative indices  -  Continue IV vancomycin per ID recommendation, recently completed 7 day course of cefepime for pneumonia as well  -  Discussed code status with patient's wife and niece, and at this time he remains level 1 and would accept intubation/resuscitation  - continue to wean supplemental oxygen as able, aspiration precautions, recommend NPO While altered and speech therapy evaluation, aggressive ics, out of bed to chair,  Goal SpO2 greater than 88%       Subjective: All to re-evaluate patient for altered mentation and hypoxia    In short, 70-year-old male admitted with hypoxic respiratory failure secondary to volume overload, pneumonia, cornynebacterium endocarditis and bacteremia with severe MR, KAZ on CKD, now with worsened hypoxia  was seen few days ago and underwent bilateral thoracentesis, both transudative, likely secondary to volume overload in the setting of severe mitral regurgitation  At that time, was on 2-4 L nasal cannula, alert and oriented  Per Medicine team, patient has been having episodes of altered mentation and this afternoon was noted to have increased respiratory distress and escalating oxygen requirement to 10-15 L mid flow nasal cannula  He was suction for increased secretions  Has been having ongoing palliative discussions, as CT surgery deemed is mitral valve disease nonoperable due to increased mortality risk  At this time, he remains full code  Patient seen examined at bedside  He opens his eyes intermittently and has one-word answers with groaning and grunting  Does not participate with all review of systems  Review of Systems   Unable to perform ROS: Mental status change       Objective:     Vitals:    04/20/21 1108 04/20/21 1245 04/20/21 1254 04/20/21 1534   BP: 102/62   103/61   Pulse: 88   91   Resp:    (!) 24   Temp: (!) 96 7 °F (35 9 °C)   (!) 97 2 °F (36 2 °C)   TempSrc:       SpO2: 93% 91% 91% 94%   Weight:       Height:               Intake/Output Summary (Last 24 hours) at 4/20/2021 1537  Last data filed at 4/20/2021 1001  Gross per 24 hour   Intake --   Output 300 ml   Net -300 ml         Physical Exam  Constitutional:       General: He is not in acute distress  Appearance: He is well-developed  He is not diaphoretic  Comments: 8 L NC   HENT:      Head: Normocephalic and atraumatic  Mouth/Throat:      Mouth: Mucous membranes are dry  Pharynx: Oropharynx is clear  No oropharyngeal exudate  Eyes:      General: No scleral icterus    Cardiovascular:      Rate and Rhythm: Normal rate and regular rhythm  Heart sounds: Normal heart sounds  No murmur  Pulmonary:      Effort: Pulmonary effort is normal  No respiratory distress  Breath sounds: Normal breath sounds  No wheezing  Comments: Diffuse rhonchi  Abdominal:      General: Bowel sounds are normal  There is no distension  Palpations: Abdomen is soft  Tenderness: There is no abdominal tenderness  Musculoskeletal:      Right lower leg: Edema present  Left lower leg: Edema present  Neurological:      Mental Status: He is alert  Comments: Altered, confused, intermittently follows commands and answers 1  Were questions, but mostly grunting         Labs: I have personally reviewed pertinent lab results    Results from last 7 days   Lab Units 04/20/21  1522 04/20/21  0526 04/19/21  1140 04/18/21  0548   WBC Thousand/uL  --  14 91* 17 30* 15 61*   HEMOGLOBIN g/dL  --  8 1* 8 2* 8 2*   I STAT HEMOGLOBIN g/dl 7 1*  --   --   --    HEMATOCRIT %  --  25 8* 26 2* 26 0*   HEMATOCRIT, ISTAT % 21*  --   --   --    PLATELETS Thousands/uL  --  114* 119* 122*   NEUTROS PCT %  --  88* 89* 88*   MONOS PCT %  --  5 5 5      Results from last 7 days   Lab Units 04/20/21  1522 04/20/21  0526 04/19/21  1139 04/18/21  0548   POTASSIUM mmol/L  --  4 2 4 2 3 7   CHLORIDE mmol/L  --  103 100 102   CO2 mmol/L  --  26 24 27   CO2, I-STAT mmol/L 29  --   --   --    BUN mg/dL  --  78* 70* 69*   CREATININE mg/dL  --  3 07* 3 15* 2 96*   CALCIUM mg/dL  --  8 8 8 6 7 9*   GLUCOSE, ISTAT mg/dl 118  --   --   --      Results from last 7 days   Lab Units 04/20/21  0526 04/17/21  0612   MAGNESIUM mg/dL 2 5 2 1     Results from last 7 days   Lab Units 04/14/21  0403   PHOSPHORUS mg/dL 4 4*              0   Lab Value Date/Time    TROPONINI 0 07 (H) 04/10/2021 0813    TROPONINI 0 32 (H) 03/27/2021 2317    TROPONINI 0 38 (H) 03/27/2021 2024    TROPONINI 0 47 (H) 03/27/2021 1619    TROPONINI <0 02 03/24/2021 1643    TROPONINI 0 08 (H) 02/06/2021 0256 TROPONINI 0 09 (H) 02/05/2021 2350    TROPONINI 0 08 (H) 02/05/2021 2028    TROPONINI 0 06 (H) 02/05/2021 1554    TROPONINI 0 03 07/13/2019 1629     Microbiology:    Blood cultures +corynebacterium, repeat Cxs NGTD   MRSA nares negative    Imaging and other studies: I have personally reviewed pertinent reports    and I have personally reviewed pertinent films in PACS   CT chest 04/13/2021   left upper lobe consolidation   large bilateral pleural effusions    CXR 4/20/21  Worsened multifocal opacities and congestion          Sonal Zamora MD  Pulmonary & Critical Care Fellow, [facility]

## 2021-04-20 NOTE — PROGRESS NOTES
Acceptance Note - Critical Care   Malena James 76 y o  male MRN: [id number]  Unit/Bed#: MICU 04 Encounter: [id number]    Attending Physician: Rose Marie Keyes MD    ______________________________________________________________________  Assessment and Plan:   Principal Problem:    Acute respiratory failure with hypoxia [facility])  Active Problems:    Anemia of chronic disease    Type I diabetes mellitus ([facility] [address] )    Essential hypertension    Stage 3b chronic kidney disease    KZA on CKD stage 3    Encephalopathy    Complete heart block     Hyponatremia    Bacteremia    Acute on chronic diastolic heart failure (HCC)    HCAP (healthcare-associated pneumonia)    Acidosis    Bilateral pleural effusion    Endocarditis    Closed compression fracture of L1 lumbar vertebra, with routine healing, subsequent encounter    Goals of care, counseling/discussion    Dysphagia  Resolved Problems:    * No resolved hospital problems  *      Neuro:   · Encephalopathy secondary to IE, respiratory failure, KAZ  · Analgesia: p r n  acetaminophen  · Sedation: None currently  Start fentanyl then propofol if requires intubation    · RASS goal: 0 Alert and Calm  · Delirium Precautions  · CAM-ICU per protocol  · Regulate sleep/wake cycle as able    CV:   · Acute on chronic diastolic heart failure   ·  start Bumex drip for diuresis  ·  cardiology following  · Mitral valve endocarditis  ·  last echo with vegetation measuring 19 mm by 17 mm on anterior leaflet, valve perforation, prolapse into ventricle with severe regurgitation  ·  not a surgical candidate per CT surgery x2 - family interested in getting outside opinion but not stable for transfer  ·  History of complete heart block status post pacemaker -  100% pacemaker dependent  ·  not candidate for pacemaker removal despite endocarditis per CT surgery  · Hemodynamic infusions: None  · MAP goal > 65  · Rhythm: Paced  · Follow rhythm on telemetry    Pulm:   · Acute hypoxic respiratory failure - from acute on chronic heart failure, bilateral pleural effusions, healthcare associated pneumonia, possible septic emboli from infectious endocarditis  ·  currently on high-flow nasal cannula at 55 L and 100% FiO2 satting in the upper 90s  ·  diursesis with Bumex as above/below  ·  goal SpO2 greater than 88%  ·  aggressive chest PT/airway clearance protocol  ·  repeat CXR in AM  ·  very high risk for needing intubation / mechanical ventilation  · Healthcare associated pneumonia  ·  on vancomycin and cefepime   ·  Bilateral pleural effusions with lower lobe atelectasis - from underlying CHF  ·  status post left-sided thoracentesis performed by pulmonology on 04/14 with 1 2 L removed and right-sided thoracentesis on 04/16 with 1 L of fluid removed  ·  diuresis with IV Bumex as above    GI:   · No acute issues  · Stress ulcer prophylaxis: Pantoprazole 40   · Bowel regimen: Miralax and Dulcolax    FEN:   · F: Hold IVF given volume overlaod  · E: Replete to K >4 0, Mag >2 0, and Phos >3 0  · N: NPO    :   · KAZ on CKD 3 - worsening  ·  baseline creatinine 1 2-1 5  ·  creatinine of 3 15 today  ·  nephrology following  ·  start Bumex drip  ·  q6 hours BMPs  ·  has not been acidotic but is high risk for eventually needing renal replacement therapy  · Indwelling Ortez present: no - will need placed for accurate Is/Os  · Trend UOP and BUN/creat  · Strict Is and Os    ID:   · Endocarditis with mitral valve vegetation  · Not candidate for valve replacement per CT surgery x2 here - family wants 3rd opinion (possibly [facility]) but not stable for transfer at this time  · Healthcare associated pneumonia on on vancomycin/cefepime  · Corynebacterium bacteremia  · Abx ordered: Vancomycin and cefepime  · Trend temps and WBC count    Heme:   ·  Anemia of chronic disease  ·  transfused 4/16 with 1 unit PRBCs  ·  trend Hgb and Plts  ·  transfuse as needed for goal Hgb >7    Endo:   ·  Type 1 diabetes mellitus  ·   Lispro SSI QID and Lantus 5 units qAM  · Glycemic control plan: follow POC glucose checks and adjust PRN given now NPO    MSK/Skin:  ·  L1 compression fracture  ·  TLSO brace when out of bed  ·  Neurosurgery following -  no plan for operative intervention  · Right lower extremity wounds  ·  Local wound care  ·  Monitor for signs of infection  · Mobility goal:   · PT consult:  when apppropriate  · OT consult:  when appropriate  · Frequent turning and pressure off-loading    Family:  · Family updated within 24 hours: yes     Lines:  · Central venous access: No  · Arterial line: No      Home meds per chart review:  Amlodipine 5 mg daily  Vitamin-C 1000 mg q i d  A sprint 81 mg daily  Atorvastatin 5 mg daily  Calcium 2 g daily  Vitamin D3 2000 units daily  Vitamin B12 2000 mcg  Duloxetine 30 mg daily  Levothyroxine 125 mcg daily  Tamsulosin 0 4 mg daily      Disposition: Continue ICU care    Code Status: Level 1 - Full Code    ______________________________________________________________________    Summary:  Patient seen as a DI alert by critical care physician assistant during the day  He was admitted on 03/24/2021 for generalized weakness, found to have mitral valve endocarditis with large vegetation, worsening heart failure causing hypoxic respiratory failure, and worsening renal function  Patient has been evaluated by two of our cardiothoracic surgeons and deemed not an an appropriate operative candidate for IE  Today he became increasingly hypoxic, satting 88% on 15 L nasal cannula and was transitioned to high-flow nasal cannula  He is now on 55 liters/minute at 100% FiO2 and still tachypneic but satting in the upper 90s  He has significant volume overloaded from CHF and has worsening renal function  Cardiology had recommended restarting diuresis today with Bumex  Palliative care has been following, and multiple physicians have had goals of care discussions with family      At this time they would like to continue aggressive / full medical treatment  Review of Systems   Constitutional: Positive for diaphoresis and fatigue  Negative for chills and fever  Respiratory: Positive for shortness of breath  Negative for cough  Cardiovascular: Negative for chest pain  Gastrointestinal: Negative for abdominal pain, constipation, diarrhea, nausea and vomiting  Genitourinary: Negative for dysuria and flank pain  Neurological: Positive for weakness  All other systems reviewed and are negative     ______________________________________________________________________    Physical Exam:   Physical Exam  Vitals signs and nursing note reviewed  Constitutional:       Appearance: He is ill-appearing and diaphoretic  HENT:      Head: Normocephalic and atraumatic  Eyes:      General: No scleral icterus  Conjunctiva/sclera: Conjunctivae normal       Pupils: Pupils are equal, round, and reactive to light  Neck:      Musculoskeletal: Normal range of motion and neck supple  Cardiovascular:      Rate and Rhythm: Normal rate and regular rhythm  Heart sounds: Murmur present  No friction rub  No gallop  Pulmonary:      Effort: Respiratory distress present  Breath sounds: Rhonchi present  No wheezing or rales  Comments: Coarse rhonchorous breath sounds bilaterally in all fields  Abdominal:      General: There is no distension  Palpations: Abdomen is soft  Tenderness: There is no abdominal tenderness  There is no guarding or rebound  Musculoskeletal: Normal range of motion  General: No tenderness  Right lower leg: Edema present  Left lower leg: No edema  Skin:     General: Skin is warm  Coloration: Skin is pale  Findings: No erythema  Comments: Right lower extremity wounds are wrapped with gauze  Neurological:      Mental Status: He is alert  Sensory: No sensory deficit  Motor: No abnormal muscle tone  Comments: Alert but disoriented    Able say hello and answer some questions but does not reliably follow commands and is not oriented to person/ place/time  ______________________________________________________________________  Vitals:    21 1840 21 1856 21   BP: 105/66 100/56 95/61    Pulse: 94 94 96    Resp:       Temp:  (!) 96 8 °F (36 °C) 97 9 °F (36 6 °C)    TempSrc:       SpO2: 90% 90% 91% 91%   Weight:       Height:           Temperature:   Temp (24hrs), Av 9 °F (36 1 °C), Min:96 4 °F (35 8 °C), Max:97 9 °F (36 6 °C)  Current: Temperature: 97 9 °F (36 6 °C)    Weights:   IBW (Ideal Body Weight): 68 4 kg    Body mass index is 23 7 kg/m²  Weight (last 2 days)     Date/Time   Weight    21 0600   70 7 (155 87)    21 0600   69 (152 12)    21 0600   69 5 (153 22)              Non-Invasive/Invasive Ventilation Settings:  Respiratory    Lab Data (Last 4 hours)    None         O2/Vent Data (Last 4 hours)       175        Non-Invasive Ventilation Mode HFNC (High flow) HFNC (High flow)                  Intake and Outputs:  I/O last 24 hours: In: 240 [P O :240]  Out: 18 [Urine:653]      Nutrition:        Diet Orders   (From admission, onward)             Start     Ordered    21  Diet NPO  Diet effective now     Question Answer Comment   Diet Type NPO    RD to adjust diet per protocol?  No        21                Labs:   Results from last 7 days   Lab Units 21  1632 21  1522 21  0526 21  1140 21  0548 21  0612 21  0515 04/15/21  0445 21  0403   WBC Thousand/uL 15 60*  --  14 91* 17 30* 15 61* 19 36* 15 47* 13 74* 14 83*   HEMOGLOBIN g/dL 7 5*  --  8 1* 8 2* 8 2* 9 0* 6 8* 7 5* 7 3*   I STAT HEMOGLOBIN g/dl  --  7 1*  --   --   --   --   --   --   --    HEMATOCRIT % 24 3*  --  25 8* 26 2* 26 0* 27 2* 21 1* 23 6* 22 6*   HEMATOCRIT, ISTAT %  --  21*  --   --   --   --   --   --   --    PLATELETS Thousands/uL 101*  -- 114* 119* 122* 142* 115* 139* 136*   NEUTROS PCT % 89*  --  88* 89* 88*  --  86* 86* 87*   MONOS PCT % 5  --  5 5 5  --  7 8 7     Results from last 7 days   Lab Units 04/20/21  1632 04/20/21  1522 04/20/21  0526 04/19/21  1139 04/18/21  0548 04/17/21  0612 04/16/21  0515 04/15/21  0445   SODIUM mmol/L 138  --  137 135* 137 136 135* 135*   POTASSIUM mmol/L 4 4  --  4 2 4 2 3 7 4 0 3 8 3 2*   CHLORIDE mmol/L 105  --  103 100 102 101 101 99*   CO2 mmol/L 28  --  26 24 27 26 26 28   CO2, I-STAT mmol/L  --  29  --   --   --   --   --   --    ANION GAP mmol/L 5  --  8 11 8 9 8 8   BUN mg/dL 75*  --  78* 70* 69* 65* 63* 59*   CREATININE mg/dL 3 29*  --  3 07* 3 15* 2 96* 2 79* 2 68* 2 68*   CALCIUM mg/dL 8 9  --  8 8 8 6 7 9* 8 3 7 9* 7 8*     Results from last 7 days   Lab Units 04/20/21  0526 04/17/21  0612 04/14/21  0403   MAGNESIUM mg/dL 2 5 2 1  --    PHOSPHORUS mg/dL  --   --  4 4*                  ABG:  Lab Results   Component Value Date    PHART 7 450 04/11/2021    MRC4LRZ 37 6 04/11/2021    PO2ART 21 5 (LL) 04/11/2021    IFR7LCD 25 5 04/11/2021    BEART 1 5 04/11/2021    SOURCE Radial, Right 04/11/2021     VBG:        Vancomycin Tr   Date Value Ref Range Status   04/19/2021 37 2 (HH) 10 0 - 20 0 ug/mL Final        Imaging: N/A  EKG: N/A  Micro:  Results from last 7 days   Lab Units 04/16/21  1119 04/14/21  1031 04/14/21  0416 04/14/21  0410   BLOOD CULTURE   --   --  No Growth After 5 Days  No Growth After 5 Days  GRAM STAIN RESULT  2+ Polys  No bacteria seen 1+ Polys  No bacteria seen  --   --    BODY FLUID CULTURE, STERILE  No growth No growth  --   --      Allergies:    Allergies   Allergen Reactions    Contrast  [Iodinated Diagnostic Agents]     Lipitor  [Atorvastatin Calcium] Drowsiness    Ibuprofen      sensitive    Iodine - Food Allergy Rash    Simvastatin Anxiety     Other reaction(s): Malaise (finding)     Medications:   Scheduled Meds:  Current Facility-Administered Medications   Medication Dose Route Frequency Provider Last Rate    acetaminophen  650 mg Oral Q6H PRN Raj Butterfield PA-C      aspirin  81 mg Oral Daily [place], [place]      atorvastatin  5 mg Oral Daily [place], [place]      bisacodyl  10 mg Rectal Daily PRN Raj Butterfield PA-C      bumetanide  1 mg/hr Intravenous Continuous Julianna Carrillo MD 1 mg/hr (04/20/21 1927)    chlorhexidine  15 mL Swish [employer] Spit Q12H [address] Julianna Carrillo MD      cholecalciferol  4,000 Units Oral Daily [place], [place]      docusate sodium  100 mg Oral BID Howie Pearson PA-C      ferrous sulfate  325 mg Oral Daily With Breakfast Howie Pearson PA-C      heparin (porcine)  5,000 Units Subcutaneous [place], [place]      insulin glargine  5 Units Subcutaneous QAM [place]      insulin lispro  1-5 Units Subcutaneous HS [place]      insulin lispro  1-5 Units Subcutaneous TID Julianna Carrillo MD      levothyroxine  125 mcg Oral Early Morning [place], [place]      ondansetron  4 mg Intravenous Q6H PRN Howie Pearson PA-C      pantoprazole  40 mg Oral Daily Before Breakfast [facility] [place]      polyethylene glycol  17 g Oral Daily PRN Raj Butterfield PA-C      tamsulosin  0 4 mg Oral Daily With [employer] SADIE Pearson PA-C      vancomycin  15 mg/kg Intravenous Daily PRN Howie Pearson PA-C       Continuous Infusions:bumetanide, 1 mg/hr, Last Rate: 1 mg/hr (04/20/21 1927)      PRN Meds:  acetaminophen, 650 mg, Q6H PRN  bisacodyl, 10 mg, Daily PRN  ondansetron, 4 mg, Q6H PRN  polyethylene glycol, 17 g, Daily PRN  vancomycin, 15 mg/kg, Daily PRN      VTE Pharmacologic Prophylaxis: Heparin  VTE Mechanical Prophylaxis: sequential compression device    Invasive lines and devices:   Invasive Devices     Peripheral Intravenous Line            Peripheral IV 04/19/21 Distal;Left;Upper;Ventral (anterior) Arm 1 day    Peripheral IV 04/20/21 Left;Proximal;Ventral (anterior) Forearm less than 1 day          Drain Urethral Catheter Straight-tip; Temperature probe 16 Fr  less than 1 day                     Portions of the record may have been created with voice recognition software  Occasional wrong word or "sound a like" substitutions may have occurred due to the inherent limitations of voice recognition software  Read the chart carefully and recognize, using context, where substitutions have occurred      Flaquita Cristobal MD

## 2021-04-20 NOTE — ASSESSMENT & PLAN NOTE
Wt Readings from Last 3 Encounters:   04/20/21 70 7 kg (155 lb 13 8 oz)   04/05/21 78 6 kg (173 lb 4 5 oz)   03/27/21 72 kg (158 lb 11 7 oz)     Echo in 03/29/2021 showed ejection fraction 65%, mild MR, mild TR  Continue to hold diuretics today, due to worsening kidney function  7 8 L negative balance since admission  Dc Pérez per Nephro 4/16  Monitor kidney function  Input and output, daily weight

## 2021-04-20 NOTE — ASSESSMENT & PLAN NOTE
Echo showed mitral valve vegetation  CHARLES showed large mitral valve vegetation measuring 19 mm x 17 mm, and severe MR due to vegetation  Evaluated by CT surgery on Friday, not a candidate for surgery  Family requested a 2nd opinion  Evaluated by Dr Melquiades Oleary today, also thinks he is not a candidate for surgery  Patient would like a 3rd opinion outside [facility]

## 2021-04-20 NOTE — ASSESSMENT & PLAN NOTE
Lab Results   Component Value Date    EGFR 19 04/20/2021    EGFR 18 04/19/2021    EGFR 20 04/18/2021    CREATININE 3 07 (H) 04/20/2021    CREATININE 3 15 (H) 04/19/2021    CREATININE 2 96 (H) 04/18/2021     Monitor creatinine closely   Input and output  Daily weights  Nephrology on board  Avoid nephrotoxic medications  Keep SBP>130

## 2021-04-20 NOTE — NURSING NOTE
Pt with increased hallucinations  Pt reaching for things in air that are not there  Pt trying to climb out of bed to " get the lady " behind staff memebers  Pt able to be redirected  Melissa Lui PA-C with [facility] made aware

## 2021-04-20 NOTE — ASSESSMENT & PLAN NOTE
Palliative care on board, appreciate recommendation  Per CT surgery patient is not a candidate for surgery, he was evaluated by 2 CT surgeons  Patient would like a 3rd opinion[facility]    Multi disciplinary family meeting: family wants everything,

## 2021-04-20 NOTE — SOCIAL WORK
LSW attended multidisciplinary team meeting with patient's wife, son, and daughter-in-law (via phone)  Please refer to Dr Sabina France note for medical specifics  LSW will continue to support the family during his admission  Wife is tearful at the end of the discussion and son is hopeful for more years with his father

## 2021-04-20 NOTE — PROGRESS NOTES
Cardiology Progress Note - Marlene Weinberg 76 y o  male MRN: [id number]    Unit/Bed#: [facility] 803-01 Encounter: [id number]      Assessment:  Principal Problem:    Acute respiratory failure with hypoxia ([facility] [address] )  Active Problems:    Anemia of chronic disease    Type I diabetes mellitus ([facility] [address] )    Essential hypertension    Stage 3b chronic kidney disease    KAZ on CKD stage 3    Encephalopathy    Complete heart block     Hyponatremia    Bacteremia    Acute on chronic diastolic heart failure (HCC)    HCAP (healthcare-associated pneumonia)    Acidosis    Bilateral pleural effusion    Endocarditis    Closed compression fracture of L1 lumbar vertebra, with routine healing, subsequent encounter    Goals of care, counseling/discussion    Dysphagia      Plan:  Patient continues on supplemental oxygen  He is in sinus rhythm on telemetry with the pacing  Cardiac surgery note appreciated  Patient with no chest discomfort  Nephrology and infectious disease note appreciated  BMP today with potassium of 4 2 and creatinine of 3 07  Hemoglobin 8 1  Will continue current medical regimen  Subjective:   Patient seen and examined  No significant events overnight   negative  Objective:     Vitals: Blood pressure 119/62, pulse 85, temperature (!) 96 4 °F (35 8 °C), resp  rate 16, height 5' 8" (1 727 m), weight 70 7 kg (155 lb 13 8 oz), SpO2 93 %  , Body mass index is 23 7 kg/m² ,   Orthostatic Blood Pressures      Most Recent Value   Blood Pressure  119/62 filed at 04/20/2021 6708   Patient Position - Orthostatic VS  Lying filed at 04/19/2021 0742      ,      Intake/Output Summary (Last 24 hours) at 4/20/2021 0845  Last data filed at 4/20/2021 0744  Gross per 24 hour   Intake 180 ml   Output 150 ml   Net 30 ml       No significant arrhythmias seen on telemetry review         Physical Exam:    GEN: Naren A Elena   NECK: supple, no carotid bruits, no JVD or HJR  HEART: normal rate, regular rhythm, normal S1 and S2, systolic murmur  LUNGS: clear to auscultation bilaterally; no wheezes, rales, or rhonchi   ABDOMEN: normal bowel sounds, soft, no tenderness, no distention  EXTREMITIES: edema    Labs & Results:    No results displayed because visit has over 200 results  Ct Abdomen Pelvis Wo Contrast    Result Date: 3/27/2021  Narrative: CT ABDOMEN AND PELVIS WITHOUT IV CONTRAST INDICATION: Right upper quadrant and periumbilical pain  COMPARISON:  10/5/2017  TECHNIQUE:  CT examination of the abdomen and pelvis was performed without intravenous contrast   Axial, sagittal, and coronal 2D reformatted images were created from the source data and submitted for interpretation  Radiation dose length product (DLP) for this visit:  397 9 mGy-cm   This examination, like all CT scans performed in the [facility], was performed utilizing techniques to minimize radiation dose exposure, including the use of iterative reconstruction and automated exposure control  Enteric contrast was administered  FINDINGS: ABDOMEN LOWER CHEST:  Small right pleural effusion  Partially imaged rounded area of groundglass opacification in the superior segment of the right lower lobe in the infrahilar region  Adjacent scarring in the right lower lobe LIVER/BILIARY TREE:  Unremarkable  GALLBLADDER:  Punctate gallstones in the dependent portion of the gallbladder fundus, with possible small amount of sludge  No findings to suggest acute cholecystitis  SPLEEN:  Unremarkable  PANCREAS:  Unremarkable  ADRENAL GLANDS:  Unremarkable  KIDNEYS/URETERS:  No nephrolithiasis or obstructive uropathy  STOMACH AND BOWEL:  Small hiatal hernia moderate to large amount of stool throughout the colon  No evidence of bowel obstruction, colitis or diverticulitis  APPENDIX:  Normal appendix  ABDOMINOPELVIC CAVITY:  No free intraperitoneal air, fluid collection or lymphadenopathy  VESSELS:  No abdominal aortic aneurysm  PELVIS REPRODUCTIVE ORGANS:  Enlarged prostate  URINARY BLADDER:  Mild circumferential bladder wall thickening may be due to underdistention or chronic outlet obstruction  ABDOMINAL WALL/INGUINAL REGIONS:  Unremarkable  OSSEOUS STRUCTURES:  Significant interval loss of L1 vertebral body height consistent with progression of 1 stable chronic fracture  No significant fracture fragment retropulsion  Transitional S1 vertebra  Impression: 1  Partially imaged groundglass opacification in the superior segment of the right lower lobe and adjacent scarring compatible with recent COVID-19 pneumonia  2   No evidence of acute intra-abdominal or pelvic process  Workstation performed: [id number]     Xr Chest Portable    Result Date: 4/16/2021  Narrative: CHEST INDICATION:   s/p right thora  COMPARISON:  Chest radiograph from 4/14/2021 and chest CT from 4/13/2021  EXAM PERFORMED/VIEWS:  XR CHEST PORTABLE  FINDINGS: Normal heart size  Left subclavian pacemaker leads in right atrium and right ventricle  No visible right effusion  No pneumothorax  Persistent moderate left effusion, left base atelectasis, and bilateral pneumonia  Osseous structures normal for age  Impression: No visible right effusion or right pneumothorax  Persistent left effusion, left base atelectasis, and bilateral pneumonia  Workstation performed: [id number]     Xr Chest Portable    Result Date: 4/14/2021  Narrative: CHEST INDICATION:   s/p thoracentesis  COMPARISON:  4/11/2021 EXAM PERFORMED/VIEWS:  XR CHEST PORTABLE Single view FINDINGS: Resolved left pleural effusion post thoracentesis  No pneumothorax  Cardiomediastinal silhouette appears unremarkable  Typical ICD  Increased left upper lobe infiltrate and persistent right perihilar infiltrate suspicious for multifocal pneumonia Osseous structures appear within normal limits for patient age  Impression: No evidence of postthoracentesis complication Increased left upper lobe infiltrate    Persistent right perihilar infiltrate Workstation performed: [id number]     Xr Chest Portable    Result Date: 4/11/2021  Narrative: CHEST INDICATION:   pleural effusions  COMPARISON:  April 10, 2021  EXAM PERFORMED/VIEWS:  XR CHEST PORTABLE  AP semierect FINDINGS:  Left-sided chest wall intracardiac device is identified  Leads are intact  Cardiomediastinal silhouette appears unremarkable  Mild pulmonary edema with perihilar groundglass opacities, improved from prior exam   Small left pleural effusion, improved from prior exam   No pneumothorax  Osseous structures appear within normal limits for patient age  Impression: Mild pulmonary edema with small left pleural effusion, improved from prior exam  Workstation performed: [id number]     Xr Chest Portable    Result Date: 4/10/2021  Narrative: CHEST INDICATION:   hypoxia  COMPARISON:  4/9/2021 EXAM PERFORMED/VIEWS:  XR CHEST PORTABLE FINDINGS:  Bipolar pacemaker is unchanged in position  The heart is borderline in size  Evidence of CHF with bilateral effusions left greater than right are noted  Consolidation at the left base may also be present  There is no pneumothorax  Chronic fracture of the left shoulder is noted  Impression: Evidence of CHF which has slightly improved  Left greater than right effusions are again noted  Atelectasis versus infiltrate at the left lung base is probably evident  Workstation performed: [id number]     Xr Chest Portable    Result Date: 4/9/2021  Narrative: CHEST INDICATION:   hypoxia  Patient had confirmed COVID-19  Positive on 2/5/2021  COMPARISON:  Chest radiograph from 4/3/2021 and abdomen CT from 3/27/2021  EXAM PERFORMED/VIEWS:  XR CHEST PORTABLE  FINDINGS: Cardiomediastinal silhouette normal  Left subclavian pacemaker lead in right atrium and right ventricle  Moderate pulmonary edema  Moderate left and small right effusion with bibasilar atelectasis  No pneumothorax  Osseous structures normal for age       Impression: Moderate pulmonary edema with moderate left and small right effusion and bibasilar atelectasis  Workstation performed: [id number]     Xr Chest Portable    Result Date: 4/4/2021  Narrative: CHEST INDICATION:   STAT  Patient had confirmed COVID-19  Positive on 2/5/2021  COMPARISON:  Chest radiograph from 3/29/2021 and abdomen CT from 3/27/2021  EXAM PERFORMED/VIEWS:  XR CHEST PORTABLE  FINDINGS: Cardiomediastinal silhouette normal  Left subclavian pacemaker leads in right atrium and right ventricle  Moderate effusions and atelectasis  No pneumothorax  Osseous structures normal for age  Old left humeral neck fracture  Impression: Moderate effusions and bibasilar atelectasis  Workstation performed: [id number]     X-ray Chest 1 View Portable    Result Date: 3/28/2021  Narrative: CHEST INDICATION:   LLL Rales  COMPARISON:  Chest x-ray from 2/5/2021  EXAM PERFORMED/VIEWS:  XR CHEST PORTABLE FINDINGS:  Left-sided chest wall intracardiac device is identified  Leads are intact  Cardiomediastinal silhouette appears unremarkable  The lungs are clear  No pneumothorax or pleural effusion  Osseous structures appear within normal limits for patient age  Impression: No acute cardiopulmonary disease  Workstation performed: [id number]     Xr Chest Pa & Lateral    Result Date: 3/29/2021  Narrative: CHEST INDICATION:   hypoxia, recently COVID + 2/19/2021  Patient had confirmed COVID-19  Positive on 2/5/2021  COMPARISON:  Chest radiograph and abdomen CT from 3/27/2021  EXAM PERFORMED/VIEWS:  XR CHEST AP & LATERAL  FINDINGS: Cardiomediastinal silhouette normal  Left subclavian pacemaker leads in right atrial appendage and right ventricular apex  Right lower lobe pneumonia, better shown on CT, with small pleural effusions  No pneumothorax  Redemonstration of L1 compression fracture  Impression: Right lower lobe pneumonia corresponding with the abdomen CT with small effusions    Workstation performed: [id number]     Xr Spine Lumbar 2 Or 3 Views Injury    Result Date: 4/14/2021  Narrative: LUMBAR SPINE INDICATION:  L1 compression fracture  COMPARISON:  4/1/2021, CT abdomen and pelvis 3/27/2021 VIEWS:  XR SPINE LUMBAR 2 OR 3 VIEWS INJURY Images: 2 FINDINGS: There are 5 non rib bearing lumbar vertebral bodies  Unchanged appearance of moderate L1 superior endplate compression fracture  Grade I spondylolithesis L4 on L5  Degenerative disc disease L4-5 fusion seen at the L5-S1 disc level  Lower lumbar facet arthropathy  The pedicles appear intact  Soft tissues are unremarkable  Impression: Unchanged L1 compression fracture  Degenerative changes as above  Workstation performed: [id number]     Xr Spine Lumbar 2 Or 3 Views Injury    Result Date: 4/2/2021  Narrative: LUMBAR SPINE INDICATION:   L1 compression fracture  COMPARISON:  CT 3/27/2021 VIEWS:  XR SPINE LUMBAR 2 OR 3 VIEWS INJURY FINDINGS: There are 5 non rib bearing lumbar vertebral bodies  Transitional lumbosacral segment noted  Again identified is a moderate anterior wedge deformity involving the superior endplate of L1 with approximately 50% loss of height similar to the prior study of 3/27/2021  No definite osseous retropulsion is noted  Vertebral body stature of the remaining visualized vertebrae throughout the lumbar lower thoracic spine appears normal   Linear back brace present  Alignment is unremarkable  Multilevel discogenic disease and facet arthrosis noted  The pedicles appear intact  Moderate amount of fecal material in the colon present  Impression: Moderate recent appearing anterior wedge compression fracture of one is noted  Workstation performed: [id number]     Ct Head Wo Contrast    Result Date: 4/3/2021  Narrative: CT BRAIN - WITHOUT CONTRAST INDICATION:   Altered mental status encephalopathy, recent COVID 19 infection in Feb 2021, rule out subacute CVA  COMPARISON:  3/30/2019 TECHNIQUE:  CT examination of the brain was performed    In addition to axial images, sagittal and coronal 2D reformatted images were created and submitted for interpretation  Radiation dose length product (DLP) for this visit:  901 35 mGy-cm   This examination, like all CT scans performed in the [facility], was performed utilizing techniques to minimize radiation dose exposure, including the use of iterative  reconstruction and automated exposure control  IMAGE QUALITY:  Diagnostic  FINDINGS: PARENCHYMA: Decreased attenuation is noted in periventricular and subcortical white matter demonstrating an appearance that is statistically most likely to represent mild microangiopathic change; this appearance is similar when compared to most recent prior examination  Chronic lacunar infarction right inferior lentiform nucleus and left external capsule, unchanged  No CT signs of acute infarction  No intracranial mass, mass effect or midline shift  No acute parenchymal hemorrhage  VENTRICLES AND EXTRA-AXIAL SPACES:  Normal for the patient's age  VISUALIZED ORBITS AND PARANASAL SINUSES:  No acute abnormality involving the orbits  Mild scattered sinus mucosal thickening is noted  No fluid levels are seen  CALVARIUM AND EXTRACRANIAL SOFT TISSUES:  Normal      Impression: No acute intracranial abnormality  Microangiopathic changes  Workstation performed: [id number]     Ct Chest Wo Contrast    Result Date: 4/14/2021  Narrative: CT CHEST WITHOUT IV CONTRAST INDICATION:   Pneumonia Pleural effusion CXR with pulm edema, rule out pna  bacteremia now  Positive for COVID 19 February 5, 2021 COMPARISON:  None  TECHNIQUE: CT examination of the chest was performed without intravenous contrast   Axial, sagittal, and coronal 2D reformatted images were created from the source data and submitted for interpretation  Radiation dose length product (DLP) for this visit:  322 94 mGy-cm     This examination, like all CT scans performed in the [facility], was performed utilizing techniques to minimize radiation dose exposure, including the use of iterative  reconstruction and automated exposure control  FINDINGS: LUNGS:  Moderate compressive atelectasis of both lower lobes related to large bilateral pleural fluid  There is bilateral patchy consolidative airspace infiltrates, greater on the left throughout much of the left upper lobe with a smaller subsegmental region of similar appearing infiltrate in the anterior and apical segments of the right upper lobe  These pulmonary opacities are not specifically peripheral and position, do show the presence of air bronchograms and remain suspicious for infectious etiology  Not typical pattern for fluid overload/CHF  There is no tracheal or endobronchial lesion  PLEURA:  Large bilateral dependent pleural effusion HEART/GREAT VESSELS:  Unremarkable for patient's age  Pacer present  MEDIASTINUM AND OSMANY:  Unremarkable  CHEST WALL AND LOWER NECK:   Unremarkable  VISUALIZED STRUCTURES IN THE UPPER ABDOMEN:  Mild flank /dependent edema suggesting component of 3rd spacing of fluid  OSSEOUS STRUCTURES:  No acute fracture or destructive osseous lesion  Impression: Dense airspace consolidation throughout much of the left upper lobe with lesser airspace infiltrates, subsegmental in the right upper lobe  Although nonspecific, does not have typical pattern of CHF  Infectious etiology remains in the differential  Large dependent bilateral pleural effusions with associated atelectasis of the lower lobes  The study was marked in [facility] for immediate notification  Workstation performed: [id number]     Nm Lung Perfusion Imaging    Result Date: 4/9/2021  Narrative: LUNG PERFUSION SCAN INDICATION: Elevated d dimer COMPARISON:  Chest radiograph  4/9/2021 TECHNIQUE: Lung perfusion only was performed as per the current COVID-19 protocol  Multiplanar perfusion imaging was performed following the intravenous administration of 4 4 mCi Tc-99m labeled MAA   FINDINGS:  Perfusion imaging demonstrates somewhat heterogeneous distribution of the radiotracer  Linear defect suggested in the left lower lung anteriorly likely related to fluid in the fissure based on chest x-ray  Nonsegmental defects bilaterally  No suspicious segmental defects  Impression: The probability for pulmonary embolus is low  Workstation performed: [id number]     Fl Barium Swallow Video W Speech    Result Date: 4/19/2021  Narrative: A video barium swallow study was performed by the Department of Speech Pathology  Please refer to the report for the official interpretation  The images are stored for archival purposes only  Study images were not formally reviewed by the Radiology Department  EKG personally reviewed by Mirtha Cohen MD      Counseling / Coordination of Care  Total floor / unit time spent today 30 minutes  Greater than 50% of total time was spent with the patient and / or family counseling and / or coordination of care

## 2021-04-20 NOTE — QUICK NOTE
QUICK NOTE - Deterioration Index  Linn Victoria 76 y o  male MRN: [id number]  [facility] Encounter: [id number]    Date Paged: 21  Room #: 5  Arrival Time: 16:45  Deterioration index score at time of page: 61 9  %  Spoke with Dr Amber Tang from primary team  Need to escalate level of care: yes     PROBLEMS resulting in high DI score:   19% Age is 75   18% Supplemental oxygen is Mid flow nasal cannula   17% Respiratory rate is 24   11% Edilberto coma scale is 14     PLAN:     Age: non-modifiable factor   O2/Respiratory rate: Oxygen requirements have greatly increased throughout the day  Patient requiring 4L NC this AM and now escalated to HFNC  He is tachypneic with accessory muscle usage  Breath sounds are coarse throughout  CXR demonstrative of multifocal consolidations- suspect some component of aspiration, volume overload, poss  Septic pulmonary emboli  Overall respiratory status is extremely tenuous with high risk for further decompensation including intubation with mechanical ventilation  I personally discussed this case with attending, Dr Stacie Velez, who had previously been made aware of the patient by pulmonology  Decision made to upgrade patient to critical care level of care and transfer to ICU for monitoring of potential respiratory decompensation and possible need for eventual intubation  Please contact critical care via [employer] with any questions or concerns       Vitals:   Vitals:    21 1245 21 1254 21 1534 21 1615   BP:   103/61    Pulse:   91    Resp:   (!) 24    Temp:   (!) 97 2 °F (36 2 °C)    TempSrc:       SpO2: 91% 91% 94% 92%   Weight:       Height:           Respiratory:  SpO2: SpO2: 92 %, SpO2 Activity: SpO2 Activity: At Rest, SpO2 Device: O2 Device: Mid flow nasal cannula  Nasal Cannula O2 Flow Rate (L/min): 5 L/min    Temperature: Temp (24hrs), Av 7 °F (35 9 °C), Min:96 2 °F (35 7 °C), Max:97 3 °F (36 3 °C)  Current: Temperature: (!) 97 2 °F (36 2 °C)    Labs:   Results from last 7 days   Lab Units 04/20/21  1522 04/20/21  0526 04/19/21  1140 04/18/21  0548   WBC Thousand/uL  --  14 91* 17 30* 15 61*   HEMOGLOBIN g/dL  --  8 1* 8 2* 8 2*   I STAT HEMOGLOBIN g/dl 7 1*  --   --   --    HEMATOCRIT %  --  25 8* 26 2* 26 0*   HEMATOCRIT, ISTAT % 21*  --   --   --    PLATELETS Thousands/uL  --  114* 119* 122*   NEUTROS PCT %  --  88* 89* 88*   MONOS PCT %  --  5 5 5     Results from last 7 days   Lab Units 04/20/21  1522 04/20/21  0526 04/19/21  1139 04/18/21  0548   SODIUM mmol/L  --  137 135* 137   POTASSIUM mmol/L  --  4 2 4 2 3 7   CHLORIDE mmol/L  --  103 100 102   CO2 mmol/L  --  26 24 27   CO2, I-STAT mmol/L 29  --   --   --    BUN mg/dL  --  78* 70* 69*   CREATININE mg/dL  --  3 07* 3 15* 2 96*   CALCIUM mg/dL  --  8 8 8 6 7 9*   GLUCOSE, ISTAT mg/dl 118  --   --   --      Results from last 7 days   Lab Units 04/20/21  0526 04/17/21  0612   MAGNESIUM mg/dL 2 5 2 1                   Code Status: Level 1 - Full Code

## 2021-04-20 NOTE — PROGRESS NOTES
Progress Note - Infectious Disease   Anish Padilla 76 y o  male MRN: [id number]  Unit/Bed#: [facility] 803-01 Encounter: [id number]      Impression/Recommendations:  1   Corynebacterium stratum bacteremia   During recent admission, patient had Corynebacterium in admission blood cultures but not in repeat blood cultures   Therefore, this was felt to be contaminant   However, given recurrent Corynebacterium striatum, this is likely be true bacteremia   Source is most likely the leg and foot ulcer   In addition, given presence of ppm, we need to consider the possibility of ppm infection, especially since Corynebacterium is very poorly pathogenic and is usually seen in hardware infection   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   However, ppm leads were not visualized   Ideally, patient should get ppm and lead extraction  However, due to overall poor prognosis, patient has been deemed not a surgical candidate   Bacteremia cleared  Corynebacterium is susceptible to vancomycin  Continue IV vancomycin  Microbiology to do susceptibilities of Corynebacterium  Follow-up repeat blood cultures  Ideally, recommend ppm and lead extraction, if plan is to continue aggressive care      2   MV endocarditis, 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   In addition to endocarditis, I am also concerned about the possibility of ppm infection, for reasons outlined above   Patient will need long-term IV antibiotic   Ideally, patient should get MVR   CT surgery evaluation noted   Patient is determined not to be a candidate for MVR  Antibiotic plan as in above  Treat x6 weeks from clearance of bacteremia, through 5/23  Patient not being a surgical candidate for MVR noted      3  Pneumonia, confirmed on chest CT   Patient is clinically improved   I suspect pneumonia is independent of Corynebacterium bacteremia above  Pneumonia has resolved clinically  Patient completed antibiotic course    No further antibiotic needed for this  Monitor respiratory symptoms  Monitor temperature/WBC      4  Sepsis, POA, presented with leukocytosis and tachypnea   Source of sepsis is probably more likely pneumonia than bacteremia   Patient is clinically improved   Temperature stays down   WBC decreasing    Fortunately, patient has remained hemodynamically stable, without hypotension   He is clinically improved  Sepsis has resolved  Patient completed antibiotic course for pneumonia  Antibiotic plan as in above  Monitor temperature/WBC  Monitor hemodynamics        5  Acute hypoxic respiratory failure   Patient is clinically improved on antibiotic   O2 support decreasing     Antibiotic plan as in above  O2 support and weaning per primary and Pulmonary service  Monitor respiratory symptoms      6  KAZ, superimposed on CKD, most likely secondary to sepsis   Creatinine is  improving again  Antibiotic dosages adjusted accordingly  Monitor creatinine      7  Right lower leg ulcer, with possible mild cellulitis on exam   This may be source of corynebacterium bacteremia above  Antibiotic plan as in above  Serial exams      8  Encephalopathy, most likely multifactorial, with sepsis and hypoxia contributing   Mental status is improved but patient remains confused   No clinical signs CNS infection  Monitor mental status      9  DM, somewhat poorly controlled, with elevated hemoglobin A1c  Management per primary service      10  Recent COVID, with chronic symptoms   No evidence of active infection  No COVID specific treatment needed    No isolation needed      11  Disposition   With multiple organ dysfunction and with bacteremia/endocarditis but not being a surgical candidate, patient has very poor prognosis for recovery   Ongoing discussion with family regarding level of care   Plan for family meeting later today noted      Discussed with patient and his wife in detail regarding the above plan      Antibiotics:  Vancomycin # 9     Subjective:  Patient is more awake and alert, with less  confusion  Dyspnea appears mild   Minimal cough  Temperature stays down   No chills  Patient is tolerating antibiotics well   No nausea, vomiting or diarrhea      Objective:  Vitals:  Temp:  [96 2 °F (35 7 °C)-97 3 °F (36 3 °C)] 96 7 °F (35 9 °C)  HR:  [81-88] 88  Resp:  [16] 16  BP: (102-119)/(57-95) 102/62  SpO2:  [86 %-93 %] 93 %  Temp (24hrs), Av 6 °F (35 9 °C), Min:96 2 °F (35 7 °C), Max:97 3 °F (36 3 °C)  Current: Temperature: (!) 96 7 °F (35 9 °C)    Physical Exam:     General: Awake, alert, cooperative, no distress  Neck:  Supple  No mass  No lymphadenopathy  Lungs: Decreased breath sounds, no rales, no wheezing, respirations unlabored  Heart:  Regular rate and rhythm, S1 and S2 normal, no murmur  Abdomen: Soft, nondistended, non-tender, bowel sounds active all four quadrants, no masses, no organomegaly  Extremities: No edema  Stable chronic leg ulcers, without purulence  No erythema/warmth  Nontender to palpation  Skin:  No rash  Neuro: Moves all extremities  Invasive Devices     Peripheral Intravenous Line            Peripheral IV 21 Distal;Left;Upper;Ventral (anterior) Arm 1 day                Labs studies:   I have personally reviewed pertinent labs    Results from last 7 days   Lab Units 21  0526 21  1139 21  0548   POTASSIUM mmol/L 4 2 4 2 3 7   CHLORIDE mmol/L 103 100 102   CO2 mmol/L 26 24 27   BUN mg/dL 78* 70* 69*   CREATININE mg/dL 3 07* 3 15* 2 96*   EGFR ml/min/1 73sq m 19 18 20   CALCIUM mg/dL 8 8 8 6 7 9*     Results from last 7 days   Lab Units 21  0526 21  1140 21  0548   WBC Thousand/uL 14 91* 17 30* 15 61*   HEMOGLOBIN g/dL 8 1* 8 2* 8 2*   PLATELETS Thousands/uL 114* 119* 122*     Results from last 7 days   Lab Units 21  1119 21  1031 21  0416 21  0410   BLOOD CULTURE   --   --  No Growth After 5 Days  No Growth After 5 Days  GRAM STAIN RESULT  2+ Polys  No bacteria seen 1+ Polys  No bacteria seen  --   --    BODY FLUID CULTURE, STERILE  No growth No growth  --   --        Imaging Studies:   I have personally reviewed pertinent imaging study reports and images in PACS  EKG, Pathology, and Other Studies:   I have personally reviewed pertinent reports

## 2021-04-20 NOTE — PROGRESS NOTES
NEPHROLOGY PROGRESS NOTE   Soheila Cortes 76 y o  male MRN: [id number]  Unit/Bed#: [facility] 803-01 Encounter: [id number]    ASSESSMENT & PLAN:   66-year-old male was admitted to [facility] on 03/27 with weakness and was found to have corynebacterium bacteremia plus also was found to have worsening L1 compression fracture which was treated with TLSO brace  He was transferred to [facility] for rehab and his creatinine trended up as well as developed hyponatremia and Nephrology was consulted on 04/05  He was then transferred from rehab to hospital side due to hypoxia and CHF and was diuresed with IV Bumex  Acute kidney injury, POA  -Baseline creatinine:1 2-1 5 mg/dl  -was transfer from [facility] on 04/10 with a serum creatinine 2 2 mg/dL  Previously creatinine was around 1 0-1 5  - Work up:   · UA with microscopy:  From 04/07 with 20-30 RBC as well as wbc's  -Etiology:  Acute kidney injury likely secondary to cardiorenal syndrome plus minus possible MPGN in the setting of endocarditis  Would also consider possibility of vancomycin toxicity as vancomycin level was 37 2 on 04/19  Less likely AIN as urine eosinophils 0%  Also with C3-C4 within normal limit, less likely infection related GN  Or MPGN  [facility] Course:   Peak creatinine was 3 1 on 04/19, worsened renal function likely due to vancomycin toxicity as well as cardiorenal syndrome in the setting of fluid overload  Bumex was held on 04/19 and renal function improving on 04/20 to creatinine 3 0  - SPEP and UPEP was negative for monoclonal protein  -Repeat UA with urine microscopy still with 10-20 RBCs as well as WBCs and 2+ protein  -Plan:   · renal function slightly improving to 3 0 creatinine  Documented urine output only 150 mL  · chest x-ray from 04/20 was personally reviewed by me, finding of bilateral pulmonary infiltrate as well as small left pleural effusion  · Would recommend Pulmonary input    ·  Also I have started patient on back on IV Bumex but will do at a dose of 2 mg twice daily  Continue to monitor renal function may have to except slight increase in creatinine to maintain volume status  · No Indication for renal replacement therapy at this time but may need to consider dialysis if renal function continue to worsen  · Avoid nephrotoxins and dose all medications per EGFR  · Avoid hypotension  Chronic Kidney Disease Stage 3b  -Outpatient Nephrologist: Dr Dougie Iqbal  -Baseline Creatinine: 1 2-1 5 mg/dl  -Avoid Nephrotoxins and Dose all medications per eGFR  -Will need outpatient follow up after discharge  BP/HTN  -Currently BP on lower side   -Plan:  Continue to monitor, avoid hypotension   Restarted on iv bumex  Ordered for IV albumin x1 dose along with Bumex for intravascular volume expansion and better diuretic effect    Fluid overload/ Acute on chronic diastolic CHF  -patient also had pleural effusion and is status post thoracentesis  - chest x-ray from 04/20 was personally reviewed by me, finding of pulmonary infiltrate as well as small left pleural effusion  -ordered for IV albumin x1 dose for intravascular volume expansion again on 04/20  Started back on IV Bumex but at a dose of 2 mg IV twice daily  Monitor renal function closely    Anemia  -hemoglobin 8 1 gram/deciliter, stable, continue to monitor per primary team    Corneybacterium bacteremia with mitral valve endocarditis plus pacemaker infection plus pneumonia confirmed on chest CT:  Management per primary team   On vancomycin per ID  Patient is not a surgical candidate per CT surgery    Hypokalemia:  -resolved with replacement, currently off diuretics so was taken off oral potassium chloride on 04/19  Potassium level now within normal limit  Continue to monitor electrolytes and replace as needed    Encephalopathy most likely multifactorial:  Continue to monitor  Mental status fluctuating as per family members      Discussed with primary team- Dr Ag Aguilera  Plan for family meeting today  SUBJECTIVE:  -Patient is confused, still on oxygen by nasal cannula 5 L/min     OBJECTIVE:  Current Weight: Weight - Scale: 70 7 kg (155 lb 13 8 oz)  Vitals:    04/20/21 1108   BP: 102/62   Pulse: 88   Resp:    Temp: (!) 96 7 °F (35 9 °C)   SpO2: 93%       Intake/Output Summary (Last 24 hours) at 4/20/2021 1232  Last data filed at 4/20/2021 1001  Gross per 24 hour   Intake --   Output 300 ml   Net -300 ml       Physical Exam  General:  Ill looking, awake  Eyes: Conjunctivae pink,  Sclera anicteric  ENT: lips and mucous membranes moist  Neck: supple   Chest: Decreased bilateral air entry  CVS: S1 & S2 present, normal rate, regular rhythm, no murmur    Abdomen: soft, non-tender, non-distended, Bowel sounds normoactive  Extremities: no edema of  legs  Skin: no rash  Neuro: awake, alert but disoriented   Psych: disoriented     Medications:    Current Facility-Administered Medications:     acetaminophen (TYLENOL) tablet 650 mg, 650 mg, Oral, Q6H PRN, Suzette Russell DO, 650 mg at 04/18/21 1405    aspirin chewable tablet 81 mg, 81 mg, Oral, Daily, Suzette Russell DO, 81 mg at 04/20/21 1002    atorvastatin (LIPITOR) tablet 5 mg, 5 mg, Oral, Daily, Suzette Russell DO, 5 mg at 04/20/21 1002    bisacodyl (DULCOLAX) rectal suppository 10 mg, 10 mg, Rectal, Daily PRN, Suzette Russell DO    cholecalciferol (VITAMIN D3) tablet 4,000 Units, 4,000 Units, Oral, Daily, Suzette Russell DO, 4,000 Units at 04/20/21 1002    docusate sodium (COLACE) capsule 100 mg, 100 mg, Oral, BID, Suzette Russell DO, 100 mg at 04/19/21 1609    ferrous sulfate tablet 325 mg, 325 mg, Oral, Daily With Breakfast, Suzette Russell DO, 325 mg at 04/20/21 1002    heparin (porcine) subcutaneous injection 5,000 Units, 5,000 Units, Subcutaneous, Q8H [facility], Suzette Russell DO, 5,000 Units at 04/20/21 0510    insulin glargine (LANTUS) subcutaneous injection 5 Units 0 05 mL, 5 Units, Subcutaneous, Nereyda TRIPP MD Sen, 5 Units at 04/20/21 1015    insulin lispro (HumaLOG) 100 units/mL subcutaneous injection 1-5 Units, 1-5 Units, Subcutaneous, TID AC, 3 Units at 04/20/21 1141 **AND** Fingerstick Glucose (POCT), , , TID AC, Jan Cornell DO    insulin lispro (HumaLOG) 100 units/mL subcutaneous injection 1-5 Units, 1-5 Units, Subcutaneous, HS, Jan Cornell DO, 1 Units at 04/19/21 2246    insulin lispro (HumaLOG) 100 units/mL subcutaneous injection 4 Units, 4 Units, Subcutaneous, TID after meals, Martha Mcfadden MD, 4 Units at 04/20/21 1142    levothyroxine tablet 125 mcg, 125 mcg, Oral, Early Morning, Jan Cornell DO, 125 mcg at 04/20/21 0510    melatonin tablet 3 mg, 3 mg, Oral, HS PRN, Lorenza Wilson PA-C, 3 mg at 04/16/21 0053    ondansetron (ZOFRAN) injection 4 mg, 4 mg, Intravenous, Q6H PRN, Jan Cornell DO    pantoprazole (PROTONIX) EC tablet 40 mg, 40 mg, Oral, Daily Before Breakfast, Jan Cornell DO, 40 mg at 04/20/21 0510    polyethylene glycol (MIRALAX) packet 17 g, 17 g, Oral, Daily PRN, Jan Cornell DO    [facility] capsule 0 4 mg, 0 4 mg, Oral, Daily With Dinner, Jan Cornell DO, 0 4 mg at 04/19/21 1609    vancomycin (VANCOCIN) IVPB (premix in dextrose) 1,000 mg 200 mL, 15 mg/kg, Intravenous, Daily PRN, Meredith Franco MD    Invasive Devices:        Lab Results:   Results from last 7 days   Lab Units 04/20/21  0526 04/19/21  1140 04/19/21  1139 04/18/21  0548 04/17/21  0612  04/14/21  0403   WBC Thousand/uL 14 91* 17 30*  --  15 61* 19 36*   < > 14 83*   HEMOGLOBIN g/dL 8 1* 8 2*  --  8 2* 9 0*   < > 7 3*   HEMATOCRIT % 25 8* 26 2*  --  26 0* 27 2*   < > 22 6*   PLATELETS Thousands/uL 114* 119*  --  122* 142*   < > 136*   POTASSIUM mmol/L 4 2  --  4 2 3 7 4 0   < > 3 2*   CHLORIDE mmol/L 103  --  100 102 101   < > 99*   CO2 mmol/L 26  --  24 27 26   < > 28   BUN mg/dL 78*  --  70* 69* 65*   < > 58*   CREATININE mg/dL 3 07*  --  3 15* 2 96* 2 79*   < > 2 64*   CALCIUM mg/dL 8 8 --  8 6 7 9* 8 3   < > 7 7*   MAGNESIUM mg/dL 2 5  --   --   --  2 1  --   --    PHOSPHORUS mg/dL  --   --   --   --   --   --  4 4*    < > = values in this interval not displayed  Previous work up:  Chest x-ray from 04/18 was personally reviewed by me, finding of persistent left pleural effusion      Portions of the record may have been created with voice recognition software  Occasional wrong word or "sound a like" substitutions may have occurred due to the inherent limitations of voice recognition software  Read the chart carefully and recognize, using context, where substitutions have occurred  If you have any questions, please contact the dictating provider

## 2021-04-20 NOTE — PROGRESS NOTES
Team 2 Cardiolo  Team 2 Cardiology - Progress Note  Amirah Garcia 76 y o  male MRN: [id number]  Unit/Bed#: [facility] 803-01 Encounter: [id number]    Assessment:  Principal Problem:    Acute respiratory failure with hypoxia ([facility] [address] )  Active Problems:    Anemia of chronic disease    Type I diabetes mellitus ([facility] [address] )    Essential hypertension    Stage 3b chronic kidney disease    KAZ on CKD stage 3    Encephalopathy    Complete heart block     Hyponatremia    Bacteremia    Acute on chronic diastolic heart failure (HCC)    HCAP (healthcare-associated pneumonia)    Acidosis    Bilateral pleural effusion    Endocarditis    Closed compression fracture of L1 lumbar vertebra, with routine healing, subsequent encounter    Goals of care, counseling/discussion    Dysphagia    #Acute on chronic diastolic heart failure  # acute on chronic kidney disease   #Mitral Valve vegetation     Plan:  1  Acute on chronic diastolic heart failure   -Bumex prn, as tolerated   -Monitor BMP  Cre rising at 3 07today (baseline ~1 2-1 5)      2  Mitral valve vegetation:   -Pt is not a surgical candidate at this time after 2 evaluations by CT surgery    -Wife expressed interest in possible transfer to [facility] for a third opinion   -Palliative care as prognosis is poor  3  Sepsis:   -Currently on IV Vacoymycin   -blood cultures grew gram + rods  6  Acute on chronic kidney disease:   -hold ACEi   -recs from Nephro appreciated     Subjective/Objective     Subjective: Pt was seen and examined at bedside  He was alert but confused  His wife was at bedside at time of exam  He is currently sat 93% on 5L  Family meeting is planned for later this afternoon for goals of care           Objective:  Vitals: /62   Pulse 85   Temp (!) 96 4 °F (35 8 °C)   Resp 16   Ht 5' 8" (1 727 m)   Wt 70 7 kg (155 lb 13 8 oz)   SpO2 93%   BMI 23 70 kg/m²   Vitals:    04/19/21 0600 04/20/21 0600   Weight: 69 kg (152 lb 1 9 oz) 70 7 kg (155 lb 13 8 oz) Orthostatic Blood Pressures      Most Recent Value   Blood Pressure  119/62 filed at 04/20/2021 1244   Patient Position - Orthostatic VS  Lying filed at 04/19/2021 0742            Intake/Output Summary (Last 24 hours) at 4/20/2021 1040  Last data filed at 4/20/2021 1001  Gross per 24 hour   Intake --   Output 300 ml   Net -300 ml     Review of Systems   Constitutional: Negative for chills and fever  HENT: Negative for ear pain and sore throat  Eyes: Negative for pain and visual disturbance  Respiratory: Negative for cough and shortness of breath  Cardiovascular: Negative for chest pain and palpitations  Gastrointestinal: Negative for abdominal pain and vomiting  Genitourinary: Negative for dysuria and hematuria  Musculoskeletal: Negative for arthralgias and back pain  Skin: Negative for color change and rash  Neurological: Negative for seizures and syncope  Psychiatric/Behavioral: Positive for confusion  All other systems reviewed and are negative  Physical Exam  Vitals signs and nursing note reviewed  Constitutional:       Appearance: He is ill appearing,    Comments: Pale in appearance    HENT:      Head: Normocephalic and atraumatic  Eyes:      Conjunctiva/sclera: Conjunctivae normal    Neck:      Musculoskeletal: Neck supple  Cardiovascular:      Rate and Rhythm: Normal rate and regular rhythm  Pulses: Normal pulses  Heart sounds: Normal heart sounds  No murmur  No gallop  Pulmonary:      Effort: Pulmonary effort is normal  No respiratory distress  Breath sounds: Normal breath sounds  Abdominal:      Palpations: Abdomen is soft  Tenderness: There is no abdominal tenderness  Musculoskeletal: Normal range of motion  Comments: R foot ulcer, wrapped in ace bandage   1+ pitting edema b/l      Skin:     General: Skin is warm and dry  Neurological:      Mental Status: He is alert           Medications:    Current Facility-Administered Medications:     acetaminophen (TYLENOL) tablet 650 mg, 650 mg, Oral, Q6H PRN, Sharon Landrum, DO, 650 mg at 04/18/21 1405    aspirin chewable tablet 81 mg, 81 mg, Oral, Daily, Sharon Landrum, DO, 81 mg at 04/19/21 0914    atorvastatin (LIPITOR) tablet 5 mg, 5 mg, Oral, Daily, Sharon Landrum, DO, 5 mg at 04/19/21 2518    bisacodyl (DULCOLAX) rectal suppository 10 mg, 10 mg, Rectal, Daily PRN, Sharon Landrum, DO    cholecalciferol (VITAMIN D3) tablet 4,000 Units, 4,000 Units, Oral, Daily, Sharon Landrum, DO, 4,000 Units at 04/19/21 0914    docusate sodium (COLACE) capsule 100 mg, 100 mg, Oral, BID, Sharon Landrum, DO, 100 mg at 04/19/21 1609    ferrous sulfate tablet 325 mg, 325 mg, Oral, Daily With Breakfast, Sharon Landrum, DO, 325 mg at 04/18/21 0757    heparin (porcine) subcutaneous injection 5,000 Units, 5,000 Units, Subcutaneous, Q8H [address], Sharon Landrum, DO, 5,000 Units at 04/20/21 0510    insulin glargine (LANTUS) subcutaneous injection 5 Units 0 05 mL, 5 Units, Subcutaneous, QAM, Dmitriy Lee MD, 5 Units at 04/19/21 1537    insulin lispro (HumaLOG) 100 units/mL subcutaneous injection 1-5 Units, 1-5 Units, Subcutaneous, TID AC, 2 Units at 04/19/21 1606 **AND** Fingerstick Glucose (POCT), , , TID AC, Sharon Landrum, DO    insulin lispro (HumaLOG) 100 units/mL subcutaneous injection 1-5 Units, 1-5 Units, Subcutaneous, HS, Sharon Landrum, DO, 1 Units at 04/19/21 2246    insulin lispro (HumaLOG) 100 units/mL subcutaneous injection 4 Units, 4 Units, Subcutaneous, TID after meals, Dmitriy Lee MD, 4 Units at 04/19/21 1609    levothyroxine tablet 125 mcg, 125 mcg, Oral, Early Morning, Sharon Landrum, DO, 125 mcg at 04/20/21 0510    melatonin tablet 3 mg, 3 mg, Oral, HS PRN, Johnathon Wilson PA-C, 3 mg at 04/16/21 0053    ondansetron (ZOFRAN) injection 4 mg, 4 mg, Intravenous, Q6H PRN, Sharon Landrum, DO    pantoprazole (PROTONIX) EC tablet 40 mg, 40 mg, Oral, Daily Before Breakfast, Sharon Landrum, DO, 40 mg at 04/20/21 0510    polyethylene glycol (MIRALAX) packet 17 g, 17 g, Oral, Daily PRN, Trish Mode, DO    tamsulosin [facility]) capsule 0 4 mg, 0 4 mg, Oral, Daily With Dinner, Trish Mode, DO, 0 4 mg at 04/19/21 1609    vancomycin (VANCOCIN) IVPB (premix in dextrose) 1,000 mg 200 mL, 15 mg/kg, Intravenous, Daily PRN, Ksenia Crane MD    Lab Results:      Results from last 7 days   Lab Units 04/19/21  1140 04/18/21  0548 04/17/21  0612   WBC Thousand/uL 17 30* 15 61* 19 36*   HEMOGLOBIN g/dL 8 2* 8 2* 9 0*   HEMATOCRIT % 26 2* 26 0* 27 2*   PLATELETS Thousands/uL 119* 122* 142*         Results from last 7 days   Lab Units 04/20/21  0526 04/19/21  1139 04/18/21  0548   POTASSIUM mmol/L 4 2 4 2 3 7   CHLORIDE mmol/L 103 100 102   CO2 mmol/L 26 24 27   BUN mg/dL 78* 70* 69*   CREATININE mg/dL 3 07* 3 15* 2 96*   CALCIUM mg/dL 8 8 8 6 7 9*         Results from last 7 days   Lab Units 04/20/21  0526 04/17/21  0612   MAGNESIUM mg/dL 2 5 2 1       [facility]   [facility]

## 2021-04-20 NOTE — PROGRESS NOTES
Vancomycin IV Pharmacy-to-Dose Consultation    Brittany Landry is a 76 y o  male who is currently receiving Vancomycin IV with management by the Pharmacy Consult service  Assessment/Plan:  The patient was reviewed  Renal function is stable and no signs or symptoms of nephrotoxicity and/or infusion reactions were documented in the chart  Based on todays assessment, continue current vancomycin (day # 9) dosing of 750 mg prn, level still high with a plan for trough to be drawn at am draw on apr22  We will continue to follow the patients culture results and clinical progress daily      Brianna Zepeda, Pharmacist

## 2021-04-20 NOTE — PROGRESS NOTES
[address] [facility]  Progress Note - Dc Shoulder 1945, 76 y o  male MRN: [id number]  Unit/Bed#: [facility] 803-01 Encounter: [id number]  Primary Care Provider: Ronaldo Mcintosh MD   Date and time admitted to hospital: 4/10/2021 10:27 AM    Dysphagia  Assessment & Plan  S/p VBS today  Silent aspiration  Pureed diet with nectar thick liquids    Goals of care, counseling/discussion  Assessment & Plan  Palliative care on board, appreciate recommendation  Per CT surgery patient is not a candidate for surgery, he was evaluated by 2 CT surgeons  Patient would like a 3rd opinion, most likely [facility]  Multi disciplinary family meeting: family wants everything,       Closed compression fracture of L1 lumbar vertebra, with routine healing, subsequent encounter  Assessment & Plan  Followed by neuro surgery  INESO brace while out of bed    Endocarditis  Assessment & Plan  Echo showed mitral valve vegetation  CHARLES showed large mitral valve vegetation measuring 19 mm x 17 mm, and severe MR due to vegetation  Evaluated by CT surgery on Friday, not a candidate for surgery  Family requested a 2nd opinion  Evaluated by Dr Aaron Ortiz today, also thinks he is not a candidate for surgery  Patient would like a 3rd opinion outside [employer] [facility]         Bilateral pleural effusion  Assessment & Plan  CT showed large dependent bilateral pleural effusions associated with atelectasis of the lower lobes  Status post left-sided thoracocentesis by Pulm 4/14, removed 1 2 L  Status post right-sided thoracocentesis 4/16, 1 L of clear yellow fluid removed    Acidosis  Assessment & Plan  Resolved, bicarb tabs discontinued 4/13    HCAP (healthcare-associated pneumonia)  Assessment & Plan  Started on vanc and cefepime for possible Hcap  Blood cultures 4/12/21 growing Corynebacterium striatum  Continue vancomycin and Cefepime        Acute on chronic diastolic heart failure (HCC)  Assessment & Plan  Wt Readings from Last 3 Encounters:   04/20/21 70 7 kg (155 lb 13 8 oz)   04/05/21 78 6 kg (173 lb 4 5 oz)   03/27/21 72 kg (158 lb 11 7 oz)     Echo in 03/29/2021 showed ejection fraction 65%, mild MR, mild TR  Continue to hold diuretics today, due to worsening kidney function  7 8 L negative balance since admission  Dc Ortez per Nephro 4/16  Monitor kidney function  Input and output, daily weight        Bacteremia  Assessment & Plan  Blood cultures from 04/12 growing Corynebacterium striatum  Continue IV Vanco  Source could be right foot ulcer  ID on board, appreciate recommendation  Echo showed anterior mitral leaflet vegetation  CHARLES ejection fraction was 60%, no regional wall motion abnormalities, large protruding echogenic mobile vegetation measuring 19 mm time 17 mm on the anterior leaflet, spanning the A1/A2 scallop with valve perforation at A2, located in the atrial aspect of the valve, prolapse into the ventricle with diastole, severe regurgitation  Evaluated by CT surgery, not a candidate for surgery      Hyponatremia  Assessment & Plan  Sodium 135  Continue fluid restriction  Off salt tabs    Complete heart block   Assessment & Plan  Patient has a history of complete heart block, status post Medtronic dual-chamber ppm implanted 2015  Now with endocarditis  Evaluated by EP, appreciate recommendations  He is 100% pacemaker dependent  On interrogation he has an underlying rhythm and he is bradycardic and has CHB     Per CT surgery, not a candidate for surgery    Encephalopathy  Assessment & Plan  Multifactorial suspect in the setting of heart failure, kidney failure, bacteremia, Hcap  Reorient, sleep-wake cycle      KAZ on CKD stage 3  Assessment & Plan  POA  Baseline creatinine from 1 2-1 5  Suspect cardiorenal, component of ATN  Creatinine slightly worsening, 3 15 today  Continue to hold diuretics  Nephrology following    Stage 3b chronic kidney disease  Assessment & Plan  Lab Results   Component Value Date    EGFR 19 04/20/2021    EGFR 18 04/19/2021    EGFR 20 04/18/2021    CREATININE 3 07 (H) 04/20/2021    CREATININE 3 15 (H) 04/19/2021    CREATININE 2 96 (H) 04/18/2021     Monitor creatinine closely   Input and output  Daily weights  Nephrology on board  Avoid nephrotoxic medications  Keep SBP>130    Essential hypertension  Assessment & Plan  Continue to hold diuretics  Monitor blood pressure    Type I diabetes mellitus [facility])  Assessment & Plan  Lab Results   Component Value Date    HGBA1C 7 7 (H) 02/05/2021       Recent Labs     04/19/21  1603 04/19/21  2102 04/20/21  0810 04/20/21  1107   POCGLU 221* 152* 173* 300*       Blood Sugar Average: Last 72 hrs:  (P) 610 [id number]   Endo recommendations appreciated  On insulin pump OP  On SQ insulin IP  Aappetite fluctuates  Continue Lantus 5 units q h s  Humalog 4 units t i d  If patient eats more than 50% of meal, if not then 2 units    Anemia of chronic disease  Assessment & Plan  In the past 2 weeks has been range of 7 and 8  Hemoglobin 4/16 6 8, status post 1 PRBC  Hemoglobin today 8 2      * Acute respiratory failure with hypoxia [facility])  Assessment & Plan  POA, secondary to acute on chronic diastolic heart failure, Srini on CKD stage 3, b/l pleural effusion, Hcap, recent COVID infection in February  RRT at [facility] needing 10L O2    CT 4/13 showed dense airspace consolidation throughout much of the left upper lobe with lesser airspace infiltrates, subsegmental in the right upper lobe, does not have a typical pattern of CHF, possible infectious etiology    Large dependent bilateral pleural effusions  Weaned down from 10 L to 3 L, now up to 4 L  Negative balance 7 8 L since admission  Diuresis :  IV diuretics stopped 4/18 due to jump in creatinine  Continue cefepime and Vanco for HCAP, endocarditis  Status post left sided thoracocentesis 4/14, 1 2 L removed, transudate  Status post right-sided thoracocentesis 4/16, 1 L of clear yellow fluid removed  Goal oxygen saturation 89% and above  Monitor in and output  Daily weights  Patient again became hypoxic requiring 15 liters midflow  ABG looks alkalosis, would monitor  Pulmonary discussed with family regarding patient's current condition, however, family wants to continue with everything  He is still full code status, CPR and Intubation if he needs  Family will discuss and comeback with plan  VTE Pharmacologic Prophylaxis:   Pharmacologic: Heparin  Mechanical VTE Prophylaxis in Place: Yes    Patient Centered Rounds: I have performed bedside rounds with nursing staff today  Discussions with Specialists or Other Care Team Provider: pulmonary, cardiology and nephrology regarding current treatments and condition  Education and Discussions with Family / Patient: regarding family meeting and deterioration to midflow to 15 liters, possible bipap vs  Intubation  Family wanted everything done for now  They would like to make decision  Time Spent for Care: 45 minutes  More than 50% of total time spent on counseling and coordination of care as described above  Current Length of Stay: 10 day(s)    Current Patient Status: Inpatient   Certification Statement: The patient will continue to require additional inpatient hospital stay due to pulmonary status    Discharge Plan: patient is not doing well today  Pending continues treatments and discharge planning  Code Status: Level 1 - Full Code      Subjective:   Patient is doing well  Objective:     Vitals:   Temp (24hrs), Av 7 °F (35 9 °C), Min:96 2 °F (35 7 °C), Max:97 3 °F (36 3 °C)    Temp:  [96 2 °F (35 7 °C)-97 3 °F (36 3 °C)] 97 2 °F (36 2 °C)  HR:  [81-91] 91  Resp:  [16-24] 24  BP: (102-119)/(60-95) 103/61  SpO2:  [86 %-94 %] 94 %  Body mass index is 23 7 kg/m²  Input and Output Summary (last 24 hours):        Intake/Output Summary (Last 24 hours) at 2021 1542  Last data filed at 2021 1001  Gross per 24 hour   Intake --   Output 300 ml   Net -300 ml       Physical Exam:     Physical Exam  Vitals signs and nursing note reviewed  Constitutional:       Appearance: Normal appearance  He is not ill-appearing, toxic-appearing or diaphoretic  HENT:      Head: Normocephalic and atraumatic  Eyes:      General: No scleral icterus  Right eye: No discharge  Left eye: No discharge  Extraocular Movements: Extraocular movements intact  Conjunctiva/sclera: Conjunctivae normal       Pupils: Pupils are equal, round, and reactive to light  Neck:      Musculoskeletal: Normal range of motion and neck supple  No neck rigidity or muscular tenderness  Cardiovascular:      Rate and Rhythm: Normal rate  Pulmonary:      Effort: Pulmonary effort is normal       Breath sounds: Normal breath sounds  Abdominal:      General: Abdomen is flat  Bowel sounds are normal  There is no distension  Palpations: Abdomen is soft  There is no mass  Tenderness: There is no abdominal tenderness  Hernia: No hernia is present  Musculoskeletal: Normal range of motion  General: No swelling, tenderness, deformity or signs of injury  Skin:     General: Skin is warm and dry  Coloration: Skin is not jaundiced or pale  Findings: No bruising  Neurological:      General: No focal deficit present  Mental Status: He is alert and oriented to person, place, and time  Cranial Nerves: No cranial nerve deficit  Sensory: No sensory deficit  Motor: No weakness  Coordination: Coordination normal    Psychiatric:         Mood and Affect: Mood normal          Behavior: Behavior normal          Thought Content:  Thought content normal          Judgment: Judgment normal          Additional Data:     Labs:    Results from last 7 days   Lab Units 04/20/21  1522 04/20/21  0526   WBC Thousand/uL  --  14 91*   HEMOGLOBIN g/dL  --  8 1*   I STAT HEMOGLOBIN g/dl 7 1*  --    HEMATOCRIT %  --  25 8*   HEMATOCRIT, ISTAT % 21*  -- PLATELETS Thousands/uL  --  114*   NEUTROS PCT %  --  88*   LYMPHS PCT %  --  6*   MONOS PCT %  --  5   EOS PCT %  --  0     Results from last 7 days   Lab Units 04/20/21  1522 04/20/21  0526   POTASSIUM mmol/L  --  4 2   CHLORIDE mmol/L  --  103   CO2 mmol/L  --  26   CO2, I-STAT mmol/L 29  --    BUN mg/dL  --  78*   CREATININE mg/dL  --  3 07*   CALCIUM mg/dL  --  8 8   GLUCOSE, ISTAT mg/dl 118  --            * I Have Reviewed All Lab Data Listed Above  * Additional Pertinent Lab Tests Reviewed: [facility]    Imaging:    Imaging Reports Reviewed Today Include: Cgest xray congestion, pneumonia, possible aspiration   Imaging Personally Reviewed by Myself Includes:  CXR congestion and pneumonia    Recent Cultures (last 7 days):     Results from last 7 days   Lab Units 04/16/21  1119 04/14/21  1031 04/14/21  0416 04/14/21  0410   BLOOD CULTURE   --   --  No Growth After 5 Days  No Growth After 5 Days     GRAM STAIN RESULT  2+ Polys  No bacteria seen 1+ Polys  No bacteria seen  --   --    BODY FLUID CULTURE, STERILE  No growth No growth  --   --        Last 24 Hours Medication List:   Current Facility-Administered Medications   Medication Dose Route Frequency Provider Last Rate    acetaminophen  650 mg Oral Q6H PRN Andi Roberson, DO      aspirin  81 mg Oral Daily Andi Roberson, DO      atorvastatin  5 mg Oral Daily Andi Roberson, DO      bisacodyl  10 mg Rectal Daily PRN Andi Roberson, DO      bumetanide  2 mg Intravenous BID Serafin Grace MD      cholecalciferol  4,000 Units Oral Daily Andi Roberson, DO      docusate sodium  100 mg Oral BID Andi Roberson, DO      ferrous sulfate  325 mg Oral Daily With Breakfast Andi Roberson DO      heparin (porcine)  5,000 Units Subcutaneous [facility] Andi Roberson DO      insulin glargine  5 Units Subcutaneous JOSEE Rosado MD      insulin lispro  1-5 Units Subcutaneous TID AC Andi Roberson DO      insulin lispro  1-5 Units Subcutaneous HS Otto Jane, DO      insulin lispro  4 Units Subcutaneous TID after meals Jojo Cruz MD      levothyroxine  125 mcg Oral Early Morning Otto Jane, DO      melatonin  3 mg Oral HS PRN JOSE Molina      ondansetron  4 mg Intravenous Q6H PRN Otto Jane, DO      pantoprazole  40 mg Oral Daily Before Breakfast Otto Jane, DO      polyethylene glycol  17 g Oral Daily PRN Otto Jane, DO      tamsulosin  0 4 mg Oral Daily With [facility], DO      vancomycin  15 mg/kg Intravenous Daily PRN Kaycee Martinez MD          Today, Patient Was Seen By: Michele Hernandez MD    ** Please Note: Dictation voice to text software may have been used in the creation of this document   **

## 2021-04-20 NOTE — ACP (ADVANCE CARE PLANNING)
Record of Family Meeting - Palliative and [address] 76 y o  male [id number]      Recommendations and Plan:  - continue full cares  - consider 3rd CTS opinion at [facility] or Monse Harding  - optimize nutritional status and work with restorative therapies    NOTE: at end of meeting, notified by  Dr Tonia Lee regarding acute change in status  Plan to follow up on pulmonary evaluation and re-address goals of care further tomorrow  A family meeting was held for Mr Parker  This meeting was necessary for determine the appropriate course of treatment  Time of Meetin:00  Meeting Location: [address]  Participants:    Dr Karol Askew- cardiology   [place], [address] [facility]- cardiology    Dr Deal Ahr- nephrology   Dr Maricarmen Cameron, PGY-1   Ana Espinoza - ID (via phone)   Dr Mary Plascencia   Family:    Baljit Martino (via phone)    Patient Participation: no    Patient Support System: present     Advanced Directive of POLST available: no    Topics of Discussion:  - Dr Estrada Espinoza provided detailed update on concerns regarding infection with inability to obtain source control and that best case will gaining control of infection but likely recurrence  - Dr Karol Askew provided update regarding heart failure and expressed concerns that PPM is also infected and removal of this device and re-inserting a new one will not "fix" the underlying issue and new pacer would likely become infected   - Dr Deal Ahr discussed concerns of rising creatine and decreased UOP  Discussed that without improvement he would need HD   - Discussed poor nutrition and performance status and that this would have to be optimized with possible artificial nutrition if full cares desired    - Discussed limits on care including CPR, etc  - Introduced end of life care    Other Content of Meeting:  - all questions/concerns addressed to their satisfation  - time spent providing supportive listening    Time Involved in Meetin minutes, beginning at approximately  2:00 and ending at approximately 3:00  Avery Ulloa, DO   Palliative and Supportive Care  Clinic/Answering Service: [phone]  You can find me on [employer]!

## 2021-04-21 NOTE — DISCHARGE SUMMARY
Discharge Summary - Jacob Vo 76 y o  male MRN: [id number]    Unit/Bed#: MICU 04 Encounter: [id number] PCP: Harry Hutchins MD    Admission Date:   Admission Orders (From admission, onward)     Ordered        04/10/21 1307  Inpatient Admission  Once                     Admitting Diagnosis: Respiratory distress [R06 03]    HPI and Summary of Hospital Course: 55-year-old gentleman with history of type 1 diabetes mellitus, CKD stage 3, diastolic CHF, and complete heart block status post pacemaker  The patient had a prolonged series of hospitalizations that began on 03/27/2021 when he was admitted for generalized weakness and found to have non-MI troponin elevation  He had an L1 compression fracture limiting his ability to return home after hospitalization and was transferred to acute inpatient rehab on 04/02  Patient was a rapid response for worsening hypoxia on 4/10 and was transferred back to medical service  He developed pulmonary edema from worsening heart failure and pneumonia and became bacteremic  Blood cultures grew Corynebacterium striatum  Patient had an TTE and CHARLES on 4/13 and 4/14 that showed large vegetation on the mitral valve causing valve perforation and severe mitral regurgitation  There were large bilateral pleural effusions and possible septic emboli seen on follow-up chest imaging  He underwent thoracenteses on 04/14 and 4/16 with removal of 1-2 L of fluid around each lung but continued with fluid overload and tenuous respiratory status  He was evaluated by multiple physicians from the cardiothoracic surgery team who reviewed patient's clinical course and determined that he was not an appropriate surgical candidate for valve replacement  Patient's respiratory status worsened throughout hospitalization despite initiating diuresis and continuing antibiotics  He also had significant decline in renal function    On 04/20 he became significantly more hypoxic, satting 88% on 15 L nasal cannula and was transitioned to high-flow nasal cannula and Bumex drip while transferring to ICU  After arriving in the ICU patient had worsening encephalopathy and respiratory distress requiring transition to BiPAP  Patient was on the verge of needing intubation / mechanical ventilation for poor mental status and work of breathing  Family meeting was held, and patient's wife, son, and daughter in-law decided to transition to comfort care  Invasive medications and monitoring were discontinued  Patient  at 12:05 am on   Disposition:      Final Diagnoses: Mitral valve endocarditis, acute on chronic diastolic heart failure, acute hypoxic respiratory failure, acute on chronic kidney injury, acute encephalopathy    Resolved Problems  Date Reviewed: 2021    None          Condition at Time of Death: Critically ill    Date, Time and Cause of Death    Date of Death: 21  Time of Death: 12:05 AM  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  Entered by: Rock Crespo MD[ST1 1]     Attribution     NQ2 6 Tal Rob MD 21 00:09          Death Note:    INPATIENT DEATH NOTE  Beverly Gould 76 y o  male MRN: [id number]  [facility] Encounter: [id number]    Date, Time and Cause of Death    Date of Death: 21  Time of Death: 12:05 AM  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  Entered by: Rock Crespo MD[ST1 1]     Attribution     PW6 2 Tal Rob MD 21 00:09           Patient's Information  Pronounced by: S Renette Dubin, MD  Did the patient's death occur in the ED?: No  Did the patient's death occur in the OR?: No  Did the patient's death occur less than 10 days post-op?: No  Did the patient's death occur within 24 hours of admission?: No  Was code status DNR at the time of death?: Yes    PHYSICAL EXAM:  Unresponsive to noxious stimuli, Spontaneous respirations absent, Breath sounds absent, Carotid pulse absent, Heart sounds absent, Pupillary light reflex absent and Corneal blink reflex absent    Medical Examiner notification criteria:  NONE APPLICABLE   Medical Examiner's office notified?:  No, does not meet ME notification criteria   Medical Examiner accepted case?:  N/A  Name of Medical Examiner: N/A    Family Notification  Was the family notified?: Yes  Date Notified: 21  Time Notified: 0005  Notified by: LIBERTAD Montes De Oca  Name of Family Notified of Death: [place]   Relationship to Patient: Spouse, Son, Daughter-in-law  Family Notification Route: At bedside  Was the family told to contact a  home?: Yes  Name of [facility][de-identified] (pending)    Autopsy Options:  Decision for post-mortem examination not yet made by next of kin    Primary Service Attending Physician notified?:  N/A    Physician/Resident responsible for completing Discharge Summary:  LIBERTAD Montes De Oca MD

## 2021-04-21 NOTE — QUICK NOTE
Family meeting with patient's wife, son, daughter-in-law, Dr Ashanti Lind, and ICU nurses  After discussing treatment options and patient's expected course going forward they have decided to focus on comfort  Patient will be made level 4 code status  Will discontinue invasive medications and monitors and order comfort care medications  Comfort cart ordered for patient's family at bedside      Jennifer Vargas MD  Emergency Medicine Resident Physician  PGY-3

## 2021-04-21 NOTE — QUICK NOTE
Patient has worsening encephalopathy since time of arrival in ICU  Worsening hypoxia on high-flow nasal cannula into the 80s requiring BiPAP  He is now saturating at 98% on BiPAP 14/8 100% FiO2  Dr Rajni Yeboah spoke with patient's family regarding very poor prognosis  They will be coming in this evening to see him and are considering the possibility of making him comfort care  He is at the cusp of requiring intubation from a mental status / airway protection / work of breathing standpoint, but family understands this will not fix his underlying problem  Will re-evaluate with family when they arrive       Kaitlynn Lemus MD  Emergency Medicine Resident Physician  PGY-3

## 2021-04-21 NOTE — DEATH NOTE
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  Entered by: Ricki Smith MD[ST1 1]     Attribution     DB3 5 Alesha Pierce MD 21 00:09           Patient's Information  Pronounced by: LIBERTAD Warren MD  Did the patient's death occur in the ED?: No  Did the patient's death occur in the OR?: No  Did the patient's death occur less than 10 days post-op?: No  Did the patient's death occur within 24 hours of admission?: No  Was code status DNR at the time of death?: Yes    PHYSICAL EXAM:  Unresponsive to noxious stimuli, Spontaneous respirations absent, Breath sounds absent, Carotid pulse absent, Heart sounds absent, Pupillary light reflex absent and Corneal blink reflex absent    Medical Examiner notification criteria:  NONE APPLICABLE   Medical Examiner's office notified?:  No, does not meet ME notification criteria   Medical Examiner accepted case?:  N/A  Name of Medical Examiner: N/A    Family Notification  Was the family notified?: Yes  Date Notified: 21  Time Notified: 0005  Notified by: LIBERTAD Warren  Name of Family Notified of Death: [place]   Relationship to Patient: Spouse, Son, Daughter-in-law  Family Notification Route: At bedside  Was the family told to contact a  home?: Yes  Name of [facility][de-identified] (pending)    Autopsy Options:  Decision for post-mortem examination not yet made by next of kin    Primary Service Attending Physician notified?:  N/A    Physician/Resident responsible for completing Discharge Summary:  LIBERTAD Warren MD

## 2021-04-22 NOTE — UTILIZATION REVIEW
Notification of Discharge   This is a Notification of Discharge from our facility [address]  Please be advised that this patient has been discharge from our facility  Below you will find the admission and discharge date and time including the patients disposition  UTILIZATION REVIEW CONTACT:  Cinthya Goldstein  Utilization   Network Utilization Review Department  Phone: [phone] x carefully listen to the prompts  All voicemails are confidential   Email: [email or web address]     PHYSICIAN ADVISORY SERVICES:  FOR KAWE-DM-IPPV REVIEW - MEDICAL NECESSITY DENIAL  Phone: [phone]  Fax: [phone]  Email: [email or web address]     PRESENTATION DATE: 4/10/2021 10:27 AM  OBERVATION ADMISSION DATE:   INPATIENT ADMISSION DATE: 4/10/21 1027   DISCHARGE DATE: 2021  1:41 AM  DISPOSITION:        IMPORTANT INFORMATION:  Send all requests for admission clinical reviews, approved or denied determinations and any other requests to dedicated fax number below belonging to the campus where the patient is receiving treatment   List of dedicated fax numbers:  [address] DENIALS (Administrative/Medical Necessity) [phone]   [address] (Maternity/NICU/Pediatrics) [phone]   Violette Yang [phone]   [facility] [phone]   Arianne Choi [phone]   [facility] [address] [facility] [phone]   [facility]  [phone]   [address] [facility] [address]  [address] [facility] [address] [facility] [phone]

## 2021-04-26 ENCOUNTER — TELEPHONE (OUTPATIENT)
Dept: PODIATRY | Facility: CLINIC | Age: 76
End: 2021-04-26

## 2021-04-26 ENCOUNTER — TELEPHONE (OUTPATIENT)
Dept: PALLIATIVE MEDICINE | Facility: HOSPITAL | Age: 76
End: 2021-04-26

## 2021-04-26 NOTE — TELEPHONE ENCOUNTER
DEANW received a call from patient's son regarding the death certificate that doesn't mention the infection on the pace maker  The family feels that this is an important component to receiving [employer] benefits  DEANW forwarded a message to Dr Jay Hamlin to request a call back to son

## 2021-05-07 LAB
BACTERIA BLD CULT: ABNORMAL
GRAM STN SPEC: ABNORMAL

## 2021-10-01 NOTE — PATIENT INSTRUCTIONS
Debridement   WHAT YOU NEED TO KNOW:   Debridement is the removal of infected, damaged, or dead tissue so a wound can heal properly  You may need more than one debridement  DISCHARGE INSTRUCTIONS:   Medicines:   · Medicines  can help decrease pain or prevent or treat an infection  · Take your medicine as directed  Contact your healthcare provider if you think your medicine is not helping or if you have side effects  Tell him of her if you are allergic to any medicine  Keep a list of the medicines, vitamins, and herbs you take  Include the amounts, and when and why you take them  Bring the list or the pill bottles to follow-up visits  Carry your medicine list with you in case of an emergency  Follow up with your healthcare provider as directed: You may need to return to have your wound checked  Write down your questions so you remember to ask them during your visits  Care for your wound as directed:   · Keep your wound clean and dry  You may need to cover your wound when you bathe  · Limit movements,  such as stretching, to prevent bleeding, tearing, and swelling in your wound  · Protect your wound  Avoid sunlight for at least 6 months  Apply mild, unscented lotion or cream to the skin around your wound to keep it moist     · Do not smoke  If you smoke, it is never too late to quit  Smoking decreases blood flow to the wound and delays healing  Ask for information if you need help quitting  · Drink liquids as directed  Ask how much liquid to drink each day and which liquids are best for you  Liquids help keep your skin moist so your wound can heal      · Eat a variety of healthy foods  Foods rich in protein, such as meat, eggs, and dairy products, help repair tissue  Carbohydrate-rich foods, such as bread and cereals, help increase cell growth and decrease the risk for wound infection  Do not have caffeine  Ask if you should take vitamins   Vitamins A and C may help tissue formation and increase EXAMINATION TYPE: XR humerus RT

 

DATE OF EXAM: 10/1/2021

 

COMPARISON: NONE

 

HISTORY: Fall. Pain

 

TECHNIQUE: 2 views

 

FINDINGS: There is anterior dislocation of the glenohumeral joint. I see no fracture line.

 

IMPRESSION: Anterior shoulder joint dislocation. No evidence of elbow fracture. scar tissue strength  Contact your healthcare provider if:   · You have a fever  · Your pain gets worse or does not go away, even after treatment  · Your skin is red, swollen, or draining pus  · You have questions or concerns about your condition or care  Return to the emergency department if:   · Blood soaks through your bandage  · You have severe pain  © 2017 [employer]  Information is for End User's use only and may not be sold, redistributed or otherwise used for commercial purposes  All illustrations and images included in CareNotes® are the copyrighted property of [employer]  or Kirill Ulrich  The above information is an  only  It is not intended as medical advice for individual conditions or treatments  Talk to your doctor, nurse or pharmacist before following any medical regimen to see if it is safe and effective for you

## 2021-12-16 NOTE — TELEPHONE ENCOUNTER
Dr Elaine Neil pt  Carmen Gilmore [address] called needing clarification on if the pt should have both the Golytely and the Glycolax   Pharmacist can be reached at [phone] Yes - the patient is able to be screened
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
## 2022-08-09 NOTE — SOCIAL WORK
No needs are identified at the time of the initial assessment,  care manager will respond upon request or reassess patient for discharge needs as appropriate  Pt. notified of results per letter and recall added.

## 2022-11-06 NOTE — NURSING NOTE
PT reported to nurse that during OT session this afternoon patient was stating there were dead people in his room  Dr Donato Fisher made aware  Labs/Imaging Studies/Medications

## 2023-12-20 NOTE — ASSESSMENT & PLAN NOTE
POA suspect secondary to acute on chronic diastolic heart failure, Srini on CKD stage 3, b/l pleural effusion, Hcap, recent COVID infection in February  RRT at [facility] needing 10L O2    CT 4/13 showed dense airspace consolidation throughout much of the left upper lobe with lesser airspace infiltrates, subsegmental in the right upper lobe, does not have a typical pattern of CHF, possible infectious etiology    Large dependent bilateral pleural effusions  Weaned down from 10 L to 4 L  Negative balance 5 3 L since admission  Diuresis : lasix 80 mg q8 hours, transitioned to Bumex 2 mg IV tid 4/13  Continue cefepime and Vanco for HCAP  Status post left sided thoracocentesis 4/14, 1 2 L removed, transudate  Goal oxygen saturation 89% and above  Monitor in and output  Daily weights Patient on room air, no distress noted, and will continue to monitor.

## 2024-02-27 NOTE — PROGRESS NOTES
[address] [facility]  Progress Note - Fara Boyce 1945, 76 y o  male MRN: [id number]  Unit/Bed#: [facility] 920-01 Encounter: [id number]  Primary Care Provider: Emilie Landers MD   Date and time admitted to hospital: 3/27/2021  3:35 PM    * Gram-positive bacteremia  Assessment & Plan  Admitted complaining of generalized weakness increased urinary frequency and diarrhea  Spiking episode of fever of 100 3 on March 27, 2021  Has remained afebrile during the last 48  hours  Blood culture from March 27, 2021 2/2 sets of blood culture growing Gram-positive rods ( Corynebacterium striatum groupAbnormal )  Urine culture 03/27/2021-negative  Follow-up Chest x-ray 3/29 showed right lower lobe consolidation likely residual from COVID-19 pneumonia on February  CT abdomen and pelvis showed no acute intra-abdominal abnormalities  Pulmonary finding compatible with recent COVID-19 pneumonia  (diagnosed on February 5, 2021)  Initially treated with ceftriaxone switched to cefepime  Stool enteric bacterial panel by PCR-negative, stool C difficile discontinued due to resolution of diarrhea  Infectious on board, recommendation to discontinue antibiotics; blood cultures oral likely contaminant  Patient has been off antibiotics during the last 48 hours has remained hemodynamically stable, afebrile  Trend temperature curve and WBC count, monitor off antibiotics    L1 vertebral fracture (HCC)  Assessment & Plan  Complaining of persistent lower back pain exacerbated by movement  Denied stool or urine incontinence, denies urinary retention  No weakness or numbness of bilateral lower extremities  CT abdomen and pelvis on 03/27/2021:  Significant interval loss of L1 vertebral body height consistent with progression of 1 stable chronic fracture  No significant fracture fragment retropulsion  Transitional S1 vertebra  Neurosurgery on board, follow recommendations      Complete heart block [facility])  Assessment & Plan  S/p PPM   Seen at bedside, denies any chest pain or shortness of breath  Transthoracic echocardiogram showed ejection fraction 65% with no regional wall abnormalities  Cardiology on board, no further workup indicated  Follow as an outpatient with Cardiology    Hyponatremia  Assessment & Plan  Hyponatremia, most likely SIADH  Will rule out pseudohyponatremia, will order lipid profile and SPEP, UPEP, a m  Cortisol  Start small dose of torsemide 5 mg daily  Monitor electrolytes  Elevated troponin level not due myocardial infarction  Assessment & Plan  Likely non MI troponin elevation  Trop mildly elevated on admission; 0 32<--  0 38<-- 0 47  EKG w/o ST changes  History of complete heart block status post pacemaker implant  Transthoracic echocardiogram show LVEF 65% with no regional wall motion abnormalities  Unable to assess diastolic function  Cardiology on board, no further cardiac workup indicated  Follow with Cardiology as an outpatient regularly  Continue ASA and Lipitor      Stage 3 chronic kidney disease  Assessment & Plan  Lab Results   Component Value Date    EGFR 57 03/31/2021    EGFR 69 03/30/2021    EGFR 60 03/29/2021    CREATININE 1 23 03/31/2021    CREATININE 1 05 03/30/2021    CREATININE 1 18 03/29/2021   Cr at baseline  Monitor BUN/Cr  Avoid nephrotoxic agents and hypotension    Essential hypertension  Assessment & Plan  Blood pressure well controlled  Discontinue amlodipine  Start patient on lisinopril 2 5 mg p o  Daily    Type 2 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, left eye [facility])  Assessment & Plan  Lab Results   Component Value Date    HGBA1C 7 7 (H) 02/05/2021       Recent Labs     03/30/21  1643 03/30/21  2051 03/31/21  0644 03/31/21  1108   POCGLU 148* 184* 158* 131       Blood Sugar Average: Last 72 hrs:  (P) 116 [id number]   On insulin pump as an outpatient    Blood glucose currently within acceptable parameters  Over the last 24 hours patient has on episode of hypoglycemia with blood glucose in the 40-50, no episodes of hypoglycemia since midnight  Endocrinology on board, follow recommendations regarding antihyperglycemic management  Anemia  Assessment & Plan  Normocytic anemia, likely anemia of chronic disease  Prior iron studies showed finding consistent with anemia of chronic disease  Hemoglobin 8 6 grams/deciliter with no evidence of active bleeding  Denies seen any blood in stools  Denies tarry dark stools  Trend hemoglobin  Hypothyroidism  Assessment & Plan  Endocrinology on board, recommendations to continue levothyroxine 125 mcg, elevated TSH-6 43, free T4 within normal limits  Thyroid function panel results could be acute stress response with acute bacteremia  Endo recommendations to follow-up outpatient in 4 to 6 weeks for repeat testing prior to changing dose        Subjective:   Patient has been seen at bedside, he was somnolent although he was arousable to verbal stimuli  Overnight patient had a rough night he was awake and tried to walk out of the room, he was agitated requiring lorazepam IV 0 25 mg tab 1 around 2:00 a m  And another dose of 0 25 p o  Around 6:30 a m  Denies any precordial chest pain or shortness of breath  Was breathing oxygen supplementation from nasal cannula 2 liters/minute  Denied upper respiratory symptoms, fever chills  Denies abdominal pain, nausea vomiting  Diarrhea has subsided  Denies focal neurological deficits  Was complaining of lower back pain (chronic nature)  No additional complaints  Objective:     Vitals: Blood pressure 136/79, pulse 100, temperature 98 7 °F (37 1 °C), resp  rate 18, height 5' 8" (1 727 m), weight 72 kg (158 lb 11 7 oz), SpO2 94 %  ,Body mass index is 24 14 kg/m²        Intake/Output Summary (Last 24 hours) at 3/31/2021 1304  Last data filed at 3/31/2021 1001  Gross per 24 hour   Intake 771 67 ml   Output 600 ml   Net 171 67 ml         Physical Exam:     Physical Exam  Constitutional:       General: He is not in acute distress  Appearance: He is not toxic-appearing  Comments: Somnolent   HENT:      Head: Normocephalic  Nose: Nose normal       Mouth/Throat:      Mouth: Mucous membranes are moist    Eyes:      General: No scleral icterus  Extraocular Movements: Extraocular movements intact  Neck:      Musculoskeletal: Normal range of motion and neck supple  Cardiovascular:      Rate and Rhythm: Normal rate and regular rhythm  Pulses: Normal pulses  Heart sounds: No murmur  No friction rub  No gallop  Pulmonary:      Effort: Pulmonary effort is normal  No respiratory distress  Breath sounds: No wheezing, rhonchi or rales  Abdominal:      General: Bowel sounds are normal  There is no distension  Palpations: Abdomen is soft  Tenderness: There is no abdominal tenderness  There is no guarding or rebound  Musculoskeletal:         General: No swelling or deformity  Right lower leg: No edema  Left lower leg: No edema  Skin:     General: Skin is warm and dry  Capillary Refill: Capillary refill takes less than 2 seconds  Neurological:      General: No focal deficit present  Mental Status: He is oriented to person, place, and time     Psychiatric:         Mood and Affect: Mood normal          Behavior: Behavior normal            Invasive Devices     Peripheral Intravenous Line            Peripheral IV 03/29/21 Right Forearm 2 days                        CBC:   Lab Results   Component Value Date    WBC 14 57 (H) 03/31/2021    HGB 8 6 (L) 03/31/2021    HCT 26 4 (L) 03/31/2021    MCV 87 03/31/2021     03/31/2021    MCH 28 3 03/31/2021    MCHC 32 6 03/31/2021    RDW 14 8 03/31/2021    MPV 8 7 (L) 03/31/2021   ,   CMP:   Lab Results   Component Value Date    K 4 4 03/31/2021     03/31/2021    CO2 23 03/31/2021    BUN 22 03/31/2021    CREATININE 1 23 03/31/2021    CALCIUM 7 5 (L) 03/31/2021    EGFR 57 03/31/2021   ,   PT/INR: No results found for: PT, INR,   Troponin: No results found for: TROPONINI,     Imaging Studies: I have personally reviewed pertinent reports  Counseling / Coordination of Care  Total time spent today  30 minutes  Greater than 50% of total time was spent with the patient and / or family counseling and / or coordination of care  I spoke to patient's wife; Eduar Helen  I will update her with patient's current condition  All questions/concerns were addressed  Will continue updating family members on regular basis  none

## (undated) DEVICE — WET SKIN PREP TRAY: Brand: MEDLINE INDUSTRIES, INC.

## (undated) DEVICE — OCCLUSIVE GAUZE STRIP,3% BISMUTH TRIBROMOPHENATE IN PETROLATUM BLEND: Brand: XEROFORM

## (undated) DEVICE — ACE WRAP 4 IN UNSTERILE

## (undated) DEVICE — INTENDED FOR TISSUE SEPARATION, AND OTHER PROCEDURES THAT REQUIRE A SHARP SURGICAL BLADE TO PUNCTURE OR CUT.: Brand: BARD-PARKER SAFETY BLADES SIZE 15, STERILE

## (undated) DEVICE — ABDOMINAL PAD: Brand: DERMACEA

## (undated) DEVICE — TELFA NON-ADHERENT ABSORBENT DRESSING: Brand: TELFA

## (undated) DEVICE — GLOVE SRG BIOGEL 7.5

## (undated) DEVICE — DERMATOME BLADES: Brand: DERMATOME

## (undated) DEVICE — TAPE CAST 4IN FIBERGLASS 4YD WHITE

## (undated) DEVICE — GAUZE SPONGES,16 PLY: Brand: CURITY

## (undated) DEVICE — SYRINGE 10ML LL

## (undated) DEVICE — PADDING CAST 4 IN  COTTON STRL

## (undated) DEVICE — BETHLEHEM UNIVERSAL  MIONR EXT: Brand: CARDINAL HEALTH

## (undated) DEVICE — DRESSING MAXORB EXTRA AG 4 IN X 4.75 IN

## (undated) DEVICE — KERLIX BANDAGE ROLL: Brand: KERLIX

## (undated) DEVICE — COBAN 4 IN STERILE

## (undated) DEVICE — ZIMMER SKIN GRAFT CARRIER 8 INCH LENGTH: Brand: DERMACARRIERS

## (undated) DEVICE — NEEDLE 18 G X 1 1/2

## (undated) DEVICE — Device

## (undated) DEVICE — CUFF TOURNIQUET 34 X 4 IN QUICK CONNECT DISP 1BLA

## (undated) DEVICE — DRAPE C-ARM 48 X 84 IN F/ OEC MINIVIEW 6800

## (undated) DEVICE — BANDAGE, ESMARK LF STR 4"X9'(20/CS): Brand: CYPRESS

## (undated) DEVICE — PROXIMATE PLUS MD MULTI-DIRECTIONAL RELEASE SKIN STAPLERS CONTAINS 35 STAINLESS STEEL STAPLES APPROXIMATE CLOSED DIMENSIONS: 6.9MM X 3.9MM WIDE: Brand: PROXIMATE

## (undated) DEVICE — 10FR FRAZIER SUCTION HANDLE: Brand: CARDINAL HEALTH

## (undated) DEVICE — NEEDLE 25G X 1 1/2

## (undated) DEVICE — 3M™ TEGADERM™ TRANSPARENT FILM DRESSING FRAME STYLE, 1627, 4 IN X 10 IN (10 CM X 25 CM), 20/CT 4CT/CASE: Brand: 3M™ TEGADERM™

## (undated) DEVICE — PAD GROUNDING ADULT